# Patient Record
Sex: FEMALE | Race: WHITE | NOT HISPANIC OR LATINO | Employment: OTHER | URBAN - METROPOLITAN AREA
[De-identification: names, ages, dates, MRNs, and addresses within clinical notes are randomized per-mention and may not be internally consistent; named-entity substitution may affect disease eponyms.]

---

## 2019-08-23 ENCOUNTER — OFFICE VISIT - HEALTHEAST (OUTPATIENT)
Dept: FAMILY MEDICINE | Facility: CLINIC | Age: 44
End: 2019-08-23

## 2019-08-23 DIAGNOSIS — Z76.89 ENCOUNTER TO ESTABLISH CARE WITH NEW DOCTOR: ICD-10-CM

## 2019-08-23 DIAGNOSIS — M65.339 TRIGGER MIDDLE FINGER, UNSPECIFIED LATERALITY: ICD-10-CM

## 2019-08-23 DIAGNOSIS — E10.65 UNCONTROLLED TYPE 1 DIABETES MELLITUS WITH HYPERGLYCEMIA (H): ICD-10-CM

## 2019-08-23 RX ORDER — LISINOPRIL 20 MG/1
20 TABLET ORAL DAILY
Status: SHIPPED | COMMUNITY
Start: 2019-03-04 | End: 2022-09-30

## 2019-08-30 ENCOUNTER — RECORDS - HEALTHEAST (OUTPATIENT)
Dept: ADMINISTRATIVE | Facility: OTHER | Age: 44
End: 2019-08-30

## 2019-08-30 ENCOUNTER — COMMUNICATION - HEALTHEAST (OUTPATIENT)
Dept: FAMILY MEDICINE | Facility: CLINIC | Age: 44
End: 2019-08-30

## 2019-09-30 ENCOUNTER — COMMUNICATION - HEALTHEAST (OUTPATIENT)
Dept: FAMILY MEDICINE | Facility: CLINIC | Age: 44
End: 2019-09-30

## 2020-02-21 ENCOUNTER — COMMUNICATION - HEALTHEAST (OUTPATIENT)
Dept: FAMILY MEDICINE | Facility: CLINIC | Age: 45
End: 2020-02-21

## 2020-03-02 ENCOUNTER — COMMUNICATION - HEALTHEAST (OUTPATIENT)
Dept: FAMILY MEDICINE | Facility: CLINIC | Age: 45
End: 2020-03-02

## 2020-10-03 ENCOUNTER — COMMUNICATION - HEALTHEAST (OUTPATIENT)
Dept: SCHEDULING | Facility: CLINIC | Age: 45
End: 2020-10-03

## 2021-05-31 NOTE — PROGRESS NOTES
Assessment/Plan:    1. Encounter to establish care with new doctor  Patient establishing care at our clinic today.  All past medical history reviewed and updated in EMR.  Patient due for Td, administered today.  Patient due for Pap smear but declines today; per chart review Pap history 2018 normal, 2015 normal; patient reports no history of abnormal Pap smears.  - Td, Preservative Free (green label)    2. Trigger middle finger, unspecified laterality  Pt reporting bilateral trigger finger symptoms, hx of this and got cortisone injection 2 years ago that was helpful. Referral to orthopedics for management.  - Ambulatory referral to Orthopedics    3. Uncontrolled type 1 diabetes mellitus with hyperglycemia (H)  Patient with a history of type 1 diabetes.  Updated insulin regimen in chart.  Patient states she is due to see her endocrinologist next month and will have labs drawn at that time.  Most recent A1c per [facility] everywhere was 9.8% on 4/29/2019.  Per chart review patient also has kidney and eye damage from long-standing diabetes.  - insulin glargine (LANTUS; BASAGLAR) 100 unit/mL (3 mL) pen; Inject 20 Units under the skin at bedtime.  Dispense: 5 adj dose pen; Refill: 0      Follow up: when able for pap smear    Scarlett Kim MD  [facility]    Subjective:    Patient ID: Josefa Curiel is a 44 y.o. female is here today for establish care, trigger finger    Trigger finger, hx anxiety/depression  -hx trigger finger bilaterally: got cortisone injection approx 2 years bilaterally, bilateral middle fingers affected, starting to become more of an issue again, becoming bent and more painful  -mood has been doing well, declines therapy referral at this time, taking cymbalta, no side effects or concerns    Treatment team:  Endocrinologist Dr Domingo Costello, apt next month  Eye doctor a couple weeks ago; sees other eye doctor next month      Patient Active Problem List   Diagnosis     LORIN (acute kidney  injury) (H)     ETOH abuse     Cannabis abuse     Essential hypertension     Proteinuria     Intractable nausea and vomiting     Diabetes type 1, uncontrolled (H)     Chronic renal impairment, stage 3 (moderate) (H)     Upper GI bleeding     Proliferative diabetic retinopathy (H)     Nephrosis in diabetes mellitus (H)     Moderate episode of recurrent major depressive disorder (H)     Anxiety     Hyperlipidemia     Vitamin D deficiency     Trigger middle finger     Past Medical History:   Diagnosis Date     LORIN (acute kidney injury) (H)      Anxiety      Cannabis abuse      Depression      DKA (diabetic ketoacidoses) (H)      DKA (diabetic ketoacidoses) (H)      ETOH abuse      HLD (hyperlipidemia)      HTN (hypertension)      Lactic acidosis      Type 1 diabetes (H)      Past Surgical History:   Procedure Laterality Date     ANKLE FRACTURE SURGERY Left      APPENDECTOMY       Current Outpatient Medications on File Prior to Visit   Medication Sig Dispense Refill     atorvastatin (LIPITOR) 80 MG tablet Take 80 mg by mouth at bedtime.       DULoxetine (CYMBALTA) 30 MG capsule Take 60 mg by mouth daily.             insulin lispro (HUMALOG) 100 unit/mL injection Inject 1 Units under the skin 3 (three) times a day before meals. 1 unit for every 10 g CHO             lisinopril (PRINIVIL,ZESTRIL) 20 MG tablet Take 20 mg by mouth daily.       traZODone (DESYREL) 100 MG tablet Take 25-50 mg by mouth at bedtime as needed for sleep.       [DISCONTINUED] insulin glargine (LANTUS) 100 unit/mL injection Inject 17 Units under the skin every morning.       [DISCONTINUED] metoclopramide (REGLAN) 10 MG tablet Take 1 tablet (10 mg total) by mouth every 6 (six) hours. 30 tablet 0     [DISCONTINUED] omeprazole (PRILOSEC) 20 MG capsule Take 1 capsule (20 mg total) by mouth 2 (two) times a day before meals. 60 capsule 0     No current facility-administered medications on file prior to visit.      Allergies   Allergen Reactions     Nickel       Penicillins Rash     Social History     Socioeconomic History     Marital status: Single     Spouse name: Not on file     Number of children: Not on file     Years of education: Not on file     Highest education level: Not on file   Occupational History     Not on file   Social Needs     Financial resource strain: Not on file     Food insecurity:     Worry: Not on file     Inability: Not on file     Transportation needs:     Medical: Not on file     Non-medical: Not on file   Tobacco Use     Smoking status: Current Some Day Smoker     Smokeless tobacco: Never Used     Tobacco comment: occasional   Substance and Sexual Activity     Alcohol use: Yes     Frequency: 4 or more times a week     Drinks per session: 1 or 2     Binge frequency: Never     Drug use: Yes     Types: Marijuana     Comment: 4-5x/week     Sexual activity: Yes     Partners: Male     Birth control/protection: Condom   Lifestyle     Physical activity:     Days per week: Not on file     Minutes per session: Not on file     Stress: Not on file   Relationships     Social connections:     Talks on phone: Not on file     Gets together: Not on file     Attends Advent service: Not on file     Active member of club or organization: Not on file     Attends meetings of clubs or organizations: Not on file     Relationship status: Not on file     Intimate partner violence:     Fear of current or ex partner: Not on file     Emotionally abused: Not on file     Physically abused: Not on file     Forced sexual activity: Not on file   Other Topics Concern     Not on file   Social History Narrative     Not on file     Review of systems is as stated in HPI, and the remainder of the 10 system review is otherwise negative.    Objective:      /86 (Patient Site: Left Arm, Patient Position: Sitting, Cuff Size: Adult Regular)   Pulse 92   Wt 152 lb 8 oz (69.2 kg)   SpO2 99%   BMI 23.88 kg/m      General appearance: awake, NAD  HEENT: atraumatic,  normocephalic, no scleral icterus or injection, ears and nose grossly normal, moist mucous membranes  CV: RRR, no murmurs/rubs/gallops, normal S1 and S2  Lungs: CTAB, no wheezes or crackles, breathing comfortably on room air  Extremities: moving all extremities  Neuro: alert, oriented x3, CNs grossly intact, no focal deficits appreciated  Psych: normal mood/affect/behavior, answering questions appropriately, linear thought process

## 2021-06-01 ENCOUNTER — RECORDS - HEALTHEAST (OUTPATIENT)
Dept: ADMINISTRATIVE | Facility: CLINIC | Age: 46
End: 2021-06-01

## 2021-06-03 VITALS — WEIGHT: 152.5 LBS | BODY MASS INDEX: 23.88 KG/M2

## 2021-06-12 ENCOUNTER — HEALTH MAINTENANCE LETTER (OUTPATIENT)
Age: 46
End: 2021-06-12

## 2021-06-15 PROBLEM — N18.30 CHRONIC RENAL IMPAIRMENT, STAGE 3 (MODERATE) (H): Status: ACTIVE | Noted: 2017-09-13

## 2021-06-16 PROBLEM — E10.11 DIABETIC KETOACIDOSIS WITH COMA ASSOCIATED WITH TYPE 1 DIABETES MELLITUS (H): Status: ACTIVE | Noted: 2020-10-01

## 2021-06-16 PROBLEM — M65.339 TRIGGER MIDDLE FINGER: Status: ACTIVE | Noted: 2019-08-23

## 2021-06-16 PROBLEM — F33.1 MODERATE EPISODE OF RECURRENT MAJOR DEPRESSIVE DISORDER (H): Status: ACTIVE | Noted: 2017-09-27

## 2021-06-16 PROBLEM — E78.5 HYPERLIPIDEMIA: Status: ACTIVE | Noted: 2019-08-23

## 2021-06-16 PROBLEM — F41.9 ANXIETY: Status: ACTIVE | Noted: 2017-09-27

## 2021-06-16 PROBLEM — K92.2 UPPER GI BLEEDING: Status: ACTIVE | Noted: 2018-03-23

## 2021-06-19 NOTE — LETTER
Letter by Scarlett Kim MD at      Author: Scarlett Kim MD Service: -- Author Type: --    Filed:  Encounter Date: 8/30/2019 Status: (Other)         Josefa Curiel  [address]  [facility] [address]      August 30, 2019      Dear Josefa    In reviewing your records, we have determined a gap in your preventive services. Based on your age and health history, we recommend the follow service.       ? Physical with a Pap Smear        If you have had the service elsewhere, please contact us so we can update our records. Please let us know if you have transferred your care to another clinic.    Please call [phone] to schedule this appointment.    We believe that a strong preventive care program, including regular physicals and follow-up care is an important part of a healthy lifestyle and we are committed to helping you maintain your health.    Thank you for choosing us as your health care provider.    Sincerely,     [facility]

## 2021-06-19 NOTE — LETTER
Letter by Adalgisa Anand CMA at      Author: Adalgisa Anand CMA Service: -- Author Type: --    Filed:  Encounter Date: 9/30/2019 Status: Signed         Josefa Curiel  [address]  [facility] [address]      September 30, 2019      Dear Joesfa    In reviewing your records, we have determined a gap in your preventive services. Based on your age and health history, we recommend the follow service.     ? General Physical    ? Physical with a Pap Smear    ? Diabetic check- Not meeting goal please come to the clinic for Diabetes labs         If you have had the service elsewhere, please contact us so we can update our records. Please let us know if you have transferred your care to another clinic.    Please call [phone] to schedule this appointment.    We believe that a strong preventive care program, including regular physicals and follow-up care is an important part of a healthy lifestyle and we are committed to helping you maintain your health.    Thank you for choosing us as your health care provider.    Sincerely,     [facility]

## 2021-10-02 ENCOUNTER — HEALTH MAINTENANCE LETTER (OUTPATIENT)
Age: 46
End: 2021-10-02

## 2022-01-16 ENCOUNTER — HEALTH MAINTENANCE LETTER (OUTPATIENT)
Age: 47
End: 2022-01-16

## 2022-02-17 PROBLEM — E11.10 DKA (DIABETIC KETOACIDOSIS) (H): Status: RESOLVED | Noted: 2017-04-09 | Resolved: 2018-03-23

## 2022-07-03 ENCOUNTER — HEALTH MAINTENANCE LETTER (OUTPATIENT)
Age: 47
End: 2022-07-03

## 2022-08-28 ENCOUNTER — HEALTH MAINTENANCE LETTER (OUTPATIENT)
Age: 47
End: 2022-08-28

## 2022-09-28 LAB — GLUCOSE BLDC GLUCOMTR-MCNC: 86 MG/DL (ref 70–99)

## 2022-09-28 PROCEDURE — 99285 EMERGENCY DEPT VISIT HI MDM: CPT | Mod: 25

## 2022-09-28 RX ORDER — ONDANSETRON 4 MG/1
4 TABLET, ORALLY DISINTEGRATING ORAL ONCE
Status: COMPLETED | OUTPATIENT
Start: 2022-09-28 | End: 2022-09-29

## 2022-09-29 ENCOUNTER — HOSPITAL ENCOUNTER (INPATIENT)
Facility: HOSPITAL | Age: 47
LOS: 1 days | Discharge: HOME | DRG: 683 | End: 2022-09-30
Attending: EMERGENCY MEDICINE | Admitting: HOSPITALIST
Payer: COMMERCIAL

## 2022-09-29 DIAGNOSIS — F10.939 ALCOHOL WITHDRAWAL, WITH UNSPECIFIED COMPLICATION (H): ICD-10-CM

## 2022-09-29 DIAGNOSIS — N17.9 ACUTE KIDNEY INJURY (H): ICD-10-CM

## 2022-09-29 DIAGNOSIS — E10.65 UNCONTROLLED TYPE 1 DIABETES MELLITUS WITH HYPERGLYCEMIA (H): ICD-10-CM

## 2022-09-29 DIAGNOSIS — E16.2 HYPOGLYCEMIA: ICD-10-CM

## 2022-09-29 DIAGNOSIS — I16.0 HYPERTENSIVE URGENCY: ICD-10-CM

## 2022-09-29 DIAGNOSIS — R11.2 NAUSEA AND VOMITING, INTRACTABILITY OF VOMITING NOT SPECIFIED, UNSPECIFIED VOMITING TYPE: ICD-10-CM

## 2022-09-29 DIAGNOSIS — E86.0 DEHYDRATION: ICD-10-CM

## 2022-09-29 DIAGNOSIS — F10.10 ETOH ABUSE: ICD-10-CM

## 2022-09-29 DIAGNOSIS — R52 PAIN: Primary | ICD-10-CM

## 2022-09-29 PROBLEM — D72.829 LEUKOCYTOSIS: Status: ACTIVE | Noted: 2022-09-29

## 2022-09-29 PROBLEM — F10.20 ALCOHOL USE DISORDER, MODERATE, DEPENDENCE (H): Status: ACTIVE | Noted: 2022-09-29

## 2022-09-29 LAB
ALBUMIN UR-MCNC: 600 MG/DL
ANION GAP SERPL CALCULATED.3IONS-SCNC: 15 MMOL/L (ref 7–15)
APPEARANCE UR: ABNORMAL
BACTERIA #/AREA URNS HPF: ABNORMAL /HPF
BASE EXCESS BLDV CALC-SCNC: 3.6 MMOL/L
BASOPHILS # BLD AUTO: 0.1 10E3/UL (ref 0–0.2)
BASOPHILS NFR BLD AUTO: 0 %
BILIRUB UR QL STRIP: NEGATIVE
BUN SERPL-MCNC: 32.3 MG/DL (ref 6–20)
CALCIUM SERPL-MCNC: 9.4 MG/DL (ref 8.6–10)
CHLORIDE SERPL-SCNC: 96 MMOL/L (ref 98–107)
COLOR UR AUTO: ABNORMAL
CREAT SERPL-MCNC: 3.3 MG/DL (ref 0.51–0.95)
DEPRECATED HCO3 PLAS-SCNC: 31 MMOL/L (ref 22–29)
EOSINOPHIL # BLD AUTO: 0 10E3/UL (ref 0–0.7)
EOSINOPHIL NFR BLD AUTO: 0 %
ERYTHROCYTE [DISTWIDTH] IN BLOOD BY AUTOMATED COUNT: 13 % (ref 10–15)
ETHANOL SERPL-MCNC: <0.01 G/DL
GFR SERPL CREATININE-BSD FRML MDRD: 17 ML/MIN/1.73M2
GLUCOSE BLDC GLUCOMTR-MCNC: 102 MG/DL (ref 70–99)
GLUCOSE BLDC GLUCOMTR-MCNC: 110 MG/DL (ref 70–99)
GLUCOSE BLDC GLUCOMTR-MCNC: 115 MG/DL (ref 70–99)
GLUCOSE BLDC GLUCOMTR-MCNC: 117 MG/DL (ref 70–99)
GLUCOSE BLDC GLUCOMTR-MCNC: 131 MG/DL (ref 70–99)
GLUCOSE BLDC GLUCOMTR-MCNC: 148 MG/DL (ref 70–99)
GLUCOSE BLDC GLUCOMTR-MCNC: 160 MG/DL (ref 70–99)
GLUCOSE BLDC GLUCOMTR-MCNC: 166 MG/DL (ref 70–99)
GLUCOSE BLDC GLUCOMTR-MCNC: 220 MG/DL (ref 70–99)
GLUCOSE BLDC GLUCOMTR-MCNC: 31 MG/DL (ref 70–99)
GLUCOSE BLDC GLUCOMTR-MCNC: 45 MG/DL (ref 70–99)
GLUCOSE BLDC GLUCOMTR-MCNC: 47 MG/DL (ref 70–99)
GLUCOSE BLDC GLUCOMTR-MCNC: 70 MG/DL (ref 70–99)
GLUCOSE BLDC GLUCOMTR-MCNC: 77 MG/DL (ref 70–99)
GLUCOSE BLDC GLUCOMTR-MCNC: 91 MG/DL (ref 70–99)
GLUCOSE SERPL-MCNC: 57 MG/DL (ref 70–99)
GLUCOSE UR STRIP-MCNC: 150 MG/DL
HCO3 BLDV-SCNC: 26 MMOL/L (ref 24–30)
HCT VFR BLD AUTO: 37.6 % (ref 35–47)
HGB BLD-MCNC: 13.1 G/DL (ref 11.7–15.7)
HGB UR QL STRIP: ABNORMAL
IMM GRANULOCYTES # BLD: 0.1 10E3/UL
IMM GRANULOCYTES NFR BLD: 0 %
KETONES UR STRIP-MCNC: ABNORMAL MG/DL
LEUKOCYTE ESTERASE UR QL STRIP: NEGATIVE
LYMPHOCYTES # BLD AUTO: 1 10E3/UL (ref 0.8–5.3)
LYMPHOCYTES NFR BLD AUTO: 6 %
MAGNESIUM SERPL-MCNC: 2 MG/DL (ref 1.7–2.3)
MCH RBC QN AUTO: 29.8 PG (ref 26.5–33)
MCHC RBC AUTO-ENTMCNC: 34.8 G/DL (ref 31.5–36.5)
MCV RBC AUTO: 86 FL (ref 78–100)
MONOCYTES # BLD AUTO: 0.6 10E3/UL (ref 0–1.3)
MONOCYTES NFR BLD AUTO: 4 %
MUCOUS THREADS #/AREA URNS LPF: PRESENT /LPF
NEUTROPHILS # BLD AUTO: 14.4 10E3/UL (ref 1.6–8.3)
NEUTROPHILS NFR BLD AUTO: 90 %
NITRATE UR QL: NEGATIVE
NRBC # BLD AUTO: 0 10E3/UL
NRBC BLD AUTO-RTO: 0 /100
OXYHGB MFR BLDV: 45 % (ref 70–75)
PCO2 BLDV: 51 MM HG (ref 35–50)
PH BLDV: 7.37 [PH] (ref 7.35–7.45)
PH UR STRIP: 7 [PH] (ref 5–7)
PHOSPHATE SERPL-MCNC: 3.8 MG/DL (ref 2.5–4.5)
PLATELET # BLD AUTO: 426 10E3/UL (ref 150–450)
PO2 BLDV: 27 MM HG (ref 25–47)
POTASSIUM SERPL-SCNC: 3.1 MMOL/L (ref 3.4–5.3)
POTASSIUM SERPL-SCNC: 3.4 MMOL/L (ref 3.4–5.3)
POTASSIUM SERPL-SCNC: 3.6 MMOL/L (ref 3.4–5.3)
RBC # BLD AUTO: 4.4 10E6/UL (ref 3.8–5.2)
RBC URINE: 1 /HPF
SAO2 % BLDV: 45.5 % (ref 70–75)
SARS-COV-2 RNA RESP QL NAA+PROBE: NEGATIVE
SODIUM SERPL-SCNC: 142 MMOL/L (ref 136–145)
SP GR UR STRIP: 1.01 (ref 1–1.03)
SQUAMOUS EPITHELIAL: 6 /HPF
TROPONIN T SERPL HS-MCNC: 22 NG/L
TROPONIN T SERPL HS-MCNC: 28 NG/L
UROBILINOGEN UR STRIP-MCNC: <2 MG/DL
WBC # BLD AUTO: 15.9 10E3/UL (ref 4–11)
WBC URINE: 8 /HPF

## 2022-09-29 PROCEDURE — 250N000009 HC RX 250: Performed by: HOSPITALIST

## 2022-09-29 PROCEDURE — 250N000011 HC RX IP 250 OP 636: Performed by: EMERGENCY MEDICINE

## 2022-09-29 PROCEDURE — 250N000012 HC RX MED GY IP 250 OP 636 PS 637: Performed by: INTERNAL MEDICINE

## 2022-09-29 PROCEDURE — 250N000011 HC RX IP 250 OP 636: Performed by: STUDENT IN AN ORGANIZED HEALTH CARE EDUCATION/TRAINING PROGRAM

## 2022-09-29 PROCEDURE — 250N000011 HC RX IP 250 OP 636: Performed by: HOSPITALIST

## 2022-09-29 PROCEDURE — 99207 PR APP CREDIT; MD BILLING SHARED VISIT: CPT | Performed by: INTERNAL MEDICINE

## 2022-09-29 PROCEDURE — 96366 THER/PROPH/DIAG IV INF ADDON: CPT

## 2022-09-29 PROCEDURE — 84484 ASSAY OF TROPONIN QUANT: CPT | Performed by: INTERNAL MEDICINE

## 2022-09-29 PROCEDURE — 80048 BASIC METABOLIC PNL TOTAL CA: CPT | Performed by: EMERGENCY MEDICINE

## 2022-09-29 PROCEDURE — 250N000013 HC RX MED GY IP 250 OP 250 PS 637: Performed by: INTERNAL MEDICINE

## 2022-09-29 PROCEDURE — 258N000001 HC RX 258: Performed by: EMERGENCY MEDICINE

## 2022-09-29 PROCEDURE — 250N000013 HC RX MED GY IP 250 OP 250 PS 637: Performed by: HOSPITALIST

## 2022-09-29 PROCEDURE — 258N000001 HC RX 258

## 2022-09-29 PROCEDURE — 85025 COMPLETE CBC W/AUTO DIFF WBC: CPT | Performed by: EMERGENCY MEDICINE

## 2022-09-29 PROCEDURE — 250N000013 HC RX MED GY IP 250 OP 250 PS 637: Performed by: EMERGENCY MEDICINE

## 2022-09-29 PROCEDURE — 96372 THER/PROPH/DIAG INJ SC/IM: CPT | Mod: 59

## 2022-09-29 PROCEDURE — 36415 COLL VENOUS BLD VENIPUNCTURE: CPT | Performed by: STUDENT IN AN ORGANIZED HEALTH CARE EDUCATION/TRAINING PROGRAM

## 2022-09-29 PROCEDURE — 36415 COLL VENOUS BLD VENIPUNCTURE: CPT | Performed by: INTERNAL MEDICINE

## 2022-09-29 PROCEDURE — 36415 COLL VENOUS BLD VENIPUNCTURE: CPT | Performed by: EMERGENCY MEDICINE

## 2022-09-29 PROCEDURE — 93005 ELECTROCARDIOGRAM TRACING: CPT | Performed by: EMERGENCY MEDICINE

## 2022-09-29 PROCEDURE — 258N000003 HC RX IP 258 OP 636: Performed by: EMERGENCY MEDICINE

## 2022-09-29 PROCEDURE — 80048 BASIC METABOLIC PNL TOTAL CA: CPT | Performed by: STUDENT IN AN ORGANIZED HEALTH CARE EDUCATION/TRAINING PROGRAM

## 2022-09-29 PROCEDURE — 82805 BLOOD GASES W/O2 SATURATION: CPT | Performed by: EMERGENCY MEDICINE

## 2022-09-29 PROCEDURE — C9803 HOPD COVID-19 SPEC COLLECT: HCPCS

## 2022-09-29 PROCEDURE — 96375 TX/PRO/DX INJ NEW DRUG ADDON: CPT

## 2022-09-29 PROCEDURE — 96365 THER/PROPH/DIAG IV INF INIT: CPT

## 2022-09-29 PROCEDURE — 83735 ASSAY OF MAGNESIUM: CPT | Performed by: HOSPITALIST

## 2022-09-29 PROCEDURE — 81001 URINALYSIS AUTO W/SCOPE: CPT | Performed by: EMERGENCY MEDICINE

## 2022-09-29 PROCEDURE — 84484 ASSAY OF TROPONIN QUANT: CPT | Performed by: EMERGENCY MEDICINE

## 2022-09-29 PROCEDURE — 99223 1ST HOSP IP/OBS HIGH 75: CPT | Mod: AI | Performed by: HOSPITALIST

## 2022-09-29 PROCEDURE — 258N000003 HC RX IP 258 OP 636: Performed by: INTERNAL MEDICINE

## 2022-09-29 PROCEDURE — 84132 ASSAY OF SERUM POTASSIUM: CPT | Performed by: INTERNAL MEDICINE

## 2022-09-29 PROCEDURE — 96376 TX/PRO/DX INJ SAME DRUG ADON: CPT

## 2022-09-29 PROCEDURE — 258N000003 HC RX IP 258 OP 636: Performed by: STUDENT IN AN ORGANIZED HEALTH CARE EDUCATION/TRAINING PROGRAM

## 2022-09-29 PROCEDURE — 258N000003 HC RX IP 258 OP 636: Performed by: HOSPITALIST

## 2022-09-29 PROCEDURE — 84100 ASSAY OF PHOSPHORUS: CPT | Performed by: HOSPITALIST

## 2022-09-29 PROCEDURE — 96361 HYDRATE IV INFUSION ADD-ON: CPT

## 2022-09-29 PROCEDURE — 96367 TX/PROPH/DG ADDL SEQ IV INF: CPT

## 2022-09-29 PROCEDURE — 85025 COMPLETE CBC W/AUTO DIFF WBC: CPT | Performed by: STUDENT IN AN ORGANIZED HEALTH CARE EDUCATION/TRAINING PROGRAM

## 2022-09-29 PROCEDURE — 82077 ASSAY SPEC XCP UR&BREATH IA: CPT | Performed by: EMERGENCY MEDICINE

## 2022-09-29 PROCEDURE — U0005 INFEC AGEN DETEC AMPLI PROBE: HCPCS | Performed by: EMERGENCY MEDICINE

## 2022-09-29 PROCEDURE — HZ2ZZZZ DETOXIFICATION SERVICES FOR SUBSTANCE ABUSE TREATMENT: ICD-10-PCS | Performed by: HOSPITALIST

## 2022-09-29 PROCEDURE — 120N000001 HC R&B MED SURG/OB

## 2022-09-29 RX ORDER — AMOXICILLIN 250 MG
2 CAPSULE ORAL 2 TIMES DAILY PRN
Status: DISCONTINUED | OUTPATIENT
Start: 2022-09-29 | End: 2022-09-30 | Stop reason: HOSPADM

## 2022-09-29 RX ORDER — POTASSIUM CHLORIDE 1500 MG/1
20 TABLET, EXTENDED RELEASE ORAL ONCE
Status: COMPLETED | OUTPATIENT
Start: 2022-09-29 | End: 2022-09-29

## 2022-09-29 RX ORDER — NICOTINE POLACRILEX 4 MG
15-30 LOZENGE BUCCAL
Status: DISCONTINUED | OUTPATIENT
Start: 2022-09-29 | End: 2022-09-30 | Stop reason: HOSPADM

## 2022-09-29 RX ORDER — DULOXETIN HYDROCHLORIDE 60 MG/1
60 CAPSULE, DELAYED RELEASE ORAL EVERY EVENING
Status: DISCONTINUED | OUTPATIENT
Start: 2022-09-29 | End: 2022-09-30 | Stop reason: HOSPADM

## 2022-09-29 RX ORDER — DEXTROSE MONOHYDRATE 25 G/50ML
INJECTION, SOLUTION INTRAVENOUS
Status: COMPLETED
Start: 2022-09-29 | End: 2022-09-29

## 2022-09-29 RX ORDER — DIAZEPAM 10 MG/2ML
2.5 INJECTION, SOLUTION INTRAMUSCULAR; INTRAVENOUS ONCE
Status: COMPLETED | OUTPATIENT
Start: 2022-09-29 | End: 2022-09-29

## 2022-09-29 RX ORDER — FLUMAZENIL 0.1 MG/ML
0.2 INJECTION, SOLUTION INTRAVENOUS
Status: DISCONTINUED | OUTPATIENT
Start: 2022-09-29 | End: 2022-09-30 | Stop reason: HOSPADM

## 2022-09-29 RX ORDER — PROCHLORPERAZINE 25 MG
25 SUPPOSITORY, RECTAL RECTAL EVERY 12 HOURS PRN
Status: DISCONTINUED | OUTPATIENT
Start: 2022-09-29 | End: 2022-09-30 | Stop reason: HOSPADM

## 2022-09-29 RX ORDER — ACETAMINOPHEN 650 MG/1
650 SUPPOSITORY RECTAL EVERY 6 HOURS PRN
Status: DISCONTINUED | OUTPATIENT
Start: 2022-09-29 | End: 2022-09-30 | Stop reason: HOSPADM

## 2022-09-29 RX ORDER — HALOPERIDOL 5 MG/ML
2.5-5 INJECTION INTRAMUSCULAR EVERY 6 HOURS PRN
Status: DISCONTINUED | OUTPATIENT
Start: 2022-09-29 | End: 2022-09-30 | Stop reason: HOSPADM

## 2022-09-29 RX ORDER — FOLIC ACID 1 MG/1
1 TABLET ORAL DAILY
Status: DISCONTINUED | OUTPATIENT
Start: 2022-09-30 | End: 2022-09-30 | Stop reason: HOSPADM

## 2022-09-29 RX ORDER — LIDOCAINE 40 MG/G
CREAM TOPICAL
Status: DISCONTINUED | OUTPATIENT
Start: 2022-09-29 | End: 2022-09-30 | Stop reason: HOSPADM

## 2022-09-29 RX ORDER — ROSUVASTATIN CALCIUM 40 MG/1
40 TABLET, COATED ORAL DAILY
COMMUNITY
End: 2023-07-14

## 2022-09-29 RX ORDER — ONDANSETRON 4 MG/1
4 TABLET, ORALLY DISINTEGRATING ORAL ONCE
Status: COMPLETED | OUTPATIENT
Start: 2022-09-29 | End: 2022-09-29

## 2022-09-29 RX ORDER — ACETAMINOPHEN 325 MG/1
650 TABLET ORAL EVERY 6 HOURS PRN
Status: DISCONTINUED | OUTPATIENT
Start: 2022-09-29 | End: 2022-09-30 | Stop reason: HOSPADM

## 2022-09-29 RX ORDER — DIAZEPAM 10 MG/2ML
5-10 INJECTION, SOLUTION INTRAMUSCULAR; INTRAVENOUS EVERY 30 MIN PRN
Status: DISCONTINUED | OUTPATIENT
Start: 2022-09-29 | End: 2022-09-30 | Stop reason: HOSPADM

## 2022-09-29 RX ORDER — METOCLOPRAMIDE HYDROCHLORIDE 5 MG/ML
5 INJECTION INTRAMUSCULAR; INTRAVENOUS EVERY 6 HOURS PRN
Status: DISCONTINUED | OUTPATIENT
Start: 2022-09-29 | End: 2022-09-30 | Stop reason: HOSPADM

## 2022-09-29 RX ORDER — DIAZEPAM 5 MG
10 TABLET ORAL EVERY 30 MIN PRN
Status: DISCONTINUED | OUTPATIENT
Start: 2022-09-29 | End: 2022-09-30 | Stop reason: HOSPADM

## 2022-09-29 RX ORDER — AMOXICILLIN 250 MG
1 CAPSULE ORAL 2 TIMES DAILY PRN
Status: DISCONTINUED | OUTPATIENT
Start: 2022-09-29 | End: 2022-09-30 | Stop reason: HOSPADM

## 2022-09-29 RX ORDER — OLANZAPINE 5 MG/1
5-10 TABLET, ORALLY DISINTEGRATING ORAL EVERY 6 HOURS PRN
Status: DISCONTINUED | OUTPATIENT
Start: 2022-09-29 | End: 2022-09-30 | Stop reason: HOSPADM

## 2022-09-29 RX ORDER — DEXTROSE MONOHYDRATE 25 G/50ML
25-50 INJECTION, SOLUTION INTRAVENOUS
Status: DISCONTINUED | OUTPATIENT
Start: 2022-09-29 | End: 2022-09-30 | Stop reason: HOSPADM

## 2022-09-29 RX ORDER — SODIUM CHLORIDE 9 MG/ML
INJECTION, SOLUTION INTRAVENOUS CONTINUOUS
Status: DISCONTINUED | OUTPATIENT
Start: 2022-09-29 | End: 2022-09-29

## 2022-09-29 RX ORDER — PROCHLORPERAZINE MALEATE 10 MG
10 TABLET ORAL EVERY 6 HOURS PRN
Status: DISCONTINUED | OUTPATIENT
Start: 2022-09-29 | End: 2022-09-30 | Stop reason: HOSPADM

## 2022-09-29 RX ORDER — DIAZEPAM 10 MG/2ML
5 INJECTION, SOLUTION INTRAMUSCULAR; INTRAVENOUS ONCE
Status: COMPLETED | OUTPATIENT
Start: 2022-09-29 | End: 2022-09-29

## 2022-09-29 RX ORDER — CLONIDINE HYDROCHLORIDE 0.1 MG/1
0.1 TABLET ORAL EVERY 4 HOURS PRN
Status: DISCONTINUED | OUTPATIENT
Start: 2022-09-29 | End: 2022-09-30 | Stop reason: HOSPADM

## 2022-09-29 RX ORDER — CLONIDINE HYDROCHLORIDE 0.1 MG/1
0.1 TABLET ORAL 3 TIMES DAILY
Status: DISCONTINUED | OUTPATIENT
Start: 2022-09-29 | End: 2022-09-30

## 2022-09-29 RX ORDER — POTASSIUM CHLORIDE 1500 MG/1
40 TABLET, EXTENDED RELEASE ORAL ONCE
Status: COMPLETED | OUTPATIENT
Start: 2022-09-29 | End: 2022-09-29

## 2022-09-29 RX ORDER — HYDRALAZINE HYDROCHLORIDE 20 MG/ML
10 INJECTION INTRAMUSCULAR; INTRAVENOUS EVERY 6 HOURS PRN
Status: DISCONTINUED | OUTPATIENT
Start: 2022-09-29 | End: 2022-09-30 | Stop reason: HOSPADM

## 2022-09-29 RX ORDER — FAMOTIDINE 20 MG/1
20 TABLET, FILM COATED ORAL 2 TIMES DAILY
Status: DISCONTINUED | OUTPATIENT
Start: 2022-09-29 | End: 2022-09-30 | Stop reason: HOSPADM

## 2022-09-29 RX ORDER — ROSUVASTATIN CALCIUM 40 MG/1
40 TABLET, COATED ORAL DAILY
Status: DISCONTINUED | OUTPATIENT
Start: 2022-09-30 | End: 2022-09-30 | Stop reason: HOSPADM

## 2022-09-29 RX ORDER — TRAZODONE HYDROCHLORIDE 50 MG/1
50 TABLET, FILM COATED ORAL
Status: DISCONTINUED | OUTPATIENT
Start: 2022-09-29 | End: 2022-09-30 | Stop reason: HOSPADM

## 2022-09-29 RX ORDER — MULTIPLE VITAMINS W/ MINERALS TAB 9MG-400MCG
1 TAB ORAL DAILY
Status: DISCONTINUED | OUTPATIENT
Start: 2022-09-30 | End: 2022-09-30 | Stop reason: HOSPADM

## 2022-09-29 RX ADMIN — SODIUM CHLORIDE 1000 ML: 9 INJECTION, SOLUTION INTRAVENOUS at 01:15

## 2022-09-29 RX ADMIN — INSULIN GLARGINE 20 UNITS: 100 INJECTION, SOLUTION SUBCUTANEOUS at 23:38

## 2022-09-29 RX ADMIN — FAMOTIDINE 20 MG: 20 TABLET ORAL at 20:12

## 2022-09-29 RX ADMIN — DEXTROSE 15 G: 15 GEL ORAL at 10:46

## 2022-09-29 RX ADMIN — PROCHLORPERAZINE EDISYLATE 10 MG: 5 INJECTION INTRAMUSCULAR; INTRAVENOUS at 04:14

## 2022-09-29 RX ADMIN — CLONIDINE HYDROCHLORIDE 0.1 MG: 0.1 TABLET ORAL at 18:00

## 2022-09-29 RX ADMIN — ONDANSETRON 4 MG: 4 TABLET, ORALLY DISINTEGRATING ORAL at 03:00

## 2022-09-29 RX ADMIN — DEXTROSE AND SODIUM CHLORIDE: 5; 900 INJECTION, SOLUTION INTRAVENOUS at 18:18

## 2022-09-29 RX ADMIN — PROCHLORPERAZINE MALEATE 10 MG: 10 TABLET ORAL at 15:20

## 2022-09-29 RX ADMIN — DEXTROSE MONOHYDRATE 25 ML: 25 INJECTION, SOLUTION INTRAVENOUS at 15:31

## 2022-09-29 RX ADMIN — DEXTROSE MONOHYDRATE 50 ML: 25 INJECTION, SOLUTION INTRAVENOUS at 01:21

## 2022-09-29 RX ADMIN — POTASSIUM CHLORIDE 40 MEQ: 1500 TABLET, EXTENDED RELEASE ORAL at 20:12

## 2022-09-29 RX ADMIN — DEXTROSE MONOHYDRATE 50 ML: 25 INJECTION, SOLUTION INTRAVENOUS at 04:50

## 2022-09-29 RX ADMIN — SODIUM CHLORIDE: 9 INJECTION, SOLUTION INTRAVENOUS at 17:20

## 2022-09-29 RX ADMIN — DIAZEPAM 2.5 MG: 5 INJECTION, SOLUTION INTRAMUSCULAR; INTRAVENOUS at 04:13

## 2022-09-29 RX ADMIN — POTASSIUM CHLORIDE 20 MEQ: 1500 TABLET, EXTENDED RELEASE ORAL at 17:20

## 2022-09-29 RX ADMIN — DIAZEPAM 5 MG: 5 INJECTION, SOLUTION INTRAMUSCULAR; INTRAVENOUS at 03:01

## 2022-09-29 RX ADMIN — DIAZEPAM 10 MG: 5 TABLET ORAL at 17:32

## 2022-09-29 RX ADMIN — FAMOTIDINE 20 MG: 20 TABLET ORAL at 10:47

## 2022-09-29 RX ADMIN — POTASSIUM CHLORIDE 20 MEQ: 1500 TABLET, EXTENDED RELEASE ORAL at 23:38

## 2022-09-29 RX ADMIN — DULOXETINE HYDROCHLORIDE 60 MG: 60 CAPSULE, DELAYED RELEASE ORAL at 20:35

## 2022-09-29 RX ADMIN — ONDANSETRON 4 MG: 4 TABLET, ORALLY DISINTEGRATING ORAL at 00:08

## 2022-09-29 RX ADMIN — HYDRALAZINE HYDROCHLORIDE 10 MG: 20 INJECTION, SOLUTION INTRAMUSCULAR; INTRAVENOUS at 14:37

## 2022-09-29 RX ADMIN — SODIUM CHLORIDE 1000 ML: 9 INJECTION, SOLUTION INTRAVENOUS at 00:06

## 2022-09-29 RX ADMIN — FOLIC ACID: 5 INJECTION, SOLUTION INTRAMUSCULAR; INTRAVENOUS; SUBCUTANEOUS at 05:28

## 2022-09-29 ASSESSMENT — ACTIVITIES OF DAILY LIVING (ADL)
ADLS_ACUITY_SCORE: 37
ADLS_ACUITY_SCORE: 35
ADLS_ACUITY_SCORE: 37
ADLS_ACUITY_SCORE: 35
ADLS_ACUITY_SCORE: 37
ADLS_ACUITY_SCORE: 35
ADLS_ACUITY_SCORE: 37
ADLS_ACUITY_SCORE: 35
ADLS_ACUITY_SCORE: 37

## 2022-09-29 ASSESSMENT — ENCOUNTER SYMPTOMS
FEVER: 0
VOMITING: 1
DIARRHEA: 0
ABDOMINAL PAIN: 1

## 2022-09-29 NOTE — PHARMACY-ADMISSION MEDICATION HISTORY
Pharmacy Note - Admission Medication History    Pertinent Provider Information: Patient was in the process of transitioning from Lantus to Tresiba for long acting insulin. Currently uses Lantus 20 units subcutaneous at bedtime. Patient did report administering before presentation. She also has a Dexcom G6 CGM, she has been unable to charge her phone which has caused her to not be able to track her BG. She uses 1 unit of humalog for every 15 g CHO, due to low food intake she told me the last time she used any rapid-acting insulin was Monday. In the past she has been prescribed Clonidine and Amlodipine for BP control, crestor for HLD but hasn't been consistent taking them.      ______________________________________________________________________    Prior To Admission (PTA) med list completed and updated in EMR.       PTA Med List   Medication Sig Note Last Dose     DULoxetine (CYMBALTA) 60 MG capsule [DULOXETINE (CYMBALTA) 30 MG CAPSULE] Take 60 mg by mouth every evening.   Past Week at Unknown time     insulin glargine (LANTUS; BASAGLAR) 100 unit/mL (3 mL) pen [INSULIN GLARGINE (LANTUS; BASAGLAR) 100 UNIT/ML (3 ML) PEN] Inject 20 Units under the skin at bedtime.  9/28/2022 at PM     insulin lispro (HUMALOG) 100 unit/mL injection [INSULIN LISPRO (HUMALOG) 100 UNIT/ML INJECTION] Inject 1 Units under the skin 3 (three) times a day before meals. 1 unit for every 15 g CHO 9/29/2022: 1 unit for every 15 g CHO, patient reports low PO intake so hasn't been taking this consistently  Past Week at Unknown time     lisinopril (PRINIVIL,ZESTRIL) 20 MG tablet [LISINOPRIL (PRINIVIL,ZESTRIL) 20 MG TABLET] Take 20 mg by mouth daily.  More than a month at Unknown time     traZODone (DESYREL) 100 MG tablet Take 50 mg by mouth nightly as needed  Past Week at Unknown time       Information source(s): Patient and CareEverywhere/SureScripts  Method of interview communication: in-person    Summary of Changes to PTA Med List  New:  None  Discontinued: None  Changed: Cymbalta    Patient was asked about OTC/herbal products specifically.  PTA med list reflects this.    In the past week, patient estimated taking medication this percent of the time:  50-90% due to cost, running out and illness.    Allergies were reviewed, assessed, and updated with the patient.      Patient did not bring any medications to the hospital and can't retrieve from home. No multi-dose medications are available for use during hospital stay.     The information provided in this note is only as accurate as the sources available at the time of the update(s).    Thank you for the opportunity to participate in the care of this patient.    PATRICIA BARRERA [facility]  9/29/2022 8:37 AM

## 2022-09-29 NOTE — ED PROVIDER NOTES
EMERGENCY DEPARTMENT ENCOUNTER      NAME: Josefa Curiel  AGE: 47 year old female  YOB: 1975  MRN: [id number]  EVALUATION DATE & TIME: 2022 12:53 AM    PCP: Domingo Costello    ED PROVIDER: Courtney Drew M.D.      Chief Complaint   Patient presents with     Dehydration     Blood Sugar Problem         FINAL IMPRESSION:  1. Hypoglycemia    2. Dehydration    3. Acute kidney injury (H)    4. Alcohol withdrawal, with unspecified complication (H)        MEDICAL DECISION MAKIN:27 AM I met with the patient, obtained history, performed an initial exam, and discussed options and plan for diagnostics and treatment here in the ED.   1:55 AM I spoke with the hospitalist, Dr. Hein. We discussed the patient's case, and they agree to admit the patient.     Pertinent Labs & Imaging studies reviewed. (See chart for details)     Josefa Curiel is a 47 year old female who presents with hyperglycemia.  Vital signs show tachycardia and hypertension.  She uses alcohol regularly.  Denies use tonight.  Nausea and vomiting for 2 days.  No difficulty breathing.  Her abdominal exam does not show any focal tenderness.  She denies urinary symptoms for me.  She also denies respiratory symptoms.  Unclear the etiology of her nausea and vomiting but I will get labs, put her on a hypoglycemic protocol, give her IV fluids, antiemetics.    Despite these interventions, she did drop again to a glucose of 31.  She became diaphoretic, altered and fatigued.  Immediately an amp of D50 was administered with good response.  Additional labs show that she is severely dehydrated with an increase of her creatinine from 1.2-3.3.  She has had nausea and vomiting for several days.  I do not see an underlying cause of these symptoms aside from ongoing use of insulin while vomiting.  She does have a history of alcohol use.  Alcohol was undetectable today.  However, this could be related to alcohol withdrawal.  I will give her Ativan  for withdrawal symptoms, continue IV fluids and admit her to the hospital.    I discussed with Dr. Hein who accepted the patient for further cares.      30 minutes of critical care time     MEDICATIONS GIVEN IN THE EMERGENCY:  Medications   glucose gel 15-30 g ( Oral See Alternative 9/29/22 0450)     Or   dextrose 50 % injection 25-50 mL (50 mLs Intravenous Given 9/29/22 0450)     Or   glucagon injection 1 mg ( Subcutaneous See Alternative 9/29/22 0450)   hydrALAZINE (APRESOLINE) injection 10 mg (has no administration in time range)   lidocaine 1 % 0.1-1 mL (has no administration in time range)   lidocaine (LMX4) cream (has no administration in time range)   sodium chloride (PF) 0.9% PF flush 3 mL (3 mLs Intracatheter Given 9/29/22 0414)   sodium chloride (PF) 0.9% PF flush 3 mL (has no administration in time range)   acetaminophen (TYLENOL) tablet 650 mg (has no administration in time range)     Or   acetaminophen (TYLENOL) Suppository 650 mg (has no administration in time range)   senna-docusate (SENOKOT-S/PERICOLACE) 8.6-50 MG per tablet 1 tablet (has no administration in time range)     Or   senna-docusate (SENOKOT-S/PERICOLACE) 8.6-50 MG per tablet 2 tablet (has no administration in time range)   prochlorperazine (COMPAZINE) injection 10 mg (10 mg Intravenous Given 9/29/22 0414)     Or   prochlorperazine (COMPAZINE) tablet 10 mg ( Oral See Alternative 9/29/22 0414)     Or   prochlorperazine (COMPAZINE) suppository 25 mg ( Rectal See Alternative 9/29/22 0414)   famotidine (PEPCID) tablet 20 mg (has no administration in time range)   metoclopramide (REGLAN) injection 5 mg (has no administration in time range)   OLANZapine zydis (zyPREXA) ODT tab 5-10 mg (has no administration in time range)     Or   haloperidol lactate (HALDOL) injection 2.5-5 mg (has no administration in time range)   flumazenil (ROMAZICON) injection 0.2 mg (has no administration in time range)   melatonin tablet 5 mg (has no administration in  time range)   diazepam (VALIUM) tablet 10 mg (has no administration in time range)     Or   diazepam (VALIUM) injection 5-10 mg (has no administration in time range)   sodium chloride 0.9 % 1,000 mL with Infuvite Adult 10 mL, thiamine 100 mg, folic acid 1 mg infusion (has no administration in time range)   thiamine (B-1) tablet 100 mg (has no administration in time range)   folic acid (FOLVITE) tablet 1 mg (has no administration in time range)   multivitamin w/minerals (THERA-VIT-M) tablet 1 tablet (has no administration in time range)   sodium chloride 0.9% infusion (has no administration in time range)   ondansetron (ZOFRAN ODT) ODT tab 4 mg (4 mg Oral Given 9/29/22 0008)   0.9% sodium chloride BOLUS (0 mLs Intravenous Stopped 9/29/22 0059)   0.9% sodium chloride BOLUS (0 mLs Intravenous Stopped 9/29/22 0307)   diazepam (VALIUM) injection 5 mg (5 mg Intravenous Given 9/29/22 0301)   ondansetron (ZOFRAN ODT) ODT tab 4 mg (4 mg Oral Given 9/29/22 0300)   diazepam (VALIUM) injection 2.5 mg (2.5 mg Intravenous Given 9/29/22 0413)     =================================================================    HPI    Patient information was obtained from: Patient    Use of : Use of : N/A       Josefa Curiel is a 47 year old female with a history of insulin-dependent diabetes mellitus type I, HLD, HTN, and chronic renal impairment who presents with hypoglycemia.    The patient reports she has had 2 days of vomiting with mild abdominal pain. She has a history of diabetes mellitus type I and is insulin dependent. She has a continuous glucose monitor that connects to her phone, but her phone stopped charging recently and she lost track of her sugars. She has still been taking her Lantus (20 units), but stopped her Humalog as she has not been able to tolerate much PO intake. She reports feeling very dehydrated.    She has not had any recent sick contacts. She denies fever, diarrhea, and any other  complaints at this time.    REVIEW OF SYSTEMS   Review of Systems   Constitutional: Negative for fever.        + dehydration   Gastrointestinal: Positive for abdominal pain and vomiting. Negative for diarrhea.   All other systems reviewed and are negative.       PAST MEDICAL HISTORY:  Past Medical History:   Diagnosis Date     LORIN (acute kidney injury) (H)      Anxiety      Cannabis abuse      Depression      DKA (diabetic ketoacidoses)      DKA (diabetic ketoacidoses)      ETOH abuse      HLD (hyperlipidemia)      HTN (hypertension)      Lactic acidosis      Type 1 diabetes (H)        PAST SURGICAL HISTORY:  Past Surgical History:   Procedure Laterality Date     ANKLE FRACTURE SURGERY Left      APPENDECTOMY         CURRENT MEDICATIONS:      Current Facility-Administered Medications:      glucose gel 15-30 g, 15-30 g, Oral, Q15 Min PRN **OR** dextrose 50 % injection 25-50 mL, 25-50 mL, Intravenous, Q15 Min PRN, 50 mL at 09/29/22 0121 **OR** glucagon injection 1 mg, 1 mg, Subcutaneous, Q15 Min PRN, Courtney Drew MD    Current Outpatient Medications:      DULoxetine (CYMBALTA) 30 MG capsule, [DULOXETINE (CYMBALTA) 30 MG CAPSULE] Take 60 mg by mouth every evening. , Disp: , Rfl:      ibuprofen (ADVIL,MOTRIN) 200 MG tablet, [IBUPROFEN (ADVIL,MOTRIN) 200 MG TABLET] Take 200-400 mg by mouth every 6 (six) hours as needed for pain., Disp: , Rfl:      insulin glargine (LANTUS; BASAGLAR) 100 unit/mL (3 mL) pen, [INSULIN GLARGINE (LANTUS; BASAGLAR) 100 UNIT/ML (3 ML) PEN] Inject 20 Units under the skin at bedtime., Disp: 5 pen, Rfl: 0     insulin lispro (HUMALOG) 100 unit/mL injection, [INSULIN LISPRO (HUMALOG) 100 UNIT/ML INJECTION] Inject 1 Units under the skin 3 (three) times a day before meals. 1 unit for every 15 g CHO, Disp: , Rfl:      lisinopril (PRINIVIL,ZESTRIL) 20 MG tablet, [LISINOPRIL (PRINIVIL,ZESTRIL) 20 MG TABLET] Take 20 mg by mouth daily., Disp: , Rfl:      traZODone (DESYREL) 100 MG tablet, [TRAZODONE  "(DESYREL) 100 MG TABLET] Take 25-50 mg by mouth at bedtime as needed for sleep., Disp: , Rfl:     ALLERGIES:  Allergies   Allergen Reactions     Nickel Rash     Penicillins Rash       FAMILY HISTORY:  Family History   Problem Relation Age of Onset     Clotting Disorder Mother         \"thin blood\"     Arthritis Mother      Hyperlipidemia Mother      Skin Cancer Father         face/nose      Depression Father      Other - See Comments Sister         eye surgeries     No Known Problems Brother      Coronary Artery Disease Maternal Grandmother      Alcoholism Maternal Grandfather      Coronary Artery Disease Maternal Grandfather      No Known Problems Paternal Grandmother      No Known Problems Paternal Grandfather        SOCIAL HISTORY:   Social History     Tobacco Use     Smoking status: Current Some Day Smoker     Smokeless tobacco: Never Used     Tobacco comment: occasional   Substance Use Topics     Alcohol use: Yes     Alcohol/week: 35.0 standard drinks     Comment: Alcoholic Drinks/day: ~750 mL wine daily     Drug use: Yes     Types: Marijuana     Comment: Drug use: 4-5x/week        PHYSICAL EXAM:    Vitals: BP (!) 184/97   Pulse 108   Temp 97.8  F (36.6  C)   Resp 20   Ht 1.702 m (5' 7\")   Wt 59 kg (130 lb)   SpO2 98%   BMI 20.36 kg/m     General:. Alert and interactive, diaphoretic.  HENT: Oropharynx without erythema or exudates. MMM.  TMs clear bilaterally.  Eyes: Pupils mid-sized and equally reactive.   Neck: Full AROM.  No midline tenderness to palpation.  Cardiovascular: Tachycardic and regular rhythm. Peripheral pulses 2+ bilaterally.  Chest/Pulmonary: Normal work of breathing. Lung sounds clear and equal throughout, no wheezes or crackles. No chest wall tenderness or deformities.  Abdomen: Soft, nondistended. Nontender without guarding or rebound.  Back/Spine: No CVA or midline tenderness.  Extremities: Normal ROM of all major joints. No lower extremity edema.   Skin: Warm and dry. Normal skin " color.   Neuro: Speech clear. CNs grossly intact. Moves all extremities appropriately. Strength and sensation grossly intact to all extremities.   Psych: Normal affect/mood, cooperative, memory appropriate.     LAB:  All pertinent labs reviewed and interpreted.  Labs Ordered and Resulted from Time of ED Arrival to Time of ED Departure   BASIC METABOLIC PANEL - Abnormal       Result Value    Sodium 142      Potassium 3.6      Chloride 96 (*)     Carbon Dioxide (CO2) 31 (*)     Anion Gap 15      Urea Nitrogen 32.3 (*)     Creatinine 3.30 (*)     Calcium 9.4      Glucose 57 (*)     GFR Estimate 17 (*)    CBC WITH PLATELETS AND DIFFERENTIAL - Abnormal    WBC Count 15.9 (*)     RBC Count 4.40      Hemoglobin 13.1      Hematocrit 37.6      MCV 86      MCH 29.8      MCHC 34.8      RDW 13.0      Platelet Count 426      % Neutrophils 90      % Lymphocytes 6      % Monocytes 4      % Eosinophils 0      % Basophils 0      % Immature Granulocytes 0      NRBCs per 100 WBC 0      Absolute Neutrophils 14.4 (*)     Absolute Lymphocytes 1.0      Absolute Monocytes 0.6      Absolute Eosinophils 0.0      Absolute Basophils 0.1      Absolute Immature Granulocytes 0.1      Absolute NRBCs 0.0     BLOOD GAS VENOUS - Abnormal    pH Venous 7.37      pCO2 Venous 51 (*)     pO2 Venous 27      Bicarbonate Venous 26      Base Excess/Deficit (+/-) 3.6      Oxyhemoglobin Venous 45.0 (*)     O2 Sat, Venous 45.5 (*)    ROUTINE UA WITH MICROSCOPIC REFLEX TO CULTURE - Abnormal    Color Urine Light Yellow      Appearance Urine Turbid (*)     Glucose Urine 150  (*)     Bilirubin Urine Negative      Ketones Urine Trace (*)     Specific Gravity Urine 1.013      Blood Urine 0.03 mg/dL (*)     pH Urine 7.0      Protein Albumin Urine 600  (*)     Urobilinogen Urine <2.0      Nitrite Urine Negative      Leukocyte Esterase Urine Negative      Bacteria Urine Few (*)     Mucus Urine Present (*)     RBC Urine 1      WBC Urine 8 (*)     Squamous Epithelials Urine  6 (*)    GLUCOSE BY METER - Abnormal    GLUCOSE BY METER POCT 31 (*)    GLUCOSE BY METER - Abnormal    GLUCOSE BY METER POCT 220 (*)    ETHYL ALCOHOL LEVEL - Abnormal    Alcohol ethyl <0.01 (*)    GLUCOSE BY METER - Abnormal    GLUCOSE BY METER POCT 148 (*)    GLUCOSE BY METER - Abnormal    GLUCOSE BY METER POCT 117 (*)    GLUCOSE BY METER - Normal    GLUCOSE BY METER POCT 86     COVID-19 VIRUS (CORONAVIRUS) BY PCR - Normal    SARS CoV2 PCR Negative     GLUCOSE BY METER - Normal    GLUCOSE BY METER POCT 70     GLUCOSE MONITOR NURSING POCT   GLUCOSE MONITOR NURSING POCT   MAGNESIUM   PHOSPHORUS         EKG:   Sinus tachycardia with incomplete right bundle branch block  Ventricular rate 108  AL interval 124  QRS 94  Comparison: When compared to the prior ECG of 10-1-2020, T wave inversions present in anterior leads now were not present at that time.      PROCEDURES:     Critical Care     Performed by: Dr Courtney Drew  Authorized by: Dr Courtney Drew  Total critical care time: 30 minutes  Critical care was necessary to treat or prevent imminent or life-threatening deterioration of the following conditions: severe hypoglycemia, acute kidney injury, alcohol withdrawal  Critical care was time spent personally by me on the following activities: development of treatment plan with patient or surrogate, discussions with consultants, examination of patient, evaluation of patient's response to treatment, obtaining history from patient or surrogate, ordering and performing treatments and interventions, ordering and review of laboratory studies, ordering and review of radiographic studies, re-evaluation of patient's condition and monitoring for potential decompensation.  Critical care time was exclusive of separately billable procedures and treating other patients.      I, Berry Epperson, am serving as a scribe to document services personally performed by Dr. Courtney Drew  based on my observation and the provider's  statements to me. I, Courtney Drew MD attest that Berry Epperson is acting in a scribe capacity, has observed my performance of the services and has documented them in accordance with my direction.      Courtney Drew M.D.  Emergency Medicine  [facility] EMERGENCY DEPARTMENT  [address] [place] [id number]  [phone]  Dept: [phone]           Courtney Drew MD  09/29/22 0515

## 2022-09-29 NOTE — ED TRIAGE NOTES
Patient is a type 1 diabetic and has been throwing for 2 days. Sugars have been around 70s   She checks her numbers with her phone which has been dead since noon    Patient feels dehydrated and would like fluids.      Triage Assessment     Row Name 09/28/22 5041       Triage Assessment (Adult)    Airway WDL WDL

## 2022-09-29 NOTE — H&P
[facility]    History and Physical - Hospitalist Service       Date of Admission:  9/29/2022    Assessment & Plan      Josefa Curiel is a 47 year old female admitted on 9/29/2022. She came to the ED for evaluation of vomiting x2 days, feeling dehydrated and hypoglycemia    1.  Acute kidney injury on chronic kidney disease stage IIIb  Dehydration in the setting of decreased oral intake from GI symptoms  IV hydration  Avoid nephrotoxins including NSAIDs    2.  Type 1 diabetes mellitus with hypoglycemia  Blood glucose 70s, however her phone ran out of battery so she was unable to track blood glucose  Has been using Lantus but not Humalog  Will reduce basal insulin until diet is advanced and tolerated  Hypoglycemia likely in the setting of renal failure  Monitor blood glucose every 1 hour until stability  Treat per hypoglycemia protocol    3.  Nausea and vomiting  Likely acute gastritis  Abdomen is benign  Nausea control  Add famotidine    4.  Hypertensive urgency  IV hydralazine as needed    5.  Prolonged QTC  Avoid QT prolonging medications    6.  Alcohol use disorder  Patient reports drinking 3 times a week with last day of binge on 9/24/2022  Monitor with CIWA protocol  IV thiamine, multivitamin, folic acid until able to tolerate by mouth     Diet: Advance Diet as Tolerated: Clear Liquid Diet    DVT Prophylaxis: Ambulate every shift  [place] Catheter: Not present  Central Lines: None  Cardiac Monitoring: ACTIVE order. Indication: Tachyarrhythmias, acute (48 hours)  Code Status: Full Code      Clinically Significant Risk Factors Present on Admission                 # Hypertension: home medication list includes antihypertensive(s)          Disposition Plan      Expected Discharge Date: 10/01/2022                The patient's care was discussed with the Patient.    David Hein MD  Hospitalist Service  [facility]  Securely message with the Vocera Web Console  (learn more here)  Text page via [facility] Paging/Directory         ______________________________________________________________________    Chief Complaint   Vomiting, feeling dehydrated, hypoglycemic    History is obtained from the patient, electronic health record and emergency department physician    History of Present Illness   Josefa Curiel is a 47 year old female who came to the ED for evaluation of above chief complaint.  Past medical history of type 1 diabetes mellitus, retinopathy, hypertension, hyperlipidemia, depression, anxiety, alcohol abuse, cannabis use.  Patient reports last alcohol binge was on 9/24/2022, however states that she only drinks 3 times per week.  She smokes cigarettes occasionally and smokes marijuana as well.  Over the last 2 days prior to ED evaluation she had some nausea, vomiting, decreased oral intake.  She has used Lantus but no Humalog and her blood glucose had been in the 70s.  However, she keeps tracks of her blood glucose in her phone but it ran out of battery earlier in the day so she was unable to check her blood glucose.  Patient felt some shortness of breath but denies sore throat, cough, fevers, chest pain, palpitations, dysuria, hematuria, diarrhea, melena or hematochezia.  She complains of feeling cold and shaky.    Review of Systems    The 10 point Review of Systems is negative other than noted in the HPI or here.     Past Medical History    I have reviewed this patient's medical history and updated it with pertinent information if needed.   Past Medical History:   Diagnosis Date     LORIN (acute kidney injury) (H)      Anxiety      Cannabis abuse      Depression      DKA (diabetic ketoacidoses)      DKA (diabetic ketoacidoses)      ETOH abuse      HLD (hyperlipidemia)      HTN (hypertension)      Lactic acidosis      Type 1 diabetes (H)        Past Surgical History   I have reviewed this patient's surgical history and updated it with pertinent information if  "needed.  Past Surgical History:   Procedure Laterality Date     ANKLE FRACTURE SURGERY Left      APPENDECTOMY         Social History   I have reviewed this patient's social history and updated it with pertinent information if needed.  Social History     Tobacco Use     Smoking status: Current Some Day Smoker     Smokeless tobacco: Never Used     Tobacco comment: occasional   Substance Use Topics     Alcohol use: Yes     Alcohol/week: 35.0 standard drinks     Comment: Alcoholic Drinks/day: ~750 mL wine daily     Drug use: Yes     Types: Marijuana     Comment: Drug use: 4-5x/week       Family History   I have reviewed this patient's family history and updated it with pertinent information if needed.  Family History   Problem Relation Age of Onset     Clotting Disorder Mother         \"thin blood\"     Arthritis Mother      Hyperlipidemia Mother      Skin Cancer Father         face/nose      Depression Father      Other - See Comments Sister         eye surgeries     No Known Problems Brother      Coronary Artery Disease Maternal Grandmother      Alcoholism Maternal Grandfather      Coronary Artery Disease Maternal Grandfather      No Known Problems Paternal Grandmother      No Known Problems Paternal Grandfather        Prior to Admission Medications   Prior to Admission Medications   Prescriptions Last Dose Informant Patient Reported? Taking?   DULoxetine (CYMBALTA) 30 MG capsule   Yes No   Sig: [DULOXETINE (CYMBALTA) 30 MG CAPSULE] Take 60 mg by mouth every evening.    ibuprofen (ADVIL,MOTRIN) 200 MG tablet   Yes No   Sig: [IBUPROFEN (ADVIL,MOTRIN) 200 MG TABLET] Take 200-400 mg by mouth every 6 (six) hours as needed for pain.   insulin glargine (LANTUS; BASAGLAR) 100 unit/mL (3 mL) pen   No No   Sig: [INSULIN GLARGINE (LANTUS; BASAGLAR) 100 UNIT/ML (3 ML) PEN] Inject 20 Units under the skin at bedtime.   insulin lispro (HUMALOG) 100 unit/mL injection   Yes No   Sig: [INSULIN LISPRO (HUMALOG) 100 UNIT/ML INJECTION] " Inject 1 Units under the skin 3 (three) times a day before meals. 1 unit for every 15 g CHO   lisinopril (PRINIVIL,ZESTRIL) 20 MG tablet   Yes No   Sig: [LISINOPRIL (PRINIVIL,ZESTRIL) 20 MG TABLET] Take 20 mg by mouth daily.   traZODone (DESYREL) 100 MG tablet   Yes No   Sig: [TRAZODONE (DESYREL) 100 MG TABLET] Take 25-50 mg by mouth at bedtime as needed for sleep.      Facility-Administered Medications: None     Allergies   Allergies   Allergen Reactions     Nickel Rash     Penicillins Rash       Physical Exam   Vital Signs: Temp: 97.8  F (36.6  C)   BP: (!) 187/87 Pulse: 113   Resp: 22 SpO2: 93 % O2 Device: None (Room air)    Weight: 130 lbs 0 oz    Constitutional: awake and alert  Respiratory: no increased work of breathing, good air exchange and clear to auscultation  Cardiovascular: tachycardic with regular rhythm, normal S1 and S2 and murmurs include systolic murmur III/VI located at right upper sternal border without radiation  GI: normal bowel sounds, soft and non-tender  Skin: no bruising or bleeding and no redness, warmth, or swelling  Musculoskeletal: no lower extremity pitting edema present  there is no redness, warmth, or swelling of the joints  Neurologic: Mental Status Exam:  Level of Alertness:   awake  Orientation:   person, place, time  Motor Exam:  moves all extremities well and symmetrically  Neuropsychiatric: General: normal, calm and normal eye contact    Data   Data reviewed today: I reviewed all medications, new labs and imaging results over the last 24 hours. I personally reviewed the EKG tracing showing Sinus tachycardia at 180 bpm, nonspecific ST waves.    Recent Labs   Lab 09/29/22  0259 09/29/22  0233 09/29/22  0128 09/29/22  0118 09/29/22  0002   WBC  --   --   --   --  15.9*   HGB  --   --   --   --  13.1   MCV  --   --   --   --  86   PLT  --   --   --   --  426   NA  --   --   --   --  142   POTASSIUM  --   --   --   --  3.6   CHLORIDE  --   --   --   --  96*   CO2  --   --   --   --   31*   BUN  --   --   --   --  32.3*   CR  --   --   --   --  3.30*   ANIONGAP  --   --   --   --  15   ZACH  --   --   --   --  9.4   * 148* 220*   < > 57*    < > = values in this interval not displayed.     No results found for this or any previous visit (from the past 24 hour(s)).

## 2022-09-29 NOTE — PROGRESS NOTES
[facility]    Hospitalist Progress Note    Assessment & Plan   47 year old female admitted on 9/29/2022. She came to the ED for evaluation of vomiting x2 days, feeling dehydrated and hypoglycemia     1.  Acute kidney injury on chronic kidney disease stage IIIb, baseline creatinine 1.7 to 2.2.   Dehydration in the setting of decreased oral intake from GI symptoms  IV hydration     2.  Type 1 diabetes mellitus with hypoglycemia  Blood glucose 70s, however her phone ran out of battery so she was unable to track blood glucose  Has been using Lantus but not Humalog  Will reduce basal insulin until diet is advanced and tolerated  Hypoglycemia likely in the setting of renal failure  -- will start Lantus 20 units daily, and given IV D5NS to avoid hypoglycemia     3.  Nausea and vomiting  Likely acute gastritis  Abdomen is benign  Nausea control  Add famotidine     4.  Hypertensive urgency  IV hydralazine as needed     5.  Prolonged QTC  Avoid QT prolonging medications     6.  Alcohol use disorder  Patient reports drinking 3 times a week with last day of binge on 9/24/2022  Monitor with CIWA protocol  IV thiamine, multivitamin, folic acid until able to tolerate by mouth    7.  Elevated trop, ?demand ischemia   -- serial trops    Impression:   Principal Problem:    LORIN (acute kidney injury) (H)  Active Problems:    Essential hypertension    Nausea with vomiting    DM type 1, Hgb A1C 10.0 in 2020    Chronic renal impairment, stage 3 (moderate) (H)    Alcohol use disorder, moderate, dependence (H)    Hypertensive urgency    Leukocytosis      Plan:  Advance diet as tolerated, check potassium, replace as needed.     DVT Prophylaxis: Pneumatic Compression Devices  Code Status: Full Code    Disposition: Expected discharge in 1-2 days    Luke Waterman MD  Pager [phone]  Cell Phone [phone]  Text Page (7am to 6pm)  (35 min total)    Interval History   Nausea improving, wants regular diet.      Physical Exam   Temp: 97.8  F (36.6  C)   BP: (!) 179/90 Pulse: 117   Resp: 16 SpO2: 98 % O2 Device: None (Room air)    Vitals:    09/28/22 2249   Weight: 59 kg (130 lb)     Vital Signs with Ranges  Temp:  [97.8  F (36.6  C)] 97.8  F (36.6  C)  Pulse:  [108-120] 117  Resp:  [16-22] 16  BP: (128-212)/() 179/90  MAP:  [108 mmHg] 108 mmHg  SpO2:  [93 %-100 %] 98 %  I/O last 3 completed shifts:  In: 2000 [IV Piggyback:2000]  Out: -     # Pain Assessment:  Current Pain Score 9/28/2022   Patient currently in pain? no   Josefa s pain level was assessed and she currently denies pain.        Constitutional: Awake, alert, cooperative, no apparent distress  Respiratory: Clear to auscultation bilaterally, no crackles or wheezing  Cardiovascular: Regular rate and rhythm, normal S1 and S2, and no murmur noted  GI: Normal bowel sounds, soft, non-distended, non-tender  Extrem: No calf tenderness, no ankle edema  Neuro: Ox3, no focal motor or sensory deficits    Medications     sodium chloride         famotidine  20 mg Oral BID     [START ON 9/30/2022] folic acid  1 mg Oral Daily     insulin aspart  1-10 Units Subcutaneous TID AC     insulin aspart  1-7 Units Subcutaneous At Bedtime     [START ON 9/30/2022] multivitamin w/minerals  1 tablet Oral Daily     sodium chloride (PF)  3 mL Intracatheter Q8H     [START ON 9/30/2022] thiamine  100 mg Oral Daily       Data   Recent Labs   Lab 09/29/22  1407 09/29/22  1108 09/29/22  1042 09/29/22  0118 09/29/22  0002   WBC  --   --   --   --  15.9*   HGB  --   --   --   --  13.1   MCV  --   --   --   --  86   PLT  --   --   --   --  426   NA  --   --   --   --  142   POTASSIUM  --   --   --   --  3.6   CHLORIDE  --   --   --   --  96*   CO2  --   --   --   --  31*   BUN  --   --   --   --  32.3*   CR  --   --   --   --  3.30*   ANIONGAP  --   --   --   --  15   ZACH  --   --   --   --  9.4   * 110* 45*   < > 57*    < > = values in this interval not displayed.       Imaging:   No  results found for this or any previous visit (from the past 24 hour(s)).

## 2022-09-29 NOTE — PROVIDER NOTIFICATION
" Patient heart rate consistently in 120s. /97 despite. PRN hydralazine being given.     She is more emotional and reports that she feels \"off\" denies any chest pain.     CIWA score of 14 and gave her 10mg diazepam.     Afebrile. States she feels cold and shaky.     Updated Dr. Aguero, new orders received. See MAR.       "

## 2022-09-30 VITALS
RESPIRATION RATE: 18 BRPM | TEMPERATURE: 98.1 F | DIASTOLIC BLOOD PRESSURE: 99 MMHG | BODY MASS INDEX: 20.4 KG/M2 | WEIGHT: 130 LBS | HEART RATE: 84 BPM | SYSTOLIC BLOOD PRESSURE: 182 MMHG | OXYGEN SATURATION: 100 % | HEIGHT: 67 IN

## 2022-09-30 LAB
ANION GAP SERPL CALCULATED.3IONS-SCNC: 9 MMOL/L (ref 7–15)
ATRIAL RATE - MUSE: 108 BPM
BUN SERPL-MCNC: 19.9 MG/DL (ref 6–20)
CALCIUM SERPL-MCNC: 7.7 MG/DL (ref 8.6–10)
CHLORIDE SERPL-SCNC: 108 MMOL/L (ref 98–107)
CREAT SERPL-MCNC: 2.9 MG/DL (ref 0.51–0.95)
DEPRECATED HCO3 PLAS-SCNC: 24 MMOL/L (ref 22–29)
DIASTOLIC BLOOD PRESSURE - MUSE: NORMAL MMHG
ERYTHROCYTE [DISTWIDTH] IN BLOOD BY AUTOMATED COUNT: 13.2 % (ref 10–15)
GFR SERPL CREATININE-BSD FRML MDRD: 20 ML/MIN/1.73M2
GLUCOSE BLDC GLUCOMTR-MCNC: 130 MG/DL (ref 70–99)
GLUCOSE BLDC GLUCOMTR-MCNC: 168 MG/DL (ref 70–99)
GLUCOSE BLDC GLUCOMTR-MCNC: 175 MG/DL (ref 70–99)
GLUCOSE BLDC GLUCOMTR-MCNC: 27 MG/DL (ref 70–99)
GLUCOSE BLDC GLUCOMTR-MCNC: 34 MG/DL (ref 70–99)
GLUCOSE BLDC GLUCOMTR-MCNC: 88 MG/DL (ref 70–99)
GLUCOSE SERPL-MCNC: 116 MG/DL (ref 70–99)
HCT VFR BLD AUTO: 30.6 % (ref 35–47)
HGB BLD-MCNC: 10.1 G/DL (ref 11.7–15.7)
INTERPRETATION ECG - MUSE: NORMAL
MAGNESIUM SERPL-MCNC: 1.6 MG/DL (ref 1.7–2.3)
MCH RBC QN AUTO: 29.6 PG (ref 26.5–33)
MCHC RBC AUTO-ENTMCNC: 33 G/DL (ref 31.5–36.5)
MCV RBC AUTO: 90 FL (ref 78–100)
P AXIS - MUSE: 78 DEGREES
PLATELET # BLD AUTO: 279 10E3/UL (ref 150–450)
POTASSIUM SERPL-SCNC: 3.6 MMOL/L (ref 3.4–5.3)
PR INTERVAL - MUSE: 124 MS
QRS DURATION - MUSE: 94 MS
QT - MUSE: 394 MS
QTC - MUSE: 527 MS
R AXIS - MUSE: 88 DEGREES
RBC # BLD AUTO: 3.41 10E6/UL (ref 3.8–5.2)
SODIUM SERPL-SCNC: 141 MMOL/L (ref 136–145)
SYSTOLIC BLOOD PRESSURE - MUSE: NORMAL MMHG
T AXIS - MUSE: 68 DEGREES
TROPONIN T SERPL HS-MCNC: 20 NG/L
VENTRICULAR RATE- MUSE: 108 BPM
WBC # BLD AUTO: 8.6 10E3/UL (ref 4–11)

## 2022-09-30 PROCEDURE — 96366 THER/PROPH/DIAG IV INF ADDON: CPT

## 2022-09-30 PROCEDURE — 85027 COMPLETE CBC AUTOMATED: CPT | Performed by: INTERNAL MEDICINE

## 2022-09-30 PROCEDURE — 258N000001 HC RX 258: Performed by: EMERGENCY MEDICINE

## 2022-09-30 PROCEDURE — 83735 ASSAY OF MAGNESIUM: CPT | Performed by: INTERNAL MEDICINE

## 2022-09-30 PROCEDURE — 250N000013 HC RX MED GY IP 250 OP 250 PS 637: Performed by: HOSPITALIST

## 2022-09-30 PROCEDURE — 99239 HOSP IP/OBS DSCHRG MGMT >30: CPT | Performed by: INTERNAL MEDICINE

## 2022-09-30 PROCEDURE — 96376 TX/PRO/DX INJ SAME DRUG ADON: CPT

## 2022-09-30 PROCEDURE — 84484 ASSAY OF TROPONIN QUANT: CPT | Performed by: INTERNAL MEDICINE

## 2022-09-30 PROCEDURE — 80048 BASIC METABOLIC PNL TOTAL CA: CPT | Performed by: INTERNAL MEDICINE

## 2022-09-30 PROCEDURE — 250N000013 HC RX MED GY IP 250 OP 250 PS 637: Performed by: INTERNAL MEDICINE

## 2022-09-30 PROCEDURE — 36415 COLL VENOUS BLD VENIPUNCTURE: CPT | Performed by: INTERNAL MEDICINE

## 2022-09-30 RX ORDER — CLONIDINE HYDROCHLORIDE 0.1 MG/1
0.1 TABLET ORAL ONCE
Status: COMPLETED | OUTPATIENT
Start: 2022-09-30 | End: 2022-09-30

## 2022-09-30 RX ORDER — ACETAMINOPHEN 325 MG/1
650 TABLET ORAL EVERY 4 HOURS PRN
Refills: 0
Start: 2022-09-30 | End: 2022-10-19

## 2022-09-30 RX ORDER — POTASSIUM CHLORIDE 750 MG/1
10 TABLET, EXTENDED RELEASE ORAL ONCE
Status: COMPLETED | OUTPATIENT
Start: 2022-09-30 | End: 2022-09-30

## 2022-09-30 RX ORDER — CLONIDINE HYDROCHLORIDE 0.1 MG/1
0.2 TABLET ORAL 2 TIMES DAILY
Status: DISCONTINUED | OUTPATIENT
Start: 2022-09-30 | End: 2022-09-30 | Stop reason: HOSPADM

## 2022-09-30 RX ORDER — FAMOTIDINE 20 MG/1
20 TABLET, FILM COATED ORAL 2 TIMES DAILY
Qty: 60 TABLET | Refills: 1 | Status: ON HOLD | OUTPATIENT
Start: 2022-09-30 | End: 2022-10-23

## 2022-09-30 RX ORDER — MULTIPLE VITAMINS W/ MINERALS TAB 9MG-400MCG
1 TAB ORAL DAILY
Qty: 30 TABLET | Refills: 1 | Status: SHIPPED | OUTPATIENT
Start: 2022-09-30 | End: 2022-10-19

## 2022-09-30 RX ORDER — INSULIN LISPRO 100 [IU]/ML
INJECTION, SOLUTION INTRAVENOUS; SUBCUTANEOUS
Qty: 15 ML | Refills: 0 | Status: ON HOLD
Start: 2022-09-30 | End: 2022-12-14

## 2022-09-30 RX ORDER — CLONIDINE HYDROCHLORIDE 0.1 MG/1
0.2 TABLET ORAL 2 TIMES DAILY
Qty: 120 TABLET | Refills: 1 | Status: SHIPPED | OUTPATIENT
Start: 2022-09-30 | End: 2022-11-17

## 2022-09-30 RX ADMIN — CLONIDINE HYDROCHLORIDE 0.1 MG: 0.1 TABLET ORAL at 09:07

## 2022-09-30 RX ADMIN — FAMOTIDINE 20 MG: 20 TABLET ORAL at 09:08

## 2022-09-30 RX ADMIN — CLONIDINE HYDROCHLORIDE 0.1 MG: 0.1 TABLET ORAL at 10:32

## 2022-09-30 RX ADMIN — DEXTROSE MONOHYDRATE 25 ML: 25 INJECTION, SOLUTION INTRAVENOUS at 02:14

## 2022-09-30 RX ADMIN — ROSUVASTATIN CALCIUM 40 MG: 40 TABLET, COATED ORAL at 09:14

## 2022-09-30 RX ADMIN — Medication 1 TABLET: at 09:07

## 2022-09-30 RX ADMIN — POTASSIUM CHLORIDE 10 MEQ: 750 TABLET, EXTENDED RELEASE ORAL at 09:07

## 2022-09-30 RX ADMIN — FOLIC ACID 1 MG: 1 TABLET ORAL at 09:08

## 2022-09-30 RX ADMIN — THIAMINE HCL TAB 100 MG 100 MG: 100 TAB at 09:07

## 2022-09-30 ASSESSMENT — ACTIVITIES OF DAILY LIVING (ADL)
ADLS_ACUITY_SCORE: 37

## 2022-09-30 NOTE — UTILIZATION REVIEW
Admission Status; Secondary Review Determination       As part of the [facility] Utilization review plan, a self-audit is done on Medicare inpatient admission with less than 2 midnights stay. The 2014 IPPS Final Rule allows outpatient billing in the event that a hospital determines that an inpatient admission was not medically necessary under utilization review process.          (x) Outpatient status would be Appropriate- Short Stay- Post discharge review.     RATIONALE FOR DETERMINATION   47 yr old female presented with vomiting.  Type I DM with hypoglycemia and LORIN on CKD3.  IVF given and discharged after one night.      This account and medical record number for a Medicare beneficiary was an inpatient short stay (<2 midnights) and didn't meet medical necessity for inpatient admission. We recommend billing outpatient services based on the severity of illness, intensity of service provided and the inpatient length of stay.  Please contact me within one week of receiving this letter only if you disagree with this determination. If you concur, no further action is needed.          The information on this document is developed by the utilization review team in order for the business office to ensure compliance.  This only denotes the appropriateness of proper admission status and does not reflect the quality of care rendered.         The definitions of Inpatient Status and Observation Status used in making the determination above are those provided in the CMS Coverage Manual, Chapter 1 and Chapter 6, section 70.4.      Sincerely,     Tasia Sparks MD  Utilization  Management  [facility].

## 2022-09-30 NOTE — DISCHARGE SUMMARY
[facility]    Discharge Summary  Hospitalist    Date of Admission:  9/29/2022  Date of Discharge:  9/30/2022  9:15 PM  Discharging Provider: Luke Waterman MD, MD    Discharge Diagnoses   Principal Problem:    LORIN (acute kidney injury)     Nausea with vomiting    Probable Alcoholic Gastritis     Elevated trop consistent with demand ischemia  Active Problems:    Essential hypertension    Nausea with vomiting    DM type 1, Hgb A1C 10.0 in 2020    Chronic renal impairment, stage 3 (moderate) (H)    Alcohol use disorder, moderate, dependence (H)    Hypertensive urgency    Leukocytosis -- stress related     Hypomagnesemia     History of Present Illness   47 year old female with  type 1 diabetes mellitus since age 10, retinopathy, hypertension, hyperlipidemia, depression, anxiety, alcohol abuse, cannabis use, who reports last alcohol binge was on 9/24/2022.  She smokes cigarettes occasionally and smokes marijuana as well.  Over the last 2 days prior to ED evaluation she had some nausea, vomiting, decreased oral intake.  She has used Lantus but no Humalog and her blood glucose had been in the 70s.  However, she keeps tracks of her blood glucose in her phone but it ran out of battery earlier in the day so she was unable to check her blood glucose.  Patient felt some shortness of breath but no chest pain.      In ER, , /114, Creat 3.30, potassium 3.1, Trop 28 (normal < 14), WBC 15.9, Hgb 13.1, UA with 8 WBC's and no dysuria.      Hospital Course   Observed on cardiac telemetry, serial trops improved -- all consistent with demand ischemia.  Treated with aggressive IV hydration along with restarting insulin -- and after 24 hours creatinine improved to 2.90 and WBC normal at 8.6.  No infection identified. Magnesium low and replaced.  Lisinopril held to avoid worsening LORIN, and was started on Clonidine 0.2 mg bid for blood pressure control, and helped with alcohol withdrawal symptoms.      Discussed importance of avoiding alcohol given her underlying diabetes, and suspect it will help with her glucose control to avoid alcohol as her current Hgb A1C was 10.0. She will follow-up in 5 days and check BMP, and possible resume Lisinopril then if creatinine continues to improve.       Luke Waterman MD  Pager: [phone]  Cell Phone:  [phone]       Significant Results and Procedures   As above    Pending Results   These results will be followed up by Dr. Waterman  Unresulted Labs Ordered in the Past 30 Days of this Admission     No orders found for last 31 day(s).          Code Status   Full Code       Primary Care Physician   Domingo Costello    Physical Exam   Temp: 98.1  F (36.7  C) Temp src: Oral BP: (P) 124/81 Pulse: 84   Resp: (P) 19 SpO2: 100 % O2 Device: None (Room air)    Vitals:    09/28/22 2249   Weight: 59 kg (130 lb)     Vital Signs with Ranges  Temp:  [98.1  F (36.7  C)] 98.1  F (36.7  C)  Pulse:  [] 84  Resp:  [15-43] (P) 19  BP: (118-216)/() (P) 124/81  SpO2:  [94 %-100 %] 100 %  No intake/output data recorded.    Exam on discharge:   Lungs clear  CV with reg S1S2    Discharge Disposition   Discharged to home  Condition at discharge: Fair    Consultations This Hospital Stay   CARE MANAGEMENT / SOCIAL WORK IP CONSULT    Time Spent on this Encounter   I spent a total of 35 minutes discharging this patient.     Discharge Orders      Reason for your hospital stay    Nausea and vomiting, stomach irritation probably 2nd to alcohol.     Follow-up and recommended labs and tests     Follow up with primary care provider, Domingo Costello, in 5 days and check BMP and BP then, and if creatinine back to baseline may consider restarting Lisinopril.     Activity    Your activity upon discharge: activity as tolerated     Discharge Instructions    Call Dr. Waterman if any medical questions at Cell Phone [phone].     Diet    Follow this diet upon discharge: Orders Placed This Encounter        Diabetic diet, avoid all alcohol for now.     Discharge Medications   Current Discharge Medication List      START taking these medications    Details   acetaminophen (TYLENOL) 325 MG tablet Take 2 tablets (650 mg) by mouth every 4 hours as needed for mild pain or other (and adjunct with moderate or severe pain or per patient request)  Refills: 0    Associated Diagnoses: Pain      cloNIDine (CATAPRES) 0.1 MG tablet Take 2 tablets (0.2 mg) by mouth 2 times daily  Qty: 120 tablet, Refills: 1    Associated Diagnoses: Hypertensive urgency      famotidine (PEPCID) 20 MG tablet Take 1 tablet (20 mg) by mouth 2 times daily For 1 week then 1 twice a day as needed for heartburn  Qty: 60 tablet, Refills: 1    Associated Diagnoses: Nausea and vomiting, intractability of vomiting not specified, unspecified vomiting type      insulin lispro (HUMALOG KWIKPEN) 100 UNIT/ML (1 unit dial) KWIKPEN 1 unit per 15 gm Carbs, hold if glucose < 80  Qty: 15 mL, Refills: 0    Associated Diagnoses: Uncontrolled type 1 diabetes mellitus with hyperglycemia (H)      multivitamin w/minerals (THERA-VIT-M) tablet Take 1 tablet by mouth daily  Qty: 30 tablet, Refills: 1    Associated Diagnoses: ETOH abuse         CONTINUE these medications which have CHANGED    Details   insulin glargine (LANTUS PEN) 100 UNIT/ML pen Inject 17 Units Subcutaneous At Bedtime  Refills: 0    Comments: If Lantus is not covered by insurance, may substitute Basaglar or Semglee or other insulin glargine product per insurance preference at same dose and frequency.    Associated Diagnoses: Uncontrolled type 1 diabetes mellitus with hyperglycemia (H)         CONTINUE these medications which have NOT CHANGED    Details   DULoxetine (CYMBALTA) 60 MG capsule [DULOXETINE (CYMBALTA) 30 MG CAPSULE] Take 60 mg by mouth every evening.       traZODone (DESYREL) 100 MG tablet Take 50 mg by mouth nightly as needed      rosuvastatin (CRESTOR) 40 MG tablet Take 40 mg by mouth daily          STOP taking these medications       insulin lispro (HUMALOG) 100 unit/mL injection Comments:   Reason for Stopping:         lisinopril (PRINIVIL,ZESTRIL) 20 MG tablet Comments:   Reason for Stopping:             Allergies   Allergies   Allergen Reactions     Nickel Rash     Penicillins Rash     Data   Most Recent 3 CBC's:Recent Labs   Lab Test 09/30/22  0408 09/29/22  0002 10/03/20  0750   WBC 8.6 15.9* 7.5   HGB 10.1* 13.1 11.6*   MCV 90 86 89    426 271      Most Recent 3 BMP's:  Recent Labs   Lab Test 09/30/22  1211 09/30/22  0800 09/30/22  0737 09/30/22  0408 09/29/22  2258 09/29/22  2150 09/29/22  1526 09/29/22  1429 09/29/22  0118 09/29/22  0002 10/03/20  0831 10/03/20  0750   NA  --   --   --  141  --   --   --   --   --  142  --  134*   POTASSIUM  --   --   --  3.6  --  3.4  --  3.1*  --  3.6   < > 3.8   CHLORIDE  --   --   --  108*  --   --   --   --   --  96*  --  101   CO2  --   --   --  24  --   --   --   --   --  31*  --  26   BUN  --   --   --  19.9  --   --   --   --   --  32.3*  --  12   CR  --   --   --  2.90*  --   --   --   --   --  3.30*  --  1.72*   ANIONGAP  --   --   --  9  --   --   --   --   --  15  --  7   ZAHC  --   --   --  7.7*  --   --   --   --   --  9.4  --  8.4*  8.4*   * 88 34* 116*   < >  --    < >  --    < > 57*   < > 138*    < > = values in this interval not displayed.     Most Recent 2 LFT's:  Recent Labs   Lab Test 10/02/20  0321 10/01/20  1903   AST 15 20   ALT <9 14   ALKPHOS 62 97   BILITOTAL 0.2 0.4     Most Recent INR's and Anticoagulation Dosing History:  Anticoagulation Dose History     Recent Dosing and Labs Latest Ref Rng & Units 3/23/2018    INR 0.90 - 1.10 0.87(L)        Most Recent 3 Troponin's:No lab results found.  Most Recent Cholesterol Panel:  Recent Labs   Lab Test 04/10/17  0507   CHOL 282*   *   HDL 85   TRIG 134     Most Recent 6 Bacteria Isolates From Any Culture (See EPIC Reports for Culture Details):No lab results found.  Most  Recent TSH, T4 and A1c Labs:  Recent Labs   Lab Test 10/02/20  0321   A1C 10.0*

## 2022-09-30 NOTE — SIGNIFICANT EVENT
"Around 0230, pt awoke after \"dreaming the blood sugar was low\". Pt was also extremely diaphoretic. Checked BG, result was 27. Rechecked with the same result. Pt was alert and able to take PO fluids. X2 orange juice given. PRN D50 25mL given. Rechecked BG after 15 minutes, per protocol, increased to 175.   Notified house resident, Dr. Mendez of critical lab and interventions. Ordered to recheck BG in an additional hour. 0330 BG was 130. Pt asymptomatic at that time.   "

## 2022-09-30 NOTE — PLAN OF CARE
Problem: Plan of Care - These are the overarching goals to be used throughout the patient stay.    Goal: Optimal Comfort and Wellbeing  Outcome: Ongoing, Progressing   Goal Outcome Evaluation:      Please see significant event note following pts hypoglycemic episode overnight.   Pt had no further symptoms of hypoglycemia after intervention.    Denied pain.   CIWA scores 0 overnight.   VSS.   SR on tele.

## 2022-09-30 NOTE — SIGNIFICANT EVENT
"Dr Aguero updated on low BG this am of 34.  Patient was given orange juice X3 and rechecked.  Follow up BG 88.  Patient called for assistance stating that she felt like she was \"having a low BS.\"  Patient was A&OX4.  Patient ordered breakfast.   "

## 2022-10-19 ENCOUNTER — APPOINTMENT (OUTPATIENT)
Dept: MRI IMAGING | Facility: HOSPITAL | Age: 47
DRG: 871 | End: 2022-10-19
Attending: FAMILY MEDICINE
Payer: COMMERCIAL

## 2022-10-19 ENCOUNTER — HOSPITAL ENCOUNTER (INPATIENT)
Facility: HOSPITAL | Age: 47
LOS: 5 days | Discharge: HOME | DRG: 871 | End: 2022-10-24
Attending: FAMILY MEDICINE | Admitting: INTERNAL MEDICINE
Payer: COMMERCIAL

## 2022-10-19 DIAGNOSIS — A41.9 SEPSIS WITH ENCEPHALOPATHY WITHOUT SEPTIC SHOCK, DUE TO UNSPECIFIED ORGANISM (H): ICD-10-CM

## 2022-10-19 DIAGNOSIS — R78.81 GRAM-POSITIVE BACTEREMIA: ICD-10-CM

## 2022-10-19 DIAGNOSIS — G93.41 SEPSIS WITH ENCEPHALOPATHY WITHOUT SEPTIC SHOCK, DUE TO UNSPECIFIED ORGANISM (H): ICD-10-CM

## 2022-10-19 DIAGNOSIS — E10.69 TYPE 1 DIABETES MELLITUS WITH OTHER SPECIFIED COMPLICATION (H): ICD-10-CM

## 2022-10-19 DIAGNOSIS — I10 ESSENTIAL HYPERTENSION: ICD-10-CM

## 2022-10-19 DIAGNOSIS — E10.65 UNCONTROLLED TYPE 1 DIABETES MELLITUS WITH HYPERGLYCEMIA (H): ICD-10-CM

## 2022-10-19 DIAGNOSIS — N18.30 CHRONIC RENAL IMPAIRMENT, STAGE 3 (MODERATE), UNSPECIFIED WHETHER STAGE 3A OR 3B CKD (H): ICD-10-CM

## 2022-10-19 DIAGNOSIS — K21.9 GASTROESOPHAGEAL REFLUX DISEASE WITHOUT ESOPHAGITIS: Primary | ICD-10-CM

## 2022-10-19 DIAGNOSIS — L03.115 CELLULITIS OF RIGHT FOOT: ICD-10-CM

## 2022-10-19 DIAGNOSIS — L97.511 ULCER OF RIGHT FOOT, LIMITED TO BREAKDOWN OF SKIN (H): ICD-10-CM

## 2022-10-19 DIAGNOSIS — R65.20 SEPSIS WITH ENCEPHALOPATHY WITHOUT SEPTIC SHOCK, DUE TO UNSPECIFIED ORGANISM (H): ICD-10-CM

## 2022-10-19 DIAGNOSIS — N17.9 AKI (ACUTE KIDNEY INJURY) (H): ICD-10-CM

## 2022-10-19 LAB
ALBUMIN SERPL BCG-MCNC: 3.4 G/DL (ref 3.5–5.2)
ALBUMIN UR-MCNC: 300 MG/DL
ALP SERPL-CCNC: 116 U/L (ref 35–104)
ALT SERPL W P-5'-P-CCNC: 21 U/L (ref 10–35)
AMPHETAMINES UR QL SCN: ABNORMAL
ANION GAP SERPL CALCULATED.3IONS-SCNC: 17 MMOL/L (ref 7–15)
APPEARANCE UR: CLEAR
AST SERPL W P-5'-P-CCNC: 45 U/L (ref 10–35)
BACTERIA #/AREA URNS HPF: ABNORMAL /HPF
BARBITURATES UR QL SCN: ABNORMAL
BASOPHILS # BLD AUTO: 0 10E3/UL (ref 0–0.2)
BASOPHILS NFR BLD AUTO: 0 %
BENZODIAZ UR QL SCN: ABNORMAL
BILIRUB DIRECT SERPL-MCNC: <0.2 MG/DL (ref 0–0.3)
BILIRUB SERPL-MCNC: 0.5 MG/DL
BILIRUB UR QL STRIP: NEGATIVE
BUN SERPL-MCNC: 32.9 MG/DL (ref 6–20)
BZE UR QL SCN: ABNORMAL
CALCIUM SERPL-MCNC: 9 MG/DL (ref 8.6–10)
CANNABINOIDS UR QL SCN: ABNORMAL
CHLORIDE SERPL-SCNC: 85 MMOL/L (ref 98–107)
COLOR UR AUTO: ABNORMAL
CREAT SERPL-MCNC: 3.35 MG/DL (ref 0.51–0.95)
DEPRECATED HCO3 PLAS-SCNC: 26 MMOL/L (ref 22–29)
EOSINOPHIL # BLD AUTO: 0 10E3/UL (ref 0–0.7)
EOSINOPHIL NFR BLD AUTO: 0 %
ERYTHROCYTE [DISTWIDTH] IN BLOOD BY AUTOMATED COUNT: 12.4 % (ref 10–15)
ETHANOL SERPL-MCNC: <0.01 G/DL
FLUAV RNA SPEC QL NAA+PROBE: NEGATIVE
FLUBV RNA RESP QL NAA+PROBE: NEGATIVE
GFR SERPL CREATININE-BSD FRML MDRD: 17 ML/MIN/1.73M2
GLUCOSE BLDC GLUCOMTR-MCNC: 101 MG/DL (ref 70–99)
GLUCOSE BLDC GLUCOMTR-MCNC: 173 MG/DL (ref 70–99)
GLUCOSE BLDC GLUCOMTR-MCNC: 181 MG/DL (ref 70–99)
GLUCOSE BLDC GLUCOMTR-MCNC: 184 MG/DL (ref 70–99)
GLUCOSE BLDC GLUCOMTR-MCNC: 197 MG/DL (ref 70–99)
GLUCOSE BLDC GLUCOMTR-MCNC: 37 MG/DL (ref 70–99)
GLUCOSE BLDC GLUCOMTR-MCNC: 47 MG/DL (ref 70–99)
GLUCOSE SERPL-MCNC: 151 MG/DL (ref 70–99)
GLUCOSE UR STRIP-MCNC: 150 MG/DL
HBA1C MFR BLD: 8.8 %
HCT VFR BLD AUTO: 31.5 % (ref 35–47)
HGB BLD-MCNC: 10.8 G/DL (ref 11.7–15.7)
HGB UR QL STRIP: ABNORMAL
IMM GRANULOCYTES # BLD: 0 10E3/UL
IMM GRANULOCYTES NFR BLD: 0 %
KETONES UR STRIP-MCNC: ABNORMAL MG/DL
LACTATE SERPL-SCNC: 0.6 MMOL/L (ref 0.7–2)
LACTATE SERPL-SCNC: 1.3 MMOL/L (ref 0.7–2)
LACTATE SERPL-SCNC: 3.9 MMOL/L (ref 0.7–2)
LEUKOCYTE ESTERASE UR QL STRIP: NEGATIVE
LIPASE SERPL-CCNC: 24 U/L (ref 13–60)
LYMPHOCYTES # BLD AUTO: 0.2 10E3/UL (ref 0.8–5.3)
LYMPHOCYTES NFR BLD AUTO: 3 %
MAGNESIUM SERPL-MCNC: 1.5 MG/DL (ref 1.7–2.3)
MCH RBC QN AUTO: 29.3 PG (ref 26.5–33)
MCHC RBC AUTO-ENTMCNC: 34.3 G/DL (ref 31.5–36.5)
MCV RBC AUTO: 85 FL (ref 78–100)
MONOCYTES # BLD AUTO: 0.1 10E3/UL (ref 0–1.3)
MONOCYTES NFR BLD AUTO: 1 %
NEUTROPHILS # BLD AUTO: 7.6 10E3/UL (ref 1.6–8.3)
NEUTROPHILS NFR BLD AUTO: 96 %
NITRATE UR QL: NEGATIVE
NRBC # BLD AUTO: 0 10E3/UL
NRBC BLD AUTO-RTO: 0 /100
OPIATES UR QL SCN: ABNORMAL
PCP QUAL URINE (ROCHE): ABNORMAL
PH UR STRIP: 7.5 [PH] (ref 5–7)
PLATELET # BLD AUTO: 260 10E3/UL (ref 150–450)
POTASSIUM SERPL-SCNC: 3.6 MMOL/L (ref 3.4–5.3)
PROCALCITONIN SERPL IA-MCNC: 3.34 NG/ML
PROT SERPL-MCNC: 6.7 G/DL (ref 6.4–8.3)
RBC # BLD AUTO: 3.69 10E6/UL (ref 3.8–5.2)
RBC URINE: 1 /HPF
RSV RNA SPEC NAA+PROBE: NEGATIVE
SARS-COV-2 RNA RESP QL NAA+PROBE: NEGATIVE
SARS-COV-2 RNA RESP QL NAA+PROBE: NEGATIVE
SODIUM SERPL-SCNC: 128 MMOL/L (ref 136–145)
SP GR UR STRIP: 1.01 (ref 1–1.03)
SQUAMOUS EPITHELIAL: 3 /HPF
UROBILINOGEN UR STRIP-MCNC: <2 MG/DL
WBC # BLD AUTO: 7.9 10E3/UL (ref 4–11)
WBC URINE: 2 /HPF

## 2022-10-19 PROCEDURE — 258N000001 HC RX 258: Performed by: INTERNAL MEDICINE

## 2022-10-19 PROCEDURE — 80053 COMPREHEN METABOLIC PANEL: CPT | Performed by: FAMILY MEDICINE

## 2022-10-19 PROCEDURE — 80307 DRUG TEST PRSMV CHEM ANLYZR: CPT | Performed by: INTERNAL MEDICINE

## 2022-10-19 PROCEDURE — 87637 SARSCOV2&INF A&B&RSV AMP PRB: CPT | Performed by: STUDENT IN AN ORGANIZED HEALTH CARE EDUCATION/TRAINING PROGRAM

## 2022-10-19 PROCEDURE — 85025 COMPLETE CBC W/AUTO DIFF WBC: CPT | Performed by: FAMILY MEDICINE

## 2022-10-19 PROCEDURE — 96365 THER/PROPH/DIAG IV INF INIT: CPT

## 2022-10-19 PROCEDURE — 250N000011 HC RX IP 250 OP 636: Performed by: FAMILY MEDICINE

## 2022-10-19 PROCEDURE — 99222 1ST HOSP IP/OBS MODERATE 55: CPT | Performed by: INTERNAL MEDICINE

## 2022-10-19 PROCEDURE — 250N000013 HC RX MED GY IP 250 OP 250 PS 637: Performed by: FAMILY MEDICINE

## 2022-10-19 PROCEDURE — 36415 COLL VENOUS BLD VENIPUNCTURE: CPT | Performed by: INTERNAL MEDICINE

## 2022-10-19 PROCEDURE — 96367 TX/PROPH/DG ADDL SEQ IV INF: CPT

## 2022-10-19 PROCEDURE — 83605 ASSAY OF LACTIC ACID: CPT | Performed by: FAMILY MEDICINE

## 2022-10-19 PROCEDURE — 96368 THER/DIAG CONCURRENT INF: CPT

## 2022-10-19 PROCEDURE — 87149 DNA/RNA DIRECT PROBE: CPT | Performed by: FAMILY MEDICINE

## 2022-10-19 PROCEDURE — 96361 HYDRATE IV INFUSION ADD-ON: CPT

## 2022-10-19 PROCEDURE — U0003 INFECTIOUS AGENT DETECTION BY NUCLEIC ACID (DNA OR RNA); SEVERE ACUTE RESPIRATORY SYNDROME CORONAVIRUS 2 (SARS-COV-2) (CORONAVIRUS DISEASE [COVID-19]), AMPLIFIED PROBE TECHNIQUE, MAKING USE OF HIGH THROUGHPUT TECHNOLOGIES AS DESCRIBED BY CMS-2020-01-R: HCPCS | Performed by: FAMILY MEDICINE

## 2022-10-19 PROCEDURE — 36415 COLL VENOUS BLD VENIPUNCTURE: CPT | Performed by: FAMILY MEDICINE

## 2022-10-19 PROCEDURE — 258N000003 HC RX IP 258 OP 636: Performed by: FAMILY MEDICINE

## 2022-10-19 PROCEDURE — 83735 ASSAY OF MAGNESIUM: CPT | Performed by: FAMILY MEDICINE

## 2022-10-19 PROCEDURE — 87077 CULTURE AEROBIC IDENTIFY: CPT | Performed by: FAMILY MEDICINE

## 2022-10-19 PROCEDURE — 84145 PROCALCITONIN (PCT): CPT | Performed by: FAMILY MEDICINE

## 2022-10-19 PROCEDURE — 81001 URINALYSIS AUTO W/SCOPE: CPT | Performed by: FAMILY MEDICINE

## 2022-10-19 PROCEDURE — 96366 THER/PROPH/DIAG IV INF ADDON: CPT

## 2022-10-19 PROCEDURE — 96372 THER/PROPH/DIAG INJ SC/IM: CPT | Mod: 59

## 2022-10-19 PROCEDURE — 99223 1ST HOSP IP/OBS HIGH 75: CPT | Performed by: INTERNAL MEDICINE

## 2022-10-19 PROCEDURE — 82248 BILIRUBIN DIRECT: CPT | Performed by: FAMILY MEDICINE

## 2022-10-19 PROCEDURE — 250N000013 HC RX MED GY IP 250 OP 250 PS 637: Performed by: INTERNAL MEDICINE

## 2022-10-19 PROCEDURE — 83605 ASSAY OF LACTIC ACID: CPT | Performed by: INTERNAL MEDICINE

## 2022-10-19 PROCEDURE — 73718 MRI LOWER EXTREMITY W/O DYE: CPT | Mod: RT

## 2022-10-19 PROCEDURE — 83690 ASSAY OF LIPASE: CPT | Performed by: FAMILY MEDICINE

## 2022-10-19 PROCEDURE — 120N000001 HC R&B MED SURG/OB

## 2022-10-19 PROCEDURE — C9803 HOPD COVID-19 SPEC COLLECT: HCPCS

## 2022-10-19 PROCEDURE — 83036 HEMOGLOBIN GLYCOSYLATED A1C: CPT | Performed by: INTERNAL MEDICINE

## 2022-10-19 PROCEDURE — 250N000011 HC RX IP 250 OP 636: Performed by: INTERNAL MEDICINE

## 2022-10-19 PROCEDURE — 99222 1ST HOSP IP/OBS MODERATE 55: CPT | Performed by: PODIATRIST

## 2022-10-19 PROCEDURE — 99285 EMERGENCY DEPT VISIT HI MDM: CPT | Mod: CS,25

## 2022-10-19 PROCEDURE — 82077 ASSAY SPEC XCP UR&BREATH IA: CPT | Performed by: FAMILY MEDICINE

## 2022-10-19 PROCEDURE — 93005 ELECTROCARDIOGRAM TRACING: CPT | Performed by: STUDENT IN AN ORGANIZED HEALTH CARE EDUCATION/TRAINING PROGRAM

## 2022-10-19 RX ORDER — MEROPENEM 500 MG/1
500 INJECTION, POWDER, FOR SOLUTION INTRAVENOUS EVERY 12 HOURS
Status: DISCONTINUED | OUTPATIENT
Start: 2022-10-19 | End: 2022-10-20

## 2022-10-19 RX ORDER — FAMOTIDINE 20 MG/1
20 TABLET, FILM COATED ORAL 2 TIMES DAILY
Status: DISCONTINUED | OUTPATIENT
Start: 2022-10-19 | End: 2022-10-19

## 2022-10-19 RX ORDER — MAGNESIUM SULFATE HEPTAHYDRATE 40 MG/ML
2 INJECTION, SOLUTION INTRAVENOUS ONCE
Status: COMPLETED | OUTPATIENT
Start: 2022-10-19 | End: 2022-10-19

## 2022-10-19 RX ORDER — PIPERACILLIN SODIUM, TAZOBACTAM SODIUM 3; .375 G/15ML; G/15ML
3.38 INJECTION, POWDER, LYOPHILIZED, FOR SOLUTION INTRAVENOUS EVERY 12 HOURS
Status: DISCONTINUED | OUTPATIENT
Start: 2022-10-19 | End: 2022-10-19

## 2022-10-19 RX ORDER — PIPERACILLIN SODIUM, TAZOBACTAM SODIUM 3; .375 G/15ML; G/15ML
3.38 INJECTION, POWDER, LYOPHILIZED, FOR SOLUTION INTRAVENOUS EVERY 8 HOURS
Status: DISCONTINUED | OUTPATIENT
Start: 2022-10-19 | End: 2022-10-19 | Stop reason: DRUGHIGH

## 2022-10-19 RX ORDER — SODIUM CHLORIDE 9 MG/ML
150 INJECTION, SOLUTION INTRAVENOUS CONTINUOUS
Status: DISCONTINUED | OUTPATIENT
Start: 2022-10-19 | End: 2022-10-20

## 2022-10-19 RX ORDER — ROSUVASTATIN CALCIUM 40 MG/1
40 TABLET, COATED ORAL DAILY
Status: DISCONTINUED | OUTPATIENT
Start: 2022-10-19 | End: 2022-10-19

## 2022-10-19 RX ORDER — CLONIDINE HYDROCHLORIDE 0.1 MG/1
0.2 TABLET ORAL 2 TIMES DAILY
Status: DISCONTINUED | OUTPATIENT
Start: 2022-10-19 | End: 2022-10-24 | Stop reason: HOSPADM

## 2022-10-19 RX ORDER — PROCHLORPERAZINE 25 MG
25 SUPPOSITORY, RECTAL RECTAL EVERY 12 HOURS PRN
Status: DISCONTINUED | OUTPATIENT
Start: 2022-10-19 | End: 2022-10-24 | Stop reason: HOSPADM

## 2022-10-19 RX ORDER — ACETAMINOPHEN 325 MG/1
975 TABLET ORAL EVERY 4 HOURS PRN
Status: DISCONTINUED | OUTPATIENT
Start: 2022-10-19 | End: 2022-10-19 | Stop reason: DRUGHIGH

## 2022-10-19 RX ORDER — ROSUVASTATIN CALCIUM 10 MG/1
10 TABLET, COATED ORAL DAILY
Status: DISCONTINUED | OUTPATIENT
Start: 2022-10-19 | End: 2022-10-24 | Stop reason: HOSPADM

## 2022-10-19 RX ORDER — DEXTROSE MONOHYDRATE 25 G/50ML
25-50 INJECTION, SOLUTION INTRAVENOUS
Status: DISCONTINUED | OUTPATIENT
Start: 2022-10-19 | End: 2022-10-24 | Stop reason: HOSPADM

## 2022-10-19 RX ORDER — LIDOCAINE 40 MG/G
CREAM TOPICAL
Status: DISCONTINUED | OUTPATIENT
Start: 2022-10-19 | End: 2022-10-24 | Stop reason: HOSPADM

## 2022-10-19 RX ORDER — CLINDAMYCIN PHOSPHATE 900 MG/50ML
900 INJECTION, SOLUTION INTRAVENOUS ONCE
Status: COMPLETED | OUTPATIENT
Start: 2022-10-19 | End: 2022-10-19

## 2022-10-19 RX ORDER — FAMOTIDINE 20 MG/1
20 TABLET, FILM COATED ORAL
Status: DISCONTINUED | OUTPATIENT
Start: 2022-10-19 | End: 2022-10-24 | Stop reason: HOSPADM

## 2022-10-19 RX ORDER — NICOTINE POLACRILEX 4 MG
15-30 LOZENGE BUCCAL
Status: DISCONTINUED | OUTPATIENT
Start: 2022-10-19 | End: 2022-10-24 | Stop reason: HOSPADM

## 2022-10-19 RX ORDER — CEFTRIAXONE 1 G/1
1 INJECTION, POWDER, FOR SOLUTION INTRAMUSCULAR; INTRAVENOUS ONCE
Status: COMPLETED | OUTPATIENT
Start: 2022-10-19 | End: 2022-10-19

## 2022-10-19 RX ORDER — PROCHLORPERAZINE MALEATE 10 MG
10 TABLET ORAL EVERY 6 HOURS PRN
Status: DISCONTINUED | OUTPATIENT
Start: 2022-10-19 | End: 2022-10-24 | Stop reason: HOSPADM

## 2022-10-19 RX ORDER — ACETAMINOPHEN 325 MG/1
650 TABLET ORAL ONCE
Status: COMPLETED | OUTPATIENT
Start: 2022-10-19 | End: 2022-10-19

## 2022-10-19 RX ORDER — DULOXETIN HYDROCHLORIDE 60 MG/1
60 CAPSULE, DELAYED RELEASE ORAL EVERY EVENING
Status: DISCONTINUED | OUTPATIENT
Start: 2022-10-19 | End: 2022-10-24 | Stop reason: HOSPADM

## 2022-10-19 RX ORDER — PIPERACILLIN SODIUM, TAZOBACTAM SODIUM 3; .375 G/15ML; G/15ML
3.38 INJECTION, POWDER, LYOPHILIZED, FOR SOLUTION INTRAVENOUS ONCE
Status: DISCONTINUED | OUTPATIENT
Start: 2022-10-19 | End: 2022-10-19

## 2022-10-19 RX ORDER — MEROPENEM 1 G/1
1 INJECTION, POWDER, FOR SOLUTION INTRAVENOUS EVERY 8 HOURS
Status: DISCONTINUED | OUTPATIENT
Start: 2022-10-19 | End: 2022-10-19

## 2022-10-19 RX ORDER — ACETAMINOPHEN 325 MG/1
975 TABLET ORAL EVERY 6 HOURS PRN
Status: DISCONTINUED | OUTPATIENT
Start: 2022-10-19 | End: 2022-10-24 | Stop reason: HOSPADM

## 2022-10-19 RX ADMIN — PROCHLORPERAZINE EDISYLATE 10 MG: 5 INJECTION INTRAMUSCULAR; INTRAVENOUS at 18:12

## 2022-10-19 RX ADMIN — CLONIDINE HYDROCHLORIDE 0.2 MG: 0.1 TABLET ORAL at 20:19

## 2022-10-19 RX ADMIN — ACETAMINOPHEN 650 MG: 325 TABLET, FILM COATED ORAL at 09:25

## 2022-10-19 RX ADMIN — DEXTROSE 15 G: 15 GEL ORAL at 21:16

## 2022-10-19 RX ADMIN — DULOXETINE HYDROCHLORIDE 60 MG: 60 CAPSULE, DELAYED RELEASE ORAL at 20:44

## 2022-10-19 RX ADMIN — CEFTRIAXONE SODIUM 1 G: 1 INJECTION, POWDER, FOR SOLUTION INTRAMUSCULAR; INTRAVENOUS at 10:17

## 2022-10-19 RX ADMIN — VANCOMYCIN HYDROCHLORIDE 1500 MG: 5 INJECTION, POWDER, LYOPHILIZED, FOR SOLUTION INTRAVENOUS at 12:55

## 2022-10-19 RX ADMIN — SODIUM CHLORIDE 1770 ML: 9 INJECTION, SOLUTION INTRAVENOUS at 09:44

## 2022-10-19 RX ADMIN — CLINDAMYCIN IN 5 PERCENT DEXTROSE 900 MG: 18 INJECTION, SOLUTION INTRAVENOUS at 09:46

## 2022-10-19 RX ADMIN — SODIUM CHLORIDE 150 ML/HR: 9 INJECTION, SOLUTION INTRAVENOUS at 23:03

## 2022-10-19 RX ADMIN — FAMOTIDINE 20 MG: 20 TABLET ORAL at 20:20

## 2022-10-19 RX ADMIN — ROSUVASTATIN CALCIUM 10 MG: 10 TABLET, FILM COATED ORAL at 16:55

## 2022-10-19 RX ADMIN — DEXTROSE MONOHYDRATE 50 ML: 25 INJECTION, SOLUTION INTRAVENOUS at 21:47

## 2022-10-19 RX ADMIN — MAGNESIUM SULFATE HEPTAHYDRATE 2 G: 40 INJECTION, SOLUTION INTRAVENOUS at 15:48

## 2022-10-19 RX ADMIN — MEROPENEM 500 MG: 500 INJECTION, POWDER, FOR SOLUTION INTRAVENOUS at 15:49

## 2022-10-19 ASSESSMENT — ENCOUNTER SYMPTOMS
NAUSEA: 1
COUGH: 0
VOMITING: 1
SHORTNESS OF BREATH: 0
ABDOMINAL PAIN: 1
DIARRHEA: 1
RHINORRHEA: 0

## 2022-10-19 ASSESSMENT — ACTIVITIES OF DAILY LIVING (ADL)
ADLS_ACUITY_SCORE: 35
ADLS_ACUITY_SCORE: 37
ADLS_ACUITY_SCORE: 35
ADLS_ACUITY_SCORE: 37
ADLS_ACUITY_SCORE: 35
ADLS_ACUITY_SCORE: 37
ADLS_ACUITY_SCORE: 37
ADLS_ACUITY_SCORE: 35

## 2022-10-19 NOTE — CONSULTS
Consultation - Infectious Disease  Josefa Curiel,  1975, MRN [id number]    Admitting Dx: Chronic renal impairment, stage 3 (moderate), unspecified whether stage 3a or 3b CKD (H) [N18.30]    PCP: Domingo Costello, [phone]   Code status:  Full Code       Extended Emergency Contact Information  Primary Emergency Contact: Wilbert Peña   [facility]  Mobile Phone: [phone]  Relation: Significant other       ASSESSMENT   1. Bullous cellulitis of right foot. Most non-purulent cellulitis is due to beta-hemolytic strep. Presenting with nausea, vomiting, noted to have temp to 103, elevated lactate. MRI not showing bone or joint infection. Low suspicion for necrotizing fasciitis clinically on exam.   2. DM type 1  3. CKD  4. Penicillin allergy as a child. Tolerated a dose of ceftriaxone here.   5. Recently quit alcohol use.     Principal Problem:    Chronic renal impairment, stage 3 (moderate), unspecified whether stage 3a or 3b CKD (H)  Active Problems:    Cellulitis of right foot    Ulcer of right foot, limited to breakdown of skin (H)    Type 1 diabetes mellitus with other specified complication (H)    Sepsis with encephalopathy without septic shock, due to unspecified organism (H)       PLAN   Can continue broad spectrum antibiotics pending blood cultures  Podiatry to see regarding potential debridement of ulceration. I do not see urgency for debridement based on exam.   Will follow.     Silvestre Valdez MD  [facility]  [phone] clinic  [employer] paging  ______________________________________________________________________        Reason For Consult: Cellulitis     HPI    We have been requested by Dr. Cisneros to evaluate Josefa Curiel who is a 47 year old year old female for the above. She has a PMH of hypertension, DM-1, CKD3b, anxiety/depression, prolong QTc, ETOH abuse, THC use who presented to ED this morning for evaluation of nausea and vomiting.     Recalls she  "started vomiting on 10/17. Also with abdominal pain that she attributes to retching. Had an episode of diarrhea this morning. Upon arrival to ED was noted be in sepsis with temp of 103F, HR in 150s, elevated lactic acid and procalcitonin. She was also noted to have 2 cm circular wound over the plantar aspect of right 1st MTP joint with surrounding cellulitis. Patient doesn't remember exactly how that happened, but may have injured it. She states she has been dealing with a lot of anxiety/depression lately, and her residence is \"[facility] Depression contributed to her delaying coming in. Has diabetic neuropathy in feet.     Feels better now after having had IV fluids. Pain at foot is mild.    Penicillin caused rash when she was 10 years old.          Medical History  Past Medical History:   Diagnosis Date     LORIN (acute kidney injury) (H)      Anxiety      Cannabis abuse      Depression      Diabetes Type 1, uncontrolled 1985    Onset age 10     DKA (diabetic ketoacidoses)      ETOH abuse      HLD (hyperlipidemia)      HTN (hypertension)      Lactic acidosis     Surgical History  She  has a past surgical history that includes appendectomy and Ankle Fracture Surgery (Left).   Social History  Reviewed, and she  reports that she has been smoking. She has never used smokeless tobacco. She reports current alcohol use of about 35.0 standard drinks per week. She reports current drug use. Drug: Marijuana.   Allergies  Allergies   Allergen Reactions     Colestipol GI Disturbance     Nickel Rash     Penicillins Rash    Family History  family history includes Alcoholism in her maternal grandfather; Arthritis in her mother; Clotting Disorder in her mother; Coronary Artery Disease in her maternal grandfather and maternal grandmother; Depression in her father; Hyperlipidemia in her mother; No Known Problems in her brother, paternal grandfather, and paternal grandmother; Other - See Comments in her sister; Skin Cancer in her father.   "    Psychosocial Needs  Social History     Social History Narrative     Not on file     Additional psychosocial needs reviewed per nursing assessment.       Animal exposure: cat  Others ill: no  Lives with boyfriend  Quit alcohol 2 weeks ago. Smokes THC, trying to quit that too.     Immunization History   Administered Date(s) Administered     DT (PEDS <7y) 04/08/1994     FLU 6-35 months 10/08/2008, 10/28/2009     Flu, Unspecified 10/30/2014     HepB-Adult 10/30/2014, 11/28/2014, 05/08/2015     Influenza (IIV3) PF 10/16/1992, 10/07/1993, 01/10/2012     Influenza Vaccine IM > 6 months Valent IIV4 (Alfuria,Fluzone) 09/11/2013, 10/30/2014, 11/16/2015, 04/12/2017, 03/24/2018, 10/02/2020     Pneumo Conj 13-V (2010&after) 09/12/2008     Pneumococcal 23 valent 09/12/2008     TD (ADULT, 7+) 08/23/2019     Tdap (Adacel,Boostrix) 09/12/2008        Prior to Admission Medications   (Not in a hospital admission)         Anti-infectives: meropenem 10/19-  Vancomycin 10/19 x1  clindamycin 10/19 x1  Ceftriaxone 10/19 x1  Cultures: blood pending 10/19  Imaging: reviewed images          Review of Systems:  All other systems negative in detail except what is noted above. Physical Exam:  Temp:  [103  F (39.4  C)] 103  F (39.4  C)  Pulse:  [] 97  Resp:  [16-28] 16  BP: (118-187)/() 123/69  SpO2:  [10 %-100 %] 97 %    GENERAL:  In no acute distress, is alert and oriented to person, place, time.  EYES: No conjunctival injection, pupils equally reactive to light, extra-ocular movement intact  HEAD, EARS, NOSE, MOUTH, AND THROAT: Nontraumatic, mouth without oral ulcers.   RESPIRATORY: Clear breath sounds to auscultation bilaterally.   CARDIOVASCULAR: Regular rate and rhythm, normal S1 and S2, no murmurs, rubs, or gallops.  ABDOMEN: Soft, nontender, no masses, no organomegaly.  Normal bowel sounds.  MUSCULOSKELETAL: No synovitis.  LYMPHATIC: No inguinal lymphadenopathy.  SKIN/HAIR/NAILS: see ER photo -- large blister/bulla at ball  of R great toe area, superficial skin has sloughed off, there is drying of underlying tissue with discoloration. Surrounding erythema, mildly tender. Not tender proximally in foot or shin. Ulceration extends to webspace between digits one and two.   NEUROLOGIC: decreased sensation to light touch in feet  Good DP and PT pulse R foot       Pertinent Labs         Recent Labs   Lab 10/19/22  0923   *   CO2 26   BUN 32.9*       Lab Results   Component Value Date    ALT 21 10/19/2022    AST 45 (H) 10/19/2022    ALKPHOS 116 (H) 10/19/2022            Pertinent Radiology  Radiology Results: Reviewed  MR Foot Right w/o Contrast    Result Date: 10/19/2022  EXAM: MR FOOT RIGHT W/O CONTRAST LOCATION: [facility] DATE/TIME: 10/19/2022 12:03 PM INDICATION: Sepsis, wound, ?osteo. COMPARISON: None. TECHNIQUE: Unenhanced. FINDINGS: JOINTS AND BONES: -No evidence for fracture or marrow signal abnormality. No evidence for osteomyelitis. No significant effusion to suggest a septic arthropathy. Hammertoe deformities. TENDONS: -The flexor and extensor tendons are negative for tendinopathy, tendosynovitis, or tearing. The hallucis tendons are negative. LIGAMENTS: -The ligament of Lisfranc is intact. The collateral ligaments at the MTP joints are all intact. MUSCLES AND SOFT TISSUES: -There is edema or cellulitis along the dorsal aspect of the foot. There are some bullous/blistering lesions involving the 3rd and 5th toes which are minimal to direct visual inspection. Fatty infiltration and atrophy of the plantar and interosseous musculature.     IMPRESSION: 1.  No evidence for fracture. 2.  No evidence for osteomyelitis, septic arthropathy, or abscess. 3.  Bullous/blistering lesions involving the 3rd and 5th toes. These would be amenable to direct visual inspection. 4.  Edema or cellulitis along the dorsal aspect of the foot extending into the toes. 5.  Fatty infiltration or atrophy of the plantar and  interosseous musculature. 6.  Hammertoe deformities.

## 2022-10-19 NOTE — ED NOTES
Agree with the triage note    She has a wound on the bottom of her right foot. She thinks has been there for about three days. There is redness that has spread to the top of the foot. Due to her diabetes she did not notice and it is clear these is an infection on the foot. She states she has been depressed and not paying as much attention to her wellbeing and foot care as she would normally. She also states her last alcohol drink was two weeks ago. She has been trying to quit drinking. She smoke mariajuana last three days ago. This she is not trying to quit. She feels she still needs it for her abdominal pain. She is very anxious and fidgety in the bed. She has difficulty laying still. She has vomited for me twice. She has a glucose monitor on her right lateral upper leg. She states it was placed there yesterday. She is pleasant and cooperative.

## 2022-10-19 NOTE — PHARMACY-VANCOMYCIN DOSING SERVICE
Pharmacy Vancomycin Initial Note  Date of Service 2022  Patient's  1975  47 year old, female    Indication: Sepsis and Skin and Soft Tissue Infection    Current estimated CrCl = Estimated Creatinine Clearance: 22.8 mL/min (A) (based on SCr of 2.9 mg/dL (H)).    Creatinine for last 3 days  No results found for requested labs within last 72 hours.    Recent Vancomycin Level(s) for last 3 days  No results found for requested labs within last 72 hours.      Vancomycin IV Administrations (past 72 hours)      No vancomycin orders with administrations in past 72 hours.                Nephrotoxins and other renal medications (From now, onward)    Start     Dose/Rate Route Frequency Ordered Stop    10/19/22 0930  vancomycin (VANCOCIN) 1,500 mg in sodium chloride 0.9 % 250 mL intermittent infusion         1,500 mg  over 90 Minutes Intravenous ONCE 10/19/22 0906            Contrast Orders - past 72 hours (72h ago, onward)    None              Plan:  1. Start vancomycin  1500 mg IV once (25.4 mg/kg)   2. Please consult again if vancomycin to be continued on the unit.     Yvette Domingo, [facility]

## 2022-10-19 NOTE — PHARMACY-VANCOMYCIN DOSING SERVICE
Pharmacy Vancomycin Initial Note  Date of Service 2022  Patient's  1975  47 year old, female    Indication: Skin and Soft Tissue Infection    Current estimated CrCl = Estimated Creatinine Clearance: 19.8 mL/min (A) (based on SCr of 3.35 mg/dL (H)).    Creatinine for last 3 days  10/19/2022:  9:23 AM Creatinine 3.35 mg/dL    Recent Vancomycin Level(s) for last 3 days  No results found for requested labs within last 72 hours.      Vancomycin IV Administrations (past 72 hours)                   vancomycin (VANCOCIN) 1,500 mg in sodium chloride 0.9 % 250 mL intermittent infusion (mg) 1,500 mg New Bag 10/19/22 1255                Nephrotoxins and other renal medications (From now, onward)    Start     Dose/Rate Route Frequency Ordered Stop    10/20/22 1300  vancomycin (VANCOCIN) 500 mg in sodium chloride 0.9 % 100 mL intermittent infusion         500 mg  over 1 Hours Intravenous EVERY 24 HOURS 10/19/22 1518            Contrast Orders - past 72 hours (72h ago, onward)    None          [employer] Prediction of Planned Initial Vancomycin Regimen  Loading dose: N/A  Regimen: 500 mg IV every 24 hours.  Start time: 16:16 on 10/19/2022  Exposure target: AUC24 (range)400-600 mg/L.hr   AUC24,ss: 468 mg/L.hr  Probability of AUC24 > 400: 68 %  Ctrough,ss: 16.4 mg/L  Probability of Ctrough,ss > 20: 30 %  Probability of nephrotoxicity (Lodise JAY ): 12 %          Plan:  1. Start vancomycin  500 mg IV q24h.   2. Vancomycin monitoring method: AUC  3. Vancomycin therapeutic monitoring goal: 400-600 mg*h/L  4. Pharmacy will check vancomycin levels as appropriate in 1-3 Days.    5. Serum creatinine levels will be ordered daily for the first week of therapy and at least twice weekly for subsequent weeks.      JESSE MOORE RPH

## 2022-10-19 NOTE — ED PROVIDER NOTES
EMERGENCY DEPARTMENT ENCOUNTER      NAME: Josefa Curiel  AGE: 47 year old female  YOB: 1975  MRN: [id number]  EVALUATION DATE & TIME: No admission date for patient encounter.    PCP: Domingo Costello    ED PROVIDER: Pradeep Sandhu M.D.    Chief Complaint   Patient presents with     Nausea, Vomiting, & Diarrhea     FINAL IMPRESSION:  1. Ulcer of right foot, limited to breakdown of skin (H)    2. Cellulitis of right foot    3. Sepsis with encephalopathy without septic shock, due to unspecified organism (H)    4. Type 1 diabetes mellitus with other specified complication (H)    5. Chronic renal impairment, stage 3 (moderate), unspecified whether stage 3a or 3b CKD (H)      ED COURSE & MEDICAL DECISION MAKING:    Pertinent Labs & Imaging studies personally reviewed and interpreted by me. (See chart for details)    8:47 AM Patient seen and examined, prior records reviewed.  Differential diagnosis includes but not limited to gastritis, pancreatitis, hepatitis, duodenitis, bowel obstruction, urinary tract infection, colitis, myocardial infarction, increased intracranial pressure, electrolyte disturbance, DKA, alcohol intoxication, alcohol withdrawal, medication reaction.  Patient with type 1 diabetes presents with vomiting for the last couple of days, found to be febrile and tachycardic on initial arrival.  She has a wound on her right foot with deeper tracking purulent discharge between the first and second toes, redness of the foot with tracking erythema to the ankle.  This is most likely the source today, she does meet sepsis criteria and concern for osteomyelitis as well.  Labs, MRI of the right foot are ordered.  Fluid bolus, Tylenol, and antibiotic coverage with Rocephin, vancomycin and clindamycin per protocol for infected diabetic foot ulcer.  Plan for admission.  Discussed transfer with the patient and she is agreeable to this as well.  10:12 AM No beds available in [facility] or within  the [facility].  Patient will be admitted to [facility].  Lactate and procalcitonin are elevated consistent with severe bacterial infection and sepsis.  10:33 AM Spoke with Dr. Cisneros, hospitalist, who accepts patient for admission.  Requests blood alcohol and urine drug screen which are ordered.  12:18 PM Alcohol screen is negative.  MRI of the foot does not demonstrate any deep space infection.  Heart rate improved, blood pressure remained stable.  Repeat lactate is pending.  2:48 PM Repeat lactate 0.6    At the conclusion of the encounter I discussed the results of all of the tests and the disposition. The questions were answered. The patient or family acknowledged understanding and was agreeable with the care plan.     Medical Decision Making    Supplemental history from: N/A    External Record(s) Reviewed: Inpatient Record and Outpatient Record    Differential Diagnosis: See MDM charting for differential considered.     I performed an independent interpretation of the: EKG: See my documentation.    Discussed with radiology regarding test interpretation: MRI Results    Discussion of management with another provider: Hospitalist    The following testing was considered but ultimately not selected: CT Imaging     I considered prescription management with: Antibiotic and Symptomatic Management    The patient's care impacted: Diabetes and Mental Health    Consideration of Admission/Observation: Patient admitted    Care significantly affected by Social Determinants of Health including: Alcohol Abuse and/or Recreational Drug Use    PROCEDURES:   Procedures     Critical Care     Performed by: Dr Pradeep Sandhu  Total critical care time: 220 minutes-prolonged critical care time due to critical bed shortage regionally  Critical care was necessary to treat or prevent imminent or life-threatening deterioration of the following conditions: Sepsis, multiple antibiotics  Critical care was time spent personally  by me on the following activities: development of treatment plan with patient or surrogate, discussions with consultants, examination of patient, evaluation of patient's response to treatment, obtaining history from patient or surrogate, ordering and performing treatments and interventions, ordering and review of laboratory studies, ordering and review of radiographic studies, re-evaluation of patient's condition and monitoring for potential decompensation.  Critical care time was exclusive of separately billable procedures and treating other patients.      MEDICATIONS GIVEN IN THE EMERGENCY:  Medications   0.9% sodium chloride BOLUS ( Intravenous Restarted 10/19/22 1247)     Followed by   sodium chloride 0.9% infusion (has no administration in time range)   vancomycin (VANCOCIN) 1,500 mg in sodium chloride 0.9 % 250 mL intermittent infusion (1,500 mg Intravenous New Bag 10/19/22 1255)   magnesium sulfate 2 g in water intermittent infusion (has no administration in time range)   lidocaine 1 % 0.1-1 mL (has no administration in time range)   lidocaine (LMX4) cream (has no administration in time range)   sodium chloride (PF) 0.9% PF flush 3 mL (has no administration in time range)   sodium chloride (PF) 0.9% PF flush 3 mL (has no administration in time range)   melatonin tablet 1 mg (has no administration in time range)   glucose gel 15-30 g (has no administration in time range)     Or   dextrose 50 % injection 25-50 mL (has no administration in time range)     Or   glucagon injection 1 mg (has no administration in time range)   prochlorperazine (COMPAZINE) injection 10 mg (has no administration in time range)     Or   prochlorperazine (COMPAZINE) tablet 10 mg (has no administration in time range)     Or   prochlorperazine (COMPAZINE) suppository 25 mg (has no administration in time range)   insulin aspart (NovoLOG) injection (RAPID ACTING) (has no administration in time range)   insulin aspart (NovoLOG) injection  (RAPID ACTING) (has no administration in time range)   cloNIDine (CATAPRES) tablet 0.2 mg (has no administration in time range)   DULoxetine (CYMBALTA) DR capsule 60 mg (has no administration in time range)   famotidine (PEPCID) tablet 20 mg (has no administration in time range)   insulin glargine (LANTUS PEN) injection 20 Units (has no administration in time range)   rosuvastatin (CRESTOR) tablet 40 mg (has no administration in time range)   traZODone (DESYREL) half-tab 25 mg (has no administration in time range)   insulin aspart (NovoLOG) injection (RAPID ACTING) (has no administration in time range)   insulin aspart (NovoLOG) injection (RAPID ACTING) (has no administration in time range)   insulin aspart (NovoLOG) injection (RAPID ACTING) (has no administration in time range)   acetaminophen (TYLENOL) tablet 975 mg (has no administration in time range)   piperacillin-tazobactam (ZOSYN) 3.375 g vial to attach to  mL bag (has no administration in time range)     Followed by   piperacillin-tazobactam (ZOSYN) 3.375 g vial to attach to  mL bag (has no administration in time range)   cefTRIAXone (ROCEPHIN) 1 g vial to attach to  mL bag for ADULTS or NS 50 mL bag for PEDS (0 g Intravenous Stopped 10/19/22 1046)   clindamycin (CLEOCIN) infusion 900 mg (0 mg Intravenous Stopped 10/19/22 1016)   acetaminophen (TYLENOL) tablet 650 mg (650 mg Oral Given 10/19/22 0925)       NEW PRESCRIPTIONS STARTED AT TODAY'S ER VISIT  New Prescriptions    No medications on file     =================================================================    HPI    Patient information was obtained from: patient    Josefa Curiel is a 47 year old female with a pertinent history of anxiety, alcohol abuse, upper GI bleed, type I DM, DKA, HLD, HTN, who presents to this ED by walk in for evaluation of vomiting.  Patient says she started vomiting 2 days ago.  Some diarrhea today.  Has not noted any runny nose, cough, chest pain,  shortness of breath, urinary symptoms.  She does note some mild diffuse abdominal pain.  She is an insulin-dependent diabetic, did not take her insulin today.  History of alcohol use, says she quit drinking 2 weeks ago.  Also notes a sore on her foot.    REVIEW OF SYSTEMS  Review of Systems   HENT: Negative for rhinorrhea.    Respiratory: Negative for cough and shortness of breath.    Cardiovascular: Negative for chest pain.   Gastrointestinal: Positive for abdominal pain (diffuse), diarrhea, nausea and vomiting.   Genitourinary: Negative.    All other systems reviewed and are negative.     All other systems reviewed and negative    PAST MEDICAL HISTORY:  Past Medical History:   Diagnosis Date     LORIN (acute kidney injury) (H)      Anxiety      Cannabis abuse      Depression      Diabetes Type 1, uncontrolled 1985    Onset age 10     DKA (diabetic ketoacidoses)      ETOH abuse      HLD (hyperlipidemia)      HTN (hypertension)      Lactic acidosis        PAST SURGICAL HISTORY:  Past Surgical History:   Procedure Laterality Date     ANKLE FRACTURE SURGERY Left      APPENDECTOMY         CURRENT MEDICATIONS:    Current Facility-Administered Medications   Medication     acetaminophen (TYLENOL) tablet 975 mg     cloNIDine (CATAPRES) tablet 0.2 mg     glucose gel 15-30 g    Or     dextrose 50 % injection 25-50 mL    Or     glucagon injection 1 mg     DULoxetine (CYMBALTA) DR capsule 60 mg     famotidine (PEPCID) tablet 20 mg     insulin aspart (NovoLOG) injection (RAPID ACTING)     insulin aspart (NovoLOG) injection (RAPID ACTING)     [START ON 10/20/2022] insulin aspart (NovoLOG) injection (RAPID ACTING)     insulin aspart (NovoLOG) injection (RAPID ACTING)     insulin aspart (NovoLOG) injection (RAPID ACTING)     [START ON 10/20/2022] insulin glargine (LANTUS PEN) injection 20 Units     lidocaine (LMX4) cream     lidocaine 1 % 0.1-1 mL     magnesium sulfate 2 g in water intermittent infusion     melatonin tablet 1 mg      "piperacillin-tazobactam (ZOSYN) 3.375 g vial to attach to  mL bag    Followed by     piperacillin-tazobactam (ZOSYN) 3.375 g vial to attach to  mL bag     prochlorperazine (COMPAZINE) injection 10 mg    Or     prochlorperazine (COMPAZINE) tablet 10 mg    Or     prochlorperazine (COMPAZINE) suppository 25 mg     rosuvastatin (CRESTOR) tablet 40 mg     sodium chloride (PF) 0.9% PF flush 3 mL     sodium chloride (PF) 0.9% PF flush 3 mL     sodium chloride 0.9% infusion     traZODone (DESYREL) half-tab 25 mg     vancomycin (VANCOCIN) 1,500 mg in sodium chloride 0.9 % 250 mL intermittent infusion     Current Outpatient Medications   Medication     cloNIDine (CATAPRES) 0.1 MG tablet     DULoxetine (CYMBALTA) 60 MG capsule     famotidine (PEPCID) 20 MG tablet     insulin glargine (LANTUS PEN) 100 UNIT/ML pen     insulin lispro (HUMALOG KWIKPEN) 100 UNIT/ML (1 unit dial) KWIKPEN     rosuvastatin (CRESTOR) 40 MG tablet     traZODone (DESYREL) 100 MG tablet       ALLERGIES:  Allergies   Allergen Reactions     Colestipol GI Disturbance     Nickel Rash     Penicillins Rash       FAMILY HISTORY:  Family History   Problem Relation Age of Onset     Clotting Disorder Mother         \"thin blood\"     Arthritis Mother      Hyperlipidemia Mother      Skin Cancer Father         face/nose      Depression Father      Other - See Comments Sister         eye surgeries     No Known Problems Brother      Coronary Artery Disease Maternal Grandmother      Alcoholism Maternal Grandfather      Coronary Artery Disease Maternal Grandfather      No Known Problems Paternal Grandmother      No Known Problems Paternal Grandfather        SOCIAL HISTORY:   Social History     Socioeconomic History     Marital status: Single   Tobacco Use     Smoking status: Some Days     Smokeless tobacco: Never     Tobacco comments:     occasional   Substance and Sexual Activity     Alcohol use: Yes     Alcohol/week: 35.0 standard drinks     Comment: " Alcoholic Drinks/day: ~750 mL wine daily     Drug use: Yes     Types: Marijuana     Comment: Drug use: 4-5x/week     Sexual activity: Yes     Partners: Male     Birth control/protection: Condom       VITALS:  /69   Pulse 97   Temp (!) 103  F (39.4  C) (Oral)   Resp 16   Wt 59 kg (130 lb)   LMP 10/05/2022   SpO2 97%   BMI 20.36 kg/m      PHYSICAL EXAM:  Physical Exam  Vitals and nursing note reviewed.   Constitutional:       Appearance: Normal appearance. She is ill-appearing.   HENT:      Head: Normocephalic and atraumatic.      Right Ear: External ear normal.      Left Ear: External ear normal.      Nose: Nose normal.      Mouth/Throat:      Mouth: Mucous membranes are moist.   Eyes:      Extraocular Movements: Extraocular movements intact.      Conjunctiva/sclera: Conjunctivae normal.      Pupils: Pupils are equal, round, and reactive to light.   Cardiovascular:      Rate and Rhythm: Regular rhythm. Tachycardia present.   Pulmonary:      Effort: Pulmonary effort is normal.      Breath sounds: Normal breath sounds. No wheezing or rales.   Abdominal:      General: Abdomen is flat. There is no distension.      Palpations: Abdomen is soft.      Tenderness: There is no abdominal tenderness. There is no guarding.   Musculoskeletal:         General: Normal range of motion.      Cervical back: Normal range of motion and neck supple.      Right lower leg: No edema.      Left lower leg: No edema.   Lymphadenopathy:      Cervical: No cervical adenopathy.   Skin:     General: Skin is warm and dry.      Comments: 2 cm ulcer on the plantar surface overlying the head of the first metatarsal with surrounding erythema on the forefoot and midfoot, linear tracking just proximal to the ankle.  Some purulent discharge and deeper appearing infection between the first and second toes.   Neurological:      General: No focal deficit present.      Mental Status: She is alert and oriented to person, place, and time. Mental  status is at baseline.      Comments: No gross focal neurologic deficits   Psychiatric:         Mood and Affect: Mood normal.         Behavior: Behavior normal.         Thought Content: Thought content normal.                      LAB:  All pertinent labs reviewed and interpreted.  Results for orders placed or performed during the hospital encounter of 10/19/22   MR Foot Right w/o Contrast    Impression    IMPRESSION:  1.  No evidence for fracture.  2.  No evidence for osteomyelitis, septic arthropathy, or abscess.  3.  Bullous/blistering lesions involving the 3rd and 5th toes. These would be amenable to direct visual inspection.  4.  Edema or cellulitis along the dorsal aspect of the foot extending into the toes.  5.  Fatty infiltration or atrophy of the plantar and interosseous musculature.  6.  Hammertoe deformities.     Symptomatic; Unknown Influenza A/B & SARS-CoV2 (COVID-19) Virus PCR Multiplex Nasopharyngeal    Specimen: Nasopharyngeal; Swab   Result Value Ref Range    Influenza A PCR Negative Negative    Influenza B PCR Negative Negative    RSV PCR Negative Negative    SARS CoV2 PCR Negative Negative   Glucose by meter   Result Value Ref Range    GLUCOSE BY METER POCT 181 (H) 70 - 99 mg/dL   Basic metabolic panel   Result Value Ref Range    Sodium 128 (L) 136 - 145 mmol/L    Potassium 3.6 3.4 - 5.3 mmol/L    Chloride 85 (L) 98 - 107 mmol/L    Carbon Dioxide (CO2) 26 22 - 29 mmol/L    Anion Gap 17 (H) 7 - 15 mmol/L    Urea Nitrogen 32.9 (H) 6.0 - 20.0 mg/dL    Creatinine 3.35 (H) 0.51 - 0.95 mg/dL    Calcium 9.0 8.6 - 10.0 mg/dL    Glucose 151 (H) 70 - 99 mg/dL    GFR Estimate 17 (L) >60 mL/min/1.73m2   Lactic acid whole blood   Result Value Ref Range    Lactic Acid 3.9 (H) 0.7 - 2.0 mmol/L   Result Value Ref Range    Magnesium 1.5 (L) 1.7 - 2.3 mg/dL   Result Value Ref Range    Lipase 24 13 - 60 U/L   Hepatic function panel   Result Value Ref Range    Protein Total 6.7 6.4 - 8.3 g/dL    Albumin 3.4 (L) 3.5 -  5.2 g/dL    Bilirubin Total 0.5 <=1.2 mg/dL    Alkaline Phosphatase 116 (H) 35 - 104 U/L    AST 45 (H) 10 - 35 U/L    ALT 21 10 - 35 U/L    Bilirubin Direct <0.20 0.00 - 0.30 mg/dL   Result Value Ref Range    Procalcitonin 3.34 (H) <0.05 ng/mL   Asymptomatic COVID-19 Virus (Coronavirus) by PCR Nasopharyngeal    Specimen: Nasopharyngeal; Swab   Result Value Ref Range    SARS CoV2 PCR Negative Negative   CBC with platelets and differential   Result Value Ref Range    WBC Count 7.9 4.0 - 11.0 10e3/uL    RBC Count 3.69 (L) 3.80 - 5.20 10e6/uL    Hemoglobin 10.8 (L) 11.7 - 15.7 g/dL    Hematocrit 31.5 (L) 35.0 - 47.0 %    MCV 85 78 - 100 fL    MCH 29.3 26.5 - 33.0 pg    MCHC 34.3 31.5 - 36.5 g/dL    RDW 12.4 10.0 - 15.0 %    Platelet Count 260 150 - 450 10e3/uL    % Neutrophils 96 %    % Lymphocytes 3 %    % Monocytes 1 %    % Eosinophils 0 %    % Basophils 0 %    % Immature Granulocytes 0 %    NRBCs per 100 WBC 0 <1 /100    Absolute Neutrophils 7.6 1.6 - 8.3 10e3/uL    Absolute Lymphocytes 0.2 (L) 0.8 - 5.3 10e3/uL    Absolute Monocytes 0.1 0.0 - 1.3 10e3/uL    Absolute Eosinophils 0.0 0.0 - 0.7 10e3/uL    Absolute Basophils 0.0 0.0 - 0.2 10e3/uL    Absolute Immature Granulocytes 0.0 <=0.4 10e3/uL    Absolute NRBCs 0.0 10e3/uL   Ethyl Alcohol Level   Result Value Ref Range    Alcohol ethyl <0.01 (H) <=0.00 g/dL   Lactic acid whole blood   Result Value Ref Range    Lactic Acid 0.6 (L) 0.7 - 2.0 mmol/L       RADIOLOGY:  Reviewed all pertinent imaging. Please see official radiology report.  MR Foot Right w/o Contrast   Final Result   IMPRESSION:   1.  No evidence for fracture.   2.  No evidence for osteomyelitis, septic arthropathy, or abscess.   3.  Bullous/blistering lesions involving the 3rd and 5th toes. These would be amenable to direct visual inspection.   4.  Edema or cellulitis along the dorsal aspect of the foot extending into the toes.   5.  Fatty infiltration or atrophy of the plantar and interosseous  musculature.   6.  Hammertoe deformities.           EKG:    Performed at: 8:51 AM  Impression: Sinus tachycardia, right axis deviation  Rate: 138  Rhythm: Sinus  Axis: Right  DC Interval: 130  QRS Interval: 90  QTc Interval: 439  ST Changes: No acute ischemic change  Comparison: September 2022, incomplete right bundle branch block previously seen is absent today    I have independently reviewed and interpreted the EKG(s) documented above.    [facility] Documentation:     The patient has signs of Severe Sepsis as evidenced by:  1. 2 SIRS criteria, AND  2. Suspected infection, AND   3. Organ dysfunction: Lactic Acidosis with value >2.0    Time severe sepsis diagnosis confirmed: 8:47 AM 10/19/22 as this was the time when patient febrile, tachycardic, with source of infection    3 Hour Severe Sepsis Bundle Completion:    1. Initial Lactic Acid Result:   Recent Labs   Lab Test 10/19/22  1304 10/19/22  0923 10/02/20  0321   LACT 0.6* 3.9* 0.8     2. Blood Cultures before Antibiotics: Yes  3. Broad Spectrum Antibiotics Administered:  yes  Anti-infectives (From admission through now)    Start     Dose/Rate Route Frequency Ordered Stop    10/19/22 1100  vancomycin (VANCOCIN) 1,500 mg in sodium chloride 0.9 % 250 mL intermittent infusion         1,500 mg  over 90 Minutes Intravenous ONCE 10/19/22 1038      10/19/22 0930  cefTRIAXone (ROCEPHIN) 1 g vial to attach to  mL bag for ADULTS or NS 50 mL bag for PEDS         1 g  over 15-30 Minutes Intravenous ONCE 10/19/22 0857 10/19/22 1046    10/19/22 0900  clindamycin (CLEOCIN) infusion 900 mg         900 mg  100 mL/hr over 30 Minutes Intravenous ONCE 10/19/22 0857 10/19/22 1016      4. Is initial hypotension present?   No (IV fluid bolus NOT required). IV Fluid volume administered: 1800mL                Severe Sepsis reassessment:  1. Repeat Lactic Acid Level within 6 hours of time zero: 0.9  2. MAP>65 after initial IVF bolus, will continue to monitor fluid  status and vital signs    I attest to having performed a repeat sepsis exam and assessment of perfusion at 11:40 AM and the results demonstrate no change.      I, Ilir Springer, am serving as a scribe to document services personally performed by Dr. Sandhu based on my observation and the provider's statements to me. I, Pradeep Sandhu MD attest that Ilir Springer is acting in a scribe capacity, has observed my performance of the services and has documented them in accordance with my direction.    Pradeep Sandhu M.D.  Emergency Medicine  [facility] EMERGENCY DEPARTMENT  [address] [place] [id number]  [phone]  Dept: [phone]     Pradeep Sandhu MD  10/19/22 4588

## 2022-10-19 NOTE — PHARMACY-ADMISSION MEDICATION HISTORY
Pharmacy Note - Admission Medication History    Pertinent Provider Information: Updated Humalog directions per most recent Endocrinology visit 10/11/22.      ______________________________________________________________________    Prior To Admission (PTA) med list completed and updated in EMR.       PTA Med List   Medication Sig Note Last Dose     cloNIDine (CATAPRES) 0.1 MG tablet Take 2 tablets (0.2 mg) by mouth 2 times daily  10/18/2022 at AM     DULoxetine (CYMBALTA) 60 MG capsule [DULOXETINE (CYMBALTA) 30 MG CAPSULE] Take 60 mg by mouth every evening.   10/17/2022 at PM     famotidine (PEPCID) 20 MG tablet Take 1 tablet (20 mg) by mouth 2 times daily For 1 week then 1 twice a day as needed for heartburn (Patient taking differently: Take 20 mg by mouth 2 times daily as needed (heartburn) For 1 week then 1 twice a day as needed for heartburn)  Past Month at PRN     insulin glargine (LANTUS PEN) 100 UNIT/ML pen Inject 17 Units Subcutaneous At Bedtime (Patient taking differently: Inject 20 Units Subcutaneous every morning) 10/19/2022: Using up supply of Lantus then switching to Tresiba 35 units QAM 10/18/2022 at AM     insulin lispro (HUMALOG KWIKPEN) 100 UNIT/ML (1 unit dial) KWIKPEN 1 unit per 15 gm Carbs, hold if glucose < 80 (Patient taking differently: Inject 1 unit for every 6g carbs for breakfast, 1 unit for every 4g carbs for lunch, & 1 unit for every 3g carbs for dinner plus 1 unit for every 70 blood sugar above 130. Max 50 units per day)  10/18/2022 at AM     rosuvastatin (CRESTOR) 40 MG tablet Take 40 mg by mouth daily 10/19/2022: Reports she is not great at remembering to take this medication 10/14/2022     traZODone (DESYREL) 100 MG tablet Take 25 mg by mouth nightly as needed for sleep  Past Month at PRN       Information source(s): Patient, Clinic records and CareEverywhere/SureScripts  Method of interview communication: in-person with surgical mask and oscareld    Summary of Changes to PTA Med  List  New: none  Discontinued: multivitamin, Tylenol  Changed: Lantus 17 units HS to 20 units AM    Patient was asked about OTC/herbal products specifically.  PTA med list reflects this.    Allergies were reviewed, assessed, and updated with the patient.      Patient does not use any multi-dose medications prior to admission.    The information provided in this note is only as accurate as the sources available at the time of the update(s).    Thank you for the opportunity to participate in the care of this patient.    Yvette Domingo [facility]  10/19/2022 1:35 PM

## 2022-10-19 NOTE — H&P
ADMISSION HISTORY & PHYSICAL    CODE STATUS:  Domingo Macedo, [phone]    Patient YOB: 1975  Admit date: 10/19/2022  Date of Service: 10/19/2022    ASSESSMENT AND PLAN:  47 year old female with PMH of hypertension, DM-1, CKD3b, anxiety/depression, prolong QTc, ETOH abuse, THC use who presented to our ED for evaluation of nausea and vomiting.    Right foot cellulitis with sepsis:  -- Patient has a circular ulcer over the right 1st MTP joint in the plantar surface with surrounding erythema over the skin dorsally spreading towards the ankle  -- Elevated procal and lactic acid noted  -- Received IV rocephin/clinida and vanc in ED. Will continue vanc/zosyn. Blood cultures obtained  -- MRI right foot to rule out osteomyelitis  -- Consult podiatry. Consult ID    Lactic acidosis  -- Received 30ml/kg IVF per sepsis protocol in ED  -- Cont with NS @ 150ml/hr  -- Check lactic acid in 4 hours    LORIN on CKD-3b  -- Baseline creatinine appears to be around 1.5. Presented with creatinine of 3.3  -- IVF as above    Nausea/vomiting  -- Due to above  -- Symptomatic treatment    DM-1  -- Resume home insulin regimen  -- Diabetic diet    Hypertension  -- Cont with home  meds    Prolong QTc  --Replace maynesium    H/o ETOH abuse  -- Reports no alcohol use for 2 wks  -- Watch for withdrawal    H/O THC abuse  -- UDS    Anxiety/depression  -- Cont home meds    Disposition:  -Anticipated Length of Stay in midnights and medical necessity (including a midnight in the Emergency Department after triage if applicable): >2      CHIEF COMPLAINT:  Nausea and vomiting    HISTORY OF PRESENTING ILLNESS:  47 year old female with PMH of hypertension, DM-1, CKD3b, anxiety/depression, prolong QTc, ETOH abuse, THC use who presented to our ED for evaluation of nausea and vomiting.    Patient reports 2 days of nausea and vomiting. Reports she has been vomiting about 3-4 times a day. Couldn't keep even liquids down. Denies any abdominal  "pain per se. States she had an episode of diarrhea this morning. Patient upon arrival to ED was noted be in sepsis with temp of 103F, HR in 150s, elevated lactic acid and procalcitonin. She was also noted to have 2 cm circular wound over the plantar aspect of right 1st MTP joint with surrounding cellulitis. Patient doesn't remember exactly how that happened. She states she has been dealing with a lot of anxiety/depression lately, and [place] \"[place]\". She reports she might have bumped her foot on something on the floor few days ago.     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)     Moderate episode of recurrent major depressive disorder (H)     Anxiety     Hyperlipidemia     Vitamin D deficiency     Trigger middle finger     Diabetic ketoacidosis with coma associated with type 1 diabetes mellitus (H)     Alcohol use disorder, moderate, dependence (H)     Hypertensive urgency     Leukocytosis     Cellulitis of right foot     Ulcer of right foot, limited to breakdown of skin (H)     Type 1 diabetes mellitus with other specified complication (H)     Sepsis with encephalopathy without septic shock, due to unspecified organism (H)     Chronic renal impairment, stage 3 (moderate), unspecified whether stage 3a or 3b CKD (H)       Past Medical History:   Diagnosis Date     LORIN (acute kidney injury) (H)      Anxiety      Cannabis abuse      Depression      Diabetes Type 1, uncontrolled 1985    Onset age 10     DKA (diabetic ketoacidoses)      ETOH abuse      HLD (hyperlipidemia)      HTN (hypertension)      Lactic acidosis         Past Surgical History:   Procedure Laterality Date     " ANKLE FRACTURE SURGERY Left      APPENDECTOMY            ALLERGIES:  Allergies   Allergen Reactions     Nickel Rash     Penicillins Rash       MEDICATIONS:  Reviewed.  Current Facility-Administered Medications   Medication     sodium chloride 0.9% infusion     vancomycin (VANCOCIN) 1,500 mg in sodium chloride 0.9 % 250 mL intermittent infusion     Current Outpatient Medications   Medication     acetaminophen (TYLENOL) 325 MG tablet     cloNIDine (CATAPRES) 0.1 MG tablet     DULoxetine (CYMBALTA) 60 MG capsule     famotidine (PEPCID) 20 MG tablet     insulin glargine (LANTUS PEN) 100 UNIT/ML pen     insulin lispro (HUMALOG KWIKPEN) 100 UNIT/ML (1 unit dial) KWIKPEN     multivitamin w/minerals (THERA-VIT-M) tablet     rosuvastatin (CRESTOR) 40 MG tablet     traZODone (DESYREL) 100 MG tablet       Current Facility-Administered Medications:      [COMPLETED] 0.9% sodium chloride BOLUS, 30 mL/kg, Intravenous, Once, Last Rate: 1,770 mL/hr at 10/19/22 0944, 1,770 mL at 10/19/22 0944 **FOLLOWED BY** sodium chloride 0.9% infusion, 150 mL/hr, Intravenous, Continuous, Pradeep Sandhu MD     vancomycin (VANCOCIN) 1,500 mg in sodium chloride 0.9 % 250 mL intermittent infusion, 1,500 mg, Intravenous, Once, Pradeep Sandhu MD    Current Outpatient Medications:      acetaminophen (TYLENOL) 325 MG tablet, Take 2 tablets (650 mg) by mouth every 4 hours as needed for mild pain or other (and adjunct with moderate or severe pain or per patient request), Disp: , Rfl: 0     cloNIDine (CATAPRES) 0.1 MG tablet, Take 2 tablets (0.2 mg) by mouth 2 times daily, Disp: 120 tablet, Rfl: 1     DULoxetine (CYMBALTA) 60 MG capsule, [DULOXETINE (CYMBALTA) 30 MG CAPSULE] Take 60 mg by mouth every evening. , Disp: , Rfl:      famotidine (PEPCID) 20 MG tablet, Take 1 tablet (20 mg) by mouth 2 times daily For 1 week then 1 twice a day as needed for heartburn, Disp: 60 tablet, Rfl: 1     insulin glargine (LANTUS PEN) 100 UNIT/ML pen, Inject 17  "Units Subcutaneous At Bedtime, Disp: , Rfl: 0     insulin lispro (HUMALOG KWIKPEN) 100 UNIT/ML (1 unit dial) KWIKPEN, 1 unit per 15 gm Carbs, hold if glucose < 80, Disp: 15 mL, Rfl: 0     multivitamin w/minerals (THERA-VIT-M) tablet, Take 1 tablet by mouth daily, Disp: 30 tablet, Rfl: 1     rosuvastatin (CRESTOR) 40 MG tablet, Take 40 mg by mouth daily, Disp: , Rfl:      traZODone (DESYREL) 100 MG tablet, Take 50 mg by mouth nightly as needed, Disp: , Rfl:    Scheduled Meds:    vancomycin  1,500 mg Intravenous Once     Continuous Infusions:    sodium chloride       PRN Meds:.    SOCIAL HISTORY:  Social History     Socioeconomic History     Marital status: Single     Spouse name: Not on file     Number of children: Not on file     Years of education: Not on file     Highest education level: Not on file   Occupational History     Not on file   Tobacco Use     Smoking status: Some Days     Smokeless tobacco: Never     Tobacco comments:     occasional   Substance and Sexual Activity     Alcohol use: Yes     Alcohol/week: 35.0 standard drinks     Comment: Alcoholic Drinks/day: ~750 mL wine daily     Drug use: Yes     Types: Marijuana     Comment: Drug use: 4-5x/week     Sexual activity: Yes     Partners: Male     Birth control/protection: Condom   Other Topics Concern     Not on file   Social History Narrative     Not on file     Social Determinants of Health     Financial Resource Strain: Not on file   Food Insecurity: Not on file   Transportation Needs: Not on file   Physical Activity: Not on file   Stress: Not on file   Social Connections: Not on file   Intimate Partner Violence: Not on file   Housing Stability: Not on file       FAMILY HISTORY:  Family History   Problem Relation Age of Onset     Clotting Disorder Mother         \"thin blood\"     Arthritis Mother      Hyperlipidemia Mother      Skin Cancer Father         face/nose      Depression Father      Other - See Comments Sister         eye surgeries     No " Known Problems Brother      Coronary Artery Disease Maternal Grandmother      Alcoholism Maternal Grandfather      Coronary Artery Disease Maternal Grandfather      No Known Problems Paternal Grandmother      No Known Problems Paternal Grandfather         ROS:  12 point review of systems reviewed and is negative except for what has already been mentioned in HPI.       PHYSICAL EXAM:  GENRL:  Not in acute distress   BP (!) 169/91   Pulse 120   Temp (!) 103  F (39.4  C) (Oral)   Resp 28   Wt 59 kg (130 lb)   LMP 10/05/2022   SpO2 95%   BMI 20.36 kg/m    No intake/output data recorded.  I/O this shift:  In: -   Out: 125 [Emesis/NG output:125]  HEENT: NC/AT      Eyes-  Pupil round and reactive to light bilaterally       Neck- supple, no JVP elevation,       Sclera- anicteric      Oropharynx- moist and pink  CHEST: Clear to auscultation bilateral anteriorly, no ronchi or wheezing  HEART: S1S2 normal, regular.   ABDMN: Soft. Non-tender, non-distended.  No guarding or rigidity. Bowel sounds- active  EXTRM: A circular about 2cm ulcer over the right 1st MTP joint in the plantar surface with surrounding erythema over the skin dorsally spreading towards the ankle  NEURO: Alert and awake. Cranial nerves II-XII grossly intact. No focal neurological deficit.      DIAGNOSTIC DATA:    Recent Labs   Lab 10/19/22  0923   WBC 7.9   HGB 10.8*   HCT 31.5*          Recent Labs   Lab 10/19/22  0923   *   CO2 26   BUN 32.9*       No results for input(s): INR in the last 168 hours.    Recent Labs   Lab 10/19/22  0923   *   CO2 26   BUN 32.9*   ALBUMIN 3.4*   ALKPHOS 116*   ALT 21   AST 45*         No results found.    Patient's new lab studies reviewed personally.  Patient's new radiology reports reviewed personally.  I personally viewed and personally interpreted patient's EKG: sinus tachy    Note created using dragon voice recognition software.  Errors in spelling or words which seems out of context are  unintentional.  Sounds alike errors may have escaped editing.     10/19/2022  Reji Cisneros MD  HOSPITALIST, [facility]  PAGER NO. [phone]

## 2022-10-19 NOTE — ED TRIAGE NOTES
Patient presents with vomiting and diarrhea that has occurred over the past two days. She is a type 1 diabetic and states that her blood glucose were in the 70-90.

## 2022-10-19 NOTE — CONSULTS
Consultation - Foot and Ankle/Podiatry  Josefa Curiel,  1975, MRN [id number]    Admitting Dx: Chronic renal impairment, stage 3 (moderate), unspecified whether stage 3a or 3b CKD (H) [N18.30]    PCP: Domingo Costello, [phone]   Code status:  Full Code       Extended Emergency Contact Information  Primary Emergency Contact: Wilbert Peña   [facility]  Mobile Phone: [phone]  Relation: Significant other       ASSESSMENT   Diabetic Foot ulceration bilateral  Principal Problem:    Chronic renal impairment, stage 3 (moderate), unspecified whether stage 3a or 3b CKD (H)  Active Problems:    Cellulitis of right foot    Ulcer of right foot, limited to breakdown of skin (H)    Type 1 diabetes mellitus with other specified complication (H)    Sepsis with encephalopathy without septic shock, due to unspecified organism (H)       PLAN   -Ulceration with non-viable tissue plantar medial right foot including stable eschar, no increased depth. Diffuse erythema along the right foot ulceration extending into the dorsal right midfoot. Ulceration along distal medial 3rd digit left foot, no increased depth or erythema at this location. WBC 7.9.     MRI right foot: 1.  No evidence for fracture.  2.  No evidence for osteomyelitis, septic arthropathy, or abscess.  3.  Bullous/blistering lesions involving the 3rd and 5th toes. These would be amenable to direct visual inspection.  4.  Edema or cellulitis along the dorsal aspect of the foot extending into the toes.  5.  Fatty infiltration or atrophy of the plantar and interosseous musculature.  6.  Hammertoe deformities.    -I reviewed the above with the patient today and do not recommend surgical debridement at this time. Although we discussed the benefits of sharp debridement, this can be done as an outpatient.     -Recommend [facility] nurse to manage bilateral foot wounds while in-house.    -Recommend non-weight bearing right foot with knee walker.     -Medical management per  hospitalist. Antibiotics per ID. I would like to see her for follow-up in 7-10 days at the [facility].     Thank you for the consultation. I will sign off at this time. Please contact me with questions.     David Patel DPM  [facility] Podiatry/Foot & Ankle Surgery  On-Call: [phone]  ______________________________________________________________________        Reason For Consult: Chronic renal impairment, stage 3 (moderate), unspecified whether stage 3a or 3b CKD (H)  Cellulitis right foot  Diabetic foot ulceration bilateral feet       HPI    I have been requested by Dr. Cisneros to evaluate Josefa Curiel who is a 47 year old year old female for the above. The patient was admitted to [facility] with a right foot infection. The patient states he only recently noticed the sore but is not a good historian due to recent worsening of her depression. PMH significant for DM1. She lives with her boyfriend. Right foot MRI completed today.          Medical History  Past Medical History:   Diagnosis Date     LORIN (acute kidney injury) (H)      Anxiety      Cannabis abuse      Depression      Diabetes Type 1, uncontrolled 1985    Onset age 10     DKA (diabetic ketoacidoses)      ETOH abuse      HLD (hyperlipidemia)      HTN (hypertension)      Lactic acidosis      Surgical History  She  has a past surgical history that includes appendectomy and Ankle Fracture Surgery (Left).   Social History  Reviewed, and she  reports that she has been smoking. She has never used smokeless tobacco. She reports current alcohol use of about 35.0 standard drinks per week. She reports current drug use. Drug: Marijuana.   Allergies  Allergies   Allergen Reactions     Colestipol GI Disturbance     Nickel Rash     Penicillins Rash    Family History  family history includes Alcoholism in her maternal grandfather; Arthritis in her mother; Clotting Disorder in her mother; Coronary Artery Disease in her maternal  grandfather and maternal grandmother; Depression in her father; Hyperlipidemia in her mother; No Known Problems in her brother, paternal grandfather, and paternal grandmother; Other - See Comments in her sister; Skin Cancer in her father.  family history not pertinent to presenting problem.    Psychosocial Needs  Social History     Social History Narrative     Not on file     Additional psychosocial needs reviewed per nursing assessment.       Prior to Admission Medications   (Not in a hospital admission)         Imaging: reviewed images          Review of Systems:  All other systems negative in detail except what is noted in HPI.  Physical Exam:  Temp:  [97.7  F (36.5  C)-103  F (39.4  C)] 97.7  F (36.5  C)  Pulse:  [] 96  Resp:  [16-28] 18  BP: (118-187)/() 145/70  SpO2:  [10 %-100 %] 100 %    General appearance: Patient is alert and fully cooperative with history & exam.  No sign of distress is noted during the visit.  Psychiatric: Affect is pleasant & appropriate.  Patient appears motivated to improve health.  Respiratory: Breathing is regular & unlabored while sitting.  HEENT: Hearing is intact to spoken word.  Speech is clear.  No gross evidence of visual impairment that would impact ambulation.    Vascular: Dorsalis pedis and posterior tibial pulses are palpable bilateral. Diffuse edema right foot and ankle.   Dermatologic: Ulceration with non-viable tissue plantar medial right foot including stable eschar, no increased depth. Diffuse erythema along the right foot ulceration extending into the dorsal right midfoot. Ulceration along distal medial 3rd digit left foot, no increased depth or erythema at this location.   Neurologic: Diminished to light touch bilateral.   Musculoskeletal: Contracted digits bilateral.      BP (!) 145/70 (BP Location: Left arm)   Pulse 96   Temp 97.7  F (36.5  C) (Oral)   Resp 18   Wt 59 kg (130 lb)   LMP 10/05/2022   SpO2 100%   BMI 20.36 kg/m      BMI= Body mass  index is 20.36 kg/m .    Pertinent Labs    [unfilled]       Recent Labs   Lab 10/19/22  0923   *   CO2 26   BUN 32.9*       Lab Results   Component Value Date    ALT 21 10/19/2022    AST 45 (H) 10/19/2022    ALKPHOS 116 (H) 10/19/2022        No results found for: CRP   [unfilled]     Pertinent Radiology  Radiology Results: Reviewed  MR Foot Right w/o Contrast    Result Date: 10/19/2022  EXAM: MR FOOT RIGHT W/O CONTRAST LOCATION: [facility] DATE/TIME: 10/19/2022 12:03 PM INDICATION: Sepsis, wound, ?osteo. COMPARISON: None. TECHNIQUE: Unenhanced. FINDINGS: JOINTS AND BONES: -No evidence for fracture or marrow signal abnormality. No evidence for osteomyelitis. No significant effusion to suggest a septic arthropathy. Hammertoe deformities. TENDONS: -The flexor and extensor tendons are negative for tendinopathy, tendosynovitis, or tearing. The hallucis tendons are negative. LIGAMENTS: -The ligament of Lisfranc is intact. The collateral ligaments at the MTP joints are all intact. MUSCLES AND SOFT TISSUES: -There is edema or cellulitis along the dorsal aspect of the foot. There are some bullous/blistering lesions involving the 3rd and 5th toes which are minimal to direct visual inspection. Fatty infiltration and atrophy of the plantar and interosseous musculature.     IMPRESSION: 1.  No evidence for fracture. 2.  No evidence for osteomyelitis, septic arthropathy, or abscess. 3.  Bullous/blistering lesions involving the 3rd and 5th toes. These would be amenable to direct visual inspection. 4.  Edema or cellulitis along the dorsal aspect of the foot extending into the toes. 5.  Fatty infiltration or atrophy of the plantar and interosseous musculature. 6.  Hammertoe deformities.

## 2022-10-20 ENCOUNTER — APPOINTMENT (OUTPATIENT)
Dept: PHYSICAL THERAPY | Facility: HOSPITAL | Age: 47
DRG: 871 | End: 2022-10-20
Attending: INTERNAL MEDICINE
Payer: COMMERCIAL

## 2022-10-20 LAB
ALBUMIN SERPL BCG-MCNC: 2.9 G/DL (ref 3.5–5.2)
ALP SERPL-CCNC: 98 U/L (ref 35–104)
ALT SERPL W P-5'-P-CCNC: 21 U/L (ref 10–35)
ANION GAP SERPL CALCULATED.3IONS-SCNC: 11 MMOL/L (ref 7–15)
AST SERPL W P-5'-P-CCNC: 35 U/L (ref 10–35)
BILIRUB SERPL-MCNC: 0.2 MG/DL
BUN SERPL-MCNC: 27 MG/DL (ref 6–20)
CALCIUM SERPL-MCNC: 8 MG/DL (ref 8.6–10)
CHLORIDE SERPL-SCNC: 97 MMOL/L (ref 98–107)
CREAT SERPL-MCNC: 2.67 MG/DL (ref 0.51–0.95)
DEPRECATED HCO3 PLAS-SCNC: 25 MMOL/L (ref 22–29)
ENTEROCOCCUS FAECALIS: NOT DETECTED
ENTEROCOCCUS FAECIUM: NOT DETECTED
ERYTHROCYTE [DISTWIDTH] IN BLOOD BY AUTOMATED COUNT: 12.7 % (ref 10–15)
GFR SERPL CREATININE-BSD FRML MDRD: 22 ML/MIN/1.73M2
GLUCOSE BLDC GLUCOMTR-MCNC: 116 MG/DL (ref 70–99)
GLUCOSE BLDC GLUCOMTR-MCNC: 123 MG/DL (ref 70–99)
GLUCOSE BLDC GLUCOMTR-MCNC: 124 MG/DL (ref 70–99)
GLUCOSE BLDC GLUCOMTR-MCNC: 148 MG/DL (ref 70–99)
GLUCOSE BLDC GLUCOMTR-MCNC: 152 MG/DL (ref 70–99)
GLUCOSE BLDC GLUCOMTR-MCNC: 211 MG/DL (ref 70–99)
GLUCOSE BLDC GLUCOMTR-MCNC: 31 MG/DL (ref 70–99)
GLUCOSE BLDC GLUCOMTR-MCNC: 33 MG/DL (ref 70–99)
GLUCOSE BLDC GLUCOMTR-MCNC: 34 MG/DL (ref 70–99)
GLUCOSE BLDC GLUCOMTR-MCNC: 38 MG/DL (ref 70–99)
GLUCOSE BLDC GLUCOMTR-MCNC: 45 MG/DL (ref 70–99)
GLUCOSE SERPL-MCNC: 160 MG/DL (ref 70–99)
HCT VFR BLD AUTO: 28.8 % (ref 35–47)
HGB BLD-MCNC: 9.7 G/DL (ref 11.7–15.7)
LISTERIA SPECIES (DETECTED/NOT DETECTED): NOT DETECTED
MCH RBC QN AUTO: 29.7 PG (ref 26.5–33)
MCHC RBC AUTO-ENTMCNC: 33.7 G/DL (ref 31.5–36.5)
MCV RBC AUTO: 88 FL (ref 78–100)
PLATELET # BLD AUTO: 244 10E3/UL (ref 150–450)
POTASSIUM SERPL-SCNC: 3.4 MMOL/L (ref 3.4–5.3)
PROT SERPL-MCNC: 5.8 G/DL (ref 6.4–8.3)
RBC # BLD AUTO: 3.27 10E6/UL (ref 3.8–5.2)
SODIUM SERPL-SCNC: 133 MMOL/L (ref 136–145)
STAPHYLOCOCCUS AUREUS: DETECTED
STAPHYLOCOCCUS EPIDERMIDIS: NOT DETECTED
STAPHYLOCOCCUS LUGDUNENSIS: NOT DETECTED
STREPTOCOCCUS AGALACTIAE: NOT DETECTED
STREPTOCOCCUS ANGINOSUS GROUP: NOT DETECTED
STREPTOCOCCUS PNEUMONIAE: NOT DETECTED
STREPTOCOCCUS PYOGENES: NOT DETECTED
STREPTOCOCCUS SPECIES: NOT DETECTED
WBC # BLD AUTO: 8.2 10E3/UL (ref 4–11)

## 2022-10-20 PROCEDURE — 250N000011 HC RX IP 250 OP 636: Performed by: INTERNAL MEDICINE

## 2022-10-20 PROCEDURE — 85027 COMPLETE CBC AUTOMATED: CPT | Performed by: INTERNAL MEDICINE

## 2022-10-20 PROCEDURE — 258N000003 HC RX IP 258 OP 636: Performed by: STUDENT IN AN ORGANIZED HEALTH CARE EDUCATION/TRAINING PROGRAM

## 2022-10-20 PROCEDURE — 250N000013 HC RX MED GY IP 250 OP 250 PS 637: Performed by: INTERNAL MEDICINE

## 2022-10-20 PROCEDURE — 97161 PT EVAL LOW COMPLEX 20 MIN: CPT | Mod: GP

## 2022-10-20 PROCEDURE — 250N000012 HC RX MED GY IP 250 OP 636 PS 637: Performed by: INTERNAL MEDICINE

## 2022-10-20 PROCEDURE — 80053 COMPREHEN METABOLIC PANEL: CPT | Performed by: INTERNAL MEDICINE

## 2022-10-20 PROCEDURE — 36415 COLL VENOUS BLD VENIPUNCTURE: CPT | Performed by: INTERNAL MEDICINE

## 2022-10-20 PROCEDURE — G0463 HOSPITAL OUTPT CLINIC VISIT: HCPCS | Mod: 25

## 2022-10-20 PROCEDURE — 120N000001 HC R&B MED SURG/OB

## 2022-10-20 PROCEDURE — 97116 GAIT TRAINING THERAPY: CPT | Mod: GP

## 2022-10-20 PROCEDURE — 99233 SBSQ HOSP IP/OBS HIGH 50: CPT | Performed by: INTERNAL MEDICINE

## 2022-10-20 PROCEDURE — 96366 THER/PROPH/DIAG IV INF ADDON: CPT

## 2022-10-20 PROCEDURE — 87040 BLOOD CULTURE FOR BACTERIA: CPT | Performed by: FAMILY MEDICINE

## 2022-10-20 PROCEDURE — 258N000003 HC RX IP 258 OP 636: Performed by: FAMILY MEDICINE

## 2022-10-20 PROCEDURE — 258N000001 HC RX 258: Performed by: INTERNAL MEDICINE

## 2022-10-20 PROCEDURE — 97602 WOUND(S) CARE NON-SELECTIVE: CPT

## 2022-10-20 RX ORDER — DEXTROSE MONOHYDRATE 50 MG/ML
INJECTION, SOLUTION INTRAVENOUS CONTINUOUS
Status: ACTIVE | OUTPATIENT
Start: 2022-10-20 | End: 2022-10-21

## 2022-10-20 RX ORDER — CEFAZOLIN SODIUM 1 G/3ML
1 INJECTION, POWDER, FOR SOLUTION INTRAMUSCULAR; INTRAVENOUS EVERY 12 HOURS
Status: DISCONTINUED | OUTPATIENT
Start: 2022-10-20 | End: 2022-10-24 | Stop reason: HOSPADM

## 2022-10-20 RX ADMIN — CEFAZOLIN 1 G: 1 INJECTION, POWDER, FOR SOLUTION INTRAMUSCULAR; INTRAVENOUS at 23:57

## 2022-10-20 RX ADMIN — MEROPENEM 500 MG: 500 INJECTION, POWDER, FOR SOLUTION INTRAVENOUS at 01:43

## 2022-10-20 RX ADMIN — SODIUM CHLORIDE 150 ML/HR: 9 INJECTION, SOLUTION INTRAVENOUS at 05:43

## 2022-10-20 RX ADMIN — INSULIN ASPART 6 UNITS: 100 INJECTION, SOLUTION INTRAVENOUS; SUBCUTANEOUS at 08:45

## 2022-10-20 RX ADMIN — DEXTROSE MONOHYDRATE 25 ML: 25 INJECTION, SOLUTION INTRAVENOUS at 11:54

## 2022-10-20 RX ADMIN — INSULIN GLARGINE 20 UNITS: 100 INJECTION, SOLUTION SUBCUTANEOUS at 09:50

## 2022-10-20 RX ADMIN — CLONIDINE HYDROCHLORIDE 0.2 MG: 0.1 TABLET ORAL at 21:20

## 2022-10-20 RX ADMIN — DEXTROSE MONOHYDRATE 25 ML: 25 INJECTION, SOLUTION INTRAVENOUS at 21:18

## 2022-10-20 RX ADMIN — DULOXETINE HYDROCHLORIDE 60 MG: 60 CAPSULE, DELAYED RELEASE ORAL at 21:21

## 2022-10-20 RX ADMIN — CLONIDINE HYDROCHLORIDE 0.2 MG: 0.1 TABLET ORAL at 08:37

## 2022-10-20 RX ADMIN — DEXTROSE MONOHYDRATE: 50 INJECTION, SOLUTION INTRAVENOUS at 22:02

## 2022-10-20 RX ADMIN — ROSUVASTATIN CALCIUM 10 MG: 10 TABLET, FILM COATED ORAL at 08:37

## 2022-10-20 RX ADMIN — CEFAZOLIN 1 G: 1 INJECTION, POWDER, FOR SOLUTION INTRAMUSCULAR; INTRAVENOUS at 12:31

## 2022-10-20 ASSESSMENT — ACTIVITIES OF DAILY LIVING (ADL)
ADLS_ACUITY_SCORE: 37
DRESSING/BATHING_DIFFICULTY: NO
CONCENTRATING,_REMEMBERING_OR_MAKING_DECISIONS_DIFFICULTY: NO
DOING_ERRANDS_INDEPENDENTLY_DIFFICULTY: NO
WEAR_GLASSES_OR_BLIND: NO
DIFFICULTY_EATING/SWALLOWING: NO
WALKING_OR_CLIMBING_STAIRS_DIFFICULTY: NO
ADLS_ACUITY_SCORE: 37
ADLS_ACUITY_SCORE: 19
ADLS_ACUITY_SCORE: 37
CHANGE_IN_FUNCTIONAL_STATUS_SINCE_ONSET_OF_CURRENT_ILLNESS/INJURY: NO
TOILETING_ISSUES: NO
ADLS_ACUITY_SCORE: 37
FALL_HISTORY_WITHIN_LAST_SIX_MONTHS: NO
ADLS_ACUITY_SCORE: 37
ADLS_ACUITY_SCORE: 19
ADLS_ACUITY_SCORE: 37
ADLS_ACUITY_SCORE: 19
DEPENDENT_IADLS:: INDEPENDENT

## 2022-10-20 NOTE — CONSULTS
"[facility]  WO Nurse Inpatient Assessment     Consulted for:  Right foot podiatry already saw and recommended [facility] manage    Patient History (according to provider note(s):      47 year old female with PMH of hypertension, DM-1, CKD3b, anxiety/depression, prolong QTc, ETOH abuse, THC use who presented to our ED for evaluation of nausea and vomiting.     Patient reports 2 days of nausea and vomiting. Reports she has been vomiting about 3-4 times a day. Couldn't keep even liquids down. Denies any abdominal pain per se. States she had an episode of diarrhea this morning. Patient upon arrival to ED was noted be in sepsis with temp of 103F, HR in 150s, elevated lactic acid and procalcitonin. She was also noted to have 2 cm circular wound over the plantar aspect of right 1st MTP joint with surrounding cellulitis. Patient doesn't remember exactly how that happened. She states she has been dealing with a lot of anxiety/depression lately, and her residence is \"[facility]". She reports she might have bumped her foot on something on the floor few days ago.  MRI shows no evidence of osteomyelitis.    Areas Assessed:      Areas visualized during today's visit: Focused: and Lower extremities     Wound Location:  Right medial plantar foot           Date of last photo 10/19 from ED &10/20 from WOC  Wound History: Patient unsure how long this has been going on   Wound Base: 2/95/5% granulation tissue and eschar, yellow slough     Palpation of the wound bed: normal      Drainage: moderate     Description of drainage: serosanguinous     Measurements (length x width x depth, in cm)   Right plantar foot and between first and second toe 6  x 5.5  x  utd cm  Left 2nd toe - 2 x 1.5 cm dried drainage and resurfacing - no drainage  Periwound skin: edematous and red out to 5cm at 3 oclock      Color: red      Temperature: hot  Odor: none  Pain: absent, none    Treatment Plan:     Right plantar foot and left 2nd toe " wound(s): Daily    Cleanse with vashe #[id number] moistened gauze and pat dry   Apply silvercell to wound bed only  Apply 3x3 foam cut in half to medial sides of first toe and 2nd toe wounds  Apply a 4x4 mepilex over the rest of the wound bed   Apply sween cream to left second toe    Non-weight bearing on right foot  Follow up with Podiatry      Orders: Written    RECOMMEND PRIMARY TEAM ORDER: Vascular consult  Education provided: plan of care and Off-loading pressure  Discussed plan of care with: Patient and Nurse  WO nurse follow-up plan: weekly  Notify WOC if wound(s) deteriorate.  Nursing to notify the Provider(s) and re-consult the WO Nurse if new skin concern.    DATA:     Current support surface: Standard  Atmos Air mattress  Containment of urine/stool: Continent of bladder and Continent of bowel  BMI: Body mass index is 20.36 kg/m .   Active diet order: Orders Placed This Encounter      Combination Diet Regular Diet Adult; Moderate Consistent Carb (60 g CHO per Meal) Diet     Output: I/O last 3 completed shifts:  In: 84733 [P.O.:540; I.V.:76492]  Out: 125 [Emesis/NG output:125]     Labs: Recent Labs   Lab 10/19/22  0923   ALBUMIN 3.4*   HGB 10.8*   WBC 7.9   A1C 8.8*     Pressure injury risk assessment:   Sensory Perception: 3-->slightly limited  Moisture: 4-->rarely moist  Activity: 3-->walks occasionally  Mobility: 3-->slightly limited  Nutrition: 2-->probably inadequate  Friction and Shear: 3-->no apparent problem  Corey Score: 18    Kaykay Sandhu RN BS CWOCN

## 2022-10-20 NOTE — PROGRESS NOTES
INFECTIOUS DISEASE FOLLOW UP NOTE      ASSESSMENT:  1. Bullous cellulitis of right foot with staph aureus bacteremia.   Presenting with nausea, vomiting, noted to have temp to 103, elevated lactate. MRI not showing bone or joint infection. Low suspicion for necrotizing fasciitis clinically on exam. With lesion on left toe as well, question whether these skin areas could be embolic now that she is found to have bacteremia.   2. DM type 1  3. CKD. Creatinine better today.  4. Penicillin allergy as a child. Tolerated a dose of ceftriaxone here. Recalls she has tolerated amoxicillin since. Discussed use of cefazolin, should be safe.   5. Recently quit alcohol use.        PLAN:  Surveillance blood cultures  Cefazolin, renally adjusted -- I have ordered for CrCl of 24, not sure if she is at steady state, may need increased dose if Cr improving, appreciate pharmacy assistance.   Discussed MARAH as preferred to TTE. She agrees. Will order for tomorrow.   Expect course of IV antibiotics after discharge in the range of 4-6 weeks.     Silvestre Valdez MD  [facility]  [phone] clinic  [facility] p[facility]    ______________________________________________________________________    SUBJECTIVE / INTERVAL HISTORY: feels better. Temps ok. Without much foot pain. Nausea improved. Reviewed results. Notes that she developed illness on 10/17, although she has been depressed so it is difficult to know how she was doing prior to that.     She has known murmur.     Recalls penicillin rash as a child, recalls that she tolerated amoxicillin after that. Tolerating antibiotics here so far.    ROS: All other systems negative except as listed above.        OBJECTIVE:  /80 (BP Location: Left arm, Patient Position: Supine, Cuff Size: Adult Regular)   Pulse 74   Temp 97.8  F (36.6  C) (Oral)   Resp 16   Wt 59 kg (130 lb)   LMP 10/05/2022   SpO2 98%   BMI 20.36 kg/m         Vital Signs  Temp: 97.8  F (36.6   C)  Temp src: Oral  Resp: 16  Pulse: 74  Pulse Rate Source: Monitor  BP: 138/80  BP Location: Right arm    Temp (24hrs), Av.8  F (36.6  C), Min:97.5  F (36.4  C), Max:98.1  F (36.7  C)      GEN: No acute distress.    RESPIRATORY:  Normal breathing pattern. Clear to auscultation  CARDIOVASCULAR:  Regular rate and rhythm. Normal S1 and S2. +murmur.  ABDOMEN:  Soft, normal bowel sounds, non-tender, no masses.  EXTREMITIES: See photos from wound care note. Not much tenderness at foot.   SKIN/HAIR/NAILS: also lesion 3rd toe L foot. No other rashes  IV: peripheral        Antibiotics:  meropenem 10/19-  Vancomycin 10/19 x1  clindamycin 10/19 x1  Ceftriaxone 10/19 x1    Pertinent labs:    Recent Labs   Lab 10/20/22  0714 10/19/22  0923   WBC 8.2 7.9   HGB 9.7* 10.8*   HCT 28.8* 31.5*    260        Recent Labs   Lab 10/20/22  0714 10/19/22  0923   * 128*   CO2 25 26   BUN 27.0* 32.9*      No results found for: CRP      Lab Results   Component Value Date    ALT 21 10/20/2022    AST 35 10/20/2022    ALKPHOS 98 10/20/2022         MICROBIOLOGY DATA:  blood 10/19 MSSA    RADIOLOGY:  MR Foot Right w/o Contrast    Result Date: 10/19/2022  EXAM: MR FOOT RIGHT W/O CONTRAST LOCATION: [facility] DATE/TIME: 10/19/2022 12:03 PM INDICATION: Sepsis, wound, ?osteo. COMPARISON: None. TECHNIQUE: Unenhanced. FINDINGS: JOINTS AND BONES: -No evidence for fracture or marrow signal abnormality. No evidence for osteomyelitis. No significant effusion to suggest a septic arthropathy. Hammertoe deformities. TENDONS: -The flexor and extensor tendons are negative for tendinopathy, tendosynovitis, or tearing. The hallucis tendons are negative. LIGAMENTS: -The ligament of Lisfranc is intact. The collateral ligaments at the MTP joints are all intact. MUSCLES AND SOFT TISSUES: -There is edema or cellulitis along the dorsal aspect of the foot. There are some bullous/blistering lesions involving the 3rd and 5th toes  which are minimal to direct visual inspection. Fatty infiltration and atrophy of the plantar and interosseous musculature.     IMPRESSION: 1.  No evidence for fracture. 2.  No evidence for osteomyelitis, septic arthropathy, or abscess. 3.  Bullous/blistering lesions involving the 3rd and 5th toes. These would be amenable to direct visual inspection. 4.  Edema or cellulitis along the dorsal aspect of the foot extending into the toes. 5.  Fatty infiltration or atrophy of the plantar and interosseous musculature. 6.  Hammertoe deformities.

## 2022-10-20 NOTE — PLAN OF CARE
Goal Outcome Evaluation:      Plan of Care Reviewed With: patient    Overall Patient Progress: improvingOverall Patient Progress: improving    Outcome Evaluation: vitals stable, denies pain

## 2022-10-20 NOTE — PROGRESS NOTES
10/20/22 1600   Appointment Info   Signing Clinician's Name / Credentials (PT) Ena Meraz, PT, DPT   Rehab Comments (PT) NWB RLE   Living Environment   People in Home significant other   Current Living Arrangements house   Home Accessibility stairs to enter home   Number of Stairs, Main Entrance 3   Stair Railings, Main Entrance none   Transportation Anticipated family or friend will provide   Living Environment Comments Pt lives in a house with her boyfriend. 3 [address]OTE[address]. All needs met on one level.   Self-Care   Usual Activity Tolerance good   Current Activity Tolerance moderate   Equipment Currently Used at Home none   Fall history within last six months no   Activity/Exercise/Self-Care Comment Pt is independent at baseline.   General Information   Onset of Illness/Injury or Date of Surgery 10/19/22   Referring Physician Reji Cisneros MBBS   Patient/Family Therapy Goals Statement (PT) Return home   Pertinent History of Current Problem (include personal factors and/or comorbidities that impact the POC) NWB RLE   Weight-Bearing Status - LLE full weight-bearing   Weight-Bearing Status - RLE (S)  nonweight-bearing   Posture    Posture Forward head position   Range of Motion (ROM)   Range of Motion ROM is WNL   Strength (Manual Muscle Testing)   Strength (Manual Muscle Testing) strength is WNL   Bed Mobility   Bed Mobility supine-sit   Supine-Sit Bond (Bed Mobility) independent   Transfers   Transfers sit-stand transfer   Maintains Weight-bearing Status (Transfers) verbal cues to maintain;nonverbal cues (demo/gesture) to maintain   Impairments Contributing to Impaired Transfers impaired balance   Sit-Stand Transfer   Sit-Stand Bond (Transfers) contact guard;verbal cues   Assistive Device (Sit-Stand Transfers) crutches, axillary   Gait/Stairs (Locomotion)   Bond Level (Gait) contact guard;verbal cues   Assistive Device (Gait) crutches, axillary   Distance in Feet (Required for LE Total  Joints) 10'   Distance in Feet (Gait) 100'   Pattern (Gait) swing-through   Deviations/Abnormal Patterns (Gait) gait speed decreased   Maintains Weight-bearing Status (Gait) verbal cues to maintain;nonverbal cues (demo/gesture) to maintain   Balance   Balance other (describe)   Balance Comments Mild to moderate unsteadiness with single leg balance on LLE.   Clinical Impression   Criteria for Skilled Therapeutic Intervention Yes, treatment indicated   PT Diagnosis (PT) Impaired functional mobility   Influenced by the following impairments NWB RLE, impaired balance, decreased strength   Functional limitations due to impairments Transfers, gait, stairs   Clinical Presentation (PT Evaluation Complexity) Stable/Uncomplicated   Clinical Presentation Rationale Pt presents as medically diagnosed.   Clinical Decision Making (Complexity) low complexity   Planned Therapy Interventions (PT) balance training;gait training;patient/family education;stair training;transfer training;home program guidelines   Anticipated Equipment Needs at Discharge (PT) crutches, axillary   Risk & Benefits of therapy have been explained evaluation/treatment results reviewed;care plan/treatment goals reviewed;patient   PT Total Evaluation Time   PT Eval, Low Complexity Minutes (26281) 10   Physical Therapy Goals   PT Frequency Daily   PT Predicted Duration/Target Date for Goal Attainment 10/27/22   PT Goals Transfers;Gait;Stairs   PT: Transfers Supervision/stand-by assist;Sit to/from stand;Assistive device;Within precautions   PT: Gait Supervision/stand-by assist;Assistive device;Within precautions;100 feet   PT: Stairs Supervision/stand-by assist;Assistive device;Within precautions;3 stairs   Interventions   Interventions Quick Adds Gait Training   Gait Training   Gait Training Minutes (26906) 10   Symptoms Noted During/After Treatment (Gait Training) fatigue   Treatment Detail/Skilled Intervention Sit<>stand from EOB with CGA and crutches. Pt able  to maintain NWB RLE with verbal cues. Pt ambulated 100' in hallway with CGA and crutches. Verbal cues for shorter hops/steps to prevent LOB backward. Increase in unsteadiness with increase in gait distance, likely due to fatigue, but no LOB.   [place] Level (Gait Training) contact guard   Physical Assistance Level (Gait Training) verbal cues;1 person assist   Weight Bearing (Gait Training) nonweight-bearing  (NWB RLE)   Assistive Device (Gait Training) axillary crutches   Pattern Analysis (Gait Training) swing-through gait   Impairments (Gait Analysis/Training) balance impaired;strength decreased   PT Discharge Planning   PT Plan Transfers, gait (crutches vs. knee scooter - Need to bring), stairs   PT Discharge Recommendation (DC Rec) (S)  home with assist   PT Rationale for DC Rec Pt able to maintain NWB RLE for transfers/ambulation with crutches, CGA. Pending ability to do stairs, anticipate pt will be able to discharge home with assist from her boyfriend.   PT Brief overview of current status CGA for transfers/ambulation with crutches. NWB RLE.   Total Session Time   Timed Code Treatment Minutes 10   Total Session Time (sum of timed and untimed services) 20

## 2022-10-20 NOTE — PROGRESS NOTES
Daily Progress Note        CODE STATUS:  Full Code    10/20/22  Assessment/Plan:  47 year old female with PMH of hypertension, DM-1, CKD3b, anxiety/depression, prolong QTc, ETOH abuse, THC use who presented to our ED for evaluation of nausea and vomiting.     Right foot cellulitis with sepsis:  -- Patient has a circular ulcer over the right 1st MTP joint in the plantar surface with surrounding erythema over the skin dorsally spreading towards the ankle  -- Elevated procal and lactic acid noted  -- Received IV rocephin/clinida and vanc in ED. Cont Vanc/cefepime. Blood cultures- 2/2 bottles positive for GPC in clusters. MARAH ordered per ID  -- MRI right foot- no osteomyelitis or abscess  -- Appreciate podiatry and ID consult. WOC consulted for wound care     Lactic acidosis  -- Received 30ml/kg IVF per sepsis protocol in ED  -- Resolved with IVF     LORIN on CKD-3b  -- Baseline creatinine appears to be around 1.5. Presented with creatinine of 3.3. Improving  -- Avoid nephrotoxic meds     Nausea/vomiting  -- Due to above  -- Symptomatic treatment     DM-1  -- Resumed home insulin regimen  -- Diabetic diet   -- Patient was hypoglycemic last night needing D50. Patient reports she doesn't take her novolog as reported in the admission med-rec. She takes novolog with I:C ratio of 1:15. Changed.     Hypertension  -- Cont with home  meds     Prolong QTc  --Replaced magnesium     H/o ETOH abuse  -- Reports no alcohol use for 2 wks  -- Watch for withdrawal     H/O THC abuse  -- UDS positive for the same     Anxiety/depression  -- Cont home meds      Disposition; TBD  Barrier to discharge; IV antibiotics, renal failure.      LOS: 1 day     Subjective:  Interval History: Patient seen and examined. Notes, labs, imaging reports personally reviewed. Patient reports feeling a lot better today. No fever overnight.     Review of Systems:   As mentioned in subjective.    Patient Active Problem List   Diagnosis     LORIN (acute kidney injury)  (H)     ETOH abuse     Cannabis abuse     Essential hypertension     Proteinuria     Nausea with vomiting     DM type 1, Hgb A1C 10.0 in 2020     Chronic renal impairment, stage 3 (moderate) (H)     Upper GI bleeding     Proliferative diabetic retinopathy (H)     Nephrosis in diabetes mellitus (H)     Moderate episode of recurrent major depressive disorder (H)     Anxiety     Hyperlipidemia     Vitamin D deficiency     Trigger middle finger     Diabetic ketoacidosis with coma associated with type 1 diabetes mellitus (H)     Alcohol use disorder, moderate, dependence (H)     Hypertensive urgency     Leukocytosis     Cellulitis of right foot     Ulcer of right foot, limited to breakdown of skin (H)     Type 1 diabetes mellitus with other specified complication (H)     Sepsis with encephalopathy without septic shock, due to unspecified organism (H)     Chronic renal impairment, stage 3 (moderate), unspecified whether stage 3a or 3b CKD (H)       Scheduled Meds:    cloNIDine  0.2 mg Oral BID     DULoxetine  60 mg Oral QPM     famotidine  20 mg Oral Q48H     insulin aspart  1-7 Units Subcutaneous TID AC     insulin aspart  1-5 Units Subcutaneous At Bedtime     insulin aspart   Subcutaneous Daily with breakfast     insulin aspart   Subcutaneous Daily with lunch     insulin aspart   Subcutaneous Daily with supper     insulin glargine  20 Units Subcutaneous QAM     meropenem  500 mg Intravenous Q12H     rosuvastatin  10 mg Oral Daily     sodium chloride (PF)  3 mL Intracatheter Q8H     vancomycin  500 mg Intravenous Q24H     Continuous Infusions:    sodium chloride 150 mL/hr (10/20/22 0543)     PRN Meds:.acetaminophen, glucose **OR** dextrose **OR** glucagon, lidocaine 4%, lidocaine (buffered or not buffered), melatonin, prochlorperazine **OR** prochlorperazine **OR** prochlorperazine, sodium chloride (PF), traZODone    Objective:  Vital signs in last 24 hours:  Temp:  [97.5  F (36.4  C)-98.1  F (36.7  C)] 97.7  F (36.5   C)  Pulse:  [] 78  Resp:  [14-38] 18  BP: (118-187)/() 138/80  SpO2:  [90 %-100 %] 96 %        Intake/Output Summary (Last 24 hours) at 10/20/2022 0929  Last data filed at 10/20/2022 0700  Gross per 24 hour   Intake 88097 ml   Output 800 ml   Net 34201 ml       Physical Exam:    General: Not in obvious distress.  HEENT: NC, AT   Chest: Clear to auscultation bilaterally  Heart: S1S2 normal, regular. Murmur +  Abdomen: Soft. NT, ND. Bowel sounds- active.  Extremities: A circular about 2cm ulcer over the right 1st MTP joint in the plantar surface with surrounding erythema over the skin dorsally spreading towards the ankle.  Neuro: Alert and awake, grossly non-focal      Lab Results:(I have personally reviewed the results)    Recent Results (from the past 24 hour(s))   Blood Culture Arm, Left    Collection Time: 10/19/22  9:40 AM    Specimen: Arm, Left; Blood   Result Value Ref Range    Culture Positive on the 1st day of incubation (A)     Culture Gram positive cocci in clusters (AA)    Lactic acid whole blood    Collection Time: 10/19/22  1:04 PM   Result Value Ref Range    Lactic Acid 0.6 (L) 0.7 - 2.0 mmol/L   Glucose by meter    Collection Time: 10/19/22  4:41 PM   Result Value Ref Range    GLUCOSE BY METER POCT 101 (H) 70 - 99 mg/dL   Lactic Acid STAT    Collection Time: 10/19/22  8:04 PM   Result Value Ref Range    Lactic Acid 1.3 0.7 - 2.0 mmol/L   UA with Microscopic reflex to Culture    Collection Time: 10/19/22  8:42 PM    Specimen: Urine, Midstream   Result Value Ref Range    Color Urine Light Yellow Colorless, Straw, Light Yellow, Yellow    Appearance Urine Clear Clear    Glucose Urine 150 (A) Negative mg/dL    Bilirubin Urine Negative Negative    Ketones Urine Trace (A) Negative mg/dL    Specific Gravity Urine 1.014 1.001 - 1.030    Blood Urine 0.03 mg/dL (A) Negative    pH Urine 7.5 (H) 5.0 - 7.0    Protein Albumin Urine 300 (A) Negative mg/dL    Urobilinogen Urine <2.0 <2.0 mg/dL    Nitrite  Urine Negative Negative    Leukocyte Esterase Urine Negative Negative    Bacteria Urine Few (A) None Seen /HPF    RBC Urine 1 <=2 /HPF    WBC Urine 2 <=5 /HPF    Squamous Epithelials Urine 3 (H) <=1 /HPF   Drug abuse screen 77 urine (FL, RH, SH)    Collection Time: 10/19/22  8:43 PM   Result Value Ref Range    Amphetamines Urine Screen Negative Screen Negative    Barbituates Urine Screen Negative Screen Negative    Benzodiazepine Urine Screen Negative Screen Negative    Cannabinoids Urine Screen Positive (A) Screen Negative    Opiates Urine Screen Negative Screen Negative    PCP Urine Screen Negative Screen Negative    Cocaine Urine Screen Negative Screen Negative   Glucose by meter    Collection Time: 10/19/22  9:09 PM   Result Value Ref Range    GLUCOSE BY METER POCT 37 (LL) 70 - 99 mg/dL   Glucose by meter    Collection Time: 10/19/22  9:38 PM   Result Value Ref Range    GLUCOSE BY METER POCT 47 (LL) 70 - 99 mg/dL   Glucose by meter    Collection Time: 10/19/22 10:03 PM   Result Value Ref Range    GLUCOSE BY METER POCT 173 (H) 70 - 99 mg/dL   Glucose by meter    Collection Time: 10/19/22 10:34 PM   Result Value Ref Range    GLUCOSE BY METER POCT 184 (H) 70 - 99 mg/dL   Glucose by meter    Collection Time: 10/19/22 11:12 PM   Result Value Ref Range    GLUCOSE BY METER POCT 197 (H) 70 - 99 mg/dL   Glucose by meter    Collection Time: 10/20/22  2:01 AM   Result Value Ref Range    GLUCOSE BY METER POCT 211 (H) 70 - 99 mg/dL   Comprehensive metabolic panel    Collection Time: 10/20/22  7:14 AM   Result Value Ref Range    Sodium 133 (L) 136 - 145 mmol/L    Potassium 3.4 3.4 - 5.3 mmol/L    Chloride 97 (L) 98 - 107 mmol/L    Carbon Dioxide (CO2) 25 22 - 29 mmol/L    Anion Gap 11 7 - 15 mmol/L    Urea Nitrogen 27.0 (H) 6.0 - 20.0 mg/dL    Creatinine 2.67 (H) 0.51 - 0.95 mg/dL    Calcium 8.0 (L) 8.6 - 10.0 mg/dL    Glucose 160 (H) 70 - 99 mg/dL    Alkaline Phosphatase 98 35 - 104 U/L    AST 35 10 - 35 U/L    ALT 21 10 -  35 U/L    Protein Total 5.8 (L) 6.4 - 8.3 g/dL    Albumin 2.9 (L) 3.5 - 5.2 g/dL    Bilirubin Total 0.2 <=1.2 mg/dL    GFR Estimate 22 (L) >60 mL/min/1.73m2   CBC with platelets    Collection Time: 10/20/22  7:14 AM   Result Value Ref Range    WBC Count 8.2 4.0 - 11.0 10e3/uL    RBC Count 3.27 (L) 3.80 - 5.20 10e6/uL    Hemoglobin 9.7 (L) 11.7 - 15.7 g/dL    Hematocrit 28.8 (L) 35.0 - 47.0 %    MCV 88 78 - 100 fL    MCH 29.7 26.5 - 33.0 pg    MCHC 33.7 31.5 - 36.5 g/dL    RDW 12.7 10.0 - 15.0 %    Platelet Count 244 150 - 450 10e3/uL   Glucose by meter    Collection Time: 10/20/22  7:20 AM   Result Value Ref Range    GLUCOSE BY METER POCT 148 (H) 70 - 99 mg/dL       All laboratory and imaging data in the past 24 hours reviewed  Serum Glucose range:   Recent Labs   Lab 10/20/22  0720 10/20/22  0714 10/20/22  0201 10/19/22  2312   * 160* 211* 197*     ABG: No lab results found in last 7 days.  CBC:   Recent Labs   Lab 10/20/22  0714 10/19/22  0923   WBC 8.2 7.9   HGB 9.7* 10.8*   HCT 28.8* 31.5*   MCV 88 85    260   NEUTROPHIL  --  96   LYMPH  --  3   MONOCYTE  --  1   EOSINOPHIL  --  0     Chemistry:   Recent Labs   Lab 10/20/22  0714 10/19/22  0923   * 128*   POTASSIUM 3.4 3.6   CHLORIDE 97* 85*   CO2 25 26   BUN 27.0* 32.9*   CR 2.67* 3.35*   GFRESTIMATED 22* 17*   ZACH 8.0* 9.0   MAG  --  1.5*   PROTTOTAL 5.8* 6.7   ALBUMIN 2.9* 3.4*   AST 35 45*   ALT 21 21   ALKPHOS 98 116*   BILITOTAL 0.2 0.5     Coags:  No results for input(s): INR, PROTIME, PTT in the last 168 hours.    Invalid input(s): APTT  Cardiac Markers:  No results for input(s): CKTOTAL, TROPONINI in the last 168 hours.       MR Foot Right w/o Contrast    Result Date: 10/19/2022  EXAM: MR FOOT RIGHT W/O CONTRAST LOCATION: [facility] DATE/TIME: 10/19/2022 12:03 PM INDICATION: Sepsis, wound, ?osteo. COMPARISON: None. TECHNIQUE: Unenhanced. FINDINGS: JOINTS AND BONES: -No evidence for fracture or marrow signal  abnormality. No evidence for osteomyelitis. No significant effusion to suggest a septic arthropathy. Hammertoe deformities. TENDONS: -The flexor and extensor tendons are negative for tendinopathy, tendosynovitis, or tearing. The hallucis tendons are negative. LIGAMENTS: -The ligament of Lisfranc is intact. The collateral ligaments at the MTP joints are all intact. MUSCLES AND SOFT TISSUES: -There is edema or cellulitis along the dorsal aspect of the foot. There are some bullous/blistering lesions involving the 3rd and 5th toes which are minimal to direct visual inspection. Fatty infiltration and atrophy of the plantar and interosseous musculature.     IMPRESSION: 1.  No evidence for fracture. 2.  No evidence for osteomyelitis, septic arthropathy, or abscess. 3.  Bullous/blistering lesions involving the 3rd and 5th toes. These would be amenable to direct visual inspection. 4.  Edema or cellulitis along the dorsal aspect of the foot extending into the toes. 5.  Fatty infiltration or atrophy of the plantar and interosseous musculature. 6.  Hammertoe deformities.       Latest radiology report personally reviewed.    Note created using dragon voice recognition software so sounds alike errors may have escaped editing.      10/20/2022   Reji Cisneros MD  Hospitalist, [facility]  Pager: [phone]

## 2022-10-20 NOTE — DISCHARGE INSTRUCTIONS
Right plantar foot and left 2nd toe wound(s): Daily    Cleanse with vashe #[id number] moistened gauze and pat dry   Apply silvercell to wound bed only  Apply 3x3 foam cut in half to medial sides of first toe and 2nd toe wounds  Apply a 4x4 mepilex over the rest of the wound bed   Apply sween cream to left second toe    Non-weight bearing on right foot  Follow up with Podiatry  [phone] [facility] 7-10 days (call to make appointment)

## 2022-10-20 NOTE — PLAN OF CARE
Problem: Nausea and Vomiting  Goal: Nausea and Vomiting Relief  Outcome: Progressing     Problem: Pain Chronic (Persistent) (Comorbidity Management)  Goal: Acceptable Pain Control and Functional Ability  Outcome: Progressing     Problem: Sepsis/Septic Shock  Goal: Absence of Infection Signs and Symptoms  Outcome: Progressing   Goal Outcome Evaluation:  VSS on RA. Up stand by assist. Heat wrap on for abdominal pain- she had used it earlier and was helpful. Offered prn tylenol but declined. Denies foot pain. Continues with iv antibiotics and iv fluids. NSR on tele monitor.

## 2022-10-20 NOTE — PROVIDER NOTIFICATION
Notified Dr. Del Real of +ve blood cultures x2, gram +ve cocci in clusters per ID lab.  Per provider, pt is on meropenem and vancomycin, she will order repeat blood cultures.

## 2022-10-20 NOTE — SIGNIFICANT EVENT
Significant Event Note    Time of event: 5:21 AM October 20, 2022    Description of event:  Patient with positive blood culture from 10/19/2022, gram-positive cocci in clusters in 2/2 bottles.  Patient already on vancomycin and meropenem    Plan:  Repeat blood cultures  No changes in antibiotics at this time, appropriately covered.    Discussed with: bedside nurse    Darcie Del Real DO

## 2022-10-20 NOTE — PROGRESS NOTES
Pts BG is now at 197. Pt denying pain.     /68   Pulse 87   Temp 98  F (36.7  C) (Oral)   Resp 14   Wt 59 kg (130 lb)   LMP 10/05/2022   SpO2 99%   BMI 20.36 kg/m

## 2022-10-20 NOTE — PLAN OF CARE
Problem: Plan of Care - These are the overarching goals to be used throughout the patient stay.    Goal: Optimal Comfort and Wellbeing  Outcome: Progressing     Patient up from ED. Patient blood glucose low in ED, upon arrival to unit blood glucose 45, D50 given sugar up to 123. Patient ordered lunch reports decreased appetite related to increased depression recently. Obtained psych consult, patient denies thoughts of harm.     Patient non-weight bearing on right lower extremity, PT consulted. Patient on IV antibiotics. VSS.

## 2022-10-21 ENCOUNTER — APPOINTMENT (OUTPATIENT)
Dept: CARDIOLOGY | Facility: HOSPITAL | Age: 47
DRG: 871 | End: 2022-10-21
Attending: INTERNAL MEDICINE
Payer: COMMERCIAL

## 2022-10-21 LAB
CREAT SERPL-MCNC: 2.46 MG/DL (ref 0.51–0.95)
GFR SERPL CREATININE-BSD FRML MDRD: 24 ML/MIN/1.73M2
GLUCOSE BLDC GLUCOMTR-MCNC: 130 MG/DL (ref 70–99)
GLUCOSE BLDC GLUCOMTR-MCNC: 144 MG/DL (ref 70–99)
GLUCOSE BLDC GLUCOMTR-MCNC: 158 MG/DL (ref 70–99)
GLUCOSE BLDC GLUCOMTR-MCNC: 59 MG/DL (ref 70–99)
GLUCOSE BLDC GLUCOMTR-MCNC: 60 MG/DL (ref 70–99)
GLUCOSE BLDC GLUCOMTR-MCNC: 71 MG/DL (ref 70–99)
GLUCOSE BLDC GLUCOMTR-MCNC: 74 MG/DL (ref 70–99)
GLUCOSE BLDC GLUCOMTR-MCNC: 85 MG/DL (ref 70–99)
HOLD SPECIMEN: NORMAL
HOLD SPECIMEN: NORMAL
LVEF ECHO: NORMAL

## 2022-10-21 PROCEDURE — 99232 SBSQ HOSP IP/OBS MODERATE 35: CPT | Performed by: INTERNAL MEDICINE

## 2022-10-21 PROCEDURE — 93312 ECHO TRANSESOPHAGEAL: CPT | Mod: 26 | Performed by: INTERNAL MEDICINE

## 2022-10-21 PROCEDURE — 99152 MOD SED SAME PHYS/QHP 5/>YRS: CPT | Performed by: INTERNAL MEDICINE

## 2022-10-21 PROCEDURE — 250N000011 HC RX IP 250 OP 636: Performed by: INTERNAL MEDICINE

## 2022-10-21 PROCEDURE — 250N000009 HC RX 250: Performed by: INTERNAL MEDICINE

## 2022-10-21 PROCEDURE — 87040 BLOOD CULTURE FOR BACTERIA: CPT | Performed by: INTERNAL MEDICINE

## 2022-10-21 PROCEDURE — 93325 DOPPLER ECHO COLOR FLOW MAPG: CPT | Mod: 26 | Performed by: INTERNAL MEDICINE

## 2022-10-21 PROCEDURE — 82565 ASSAY OF CREATININE: CPT | Performed by: INTERNAL MEDICINE

## 2022-10-21 PROCEDURE — 99233 SBSQ HOSP IP/OBS HIGH 50: CPT | Performed by: INTERNAL MEDICINE

## 2022-10-21 PROCEDURE — 93325 DOPPLER ECHO COLOR FLOW MAPG: CPT

## 2022-10-21 PROCEDURE — 36415 COLL VENOUS BLD VENIPUNCTURE: CPT | Performed by: INTERNAL MEDICINE

## 2022-10-21 PROCEDURE — 258N000003 HC RX IP 258 OP 636: Performed by: INTERNAL MEDICINE

## 2022-10-21 PROCEDURE — 120N000001 HC R&B MED SURG/OB

## 2022-10-21 PROCEDURE — 250N000013 HC RX MED GY IP 250 OP 250 PS 637: Performed by: INTERNAL MEDICINE

## 2022-10-21 PROCEDURE — 250N000012 HC RX MED GY IP 250 OP 636 PS 637: Performed by: INTERNAL MEDICINE

## 2022-10-21 PROCEDURE — 93320 DOPPLER ECHO COMPLETE: CPT | Mod: 26 | Performed by: INTERNAL MEDICINE

## 2022-10-21 RX ORDER — LIDOCAINE HYDROCHLORIDE 20 MG/ML
SOLUTION OROPHARYNGEAL
Status: COMPLETED | OUTPATIENT
Start: 2022-10-21 | End: 2022-10-21

## 2022-10-21 RX ORDER — FENTANYL CITRATE 50 UG/ML
INJECTION, SOLUTION INTRAMUSCULAR; INTRAVENOUS
Status: COMPLETED | OUTPATIENT
Start: 2022-10-21 | End: 2022-10-21

## 2022-10-21 RX ORDER — SODIUM CHLORIDE 9 MG/ML
INJECTION, SOLUTION INTRAVENOUS CONTINUOUS PRN
Status: COMPLETED | OUTPATIENT
Start: 2022-10-21 | End: 2022-10-21

## 2022-10-21 RX ADMIN — ROSUVASTATIN CALCIUM 10 MG: 10 TABLET, FILM COATED ORAL at 08:14

## 2022-10-21 RX ADMIN — SODIUM CHLORIDE 30 ML/HR: 9 INJECTION, SOLUTION INTRAVENOUS at 09:52

## 2022-10-21 RX ADMIN — CEFAZOLIN 1 G: 1 INJECTION, POWDER, FOR SOLUTION INTRAMUSCULAR; INTRAVENOUS at 12:02

## 2022-10-21 RX ADMIN — FAMOTIDINE 20 MG: 20 TABLET ORAL at 19:52

## 2022-10-21 RX ADMIN — INSULIN ASPART 2 UNITS: 100 INJECTION, SOLUTION INTRAVENOUS; SUBCUTANEOUS at 14:37

## 2022-10-21 RX ADMIN — FENTANYL CITRATE 50 MCG: 50 INJECTION, SOLUTION INTRAMUSCULAR; INTRAVENOUS at 10:25

## 2022-10-21 RX ADMIN — BENZOCAINE 4 SPRAY: 220 SPRAY, METERED PERIODONTAL at 10:25

## 2022-10-21 RX ADMIN — CLONIDINE HYDROCHLORIDE 0.2 MG: 0.1 TABLET ORAL at 08:14

## 2022-10-21 RX ADMIN — DULOXETINE HYDROCHLORIDE 60 MG: 60 CAPSULE, DELAYED RELEASE ORAL at 19:52

## 2022-10-21 RX ADMIN — MIDAZOLAM 2 MG: 1 INJECTION INTRAMUSCULAR; INTRAVENOUS at 10:27

## 2022-10-21 RX ADMIN — LIDOCAINE HYDROCHLORIDE 15 ML: 20 SOLUTION ORAL; TOPICAL at 10:24

## 2022-10-21 RX ADMIN — CLONIDINE HYDROCHLORIDE 0.2 MG: 0.1 TABLET ORAL at 19:52

## 2022-10-21 ASSESSMENT — ACTIVITIES OF DAILY LIVING (ADL)
ADLS_ACUITY_SCORE: 19
ADLS_ACUITY_SCORE: 19
ADLS_ACUITY_SCORE: 20
ADLS_ACUITY_SCORE: 19
ADLS_ACUITY_SCORE: 20

## 2022-10-21 NOTE — PLAN OF CARE
Patient up to bathroom with assist, ordered dinner and ate well.Covered blood sugar and carb count, per order see previous note on Hypoglycemic episode. Patient denied pain or nausea. Vital signs otherwise baseline. Reported and continue to monitor  Problem: Plan of Care - These are the overarching goals to be used throughout the patient stay.    Goal: Absence of Hospital-Acquired Illness or Injury  Intervention: Identify and Manage Fall Risk  Recent Flowsheet Documentation  Taken 10/20/2022 1615 by Louise Roland, RN  Safety Promotion/Fall Prevention:   activity supervised   fall prevention program maintained   nonskid shoes/slippers when out of bed     Problem: Plan of Care - These are the overarching goals to be used throughout the patient stay.    Goal: Optimal Comfort and Wellbeing  Outcome: Progressing     Problem: Diabetes Comorbidity  Goal: Blood Glucose Level Within Targeted Range  Outcome: Progressing     Problem: Pain Chronic (Persistent) (Comorbidity Management)  Goal: Acceptable Pain Control and Functional Ability  Intervention: Manage Persistent Pain  Recent Flowsheet Documentation  Taken 10/20/2022 1615 by Louise Roland, RN  Medication Review/Management: medications reviewed     Problem: Sepsis/Septic Shock  Goal: Absence of Infection Signs and Symptoms  Outcome: Progressing     Problem: Sepsis/Septic Shock  Goal: Optimal Nutrition Intake  Outcome: Progressing   Goal Outcome Evaluation:

## 2022-10-21 NOTE — PLAN OF CARE
Problem: Plan of Care - These are the overarching goals to be used throughout the patient stay.    Goal: Plan of Care Review  Description: The Plan of Care Review/Shift note should be completed every shift.  The Outcome Evaluation is a brief statement about your assessment that the patient is improving, declining, or no change.  This information will be displayed automatically on your shift note.  Outcome: Adequate for Care Transition  Flowsheets (Taken 10/21/2022 1301)  Plan of Care Reviewed With: patient     Problem: Nausea and Vomiting  Goal: Nausea and Vomiting Relief  Outcome: Adequate for Care Transition   Goal Outcome Evaluation:-none noted - medicate as per mar  Problem: Diabetes Comorbidity  Goal: Blood Glucose Level Within Targeted Range  Outcome: Adequate for Care Transition-monitoring before meals  Problem: Pain Chronic (Persistent) (Comorbidity Management)  Goal: Acceptable Pain Control and Functional Ability  Outcome: Adequate for Care Transition  Intervention: Manage Persistent Pain-understands pain scale (0-10) and medicate as per mar  Recent Flowsheet Documentation  Taken 10/21/2022 5979 by Adelina Lora RN  Medication Review/Management: medications reviewed            Plan of Care Reviewed With: patient

## 2022-10-21 NOTE — PROGRESS NOTES
INFECTIOUS DISEASE FOLLOW UP NOTE      ASSESSMENT:  1. Bullous cellulitis of right foot with MSSA bacteremia.   Presenting with nausea, vomiting, noted to have temp to 103, elevated lactate. MRI not showing bone or joint infection. Low suspicion for necrotizing fasciitis clinically on exam. With lesion on left toe as well, question whether these skin areas could be embolic now that she is found to have bacteremia, however MARAH not showing endocarditis. Improved.    2. DM type 1  3. CKD. Creatinine better today.  4. Penicillin allergy as a child. Tolerated a dose of ceftriaxone here. Recalls she has tolerated amoxicillin since. Discussed use of cefazolin, should be safe.   5. Recently quit alcohol use.        PLAN:  Surveillance blood cultures  Cefazolin, renally adjusted -- I have ordered for CrCl of 27, not sure if she is at steady state, may need increased dose if Cr improving, appreciate pharmacy assistance.   Expect course of IV antibiotics after discharge in the range of 4 weeks from 10/19.   PICC once blood cultures are negative 48-72 hours.   Assess for outpatient IV antibiotic.    Silvestre Valdez MD  [facility]  [phone] clinic  [facility] paging    ______________________________________________________________________    SUBJECTIVE / INTERVAL HISTORY: back from [facility]. Doing well. Feels better. Denies pain. Tolerating cefazolin so far. Discussed results and plan.     She has known murmur.     Recalls penicillin rash as a child, recalls that she tolerated amoxicillin after that. Tolerating antibiotics here so far.    ROS: All other systems negative except as listed above.        OBJECTIVE:  /67 (BP Location: Left arm)   Pulse 66   Temp 98  F (36.7  C) (Oral)   Resp 18   Wt 59 kg (130 lb)   LMP 10/05/2022   SpO2 96%   BMI 20.36 kg/m             GEN: No acute distress.    RESPIRATORY:  Normal breathing pattern.   CARDIOVASCULAR:  Regular rate and rhythm. Normal S1 and S2.  +murmur.  ABDOMEN:  Soft, normal bowel sounds, non-tender, no masses.  EXTREMITIES: See photos from wound care note. Not much tenderness at foot. Erythema and swelling of right foot.   SKIN/HAIR/NAILS: also lesion 3rd toe L foot. No other rashes  IV: peripheral        Antibiotics:  Cefazolin 10/20-      meropenem 10/19-20  Vancomycin 10/19 x1  clindamycin 10/19 x1  Ceftriaxone 10/19 x1    Pertinent labs:    Recent Labs   Lab 10/20/22  0714 10/19/22  0923   WBC 8.2 7.9   HGB 9.7* 10.8*   HCT 28.8* 31.5*    260        Recent Labs   Lab 10/20/22  0714 10/19/22  0923   * 128*   CO2 25 26   BUN 27.0* 32.9*      No results found for: CRP      Lab Results   Component Value Date    ALT 21 10/20/2022    AST 35 10/20/2022    ALKPHOS 98 10/20/2022         MICROBIOLOGY DATA:  blood 10/19 MSSA  10/20 negative to date  10/21 pending      RADIOLOGY:  MR Foot Right w/o Contrast    Result Date: 10/19/2022  EXAM: MR FOOT RIGHT W/O CONTRAST LOCATION: [facility] DATE/TIME: 10/19/2022 12:03 PM INDICATION: Sepsis, wound, ?osteo. COMPARISON: None. TECHNIQUE: Unenhanced. FINDINGS: JOINTS AND BONES: -No evidence for fracture or marrow signal abnormality. No evidence for osteomyelitis. No significant effusion to suggest a septic arthropathy. Hammertoe deformities. TENDONS: -The flexor and extensor tendons are negative for tendinopathy, tendosynovitis, or tearing. The hallucis tendons are negative. LIGAMENTS: -The ligament of Lisfranc is intact. The collateral ligaments at the MTP joints are all intact. MUSCLES AND SOFT TISSUES: -There is edema or cellulitis along the dorsal aspect of the foot. There are some bullous/blistering lesions involving the 3rd and 5th toes which are minimal to direct visual inspection. Fatty infiltration and atrophy of the plantar and interosseous musculature.     IMPRESSION: 1.  No evidence for fracture. 2.  No evidence for osteomyelitis, septic arthropathy, or abscess. 3.   Bullous/blistering lesions involving the 3rd and 5th toes. These would be amenable to direct visual inspection. 4.  Edema or cellulitis along the dorsal aspect of the foot extending into the toes. 5.  Fatty infiltration or atrophy of the plantar and interosseous musculature. 6.  Hammertoe deformities.     Echocardiogram MAR[facility]    Result Date: 10/21/2022  [id number]^[email or web address]^JOSHUA^[place] [address]  Name: DONITA FREITAS MRN: [id number] : 1975 Study Date: 10/21/2022 09:54 AM Age: 47 yrs Gender: Female Patient Location: [facility] Reason For Study: Murmur Ordering Physician: JOSHUA POOLE Performed By: MAHESH/ROSIE  BSA: 1.7 m2 Height: 67 in Weight: 130 lb HR: 73 ______________________________________________________________________________ Procedure Complete [facility] Adult. Good quality two-dimensional was performed and interpreted. Good quality color and spectral Doppler were performed and interpreted. ______________________________________________________________________________ Interpretation Summary  Left ventricular size, wall motion and function are normal. The ejection fraction is 60-65%. Normal right ventricle size and systolic function. No vegetations present. No hemodynamically significant valvular abnormalities on 2D or color flow imaging. ______________________________________________________________________________ MAR[facility] I determined this patient to be an appropriate candidate for the planned sedation and procedure and have reassessed the patient immediately prior to sedation and procedure. Total sedation time: 18 mins minutes of continuous bedside 1:1 monitoring. Versed (2mg) was given intravenously. Fentanyl (50mcg) was given intravenously. 4 sprays benzocaine 15 mL viscous lidocaine. Consent to the procedure was obtained prior to sedation. There were no complications associated with this procedure. The Transducer was inserted without  difficulty . The procedure was performed in the Echo Lab.  Left Ventricle Left ventricular size, wall motion and function are normal. The ejection fraction is 60-65%.  Right Ventricle Normal right ventricle size and systolic function.  Atria Normal left atrial size. Right atrial size is normal. There is no color Doppler evidence of an atrial shunt. No thrombus is detected in the left atrial appendage.  Mitral Valve Mitral valve leaflets appear normal. There is no evidence of mitral stenosis or clinically significant mitral regurgitation.  Tricuspid Valve The tricuspid valve is normal in structure and function. This degree of valvular regurgitation is within normal limits. There is no tricuspid stenosis.  Aortic Valve The aortic valve is trileaflet. Lambl's excescences noted. No aortic regurgitation is present. No aortic stenosis is present.  Pulmonic Valve The pulmonic valve is not well seen, but is grossly normal. There is trace pulmonic valvular regurgitation.  Vessels The aortic root is normal size. Normal size ascending aorta. The inferior vena cava is normal.  Pericardial/Pleural There is no pericardial effusion. ______________________________________________________________________________ Report approved by: Eli Nicole 10/21/2022 11:07 AM  ______________________________________________________________________________

## 2022-10-21 NOTE — PROGRESS NOTES
Nurse contacted Dr. Cisneros regarding patients blood sugar of 130 and lantus insulin.  Scheduled 20units of lantus - per DrGaby Order will only give 10u of lantus

## 2022-10-21 NOTE — PROGRESS NOTES
Patient blood sugar is 60- orange juice and crackers given and recheck done in 15minutes sugar then 85

## 2022-10-21 NOTE — PROGRESS NOTES
"Transesophageal Echocardiogram:  Patient tolerated procedure well. VSS. See MAR for sedation dosages. NPO until 1140 and no hot foods/liquids til 1640. Gave report to Montse to update. Premilary findings from Dr. Pickens \"looked good\",  however final results pending cardiology interpretation.    Shelley Matute RN        "

## 2022-10-21 NOTE — PLAN OF CARE
Problem: Plan of Care - These are the overarching goals to be used throughout the patient stay.    Goal: Plan of Care Review  Description: The Plan of Care Review/Shift note should be completed every shift.  The Outcome Evaluation is a brief statement about your assessment that the patient is improving, declining, or no change.  This information will be displayed automatically on your shift note.  Outcome: Progressing     Problem: Diabetes Comorbidity  Goal: Blood Glucose Level Within Targeted Range  Outcome: Progressing   Goal Outcome Evaluation:       Pt a&o x4. Reminded pt on NPO started midnight for echocardiogram MARAH. Denies any pain or discomfort. Slept between cares. VSS WNL. IV Cefazolin given.

## 2022-10-21 NOTE — PROGRESS NOTES
Daily Progress Note        CODE STATUS:  Full Code    10/20/22  Assessment/Plan:  47 year old female with PMH of hypertension, DM-1, CKD3b, anxiety/depression, prolong QTc, ETOH abuse, THC use who presented to our ED for evaluation of nausea and vomiting.     Right foot cellulitis with sepsis:  -- Patient has a circular ulcer over the right 1st MTP joint in the plantar surface with surrounding erythema over the skin dorsally spreading towards the ankle  -- Elevated procal and lactic acid noted  -- Received IV rocephin/clinida and vanc in ED. Started empirically on Vanc/cefepime on admission. Blood cultures- 2/2 bottles positive for GPC in clusters. ID consult appreciated. Antibiotics swithced to IV cefazolin. MARAH ordered per ID  -- MRI right foot- no osteomyelitis or abscess  -- Appreciate podiatry and ID consult. WOC consulted for wound care     Lactic acidosis  -- Received 30ml/kg IVF per sepsis protocol in ED  -- Resolved with IVF     LORIN on CKD-3b  -- Baseline creatinine appears to be around 1.5. Presented with creatinine of 3.3. Improving. Creatinine 2.46 today  -- Avoid nephrotoxic meds     Nausea/vomiting  -- Due to above  -- Symptomatic treatment     DM-1  -- Resumed home insulin regimen  -- Diabetic diet   -- Patient was hypoglycemic episodes needing D50. Patient reports she doesn't take her novolog as reported in the admission med-rec. She takes novolog with I:C ratio of 1:15. Changed.  -- NPO for MARAH today. Will only give 10 units of lantus this morning (instead of 20 units)     Hypertension  -- Cont with home  meds     Prolong QTc  --Replaced magnesium     H/o ETOH abuse  -- Reports no alcohol use for 2 wks  -- Watch for withdrawal     H/O THC abuse  -- UDS positive for the same     Anxiety/depression  -- Cont home meds      Disposition; TBD  Barrier to discharge; IV antibiotics, renal failure.     Subjective:  Interval History: No acute issues overnight. Doing well.     Review of Systems:   As mentioned  in subjective.    Patient Active Problem List   Diagnosis     LORIN (acute kidney injury) (H)     ETOH abuse     Cannabis abuse     Essential hypertension     Proteinuria     Nausea with vomiting     DM type 1, Hgb A1C 10.0 in 2020     Chronic renal impairment, stage 3 (moderate) (H)     Upper GI bleeding     Proliferative diabetic retinopathy (H)     Nephrosis in diabetes mellitus (H)     Moderate episode of recurrent major depressive disorder (H)     Anxiety     Hyperlipidemia     Vitamin D deficiency     Trigger middle finger     Diabetic ketoacidosis with coma associated with type 1 diabetes mellitus (H)     Alcohol use disorder, moderate, dependence (H)     Hypertensive urgency     Leukocytosis     Cellulitis of right foot     Ulcer of right foot, limited to breakdown of skin (H)     Type 1 diabetes mellitus with other specified complication (H)     Sepsis with encephalopathy without septic shock, due to unspecified organism (H)     Chronic renal impairment, stage 3 (moderate), unspecified whether stage 3a or 3b CKD (H)       Scheduled Meds:    ceFAZolin  1 g Intravenous Q12H     cloNIDine  0.2 mg Oral BID     DULoxetine  60 mg Oral QPM     famotidine  20 mg Oral Q48H     influenza quadrivalent (PF) vacc  0.5 mL Intramuscular Prior to discharge     insulin aspart  1-7 Units Subcutaneous TID AC     insulin aspart  1-5 Units Subcutaneous At Bedtime     insulin aspart   Subcutaneous Daily with breakfast     insulin aspart   Subcutaneous Daily with lunch     insulin aspart   Subcutaneous Daily with supper     [Held by provider] insulin glargine  20 Units Subcutaneous QAM     rosuvastatin  10 mg Oral Daily     sodium chloride (PF)  3 mL Intracatheter Q8H     Continuous Infusions:    PRN Meds:.acetaminophen, glucose **OR** dextrose **OR** glucagon, lidocaine 4%, lidocaine (buffered or not buffered), melatonin, prochlorperazine **OR** prochlorperazine **OR** prochlorperazine, sodium chloride (PF),  traZODone    Objective:  Vital signs in last 24 hours:  Temp:  [97.4  F (36.3  C)-98.4  F (36.9  C)] 98.4  F (36.9  C)  Pulse:  [] 110  Resp:  [16-18] 18  BP: (135-167)/(69-91) 167/89  SpO2:  [95 %-100 %] 95 %        Intake/Output Summary (Last 24 hours) at 10/20/2022 0929  Last data filed at 10/20/2022 0700  Gross per 24 hour   Intake 80475 ml   Output 800 ml   Net 64633 ml       Physical Exam:    General: Not in obvious distress.  HEENT: NC, AT   Chest: Clear to auscultation bilaterally  Heart: S1S2 normal, regular. Murmur +  Abdomen: Soft. NT, ND. Bowel sounds- active.  Extremities: A circular about 2cm ulcer over the right 1st MTP joint in the plantar surface with surrounding erythema over the skin dorsally spreading towards the ankle.  Neuro: Alert and awake, grossly non-focal      Lab Results:(I have personally reviewed the results)    Recent Results (from the past 24 hour(s))   Glucose by meter    Collection Time: 10/20/22 11:08 AM   Result Value Ref Range    GLUCOSE BY METER POCT 33 (LL) 70 - 99 mg/dL   Glucose by meter    Collection Time: 10/20/22 11:35 AM   Result Value Ref Range    GLUCOSE BY METER POCT 38 (LL) 70 - 99 mg/dL   Glucose by meter    Collection Time: 10/20/22 11:49 AM   Result Value Ref Range    GLUCOSE BY METER POCT 45 (LL) 70 - 99 mg/dL   Glucose by meter    Collection Time: 10/20/22 12:30 PM   Result Value Ref Range    GLUCOSE BY METER POCT 123 (H) 70 - 99 mg/dL   Glucose by meter    Collection Time: 10/20/22  5:08 PM   Result Value Ref Range    GLUCOSE BY METER POCT 152 (H) 70 - 99 mg/dL   Glucose by meter    Collection Time: 10/20/22  9:03 PM   Result Value Ref Range    GLUCOSE BY METER POCT 31 (LL) 70 - 99 mg/dL   Glucose by meter    Collection Time: 10/20/22  9:14 PM   Result Value Ref Range    GLUCOSE BY METER POCT 34 (LL) 70 - 99 mg/dL   Glucose by meter    Collection Time: 10/20/22  9:29 PM   Result Value Ref Range    GLUCOSE BY METER POCT 116 (H) 70 - 99 mg/dL   Glucose by  meter    Collection Time: 10/20/22 11:05 PM   Result Value Ref Range    GLUCOSE BY METER POCT 124 (H) 70 - 99 mg/dL   Glucose by meter    Collection Time: 10/21/22  2:09 AM   Result Value Ref Range    GLUCOSE BY METER POCT 144 (H) 70 - 99 mg/dL   Creatinine    Collection Time: 10/21/22  5:46 AM   Result Value Ref Range    Creatinine 2.46 (H) 0.51 - 0.95 mg/dL    GFR Estimate 24 (L) >60 mL/min/1.73m2   Extra Red Top Tube    Collection Time: 10/21/22  5:46 AM   Result Value Ref Range    Hold Specimen JIC    Glucose by meter    Collection Time: 10/21/22  7:54 AM   Result Value Ref Range    GLUCOSE BY METER POCT 130 (H) 70 - 99 mg/dL       All laboratory and imaging data in the past 24 hours reviewed  Serum Glucose range:   Recent Labs   Lab 10/21/22  0754 10/21/22  0209 10/20/22  2305 10/20/22  2129   * 144* 124* 116*     ABG: No lab results found in last 7 days.  CBC:   Recent Labs   Lab 10/20/22  0714 10/19/22  0923   WBC 8.2 7.9   HGB 9.7* 10.8*   HCT 28.8* 31.5*   MCV 88 85    260   NEUTROPHIL  --  96   LYMPH  --  3   MONOCYTE  --  1   EOSINOPHIL  --  0     Chemistry:   Recent Labs   Lab 10/21/22  0546 10/20/22  0714 10/19/22  0923   NA  --  133* 128*   POTASSIUM  --  3.4 3.6   CHLORIDE  --  97* 85*   CO2  --  25 26   BUN  --  27.0* 32.9*   CR 2.46* 2.67* 3.35*   GFRESTIMATED 24* 22* 17*   ZACH  --  8.0* 9.0   MAG  --   --  1.5*   PROTTOTAL  --  5.8* 6.7   ALBUMIN  --  2.9* 3.4*   AST  --  35 45*   ALT  --  21 21   ALKPHOS  --  98 116*   BILITOTAL  --  0.2 0.5     Coags:  No results for input(s): INR, PROTIME, PTT in the last 168 hours.    Invalid input(s): APTT  Cardiac Markers:  No results for input(s): CKTOTAL, TROPONINI in the last 168 hours.       MR Foot Right w/o Contrast    Result Date: 10/19/2022  EXAM: MR FOOT RIGHT W/O CONTRAST LOCATION: [facility] DATE/TIME: 10/19/2022 12:03 PM INDICATION: Sepsis, wound, ?osteo. COMPARISON: None. TECHNIQUE: Unenhanced. FINDINGS:  JOINTS AND BONES: -No evidence for fracture or marrow signal abnormality. No evidence for osteomyelitis. No significant effusion to suggest a septic arthropathy. Hammertoe deformities. TENDONS: -The flexor and extensor tendons are negative for tendinopathy, tendosynovitis, or tearing. The hallucis tendons are negative. LIGAMENTS: -The ligament of Lisfranc is intact. The collateral ligaments at the MTP joints are all intact. MUSCLES AND SOFT TISSUES: -There is edema or cellulitis along the dorsal aspect of the foot. There are some bullous/blistering lesions involving the 3rd and 5th toes which are minimal to direct visual inspection. Fatty infiltration and atrophy of the plantar and interosseous musculature.     IMPRESSION: 1.  No evidence for fracture. 2.  No evidence for osteomyelitis, septic arthropathy, or abscess. 3.  Bullous/blistering lesions involving the 3rd and 5th toes. These would be amenable to direct visual inspection. 4.  Edema or cellulitis along the dorsal aspect of the foot extending into the toes. 5.  Fatty infiltration or atrophy of the plantar and interosseous musculature. 6.  Hammertoe deformities.       Latest radiology report personally reviewed.    Note created using dragon voice recognition software so sounds alike errors may have escaped editing.      10/21/2022   Reji Cisneros MD  Hospitalist, [facility]  Pager: [phone]

## 2022-10-21 NOTE — PROVIDER NOTIFICATION
At around 2100 patient requested blood glucose check as she felt hypoglycemic. Blood sugar below 31. Protocol followed Pt. Declined Glucose gel and apple juice, requesting orange juice instead. Drank four and BG still below 34. D50 given iv and recheck 116. Patient alert and responsive throughout and very upset/agitated. Pt. Inconsolable at first. Responded more calmly thereafter and settled into sleep. Will do spot check blood sugar at 2300. Call placed to House physician and updated. Pt. Is NPO for procedure in A.M.. D% IVM ordered and running. Patient is calm and baseline at time of this entry.

## 2022-10-21 NOTE — PRE-PROCEDURE
GENERAL PRE-PROCEDURE:   Date/Time:  10/21/2022 10:23 AM    Written consent obtained?: Yes    Risks and benefits: Risks, benefits and alternatives were discussed    Consent given by:  Patient  Patient states understanding of procedure being performed: Yes    Patient's understanding of procedure matches consent: Yes    Procedure consent matches procedure scheduled: Yes    Expected level of sedation:  Moderate  Appropriately NPO:  Yes  ASA Class:  3  Mallampati  :  Grade 3- soft palate visible, posterior pharyngeal wall not visible  Lungs:  Lungs clear with good breath sounds bilaterally  Heart:  Normal heart sounds and rate  History & Physical reviewed:  History and physical reviewed and no updates needed  Statement of review:  I have reviewed the lab findings, diagnostic data, medications, and the plan for sedation

## 2022-10-21 NOTE — CONSULTS
Care Management Initial Consult    General Information  Assessment completed with: Josefa Bolanos  Type of CM/SW Visit: Initial Assessment    Primary Care Provider verified and updated as needed: Yes   Readmission within the last 30 days: current reason for admission unrelated to previous admission   Return Category: New Diagnosis  Reason for Consult: discharge planning  Advance Care Planning: Advance Care Planning Reviewed: other (see comments) (pt has no HCD and does not want info)          Communication Assessment  Patient's communication style: spoken language (English or Bilingual)    Hearing Difficulty or Deaf: no   Wear Glasses or Blind: no    Cognitive  Cognitive/Neuro/Behavioral: WDL                      Living Environment:   People in home: significant other  Wilbert  Current living Arrangements: house      Able to return to prior arrangements: yes       Family/Social Support:  Care provided by: self  Provides care for: no one  Marital Status: Lives with Significant Other  Significant Other, Parent(s), Sibling(s)       Wilbert  Description of Support System: Supportive, Involved    Support Assessment: Adequate family and caregiver support, Adequate social supports    Current Resources:   Patient receiving home care services: No     Community Resources: None  Equipment currently used at home: glucometer, other (see comments) (CGM)  Supplies currently used at home: None    Employment/Financial:  Employment Status: employed full-time     Employment/ Comments:  (works from home for [employer] company)  Financial Concerns:             Lifestyle & Psychosocial Needs:  Social Determinants of Health     Tobacco Use: High Risk     Smoking Tobacco Use: Some Days     Smokeless Tobacco Use: Never     Passive Exposure: Not on file   Alcohol Use: Not on file   Financial Resource Strain: Not on file   Food Insecurity: Not on file   Transportation Needs: Not on file   Physical Activity: Not on file   Stress: Not on  "file   Social Connections: Not on file   Intimate Partner Violence: Not on file   Depression: Not on file   Housing Stability: Not on file       Functional Status:  Prior to admission patient needed assistance:   Dependent ADLs:: Independent  Dependent IADLs:: Independent  Assesssment of Functional Status: At functional baseline    Mental Health Status:  Mental Health Status: Current Concern  Mental Health Management: Medication    Chemical Dependency Status:  Chemical Dependency Status: Current Concern  Chemical Dependency Management: Other (see comment) (pt interested in CD resources for outpt tx)          Values/Beliefs:  Spiritual, Cultural Beliefs, Jew Practices, Values that affect care:                 Additional Information:    SW met with pt to complete assessment, address consult, and discuss discharge planning.  Pt and significant other Wilbert of eighteen years live together with their cat in the lower level of a home owned by another couple.  Pt is independent at baseline and drives.  Pt works full time from home.  Pt reported long-standing depression.  She takes Cymbalta for her symptoms, although she stated to SW that she does not feel the medication is helping as much anymore.  Pt reported that she has been struggling to find a new therapist since her previous one retired two years ago and then Covid hit.  Pt stated that she \"gets by mentally.\"  Pt reported anxiety and body dysmorphic concerns as well.  Pt spoke about trying to motivate and organize herself to follow through with making appointments and taking steps to utilize other community resources and services.  Pt reported additional struggles with tasks such as cleaning and putting her clothes away.  Pt reported completing a Dialectical Behavioral Therapy (DBT) program fifteen years ago that she found helpful.  Discussed pt getting back into a DBT program.  Discussed referral to [facility] for an Adult Mental Health Case " "Manager - pt is interested and is okay with SW sending referral.   Pt reported that she stopped drinking alcohol two weeks ago.  She stated that the first week after stopping she felt weird and craved sugar a lot such as juice.  She has also been a lot sleepier.  She reported that she has been working on identifying her cravings.  Pt stated that she drinks wine about four times per week from a 750 ml bottle.  She estimated that she drinks a total of three bottles within a one week period.  Pt stated that she sometimes drinks vodka and orange juice mixed drinks.  She usually drinks alone.  She has thought about and looked into AA and getting a sponsor.  She stated that she thinks a sponsor would be \"too indulgent\" and when SW asked her to elaborate on this she stated that she thinks she does not need a sponsor.  Pt shared that she just bought a book for herself by Kehinde Humphrey regarding how to quit drinking.  She reported that her boyfriend drinks occasionally.  Discussed CD resources, and pt is interested in looking into potential outpatient CD treatment options that could work with her job schedule.  SW to follow up with pt with these resources.  Pt seems to want to do what she feels is the right thing to do and to please others.  Pt appears to become overwhelmed with anxiety and self-doubt to the point that she self-sabotages any potential progress.  Pt does report support from her boyfriend, as well as her parents and siblings.  Boyfriend to transport at discharge.      BHAVANI Fong, PIERRESW 10/21/22 5:13 PM            "

## 2022-10-22 ENCOUNTER — APPOINTMENT (OUTPATIENT)
Dept: PHYSICAL THERAPY | Facility: HOSPITAL | Age: 47
DRG: 871 | End: 2022-10-22
Payer: COMMERCIAL

## 2022-10-22 LAB
BACTERIA BLD CULT: ABNORMAL
CREAT SERPL-MCNC: 2.45 MG/DL (ref 0.51–0.95)
CRP SERPL-MCNC: 45.4 MG/L
GFR SERPL CREATININE-BSD FRML MDRD: 24 ML/MIN/1.73M2
GLUCOSE BLDC GLUCOMTR-MCNC: 119 MG/DL (ref 70–99)
GLUCOSE BLDC GLUCOMTR-MCNC: 121 MG/DL (ref 70–99)
GLUCOSE BLDC GLUCOMTR-MCNC: 121 MG/DL (ref 70–99)
GLUCOSE BLDC GLUCOMTR-MCNC: 137 MG/DL (ref 70–99)
GLUCOSE BLDC GLUCOMTR-MCNC: 167 MG/DL (ref 70–99)
GLUCOSE BLDC GLUCOMTR-MCNC: 41 MG/DL (ref 70–99)
HOLD SPECIMEN: NORMAL

## 2022-10-22 PROCEDURE — 250N000013 HC RX MED GY IP 250 OP 250 PS 637: Performed by: INTERNAL MEDICINE

## 2022-10-22 PROCEDURE — 250N000011 HC RX IP 250 OP 636: Performed by: INTERNAL MEDICINE

## 2022-10-22 PROCEDURE — 36415 COLL VENOUS BLD VENIPUNCTURE: CPT | Performed by: INTERNAL MEDICINE

## 2022-10-22 PROCEDURE — 120N000001 HC R&B MED SURG/OB

## 2022-10-22 PROCEDURE — 86140 C-REACTIVE PROTEIN: CPT | Performed by: INTERNAL MEDICINE

## 2022-10-22 PROCEDURE — 250N000012 HC RX MED GY IP 250 OP 636 PS 637: Performed by: INTERNAL MEDICINE

## 2022-10-22 PROCEDURE — 82565 ASSAY OF CREATININE: CPT | Performed by: INTERNAL MEDICINE

## 2022-10-22 PROCEDURE — 99232 SBSQ HOSP IP/OBS MODERATE 35: CPT | Performed by: INTERNAL MEDICINE

## 2022-10-22 PROCEDURE — 97530 THERAPEUTIC ACTIVITIES: CPT | Mod: GP

## 2022-10-22 PROCEDURE — 97116 GAIT TRAINING THERAPY: CPT | Mod: GP

## 2022-10-22 PROCEDURE — 87040 BLOOD CULTURE FOR BACTERIA: CPT | Performed by: INTERNAL MEDICINE

## 2022-10-22 RX ORDER — AMLODIPINE BESYLATE 2.5 MG/1
2.5 TABLET ORAL AT BEDTIME
Status: DISCONTINUED | OUTPATIENT
Start: 2022-10-22 | End: 2022-10-24 | Stop reason: HOSPADM

## 2022-10-22 RX ORDER — HYDRALAZINE HYDROCHLORIDE 20 MG/ML
10 INJECTION INTRAMUSCULAR; INTRAVENOUS EVERY 6 HOURS PRN
Status: DISCONTINUED | OUTPATIENT
Start: 2022-10-22 | End: 2022-10-24 | Stop reason: HOSPADM

## 2022-10-22 RX ADMIN — CLONIDINE HYDROCHLORIDE 0.2 MG: 0.1 TABLET ORAL at 20:17

## 2022-10-22 RX ADMIN — CEFAZOLIN 1 G: 1 INJECTION, POWDER, FOR SOLUTION INTRAMUSCULAR; INTRAVENOUS at 00:18

## 2022-10-22 RX ADMIN — ROSUVASTATIN CALCIUM 10 MG: 10 TABLET, FILM COATED ORAL at 08:21

## 2022-10-22 RX ADMIN — CLONIDINE HYDROCHLORIDE 0.2 MG: 0.1 TABLET ORAL at 08:21

## 2022-10-22 RX ADMIN — HYDRALAZINE HYDROCHLORIDE 10 MG: 20 INJECTION, SOLUTION INTRAMUSCULAR; INTRAVENOUS at 16:07

## 2022-10-22 RX ADMIN — INSULIN ASPART 3 UNITS: 100 INJECTION, SOLUTION INTRAVENOUS; SUBCUTANEOUS at 13:45

## 2022-10-22 RX ADMIN — DULOXETINE HYDROCHLORIDE 60 MG: 60 CAPSULE, DELAYED RELEASE ORAL at 20:17

## 2022-10-22 RX ADMIN — CEFAZOLIN 1 G: 1 INJECTION, POWDER, FOR SOLUTION INTRAMUSCULAR; INTRAVENOUS at 12:22

## 2022-10-22 RX ADMIN — INSULIN ASPART 3 UNITS: 100 INJECTION, SOLUTION INTRAVENOUS; SUBCUTANEOUS at 09:10

## 2022-10-22 RX ADMIN — AMLODIPINE BESYLATE 2.5 MG: 2.5 TABLET ORAL at 18:13

## 2022-10-22 ASSESSMENT — ACTIVITIES OF DAILY LIVING (ADL)
ADLS_ACUITY_SCORE: 20

## 2022-10-22 NOTE — PROGRESS NOTES
"Care Management Follow Up    Length of Stay (days): 3    Expected Discharge Date: 10/23/2022     Concerns to be Addressed: discharge  planning     Patient plan of care discussed at interdisciplinary rounds: Yes    Anticipated Discharge Disposition: Home     Anticipated Discharge Services: Chemical Dependency Resources, Mental Health Resources, needs long term IV Abx  Anticipated Discharge DME: None    Patient/family educated on Medicare website which has current facility and service quality ratings: yes  Education Provided on the Discharge Plan:    Patient/Family in Agreement with the Plan: yes    Referrals Placed by CM/SW:  (adult mental health case management; outpatient CD tx resources), and [facility]  Private pay costs discussed: Not applicable    Additional Information:  Chart reviewed.    Per ID note 10/21:  \"Surveillance blood cultures  Cefazolin, renally adjusted -- I have ordered for CrCl of 27, not sure if she is at steady state, may need increased dose if Cr improving, appreciate pharmacy assistance.   Expect course of IV antibiotics after discharge in the range of 4 weeks from 10/19.   PICC once blood cultures are negative 48-72 hours.   Assess for outpatient IV antibiotic.\"    RNCM sent a referral to [facility] (pending).    12:15pm- [facility] can accept the patient with 100% coverage, patient has met deductbile for the year.     CM to notify them closer to discharge date.     Per therapy patient can return home with assist, patient will need RN for IV abx.    CM will continue to follow plan of care, review recommendations, and assist with any discharge needs anticipated.         Carol Whitney RN        "

## 2022-10-22 NOTE — PROGRESS NOTES
Bandage changed on right foot - as per orders cleansed with vashe cleanser/silvercell applied to area and covered with mepilex- patient tolerated well

## 2022-10-22 NOTE — PROGRESS NOTES
INFECTIOUS DISEASE FOLLOW UP NOTE      ASSESSMENT:  1. Bullous cellulitis of right foot with MSSA bacteremia.   Presenting with nausea, vomiting, noted to have temp to 103, elevated lactate. MRI not showing bone or joint infection. Low suspicion for necrotizing fasciitis clinically on exam. With lesion on left toe as well, question whether these skin areas could be embolic now that she is found to have bacteremia, however MARAH not showing endocarditis. Improved.    2. DM type 1  3. CKD. Creatinine better today.  4. Penicillin allergy as a child. Tolerated a dose of ceftriaxone here. Recalls she has tolerated amoxicillin since. Discussed use of cefazolin, should be safe.   5. Recently quit alcohol use.        PLAN:  Surveillance blood cultures--none + since oct 19  Cefazolin, renally adjusted -- I have ordered for CrCl of 27, not sure if she is at steady state, may need increased dose if Cr improving, appreciate pharmacy assistance.   Expect course of IV antibiotics after discharge in the range of 4 weeks from 10/19.   PICC once blood cultures are negative 48-72 hours.  Probably best to do tomorrow if we can, ow first thing Monday AM    Assess for outpatient IV antibiotic.    DEYSI Cosme MD  [facility]  [phone] clinic  [employer] paging    ______________________________________________________________________    SUBJECTIVE / INTERVAL HISTORY:nice lady.  Doing good.  Talked a bit about picc and thrice daily dosing plan for ancef etc    She has known murmur.     Recalls penicillin rash as a child, recalls that she tolerated amoxicillin after that. Tolerating antibiotics here so far.    ROS: All other systems negative except as listed above.        OBJECTIVE:  BP (!) 168/84 (BP Location: Right arm)   Pulse 77   Temp 98  F (36.7  C) (Oral)   Resp 18   Wt 59 kg (130 lb)   LMP 10/05/2022   SpO2 98%   BMI 20.36 kg/m             GEN: No acute distress.    RESPIRATORY:  Normal breathing pattern.    CARDIOVASCULAR:  Regular rate and rhythm. Normal S1 and S2. +murmur.  ABDOMEN:  Soft, normal bowel sounds, non-tender, no masses.  EXTREMITIES: See photos from wound care note. Not much tenderness at foot. Erythema and swelling of right foot.   SKIN/HAIR/NAILS: also lesion 3rd toe L foot. No other rashes  IV: peripheral        Antibiotics:  Cefazolin 10/20-      meropenem 10/19-20  Vancomycin 10/19 x1  clindamycin 10/19 x1  Ceftriaxone 10/19 x1    Pertinent labs:    Recent Labs   Lab 10/20/22  0714 10/19/22  0923   WBC 8.2 7.9   HGB 9.7* 10.8*   HCT 28.8* 31.5*    260        Recent Labs   Lab 10/20/22  0714 10/19/22  0923   * 128*   CO2 25 26   BUN 27.0* 32.9*        Lab Results   Component Value Date    CRP 45.40 (H) 10/22/2022         Lab Results   Component Value Date    ALT 21 10/20/2022    AST 35 10/20/2022    ALKPHOS 98 10/20/2022         MICROBIOLOGY DATA:  blood 10/19 MSSA  10/20 negative to date  10/21 pending      RADIOLOGY:  MR Foot Right w/o Contrast    Result Date: 10/19/2022  EXAM: MR FOOT RIGHT W/O CONTRAST LOCATION: [facility] DATE/TIME: 10/19/2022 12:03 PM INDICATION: Sepsis, wound, ?osteo. COMPARISON: None. TECHNIQUE: Unenhanced. FINDINGS: JOINTS AND BONES: -No evidence for fracture or marrow signal abnormality. No evidence for osteomyelitis. No significant effusion to suggest a septic arthropathy. Hammertoe deformities. TENDONS: -The flexor and extensor tendons are negative for tendinopathy, tendosynovitis, or tearing. The hallucis tendons are negative. LIGAMENTS: -The ligament of Lisfranc is intact. The collateral ligaments at the MTP joints are all intact. MUSCLES AND SOFT TISSUES: -There is edema or cellulitis along the dorsal aspect of the foot. There are some bullous/blistering lesions involving the 3rd and 5th toes which are minimal to direct visual inspection. Fatty infiltration and atrophy of the plantar and interosseous musculature.     IMPRESSION:  1.  No evidence for fracture. 2.  No evidence for osteomyelitis, septic arthropathy, or abscess. 3.  Bullous/blistering lesions involving the 3rd and 5th toes. These would be amenable to direct visual inspection. 4.  Edema or cellulitis along the dorsal aspect of the foot extending into the toes. 5.  Fatty infiltration or atrophy of the plantar and interosseous musculature. 6.  Hammertoe deformities.     Echocardiogram MARAH    Result Date: 10/21/2022  [id number] [facility] [id number]^[email or web address]^JOSHUA^[place] [address]  Name: DONITA FREITAS MRN: [id number] : 1975 Study Date: 10/21/2022 09:54 AM Age: 47 yrs Gender: Female Patient Location: [facility] Reason For Study: Murmur Ordering Physician: JOSHUA POOLE Performed By: CM/ROSIE  BSA: 1.7 m2 Height: 67 in Weight: 130 lb HR: 73 ______________________________________________________________________________ Procedure Complete MAR[facility] Adult. Good quality two-dimensional was performed and interpreted. Good quality color and spectral Doppler were performed and interpreted. ______________________________________________________________________________ Interpretation Summary  Left ventricular size, wall motion and function are normal. The ejection fraction is 60-65%. Normal right ventricle size and systolic function. No vegetations present. No hemodynamically significant valvular abnormalities on 2D or color flow imaging. ______________________________________________________________________________ MARAH I determined this patient to be an appropriate candidate for the planned sedation and procedure and have reassessed the patient immediately prior to sedation and procedure. Total sedation time: 18 mins minutes of continuous bedside 1:1 monitoring. Versed (2mg) was given intravenously. Fentanyl (50mcg) was given intravenously. 4 sprays benzocaine 15 mL viscous lidocaine. Consent to the procedure was obtained prior to sedation.  There were no complications associated with this procedure. The Transducer was inserted without difficulty . The procedure was performed in the Echo Lab.  Left Ventricle Left ventricular size, wall motion and function are normal. The ejection fraction is 60-65%.  Right Ventricle Normal right ventricle size and systolic function.  Atria Normal left atrial size. Right atrial size is normal. There is no color Doppler evidence of an atrial shunt. No thrombus is detected in the left atrial appendage.  Mitral Valve Mitral valve leaflets appear normal. There is no evidence of mitral stenosis or clinically significant mitral regurgitation.  Tricuspid Valve The tricuspid valve is normal in structure and function. This degree of valvular regurgitation is within normal limits. There is no tricuspid stenosis.  Aortic Valve The aortic valve is trileaflet. Lambl's excescences noted. No aortic regurgitation is present. No aortic stenosis is present.  Pulmonic Valve The pulmonic valve is not well seen, but is grossly normal. There is trace pulmonic valvular regurgitation.  Vessels The aortic root is normal size. Normal size ascending aorta. The inferior vena cava is normal.  Pericardial/Pleural There is no pericardial effusion. ______________________________________________________________________________ Report approved by: Eli Nicole 10/21/2022 11:07 AM  ______________________________________________________________________________

## 2022-10-22 NOTE — PROGRESS NOTES
Daily Progress Note        CODE STATUS:  Full Code    10/20/22  Assessment/Plan:  47 year old female with PMH of hypertension, DM-1, CKD3b, anxiety/depression, prolong QTc, ETOH abuse, THC use who presented to our ED for evaluation of nausea and vomiting.     Right foot cellulitis with sepsis:  MSSA bacteremia  -- Patient has a circular ulcer over the right 1st MTP joint in the plantar surface with surrounding erythema over the skin dorsally spreading towards the ankle  -- Elevated procal and lactic acid noted  -- Received IV rocephin/clinida and vanc in ED. Started empirically on Vanc/cefepime on admission. Blood cultures- 2/2 bottles positive for MSSA. ID consult appreciated. Antibiotics swithced to IV cefazolin. MARAH negative for vegetations. ID recommends 4 wks of IV antibiotics. Will place a piccline, likely tomorrow or on Monday, then home with home infusion.   -- MRI right foot- no osteomyelitis or abscess  -- Appreciate podiatry and ID consult. WOC consulted for wound care     Lactic acidosis  -- Received 30ml/kg IVF per sepsis protocol in ED  -- Resolved with IVF     LORIN on CKD-3b  -- Baseline creatinine appears to be around 1.5. Presented with creatinine of 3.3. Improving. Creatinine 2.45 today. This could corrie her new baseline  -- Avoid nephrotoxic meds     Nausea/vomiting  -- Due to above  -- Symptomatic treatment     DM-1  -- Diabetic diet   -- Patient having recurrent hypoglycemic episodes. Patient reports she doesn't take her novolog as reported in the admission med-rec. She takes novolog with I:C ratio of 1:15. Changed.  -- Decreased lantus to 5 units AM. Titrate the dose as needed     Hypertension  -- Cont with home  meds     Prolong QTc  --Replaced magnesium     H/o ETOH abuse  -- Reports no alcohol use for 2 wks  -- Watch for withdrawal     H/O THC abuse  -- UDS positive for the same     Anxiety/depression  -- Cont home meds      Disposition; TBD  Barrier to discharge; IV antibiotics, renal failure.      Subjective:  Interval History: Patient seen and examined this morning. Reports doing well except for some sorethroat, and some pain below the left scapular ankle. Pain is sharp and worse with deep inspiration suggestive of pleurisy.     Review of Systems:   As mentioned in subjective.    Patient Active Problem List   Diagnosis     LORIN (acute kidney injury) (H)     ETOH abuse     Cannabis abuse     Essential hypertension     Proteinuria     Nausea with vomiting     DM type 1, Hgb A1C 10.0 in 2020     Chronic renal impairment, stage 3 (moderate) (H)     Upper GI bleeding     Proliferative diabetic retinopathy (H)     Nephrosis in diabetes mellitus (H)     Moderate episode of recurrent major depressive disorder (H)     Anxiety     Hyperlipidemia     Vitamin D deficiency     Trigger middle finger     Diabetic ketoacidosis with coma associated with type 1 diabetes mellitus (H)     Alcohol use disorder, moderate, dependence (H)     Hypertensive urgency     Leukocytosis     Cellulitis of right foot     Ulcer of right foot, limited to breakdown of skin (H)     Type 1 diabetes mellitus with other specified complication (H)     Sepsis with encephalopathy without septic shock, due to unspecified organism (H)     Chronic renal impairment, stage 3 (moderate), unspecified whether stage 3a or 3b CKD (H)       Scheduled Meds:    ceFAZolin  1 g Intravenous Q12H     cloNIDine  0.2 mg Oral BID     DULoxetine  60 mg Oral QPM     famotidine  20 mg Oral Q48H     influenza quadrivalent (PF) vacc  0.5 mL Intramuscular Prior to discharge     insulin aspart  1-7 Units Subcutaneous TID AC     insulin aspart  1-5 Units Subcutaneous At Bedtime     insulin aspart   Subcutaneous Daily with breakfast     insulin aspart   Subcutaneous Daily with lunch     insulin aspart   Subcutaneous Daily with supper     insulin glargine  5 Units Subcutaneous QAM AC     rosuvastatin  10 mg Oral Daily     sodium chloride (PF)  3 mL Intracatheter Q8H      Continuous Infusions:    PRN Meds:.acetaminophen, glucose **OR** dextrose **OR** glucagon, lidocaine 4%, lidocaine (buffered or not buffered), melatonin, prochlorperazine **OR** prochlorperazine **OR** prochlorperazine, sodium chloride (PF), traZODone    Objective:  Vital signs in last 24 hours:  Temp:  [98  F (36.7  C)-98.2  F (36.8  C)] 98  F (36.7  C)  Pulse:  [75-78] 77  Resp:  [16-20] 18  BP: (125-168)/(77-84) 168/84  SpO2:  [98 %-100 %] 98 %        Intake/Output Summary (Last 24 hours) at 10/20/2022 0929  Last data filed at 10/20/2022 0700  Gross per 24 hour   Intake 54165 ml   Output 800 ml   Net 99006 ml       Physical Exam:    General: Not in obvious distress.  HEENT: NC, AT   Chest: Clear to auscultation bilaterally  Heart: S1S2 normal, regular. Murmur +  Abdomen: Soft. NT, ND. Bowel sounds- active.  Extremities: A circular about 2cm ulcer over the right 1st MTP joint in the plantar surface with surrounding erythema over the skin dorsally spreading towards the ankle. Swelling and redness much better today.   Neuro: Alert and awake, grossly non-focal      Lab Results:(I have personally reviewed the results)    Recent Results (from the past 24 hour(s))   Glucose by meter    Collection Time: 10/21/22  4:53 PM   Result Value Ref Range    GLUCOSE BY METER POCT 158 (H) 70 - 99 mg/dL   Glucose by meter    Collection Time: 10/21/22  7:31 PM   Result Value Ref Range    GLUCOSE BY METER POCT 71 70 - 99 mg/dL   Glucose by meter    Collection Time: 10/21/22 10:12 PM   Result Value Ref Range    GLUCOSE BY METER POCT 59 (L) 70 - 99 mg/dL   Glucose by meter    Collection Time: 10/21/22 10:45 PM   Result Value Ref Range    GLUCOSE BY METER POCT 74 70 - 99 mg/dL   Glucose by meter    Collection Time: 10/22/22  2:47 AM   Result Value Ref Range    GLUCOSE BY METER POCT 41 (LL) 70 - 99 mg/dL   Glucose by meter    Collection Time: 10/22/22  5:06 AM   Result Value Ref Range    GLUCOSE BY METER POCT 137 (H) 70 - 99 mg/dL    Extra Purple Top Tube    Collection Time: 10/22/22  5:10 AM   Result Value Ref Range    Hold Specimen JIC    Creatinine    Collection Time: 10/22/22  5:16 AM   Result Value Ref Range    Creatinine 2.45 (H) 0.51 - 0.95 mg/dL    GFR Estimate 24 (L) >60 mL/min/1.73m2   CRP inflammation    Collection Time: 10/22/22  5:16 AM   Result Value Ref Range    CRP Inflammation 45.40 (H) <5.00 mg/L   Glucose by meter    Collection Time: 10/22/22  7:34 AM   Result Value Ref Range    GLUCOSE BY METER POCT 119 (H) 70 - 99 mg/dL   Glucose by meter    Collection Time: 10/22/22 11:32 AM   Result Value Ref Range    GLUCOSE BY METER POCT 121 (H) 70 - 99 mg/dL       All laboratory and imaging data in the past 24 hours reviewed  Serum Glucose range:   Recent Labs   Lab 10/22/22  1132 10/22/22  0734 10/22/22  0506 10/22/22  0247   * 119* 137* 41*     ABG: No lab results found in last 7 days.  CBC:   Recent Labs   Lab 10/20/22  0714 10/19/22  0923   WBC 8.2 7.9   HGB 9.7* 10.8*   HCT 28.8* 31.5*   MCV 88 85    260   NEUTROPHIL  --  96   LYMPH  --  3   MONOCYTE  --  1   EOSINOPHIL  --  0     Chemistry:   Recent Labs   Lab 10/22/22  0516 10/21/22  0546 10/20/22  0714 10/19/22  0923   NA  --   --  133* 128*   POTASSIUM  --   --  3.4 3.6   CHLORIDE  --   --  97* 85*   CO2  --   --  25 26   BUN  --   --  27.0* 32.9*   CR 2.45* 2.46* 2.67* 3.35*   GFRESTIMATED 24* 24* 22* 17*   ZACH  --   --  8.0* 9.0   MAG  --   --   --  1.5*   PROTTOTAL  --   --  5.8* 6.7   ALBUMIN  --   --  2.9* 3.4*   AST  --   --  35 45*   ALT  --   --  21 21   ALKPHOS  --   --  98 116*   BILITOTAL  --   --  0.2 0.5     Coags:  No results for input(s): INR, PROTIME, PTT in the last 168 hours.    Invalid input(s): APTT  Cardiac Markers:  No results for input(s): CKTOTAL, TROPONINI in the last 168 hours.       MR Foot Right w/o Contrast    Result Date: 10/19/2022  EXAM: MR FOOT RIGHT W/O CONTRAST LOCATION: [facility] DATE/TIME: 10/19/2022  12:03 PM INDICATION: Sepsis, wound, ?osteo. COMPARISON: None. TECHNIQUE: Unenhanced. FINDINGS: JOINTS AND BONES: -No evidence for fracture or marrow signal abnormality. No evidence for osteomyelitis. No significant effusion to suggest a septic arthropathy. Hammertoe deformities. TENDONS: -The flexor and extensor tendons are negative for tendinopathy, tendosynovitis, or tearing. The hallucis tendons are negative. LIGAMENTS: -The ligament of Lisfranc is intact. The collateral ligaments at the MTP joints are all intact. MUSCLES AND SOFT TISSUES: -There is edema or cellulitis along the dorsal aspect of the foot. There are some bullous/blistering lesions involving the 3rd and 5th toes which are minimal to direct visual inspection. Fatty infiltration and atrophy of the plantar and interosseous musculature.     IMPRESSION: 1.  No evidence for fracture. 2.  No evidence for osteomyelitis, septic arthropathy, or abscess. 3.  Bullous/blistering lesions involving the 3rd and 5th toes. These would be amenable to direct visual inspection. 4.  Edema or cellulitis along the dorsal aspect of the foot extending into the toes. 5.  Fatty infiltration or atrophy of the plantar and interosseous musculature. 6.  Hammertoe deformities.       Latest radiology report personally reviewed.    Note created using dragon voice recognition software so sounds alike errors may have escaped editing.      10/22/2022   Reji Cisneros MD  Hospitalist, [facility]  Pager: [phone]

## 2022-10-22 NOTE — PLAN OF CARE
"  Problem: Plan of Care - These are the overarching goals to be used throughout the patient stay.    Goal: Patient-Specific Goal (Individualized)  Description: You can add care plan individualizations to a care plan. Examples of Individualization might be:  \"Parent requests to be called daily at 9am for status\", \"I have a hard time hearing out of my right ear\", or \"Do not touch me to wake me up as it startles me\".  Outcome: Adequate for Care Transition     Problem: Plan of Care - These are the overarching goals to be used throughout the patient stay.    Goal: Absence of Hospital-Acquired Illness or Injury  Intervention: Prevent Skin Injury  Recent Flowsheet Documentation  Taken 10/22/2022 0830 by Adelina Lora, RN  Body Position: position changed independently   Goal Outcome Evaluation:wound care as ordered  Problem: Pain Chronic (Persistent) (Comorbidity Management)  Goal: Acceptable Pain Control and Functional Ability  Intervention: Manage Persistent Pain-understands pain scale 90-10) and medicate as per mar  Problem: Sepsis/Septic Shock  Goal: Blood Glucose Level Within Targeted Range  Outcome: Progressing-monitoring blood sugars as per orders/encourage to eat meals     Recent Flowsheet Documentation  Taken 10/22/2022 0830 by Adelina Lora, RN  Medication Review/Management: medications reviewed         Plan of Care Reviewed With: patient                 "

## 2022-10-22 NOTE — PLAN OF CARE
"  Problem: Plan of Care - These are the overarching goals to be used throughout the patient stay.    Goal: Patient-Specific Goal (Individualized)  Description: You can add care plan individualizations to a care plan. Examples of Individualization might be:  \"Parent requests to be called daily at 9am for status\", \"I have a hard time hearing out of my right ear\", or \"Do not touch me to wake me up as it startles me\".  Outcome: Progressing  Goal: Absence of Hospital-Acquired Illness or Injury  Outcome: Progressing  Intervention: Identify and Manage Fall Risk  Recent Flowsheet Documentation  Taken 10/22/2022 0200 by Lynn Oconnell, RN  Safety Promotion/Fall Prevention:   activity supervised   nonskid shoes/slippers when out of bed   safety round/check completed  Intervention: Prevent Skin Injury  Recent Flowsheet Documentation  Taken 10/22/2022 0200 by Lynn Oconnell, RN  Body Position: position changed independently  Goal: Optimal Comfort and Wellbeing  Outcome: Progressing  Goal: Readiness for Transition of Care  Outcome: Progressing     Problem: Sepsis/Septic Shock  Goal: Blood Glucose Level Within Targeted Range  Outcome: Progressing  Goal: Absence of Infection Signs and Symptoms  Outcome: Progressing  Intervention: Promote Recovery  Recent Flowsheet Documentation  Taken 10/22/2022 0200 by Lynn Oconnell, RN  Activity Management: activity adjusted per tolerance  Goal: Optimal Nutrition Intake  Outcome: Progressing     Problem: Hyperglycemia  Goal: Blood Glucose Level Within Targeted Range  Outcome: Progressing   Goal Outcome Evaluation:  Pt has been sleeping well throughout the shift, awake at 0245 to have blood sugar checked, it was 41mg/dl. Orange juice and january crackers given. At recheck blood sugar was 137. Pt denied pain. All due cares done and explained to the patient, will cont to monitor and treat as needed.                      "

## 2022-10-22 NOTE — PLAN OF CARE
Problem: Sepsis/Septic Shock  Goal: Blood Glucose Level Within Targeted Range  Outcome: Not Progressing     Problem: Sepsis/Septic Shock  Goal: Optimal Nutrition Intake  Outcome: Not Progressing     Goal Outcome Evaluation:       Pt presented to the hospital on 10/19 with right foot cellulitis and sepsis. She has an ulcer over the first MTP of the right foot. Dressing is clean, dry and intact. WOC consulting. Receiving IV abx in the form of Ancef. Blood cultures positive. She will require prolonged course of IV abx. PICC line will be placed prior to discharge but after consecutive negative blood cultures. NWB to the RLE. Requires assist of 1 with a walker for ambulation. Pt denies pain. A&O x4. Presented to hospital with LORIN. Creatinine remains elevated today at 2.4. Echo and TTE were performed today and unremarkable. She is tolerating a regular diet. Appetite is poor. Blood sugar was 158 before meal and 59 at hs. She has been having frequent episodes of hypoglycemia. Today adjustments had been made in her scheduled Lantus and carb coverage. MD notified. No new orders at this time. Snack provided. Continent of bowel and bladder. VS stable.

## 2022-10-23 LAB
CREAT SERPL-MCNC: 2.47 MG/DL (ref 0.51–0.95)
GFR SERPL CREATININE-BSD FRML MDRD: 24 ML/MIN/1.73M2
GLUCOSE BLDC GLUCOMTR-MCNC: 136 MG/DL (ref 70–99)
GLUCOSE BLDC GLUCOMTR-MCNC: 147 MG/DL (ref 70–99)
GLUCOSE BLDC GLUCOMTR-MCNC: 264 MG/DL (ref 70–99)
GLUCOSE BLDC GLUCOMTR-MCNC: 455 MG/DL (ref 70–99)
GLUCOSE BLDC GLUCOMTR-MCNC: 458 MG/DL (ref 70–99)
GLUCOSE BLDC GLUCOMTR-MCNC: 466 MG/DL (ref 70–99)

## 2022-10-23 PROCEDURE — 99232 SBSQ HOSP IP/OBS MODERATE 35: CPT | Performed by: INTERNAL MEDICINE

## 2022-10-23 PROCEDURE — 272N000450 HC KIT 4FR POWER PICC SINGLE LUMEN

## 2022-10-23 PROCEDURE — 36415 COLL VENOUS BLD VENIPUNCTURE: CPT | Performed by: INTERNAL MEDICINE

## 2022-10-23 PROCEDURE — 250N000011 HC RX IP 250 OP 636: Performed by: INTERNAL MEDICINE

## 2022-10-23 PROCEDURE — 36569 INSJ PICC 5 YR+ W/O IMAGING: CPT

## 2022-10-23 PROCEDURE — 250N000013 HC RX MED GY IP 250 OP 250 PS 637: Performed by: INTERNAL MEDICINE

## 2022-10-23 PROCEDURE — 87040 BLOOD CULTURE FOR BACTERIA: CPT | Performed by: INTERNAL MEDICINE

## 2022-10-23 PROCEDURE — G0008 ADMIN INFLUENZA VIRUS VAC: HCPCS | Performed by: INTERNAL MEDICINE

## 2022-10-23 PROCEDURE — 250N000009 HC RX 250: Performed by: INTERNAL MEDICINE

## 2022-10-23 PROCEDURE — 120N000001 HC R&B MED SURG/OB

## 2022-10-23 PROCEDURE — 82565 ASSAY OF CREATININE: CPT | Performed by: INTERNAL MEDICINE

## 2022-10-23 PROCEDURE — 90686 IIV4 VACC NO PRSV 0.5 ML IM: CPT | Performed by: INTERNAL MEDICINE

## 2022-10-23 RX ORDER — LIDOCAINE 40 MG/G
CREAM TOPICAL
Status: DISCONTINUED | OUTPATIENT
Start: 2022-10-23 | End: 2022-10-23 | Stop reason: CLARIF

## 2022-10-23 RX ORDER — AMLODIPINE BESYLATE 2.5 MG/1
2.5 TABLET ORAL AT BEDTIME
Qty: 30 TABLET | Refills: 0 | Status: ON HOLD | OUTPATIENT
Start: 2022-10-23 | End: 2022-11-28

## 2022-10-23 RX ORDER — CEFAZOLIN SODIUM 1 G/3ML
1 INJECTION, POWDER, FOR SOLUTION INTRAMUSCULAR; INTRAVENOUS EVERY 12 HOURS
Start: 2022-10-23 | End: 2022-11-17

## 2022-10-23 RX ORDER — FAMOTIDINE 20 MG/1
20 TABLET, FILM COATED ORAL 2 TIMES DAILY PRN
Start: 2022-10-23 | End: 2022-11-17

## 2022-10-23 RX ADMIN — DULOXETINE HYDROCHLORIDE 60 MG: 60 CAPSULE, DELAYED RELEASE ORAL at 20:14

## 2022-10-23 RX ADMIN — FAMOTIDINE 20 MG: 20 TABLET ORAL at 20:14

## 2022-10-23 RX ADMIN — AMLODIPINE BESYLATE 2.5 MG: 2.5 TABLET ORAL at 20:14

## 2022-10-23 RX ADMIN — INFLUENZA A VIRUS A/VICTORIA/2570/2019 IVR-215 (H1N1) ANTIGEN (FORMALDEHYDE INACTIVATED), INFLUENZA A VIRUS A/DARWIN/9/2021 SAN-010 (H3N2) ANTIGEN (FORMALDEHYDE INACTIVATED), INFLUENZA B VIRUS B/PHUKET/3073/2013 ANTIGEN (FORMALDEHYDE INACTIVATED), AND INFLUENZA B VIRUS B/MICHIGAN/01/2021 ANTIGEN (FORMALDEHYDE INACTIVATED) 0.5 ML: 15; 15; 15; 15 INJECTION, SUSPENSION INTRAMUSCULAR at 11:42

## 2022-10-23 RX ADMIN — CEFAZOLIN 1 G: 1 INJECTION, POWDER, FOR SOLUTION INTRAMUSCULAR; INTRAVENOUS at 00:27

## 2022-10-23 RX ADMIN — CLONIDINE HYDROCHLORIDE 0.2 MG: 0.1 TABLET ORAL at 20:14

## 2022-10-23 RX ADMIN — ROSUVASTATIN CALCIUM 10 MG: 10 TABLET, FILM COATED ORAL at 08:23

## 2022-10-23 RX ADMIN — HYDRALAZINE HYDROCHLORIDE 10 MG: 20 INJECTION, SOLUTION INTRAMUSCULAR; INTRAVENOUS at 08:23

## 2022-10-23 RX ADMIN — INSULIN ASPART 2 UNITS: 100 INJECTION, SOLUTION INTRAVENOUS; SUBCUTANEOUS at 09:02

## 2022-10-23 RX ADMIN — CEFAZOLIN 1 G: 1 INJECTION, POWDER, FOR SOLUTION INTRAMUSCULAR; INTRAVENOUS at 11:44

## 2022-10-23 RX ADMIN — LIDOCAINE HYDROCHLORIDE 2 ML: 10 INJECTION, SOLUTION EPIDURAL; INFILTRATION; INTRACAUDAL; PERINEURAL at 10:40

## 2022-10-23 RX ADMIN — CLONIDINE HYDROCHLORIDE 0.2 MG: 0.1 TABLET ORAL at 08:23

## 2022-10-23 ASSESSMENT — ACTIVITIES OF DAILY LIVING (ADL)
ADLS_ACUITY_SCORE: 20

## 2022-10-23 NOTE — PLAN OF CARE
Problem: Hypertension Acute  Goal: Blood Pressure Within Desired Range  Outcome: Not Progressing     Problem: Hyperglycemia  Goal: Blood Glucose Level Within Targeted Range  10/22/2022 2225 by Sammi Dennison, RN  Outcome: Progressing  10/22/2022 2224 by Sammi Dennison RN  Outcome: Progressing     Goal Outcome Evaluation:       Pt presented to hospital on 10/19 with RLE cellulitis. MRI negative for osteomyelitis. Blood cultures were positive. ID consulting. Receiving IV abx in the form of Ancef. Pt will need a PICC line placed at time of discharge for long-term course of IV abx. Repeat blood cultures x2 are showing no growth. Dressing in place to ulcer on plantar aspect of right foot. Denies pain. NWB to RLE. Ambulates with SBA using a walker. Receiving PT services. Pt is A&O x4. Creatinine remains elevated at 2.4. She is tolerating a diabetic diet. Appetite is fair. Blood sugar was 121 before dinner and 167 at hs. She has had episodes of hypoglycemia the last couple of days with adjustments made in insulin regimen. She is currently receiving s/s, carb coverage and scheduled Lantus insulin. BP has been elevated this shift, up to 194/104. MD updated. Received prn IV Hydralazine at 1605 and daily Amlodipine was initiated, which were effective.

## 2022-10-23 NOTE — PLAN OF CARE
Problem: Plan of Care - These are the overarching goals to be used throughout the patient stay.    Goal: Absence of Hospital-Acquired Illness or Injury  Outcome: Progressing  Intervention: Identify and Manage Fall Risk  Recent Flowsheet Documentation  Taken 10/23/2022 0300 by Lynn Oconnell RN  Safety Promotion/Fall Prevention:   activity supervised   nonskid shoes/slippers when out of bed   safety round/check completed  Intervention: Prevent Skin Injury  Recent Flowsheet Documentation  Taken 10/23/2022 0300 by Lynn Oconnell RN  Body Position:   position changed independently   turned   right  Goal: Optimal Comfort and Wellbeing  Outcome: Progressing  Goal: Readiness for Transition of Care  Outcome: Progressing     Problem: Sepsis/Septic Shock  Goal: Blood Glucose Level Within Targeted Range  Outcome: Progressing  Goal: Absence of Infection Signs and Symptoms  Outcome: Progressing  Intervention: Promote Recovery  Recent Flowsheet Documentation  Taken 10/23/2022 0300 by Lynn Oconnell RN  Activity Management: activity adjusted per tolerance  Goal: Optimal Nutrition Intake  Outcome: Progressing     Problem: Hyperglycemia  Goal: Blood Glucose Level Within Targeted Range  Outcome: Progressing     Problem: Hypertension Acute  Goal: Blood Pressure Within Desired Range  Outcome: Progressing  Intervention: Normalize Blood Pressure  Recent Flowsheet Documentation  Taken 10/23/2022 0300 by Lynn Oconnell RN  Medication Review/Management: medications reviewed   Goal Outcome Evaluation:  Pt has been sleeping well throughout the night, awake only with cares. Pt denied pain and is receiving iv axb. Pt is due to have picc placed after 3rd negative blood culture.

## 2022-10-23 NOTE — PLAN OF CARE
Problem: Plan of Care - These are the overarching goals to be used throughout the patient stay.    Goal: Absence of Hospital-Acquired Illness or Injury  Outcome: Progressing  Intervention: Identify and Manage Fall Risk  Recent Flowsheet Documentation  Taken 10/23/2022 0820 by Adelina Lora RN  Safety Promotion/Fall Prevention: activity supervised  Intervention: Prevent Skin Injury  Recent Flowsheet Documentation  Taken 10/23/2022 0820 by Adelina Lora RN  Body Position: position changed independently     Problem: Pain Chronic (Persistent) (Comorbidity Management)  Goal: Acceptable Pain Control and Functional Ability  Intervention: Manage Persistent Pain  Recent Flowsheet Documentation  Taken 10/23/2022 0820 by Adelina Lora RN  Medication Review/Management: medications reviewed   Goal Outcome Evaluation:understands pain scale (0-10) and medicate as per mar  Problem: Sepsis/Septic Shock  Goal: Blood Glucose Level Within Targeted Range  Outcome: Progressing-monitoring before meals  Problem: Hypertension Acute  Goal: Blood Pressure Within Desired Range  Outcome: Progressing  Intervention: Normalize Blood Pressure-monitoring every shift and medicate as per mar  Recent Flowsheet Documentation  Taken 10/23/2022 0820 by Adelina Lora RN  Medication Review/Management: medications reviewed

## 2022-10-23 NOTE — PROGRESS NOTES
INFECTIOUS DISEASE FOLLOW UP NOTE      ASSESSMENT:  1. Bullous cellulitis of right foot with MSSA bacteremia.   Presenting with nausea, vomiting, noted to have temp to 103, elevated lactate. MRI not showing bone or joint infection. Low suspicion for necrotizing fasciitis clinically on exam. With lesion on left toe as well, question whether these skin areas could be embolic now that she is found to have bacteremia, however MARAH not showing endocarditis. Improved.    2. DM type 1  3. CKD. Creatinine better today.  4. Penicillin allergy as a child. Tolerated a dose of ceftriaxone here. Recalls she has tolerated amoxicillin since. Discussed use of cefazolin, should be safe.   5. Recently quit alcohol use.        PLAN:  Surveillance blood cultures--none + since oct 19  Cefazolin, Expect course of IV antibiotics after discharge in the range of 4 weeks from 10/19.   PICC in  See Discharge orders    DEYSI Cosme MD  [facility]  [phone] clinic  Am[employer] paging    ______________________________________________________________________    SUBJECTIVE / INTERVAL HISTORY:picc in.  May go home later.  Tearful now as talking to care manager and desperately wanting to get out of here.       Recalls penicillin rash as a child, recalls that she tolerated amoxicillin after that. Tolerating antibiotics here so far.    ROS: All other systems negative except as listed above.        OBJECTIVE:  BP (!) 187/87 (BP Location: Left arm)   Pulse 82   Temp 98  F (36.7  C) (Oral)   Resp 18   Wt 59 kg (130 lb)   LMP 10/05/2022   SpO2 99%   BMI 20.36 kg/m             GEN: No acute distress.    RESPIRATORY:  Normal breathing pattern.   CARDIOVASCULAR:  Regular rate and rhythm. Normal S1 and S2. +murmur.  ABDOMEN:  Soft, normal bowel sounds, non-tender, no masses.  EXTREMITIES: See photos from wound care note. Not much tenderness at foot. Erythema and swelling of right foot.   SKIN/HAIR/NAILS: also lesion 3rd toe L foot.  No other rashes  IV: peripheral        Antibiotics:  Cefazolin 10/20-      meropenem 10/19-20  Vancomycin 10/19 x1  clindamycin 10/19 x1  Ceftriaxone 10/19 x1    Pertinent labs:    Recent Labs   Lab 10/20/22  0714 10/19/22  0923   WBC 8.2 7.9   HGB 9.7* 10.8*   HCT 28.8* 31.5*    260        Recent Labs   Lab 10/20/22  0714 10/19/22  0923   * 128*   CO2 25 26   BUN 27.0* 32.9*        Lab Results   Component Value Date    CRP 45.40 (H) 10/22/2022         Lab Results   Component Value Date    ALT 21 10/20/2022    AST 35 10/20/2022    ALKPHOS 98 10/20/2022         MICROBIOLOGY DATA:  blood 10/19 MSSA  10/20 negative to date  10/21 pending      RADIOLOGY:  MR Foot Right w/o Contrast    Result Date: 10/19/2022  EXAM: MR FOOT RIGHT W/O CONTRAST LOCATION: [facility] DATE/TIME: 10/19/2022 12:03 PM INDICATION: Sepsis, wound, ?osteo. COMPARISON: None. TECHNIQUE: Unenhanced. FINDINGS: JOINTS AND BONES: -No evidence for fracture or marrow signal abnormality. No evidence for osteomyelitis. No significant effusion to suggest a septic arthropathy. Hammertoe deformities. TENDONS: -The flexor and extensor tendons are negative for tendinopathy, tendosynovitis, or tearing. The hallucis tendons are negative. LIGAMENTS: -The ligament of Lisfranc is intact. The collateral ligaments at the MTP joints are all intact. MUSCLES AND SOFT TISSUES: -There is edema or cellulitis along the dorsal aspect of the foot. There are some bullous/blistering lesions involving the 3rd and 5th toes which are minimal to direct visual inspection. Fatty infiltration and atrophy of the plantar and interosseous musculature.     IMPRESSION: 1.  No evidence for fracture. 2.  No evidence for osteomyelitis, septic arthropathy, or abscess. 3.  Bullous/blistering lesions involving the 3rd and 5th toes. These would be amenable to direct visual inspection. 4.  Edema or cellulitis along the dorsal aspect of the foot extending into the  toes. 5.  Fatty infiltration or atrophy of the plantar and interosseous musculature. 6.  Hammertoe deformities.     Echocardiogram MARAH    Result Date: 10/21/2022  [id number] [facility] [id number]^ZULEMA^JOSHUA^[place] [address]  Name: DONITA FREITAS MRN: [id number] : 1975 Study Date: 10/21/2022 09:54 AM Age: 47 yrs Gender: Female Patient Location: [facility] Reason For Study: Murmur Ordering Physician: JOSHUA POOLE Performed By: MAHESH/ROSIE  BSA: 1.7 m2 Height: 67 in Weight: 130 lb HR: 73 ______________________________________________________________________________ Procedure Complete MARAH Adult. Good quality two-dimensional was performed and interpreted. Good quality color and spectral Doppler were performed and interpreted. ______________________________________________________________________________ Interpretation Summary  Left ventricular size, wall motion and function are normal. The ejection fraction is 60-65%. Normal right ventricle size and systolic function. No vegetations present. No hemodynamically significant valvular abnormalities on 2D or color flow imaging. ______________________________________________________________________________ MARAH I determined this patient to be an appropriate candidate for the planned sedation and procedure and have reassessed the patient immediately prior to sedation and procedure. Total sedation time: 18 mins minutes of continuous bedside 1:1 monitoring. Versed (2mg) was given intravenously. Fentanyl (50mcg) was given intravenously. 4 sprays benzocaine 15 mL viscous lidocaine. Consent to the procedure was obtained prior to sedation. There were no complications associated with this procedure. The Transducer was inserted without difficulty . The procedure was performed in the Echo Lab.  Left Ventricle Left ventricular size, wall motion and function are normal. The ejection fraction is 60-65%.  Right Ventricle Normal right  ventricle size and systolic function.  Atria Normal left atrial size. Right atrial size is normal. There is no color Doppler evidence of an atrial shunt. No thrombus is detected in the left atrial appendage.  Mitral Valve Mitral valve leaflets appear normal. There is no evidence of mitral stenosis or clinically significant mitral regurgitation.  Tricuspid Valve The tricuspid valve is normal in structure and function. This degree of valvular regurgitation is within normal limits. There is no tricuspid stenosis.  Aortic Valve The aortic valve is trileaflet. Lambl's excescences noted. No aortic regurgitation is present. No aortic stenosis is present.  Pulmonic Valve The pulmonic valve is not well seen, but is grossly normal. There is trace pulmonic valvular regurgitation.  Vessels The aortic root is normal size. Normal size ascending aorta. The inferior vena cava is normal.  Pericardial/Pleural There is no pericardial effusion. ______________________________________________________________________________ Report approved by: Eli Nicole 10/21/2022 11:07 AM  ______________________________________________________________________________

## 2022-10-23 NOTE — PROGRESS NOTES
Patients blood sugar at 1130 is 455-notified Dr. Cisneros - gave 7 units per sliding scale  and will give per carb count when she eats.  Dr. Cisnreos ordered another 5units of lantus to give to patient now.  Patient upset wants to really go home.  States not her fault that sugars are high and that she is eating more than she usually eats at home.  Notified Dr. Cisneros she wants to go   in room talking to patient~~notified patient that she can't discharge today because they are still working on who will be doing the infusion, so more likely Monday.   1308 patient notified nurse that she is not going to eat her lunch so wont be giving carb count correction-- recheck blood sugar is 466 at 1310-notified Dr. Cisneros that sugar went up since last notification and patient is not going to eat lunch - he says to recheck in an hour  1410 recheck blood sugar 458- Dr. Cisneros notified-he is going to order 8 units of novolog to be given.   1415 nurse gave 8 units novolog and to do normal dinner check

## 2022-10-23 NOTE — PROGRESS NOTES
Daily Progress Note        CODE STATUS:  Full Code    10/20/22  Assessment/Plan:  47 year old female with PMH of hypertension, DM-1, CKD3b, anxiety/depression, prolong QTc, ETOH abuse, THC use who presented to our ED for evaluation of nausea and vomiting.     Right foot cellulitis with sepsis:  MSSA bacteremia  -- Patient has a circular ulcer over the right 1st MTP joint in the plantar surface with surrounding erythema over the skin dorsally spreading towards the ankle  -- Elevated procal and lactic acid noted  -- Received IV rocephin/clinida and vanc in ED. Started empirically on Vanc/cefepime on admission. Blood cultures- 2/2 bottles positive for MSSA. ID consult appreciated. Antibiotics swithced to IV cefazolin. MARAH negative for vegetations. ID recommends 4 wks of IV antibiotics. Will place a piccline today. Ordered. Home today if home infusion can be set up. Discussed with   -- MRI right foot- no osteomyelitis or abscess  -- Appreciate podiatry and ID consult. WOC consulted for wound care     Lactic acidosis  -- Received 30ml/kg IVF per sepsis protocol in ED  -- Resolved with IVF     LORIN on CKD-3b  -- Baseline creatinine appears to be around 1.5. Presented with creatinine of 3.3. Improving. Creatinine 2.45 today. This could corrie her new baseline  -- Avoid nephrotoxic meds     Nausea/vomiting  -- Due to above  -- Symptomatic treatment     DM-1  -- Diabetic diet   -- Patient having recurrent hypoglycemic episodes. Patient reports she doesn't take her novolog as reported in the admission med-rec. She takes novolog with I:C ratio of 1:15. Changed.  -- Decreased lantus to 5 units AM. Titrate the dose as needed     Addendum: 12 noon. Paged with BS of 455 (which was unusual as she was having hypoglycemic episodes before). It appears she was not given the ordered lantus yesterday. Got 5 units this morning. Added 5 more units of lantus once. Will give 10 units of lantus from tomorrow. Cont the sliding scale  and I;C ratio.    Hypertension  -- Cont with home  meds     Prolong QTc  --Replaced magnesium     H/o ETOH abuse  -- Reports no alcohol use for 2 wks  -- Watch for withdrawal     H/O THC abuse  -- UDS positive for the same     Anxiety/depression  -- Cont home meds      Disposition; TBD  Barrier to discharge; IV antibiotics, renal failure.     Subjective:  Interval History: Doing well. No new issues overnight. Feels ready to go home.     Review of Systems:   As mentioned in subjective.    Patient Active Problem List   Diagnosis     LORIN (acute kidney injury) (H)     ETOH abuse     Cannabis abuse     Essential hypertension     Proteinuria     Nausea with vomiting     DM type 1, Hgb A1C 10.0 in 2020     Chronic renal impairment, stage 3 (moderate) (H)     Upper GI bleeding     Proliferative diabetic retinopathy (H)     Nephrosis in diabetes mellitus (H)     Moderate episode of recurrent major depressive disorder (H)     Anxiety     Hyperlipidemia     Vitamin D deficiency     Trigger middle finger     Diabetic ketoacidosis with coma associated with type 1 diabetes mellitus (H)     Alcohol use disorder, moderate, dependence (H)     Hypertensive urgency     Leukocytosis     Cellulitis of right foot     Ulcer of right foot, limited to breakdown of skin (H)     Type 1 diabetes mellitus with other specified complication (H)     Sepsis with encephalopathy without septic shock, due to unspecified organism (H)     Chronic renal impairment, stage 3 (moderate), unspecified whether stage 3a or 3b CKD (H)       Scheduled Meds:    amLODIPine  2.5 mg Oral At Bedtime     ceFAZolin  1 g Intravenous Q12H     cloNIDine  0.2 mg Oral BID     DULoxetine  60 mg Oral QPM     famotidine  20 mg Oral Q48H     influenza quadrivalent (PF) vacc  0.5 mL Intramuscular Prior to discharge     insulin aspart  1-7 Units Subcutaneous TID AC     insulin aspart  1-5 Units Subcutaneous At Bedtime     insulin aspart   Subcutaneous Daily with breakfast      insulin aspart   Subcutaneous Daily with lunch     insulin aspart   Subcutaneous Daily with supper     insulin glargine  5 Units Subcutaneous QAM AC     rosuvastatin  10 mg Oral Daily     sodium chloride (PF)  3 mL Intracatheter Q8H     Continuous Infusions:    PRN Meds:.acetaminophen, glucose **OR** dextrose **OR** glucagon, hydrALAZINE, lidocaine 4%, lidocaine 4%, lidocaine (buffered or not buffered), lidocaine (buffered or not buffered), melatonin, prochlorperazine **OR** prochlorperazine **OR** prochlorperazine, sodium chloride (PF), sodium chloride (PF), traZODone    Objective:  Vital signs in last 24 hours:  Temp:  [98  F (36.7  C)-98.8  F (37.1  C)] 98.2  F (36.8  C)  Pulse:  [77-87] 87  Resp:  [16-20] 18  BP: (142-194)/() 188/103  SpO2:  [97 %-98 %] 98 %        Intake/Output Summary (Last 24 hours) at 10/20/2022 0929  Last data filed at 10/20/2022 0700  Gross per 24 hour   Intake 19288 ml   Output 800 ml   Net 56507 ml       Physical Exam:    General: Not in obvious distress.  HEENT: NC, AT   Chest: Clear to auscultation bilaterally  Heart: S1S2 normal, regular. Murmur +  Abdomen: Soft. NT, ND. Bowel sounds- active.  Extremities: A circular about 2cm ulcer over the right 1st MTP joint in the plantar surface with surrounding erythema over the skin dorsally spreading towards the ankle. Swelling and redness much better today.   Neuro: Alert and awake, grossly non-focal      Lab Results:(I have personally reviewed the results)    Recent Results (from the past 24 hour(s))   Glucose by meter    Collection Time: 10/22/22 11:32 AM   Result Value Ref Range    GLUCOSE BY METER POCT 121 (H) 70 - 99 mg/dL   Glucose by meter    Collection Time: 10/22/22  5:02 PM   Result Value Ref Range    GLUCOSE BY METER POCT 121 (H) 70 - 99 mg/dL   Glucose by meter    Collection Time: 10/22/22  9:28 PM   Result Value Ref Range    GLUCOSE BY METER POCT 167 (H) 70 - 99 mg/dL   Creatinine    Collection Time: 10/23/22  5:19 AM    Result Value Ref Range    Creatinine 2.47 (H) 0.51 - 0.95 mg/dL    GFR Estimate 24 (L) >60 mL/min/1.73m2   Glucose by meter    Collection Time: 10/23/22  8:18 AM   Result Value Ref Range    GLUCOSE BY METER POCT 264 (H) 70 - 99 mg/dL       All laboratory and imaging data in the past 24 hours reviewed  Serum Glucose range:   Recent Labs   Lab 10/23/22  0818 10/22/22  2128 10/22/22  1702 10/22/22  1132   * 167* 121* 121*     ABG: No lab results found in last 7 days.  CBC:   Recent Labs   Lab 10/20/22  0714 10/19/22  0923   WBC 8.2 7.9   HGB 9.7* 10.8*   HCT 28.8* 31.5*   MCV 88 85    260   NEUTROPHIL  --  96   LYMPH  --  3   MONOCYTE  --  1   EOSINOPHIL  --  0     Chemistry:   Recent Labs   Lab 10/23/22  0519 10/22/22  0516 10/21/22  0546 10/20/22  0714 10/19/22  0923   NA  --   --   --  133* 128*   POTASSIUM  --   --   --  3.4 3.6   CHLORIDE  --   --   --  97* 85*   CO2  --   --   --  25 26   BUN  --   --   --  27.0* 32.9*   CR 2.47* 2.45* 2.46* 2.67* 3.35*   GFRESTIMATED 24* 24* 24* 22* 17*   ZACH  --   --   --  8.0* 9.0   MAG  --   --   --   --  1.5*   PROTTOTAL  --   --   --  5.8* 6.7   ALBUMIN  --   --   --  2.9* 3.4*   AST  --   --   --  35 45*   ALT  --   --   --  21 21   ALKPHOS  --   --   --  98 116*   BILITOTAL  --   --   --  0.2 0.5     Coags:  No results for input(s): INR, PROTIME, PTT in the last 168 hours.    Invalid input(s): APTT  Cardiac Markers:  No results for input(s): CKTOTAL, TROPONINI in the last 168 hours.       MR Foot Right w/o Contrast    Result Date: 10/19/2022  EXAM: MR FOOT RIGHT W/O CONTRAST LOCATION: [facility] DATE/TIME: 10/19/2022 12:03 PM INDICATION: Sepsis, wound, ?osteo. COMPARISON: None. TECHNIQUE: Unenhanced. FINDINGS: JOINTS AND BONES: -No evidence for fracture or marrow signal abnormality. No evidence for osteomyelitis. No significant effusion to suggest a septic arthropathy. Hammertoe deformities. TENDONS: -The flexor and extensor  tendons are negative for tendinopathy, tendosynovitis, or tearing. The hallucis tendons are negative. LIGAMENTS: -The ligament of Lisfranc is intact. The collateral ligaments at the MTP joints are all intact. MUSCLES AND SOFT TISSUES: -There is edema or cellulitis along the dorsal aspect of the foot. There are some bullous/blistering lesions involving the 3rd and 5th toes which are minimal to direct visual inspection. Fatty infiltration and atrophy of the plantar and interosseous musculature.     IMPRESSION: 1.  No evidence for fracture. 2.  No evidence for osteomyelitis, septic arthropathy, or abscess. 3.  Bullous/blistering lesions involving the 3rd and 5th toes. These would be amenable to direct visual inspection. 4.  Edema or cellulitis along the dorsal aspect of the foot extending into the toes. 5.  Fatty infiltration or atrophy of the plantar and interosseous musculature. 6.  Hammertoe deformities.       Latest radiology report personally reviewed.    Note created using dragon voice recognition software so sounds alike errors may have escaped editing.      10/23/2022   Reji Cisneros MD  Hospitalist, [facility]  Pager: [phone]

## 2022-10-23 NOTE — PROGRESS NOTES
Care Management Follow Up    Length of Stay (days): 4    Expected Discharge Date: 10/24/2022     Concerns to be Addressed: discharge planning     Patient plan of care discussed at interdisciplinary rounds: Yes    Anticipated Discharge Disposition: Home     Anticipated Discharge Services: Chemical Dependency Resources, Mental Health Resources  Anticipated Discharge DME: None    Patient/family educated on Medicare website which has current facility and service quality ratings: yes  Education Provided on the Discharge Plan:    Patient/Family in Agreement with the Plan: yes    Referrals Placed by CM/SW:  (adult mental health case management; outpatient CD tx resources), [facility]  Private pay costs discussed: Not applicable    Additional Information:  Chart reviewed.  10:55 am- [facility] can go to t  [facility] accepted the patient with 100% coverage.      RNCM called [facility] this am to see if they would be able to do IV abx teach today. Awaiting call back.    he house to teach tonight, and they would need orders in by 2pm to compound the ABX, if patient is medically ready to discharge today.     RNCM updated the patient, and [facility].    12:05 pm- [facility] talked to pharmacist through [facility], the pharmacist wants to have a team meeting tomorrow morning (10/24) discussing if it would be safe for the patient to have IV abx treatment at home. Due to patient's history of alcohol abuse/ and THC found in the blood. They are concerned the patient would not be compliment/saftey. They will meet tomorrow and contact back in the morning.     Patient informed, writer told other CM to tell the patient that she is still under review at this time.     [facility] also wants to monitor patient's blood sugar today as well.    RNCM to follow up with [facility] on Monday morning (10/23).    CM will continue to follow plan of care, review recommendations, and assist with any discharge needs anticipated.       Carol Whitney RN

## 2022-10-23 NOTE — PROCEDURES
"PICC Line Insertion Procedure Note  Pt. Name: Josefa Curiel  MRN:        [id number]  Procedure: Insertion of a  single Lumen  single fr  Bard SOLO (valved) Power PICC, Lot number [id number]    Indications: long term antibiotics    Contraindications : none    Procedure Details   Patient identified with 2 identifiers and \"Time Out\" conducted.  .     Central line insertion bundle followed: hand hygeine performed prior to procedure, site cleansed with cholraprep, hat, mask, sterile gloves,sterile gown worn, patient draped with maximum barrier head to toe drape, sterile field maintained.    The vein was assessed and found to be compressible and of adequate size. 2 ml 1% Lidocaine administered sq to the insertion site. A 4 Fr PICC was inserted into the brachial vein of the right arm with ultrasound guidance. 1 attempt(s) required to access vein.   Catheter threaded without difficulty. Good blood return noted.    Modified Seldinger Technique used for insertion.    The 8 sharps that are included in the PICC insertion kit were accounted for and disposed of in the sharps container prior to breakdown of the sterile field.    Catheter secured with Statlock, biopatch and Tegaderm dressing applied.    Findings:  Total catheter length  36 cm, with 0 cm exposed. Mid upper arm circumference is 25.5 cm. Catheter was flushed with 30 cc NS. Patient  tolerated procedure well.    Tip placement verified by 3cg technology . Tip placement in the SVC.    CLABSI prevention brochure left at bedside.    Patient's primary RN notified PICC is ready for use.    Comments:            Bindu Pope RN [facility]      "

## 2022-10-24 ENCOUNTER — APPOINTMENT (OUTPATIENT)
Dept: PHYSICAL THERAPY | Facility: HOSPITAL | Age: 47
DRG: 871 | End: 2022-10-24
Payer: COMMERCIAL

## 2022-10-24 VITALS
RESPIRATION RATE: 18 BRPM | HEART RATE: 81 BPM | OXYGEN SATURATION: 100 % | TEMPERATURE: 98.1 F | DIASTOLIC BLOOD PRESSURE: 86 MMHG | SYSTOLIC BLOOD PRESSURE: 168 MMHG | BODY MASS INDEX: 20.36 KG/M2 | WEIGHT: 130 LBS

## 2022-10-24 LAB
CREAT SERPL-MCNC: 2.65 MG/DL (ref 0.51–0.95)
GFR SERPL CREATININE-BSD FRML MDRD: 22 ML/MIN/1.73M2
GLUCOSE BLDC GLUCOMTR-MCNC: 296 MG/DL (ref 70–99)
GLUCOSE BLDC GLUCOMTR-MCNC: 305 MG/DL (ref 70–99)

## 2022-10-24 PROCEDURE — 99239 HOSP IP/OBS DSCHRG MGMT >30: CPT | Performed by: INTERNAL MEDICINE

## 2022-10-24 PROCEDURE — 250N000011 HC RX IP 250 OP 636: Performed by: INTERNAL MEDICINE

## 2022-10-24 PROCEDURE — 97116 GAIT TRAINING THERAPY: CPT | Mod: GP

## 2022-10-24 PROCEDURE — 250N000013 HC RX MED GY IP 250 OP 250 PS 637: Performed by: INTERNAL MEDICINE

## 2022-10-24 PROCEDURE — 82565 ASSAY OF CREATININE: CPT | Performed by: INTERNAL MEDICINE

## 2022-10-24 PROCEDURE — 97530 THERAPEUTIC ACTIVITIES: CPT | Mod: GP

## 2022-10-24 RX ADMIN — CEFAZOLIN 1 G: 1 INJECTION, POWDER, FOR SOLUTION INTRAMUSCULAR; INTRAVENOUS at 00:07

## 2022-10-24 RX ADMIN — ROSUVASTATIN CALCIUM 10 MG: 10 TABLET, FILM COATED ORAL at 07:54

## 2022-10-24 RX ADMIN — HYDRALAZINE HYDROCHLORIDE 10 MG: 20 INJECTION, SOLUTION INTRAMUSCULAR; INTRAVENOUS at 07:54

## 2022-10-24 RX ADMIN — CLONIDINE HYDROCHLORIDE 0.2 MG: 0.1 TABLET ORAL at 07:54

## 2022-10-24 RX ADMIN — CEFAZOLIN 1 G: 1 INJECTION, POWDER, FOR SOLUTION INTRAMUSCULAR; INTRAVENOUS at 11:24

## 2022-10-24 RX ADMIN — INSULIN ASPART 3 UNITS: 100 INJECTION, SOLUTION INTRAVENOUS; SUBCUTANEOUS at 08:11

## 2022-10-24 ASSESSMENT — ACTIVITIES OF DAILY LIVING (ADL)
ADLS_ACUITY_SCORE: 20

## 2022-10-24 NOTE — PROGRESS NOTES
Discharge paperwork gone over with patient - questions answered.  Wound care gone over with patient - knows to follow up with podietry in 7-10 days.  Instructions give to patient to have labs done every week and to fax results to ID doctors.  Patient understands non weight bear on right foot.

## 2022-10-24 NOTE — PLAN OF CARE
Problem: Plan of Care - These are the overarching goals to be used throughout the patient stay.    Goal: Absence of Hospital-Acquired Illness or Injury  Outcome: Progressing  Intervention: Identify and Manage Fall Risk  Recent Flowsheet Documentation  Taken 10/24/2022 0300 by Lynn Oconnell RN  Safety Promotion/Fall Prevention:   nonskid shoes/slippers when out of bed   safety round/check completed  Intervention: Prevent Skin Injury  Recent Flowsheet Documentation  Taken 10/24/2022 0300 by Lynn Oconnell RN  Body Position: position changed independently  Goal: Optimal Comfort and Wellbeing  Outcome: Progressing  Goal: Readiness for Transition of Care  Outcome: Progressing     Problem: Sepsis/Septic Shock  Goal: Blood Glucose Level Within Targeted Range  Outcome: Progressing  Goal: Absence of Infection Signs and Symptoms  Outcome: Progressing  Intervention: Promote Recovery  Recent Flowsheet Documentation  Taken 10/24/2022 0300 by Lynn Oconnell RN  Activity Management: up ad patricia  Goal: Optimal Nutrition Intake  Outcome: Progressing     Problem: Hyperglycemia  Goal: Blood Glucose Level Within Targeted Range  Outcome: Progressing     Problem: Hypertension Acute  Goal: Blood Pressure Within Desired Range  Outcome: Progressing  Intervention: Normalize Blood Pressure  Recent Flowsheet Documentation  Taken 10/24/2022 0300 by Lynn Oconnell RN  Medication Review/Management: medications reviewed   Goal Outcome Evaluation:  Pt has been sleeping well overnight, awake only with cares and to use bathroom. Pt has denied pain overnight.

## 2022-10-24 NOTE — PLAN OF CARE
Physical Therapy Discharge Summary    Reason for therapy discharge:    Discharged to home.    Progress towards therapy goal(s). See goals on Care Plan in [facility] electronic health record for goal details.  Goals partially met.  Barriers to achieving goals:   discharge from facility.    Therapy recommendation(s):    No further therapy is recommended.      Cee Blanton, PT  10/24/2022

## 2022-10-24 NOTE — PROGRESS NOTES
[facility]    Referral received  from Carol Whitney CM, for IV antibiotics.    Benefits verified.  Pt has coverage for IV antibiotics under her [employer] plan. She has met her deductible ($2000) in full and she has met her out of pocket ($6000) in full.  She should be covered at 100%.    Writer has evaluated pt for appropriateness for home infusion services and management has approved servicing this patient.  Writer has spoken with pt to review benefits, home infusion and to offer choice of agency/home infusion provider.     will be seeing pt around 1130-12 noon for home infusion education.  [facility] will be providing pt's home infusion nursing visits.  Update provided to Surekha Kowalski CM.    Thank you for the referral.    Corine Jacome RN, BSN  [facility] Liaison  [phone] (Mon through Fri, 8:00 am-5:00 pm)  [phone] (Office)

## 2022-10-24 NOTE — PROGRESS NOTES
Wound care done with right foot as per wound nurse notes - vashe solution/mepilex cut in two on put on two toes and large mepilex over silvercele

## 2022-10-24 NOTE — PROGRESS NOTES
Care Management Discharge Note    Discharge Date: 10/24/2022       Discharge Disposition: Home, Home Infusion    Discharge Services: Mental Health Resources    Discharge DME: None    Discharge Transportation: family or friend will provide    Private pay costs discussed: Not applicable    PAS Confirmation Code:  (N/A)  Patient/family educated on Medicare website which has current facility and service quality ratings: yes    Education Provided on the Discharge Plan:    Persons Notified of Discharge Plans: Patient  Patient/Family in Agreement with the Plan: yes    Handoff Referral Completed: Yes    Additional Information:  Patient to discharge home with home infusion services, received CD resources. Family/ friend to transport.     Ana Kowalski RN

## 2022-10-24 NOTE — PLAN OF CARE
Problem: Plan of Care - These are the overarching goals to be used throughout the patient stay.    Goal: Absence of Hospital-Acquired Illness or Injury  Outcome: Progressing  Intervention: Identify and Manage Fall Risk  Recent Flowsheet Documentation  Taken 10/24/2022 0755 by Adelina Lora RN  Safety Promotion/Fall Prevention:   assistive device/personal items within reach   nonskid shoes/slippers when out of bed  Intervention: Prevent Skin Injury  Recent Flowsheet Documentation  Taken 10/24/2022 0755 by Adelina Lora RN  Body Position: position changed independently     Problem: Pain Chronic (Persistent) (Comorbidity Management)  Goal: Acceptable Pain Control and Functional Ability  Intervention: Manage Persistent Pain  Recent Flowsheet Documentation  Taken 10/24/2022 0755 by Adelina Lora RN  Medication Review/Management: medications reviewed   Goal Outcome Evaluation:-patient understands pain scale (0-10) and medicate as per mar  Problem: Hyperglycemia  Goal: Blood Glucose Level Within Targeted Range  Outcome: Progressing-monintroing before meals and insulin as per orders  Problem: Hypertension Acute  Goal: Blood Pressure Within Desired Range  Outcome: Progressing  Intervention: Normalize Blood Pressure-monitoring every shift and medicate as per mar   Recent Flowsheet Documentation  Taken 10/24/2022 0755 by Adelina Lora, RN  Medication Review/Management: medications reviewed

## 2022-10-24 NOTE — PROGRESS NOTES
"Care Management Follow Up    Length of Stay (days): 4    Expected Discharge Date: 10/24/2022     Concerns to be Addressed: discharge planning       Patient plan of care discussed at interdisciplinary rounds: Yes    Anticipated Discharge Disposition: Home, Home Infusion     Anticipated Discharge Services: Mental Health Resources, Chemical Dependency Resources, Other (see comment) (crisis stabilization referral sent for mental health; information on outpt CD programs through [email or web address] and [facility] provided)    Anticipated Discharge DME: None    Patient/family educated on Medicare website which has current facility and service quality ratings: yes    Education Provided on the Discharge Plan:  Yes    Patient/Family in Agreement with the Plan: yes    Referrals Placed by CM/SW: Home Infusion (referral for crisis stabilization services for mental health sent; referral info for outpt CD programs also provided to pt)    Private pay costs discussed: Not applicable    Additional Information: Per Dr Cisneros, PICC line to be placed today for pt's four weeks of IV antibiotics.  RN MAHESH MANZANO has been working on arranging home infusion for pt through [facility].  SW notified that pt has PICC line and per home infusion and team pt can discharge home today.    SW went to pt's room to follow up on providing mental health and CD resources to pt.  When SW got to pt's room, pt was crying.  Pt stated she was just told that she cannot discharge now because her blood sugar is too high and they need to monitor her until tomorrow.  Pt very upset and crying sometimes uncontrollably.  Pt shared many feelings about the situation with SW.  Pt appeared to be taking on much of the blame for this, although it was not something in her control and she also stated that she was not sure \"what she did wrong.\"  Pt also stated at one point that we were probably going to \"throw her in the psych messer.\"  SW explored with pt her feelings " and thoughts regarding this statement.  SW discussed crisis stabilization services through [facility] that provide mental health support and services and would come to pt's home.  Pt agreed for SW to send referral for these services and supports.  Pt signed referral form and release of information.  SW sent referral.  SW also provided pt with outpatient CD program information for [facility] and [facility] programs.  SW and pt continued discussion about discharge barriers.  After speaking with pt's nurse Adelina, Dr Cisneros, RN MAHESH Medina, and Dr Cosme from ID, it was determined that pt could also not discharge today due to [facility] still evaluating whether or not they were going to accept her for services due to concerns about pt's ability to follow through with administering the medication and her compliance level (see RN MAHESH MANZANO's note for more information).  Pt quite unhappy and continued to cry and express concerns about not being able to leave today (primarily financial but also work concerns).      TIA updated charge nurse and pt's nurse that pt not discharging today.  Orders need to be taken out.        BHAVANI Fong, ISRAEL 10/23/22 7:30 PM

## 2022-10-24 NOTE — DISCHARGE SUMMARY
[facility]  Hospitalist Discharge Summary      Date of Admission:  10/19/2022  Date of Discharge:  10/24/2022 12:43 PM  Discharging Provider: Elsa Cruz DO  Discharge Service: Hospitalist Service    Discharge Diagnoses   Right foot cellulitis  Sepsis  MSSA bacteremia  Lactic acidosis  LORIN  Hyperglycemia diabetes    Follow-ups Needed After Discharge   Follow-up Appointments     Follow-up and recommended labs and tests       Follow up with primary care provider, Domingo Costello, within 7 days for   hospital follow- up.    Follow up with ID specialist in 2-3 wks  Follow up with podiatry clinic in 1-2 wks.             Unresulted Labs Ordered in the Past 30 Days of this Admission     Date and Time Order Name Status Description    10/23/2022 12:02 AM Blood Culture Peripheral Blood Preliminary     10/22/2022 12:01 AM Blood Culture Peripheral Blood Preliminary     10/21/2022 12:02 AM Blood Culture Peripheral Blood Preliminary     10/20/2022  6:51 AM Blood Culture Arm, Left Preliminary       These results will be followed up by [facility]    Discharge Disposition   Discharged to home  Condition at discharge: Stable      Hospital Course   47 year old female with PMH of hypertension, DM-1, CKD3b, anxiety/depression, prolong QTc, ETOH abuse, THC use who presented to our ED for evaluation of nausea and vomiting.     Right foot cellulitis with sepsis:  MSSA bacteremia  -- Patient has a circular ulcer over the right 1st MTP joint in the plantar surface with surrounding erythema over the skin dorsally spreading towards the ankle  -- Received IV rocephin/clinida and vanc in ED. Started empirically on Vanc/cefepime on admission. Blood cultures- 2/2 bottles positive for MSSA. ID consult appreciated. Antibiotics swithced to IV cefazolin. MARAH negative for vegetations.    ID recommends 4 wks of IV antibiotics. PICC line placed IV home infusion set up   -- MRI right foot- no osteomyelitis or abscess  -- Appreciate  podiatry and ID consult. WOC consulted for wound care     Lactic acidosis     LORIN on CKD-3b  -- Baseline creatinine appears to be around 1.5. Presented with creatinine of 3.3. Improving. Creatinine 2.45 today. This could be her new baseline   Follow-up with nephrology ordered at discharge  -- Avoid nephrotoxic meds     Nausea/vomiting  -- Due to above  -- Symptomatic treatment     DM-1  -- Diabetic diet   -- Hypoglycemic and hyperglycemic episodes here.  Patient notes that her blood sugars run well controlled outpatient.  Resume home regimen at discharge.      Hypertension  -- Cont with home  meds     Prolong QTc  --Replaced magnesium     H/o ETOH abuse  -- Reports no alcohol use for 2 wks     H/O THC abuse  -- UDS positive for the same     Anxiety/depression  -- Cont home meds       Consultations This Hospital Stay   PHARMACY TO DOSE VANCO  PHARMACY TO DOSE VANCO  INFECTIOUS DISEASES IP CONSULT  PODIATRY IP CONSULT  WOUND OSTOMY CONTINENCE NURSE  IP CONSULT  PSYCHIATRY IP CONSULT  PHYSICAL THERAPY ADULT IP CONSULT  CARE MANAGEMENT / SOCIAL WORK IP CONSULT  VASCULAR ACCESS ADULT IP CONSULT    Code Status   Prior    Time Spent on this Encounter   I, Elsa Cruz DO, personally saw the patient today and spent greater than 30 minutes discharging this patient.       Elsa Cruz DO  [place] [id number]  Phone: [phone]  Fax: [phone]  ______________________________________________________________________    Physical Exam   Vital Signs: Temp: 98.1  F (36.7  C) Temp src: Oral BP: (!) 168/86 Pulse: 81   Resp: 18 SpO2: 100 % O2 Device: None (Room air)    Weight: 130 lbs 0 oz  GENRL: Alert and answering questions appropriately. Not in acute distress. Sitting in bed   HEENT: no lymphadenopathy or thyromegaly  CHEST: Clear to auscultation bilaterally. No wheezes, rhonchi or crackles. Breathing easily   HEART: Regular rate and rhythm,  S1S2 auscultated. No murmurs, rubs or gallops.   ABDMN: Soft. Non-tender, non-distended. No organomegaly. No guarding or rigidity. Bowel sounds present   EXTRM: No pedal edema, DP pulses 2+.   NEURO: Cranial nerves II-XII grossly intact. No focal neurological deficit. No involuntary movements. Normal mentation  PSYCH: Normal affect and mood.   INTGM: No skin rash, no cyanosis or clubbing        Primary Care Physician   Domingo Costello    Discharge Orders      Home Infusion Referral      Home Care Referral      Med Therapy Management Referral      Adult Nephrology  Referral      Reason for your hospital stay    Foot cellulitis, sepsis     Follow-up and recommended labs and tests     Follow up with primary care provider, Domingo Costello, within 7 days for hospital follow- up.    Follow up with ID specialist in 2-3 wks  Follow up with podiatry clinic in 1-2 wks.     Activity    Your activity upon discharge: activity as tolerated. NWB on the right foot.     Discharge Instructions    Weekly BMP, CBC, CRP while on antibiotics. Fax the result to ID specialist, Dr Valdez.     Patient care order    ID discharge orders:    Draw weekly:  CBC with differential  BMP  ESR  CRP inflammation (NOT the hi sens version)    Fax above weekly to [facility] [phone]     Diet    Follow this diet upon discharge: Orders Placed This Encounter      Consistent Carbohydrate Diet Moderate Consistent Carb (60 g CHO per Meal) Diet       Significant Results and Procedures   Most Recent 3 CBC's:Recent Labs   Lab Test 10/20/22  0714 10/19/22  0923 09/30/22  0408   WBC 8.2 7.9 8.6   HGB 9.7* 10.8* 10.1*   MCV 88 85 90    260 279     Most Recent 3 BMP's:Recent Labs   Lab Test 10/24/22  1128 10/24/22  0803 10/24/22  0619 10/23/22  2111 10/23/22  0818 10/23/22  0519 10/22/22  0734 10/22/22  0516 10/20/22  0720 10/20/22  0714 10/19/22  1641 10/19/22  0923 09/30/22  0737 09/30/22  0408   NA  --   --   --   --   --   --   --   --   --  133*  --  128*  --   141   POTASSIUM  --   --   --   --   --   --   --   --   --  3.4  --  3.6  --  3.6   CHLORIDE  --   --   --   --   --   --   --   --   --  97*  --  85*  --  108*   CO2  --   --   --   --   --   --   --   --   --  25  --  26  --  24   BUN  --   --   --   --   --   --   --   --   --  27.0*  --  32.9*  --  19.9   CR  --   --  2.65*  --   --  2.47*  --  2.45*   < > 2.67*  --  3.35*  --  2.90*   ANIONGAP  --   --   --   --   --   --   --   --   --  11  --  17*  --  9   ZACH  --   --   --   --   --   --   --   --   --  8.0*  --  9.0  --  7.7*   * 296*  --  136*   < >  --    < >  --    < > 160*   < > 151*   < > 116*    < > = values in this interval not displayed.     Most Recent 2 LFT's:Recent Labs   Lab Test 10/20/22  0714 10/19/22  0923   AST 35 45*   ALT 21 21   ALKPHOS 98 116*   BILITOTAL 0.2 0.5   ,   Results for orders placed or performed during the hospital encounter of 10/19/22   MR Foot Right w/o Contrast    Narrative    EXAM: MR FOOT RIGHT W/O CONTRAST  LOCATION: [facility]  DATE/TIME: 10/19/2022 12:03 PM    INDICATION: Sepsis, wound, ?osteo.  COMPARISON: None.  TECHNIQUE: Unenhanced.    FINDINGS:     JOINTS AND BONES:   -No evidence for fracture or marrow signal abnormality. No evidence for osteomyelitis. No significant effusion to suggest a septic arthropathy. Hammertoe deformities.    TENDONS:   -The flexor and extensor tendons are negative for tendinopathy, tendosynovitis, or tearing. The hallucis tendons are negative.    LIGAMENTS:   -The ligament of Lisfranc is intact. The collateral ligaments at the MTP joints are all intact.    MUSCLES AND SOFT TISSUES:   -There is edema or cellulitis along the dorsal aspect of the foot. There are some bullous/blistering lesions involving the 3rd and 5th toes which are minimal to direct visual inspection. Fatty infiltration and atrophy of the plantar and interosseous   musculature.      Impression    IMPRESSION:  1.  No evidence for  fracture.  2.  No evidence for osteomyelitis, septic arthropathy, or abscess.  3.  Bullous/blistering lesions involving the 3rd and 5th toes. These would be amenable to direct visual inspection.  4.  Edema or cellulitis along the dorsal aspect of the foot extending into the toes.  5.  Fatty infiltration or atrophy of the plantar and interosseous musculature.  6.  Hammertoe deformities.     Echocardiogram MARAH     Value    LVEF  60-65%    [facility]    [id number]  [id number]  [id number]^ZULEMA^JOSHUA^[place] [address]     Name: DONITA FREITAS  MRN: [id number]  : 1975  Study Date: 10/21/2022 09:54 AM  Age: 47 yrs  Gender: Female  Patient Location: [facility]  Reason For Study: Murmur  Ordering Physician: JOSHUA POOLE  Performed By: MAHESH/ROSIE     BSA: 1.7 m2  Height: 67 in  Weight: 130 lb  HR: 73  ______________________________________________________________________________  Procedure  Complete MARAH Adult. Good quality two-dimensional was performed and  interpreted. Good quality color and spectral Doppler were performed and  interpreted.  ______________________________________________________________________________  Interpretation Summary     Left ventricular size, wall motion and function are normal. The ejection  fraction is 60-65%.  Normal right ventricle size and systolic function.  No vegetations present. No hemodynamically significant valvular abnormalities  on 2D or color flow imaging.  ______________________________________________________________________________  MARAH  I determined this patient to be an appropriate candidate for the planned  sedation and procedure and have reassessed the patient immediately prior to  sedation and procedure. Total sedation time: 18 mins minutes of continuous  bedside 1:1 monitoring. Versed (2mg) was given intravenously. Fentanyl (50mcg)  was given intravenously. 4 sprays benzocaine  15 mL viscous lidocaine. Consent to  the procedure was obtained prior to  sedation. There were no complications associated with this procedure. The  Transducer was inserted without difficulty . The procedure was performed in  the Echo Lab.     Left Ventricle  Left ventricular size, wall motion and function are normal. The ejection  fraction is 60-65%.     Right Ventricle  Normal right ventricle size and systolic function.     Atria  Normal left atrial size. Right atrial size is normal. There is no color  Doppler evidence of an atrial shunt. No thrombus is detected in the left  atrial appendage.     Mitral Valve  Mitral valve leaflets appear normal. There is no evidence of mitral stenosis  or clinically significant mitral regurgitation.     Tricuspid Valve  The tricuspid valve is normal in structure and function. This degree of  valvular regurgitation is within normal limits. There is no tricuspid  stenosis.     Aortic Valve  The aortic valve is trileaflet. Lambl's excescences noted. No aortic  regurgitation is present. No aortic stenosis is present.     Pulmonic Valve  The pulmonic valve is not well seen, but is grossly normal. There is trace  pulmonic valvular regurgitation.     Vessels  The aortic root is normal size. Normal size ascending aorta. The inferior vena  cava is normal.     Pericardial/Pleural  There is no pericardial effusion.  ______________________________________________________________________________  Report approved by: Eli Nicole 10/21/2022 11:07 AM     ______________________________________________________________________________            Discharge Medications   Discharge Medication List as of 10/24/2022 12:32 PM      START taking these medications    Details   amLODIPine (NORVASC) 2.5 MG tablet Take 1 tablet (2.5 mg) by mouth At Bedtime, Disp-30 tablet, R-0, E-Prescribe      ceFAZolin (ANCEF) 1 GM vial Inject 1 g into the vein every 12 hours for 24 days, No Print Out         CONTINUE these medications which have  CHANGED    Details   famotidine (PEPCID) 20 MG tablet Take 1 tablet (20 mg) by mouth 2 times daily as needed (heartburn) For 1 week then 1 twice a day as needed for heartburn, No Print Out      insulin glargine (LANTUS PEN) 100 UNIT/ML pen Inject 10 Units Subcutaneous every morning, Disp-15 mL, R-0, No Print OutIf Lantus is not covered by insurance, may substitute Basaglar or Semglee or other insulin glargine product per insurance preference at same dose and frequency.           CONTINUE these medications which have NOT CHANGED    Details   cloNIDine (CATAPRES) 0.1 MG tablet Take 2 tablets (0.2 mg) by mouth 2 times daily, Disp-120 tablet, R-1, E-Prescribe      DULoxetine (CYMBALTA) 60 MG capsule [DULOXETINE (CYMBALTA) 30 MG CAPSULE] Take 60 mg by mouth every evening. , Historical      insulin lispro (HUMALOG KWIKPEN) 100 UNIT/ML (1 unit dial) KWIKPEN 1 unit per 15 gm Carbs, hold if glucose < 80, Disp-15 mL, R-0, No Print Out      rosuvastatin (CRESTOR) 40 MG tablet Take 40 mg by mouth daily, Historical      traZODone (DESYREL) 100 MG tablet Take 25 mg by mouth nightly as needed for sleep, Historical           Allergies   Allergies   Allergen Reactions     Colestipol GI Disturbance     Nickel Rash     Penicillins Rash

## 2022-10-24 NOTE — PLAN OF CARE
Problem: Hypertension Acute  Goal: Blood Pressure Within Desired Range  Outcome: Progressing     Problem: Hyperglycemia  Goal: Blood Glucose Level Within Targeted Range  Outcome: Progressing     Problem: Plan of Care - These are the overarching goals to be used throughout the patient stay.    Goal: Readiness for Transition of Care  Outcome: Progressing     Goal Outcome Evaluation:       Pt presented to the hospital on 10/19 with RLE cellulitis. MRI shows no osteomyelitis. Received PICC line to right upper arm today for long-term course of IV abx at home. She is receiving abx in the form of IV Ancef. Has had consecutive blood cultures with no growth. Discharge to home was delayed  today 2/2 episodes of hyperglycemia and hypertension as well as inability to arrange home infusion therapy. She is SBA with transfers. Nonweightbearing to the RLE. Denies pain. She is tolerating a diabetic diet. Pt received additional doses of insulin today for hyperglycemia >400. Blood sugar was 147 at dinner and 136 at hs. Remains on s/s, carb coverage and scheduled Lantus insulin. Creatinine has stabilized at 2.4. VS stable.

## 2022-10-25 LAB — BACTERIA BLD CULT: NO GROWTH

## 2022-10-26 ENCOUNTER — PATIENT OUTREACH (OUTPATIENT)
Dept: CARE COORDINATION | Facility: CLINIC | Age: 47
End: 2022-10-26

## 2022-10-26 LAB — BACTERIA BLD CULT: NO GROWTH

## 2022-10-26 NOTE — PROGRESS NOTES
[facility] Resource Center Contact  [facility]/Voicemail     Clinical Data: Transitional Care Management Outreach     Outreach attempted x 2.  Left message on patient's voicemail, providing [facility]'s 24/7 scheduling and nurse triage phone number [phone] ([phone]) for questions/concerns and/or to schedule an appt with an [facility] provider, if they do not have a PCP.      Plan:  [facility] will do no further outreaches at this time.       Raysa Potter  Community Health Worker  [facility], [facility]  [phone]      *Connected Care Resource Team does NOT follow patient ongoing. Referrals are identified based on internal discharge reports and the outreach is to ensure patient has an understanding of their discharge instructions.

## 2022-10-26 NOTE — PROGRESS NOTES
Medication Therapy Management (MTM) Encounter    ASSESSMENT:                            Right foot cellulitis with sepsis: Patient to closely follow with ID and vascular.  Appears to be doing well since home.    Hypertension: Last blood pressure not at goal <130/80 mmhg, not been checked since she started amlodipine.  She has upcoming appointments where her BP will be checked.  Per chart review, patient was on lisinopril in the past but it had been held due to worsening LORIN therefore clonidine was started for blood pressure control and also to help with alcohol withdrawal symptoms.    Type 1 Diabetes: Patient is closely following with endocrinology.  Per her report, sounds like her blood sugars are well controlled.  However, we reviewed her last A1c which was elevated.  She is due for a microalbuminuria check.  We did discuss getting a primary care physician, since she mainly follows with endocrinology.    Mood/Insomnia: Stable at this time.    GERD: Okay to continue to use famotidine as needed.    PLAN:                            1.  Future BP check at upcoming appointments  2.  Due for microalbuminuria check    Follow-up: As needed with MTM     SUBJECTIVE/OBJECTIVE:                          Josefa Curiel is a 47 year old female called for a transitions of care visit. She was discharged from [facility] on 10/24/22 for cellulitis.      Reason for visit: Transitions of care.  Medication Adherence/Access: no issues reported.  Patient was familiar with her medications.  She follows with endocrinology, does not have a primary physician.    Right foot cellulitis with sepsis: Patient has a circular ulcer over the right 1st MTP joint in the plantar surface with surrounding erythema over the skin dorsally spreading towards the ankle. Blood cultures- 2/2 bottles positive for MSSA. ID and podiatry consult. Antibiotics switched to IV cefazolin. MARAH negative for vegetations.  ID recommended 4 wks of IV antibiotics. PICC line  placed, IV home infusion set up. MRI right foot- no osteomyelitis or abscess. Has follow up with vascular and ID. Doing well since hospital. Foot is getting better she thinks. Took photos to see the changes.  Tolerating antibiotic fine.    Hypertension: Currently taking amlodipine 2.5 mg daily and clonidine 0.1 mg x2 (0.2 mg) twice daily. Amlodipine started at discharge. Patient does not monitor BP at home.  Denies edema or lightheadedness/dizziness.   BP Readings from Last 3 Encounters:   10/24/22 (!) 168/86   09/30/22 (P) 124/81       Type 1 Diabetes: Following with endocrinology, Dr. Costello.   Currently taking Lantus 20 units daily and Humalog 1 unit for every 15 g CHO.  She is planning on finishing up the Lantus that she has been switching to Tresiba.  Using a DexCom G6.  Reports blood sugars have been running in the 120s she has had a few lows in the 80s, but her Dexcom warns her early.  Last A1c checked 10/19/22 = 8.8%.   Microalbumin checked 7/21/20   Is taking rosuvastatin 40 mg daily. Last lipids checked 5/18/22  Is not taking aspirin 81 mg    Mood/Insomnia: Continues duloxetine 60 mg daily and trazodone 100 mg -cutting into pieces.  Reports that her mood is well on the Cymbalta, she feels it is working.  She only uses the trazodone as needed and cuts it into small pieces.    GERD: Patient was discharged with famotidine as needed.  She reports that she occasionally gets some heartburn and acidic feeling.      Today's Vitals: LMP 10/05/2022      Allergies/ADRs: Reviewed in chart  Past Medical History: Reviewed in chart  Tobacco: She reports that she has been smoking. She has never used smokeless tobacco.Nicotine/Tobacco Cessation Plan:   Information offered: Patient not interested at this time  Alcohol: Reviewed in chart    ----------------  Post Discharge Medication Reconciliation Status: discharge medications reconciled and changed, per note/orders.    I spent 9 minutes with this patient today. I offer these  suggestions with the understanding that I don't fully understand Josefa's past medical history and the complexity of her health conditions. Josefa should make no changes without the approval of her physician. A copy of the visit note was provided to the patient's provider(s).    The patient declined a summary of these recommendations.     Frieda Perez, Pharm.D., [facility]   Medication Therapy Management Pharmacist   [facility]     Telemedicine Visit Details  Type of service:  Telephone visit  Start Time: 8:34 AM  End Time: 8:43 AM  Originating Location (pt. Location): Home  Distant Location (provider location):  On-site  Provider has received verbal consent for a visit from the patient? Yes     Medication Therapy Recommendations  Diabetes type 1, uncontrolled    Current Medication: amLODIPine (NORVASC) 2.5 MG tablet   Rationale: Medication requires monitoring - Needs additional monitoring   Recommendation: Order Lab   Status: Contact Provider - Awaiting Response   Note: microalbuminuria

## 2022-10-27 ENCOUNTER — LAB REQUISITION (OUTPATIENT)
Dept: LAB | Facility: HOSPITAL | Age: 47
End: 2022-10-27
Payer: COMMERCIAL

## 2022-10-27 ENCOUNTER — VIRTUAL VISIT (OUTPATIENT)
Dept: PHARMACY | Facility: CLINIC | Age: 47
End: 2022-10-27
Payer: COMMERCIAL

## 2022-10-27 DIAGNOSIS — E10.65 UNCONTROLLED TYPE 1 DIABETES MELLITUS WITH HYPERGLYCEMIA (H): ICD-10-CM

## 2022-10-27 DIAGNOSIS — R78.81 BACTEREMIA: ICD-10-CM

## 2022-10-27 DIAGNOSIS — K92.2 UPPER GI BLEEDING: ICD-10-CM

## 2022-10-27 DIAGNOSIS — I16.0 HYPERTENSIVE URGENCY: ICD-10-CM

## 2022-10-27 DIAGNOSIS — L03.115 CELLULITIS OF RIGHT FOOT: Primary | ICD-10-CM

## 2022-10-27 DIAGNOSIS — F41.9 ANXIETY: ICD-10-CM

## 2022-10-27 LAB
ANION GAP SERPL CALCULATED.3IONS-SCNC: 13 MMOL/L (ref 7–15)
BACTERIA BLD CULT: NO GROWTH
BASOPHILS # BLD AUTO: 0.1 10E3/UL (ref 0–0.2)
BASOPHILS NFR BLD AUTO: 1 %
BUN SERPL-MCNC: 22.2 MG/DL (ref 6–20)
CALCIUM SERPL-MCNC: 8.6 MG/DL (ref 8.6–10)
CHLORIDE SERPL-SCNC: 98 MMOL/L (ref 98–107)
CREAT SERPL-MCNC: 2.84 MG/DL (ref 0.51–0.95)
CRP SERPL-MCNC: 12.8 MG/L
DEPRECATED HCO3 PLAS-SCNC: 25 MMOL/L (ref 22–29)
EOSINOPHIL # BLD AUTO: 0.3 10E3/UL (ref 0–0.7)
EOSINOPHIL NFR BLD AUTO: 3 %
ERYTHROCYTE [DISTWIDTH] IN BLOOD BY AUTOMATED COUNT: 13.2 % (ref 10–15)
ERYTHROCYTE [SEDIMENTATION RATE] IN BLOOD BY WESTERGREN METHOD: 71 MM/HR (ref 0–20)
GFR SERPL CREATININE-BSD FRML MDRD: 20 ML/MIN/1.73M2
GLUCOSE SERPL-MCNC: 74 MG/DL (ref 70–99)
HCT VFR BLD AUTO: 28.5 % (ref 35–47)
HGB BLD-MCNC: 9.6 G/DL (ref 11.7–15.7)
IMM GRANULOCYTES # BLD: 0.2 10E3/UL
IMM GRANULOCYTES NFR BLD: 2 %
LYMPHOCYTES # BLD AUTO: 1.3 10E3/UL (ref 0.8–5.3)
LYMPHOCYTES NFR BLD AUTO: 12 %
MCH RBC QN AUTO: 29.4 PG (ref 26.5–33)
MCHC RBC AUTO-ENTMCNC: 33.7 G/DL (ref 31.5–36.5)
MCV RBC AUTO: 87 FL (ref 78–100)
MONOCYTES # BLD AUTO: 0.6 10E3/UL (ref 0–1.3)
MONOCYTES NFR BLD AUTO: 5 %
NEUTROPHILS # BLD AUTO: 9.3 10E3/UL (ref 1.6–8.3)
NEUTROPHILS NFR BLD AUTO: 77 %
NRBC # BLD AUTO: 0 10E3/UL
NRBC BLD AUTO-RTO: 0 /100
PLATELET # BLD AUTO: 462 10E3/UL (ref 150–450)
POTASSIUM SERPL-SCNC: 4.4 MMOL/L (ref 3.4–5.3)
RBC # BLD AUTO: 3.27 10E6/UL (ref 3.8–5.2)
SODIUM SERPL-SCNC: 136 MMOL/L (ref 136–145)
WBC # BLD AUTO: 11.6 10E3/UL (ref 4–11)

## 2022-10-27 PROCEDURE — 85049 AUTOMATED PLATELET COUNT: CPT | Performed by: INTERNAL MEDICINE

## 2022-10-27 PROCEDURE — 99605 MTMS BY PHARM NP 15 MIN: CPT | Performed by: PHARMACIST

## 2022-10-27 PROCEDURE — 80048 BASIC METABOLIC PNL TOTAL CA: CPT | Performed by: INTERNAL MEDICINE

## 2022-10-27 PROCEDURE — 85014 HEMATOCRIT: CPT | Performed by: INTERNAL MEDICINE

## 2022-10-27 PROCEDURE — 86140 C-REACTIVE PROTEIN: CPT | Performed by: INTERNAL MEDICINE

## 2022-10-27 PROCEDURE — 85652 RBC SED RATE AUTOMATED: CPT | Performed by: INTERNAL MEDICINE

## 2022-10-28 ENCOUNTER — TELEPHONE (OUTPATIENT)
Dept: NEPHROLOGY | Facility: CLINIC | Age: 47
End: 2022-10-28

## 2022-10-28 DIAGNOSIS — N18.30 CHRONIC RENAL IMPAIRMENT, STAGE 3 (MODERATE), UNSPECIFIED WHETHER STAGE 3A OR 3B CKD (H): Primary | ICD-10-CM

## 2022-10-28 LAB — BACTERIA BLD CULT: NO GROWTH

## 2022-10-28 NOTE — TELEPHONE ENCOUNTER
[facility] Call Center    Phone Message    May a detailed message be left on voicemail: yes     Reason for Call: orders  Date needed: before 11/16/22  Provider: rachel    Sending encounter per guidelines for orders    Action Taken: Message routed to:  [facility] Nephrology    Travel Screening: Not Applicable

## 2022-10-31 NOTE — TELEPHONE ENCOUNTER
DIAGNOSIS:  Chronic renal impairment, stage 3 (moderate), unspecified whether stage 3a or 3b   DATE RECEIVED: 11.16.2022   NOTES STATUS DETAILS   OFFICE NOTE from referring provider Internal 10.19.2022 Elsa Cruz DO   OFFICE NOTE from other specialist      *Only VASCULITIS or LUPUS gather office notes for the following     *PULMONARY       *ENT     *DERMATOLOGY     *RHEUMATOLOGY     DISCHARGE SUMMARY from hospital Internal 10.19.2022 Elsa Cruz DO   DISCHARGE REPORT from the ER     MEDICATION LIST Internal / CE    IMAGING  (NEED IMAGES AND REPORTS)     KIDNEY CT SCAN     KIDNEY ULTRASOUND     MR ABDOMEN     NUCLEAR MEDICINE RENAL     LABS     CBC Internal 10.27.2022   CMP Internal 10.27.2022   BMP Internal 10.27.2022   UA Internal 10.19.2022   URINE PROTEIN Internal 10.19.2022   RENAL PANEL w    BIOPSY     KIDNEY BIOPSY

## 2022-11-01 ENCOUNTER — OFFICE VISIT (OUTPATIENT)
Dept: VASCULAR SURGERY | Facility: CLINIC | Age: 47
End: 2022-11-01
Attending: PODIATRIST
Payer: COMMERCIAL

## 2022-11-01 VITALS
RESPIRATION RATE: 12 BRPM | SYSTOLIC BLOOD PRESSURE: 178 MMHG | HEART RATE: 88 BPM | OXYGEN SATURATION: 99 % | DIASTOLIC BLOOD PRESSURE: 96 MMHG | WEIGHT: 130 LBS | BODY MASS INDEX: 20.36 KG/M2

## 2022-11-01 DIAGNOSIS — L97.511 ULCER OF RIGHT FOOT, LIMITED TO BREAKDOWN OF SKIN (H): Primary | ICD-10-CM

## 2022-11-01 DIAGNOSIS — L97.521 ULCER OF LEFT FOOT, LIMITED TO BREAKDOWN OF SKIN (H): ICD-10-CM

## 2022-11-01 PROCEDURE — G0463 HOSPITAL OUTPT CLINIC VISIT: HCPCS

## 2022-11-01 PROCEDURE — 11045 DBRDMT SUBQ TISS EACH ADDL: CPT | Performed by: PODIATRIST

## 2022-11-01 PROCEDURE — 97597 DBRDMT OPN WND 1ST 20 CM/<: CPT | Performed by: PODIATRIST

## 2022-11-01 PROCEDURE — 11042 DBRDMT SUBQ TIS 1ST 20SQCM/<: CPT | Performed by: PODIATRIST

## 2022-11-01 RX ORDER — PROCHLORPERAZINE 25 MG/1
SUPPOSITORY RECTAL
COMMUNITY
Start: 2022-10-31

## 2022-11-01 ASSESSMENT — PAIN SCALES - GENERAL: PAINLEVEL: NO PAIN (0)

## 2022-11-01 NOTE — PROGRESS NOTES
FOOT AND ANKLE SURGERY/PODIATRY Progress Note      ASSESSMENT:   Diabetic Ulceration right foot  Diabetic Ulceration 3rd digit left foot       TREATMENT:  -I discussed with the patient that there is considerable fibrotic tissue on both feet with slough. I was able to remove non-viable tissue in clinic today, but discussed that surgical debridement with possible use of wound vac may be necessary.     -We will begin use of santyl at this time. Continue non-weight bearing right foot with crutches.     -Recommend better blood sugar control.     -After discussion of risk factors and consent obtained 2% Lidocaine HCL jelly was applied, under clean conditions, the right and foot ulceration(s) were debrided using #15 blade scalpel.  Devitalized and nonviable tissue, along with any fibrin and slough, was removed to improve granulation tissue formation, stimulate wound healing, decrease overall bacteria load, disrupt biofilm formation and decrease edge senescence. Wound drainage was scant Serosanguinous. Total excisional debridement was 20.4 sq cm into the subcutaneous tissue with a depth of 0.3 cm.   Ulcers were improved afterwards and .  Measures were as noted on the flow sheet. Santyl to be applied daily with a gauze dressing.     -After discussion of risk factors and consent obtained 2% Lidocaine HCL jelly was applied, under clean conditions, the left and foot ulceration(s) were debrided using #15 blade scalpel.  Devitalized and nonviable tissue, along with any fibrin and slough, was removed to improve granulation tissue formation, stimulate wound healing, decrease overall bacteria load, disrupt biofilm formation and decrease edge senescence. Wound drainage was scant No. Total excisional debridement was 1.04 sq cm from the epidermis/dermis area with a depth of 0.1 cm.   Ulcers were improved afterwards and .  Measures were as noted on the flow sheet. A gauze dressing was applied. Santyl to be applied daily  with a gauze dressing.    -She will follow-up in 3 weeks.    ADILENE Alexander [facility] [place]      HPI: Josefa Curiel was seen again today for bilateral foot ulcers. Recent MRI obtained during her hospitalization was negative for osteomyelitis on the right foot.       Past Medical History:   Diagnosis Date     LORIN (acute kidney injury) (H)      Anxiety      Cannabis abuse      Depression      Diabetes Type 1, uncontrolled 1985    Onset age 10     DKA (diabetic ketoacidoses)      ETOH abuse      HLD (hyperlipidemia)      HTN (hypertension)      Lactic acidosis        Past Surgical History:   Procedure Laterality Date     ANKLE FRACTURE SURGERY Left      APPENDECTOMY       PICC SINGLE LUMEN PLACEMENT  10/23/2022            Allergies   Allergen Reactions     Colestipol GI Disturbance     Nickel Rash     Penicillins Rash         Current Outpatient Medications:      amLODIPine (NORVASC) 2.5 MG tablet, Take 1 tablet (2.5 mg) by mouth At Bedtime, Disp: 30 tablet, Rfl: 0     ceFAZolin (ANCEF) 1 GM vial, Inject 1 g into the vein every 12 hours for 24 days, Disp: , Rfl:      cloNIDine (CATAPRES) 0.1 MG tablet, Take 2 tablets (0.2 mg) by mouth 2 times daily, Disp: 120 tablet, Rfl: 1     collagenase (SANTYL) 250 UNIT/GM external ointment, Apply topically daily Total square centimeters of wound(s) being treated is         30 day supply, Disp: 30 g, Rfl: 3     Continuous Blood Gluc Sensor (DEXCOM G6 SENSOR) MISC, , Disp: , Rfl:      DULoxetine (CYMBALTA) 60 MG capsule, [DULOXETINE (CYMBALTA) 30 MG CAPSULE] Take 60 mg by mouth every evening. , Disp: , Rfl:      famotidine (PEPCID) 20 MG tablet, Take 1 tablet (20 mg) by mouth 2 times daily as needed (heartburn) For 1 week then 1 twice a day as needed for heartburn, Disp: , Rfl:      insulin degludec (TRESIBA) 100 UNIT/ML pen, Inject 35 units under the skin in the morning., Disp: , Rfl:      insulin glargine (LANTUS PEN) 100 UNIT/ML pen, Inject 20 Units  Subcutaneous At Bedtime, Disp: , Rfl:      insulin lispro (HUMALOG KWIKPEN) 100 UNIT/ML (1 unit dial) KWIKPEN, 1 unit per 15 gm Carbs, hold if glucose < 80 (Patient taking differently: Inject 1 unit for every 6g carbs for breakfast, 1 unit for every 4g carbs for lunch, & 1 unit for every 3g carbs for dinner plus 1 unit for every 70 blood sugar above 130. Max 50 units per day), Disp: 15 mL, Rfl: 0     rosuvastatin (CRESTOR) 40 MG tablet, Take 40 mg by mouth daily, Disp: , Rfl:      traZODone (DESYREL) 100 MG tablet, Take 25 mg by mouth nightly as needed for sleep, Disp: , Rfl:     Review of Systems - 10 point Review of Systems is negative except for foot ulcers which is noted in HPI.      OBJECTIVE:  BP (!) 178/96   Pulse 88   Resp 12   Wt 130 lb (59 kg)   LMP 10/05/2022   SpO2 99%   BMI 20.36 kg/m    General appearance: Patient is alert and fully cooperative with history & exam.  No sign of distress is noted during the visit.    Vascular: Dorsalis pedis palpableBilateral.  Dermatologic:    Wound Foot Other (comment) NA (Active)   Wound Bed Edema;Erythema, non-blanchable, skin intact 10/22/22 1415   Celina-wound Assessment Other (Comment) 10/24/22 0300   Estimated Circumference ( if not measured golf sized 10/19/22 1735   Drainage Amount UTV 10/21/22 1742   Drainage Color/Characteristics UTV 10/21/22 1742   Wound Care/Cleansing Wound cleanser 10/24/22 1100   Dressing Foam 10/24/22 1100   Dressing Status Clean, dry, intact;New dressing;Changed 10/24/22 1100   Dressing Change Due 10/23/22 10/22/22 1415       Wound Toe (Comment  which one) Ulceration (Active)   Wound Bed Other (Comment) 10/23/22 0300   Celina-wound Assessment Other (Comment) 10/23/22 0300   Estimated Circumference ( if not measured quarter sized 10/19/22 1737   Drainage Amount UTV 10/23/22 0300   Drainage Color/Characteristics UTV 10/23/22 0300   Dressing Open to air 10/21/22 0815   Dressing Status Clean, dry, intact 10/23/22 0820       VASC Wound  Right plantar foot (Active)   Pre Size Length 6.8 11/01/22 1400   Pre Size Width 3 11/01/22 1400   Pre Size Depth 0.3 11/01/22 1400   Pre Total Sq cm 20.4 11/01/22 1400       VASC Wound left 3rd toe (Active)       VASC Wound left 3rd toe (Active)   Post Size Length 1.3 11/01/22 1400   Post Size Width 0.8 11/01/22 1400   Post Size Depth 0.1 11/01/22 1400   Fibrotic tissue with slough plantar right foot ulcer and distal ulcer 3rd digit left foot, no erythema bilateral. Stable eschar distal left hallux and 5th digit right foot.   Neurologic: Diminished to light touch Bilateral.  Musculoskeletal: Contracted digits noted Bilateral.    Imaging:     MR Foot Right w/o Contrast    Result Date: 10/19/2022  EXAM: MR FOOT RIGHT W/O CONTRAST LOCATION: [facility] DATE/TIME: 10/19/2022 12:03 PM INDICATION: Sepsis, wound, ?osteo. COMPARISON: None. TECHNIQUE: Unenhanced. FINDINGS: JOINTS AND BONES: -No evidence for fracture or marrow signal abnormality. No evidence for osteomyelitis. No significant effusion to suggest a septic arthropathy. Hammertoe deformities. TENDONS: -The flexor and extensor tendons are negative for tendinopathy, tendosynovitis, or tearing. The hallucis tendons are negative. LIGAMENTS: -The ligament of Lisfranc is intact. The collateral ligaments at the MTP joints are all intact. MUSCLES AND SOFT TISSUES: -There is edema or cellulitis along the dorsal aspect of the foot. There are some bullous/blistering lesions involving the 3rd and 5th toes which are minimal to direct visual inspection. Fatty infiltration and atrophy of the plantar and interosseous musculature.     IMPRESSION: 1.  No evidence for fracture. 2.  No evidence for osteomyelitis, septic arthropathy, or abscess. 3.  Bullous/blistering lesions involving the 3rd and 5th toes. These would be amenable to direct visual inspection. 4.  Edema or cellulitis along the dorsal aspect of the foot extending into the toes. 5.  Fatty  infiltration or atrophy of the plantar and interosseous musculature. 6.  Hammertoe deformities.     Echocardiogram MARAH    Result Date: 10/21/2022  [id number] [facility] [id number]^ZULEMA^JOSHUA^[place] [address]  Name: DONITA FREITAS MRN: [id number] : 1975 Study Date: 10/21/2022 09:54 AM Age: 47 yrs Gender: Female Patient Location: [facility] [address] Reason For Study: Murmur Ordering Physician: JOSHUA POOLE Performed By: MAHESH/ROSIE  BSA: 1.7 m2 Height: 67 in Weight: 130 lb HR: 73 ______________________________________________________________________________ Procedure Complete MARAH Adult. Good quality two-dimensional was performed and interpreted. Good quality color and spectral Doppler were performed and interpreted. ______________________________________________________________________________ Interpretation Summary  Left ventricular size, wall motion and function are normal. The ejection fraction is 60-65%. Normal right ventricle size and systolic function. No vegetations present. No hemodynamically significant valvular abnormalities on 2D or color flow imaging. ______________________________________________________________________________ MARAH I determined this patient to be an appropriate candidate for the planned sedation and procedure and have reassessed the patient immediately prior to sedation and procedure. Total sedation time: 18 mins minutes of continuous bedside 1:1 monitoring. Versed (2mg) was given intravenously. Fentanyl (50mcg) was given intravenously. 4 sprays benzocaine 15 mL viscous lidocaine. Consent to the procedure was obtained prior to sedation. There were no complications associated with this procedure. The Transducer was inserted without difficulty . The procedure was performed in the Echo Lab.  Left Ventricle Left ventricular size, wall motion and function are normal. The ejection fraction is 60-65%.  Right Ventricle Normal right ventricle size and  systolic function.  Atria Normal left atrial size. Right atrial size is normal. There is no color Doppler evidence of an atrial shunt. No thrombus is detected in the left atrial appendage.  Mitral Valve Mitral valve leaflets appear normal. There is no evidence of mitral stenosis or clinically significant mitral regurgitation.  Tricuspid Valve The tricuspid valve is normal in structure and function. This degree of valvular regurgitation is within normal limits. There is no tricuspid stenosis.  Aortic Valve The aortic valve is trileaflet. Lambl's excescences noted. No aortic regurgitation is present. No aortic stenosis is present.  Pulmonic Valve The pulmonic valve is not well seen, but is grossly normal. There is trace pulmonic valvular regurgitation.  Vessels The aortic root is normal size. Normal size ascending aorta. The inferior vena cava is normal.  Pericardial/Pleural There is no pericardial effusion. ______________________________________________________________________________ Report approved by: lEi Nicole 10/21/2022 11:07 AM  ______________________________________________________________________________           Picture:

## 2022-11-01 NOTE — PATIENT INSTRUCTIONS
Important lnstructions      WEIGHT BEARING STATUS: You are to remain NON WEIGHT BEARING on your right foot. NON WEIGHT BEARING MEANS NO PRESSURE ON YOUR FOOT OR HEEL AT ANY TIME FOR ANY REASON!    2. OFFLOADING DEVICE: Must use a CRUTCHES at all times! (do not use affected foot to push wheelchair)    3. STABILIZATION DEVICE: Use a CAM BOOT . You will need to WEAR THIS ANYTIME YOU ARE UP AND OUT OF BED, IT IS OKAY TO REMOVE WHEN YOU ARE SLEEPING..       4. ELEVATE: Elevating your leg means laying with your head on a pillow and your foot ABOVE YOUR WAIST.     5. DO NOT MOVE YOUR FOOT.  There is a risk of worsening the wound or incision. To give yourself a higher chance of healing, please DO NOT swing foot back and forth and wiggle foot/toes especially when inside a stabilization device.        Dressing Change lnstructions        Dressing change daily with Santyl    How to Use Collagenase  SANTYL  Ointment    DAILY CLEANSE  Gently cleanse the wound with sterile saline    APPLY  Apply SANTYL  Ointment at the thickness of a nickel or the thickness of the cream inside an [employer]    COVER  Cover the wound with a clean moistened gauze followed by dry gauze if wound bed is dry. Wounds with sufficient exudate will naturally activate the collagenase enzyme and do not need a moistened gauze applied. Moisture helps release the active ingredient in SANTYL  to make the ointment most effective. Without the right amount of moisture, SANTYL  Ointment may not work properly. May apply adaptic touch over Santyl ointment to hold into place. Secure with roll gauze. Do not use dressings that contain silver or iodine with SANTYL  Ointment, as they may stop SANTYL  Ointment       It IS NOT ok to get your wound wet in the bath or shower    SEEK MEDICAL CARE IF:  You have an increase in swelling, pain, or redness around the wound.  You have an increase in the amount of pus coming from the wound.  There is a bad smell coming from the  wound.  The wound appears to be worsening/enlarging  You have a fever greater than 101.5 F      It is ok to continue current wound care treatment/products for the next 2-3 days until new wound care supplies are ordered and arrive. If longer than this please contact our office at [phone].        We want to hear from you!   In the next few weeks, you should receive a call or email to complete a survey about your visit at [facility]. Please help us improve your appointment experience by letting us know how we did today. We strive to make your experience good and value any ways in which we could do better.      We value your input and suggestions.    Thank you for choosing the [facility]!

## 2022-11-02 ENCOUNTER — TELEPHONE (OUTPATIENT)
Dept: VASCULAR SURGERY | Facility: CLINIC | Age: 47
End: 2022-11-02

## 2022-11-02 NOTE — TELEPHONE ENCOUNTER
Spoke with [facility].  Verified wound measurements and gave verbal ok for amount to be dispensed.

## 2022-11-02 NOTE — TELEPHONE ENCOUNTER
Rita calling from [facility] pharmacy asking for a call back with the wound measurments for this patient.      She states you can speak to whoever answers the phone. Of note,  [facility] has this patient's last name spelt with 2 m's (Angel).     [phone]

## 2022-11-03 ENCOUNTER — MEDICAL CORRESPONDENCE (OUTPATIENT)
Dept: HEALTH INFORMATION MANAGEMENT | Facility: CLINIC | Age: 47
End: 2022-11-03

## 2022-11-03 ENCOUNTER — LAB REQUISITION (OUTPATIENT)
Dept: LAB | Facility: CLINIC | Age: 47
End: 2022-11-03
Payer: COMMERCIAL

## 2022-11-03 DIAGNOSIS — R78.81 BACTEREMIA: ICD-10-CM

## 2022-11-03 LAB
ANION GAP SERPL CALCULATED.3IONS-SCNC: 9 MMOL/L (ref 3–14)
BASOPHILS # BLD AUTO: 0.1 10E3/UL (ref 0–0.2)
BASOPHILS NFR BLD AUTO: 1 %
BUN SERPL-MCNC: 37 MG/DL (ref 7–30)
CALCIUM SERPL-MCNC: 8.5 MG/DL (ref 8.5–10.1)
CHLORIDE BLD-SCNC: 103 MMOL/L (ref 94–109)
CO2 SERPL-SCNC: 25 MMOL/L (ref 20–32)
CREAT SERPL-MCNC: 3.25 MG/DL (ref 0.52–1.04)
CRP SERPL-MCNC: 3.9 MG/L (ref 0–8)
EOSINOPHIL # BLD AUTO: 0.2 10E3/UL (ref 0–0.7)
EOSINOPHIL NFR BLD AUTO: 2 %
ERYTHROCYTE [DISTWIDTH] IN BLOOD BY AUTOMATED COUNT: 13.3 % (ref 10–15)
ERYTHROCYTE [SEDIMENTATION RATE] IN BLOOD BY WESTERGREN METHOD: 88 MM/HR (ref 0–20)
GFR SERPL CREATININE-BSD FRML MDRD: 17 ML/MIN/1.73M2
GLUCOSE BLD-MCNC: 79 MG/DL (ref 70–99)
HCT VFR BLD AUTO: 27.2 % (ref 35–47)
HGB BLD-MCNC: 8.9 G/DL (ref 11.7–15.7)
IMM GRANULOCYTES # BLD: 0 10E3/UL
IMM GRANULOCYTES NFR BLD: 0 %
LYMPHOCYTES # BLD AUTO: 1.4 10E3/UL (ref 0.8–5.3)
LYMPHOCYTES NFR BLD AUTO: 13 %
MCH RBC QN AUTO: 28.5 PG (ref 26.5–33)
MCHC RBC AUTO-ENTMCNC: 32.7 G/DL (ref 31.5–36.5)
MCV RBC AUTO: 87 FL (ref 78–100)
MONOCYTES # BLD AUTO: 0.5 10E3/UL (ref 0–1.3)
MONOCYTES NFR BLD AUTO: 5 %
NEUTROPHILS # BLD AUTO: 8.5 10E3/UL (ref 1.6–8.3)
NEUTROPHILS NFR BLD AUTO: 79 %
NRBC # BLD AUTO: 0 10E3/UL
NRBC BLD AUTO-RTO: 0 /100
PLATELET # BLD AUTO: 443 10E3/UL (ref 150–450)
POTASSIUM BLD-SCNC: 4.2 MMOL/L (ref 3.4–5.3)
RBC # BLD AUTO: 3.12 10E6/UL (ref 3.8–5.2)
SODIUM SERPL-SCNC: 137 MMOL/L (ref 133–144)
WBC # BLD AUTO: 10.8 10E3/UL (ref 4–11)

## 2022-11-03 PROCEDURE — 85025 COMPLETE CBC W/AUTO DIFF WBC: CPT | Performed by: INTERNAL MEDICINE

## 2022-11-03 PROCEDURE — 86140 C-REACTIVE PROTEIN: CPT | Performed by: INTERNAL MEDICINE

## 2022-11-03 PROCEDURE — 85652 RBC SED RATE AUTOMATED: CPT | Performed by: INTERNAL MEDICINE

## 2022-11-03 PROCEDURE — 80048 BASIC METABOLIC PNL TOTAL CA: CPT | Performed by: INTERNAL MEDICINE

## 2022-11-07 ENCOUNTER — TELEPHONE (OUTPATIENT)
Dept: INFECTIOUS DISEASES | Facility: CLINIC | Age: 47
End: 2022-11-07

## 2022-11-07 DIAGNOSIS — R79.89 ELEVATED SERUM CREATININE: Primary | ICD-10-CM

## 2022-11-08 ENCOUNTER — LAB (OUTPATIENT)
Dept: LAB | Facility: CLINIC | Age: 47
End: 2022-11-08
Payer: COMMERCIAL

## 2022-11-08 DIAGNOSIS — N18.30 CHRONIC RENAL IMPAIRMENT, STAGE 3 (MODERATE), UNSPECIFIED WHETHER STAGE 3A OR 3B CKD (H): ICD-10-CM

## 2022-11-08 DIAGNOSIS — R79.89 ELEVATED SERUM CREATININE: ICD-10-CM

## 2022-11-08 LAB
ALBUMIN MFR UR ELPH: >600 MG/DL
ALBUMIN SERPL BCG-MCNC: 3.5 G/DL (ref 3.5–5.2)
ALBUMIN UR-MCNC: >=300 MG/DL
ANION GAP SERPL CALCULATED.3IONS-SCNC: 16 MMOL/L (ref 7–15)
APPEARANCE UR: CLEAR
BACTERIA #/AREA URNS HPF: ABNORMAL /HPF
BILIRUB UR QL STRIP: NEGATIVE
BUN SERPL-MCNC: 34.4 MG/DL (ref 6–20)
CALCIUM SERPL-MCNC: 8.5 MG/DL (ref 8.6–10)
CHLORIDE SERPL-SCNC: 98 MMOL/L (ref 98–107)
COLOR UR AUTO: YELLOW
CREAT SERPL-MCNC: 3.54 MG/DL (ref 0.51–0.95)
CREAT UR-MCNC: 69.7 MG/DL
DEPRECATED HCO3 PLAS-SCNC: 22 MMOL/L (ref 22–29)
ERYTHROCYTE [DISTWIDTH] IN BLOOD BY AUTOMATED COUNT: 13.6 % (ref 10–15)
GFR SERPL CREATININE-BSD FRML MDRD: 15 ML/MIN/1.73M2
GLUCOSE SERPL-MCNC: 82 MG/DL (ref 70–99)
GLUCOSE UR STRIP-MCNC: 100 MG/DL
HCT VFR BLD AUTO: 28.2 % (ref 35–47)
HGB BLD-MCNC: 9.4 G/DL (ref 11.7–15.7)
HGB UR QL STRIP: ABNORMAL
HYALINE CASTS #/AREA URNS LPF: ABNORMAL /LPF
IRON BINDING CAPACITY (ROCHE): 239 UG/DL (ref 240–430)
IRON SATN MFR SERPL: 18 % (ref 15–46)
IRON SERPL-MCNC: 44 UG/DL (ref 37–145)
KETONES UR STRIP-MCNC: NEGATIVE MG/DL
LEUKOCYTE ESTERASE UR QL STRIP: NEGATIVE
MCH RBC QN AUTO: 29.6 PG (ref 26.5–33)
MCHC RBC AUTO-ENTMCNC: 33.3 G/DL (ref 31.5–36.5)
MCV RBC AUTO: 89 FL (ref 78–100)
MUCOUS THREADS #/AREA URNS LPF: PRESENT /LPF
NITRATE UR QL: NEGATIVE
PH UR STRIP: 7 [PH] (ref 5–8)
PHOSPHATE SERPL-MCNC: 4.9 MG/DL (ref 2.5–4.5)
PLATELET # BLD AUTO: 338 10E3/UL (ref 150–450)
POTASSIUM SERPL-SCNC: 4.1 MMOL/L (ref 3.4–5.3)
PROT/CREAT 24H UR: NORMAL MG/G{CREAT}
PTH-INTACT SERPL-MCNC: 220 PG/ML (ref 15–65)
RBC # BLD AUTO: 3.18 10E6/UL (ref 3.8–5.2)
RBC #/AREA URNS AUTO: ABNORMAL /HPF
SODIUM SERPL-SCNC: 136 MMOL/L (ref 136–145)
SP GR UR STRIP: 1.02 (ref 1–1.03)
SQUAMOUS #/AREA URNS AUTO: ABNORMAL /LPF
UROBILINOGEN UR STRIP-ACNC: 0.2 E.U./DL
WBC # BLD AUTO: 6.2 10E3/UL (ref 4–11)
WBC #/AREA URNS AUTO: ABNORMAL /HPF

## 2022-11-08 PROCEDURE — 36415 COLL VENOUS BLD VENIPUNCTURE: CPT

## 2022-11-08 PROCEDURE — 85027 COMPLETE CBC AUTOMATED: CPT

## 2022-11-08 PROCEDURE — 81001 URINALYSIS AUTO W/SCOPE: CPT

## 2022-11-08 PROCEDURE — 80069 RENAL FUNCTION PANEL: CPT

## 2022-11-08 PROCEDURE — 84156 ASSAY OF PROTEIN URINE: CPT

## 2022-11-08 PROCEDURE — 82306 VITAMIN D 25 HYDROXY: CPT

## 2022-11-08 PROCEDURE — 83550 IRON BINDING TEST: CPT

## 2022-11-08 PROCEDURE — 82728 ASSAY OF FERRITIN: CPT

## 2022-11-08 PROCEDURE — 83540 ASSAY OF IRON: CPT

## 2022-11-08 PROCEDURE — 83970 ASSAY OF PARATHORMONE: CPT

## 2022-11-09 LAB
DEPRECATED CALCIDIOL+CALCIFEROL SERPL-MC: 10 UG/L (ref 20–75)
FERRITIN SERPL-MCNC: 55 NG/ML (ref 6–175)

## 2022-11-10 ENCOUNTER — MYC MEDICAL ADVICE (OUTPATIENT)
Dept: ENDOCRINOLOGY | Facility: CLINIC | Age: 47
End: 2022-11-10

## 2022-11-10 ENCOUNTER — MYC MEDICAL ADVICE (OUTPATIENT)
Dept: NEPHROLOGY | Facility: CLINIC | Age: 47
End: 2022-11-10

## 2022-11-10 ENCOUNTER — OFFICE VISIT (OUTPATIENT)
Dept: INFECTIOUS DISEASES | Facility: CLINIC | Age: 47
End: 2022-11-10
Payer: COMMERCIAL

## 2022-11-10 ENCOUNTER — LAB REQUISITION (OUTPATIENT)
Dept: LAB | Facility: CLINIC | Age: 47
End: 2022-11-10
Payer: COMMERCIAL

## 2022-11-10 VITALS
HEART RATE: 109 BPM | TEMPERATURE: 98 F | SYSTOLIC BLOOD PRESSURE: 170 MMHG | DIASTOLIC BLOOD PRESSURE: 90 MMHG | OXYGEN SATURATION: 99 %

## 2022-11-10 DIAGNOSIS — R78.81 BACTEREMIA: ICD-10-CM

## 2022-11-10 DIAGNOSIS — N18.30 CHRONIC RENAL IMPAIRMENT, STAGE 3 (MODERATE), UNSPECIFIED WHETHER STAGE 3A OR 3B CKD (H): Primary | ICD-10-CM

## 2022-11-10 DIAGNOSIS — A41.9 SEPSIS WITH ENCEPHALOPATHY WITHOUT SEPTIC SHOCK, DUE TO UNSPECIFIED ORGANISM (H): Primary | ICD-10-CM

## 2022-11-10 DIAGNOSIS — R65.20 SEPSIS WITH ENCEPHALOPATHY WITHOUT SEPTIC SHOCK, DUE TO UNSPECIFIED ORGANISM (H): Primary | ICD-10-CM

## 2022-11-10 DIAGNOSIS — G93.41 SEPSIS WITH ENCEPHALOPATHY WITHOUT SEPTIC SHOCK, DUE TO UNSPECIFIED ORGANISM (H): Primary | ICD-10-CM

## 2022-11-10 LAB
ANION GAP SERPL CALCULATED.3IONS-SCNC: 13 MMOL/L (ref 7–15)
BASOPHILS # BLD AUTO: 0.1 10E3/UL (ref 0–0.2)
BASOPHILS NFR BLD AUTO: 1 %
BUN SERPL-MCNC: 36.3 MG/DL (ref 6–20)
CALCIUM SERPL-MCNC: 8.5 MG/DL (ref 8.6–10)
CHLORIDE SERPL-SCNC: 99 MMOL/L (ref 98–107)
CREAT SERPL-MCNC: 3.53 MG/DL (ref 0.51–0.95)
CRP SERPL-MCNC: <3 MG/L
DEPRECATED HCO3 PLAS-SCNC: 22 MMOL/L (ref 22–29)
EOSINOPHIL # BLD AUTO: 0.1 10E3/UL (ref 0–0.7)
EOSINOPHIL NFR BLD AUTO: 2 %
ERYTHROCYTE [DISTWIDTH] IN BLOOD BY AUTOMATED COUNT: 13.8 % (ref 10–15)
ERYTHROCYTE [SEDIMENTATION RATE] IN BLOOD BY WESTERGREN METHOD: 65 MM/HR (ref 0–20)
GFR SERPL CREATININE-BSD FRML MDRD: 16 ML/MIN/1.73M2
GLUCOSE SERPL-MCNC: 103 MG/DL (ref 70–99)
HCT VFR BLD AUTO: 28.4 % (ref 35–47)
HGB BLD-MCNC: 9.5 G/DL (ref 11.7–15.7)
IMM GRANULOCYTES # BLD: 0 10E3/UL
IMM GRANULOCYTES NFR BLD: 0 %
LYMPHOCYTES # BLD AUTO: 1 10E3/UL (ref 0.8–5.3)
LYMPHOCYTES NFR BLD AUTO: 15 %
MCH RBC QN AUTO: 29 PG (ref 26.5–33)
MCHC RBC AUTO-ENTMCNC: 33.5 G/DL (ref 31.5–36.5)
MCV RBC AUTO: 87 FL (ref 78–100)
MONOCYTES # BLD AUTO: 0.3 10E3/UL (ref 0–1.3)
MONOCYTES NFR BLD AUTO: 5 %
NEUTROPHILS # BLD AUTO: 5.1 10E3/UL (ref 1.6–8.3)
NEUTROPHILS NFR BLD AUTO: 77 %
NRBC # BLD AUTO: 0 10E3/UL
NRBC BLD AUTO-RTO: 0 /100
PLATELET # BLD AUTO: 355 10E3/UL (ref 150–450)
POTASSIUM SERPL-SCNC: 4.3 MMOL/L (ref 3.4–5.3)
RBC # BLD AUTO: 3.28 10E6/UL (ref 3.8–5.2)
SODIUM SERPL-SCNC: 134 MMOL/L (ref 136–145)
WBC # BLD AUTO: 6.6 10E3/UL (ref 4–11)

## 2022-11-10 PROCEDURE — 99213 OFFICE O/P EST LOW 20 MIN: CPT | Performed by: INTERNAL MEDICINE

## 2022-11-10 PROCEDURE — 80048 BASIC METABOLIC PNL TOTAL CA: CPT | Performed by: INTERNAL MEDICINE

## 2022-11-10 PROCEDURE — 86140 C-REACTIVE PROTEIN: CPT | Performed by: INTERNAL MEDICINE

## 2022-11-10 PROCEDURE — 85652 RBC SED RATE AUTOMATED: CPT | Performed by: INTERNAL MEDICINE

## 2022-11-10 PROCEDURE — 85025 COMPLETE CBC W/AUTO DIFF WBC: CPT | Performed by: INTERNAL MEDICINE

## 2022-11-10 RX ORDER — DOXYCYCLINE 100 MG/1
100 CAPSULE ORAL 2 TIMES DAILY
Qty: 20 CAPSULE | Refills: 0 | Status: ON HOLD | OUTPATIENT
Start: 2022-11-10 | End: 2022-11-28

## 2022-11-10 NOTE — PROGRESS NOTES
Pt here for Follow-up.   Her creatinine is rising.   She in on ancef and due for about 9 more days.     She smokes but does not currently use alcohol.    She has DM1 and a continuous glucose monitor.    Foot is in cam boot, see podiatry note for recent visit.           ASSESSMENT:  1. Bullous cellulitis of right foot with MSSA bacteremia.   Presenting with nausea, vomiting, noted to have temp to 103, elevated lactate. MRI not showing bone or joint infection. Low suspicion for necrotizing fasciitis clinically on exam. With lesion on left toe as well, question whether these skin areas could be embolic now that she is found to have bacteremia, however MARAH not showing endocarditis. Improved.    2. DM type 1  3. CKD. 4. Penicillin allergy as a child. Tolerated a dose of ceftriaxone here. Recalls she has tolerated amoxicillin since. Discussed use of cefazolin, should be safe.   5. Recently quit alcohol use.      Elevated creatinine:  Component      Latest Ref Rng & Units 10/21/2022 10/22/2022 10/23/2022 10/24/2022                Creatinine      0.51 - 0.95 mg/dL 2.46 (H) 2.45 (H) 2.47 (H) 2.65 (H)     Component      Latest Ref Rng & Units 10/27/2022 11/3/2022 11/8/2022 11/10/2022                Creatinine      0.51 - 0.95 mg/dL 2.84 (H) 3.25 (H) 3.54 (H) 3.53 (H)          PLAN:  Surveillance blood cultures--none + since oct 19  I am too worried about ancef driving renal failure, enough to stop the medication  I pulled picc line, the os bled a ton in the office, I put coban and gauze on it to tampon the bleed  Do ten final days PO doxy which should be safe for kidneys    Pt understands  Leave pressure bandage on for 24 hours

## 2022-11-15 ENCOUNTER — APPOINTMENT (OUTPATIENT)
Dept: RADIOLOGY | Facility: HOSPITAL | Age: 47
DRG: 623 | End: 2022-11-15
Attending: EMERGENCY MEDICINE
Payer: COMMERCIAL

## 2022-11-15 ENCOUNTER — HOSPITAL ENCOUNTER (EMERGENCY)
Facility: HOSPITAL | Age: 47
Discharge: HOME | DRG: 623 | End: 2022-11-15
Attending: EMERGENCY MEDICINE | Admitting: EMERGENCY MEDICINE
Payer: COMMERCIAL

## 2022-11-15 VITALS
HEART RATE: 107 BPM | HEIGHT: 67 IN | OXYGEN SATURATION: 96 % | TEMPERATURE: 97.6 F | SYSTOLIC BLOOD PRESSURE: 167 MMHG | BODY MASS INDEX: 20.4 KG/M2 | RESPIRATION RATE: 16 BRPM | WEIGHT: 130 LBS | DIASTOLIC BLOOD PRESSURE: 87 MMHG

## 2022-11-15 DIAGNOSIS — R11.2 NAUSEA AND VOMITING, UNSPECIFIED VOMITING TYPE: ICD-10-CM

## 2022-11-15 LAB
ANION GAP SERPL CALCULATED.3IONS-SCNC: 17 MMOL/L (ref 7–15)
BASOPHILS # BLD AUTO: 0.1 10E3/UL (ref 0–0.2)
BASOPHILS NFR BLD AUTO: 1 %
BUN SERPL-MCNC: 35.5 MG/DL (ref 6–20)
CALCIUM SERPL-MCNC: 9.4 MG/DL (ref 8.6–10)
CHLORIDE SERPL-SCNC: 97 MMOL/L (ref 98–107)
CREAT SERPL-MCNC: 3.98 MG/DL (ref 0.51–0.95)
CRP SERPL-MCNC: <3 MG/L
DEPRECATED HCO3 PLAS-SCNC: 24 MMOL/L (ref 22–29)
EOSINOPHIL # BLD AUTO: 0 10E3/UL (ref 0–0.7)
EOSINOPHIL NFR BLD AUTO: 0 %
ERYTHROCYTE [DISTWIDTH] IN BLOOD BY AUTOMATED COUNT: 13.5 % (ref 10–15)
ERYTHROCYTE [SEDIMENTATION RATE] IN BLOOD BY WESTERGREN METHOD: 50 MM/HR (ref 0–20)
GFR SERPL CREATININE-BSD FRML MDRD: 13 ML/MIN/1.73M2
GLUCOSE SERPL-MCNC: 243 MG/DL (ref 70–99)
HCT VFR BLD AUTO: 32.5 % (ref 35–47)
HGB BLD-MCNC: 11 G/DL (ref 11.7–15.7)
HOLD SPECIMEN: NORMAL
IMM GRANULOCYTES # BLD: 0.1 10E3/UL
IMM GRANULOCYTES NFR BLD: 1 %
LACTATE SERPL-SCNC: 1.6 MMOL/L (ref 0.7–2)
LYMPHOCYTES # BLD AUTO: 0.7 10E3/UL (ref 0.8–5.3)
LYMPHOCYTES NFR BLD AUTO: 7 %
MCH RBC QN AUTO: 29.2 PG (ref 26.5–33)
MCHC RBC AUTO-ENTMCNC: 33.8 G/DL (ref 31.5–36.5)
MCV RBC AUTO: 86 FL (ref 78–100)
MONOCYTES # BLD AUTO: 0.2 10E3/UL (ref 0–1.3)
MONOCYTES NFR BLD AUTO: 2 %
NEUTROPHILS # BLD AUTO: 9.7 10E3/UL (ref 1.6–8.3)
NEUTROPHILS NFR BLD AUTO: 89 %
NRBC # BLD AUTO: 0 10E3/UL
NRBC BLD AUTO-RTO: 0 /100
PLATELET # BLD AUTO: 436 10E3/UL (ref 150–450)
POTASSIUM SERPL-SCNC: 4.1 MMOL/L (ref 3.4–5.3)
RBC # BLD AUTO: 3.77 10E6/UL (ref 3.8–5.2)
SARS-COV-2 RNA RESP QL NAA+PROBE: NEGATIVE
SODIUM SERPL-SCNC: 138 MMOL/L (ref 136–145)
WBC # BLD AUTO: 10.7 10E3/UL (ref 4–11)

## 2022-11-15 PROCEDURE — 85025 COMPLETE CBC W/AUTO DIFF WBC: CPT | Performed by: EMERGENCY MEDICINE

## 2022-11-15 PROCEDURE — U0003 INFECTIOUS AGENT DETECTION BY NUCLEIC ACID (DNA OR RNA); SEVERE ACUTE RESPIRATORY SYNDROME CORONAVIRUS 2 (SARS-COV-2) (CORONAVIRUS DISEASE [COVID-19]), AMPLIFIED PROBE TECHNIQUE, MAKING USE OF HIGH THROUGHPUT TECHNOLOGIES AS DESCRIBED BY CMS-2020-01-R: HCPCS | Performed by: EMERGENCY MEDICINE

## 2022-11-15 PROCEDURE — 36415 COLL VENOUS BLD VENIPUNCTURE: CPT | Performed by: EMERGENCY MEDICINE

## 2022-11-15 PROCEDURE — 87040 BLOOD CULTURE FOR BACTERIA: CPT | Performed by: EMERGENCY MEDICINE

## 2022-11-15 PROCEDURE — C9803 HOPD COVID-19 SPEC COLLECT: HCPCS

## 2022-11-15 PROCEDURE — 83605 ASSAY OF LACTIC ACID: CPT | Performed by: EMERGENCY MEDICINE

## 2022-11-15 PROCEDURE — 85652 RBC SED RATE AUTOMATED: CPT | Performed by: EMERGENCY MEDICINE

## 2022-11-15 PROCEDURE — 86140 C-REACTIVE PROTEIN: CPT | Performed by: EMERGENCY MEDICINE

## 2022-11-15 PROCEDURE — 250N000011 HC RX IP 250 OP 636: Performed by: EMERGENCY MEDICINE

## 2022-11-15 PROCEDURE — 258N000003 HC RX IP 258 OP 636: Performed by: EMERGENCY MEDICINE

## 2022-11-15 PROCEDURE — 96361 HYDRATE IV INFUSION ADD-ON: CPT

## 2022-11-15 PROCEDURE — 36415 COLL VENOUS BLD VENIPUNCTURE: CPT | Performed by: STUDENT IN AN ORGANIZED HEALTH CARE EDUCATION/TRAINING PROGRAM

## 2022-11-15 PROCEDURE — 82310 ASSAY OF CALCIUM: CPT | Performed by: EMERGENCY MEDICINE

## 2022-11-15 PROCEDURE — 73630 X-RAY EXAM OF FOOT: CPT | Mod: RT

## 2022-11-15 PROCEDURE — 99285 EMERGENCY DEPT VISIT HI MDM: CPT | Mod: 25

## 2022-11-15 PROCEDURE — 96375 TX/PRO/DX INJ NEW DRUG ADDON: CPT

## 2022-11-15 PROCEDURE — 96374 THER/PROPH/DIAG INJ IV PUSH: CPT

## 2022-11-15 RX ORDER — ONDANSETRON 2 MG/ML
4 INJECTION INTRAMUSCULAR; INTRAVENOUS ONCE
Status: COMPLETED | OUTPATIENT
Start: 2022-11-15 | End: 2022-11-15

## 2022-11-15 RX ORDER — MORPHINE SULFATE 4 MG/ML
4 INJECTION, SOLUTION INTRAMUSCULAR; INTRAVENOUS ONCE
Status: COMPLETED | OUTPATIENT
Start: 2022-11-15 | End: 2022-11-15

## 2022-11-15 RX ORDER — ONDANSETRON 4 MG/1
4 TABLET, ORALLY DISINTEGRATING ORAL EVERY 8 HOURS PRN
Qty: 10 TABLET | Refills: 0 | Status: ON HOLD | OUTPATIENT
Start: 2022-11-15 | End: 2022-11-28

## 2022-11-15 RX ADMIN — MORPHINE SULFATE 4 MG: 4 INJECTION INTRAVENOUS at 14:46

## 2022-11-15 RX ADMIN — ONDANSETRON 4 MG: 2 INJECTION INTRAMUSCULAR; INTRAVENOUS at 14:12

## 2022-11-15 RX ADMIN — SODIUM CHLORIDE 500 ML: 9 INJECTION, SOLUTION INTRAVENOUS at 14:14

## 2022-11-15 ASSESSMENT — ACTIVITIES OF DAILY LIVING (ADL)
ADLS_ACUITY_SCORE: 35
ADLS_ACUITY_SCORE: 35

## 2022-11-15 NOTE — ED PROVIDER NOTES
EMERGENCY DEPARTMENT ENCOUnter      NAME: Josefa Curiel  AGE: 47 year old female  YOB: 1975  MRN: [id number]  EVALUATION DATE & TIME: 11/15/2022  2:22 PM    PCP: Domingo Costello    ED PROVIDER: Jayson Lora DO      Chief Complaint   Patient presents with     Emesis     Foot Problems         FINAL IMPRESSION:  1. Nausea and vomiting, unspecified vomiting type          ED COURSE & MEDICAL DECISION MAKING:      Medical Decision Making    Supplemental history from: N/A    External Record(s) Reviewed: N/A    Differential Diagnosis: See MDM charting for differential considered.     I performed an independent interpretation of the: N/A    Discussed with radiology regarding test interpretation: N/A    Discussion of management with another provider: See chart documentation, if applicable    The following testing was considered but ultimately not selected: None    I considered prescription management with: N/A    The patient's care impacted: None    Consideration of Admission/Observation: N/A - Patient discharged without consideration for admission    Care significantly affected by Social Determinants of Health including: N/A     2:37 PM I met with the patient in HW 7, obtained history, performed an initial exam, and discussed options and plan for diagnostics and treatment here in the ED.       The patient presented to the emergency department today with complaints of vomiting and body aches.  She is also being treated with antibiotics right now for wound infection on her foot.  On exam, she does have some necrotic appearing tissue on her right toe.  X-ray does not reveal evidence of subcutaneous gas or osteomyelitis.  Laboratory testing today in general has been unremarkable and she is feeling better after IV fluids and nausea medication.  At this time I feel that she can be safely discharged home to continue her oral outpatient antibiotics and follow-up with podiatry.  She is comfortable with this  plan.      At the conclusion of the encounter I discussed the results of all of the tests and the disposition. The questions were answered. The patient or family acknowledged understanding and was agreeable with the care plan.       MEDICATIONS GIVEN IN THE EMERGENCY:  Medications   0.9% sodium chloride BOLUS (0 mLs Intravenous Stopped 11/15/22 1644)   ondansetron (ZOFRAN) injection 4 mg (4 mg Intravenous Given 11/15/22 1412)   morphine (PF) injection 4 mg (4 mg Intravenous Given 11/15/22 1446)       NEW PRESCRIPTIONS STARTED AT TODAY'S ER VISIT  Discharge Medication List as of 11/15/2022  4:57 PM      START taking these medications    Details   ondansetron (ZOFRAN ODT) 4 MG ODT tab Take 1 tablet (4 mg) by mouth every 8 hours as needed for nausea, Disp-10 tablet, R-0, E-Prescribe                =================================================================    HPI    Josefa Curiel is a 47 year old female with a pertinent history of foot ulcers, leukocytosis, and sepsis who presents to this ED for evaluation of emesis and foot wound problems    The patient reports that last night she had started vomiting and having chills, body aches, nausea, and fever (subjective). She had recently had her PICC line taken out for her foot infection she had gotten from a wound, and had also switched from IV antibiotics to oral antiobiotics (doxycicline) starting Thursday (11/10). In addition she endorses lower abdominal pain (suprapubic region). She does not identify and palliating or provoking factors. No other complaints at this time.    REVIEW OF SYSTEMS     Constitutional:  Denies chills. Endorses fever (subjective), chills, and body aches.  HENT:  Denies sore throat   Respiratory:  Denies cough or shortness of breath   Cardiovascular:  Denies chest pain or palpitations  GI:  Endorses abdominal pain (lower/suprapubic region), nausea, and vomiting.  Musculoskeletal:  Denies any new extremity pain   Skin:  Denies rash  "  Neurologic:  Denies headache, focal weakness or sensory changes. Endorses   All other systems reviewed and are negative      PAST MEDICAL HISTORY:  Past Medical History:   Diagnosis Date     LORIN (acute kidney injury) (H)      Anxiety      Cannabis abuse      Depression      Diabetes Type 1, uncontrolled 1985    Onset age 10     DKA (diabetic ketoacidoses)      ETOH abuse      HLD (hyperlipidemia)      HTN (hypertension)      Lactic acidosis        PAST SURGICAL HISTORY:  Past Surgical History:   Procedure Laterality Date     ANKLE FRACTURE SURGERY Left      APPENDECTOMY       PICC SINGLE LUMEN PLACEMENT  10/23/2022                CURRENT MEDICATIONS:    ondansetron (ZOFRAN ODT) 4 MG ODT tab  amLODIPine (NORVASC) 2.5 MG tablet  ceFAZolin (ANCEF) 1 GM vial  cloNIDine (CATAPRES) 0.1 MG tablet  collagenase (SANTYL) 250 UNIT/GM external ointment  Continuous Blood Gluc Sensor (DEXCOM G6 SENSOR) MISC  doxycycline hyclate (VIBRAMYCIN) 100 MG capsule  DULoxetine (CYMBALTA) 60 MG capsule  famotidine (PEPCID) 20 MG tablet  insulin degludec (TRESIBA) 100 UNIT/ML pen  insulin glargine (LANTUS PEN) 100 UNIT/ML pen  insulin lispro (HUMALOG KWIKPEN) 100 UNIT/ML (1 unit dial) KWIKPEN  rosuvastatin (CRESTOR) 40 MG tablet  traZODone (DESYREL) 100 MG tablet        ALLERGIES:  Allergies   Allergen Reactions     Colestipol GI Disturbance     Nickel Rash     Penicillins Rash       FAMILY HISTORY:  Family History   Problem Relation Age of Onset     Clotting Disorder Mother         \"thin blood\"     Arthritis Mother      Hyperlipidemia Mother      Skin Cancer Father         face/nose      Depression Father      Other - See Comments Sister         eye surgeries     No Known Problems Brother      Coronary Artery Disease Maternal Grandmother      Alcoholism Maternal Grandfather      Coronary Artery Disease Maternal Grandfather      No Known Problems Paternal Grandmother      No Known Problems Paternal Grandfather        SOCIAL HISTORY: " "  Social History     Socioeconomic History     Marital status: Single   Tobacco Use     Smoking status: Some Days     Smokeless tobacco: Never     Tobacco comments:     occasional   Substance and Sexual Activity     Alcohol use: Yes     Alcohol/week: 35.0 standard drinks     Comment: Alcoholic Drinks/day: ~750 mL wine daily     Drug use: Yes     Types: Marijuana     Comment: Drug use: 4-5x/week     Sexual activity: Yes     Partners: Male     Birth control/protection: Condom       VITALS:  Patient Vitals for the past 24 hrs:   BP Temp Temp src Pulse Resp SpO2 Height Weight   11/15/22 1700 (!) 167/87 -- -- -- 16 96 % -- --   11/15/22 1600 (!) 140/72 -- -- 107 18 97 % -- --   11/15/22 1530 (!) 142/71 -- -- 105 16 98 % -- --   11/15/22 1500 (!) 194/95 -- -- 105 18 100 % -- --   11/15/22 1221 (!) 215/119 -- -- 108 20 100 % -- --   11/15/22 1054 -- 97.6  F (36.4  C) Temporal 109 16 100 % 1.702 m (5' 7\") 59 kg (130 lb)       PHYSICAL EXAM    Constitutional:  Well developed, Well nourished,  HENT:  Normocephalic, Atraumatic, Bilateral external ears normal, Oropharynx moist, Nose normal.   Neck:  Normal range of motion, No meningismus, No stridor.   Eyes:  EOMI, Conjunctiva normal, No discharge.   Respiratory:  Normal breath sounds, No respiratory distress, No wheezing  Cardiovascular: Mild tachycardia, normal rhythm, No murmurs  GI:  Soft, No tenderness, No guarding  Musculoskeletal:   No tenderness to palpation or major deformities noted.  Necrotic tissue of the right great toe.  Integument:  Warm, Dry, No erythema, No rash.   Neurologic:  Alert & oriented x 3, No focal deficits noted.   Psychiatric:  Affect normal, Judgment normal, Mood normal.      LAB:  All pertinent labs reviewed and interpreted.  Results for orders placed or performed during the hospital encounter of 11/15/22                                                                                                                Erythrocyte sedimentation rate " auto   Result Value Ref Range    Erythrocyte Sedimentation Rate 50 (H) 0 - 20 mm/hr   Basic metabolic panel   Result Value Ref Range    Sodium 138 136 - 145 mmol/L    Potassium 4.1 3.4 - 5.3 mmol/L    Chloride 97 (L) 98 - 107 mmol/L    Carbon Dioxide (CO2) 24 22 - 29 mmol/L    Anion Gap 17 (H) 7 - 15 mmol/L    Urea Nitrogen 35.5 (H) 6.0 - 20.0 mg/dL    Creatinine 3.98 (H) 0.51 - 0.95 mg/dL    Calcium 9.4 8.6 - 10.0 mg/dL    Glucose 243 (H) 70 - 99 mg/dL    GFR Estimate 13 (L) >60 mL/min/1.73m2   Result Value Ref Range    CRP Inflammation <3.00 <5.00 mg/L   Lactic acid whole blood   Result Value Ref Range    Lactic Acid 1.6 0.7 - 2.0 mmol/L   CBC with platelets and differential   Result Value Ref Range    WBC Count 10.7 4.0 - 11.0 10e3/uL    RBC Count 3.77 (L) 3.80 - 5.20 10e6/uL    Hemoglobin 11.0 (L) 11.7 - 15.7 g/dL    Hematocrit 32.5 (L) 35.0 - 47.0 %    MCV 86 78 - 100 fL    MCH 29.2 26.5 - 33.0 pg    MCHC 33.8 31.5 - 36.5 g/dL    RDW 13.5 10.0 - 15.0 %    Platelet Count 436 150 - 450 10e3/uL    % Neutrophils 89 %    % Lymphocytes 7 %    % Monocytes 2 %    % Eosinophils 0 %    % Basophils 1 %    % Immature Granulocytes 1 %    NRBCs per 100 WBC 0 <1 /100    Absolute Neutrophils 9.7 (H) 1.6 - 8.3 10e3/uL    Absolute Lymphocytes 0.7 (L) 0.8 - 5.3 10e3/uL    Absolute Monocytes 0.2 0.0 - 1.3 10e3/uL    Absolute Eosinophils 0.0 0.0 - 0.7 10e3/uL    Absolute Basophils 0.1 0.0 - 0.2 10e3/uL    Absolute Immature Granulocytes 0.1 <=0.4 10e3/uL    Absolute NRBCs 0.0 10e3/uL   Symptomatic; Unknown COVID-19 Virus (Coronavirus) by PCR Nasopharyngeal    Specimen: Nasopharyngeal; Swab   Result Value Ref Range    SARS CoV2 PCR Negative Negative       RADIOLOGY:  I have independently reviewed and interpreted the above imaging, pending the final radiology read.  XR Foot Right G/E 3 Views   Final Result   IMPRESSION: Soft tissue bandage material and swelling within the toes; hyperdense material along the plantar aspect of the  forefoot is likely ointment. There is no evidence of soft tissue gas or osteomyelitis by radiograph. No acute fracture.       There is a metallic foreign body in the soft tissues of the posterior ankle/distal calf measuring 1.1 cm as seen on the lateral view.            I, Mumtaz Chapman , am serving as a scribe to document services personally performed by Dr. Lora based on my observation and the provider's statements to me. I, Jayson Lora, DO attest that Mumtaz Chapman is acting in a scribe capacity, has observed my performance of the services and has documented them in accordance with my direction.    Jayson Lora DO  Emergency Medicine  [facility] EMERGENCY DEPARTMENT  [facility] [facility] [id number] [phone]  Dept: [phone]     Jayson Lora MD  11/15/22 3712

## 2022-11-15 NOTE — TELEPHONE ENCOUNTER
I just called the pt to discuss labs, appears she is in our ER now.  Will review those notes.     Dr Cosme

## 2022-11-15 NOTE — DISCHARGE INSTRUCTIONS
Take the prescribed medications as directed and follow-up closely with your podiatrist as an outpatient for ongoing treatment.  Return to the ER for any worsening symptoms or other concerns.

## 2022-11-15 NOTE — ED NOTES
"ED Triage Provider Note  [facility] EMERGENCY DEPARTMENT  Encounter Date: Nov 15, 2022    History:  Chief Complaint   Patient presents with     Emesis     Foot Problems     Josefa Curiel is a 47 year old female who presents to the ED with complaints of not feeling well and persistent right toe infection, now block and what appears to be dry gangrene.    Review of Systems:  Patient just not feeling well, persistent right toe infection.    Exam:  BP (!) 215/119   Pulse 108   Temp 97.6  F (36.4  C) (Temporal)   Resp 20   Ht 1.702 m (5' 7\")   Wt 59 kg (130 lb)   LMP 10/05/2022   SpO2 100%   BMI 20.36 kg/m    General: No acute distress. Appears stated age.       Medical Decision Making:  Patient arriving to the ED with problem as above. A medical screening exam was performed.  Differential diagnosis includes foot infection, osteomyelitis, dry gangrene, peripheral arterial disease, or other exacerbation of diabetes.    12:32 PM.  Orders initiated from Triage. The patient is most appropriate to be immediately roomed.       Jayson Mcdermott MD  11/15/2022 at 12:31 PM     Jayson Mcdermott MD  11/15/22 1232    " show

## 2022-11-15 NOTE — ED TRIAGE NOTES
Triage Assessment     Row Name 11/15/22 1055       Triage Assessment (Adult)    Airway WDL WDL       Respiratory WDL    Respiratory WDL WDL       Skin Circulation/Temperature WDL    Skin Circulation/Temperature WDL WDL       Cardiac WDL    Cardiac WDL WDL       Peripheral/Neurovascular WDL    Peripheral Neurovascular WDL WDL       Cognitive/Neuro/Behavioral WDL    Cognitive/Neuro/Behavioral WDL WDL

## 2022-11-15 NOTE — ED TRIAGE NOTES
Patient presents to ed for emesis. Had cut on foot, infected was getting infusions but stopped now emesis and having more pain. Boot on foot     Triage Assessment     Row Name 11/15/22 105       Triage Assessment (Adult)    Airway WDL WDL       Respiratory WDL    Respiratory WDL WDL       Skin Circulation/Temperature WDL    Skin Circulation/Temperature WDL WDL       Cardiac WDL    Cardiac WDL WDL       Peripheral/Neurovascular WDL    Peripheral Neurovascular WDL WDL       Cognitive/Neuro/Behavioral WDL    Cognitive/Neuro/Behavioral WDL WDL

## 2022-11-15 NOTE — ED NOTES
Patient reevaluation in triage: Re evaluation with lab draw reveals that patient is a diabetic with a wound to her right foot that has been treated with antibiotic therapy. With inspection, it is noticed that the wound is open and draining. Additionally, the plantar aspect of her 5th toe is black. She states that her blood glucoses was 260's today. Skin is pale, clammy and she is tachycardic. She is also nauseous. Blood pressure is hypertensive. Discussion with Provider in triage for orders and notified charge nurse for need for ED bed.

## 2022-11-16 ENCOUNTER — HOSPITAL ENCOUNTER (INPATIENT)
Facility: HOSPITAL | Age: 47
LOS: 11 days | Discharge: HOME | DRG: 623 | End: 2022-11-28
Attending: EMERGENCY MEDICINE | Admitting: INTERNAL MEDICINE
Payer: COMMERCIAL

## 2022-11-16 ENCOUNTER — PRE VISIT (OUTPATIENT)
Dept: NEPHROLOGY | Facility: CLINIC | Age: 47
End: 2022-11-16

## 2022-11-16 DIAGNOSIS — E10.69 TYPE 1 DIABETES MELLITUS WITH OTHER SPECIFIED COMPLICATION (H): Primary | ICD-10-CM

## 2022-11-16 DIAGNOSIS — E10.621: ICD-10-CM

## 2022-11-16 DIAGNOSIS — I16.0 HYPERTENSIVE URGENCY: ICD-10-CM

## 2022-11-16 DIAGNOSIS — I10 HYPERTENSION, UNSPECIFIED TYPE: ICD-10-CM

## 2022-11-16 DIAGNOSIS — R11.2 NAUSEA AND VOMITING, UNSPECIFIED VOMITING TYPE: ICD-10-CM

## 2022-11-16 DIAGNOSIS — L03.115 CELLULITIS OF RIGHT FOOT: ICD-10-CM

## 2022-11-16 DIAGNOSIS — L97.511 ULCER OF RIGHT FOOT, LIMITED TO BREAKDOWN OF SKIN (H): ICD-10-CM

## 2022-11-16 DIAGNOSIS — D63.1 ANEMIA DUE TO CHRONIC KIDNEY DISEASE, UNSPECIFIED CKD STAGE: ICD-10-CM

## 2022-11-16 DIAGNOSIS — N18.30 CHRONIC RENAL IMPAIRMENT, STAGE 3 (MODERATE), UNSPECIFIED WHETHER STAGE 3A OR 3B CKD (H): ICD-10-CM

## 2022-11-16 DIAGNOSIS — L97.521 ULCER OF LEFT FOOT, LIMITED TO BREAKDOWN OF SKIN (H): ICD-10-CM

## 2022-11-16 DIAGNOSIS — L97.513: ICD-10-CM

## 2022-11-16 DIAGNOSIS — N18.9 ANEMIA DUE TO CHRONIC KIDNEY DISEASE, UNSPECIFIED CKD STAGE: ICD-10-CM

## 2022-11-16 LAB
ANION GAP SERPL CALCULATED.3IONS-SCNC: 20 MMOL/L (ref 7–15)
BASOPHILS # BLD AUTO: 0.1 10E3/UL (ref 0–0.2)
BASOPHILS NFR BLD AUTO: 1 %
BUN SERPL-MCNC: 35.1 MG/DL (ref 6–20)
CALCIUM SERPL-MCNC: 9.4 MG/DL (ref 8.6–10)
CHLORIDE SERPL-SCNC: 95 MMOL/L (ref 98–107)
CREAT SERPL-MCNC: 4.36 MG/DL (ref 0.51–0.95)
CRP SERPL-MCNC: <3 MG/L
DEPRECATED HCO3 PLAS-SCNC: 29 MMOL/L (ref 22–29)
EOSINOPHIL # BLD AUTO: 0 10E3/UL (ref 0–0.7)
EOSINOPHIL NFR BLD AUTO: 0 %
ERYTHROCYTE [DISTWIDTH] IN BLOOD BY AUTOMATED COUNT: 13.9 % (ref 10–15)
GFR SERPL CREATININE-BSD FRML MDRD: 12 ML/MIN/1.73M2
GLUCOSE SERPL-MCNC: 213 MG/DL (ref 70–99)
HCT VFR BLD AUTO: 35 % (ref 35–47)
HGB BLD-MCNC: 11.7 G/DL (ref 11.7–15.7)
IMM GRANULOCYTES # BLD: 0.1 10E3/UL
IMM GRANULOCYTES NFR BLD: 1 %
LYMPHOCYTES # BLD AUTO: 1.1 10E3/UL (ref 0.8–5.3)
LYMPHOCYTES NFR BLD AUTO: 11 %
MCH RBC QN AUTO: 29.1 PG (ref 26.5–33)
MCHC RBC AUTO-ENTMCNC: 33.4 G/DL (ref 31.5–36.5)
MCV RBC AUTO: 87 FL (ref 78–100)
MONOCYTES # BLD AUTO: 0.3 10E3/UL (ref 0–1.3)
MONOCYTES NFR BLD AUTO: 3 %
NEUTROPHILS # BLD AUTO: 9 10E3/UL (ref 1.6–8.3)
NEUTROPHILS NFR BLD AUTO: 84 %
NRBC # BLD AUTO: 0 10E3/UL
NRBC BLD AUTO-RTO: 0 /100
PLATELET # BLD AUTO: 484 10E3/UL (ref 150–450)
POTASSIUM SERPL-SCNC: 3.5 MMOL/L (ref 3.4–5.3)
RBC # BLD AUTO: 4.02 10E6/UL (ref 3.8–5.2)
SODIUM SERPL-SCNC: 144 MMOL/L (ref 136–145)
WBC # BLD AUTO: 10.5 10E3/UL (ref 4–11)

## 2022-11-16 PROCEDURE — 84703 CHORIONIC GONADOTROPIN ASSAY: CPT | Performed by: EMERGENCY MEDICINE

## 2022-11-16 PROCEDURE — 258N000003 HC RX IP 258 OP 636: Performed by: EMERGENCY MEDICINE

## 2022-11-16 PROCEDURE — 80048 BASIC METABOLIC PNL TOTAL CA: CPT | Performed by: EMERGENCY MEDICINE

## 2022-11-16 PROCEDURE — 86140 C-REACTIVE PROTEIN: CPT | Performed by: EMERGENCY MEDICINE

## 2022-11-16 PROCEDURE — 99285 EMERGENCY DEPT VISIT HI MDM: CPT | Mod: CS,25

## 2022-11-16 PROCEDURE — 93005 ELECTROCARDIOGRAM TRACING: CPT | Performed by: EMERGENCY MEDICINE

## 2022-11-16 PROCEDURE — 87070 CULTURE OTHR SPECIMN AEROBIC: CPT | Performed by: EMERGENCY MEDICINE

## 2022-11-16 PROCEDURE — 83605 ASSAY OF LACTIC ACID: CPT | Performed by: EMERGENCY MEDICINE

## 2022-11-16 PROCEDURE — 85652 RBC SED RATE AUTOMATED: CPT | Performed by: EMERGENCY MEDICINE

## 2022-11-16 PROCEDURE — 83735 ASSAY OF MAGNESIUM: CPT | Performed by: INTERNAL MEDICINE

## 2022-11-16 PROCEDURE — 36415 COLL VENOUS BLD VENIPUNCTURE: CPT | Performed by: EMERGENCY MEDICINE

## 2022-11-16 PROCEDURE — 96360 HYDRATION IV INFUSION INIT: CPT

## 2022-11-16 PROCEDURE — C9803 HOPD COVID-19 SPEC COLLECT: HCPCS

## 2022-11-16 PROCEDURE — 85014 HEMATOCRIT: CPT | Performed by: EMERGENCY MEDICINE

## 2022-11-16 RX ORDER — CEFTRIAXONE 1 G/1
1 INJECTION, POWDER, FOR SOLUTION INTRAMUSCULAR; INTRAVENOUS ONCE
Status: COMPLETED | OUTPATIENT
Start: 2022-11-17 | End: 2022-11-17

## 2022-11-16 RX ORDER — MORPHINE SULFATE 4 MG/ML
4 INJECTION, SOLUTION INTRAMUSCULAR; INTRAVENOUS
Status: DISCONTINUED | OUTPATIENT
Start: 2022-11-16 | End: 2022-11-17

## 2022-11-16 RX ORDER — METRONIDAZOLE 500 MG/100ML
500 INJECTION, SOLUTION INTRAVENOUS ONCE
Status: COMPLETED | OUTPATIENT
Start: 2022-11-16 | End: 2022-11-17

## 2022-11-16 RX ADMIN — SODIUM CHLORIDE 1000 ML: 9 INJECTION, SOLUTION INTRAVENOUS at 23:13

## 2022-11-16 ASSESSMENT — ACTIVITIES OF DAILY LIVING (ADL): ADLS_ACUITY_SCORE: 35

## 2022-11-16 ASSESSMENT — ENCOUNTER SYMPTOMS
MYALGIAS: 0
DIARRHEA: 0
VOMITING: 1
SHORTNESS OF BREATH: 0
NAUSEA: 1
CHILLS: 1
COUGH: 0

## 2022-11-17 ENCOUNTER — APPOINTMENT (OUTPATIENT)
Dept: MRI IMAGING | Facility: HOSPITAL | Age: 47
DRG: 623 | End: 2022-11-17
Attending: PODIATRIST
Payer: COMMERCIAL

## 2022-11-17 ENCOUNTER — DOCUMENTATION ONLY (OUTPATIENT)
Dept: VASCULAR SURGERY | Facility: CLINIC | Age: 47
End: 2022-11-17

## 2022-11-17 ENCOUNTER — PREP FOR PROCEDURE (OUTPATIENT)
Dept: OTHER | Facility: CLINIC | Age: 47
End: 2022-11-17

## 2022-11-17 DIAGNOSIS — L02.619 CELLULITIS AND ABSCESS OF FOOT EXCLUDING TOE: Primary | ICD-10-CM

## 2022-11-17 DIAGNOSIS — L03.119 CELLULITIS AND ABSCESS OF FOOT EXCLUDING TOE: Primary | ICD-10-CM

## 2022-11-17 PROBLEM — I10 HYPERTENSION, UNSPECIFIED TYPE: Status: ACTIVE | Noted: 2022-11-17

## 2022-11-17 PROBLEM — L97.513: Status: ACTIVE | Noted: 2022-11-17

## 2022-11-17 PROBLEM — R11.2 NAUSEA AND VOMITING, UNSPECIFIED VOMITING TYPE: Status: ACTIVE | Noted: 2022-11-17

## 2022-11-17 PROBLEM — E10.621: Status: ACTIVE | Noted: 2022-11-17

## 2022-11-17 LAB
AMPHETAMINES UR QL SCN: ABNORMAL
BARBITURATES UR QL SCN: ABNORMAL
BENZODIAZ UR QL SCN: ABNORMAL
BZE UR QL SCN: ABNORMAL
CANNABINOIDS UR QL SCN: ABNORMAL
ERYTHROCYTE [SEDIMENTATION RATE] IN BLOOD BY WESTERGREN METHOD: 58 MM/HR (ref 0–20)
GLUCOSE BLDC GLUCOMTR-MCNC: 105 MG/DL (ref 70–99)
GLUCOSE BLDC GLUCOMTR-MCNC: 107 MG/DL (ref 70–99)
GLUCOSE BLDC GLUCOMTR-MCNC: 154 MG/DL (ref 70–99)
GLUCOSE BLDC GLUCOMTR-MCNC: 207 MG/DL (ref 70–99)
GLUCOSE BLDC GLUCOMTR-MCNC: 231 MG/DL (ref 70–99)
GLUCOSE BLDC GLUCOMTR-MCNC: 305 MG/DL (ref 70–99)
GLUCOSE BLDC GLUCOMTR-MCNC: 35 MG/DL (ref 70–99)
GLUCOSE BLDC GLUCOMTR-MCNC: 37 MG/DL (ref 70–99)
GLUCOSE BLDC GLUCOMTR-MCNC: 55 MG/DL (ref 70–99)
HCG SERPL QL: NEGATIVE
HOLD SPECIMEN: NORMAL
LACTATE SERPL-SCNC: 0.6 MMOL/L (ref 0.7–2)
LACTATE SERPL-SCNC: 1.6 MMOL/L (ref 0.7–2)
MAGNESIUM SERPL-MCNC: 1.9 MG/DL (ref 1.7–2.3)
OPIATES UR QL SCN: ABNORMAL
PCP QUAL URINE (ROCHE): ABNORMAL

## 2022-11-17 PROCEDURE — 258N000001 HC RX 258: Performed by: INTERNAL MEDICINE

## 2022-11-17 PROCEDURE — 99207 PR APP CREDIT; MD BILLING SHARED VISIT: CPT | Performed by: INTERNAL MEDICINE

## 2022-11-17 PROCEDURE — 87149 DNA/RNA DIRECT PROBE: CPT | Performed by: EMERGENCY MEDICINE

## 2022-11-17 PROCEDURE — 11046 DBRDMT MUSC&/FSCA EA ADDL: CPT | Mod: 59 | Performed by: PODIATRIST

## 2022-11-17 PROCEDURE — 250N000012 HC RX MED GY IP 250 OP 636 PS 637: Performed by: INTERNAL MEDICINE

## 2022-11-17 PROCEDURE — 99223 1ST HOSP IP/OBS HIGH 75: CPT | Mod: AI | Performed by: INTERNAL MEDICINE

## 2022-11-17 PROCEDURE — 11043 DBRDMT MUSC&/FSCA 1ST 20/<: CPT | Mod: 59 | Performed by: PODIATRIST

## 2022-11-17 PROCEDURE — 36415 COLL VENOUS BLD VENIPUNCTURE: CPT | Performed by: INTERNAL MEDICINE

## 2022-11-17 PROCEDURE — 83605 ASSAY OF LACTIC ACID: CPT | Performed by: INTERNAL MEDICINE

## 2022-11-17 PROCEDURE — 250N000011 HC RX IP 250 OP 636: Performed by: INTERNAL MEDICINE

## 2022-11-17 PROCEDURE — 36415 COLL VENOUS BLD VENIPUNCTURE: CPT | Performed by: EMERGENCY MEDICINE

## 2022-11-17 PROCEDURE — 73718 MRI LOWER EXTREMITY W/O DYE: CPT | Mod: RT

## 2022-11-17 PROCEDURE — 87077 CULTURE AEROBIC IDENTIFY: CPT | Performed by: EMERGENCY MEDICINE

## 2022-11-17 PROCEDURE — 80307 DRUG TEST PRSMV CHEM ANLYZR: CPT | Performed by: EMERGENCY MEDICINE

## 2022-11-17 PROCEDURE — 99222 1ST HOSP IP/OBS MODERATE 55: CPT | Performed by: INTERNAL MEDICINE

## 2022-11-17 PROCEDURE — 120N000001 HC R&B MED SURG/OB

## 2022-11-17 PROCEDURE — 250N000009 HC RX 250: Performed by: INTERNAL MEDICINE

## 2022-11-17 PROCEDURE — 258N000003 HC RX IP 258 OP 636: Performed by: INTERNAL MEDICINE

## 2022-11-17 PROCEDURE — 250N000013 HC RX MED GY IP 250 OP 250 PS 637: Performed by: INTERNAL MEDICINE

## 2022-11-17 PROCEDURE — 250N000011 HC RX IP 250 OP 636: Performed by: EMERGENCY MEDICINE

## 2022-11-17 PROCEDURE — 99223 1ST HOSP IP/OBS HIGH 75: CPT | Mod: 57 | Performed by: PODIATRIST

## 2022-11-17 RX ORDER — LIDOCAINE 40 MG/G
CREAM TOPICAL
Status: DISCONTINUED | OUTPATIENT
Start: 2022-11-17 | End: 2022-11-18

## 2022-11-17 RX ORDER — DEXTROSE MONOHYDRATE 25 G/50ML
25-50 INJECTION, SOLUTION INTRAVENOUS
Status: DISCONTINUED | OUTPATIENT
Start: 2022-11-17 | End: 2022-11-28 | Stop reason: HOSPADM

## 2022-11-17 RX ORDER — AMLODIPINE BESYLATE 2.5 MG/1
2.5 TABLET ORAL AT BEDTIME
Status: CANCELLED | OUTPATIENT
Start: 2022-11-17

## 2022-11-17 RX ORDER — TRAZODONE HYDROCHLORIDE 50 MG/1
50 TABLET, FILM COATED ORAL
Status: DISCONTINUED | OUTPATIENT
Start: 2022-11-17 | End: 2022-11-28 | Stop reason: HOSPADM

## 2022-11-17 RX ORDER — NICOTINE POLACRILEX 4 MG
15-30 LOZENGE BUCCAL
Status: DISCONTINUED | OUTPATIENT
Start: 2022-11-17 | End: 2022-11-28 | Stop reason: HOSPADM

## 2022-11-17 RX ORDER — CEFTRIAXONE 1 G/1
1 INJECTION, POWDER, FOR SOLUTION INTRAMUSCULAR; INTRAVENOUS EVERY 24 HOURS
Status: DISCONTINUED | OUTPATIENT
Start: 2022-11-17 | End: 2022-11-19

## 2022-11-17 RX ORDER — ROSUVASTATIN CALCIUM 40 MG/1
40 TABLET, COATED ORAL DAILY
Status: CANCELLED | OUTPATIENT
Start: 2022-11-17

## 2022-11-17 RX ORDER — HYDRALAZINE HYDROCHLORIDE 20 MG/ML
10 INJECTION INTRAMUSCULAR; INTRAVENOUS EVERY 6 HOURS PRN
Status: DISCONTINUED | OUTPATIENT
Start: 2022-11-17 | End: 2022-11-28 | Stop reason: HOSPADM

## 2022-11-17 RX ORDER — METRONIDAZOLE 500 MG/100ML
500 INJECTION, SOLUTION INTRAVENOUS EVERY 12 HOURS
Status: DISCONTINUED | OUTPATIENT
Start: 2022-11-17 | End: 2022-11-21

## 2022-11-17 RX ORDER — HYDRALAZINE HYDROCHLORIDE 20 MG/ML
10 INJECTION INTRAMUSCULAR; INTRAVENOUS EVERY 6 HOURS PRN
Status: DISCONTINUED | OUTPATIENT
Start: 2022-11-17 | End: 2022-11-17

## 2022-11-17 RX ORDER — PROCHLORPERAZINE 25 MG
25 SUPPOSITORY, RECTAL RECTAL EVERY 12 HOURS PRN
Status: DISCONTINUED | OUTPATIENT
Start: 2022-11-17 | End: 2022-11-18

## 2022-11-17 RX ORDER — PROCHLORPERAZINE MALEATE 10 MG
10 TABLET ORAL EVERY 6 HOURS PRN
Status: DISCONTINUED | OUTPATIENT
Start: 2022-11-17 | End: 2022-11-18

## 2022-11-17 RX ORDER — ACETAMINOPHEN 650 MG/1
650 SUPPOSITORY RECTAL EVERY 6 HOURS PRN
Status: DISCONTINUED | OUTPATIENT
Start: 2022-11-17 | End: 2022-11-18

## 2022-11-17 RX ORDER — ACETAMINOPHEN 325 MG/1
650 TABLET ORAL EVERY 6 HOURS PRN
Status: DISCONTINUED | OUTPATIENT
Start: 2022-11-17 | End: 2022-11-18

## 2022-11-17 RX ORDER — MORPHINE SULFATE 4 MG/ML
4 INJECTION, SOLUTION INTRAMUSCULAR; INTRAVENOUS ONCE
Status: COMPLETED | OUTPATIENT
Start: 2022-11-17 | End: 2022-11-17

## 2022-11-17 RX ORDER — CLONIDINE 0.2 MG/24H
1 PATCH, EXTENDED RELEASE TRANSDERMAL WEEKLY
Status: DISCONTINUED | OUTPATIENT
Start: 2022-11-17 | End: 2022-11-19

## 2022-11-17 RX ORDER — DULOXETIN HYDROCHLORIDE 60 MG/1
60 CAPSULE, DELAYED RELEASE ORAL EVERY EVENING
Status: DISCONTINUED | OUTPATIENT
Start: 2022-11-17 | End: 2022-11-17

## 2022-11-17 RX ORDER — OXYCODONE HYDROCHLORIDE 5 MG/1
5 TABLET ORAL EVERY 4 HOURS PRN
Status: DISCONTINUED | OUTPATIENT
Start: 2022-11-17 | End: 2022-11-19

## 2022-11-17 RX ORDER — METOPROLOL TARTRATE 1 MG/ML
5 INJECTION, SOLUTION INTRAVENOUS EVERY 6 HOURS PRN
Status: DISCONTINUED | OUTPATIENT
Start: 2022-11-17 | End: 2022-11-19

## 2022-11-17 RX ORDER — ONDANSETRON 2 MG/ML
4 INJECTION INTRAMUSCULAR; INTRAVENOUS ONCE
Status: COMPLETED | OUTPATIENT
Start: 2022-11-17 | End: 2022-11-17

## 2022-11-17 RX ORDER — CLONIDINE 0.2 MG/24H
1 PATCH, EXTENDED RELEASE TRANSDERMAL WEEKLY
Status: DISCONTINUED | OUTPATIENT
Start: 2022-11-17 | End: 2022-11-17

## 2022-11-17 RX ORDER — SODIUM CHLORIDE 9 MG/ML
INJECTION, SOLUTION INTRAVENOUS CONTINUOUS
Status: DISCONTINUED | OUTPATIENT
Start: 2022-11-17 | End: 2022-11-17

## 2022-11-17 RX ADMIN — METRONIDAZOLE 500 MG: 500 INJECTION, SOLUTION INTRAVENOUS at 01:33

## 2022-11-17 RX ADMIN — CEFTRIAXONE SODIUM 1 G: 1 INJECTION, POWDER, FOR SOLUTION INTRAMUSCULAR; INTRAVENOUS at 00:55

## 2022-11-17 RX ADMIN — HYDRALAZINE HYDROCHLORIDE 10 MG: 20 INJECTION, SOLUTION INTRAMUSCULAR; INTRAVENOUS at 03:35

## 2022-11-17 RX ADMIN — SODIUM CHLORIDE: 9 INJECTION, SOLUTION INTRAVENOUS at 15:39

## 2022-11-17 RX ADMIN — CLONIDINE 1 PATCH: 0.2 PATCH TRANSDERMAL at 16:56

## 2022-11-17 RX ADMIN — METOPROLOL TARTRATE 5 MG: 5 INJECTION INTRAVENOUS at 05:12

## 2022-11-17 RX ADMIN — MORPHINE SULFATE 4 MG: 4 INJECTION INTRAVENOUS at 00:30

## 2022-11-17 RX ADMIN — OXYCODONE HYDROCHLORIDE 5 MG: 5 TABLET ORAL at 04:51

## 2022-11-17 RX ADMIN — INSULIN GLARGINE 20 UNITS: 100 INJECTION, SOLUTION SUBCUTANEOUS at 03:33

## 2022-11-17 RX ADMIN — SODIUM CHLORIDE: 9 INJECTION, SOLUTION INTRAVENOUS at 03:30

## 2022-11-17 RX ADMIN — HYDRALAZINE HYDROCHLORIDE 10 MG: 20 INJECTION INTRAMUSCULAR; INTRAVENOUS at 16:42

## 2022-11-17 RX ADMIN — INSULIN ASPART 4 UNITS: 100 INJECTION, SOLUTION INTRAVENOUS; SUBCUTANEOUS at 21:13

## 2022-11-17 RX ADMIN — FAMOTIDINE 10 MG: 10 INJECTION, SOLUTION INTRAVENOUS at 09:56

## 2022-11-17 RX ADMIN — DEXTROSE AND SODIUM CHLORIDE: 5; 450 INJECTION, SOLUTION INTRAVENOUS at 16:40

## 2022-11-17 RX ADMIN — PROCHLORPERAZINE EDISYLATE 10 MG: 5 INJECTION INTRAMUSCULAR; INTRAVENOUS at 03:31

## 2022-11-17 RX ADMIN — METRONIDAZOLE 500 MG: 500 INJECTION, SOLUTION INTRAVENOUS at 15:40

## 2022-11-17 RX ADMIN — SODIUM CHLORIDE: 9 INJECTION, SOLUTION INTRAVENOUS at 13:25

## 2022-11-17 RX ADMIN — PROCHLORPERAZINE EDISYLATE 10 MG: 5 INJECTION INTRAMUSCULAR; INTRAVENOUS at 16:32

## 2022-11-17 RX ADMIN — ONDANSETRON 4 MG: 2 INJECTION INTRAMUSCULAR; INTRAVENOUS at 00:29

## 2022-11-17 RX ADMIN — CLONIDINE 1 PATCH: 0.2 PATCH TRANSDERMAL at 04:35

## 2022-11-17 RX ADMIN — DEXTROSE MONOHYDRATE 25 ML: 25 INJECTION, SOLUTION INTRAVENOUS at 11:36

## 2022-11-17 RX ADMIN — CEFTRIAXONE SODIUM 1 G: 1 INJECTION, POWDER, FOR SOLUTION INTRAMUSCULAR; INTRAVENOUS at 21:16

## 2022-11-17 ASSESSMENT — ACTIVITIES OF DAILY LIVING (ADL)
ADLS_ACUITY_SCORE: 35
WALKING_OR_CLIMBING_STAIRS_DIFFICULTY: YES
WEAR_GLASSES_OR_BLIND: NO
CHANGE_IN_FUNCTIONAL_STATUS_SINCE_ONSET_OF_CURRENT_ILLNESS/INJURY: NO
ADLS_ACUITY_SCORE: 35
FALL_HISTORY_WITHIN_LAST_SIX_MONTHS: NO
DEPENDENT_IADLS:: INDEPENDENT
ADLS_ACUITY_SCORE: 35
TRANSFERRING: 0-->INDEPENDENT
DIFFICULTY_EATING/SWALLOWING: NO
WALKING_OR_CLIMBING_STAIRS: AMBULATION DIFFICULTY, REQUIRES EQUIPMENT
ADLS_ACUITY_SCORE: 19
ADLS_ACUITY_SCORE: 35
ADLS_ACUITY_SCORE: 35
DRESSING/BATHING_DIFFICULTY: NO
CONCENTRATING,_REMEMBERING_OR_MAKING_DECISIONS_DIFFICULTY: NO
ADLS_ACUITY_SCORE: 19
ADLS_ACUITY_SCORE: 35
ADLS_ACUITY_SCORE: 35
TOILETING_ISSUES: NO
DOING_ERRANDS_INDEPENDENTLY_DIFFICULTY: NO
ADLS_ACUITY_SCORE: 19
ADLS_ACUITY_SCORE: 35
ADLS_ACUITY_SCORE: 19

## 2022-11-17 NOTE — PROGRESS NOTES
Surgery Scheduled      Surgery/Procedure: I&D right foot     Special Equipment: pulse lavage    Location: [facility]:  [facility] [place] [address] (phone: [phone], Fax: [phone])    Date: 11/18/22    Time: 4:30PM    Admission Type: Inpatient    Surgeon: Dr. Patel    OR Confirmed & :  Yes with Ana on 11/17/2022    Covid Scheduled: pt admitted to ED    Post Op: 12/2/22 9:40AM - [facility]    Wound Vac Needed: tbd    Home Care Needed: tbd    Blood Thinners Addressed: hospitalists managing

## 2022-11-17 NOTE — ED NOTES
Pt felt low (blood glucose) and called staff for recheck.  B.  Pt wakens to voice, appropriate.  Given D50 per MAR.    Will recheck BG at 1200.

## 2022-11-17 NOTE — ED TRIAGE NOTES
Pt here d/t emesis and HTN. Has not been able to take her meds. /118. Tachy to 113. Pt was seen here yesterday for same, but zofran is not working. Having ABD pain and sore throat d/t vomiting. Pt states she is also anxious about all of her Sx.      Triage Assessment     Row Name 11/16/22 1496       Triage Assessment (Adult)    Airway WDL WDL

## 2022-11-17 NOTE — H&P
Admission History and Physical   Josefa Curiel,  1975, MRN [id number]      Nausea and vomiting, unspecified vomiting type [R11.2]    PCP: Domingo Costello, [unfilled], [phone]   Code status:  Prior       Extended Emergency Contact Information  Primary Emergency Contact: Wilbert Peña   [facility]  Mobile Phone: [phone]  Relation: Significant other       Assessment and Plan     Assessment:  47-year-old with known diabetic foot ulcer, type 1 diabetes, CKD, smoker, marijuana use, hypertension who presented with 3 days of ongoing nausea and vomiting after doxycycline use.  She was unable to keep medicine and food down.  She was quite hypertensive on presentation.  Improving with IV fluids.  She was noted to have necrotic looking right foot ulcer and therefore was empirically started on Rocephin and Flagyl.  Will hold doxycycline even nausea and vomiting.  Consult ID and podiatry    Nausea and vomiting likely side effect of doxycycline and ongoing chronic marijuana use  -- Hold doxycycline  -- Order supportive treatment with IV Protonix, Compazine, IV fluids    Acute renal failure on chronic kidney disease stage III  -- Creatinine has worsened to 4.3 likely due to poor p.o. intake for 3 days  -- Order gentle IV hydration avoid nephrotoxic  -- Lactic acid is normal    Tobacco and marijuana use  -- Patient counseled to quit smoking  -- Urine tox is positive for opiates and cannabinoid    Past ethanol use  -- Patient claims she quit alcohol 6 years ago    Type 1 insulin-dependent diabetes  -- Continue long-term insulin and order NovoLog with meals    Hypertensive urgency due to medical nonadherence due to ongoing nausea vomiting  -- Order as needed hydralazine for systolic blood pressure more than 200  -- Does not appear to have alcohol withdrawal  -- Order clonidine patch since patient is actively vomiting.    Diabetic foot ulcer  -- Was treated cefazolin for MSSA but switched to doxycycline due to  worsening kidney function  -- ER started the patient on IV Rocephin and Flagyl  -- Continue for now consult ID  -- Wound and blood culture ordered by ER physician  -- Interestingly CRP is within normal limits.  But sed rate is 58.    Thrombocytosis likely due to infection    Anxiety depression  -- Continue home medication  Denies suicidal ideation    History of prolonged QTC  QTC of 506 ms on 11/16  --order Mag  level            Total unit/floor time 70 minutes. Greater than 50% was spent in counseling with the patient on discussions regarding tobacco and marijuana use.     Chief Complaint:  Vomiting and body aches 3 days     HPI:    Informant is patient reliable  Josefa Curiel is a 47 year old old female with history of type 1 diabetes, diabetic foot, CKD 3, hypertension, smoker, marijuana use, past ethanol use, anxiety depression who was discharged from [facility] on 10/24/2022 after being treated for diabetic foot infection.  She was given IV cefazolin via PICC line.  She was seen by infectious disease on 11/10/2022 and switched to doxycycline due to concern for Ancef causing acute renal failure.  PICC line was then discontinued.  Since then patient was unable to keep the medications and food down.  She had not taken her BP medication for 3 days.  Did take her insulin.  On arrival in the ER her blood pressure was greater than 200.  She was tachycardic.  Creatinine fina to 4.3.  Patient claims that did she did smoke marijuana 3 days ago.  ER physician had concern about diet fatty food and therefore started Rocephin and Flagyl.  Patient is feeling slightly better.  Has not vomited in the ER.                     Medical History      Past Medical History:   Diagnosis Date     LORIN (acute kidney injury) (H)      Anxiety      Cannabis abuse      Depression      Diabetes Type 1, uncontrolled 1985    Onset age 10     DKA (diabetic ketoacidoses)      ETOH abuse      HLD (hyperlipidemia)      HTN (hypertension)       Lactic acidosis       Family History  Reviewed, and family history includes Alcoholism in her maternal grandfather; Arthritis in her mother; Clotting Disorder in her mother; Coronary Artery Disease in her maternal grandfather and maternal grandmother; Depression in her father; Hyperlipidemia in her mother; No Known Problems in her brother, paternal grandfather, and paternal grandmother; Other - See Comments in her sister; Skin Cancer in her father.          Allergies  Allergies   Allergen Reactions     Colestipol GI Disturbance     Nickel Rash     Penicillins Rash    Social History  Reviewed, and she  reports that she has been smoking. She has never used smokeless tobacco. She reports current alcohol use of about 35.0 standard drinks per week. She reports current drug use. Drug: Marijuana.    Review of Systems:  10-point ROS negative, except as noted in HPI            Prior to Admission Medications   (Not in a hospital admission)         Physical Exam:  Temp:  [96.4  F (35.8  C)] 96.4  F (35.8  C)  Pulse:  [111-114] 111  Resp:  [9-20] 14  BP: (202-222)/(100-118) 202/100  SpO2:  [100 %] 100 %  Wt Readings from Last 5 Encounters:   11/16/22 59.4 kg (131 lb)   11/15/22 59 kg (130 lb)   11/01/22 59 kg (130 lb)   10/19/22 59 kg (130 lb)   09/28/22 59 kg (130 lb)     Body mass index is 20.52 kg/m .  Alert, oriented*3  Does not appear to be in distress  Well-nourished  No sinus tenderness  Dry mucus membranes  Neck supple, but thin  CVS: S1 S2-N, no murmurs, gallops, rubs  Resp: B/L vesicular breath sounds, no wheezing, crackles   Abd: soft, No t/g/r  Neuro: No tremors of the outstretched hand  Vasc: no leg edema  No clubbing  Skin--no generalized skin rash    Large deep necrotic ulcer on the medial wall of the right foot.     Pertinent Labs  Lab Results:  Recent Results (from the past 24 hour(s))   Extra Blue Top Tube    Collection Time: 11/16/22 10:47 PM   Result Value Ref Range    Hold Specimen JIC    Extra Red Top  Tube    Collection Time: 11/16/22 10:47 PM   Result Value Ref Range    Hold Specimen JIC    Extra Green Top (Lithium Heparin) Tube    Collection Time: 11/16/22 10:47 PM   Result Value Ref Range    Hold Specimen JIC    Extra Purple Top Tube    Collection Time: 11/16/22 10:47 PM   Result Value Ref Range    Hold Specimen JIC    Basic metabolic panel    Collection Time: 11/16/22 10:47 PM   Result Value Ref Range    Sodium 144 136 - 145 mmol/L    Potassium 3.5 3.4 - 5.3 mmol/L    Chloride 95 (L) 98 - 107 mmol/L    Carbon Dioxide (CO2) 29 22 - 29 mmol/L    Anion Gap 20 (H) 7 - 15 mmol/L    Urea Nitrogen 35.1 (H) 6.0 - 20.0 mg/dL    Creatinine 4.36 (H) 0.51 - 0.95 mg/dL    Calcium 9.4 8.6 - 10.0 mg/dL    Glucose 213 (H) 70 - 99 mg/dL    GFR Estimate 12 (L) >60 mL/min/1.73m2   CRP inflammation    Collection Time: 11/16/22 10:47 PM   Result Value Ref Range    CRP Inflammation <3.00 <5.00 mg/L   CBC with platelets and differential    Collection Time: 11/16/22 10:47 PM   Result Value Ref Range    WBC Count 10.5 4.0 - 11.0 10e3/uL    RBC Count 4.02 3.80 - 5.20 10e6/uL    Hemoglobin 11.7 11.7 - 15.7 g/dL    Hematocrit 35.0 35.0 - 47.0 %    MCV 87 78 - 100 fL    MCH 29.1 26.5 - 33.0 pg    MCHC 33.4 31.5 - 36.5 g/dL    RDW 13.9 10.0 - 15.0 %    Platelet Count 484 (H) 150 - 450 10e3/uL    % Neutrophils 84 %    % Lymphocytes 11 %    % Monocytes 3 %    % Eosinophils 0 %    % Basophils 1 %    % Immature Granulocytes 1 %    NRBCs per 100 WBC 0 <1 /100    Absolute Neutrophils 9.0 (H) 1.6 - 8.3 10e3/uL    Absolute Lymphocytes 1.1 0.8 - 5.3 10e3/uL    Absolute Monocytes 0.3 0.0 - 1.3 10e3/uL    Absolute Eosinophils 0.0 0.0 - 0.7 10e3/uL    Absolute Basophils 0.1 0.0 - 0.2 10e3/uL    Absolute Immature Granulocytes 0.1 <=0.4 10e3/uL    Absolute NRBCs 0.0 10e3/uL   Lactic acid whole blood    Collection Time: 11/16/22 11:54 PM   Result Value Ref Range    Lactic Acid 1.6 0.7 - 2.0 mmol/L   Erythrocyte sedimentation rate auto    Collection  Time: 11/16/22 11:54 PM   Result Value Ref Range    Erythrocyte Sedimentation Rate 58 (H) 0 - 20 mm/hr   HCG qualitative Blood    Collection Time: 11/16/22 11:54 PM   Result Value Ref Range    hCG Serum Qualitative Negative Negative   Drug abuse screen 77 urine (FL, RH, SH)    Collection Time: 11/17/22 12:41 AM   Result Value Ref Range    Amphetamines Urine Screen Negative Screen Negative    Barbituates Urine Screen Negative Screen Negative    Benzodiazepine Urine Screen Negative Screen Negative    Cannabinoids Urine Screen Positive (A) Screen Negative    Opiates Urine Screen Positive (A) Screen Negative    PCP Urine Screen Negative Screen Negative    Cocaine Urine Screen Negative Screen Negative     No results found for: HGBA1C  Lab Results   Component Value Date    IRON 44 11/08/2022     No results found for: CKTOTAL, CKMB, TROPONINI  No results found for: TSH, T1WTUKB    Pertinent Radiology  Radiology Results:  MR Foot Right w/o Contrast    Result Date: 10/19/2022  EXAM: MR FOOT RIGHT W/O CONTRAST LOCATION: [facility] DATE/TIME: 10/19/2022 12:03 PM INDICATION: Sepsis, wound, ?osteo. COMPARISON: None. TECHNIQUE: Unenhanced. FINDINGS: JOINTS AND BONES: -No evidence for fracture or marrow signal abnormality. No evidence for osteomyelitis. No significant effusion to suggest a septic arthropathy. Hammertoe deformities. TENDONS: -The flexor and extensor tendons are negative for tendinopathy, tendosynovitis, or tearing. The hallucis tendons are negative. LIGAMENTS: -The ligament of Lisfranc is intact. The collateral ligaments at the MTP joints are all intact. MUSCLES AND SOFT TISSUES: -There is edema or cellulitis along the dorsal aspect of the foot. There are some bullous/blistering lesions involving the 3rd and 5th toes which are minimal to direct visual inspection. Fatty infiltration and atrophy of the plantar and interosseous musculature.     IMPRESSION: 1.  No evidence for fracture. 2.  No  evidence for osteomyelitis, septic arthropathy, or abscess. 3.  Bullous/blistering lesions involving the 3rd and 5th toes. These would be amenable to direct visual inspection. 4.  Edema or cellulitis along the dorsal aspect of the foot extending into the toes. 5.  Fatty infiltration or atrophy of the plantar and interosseous musculature. 6.  Hammertoe deformities.     XR Foot Right G/E 3 Views    Result Date: 11/15/2022  EXAM: XR FOOT RIGHT G/E 3 VIEWS LOCATION: [facility] DATE/TIME: 11/15/2022 1:09 PM INDICATION: Suspect toe gangrene, osteomyelitis. COMPARISON: None.     IMPRESSION: Soft tissue bandage material and swelling within the toes; hyperdense material along the plantar aspect of the forefoot is likely ointment. There is no evidence of soft tissue gas or osteomyelitis by radiograph. No acute fracture. There is a metallic foreign body in the soft tissues of the posterior ankle/distal calf measuring 1.1 cm as seen on the lateral view.    Echocardiogram MARAH    Result Date: 10/21/2022  [id number] [id number]^ZULEMA^JOSHUA^[place] [address]  Name: DONITA FREITAS MRN: [id number] : 1975 Study Date: 10/21/2022 09:54 AM Age: 47 yrs Gender: Female Patient Location: [facility] Reason For Study: Murmur Ordering Physician: JOSHUA POOLE Performed By: MAHESH/ROSIE  BSA: 1.7 m2 Height: 67 in Weight: 130 lb HR: 73 ______________________________________________________________________________ Procedure Complete MARAH Adult. Good quality two-dimensional was performed and interpreted. Good quality color and spectral Doppler were performed and interpreted. ______________________________________________________________________________ Interpretation Summary  Left ventricular size, wall motion and function are normal. The ejection fraction is 60-65%. Normal right ventricle size and systolic function. No vegetations present. No hemodynamically  significant valvular abnormalities on 2D or color flow imaging. ______________________________________________________________________________ MARAH I determined this patient to be an appropriate candidate for the planned sedation and procedure and have reassessed the patient immediately prior to sedation and procedure. Total sedation time: 18 mins minutes of continuous bedside 1:1 monitoring. Versed (2mg) was given intravenously. Fentanyl (50mcg) was given intravenously. 4 sprays benzocaine 15 mL viscous lidocaine. Consent to the procedure was obtained prior to sedation. There were no complications associated with this procedure. The Transducer was inserted without difficulty . The procedure was performed in the Echo Lab.  Left Ventricle Left ventricular size, wall motion and function are normal. The ejection fraction is 60-65%.  Right Ventricle Normal right ventricle size and systolic function.  Atria Normal left atrial size. Right atrial size is normal. There is no color Doppler evidence of an atrial shunt. No thrombus is detected in the left atrial appendage.  Mitral Valve Mitral valve leaflets appear normal. There is no evidence of mitral stenosis or clinically significant mitral regurgitation.  Tricuspid Valve The tricuspid valve is normal in structure and function. This degree of valvular regurgitation is within normal limits. There is no tricuspid stenosis.  Aortic Valve The aortic valve is trileaflet. Lambl's excescences noted. No aortic regurgitation is present. No aortic stenosis is present.  Pulmonic Valve The pulmonic valve is not well seen, but is grossly normal. There is trace pulmonic valvular regurgitation.  Vessels The aortic root is normal size. Normal size ascending aorta. The inferior vena cava is normal.  Pericardial/Pleural There is no pericardial effusion. ______________________________________________________________________________ Report approved by: Eli Nicole 10/21/2022 11:07 AM   ______________________________________________________________________________        EKG Results: Reviewed  Echo: No results found.

## 2022-11-17 NOTE — PROGRESS NOTES
[facility]    Medicine Progress Note - Hospitalist Service    Date of Admission:  11/16/2022    Assessment & Plan     47-year-old with known diabetic foot ulcer, type 1 diabetes, CKD, smoker, marijuana use, hypertension who presented with 3 days of nausea and vomiting after doxycycline use.  She was unable to keep medicine and food down.       Intractable nausea and vomiting: likely side effect of doxycycline, ongoing chronic marijuana use and uremia  - Hold doxycycline  - Supportive treatment with IV Protonix, Compazine, IV fluids     Acute renal failure on chronic kidney disease stage IV: with metabolic acidosis. Baseline creatinine is 2.5-3, elevated to 4.3 on admission. Likely due to poor p.o. intake for 3 days  - Continue gentle IV hydration avoid nephrotoxic    Hypertensive urgency due to medical nonadherence due to ongoing nausea vomiting. Not able to take PTA amlodipine  - Continue clonidine patch and as needed hydralazine     Type 1 insulin-dependent diabetes with hypoglycemia: PTA Lantus 20 units, Novolog 1:15 carb count tidac  - Patient is not able to have oral intake today. She developed hypoglycemia with blood sugar 35. Will start gentle D5 drip. Reduce Lantus to 10 units at bedtime. NovoLog sliding scale.     Right foot ulcer: Ulcer persists despite antibiotic treatment. MRI reveals no osteomyelitis, septic arthritis, and abscess.  - Started Ceftriaxone and flagyl. Will continue  - Per podiatry, plan I&D tomorrow  - ID consulted and input appreciated     Tobacco and marijuana use: Urine tox is positive for opiates and cannabinoid  - Patient counseled to quit smoking     Past ethanol use: Patient claims she quit alcohol 6 years ago      Thrombocytosis: likely due to infection. Monitor     Anxiety depression: Continue home medication.            Diet: Combination Diet Clear Liquid; Moderate Consistent Carb (60 g CHO per Meal) Diet    DVT Prophylaxis: Pneumatic Compression  Devices. Will start heparin postop tomorrow  Williamson Catheter: Not present  Central Lines: None  Cardiac Monitoring: None  Code Status: Full Code      Disposition Plan      Expected Discharge Date: 11/19/2022                The patient's care was discussed with the Bedside Nurse, Care Coordinator/ and Patient.    Dee Devine MD  Hospitalist Service  [facility]  Securely message with the Vocera Web Console (learn more here)  Text page via [employer] Paging/Directory         ______________________________________________________________________    Interval History   Patient reports that she continues to feel nausea. She was not able to eat anything today. Her blood sugar was low in the afternoon.    Data reviewed today: I reviewed all medications, new labs and imaging results over the last 24 hours.    Physical Exam   Vital Signs: Temp: 97.5  F (36.4  C) Temp src: Oral BP: (!) 184/94 Pulse: 107   Resp: 18 SpO2: 97 % O2 Device: None (Room air) Oxygen Delivery: 1 LPM  Weight: 131 lbs 0 oz    General appearance: not in acute distress  HEENT: PERRL, EOMI  Lungs: Clear breath sounds in bilateral lung fields  Cardiovascular: Regular rate and rhythm, normal S1-S2  Abdomen: Soft, non tender, no distension  Musculoskeletal: No joint swelling  Skin: No rash and no edema. Right foot plantar deep ulcer. Eschar of left big toe and 3th toe on the medial side  Neurology: AAO ×3.  Cranial nerves II - XII normal.  Normal muscle strength in all four extremities.    Data   Recent Labs   Lab 11/17/22  1614 11/17/22  1556 11/17/22  1201 11/17/22  0327 11/16/22  2247 11/15/22  1230   WBC  --   --   --   --  10.5 10.7   HGB  --   --   --   --  11.7 11.0*   MCV  --   --   --   --  87 86   PLT  --   --   --   --  484* 436   NA  --   --   --   --  144 138   POTASSIUM  --   --   --   --  3.5 4.1   CHLORIDE  --   --   --   --  95* 97*   CO2  --   --   --   --  29 24   BUN  --   --   --   --  35.1* 35.5*   CR  --    --   --   --  4.36* 3.98*   ANIONGAP  --   --   --   --  20* 17*   ZACH  --   --   --   --  9.4 9.4   GLC 55* 37* 154*   < > 213* 243*    < > = values in this interval not displayed.

## 2022-11-17 NOTE — CONSULTS
"Care Management Initial Consult    General Information  Assessment completed with: Josefa Bolanos  Type of CM/SW Visit: Initial Assessment    Primary Care Provider verified and updated as needed: Yes   Readmission within the last 30 days: previous discharge plan unsuccessful (10/19/22 - 10/24/22)   Return Category: Exacerbation of disease  Reason for Consult: discharge planning  Advance Care Planning: Advance Care Planning Reviewed: no concerns identified          Communication Assessment  Patient's communication style: spoken language (English or Bilingual)             Cognitive  Cognitive/Neuro/Behavioral: WDL                      Living Environment:   People in home: significant other  Josefa, Boyfriend Wilbert; and older couple lives on the upper level of the house  Current living Arrangements: house (\"My boyfriend and I live on the lower level of a house. I have to use 3 steps.\")      Able to return to prior arrangements: yes       Family/Social Support:  Care provided by: self  Provides care for: no one  Marital Status: Lives with Significant Other  Significant Other       Wilbert  Description of Support System: Involved, Supportive    Support Assessment: Adequate family and caregiver support, Adequate social supports, Patient communicates needs well met    Current Resources:   Patient receiving home care services: No     Community Resources: None  Equipment currently used at home: crutches, glucometer, other (see comments) (\"knee stroller that I use most of the time; also crutches that I use occasionally\")  Supplies currently used at home: Diabetic Supplies, Wound Care Supplies    Employment/Financial:  Employment Status: employed full-time     Employment/ Comments: \"no  benefits\"  Financial Concerns:     Referral to Financial Worker: No       Lifestyle & Psychosocial Needs:  Social Determinants of Health     Tobacco Use: High Risk     Smoking Tobacco Use: Some Days     Smokeless Tobacco Use: Never " "    Passive Exposure: Not on file   Alcohol Use: Not on file   Financial Resource Strain: Not on file   Food Insecurity: Not on file   Transportation Needs: Not on file   Physical Activity: Not on file   Stress: Not on file   Social Connections: Not on file   Intimate Partner Violence: Not on file   Depression: Not on file   Housing Stability: Not on file       Functional Status:  Prior to admission patient needed assistance:   Dependent ADLs:: Ambulation-walker, Independent (\"knee walker\")  Dependent IADLs:: Independent  Assesssment of Functional Status: At functional baseline    Mental Health Status:  Mental Health Status: Past Concern  Mental Health Management: Medication, Individual Therapy, Psychiatrist    Chemical Dependency Status:  Chemical Dependency Status: Current Concern  Chemical Dependency Management:  (\"I have looked into AA in the past\")          Values/Beliefs:  Spiritual, Cultural Beliefs, Hoahaoism Practices, Values that affect care:                 Additional Information:  Josefa lives in a house with her boyfriend. \"My boyfriend and I live on the lower level of a house. I have to use 3 steps. An older couples lives on the main level of the house\".    She is independent with ADLs at baseline. Uses a \"knee stroller that I use most of the time; also crutches that I use occasionally\".    Unknown discharge needs at this time.     Boyfriend to transport at discharge.    Jeanne Duque RN      "

## 2022-11-17 NOTE — CONSULTS
Consultation - Foot and Ankle/Podiatry  Josefa Curiel,  1975, MRN [id number]    Admitting Dx: Nausea and vomiting, unspecified vomiting type [R11.2]    PCP: Domingo Costello, [phone]   Code status:  Full Code       Extended Emergency Contact Information  Primary Emergency Contact: Wilbert Peña   [facility]  Mobile Phone: [phone]  Relation: Significant other       ASSESSMENT   Principal Problem:    Nausea and vomiting, unspecified vomiting type  Active Problems:    Diabetic ulcer of right foot associated with type 1 diabetes mellitus, with necrosis of muscle, unspecified part of foot (H)    Hypertension, unspecified type       PLAN   -Fibrotic tissue with slough plantar right foot ulcer and distal ulcer 3rd digit left foot, no erythema bilateral. Stable eschar distal left hallux and 5th digit right foot. WBC 10.5. Afebrile.     -I discussed with the patient that the amount of slough along the plantar right foot has increased since her last visit with me. I recommend and have referred her for an MRI to evaluate for osteomyelitis.     -We discussed surgical I&D of the right foot to remove non-viable tissue with possible use of wound vac post-op. All questions invited and answered. She consents to surgery.     -Medical management per hospitalist. Antibiotics per ID. Right foot surgery to be scheduled for tomorrow. NPO at midnight.     -I will contact the patient with the MRI report when available and we will be guided by the results.     Thank you for the consultation. I will follow.     David Patel DPM  [facility] Podiatry/Foot & Ankle Surgery  On-Call: [phone]  ______________________________________________________________________        Reason For Consult: Nausea and vomiting, unspecified vomiting type  Diabetic Ulceration right foot        HPI    I have been requested by Dr. Dewey to evaluate Josefa Curiel who is a 47 year old year old female for the above. The patient was  admitted to [facility] for nausea/vomiting. She is known to me having recently seen her for a right foot ulceration. The patient is not sure if her right foot is contributing to her symptoms. PMH significant for DM1.          Medical History  Past Medical History:   Diagnosis Date     LORIN (acute kidney injury) (H)      Anxiety      Cannabis abuse      Depression      Diabetes Type 1, uncontrolled 1985    Onset age 10     DKA (diabetic ketoacidoses)      ETOH abuse      HLD (hyperlipidemia)      HTN (hypertension)      Lactic acidosis      Surgical History  She  has a past surgical history that includes appendectomy; Ankle Fracture Surgery (Left); and PICC/Midline Placement (10/23/2022).   Social History  Reviewed, and she  reports that she has been smoking. She has never used smokeless tobacco. She reports current alcohol use of about 35.0 standard drinks per week. She reports current drug use. Drug: Marijuana.   Allergies  Allergies   Allergen Reactions     Colestipol GI Disturbance     Nickel Rash     Penicillins Rash    Family History  family history includes Alcoholism in her maternal grandfather; Arthritis in her mother; Clotting Disorder in her mother; Coronary Artery Disease in her maternal grandfather and maternal grandmother; Depression in her father; Hyperlipidemia in her mother; No Known Problems in her brother, paternal grandfather, and paternal grandmother; Other - See Comments in her sister; Skin Cancer in her father.  family history not pertinent to presenting problem.    Psychosocial Needs  Social History     Social History Narrative     Not on file     Additional psychosocial needs reviewed per nursing assessment.       Prior to Admission Medications   (Not in a hospital admission)         Imaging: reviewed images          Review of Systems:  All other systems negative in detail except what is noted in HPI.  Physical Exam:  Temp:  [96.4  F (35.8  C)-97.6  F (36.4  C)] 97.6  F (36.4  C)  Pulse:   "[100-141] 109  Resp:  [9-29] 17  BP: (162-222)/() 169/87  SpO2:  [99 %-100 %] 100 %    General appearance: Patient is alert and fully cooperative with history & exam.  No sign of distress is noted during the visit.  Psychiatric: Affect is pleasant & appropriate.  Patient appears motivated to improve health.  Respiratory: Breathing is regular & unlabored while sitting.  HEENT: Hearing is intact to spoken word.  Speech is clear.  No gross evidence of visual impairment that would impact ambulation.    Vascular: Dorsalis pedis palpable right. There is no appreciable edema noted.  Dermatologic: Fibrotic tissue with slough plantar right foot ulcer and distal ulcer 3rd digit left foot, no erythema bilateral. Stable eschar distal left hallux and 5th digit right foot.   Neurologic: Diminished to light touch bilateral.   Musculoskeletal: Contracted digits bilateral.      BP (!) 169/87   Pulse 109   Temp 97.6  F (36.4  C) (Oral)   Resp 17   Ht 1.702 m (5' 7\")   Wt 59.4 kg (131 lb)   LMP 10/05/2022   SpO2 100%   BMI 20.52 kg/m      BMI= Body mass index is 20.52 kg/m .    Pertinent Labs    [unfilled]       Recent Labs   Lab 11/16/22  2247 11/15/22  1230    138   CO2 29 24   BUN 35.1* 35.5*       Lab Results   Component Value Date    ALT 21 10/20/2022    AST 35 10/20/2022    ALKPHOS 98 10/20/2022          Lab Results   Component Value Date    CRP <3.00 11/16/2022    CRP <3.00 11/15/2022    CRP <3.00 11/10/2022      [unfilled]     Pertinent Radiology  Radiology Results: Reviewed  MR Foot Right w/o Contrast    Result Date: 10/19/2022  EXAM: MR FOOT RIGHT W/O CONTRAST LOCATION: [facility] DATE/TIME: 10/19/2022 12:03 PM INDICATION: Sepsis, wound, ?osteo. COMPARISON: None. TECHNIQUE: Unenhanced. FINDINGS: JOINTS AND BONES: -No evidence for fracture or marrow signal abnormality. No evidence for osteomyelitis. No significant effusion to suggest a septic arthropathy. Hammertoe deformities. " TENDONS: -The flexor and extensor tendons are negative for tendinopathy, tendosynovitis, or tearing. The hallucis tendons are negative. LIGAMENTS: -The ligament of Lisfranc is intact. The collateral ligaments at the MTP joints are all intact. MUSCLES AND SOFT TISSUES: -There is edema or cellulitis along the dorsal aspect of the foot. There are some bullous/blistering lesions involving the 3rd and 5th toes which are minimal to direct visual inspection. Fatty infiltration and atrophy of the plantar and interosseous musculature.     IMPRESSION: 1.  No evidence for fracture. 2.  No evidence for osteomyelitis, septic arthropathy, or abscess. 3.  Bullous/blistering lesions involving the 3rd and 5th toes. These would be amenable to direct visual inspection. 4.  Edema or cellulitis along the dorsal aspect of the foot extending into the toes. 5.  Fatty infiltration or atrophy of the plantar and interosseous musculature. 6.  Hammertoe deformities.     XR Foot Right G/E 3 Views    Result Date: 11/15/2022  EXAM: XR FOOT RIGHT G/E 3 VIEWS LOCATION: [facility] DATE/TIME: 11/15/2022 1:09 PM INDICATION: Suspect toe gangrene, osteomyelitis. COMPARISON: None.     IMPRESSION: Soft tissue bandage material and swelling within the toes; hyperdense material along the plantar aspect of the forefoot is likely ointment. There is no evidence of soft tissue gas or osteomyelitis by radiograph. No acute fracture. There is a metallic foreign body in the soft tissues of the posterior ankle/distal calf measuring 1.1 cm as seen on the lateral view.    Echocardiogram MARAH    Result Date: 10/21/2022  [id number] [id number]^ZULEMA^JOSHUA^[place] [address]  Name: DONITA FREITAS MRN: [id number] : 1975 Study Date: 10/21/2022 09:54 AM Age: 47 yrs Gender: Female Patient [facility]: [facility] Reason For Study: Murmur Ordering Physician: JOSHUA POOLE Performed By: CM/TZ   BSA: 1.7 m2 Height: 67 in Weight: 130 lb HR: 73 ______________________________________________________________________________ Procedure Complete MARAH Adult. Good quality two-dimensional was performed and interpreted. Good quality color and spectral Doppler were performed and interpreted. ______________________________________________________________________________ Interpretation Summary  Left ventricular size, wall motion and function are normal. The ejection fraction is 60-65%. Normal right ventricle size and systolic function. No vegetations present. No hemodynamically significant valvular abnormalities on 2D or color flow imaging. ______________________________________________________________________________ MARAH I determined this patient to be an appropriate candidate for the planned sedation and procedure and have reassessed the patient immediately prior to sedation and procedure. Total sedation time: 18 mins minutes of continuous bedside 1:1 monitoring. Versed (2mg) was given intravenously. Fentanyl (50mcg) was given intravenously. 4 sprays benzocaine 15 mL viscous lidocaine. Consent to the procedure was obtained prior to sedation. There were no complications associated with this procedure. The Transducer was inserted without difficulty . The procedure was performed in the Echo Lab.  Left Ventricle Left ventricular size, wall motion and function are normal. The ejection fraction is 60-65%.  Right Ventricle Normal right ventricle size and systolic function.  Atria Normal left atrial size. Right atrial size is normal. There is no color Doppler evidence of an atrial shunt. No thrombus is detected in the left atrial appendage.  Mitral Valve Mitral valve leaflets appear normal. There is no evidence of mitral stenosis or clinically significant mitral regurgitation.  Tricuspid Valve The tricuspid valve is normal in structure and function. This degree of valvular regurgitation is within normal limits. There is no  tricuspid stenosis.  Aortic Valve The aortic valve is trileaflet. Lambl's excescences noted. No aortic regurgitation is present. No aortic stenosis is present.  Pulmonic Valve The pulmonic valve is not well seen, but is grossly normal. There is trace pulmonic valvular regurgitation.  Vessels The aortic root is normal size. Normal size ascending aorta. The inferior vena cava is normal.  Pericardial/Pleural There is no pericardial effusion. ______________________________________________________________________________ Report approved by: Eli Nicole 10/21/2022 11:07 AM  ______________________________________________________________________________

## 2022-11-17 NOTE — CONSULTS
"Infectious Disease Consultation:  Requesting MD:  Reason for consult:      HISTORY:  I saw pt 7 days ago in my office, had to stop ancef at about 70% of the duration I had planned due to ARF.  I put her on oral doxy.  She is in with worse ARF, nausea, malaise.  The wound has more slough and an operation will be done to clean wound, after MRI is done.  She is on abx.  [facility] has been [facility] over a day waiting for bed.           Pertinent past history, past infectious disease history:  See prior consultation(s) reviewed no additions  Smoker  DM    Medications:  Reviewed prior to admission meds as applicable in chart review.  Current meds are reviewed in the EMR listed MAR.     ANTIBIOTICS:    Current:CTX, flagyl   Prior:   Allergy to:    SH/FH and  travel history(if applicable to consult):smoker, reviewed sh and fh from prior ID consultation no addition    REVIEW OF SYSTEMS:  All other systems negative    EXAMINATION:  BP (!) 169/87   Pulse 109   Temp 97.6  F (36.4  C) (Oral)   Resp 17   Ht 1.702 m (5' 7\")   Wt 59.4 kg (131 lb)   LMP 10/05/2022   SpO2 100%   BMI 20.52 kg/m    Alert, awake  Vitals tabulated above, reviewed  HEENT:disheveled hair, sleeping on ER gurney  Neck supple without lymphadenopathy  Sclera clear  CARDIOVASCULAR regular rate and rhythm, no murmur  Lungs CLEAR TO AUSCULTATION   Abdomen soft, NT/ND, absent HEPATOSPLENOMEGALY  Skin normal  Joints normal  Neurologic exam non focal  Wound:  NA          This picture is nov 1, and right now the tissue is more necrotic      CLINICAL DATABASE FOR---LAB/MICRO/CULTURES/IMAGING STUDIES:  Lab Results   Component Value Date    WBC 10.5 11/16/2022     Lab Results   Component Value Date    RBC 4.02 11/16/2022     Lab Results   Component Value Date    HGB 11.7 11/16/2022     Lab Results   Component Value Date    HCT 35.0 11/16/2022     No components found for: MCT  Lab Results   Component Value Date    MCV 87 11/16/2022     Lab Results   Component Value " Date    MCH 29.1 11/16/2022     Lab Results   Component Value Date    MCHC 33.4 11/16/2022     Lab Results   Component Value Date    RDW 13.9 11/16/2022     Lab Results   Component Value Date     11/16/2022       Last Comprehensive Metabolic Panel:  Sodium   Date Value Ref Range Status   11/16/2022 144 136 - 145 mmol/L Final     Potassium   Date Value Ref Range Status   11/16/2022 3.5 3.4 - 5.3 mmol/L Final   11/03/2022 4.2 3.4 - 5.3 mmol/L Final     Chloride   Date Value Ref Range Status   11/16/2022 95 (L) 98 - 107 mmol/L Final   11/03/2022 103 94 - 109 mmol/L Final     Carbon Dioxide (CO2)   Date Value Ref Range Status   11/16/2022 29 22 - 29 mmol/L Final   11/03/2022 25 20 - 32 mmol/L Final     Anion Gap   Date Value Ref Range Status   11/16/2022 20 (H) 7 - 15 mmol/L Final   11/03/2022 9 3 - 14 mmol/L Final     Glucose   Date Value Ref Range Status   11/03/2022 79 70 - 99 mg/dL Final     GLUCOSE BY METER POCT   Date Value Ref Range Status   11/17/2022 154 (H) 70 - 99 mg/dL Final     Urea Nitrogen   Date Value Ref Range Status   11/16/2022 35.1 (H) 6.0 - 20.0 mg/dL Final   11/03/2022 37 (H) 7 - 30 mg/dL Final     Creatinine   Date Value Ref Range Status   11/16/2022 4.36 (H) 0.51 - 0.95 mg/dL Final     GFR Estimate   Date Value Ref Range Status   11/16/2022 12 (L) >60 mL/min/1.73m2 Final     Comment:     Effective December 21, 2021 eGFRcr in adults is calculated using the 2021 CKD-EPI creatinine equation which includes age and gender (Deana et al., NEJM, DOI: 10.1056/EACSht0254105)   10/03/2020 32 (L) >60 mL/min/1.73m2 Final     Calcium   Date Value Ref Range Status   11/16/2022 9.4 8.6 - 10.0 mg/dL Final       Liver Function Studies -   Recent Labs   Lab Test 11/08/22  1440 10/20/22  0714   PROTTOTAL  --  5.8*   ALBUMIN 3.5 2.9*   BILITOTAL  --  0.2   ALKPHOS  --  98   AST  --  35   ALT  --  21       Erythrocyte Sedimentation Rate   Date Value Ref Range Status   11/16/2022 58 (H) 0 - 20 mm/hr Final        CRP Inflammation   Date Value Ref Range Status   11/16/2022 <3.00 <5.00 mg/L Final   11/03/2022 3.9 0.0 - 8.0 mg/L Final           0 Result Notes  Gram Stain Result   Abnormal   1+ Gram negative bacilli      1+ Gram positive cocci      No white blood cells seen                      IMPRESSION:  Threatened foot  Recent course of SAB rx with ancef had to abort due to renal failure, oral doxy for the past 7 days, plan was to go to nov 20  ARF, worsening.  I had called her this week to continue to monitor this, ended up in ER anyway  DM    PLAN:  Ok with present abx, focused on staph and anaerobes for foot coverage.  Appears to have gram neg on skin swab too but this has no white cells and is likely not clinically all that meaningful...  MRI pending    DEYSI LEYVA MD  [facility]  Office [phone]

## 2022-11-17 NOTE — PHARMACY-ADMISSION MEDICATION HISTORY
Pharmacy Note - Admission Medication History    Pertinent Provider Information: Patient states that she has not been taking her medications for the last 3 days because of her nausea and vomiting     ______________________________________________________________________    Prior To Admission (PTA) med list completed and updated in EMR.       PTA Med List   Medication Sig Note Last Dose     amLODIPine (NORVASC) 2.5 MG tablet Take 1 tablet (2.5 mg) by mouth At Bedtime  Past Week     collagenase (SANTYL) 250 UNIT/GM external ointment Apply topically daily Total square centimeters of wound(s) being treated is         30 day supply 11/17/2022: Patient states she has ran out Past Week     doxycycline hyclate (VIBRAMYCIN) 100 MG capsule Take 1 capsule (100 mg) by mouth 2 times daily for 10 days  Past Week     DULoxetine (CYMBALTA) 60 MG capsule [DULOXETINE (CYMBALTA) 30 MG CAPSULE] Take 60 mg by mouth every evening.   Past Week     insulin degludec (TRESIBA) 100 UNIT/ML pen 20 Units every morning  Past Week     insulin lispro (HUMALOG KWIKPEN) 100 UNIT/ML (1 unit dial) KWIKPEN 1 unit per 15 gm Carbs, hold if glucose < 80 (Patient taking differently: Inject 1 unit for every 6g carbs for breakfast, 1 unit for every 4g carbs for lunch, & 1 unit for every 3g carbs for dinner plus 1 unit for every 70 blood sugar above 130. Max 50 units per day)  Past Week     ondansetron (ZOFRAN ODT) 4 MG ODT tab Take 1 tablet (4 mg) by mouth every 8 hours as needed for nausea  11/16/2022     rosuvastatin (CRESTOR) 40 MG tablet Take 40 mg by mouth daily  Past Month     traZODone (DESYREL) 100 MG tablet Take 25 mg by mouth nightly as needed for sleep  Past Month       Information source(s): Patient  Method of interview communication: in-person    Summary of Changes to PTA Med List  New: doxycycline  Discontinued: n/a  Changed: n/a    Patient was asked about OTC/herbal products specifically.  PTA med list reflects this.    In the past week,  patient estimated taking medication this percent of the time:  50-90% due to illness.    Allergies were reviewed, assessed, and updated with the patient.      Patient did not bring any medications to the hospital and can't retrieve from home. No multi-dose medications are available for use during hospital stay.     The information provided in this note is only as accurate as the sources available at the time of the update(s).    Thank you for the opportunity to participate in the care of this patient.    Ana Nolen RPH  11/17/2022 8:14 AM

## 2022-11-17 NOTE — ED NOTES
"ER NURSING BOARDING NOTE  Josefa Curiel MRN: [id number]      Admitting dx:   (E10.621,  L97.513) Diabetic ulcer of right foot associated with type 1 diabetes mellitus, with necrosis of muscle, unspecified part of foot (H)    (I10) Hypertension, unspecified type    (R11.2) Nausea and vomiting, unspecified vomiting type    Current length of ER stay: ~12 hours    Back story: ER visit on 11/16/22 for c/o vomiting and high blood pressure.  Hx of DM1.      Medical Hx:   Past Medical History:   Diagnosis Date     LORIN (acute kidney injury) (H)      Anxiety      Cannabis abuse      Depression      Diabetes Type 1, uncontrolled 1985    Onset age 10     DKA (diabetic ketoacidoses)      ETOH abuse      HLD (hyperlipidemia)      HTN (hypertension)      Lactic acidosis         Current status:    Neuro: alert and oriented.  Uses crutches at baseline for left foot infection.  Cardiac: on full cardiac monitor.  HR 100s.  Denies CP.    Respiratory: on 2L of O2 via NC, sats 100%, turned down to 1L.  No increase work of breathing.  COVID swab: negative  Digestive: nausea with retching this AM.  IV pepcid given.    Urinary: left food   Mobility: +Crutches.  Independent.    Skin: necrotic left foot ulcer  IV Access site: 20g to right hand and right AC.  Psych: calm/cooperative  Current living status: lives with SO.    Visitor at bedside: none at this time.  Preferred language: English  VS obtained: BP (!) 188/100   Pulse 102   Temp 97.6  F (36.4  C) (Oral)   Resp 21   Ht 1.702 m (5' 7\")   Wt 59.4 kg (131 lb)   LMP 10/05/2022   SpO2 99%   BMI 20.52 kg/m  .  Plan: Admitting to: [facility]  Consults: Podiatry, ID  "

## 2022-11-17 NOTE — ED PROVIDER NOTES
Emergency Department Encounter      NAME: Josefa Curiel  AGE: 47 year old female  YOB: 1975  MRN: [id number]  EVALUATION DATE & TIME: 11/16/2022 10:14 PM    PCP: Domingo Costello    ED PROVIDER: Frank Mcgee M.D.      Chief Complaint   Patient presents with     Vomiting         FINAL IMPRESSION:  1. Diabetic ulcer of right foot associated with type 1 diabetes mellitus, with necrosis of muscle, unspecified part of foot (H)    2. Hypertension, unspecified type    3. Nausea and vomiting, unspecified vomiting type        MEDICAL DECISION MAKING:    10:55 PM I met with the patient, obtained history, performed an initial exam, and discussed options and plan for diagnostics and treatment here in the ED. PPE worn: surgical mask.  12:00 AM I rechecked and updated the patient with results.  12:21 AM I discussed the patient with Dr. Bautista from the hospitalist service who agrees to admit the patient.     This patient is a 47-year-old female with a history of type 1 diabetes, right kidney disease, alcohol abuse and THC use who presents with vomiting.  The patient was seen on 29 September for nausea and vomiting.  She was admitted on 19 October until 24 October for a diabetic foot ulcer.  She had had a fever 103 at that time with an elevated lactic acid.  An MRI of the foot was negative.  She underwent 4 weeks of IV Ancef through a PICC line.  The PICC line was discontinued on 10 November and she was switched to p.o. doxycycline.  The ulcer culture grew out methicillin sensitive staph aureus.  Dr. Rodriguez is consulted for infectious disease.  Dr. Patel is the patient's podiatrist.  The patient says that for the past few days she has had nausea and vomiting, chills, myalgia, nausea and feverishness.  She was seen yesterday in the ER where an x-ray was done of the foot which was negative for any sign of osteomyelitis or gas.  The patient returns today as she says that the Zofran that was prescribed is not helping  with her nausea and vomiting.  The patient denies withdrawing from any narcotics or alcohol.  She says she uses marijuana daily.  Yesterday she received IV fluids and morphine.  It appeared that she was quite hypertensive when she arrived but quickly the hypotension resolved.  She was little agitated nauseous and had goosebumps on her arms.  I am going to give the patient a dose of morphine and Zofran to see if this relieves her symptoms as I am suspicious of withdrawal as a cause of her symptoms.  However she has a nasty looking necrotic diabetic foot ulcer on the ball of her right foot which appears worse than the pictures from her October medical record.  I did have the nursing staff swab the ulcer for culture and gram stain.  2 sets of blood cultures were sent.  The patient was ordered Rocephin and Flagyl.  I have admitted the patient to the hospitalist service for evaluation of the patient's right foot.  They can also take care of the patient's renal failure and hypertension while she was in the hospital.        Pertinent Labs & Imaging studies reviewed. (See chart for details)    Medical Decision Making    History:    Supplemental history from: N/A    External Record(s) reviewed: Inpatient Record and Outpatient Record (Details documented in chart).    Work Up:    Chart documentation includes differential considered and any EKGs or imaging interpreted by provider.    In additional to work up documented, I considered the following work up: See chart documentation, if applicable.    External consultation:    Discussion of management with another provider: See chart documentation, if applicable    Discussed with radiology regarding test interpretation: N/A    Complicating factors:    Care impacted by chronic illness: Chronic Kidney Disease and Diabetes    Care affected by social determinants of health: Alcohol Abuse and/or Recreational Drug Use    Disposition considerations: Admit.          The importance of close  follow up was discussed. We reviewed warning signs and symptoms, and I instructed Ms. Curiel to return to the emergency department immediately if she develops any new or worsening symptoms. I provided additional verbal discharge instructions. Ms. Curiel expressed understanding and agreement with this plan of care, her questions were answered, and she was discharged in stable condition.       MEDICATIONS GIVEN IN THE EMERGENCY:  Medications   cefTRIAXone (ROCEPHIN) 1 g vial to attach to  mL bag for ADULTS or NS 50 mL bag for PEDS (has no administration in time range)   metroNIDAZOLE (FLAGYL) infusion 500 mg (has no administration in time range)   morphine (PF) injection 4 mg (has no administration in time range)   ondansetron (ZOFRAN) injection 4 mg (has no administration in time range)   0.9% sodium chloride BOLUS (1,000 mLs Intravenous New Bag 11/16/22 7524)       NEW PRESCRIPTIONS STARTED AT TODAY'S ER VISIT:  New Prescriptions    No medications on file          =================================================================    HPI    Patient information was obtained from: patient     Use of : N/A       Josefa Curiel is a 47 year old female with a past medical history of DM1, CKD3b, anxiety/depression, prolonged QTc, alcohol abuse, and THC use, who presents for evaluation of vomiting.     Per chart review, the patient was admitted to [facility] for 5 days in mid October for nausea/vomiting and fevers, was found to have a right foot cellulitis with MSSA bacteremia. MARAH was negative for vegetations and MR right foot without osteomyelitis or abscess. Patient had PICC placed with a plan for 4 weeks of IV antibiotics per ID rec. She had ID follow up on 11/10 - PICC was removed and was switched to PO doxy for ten final days. More recently, patient presented to the ED yesterday for vomiting and malaise. Labs were unremarkable and foot XR was without evidence of subcutaneous gas or  osteomyelitis. Patient felt improved after IV fluids, antiemetics, and morphine - was discharged with a Zofran prescription.     Patient reports continued nausea and vomiting for the past 2 days. She has been unable to tolerate PO intake today. States the Zofran that she is taking does not provide adequate relief. Complains of associating shakes and chills. Denies diarrhea, malaise, cough, shortness of breath, congestion. She reports daily marijuana use. Denies narcotic use. Patient states she has not had any alcohol for 6 weeks (states she was drinking 3 days per week prior to this). No sick contacts.      REVIEW OF SYSTEMS   Review of Systems   Constitutional: Positive for chills.        Positive for shakiness.   HENT: Negative for congestion.    Respiratory: Negative for cough and shortness of breath.    Gastrointestinal: Positive for nausea and vomiting. Negative for diarrhea.   Musculoskeletal: Negative for myalgias.   All other systems reviewed and are negative.       PAST MEDICAL HISTORY:  Past Medical History:   Diagnosis Date     LORIN (acute kidney injury) (H)      Anxiety      Cannabis abuse      Depression      Diabetes Type 1, uncontrolled 1985    Onset age 10     DKA (diabetic ketoacidoses)      ETOH abuse      HLD (hyperlipidemia)      HTN (hypertension)      Lactic acidosis        PAST SURGICAL HISTORY:  Past Surgical History:   Procedure Laterality Date     ANKLE FRACTURE SURGERY Left      APPENDECTOMY       PICC SINGLE LUMEN PLACEMENT  10/23/2022            CURRENT MEDICATIONS:      Current Facility-Administered Medications:      cefTRIAXone (ROCEPHIN) 1 g vial to attach to  mL bag for ADULTS or NS 50 mL bag for PEDS, 1 g, Intravenous, Once, Frank Mcgee MD     metroNIDAZOLE (FLAGYL) infusion 500 mg, 500 mg, Intravenous, Once, Frank Mcgee MD     morphine (PF) injection 4 mg, 4 mg, Intravenous, Once, Frank Mcgee MD     ondansetron (ZOFRAN) injection 4 mg, 4 mg, Intravenous,  Once, Frank Mcgee MD    Current Outpatient Medications:      amLODIPine (NORVASC) 2.5 MG tablet, Take 1 tablet (2.5 mg) by mouth At Bedtime, Disp: 30 tablet, Rfl: 0     cloNIDine (CATAPRES) 0.1 MG tablet, Take 2 tablets (0.2 mg) by mouth 2 times daily, Disp: 120 tablet, Rfl: 1     collagenase (SANTYL) 250 UNIT/GM external ointment, Apply topically daily Total square centimeters of wound(s) being treated is         30 day supply, Disp: 30 g, Rfl: 3     Continuous Blood Gluc Sensor (DEXCOM G6 SENSOR) MISC, , Disp: , Rfl:      doxycycline hyclate (VIBRAMYCIN) 100 MG capsule, Take 1 capsule (100 mg) by mouth 2 times daily for 10 days, Disp: 20 capsule, Rfl: 0     DULoxetine (CYMBALTA) 60 MG capsule, [DULOXETINE (CYMBALTA) 30 MG CAPSULE] Take 60 mg by mouth every evening. , Disp: , Rfl:      famotidine (PEPCID) 20 MG tablet, Take 1 tablet (20 mg) by mouth 2 times daily as needed (heartburn) For 1 week then 1 twice a day as needed for heartburn (Patient not taking: Reported on 11/10/2022), Disp: , Rfl:      insulin degludec (TRESIBA) 100 UNIT/ML pen, Inject 35 units under the skin in the morning., Disp: , Rfl:      insulin glargine (LANTUS PEN) 100 UNIT/ML pen, Inject 20 Units Subcutaneous At Bedtime, Disp: , Rfl:      insulin lispro (HUMALOG KWIKPEN) 100 UNIT/ML (1 unit dial) KWIKPEN, 1 unit per 15 gm Carbs, hold if glucose < 80 (Patient taking differently: Inject 1 unit for every 6g carbs for breakfast, 1 unit for every 4g carbs for lunch, & 1 unit for every 3g carbs for dinner plus 1 unit for every 70 blood sugar above 130. Max 50 units per day), Disp: 15 mL, Rfl: 0     ondansetron (ZOFRAN ODT) 4 MG ODT tab, Take 1 tablet (4 mg) by mouth every 8 hours as needed for nausea, Disp: 10 tablet, Rfl: 0     rosuvastatin (CRESTOR) 40 MG tablet, Take 40 mg by mouth daily, Disp: , Rfl:      traZODone (DESYREL) 100 MG tablet, Take 25 mg by mouth nightly as needed for sleep, Disp: , Rfl:     ALLERGIES:  Allergies  "  Allergen Reactions     Colestipol GI Disturbance     Nickel Rash     Penicillins Rash       FAMILY HISTORY:  Family History   Problem Relation Age of Onset     Clotting Disorder Mother         \"thin blood\"     Arthritis Mother      Hyperlipidemia Mother      Skin Cancer Father         face/nose      Depression Father      Other - See Comments Sister         eye surgeries     No Known Problems Brother      Coronary Artery Disease Maternal Grandmother      Alcoholism Maternal Grandfather      Coronary Artery Disease Maternal Grandfather      No Known Problems Paternal Grandmother      No Known Problems Paternal Grandfather        SOCIAL HISTORY:   Social History     Socioeconomic History     Marital status: Single   Tobacco Use     Smoking status: Some Days     Smokeless tobacco: Never     Tobacco comments:     occasional   Substance and Sexual Activity     Alcohol use: Yes     Alcohol/week: 35.0 standard drinks     Comment: Alcoholic Drinks/day: ~750 mL wine daily     Drug use: Yes     Types: Marijuana     Comment: Drug use: 4-5x/week     Sexual activity: Yes     Partners: Male     Birth control/protection: Condom       PHYSICAL EXAM:    Vitals: BP (!) 202/100   Pulse 114   Temp (!) 96.4  F (35.8  C) (Temporal)   Resp 20   Ht 1.702 m (5' 7\")   Wt 59.4 kg (131 lb)   LMP 10/05/2022   SpO2 100%   BMI 20.52 kg/m     Constitutional: Well developed, well nourished. Fidgety, disheveled female in moderate distress.  HEAD:Normocephalic, atraumatic,   Eyes: PERRLA, EOM intact, conjunctiva clear, no discharge  ENT: mucous membranes moist, nose normal.   Neck- Supple, gross ROM intact.  No JVD.  No palpable nodes.  Pulmonary: Clear to auscultation bilaterally, no respiratory distress, no wheezing, speaks full sentences easily.  Chest: No chest wall tenderness  Cardiovascular: Normal heart rate, regular rhythm, no murmurs. No lower extremity edema, 2+ DP pulses.   GI: Soft, no tenderness to deep palpation in all " quadrants, not distended, no masses.  No hepatosplenomegaly.  Musculoskeletal: Moving all 4 extremities intentionally and without pain. No obvious deformity. Large deep necrotic ulcer across the medial ball of her right foot.   Back: No CVA tenderness  Skin: Warm, dry, no rash. Piloerection seen on the arms.   Neurologic: Alert & oriented x 3, speech clear, moving all extremities spontaneously   Psychiatric: Affect normal, cooperative.     LAB:  All pertinent labs reviewed and interpreted.  Labs Ordered and Resulted from Time of ED Arrival to Time of ED Departure   BASIC METABOLIC PANEL - Abnormal       Result Value    Sodium 144      Potassium 3.5      Chloride 95 (*)     Carbon Dioxide (CO2) 29      Anion Gap 20 (*)     Urea Nitrogen 35.1 (*)     Creatinine 4.36 (*)     Calcium 9.4      Glucose 213 (*)     GFR Estimate 12 (*)    CBC WITH PLATELETS AND DIFFERENTIAL - Abnormal    WBC Count 10.5      RBC Count 4.02      Hemoglobin 11.7      Hematocrit 35.0      MCV 87      MCH 29.1      MCHC 33.4      RDW 13.9      Platelet Count 484 (*)     % Neutrophils 84      % Lymphocytes 11      % Monocytes 3      % Eosinophils 0      % Basophils 1      % Immature Granulocytes 1      NRBCs per 100 WBC 0      Absolute Neutrophils 9.0 (*)     Absolute Lymphocytes 1.1      Absolute Monocytes 0.3      Absolute Eosinophils 0.0      Absolute Basophils 0.1      Absolute Immature Granulocytes 0.1      Absolute NRBCs 0.0     LACTIC ACID WHOLE BLOOD - Normal    Lactic Acid 1.6     CRP INFLAMMATION - Normal    CRP Inflammation <3.00     ERYTHROCYTE SEDIMENTATION RATE AUTO   HCG QUALITATIVE PREGNANCY   DRUG ABUSE SCREEN 77 URINE (FL, RH, SH)   AEROBIC BACTERIAL CULTURE ROUTINE       RADIOLOGY:  No orders to display       EKG:   Performed at: 23:23 on 11/16/2022   Impression: Sinus tachycardia, possible left atrial enlargement, incomplete RBBB  Rate: 115 bpm  Rhythm: Sinus  QRS Interval: 104 ms  QTc Interval: 506 ms  Comparison: Compared to  EKG of 11/16/22 at 23:21; no significant change was found  I have independently reviewed and interpreted the EKG(s) documented above.     PROCEDURES:   Procedures       I, Timmy Alegre, am serving as a scribe to document services personally performed by Dr. Frank Mcgee based on my observation and the provider's statements to me. I, Frank Mcgee M.D. attest that Timmy Alegre is acting in a scribe capacity, has observed my performance of the services and has documented them in accordance with my direction.      Frank Mcgee M.D.  Emergency Medicine  [facility] EMERGENCY DEPARTMENT  [facility] [facility] [id number] [phone]  Dept: [phone]     Frank Mcgee MD  11/17/22 0108

## 2022-11-18 ENCOUNTER — ANESTHESIA (OUTPATIENT)
Dept: SURGERY | Facility: HOSPITAL | Age: 47
DRG: 623 | End: 2022-11-18
Payer: COMMERCIAL

## 2022-11-18 ENCOUNTER — ANESTHESIA EVENT (OUTPATIENT)
Dept: SURGERY | Facility: HOSPITAL | Age: 47
DRG: 623 | End: 2022-11-18
Payer: COMMERCIAL

## 2022-11-18 LAB
ALBUMIN SERPL BCG-MCNC: 2.8 G/DL (ref 3.5–5.2)
ALP SERPL-CCNC: 79 U/L (ref 35–104)
ALT SERPL W P-5'-P-CCNC: 5 U/L (ref 10–35)
ANION GAP SERPL CALCULATED.3IONS-SCNC: 12 MMOL/L (ref 7–15)
AST SERPL W P-5'-P-CCNC: 18 U/L (ref 10–35)
BILIRUB SERPL-MCNC: <0.2 MG/DL
BUN SERPL-MCNC: 32.4 MG/DL (ref 6–20)
CALCIUM SERPL-MCNC: 7.8 MG/DL (ref 8.6–10)
CHLORIDE SERPL-SCNC: 106 MMOL/L (ref 98–107)
CREAT SERPL-MCNC: 4.36 MG/DL (ref 0.51–0.95)
DEPRECATED HCO3 PLAS-SCNC: 28 MMOL/L (ref 22–29)
ENTEROCOCCUS FAECALIS: NOT DETECTED
ENTEROCOCCUS FAECIUM: NOT DETECTED
GFR SERPL CREATININE-BSD FRML MDRD: 12 ML/MIN/1.73M2
GLUCOSE BLDC GLUCOMTR-MCNC: 113 MG/DL (ref 70–99)
GLUCOSE BLDC GLUCOMTR-MCNC: 133 MG/DL (ref 70–99)
GLUCOSE BLDC GLUCOMTR-MCNC: 168 MG/DL (ref 70–99)
GLUCOSE BLDC GLUCOMTR-MCNC: 234 MG/DL (ref 70–99)
GLUCOSE BLDC GLUCOMTR-MCNC: 61 MG/DL (ref 70–99)
GLUCOSE BLDC GLUCOMTR-MCNC: 69 MG/DL (ref 70–99)
GLUCOSE BLDC GLUCOMTR-MCNC: 84 MG/DL (ref 70–99)
GLUCOSE BLDC GLUCOMTR-MCNC: 87 MG/DL (ref 70–99)
GLUCOSE BLDC GLUCOMTR-MCNC: 95 MG/DL (ref 70–99)
GLUCOSE BLDC GLUCOMTR-MCNC: 98 MG/DL (ref 70–99)
GLUCOSE SERPL-MCNC: 88 MG/DL (ref 70–99)
GRAM STAIN RESULT: ABNORMAL
GRAM STAIN RESULT: ABNORMAL
LISTERIA SPECIES (DETECTED/NOT DETECTED): NOT DETECTED
Lab: DETECTED
MAGNESIUM SERPL-MCNC: 2.3 MG/DL (ref 1.7–2.3)
POTASSIUM SERPL-SCNC: 2.9 MMOL/L (ref 3.4–5.3)
POTASSIUM SERPL-SCNC: 3.4 MMOL/L (ref 3.4–5.3)
PROT SERPL-MCNC: 5.5 G/DL (ref 6.4–8.3)
SODIUM SERPL-SCNC: 146 MMOL/L (ref 136–145)
STAPHYLOCOCCUS AUREUS: DETECTED
STAPHYLOCOCCUS EPIDERMIDIS: DETECTED
STAPHYLOCOCCUS LUGDUNENSIS: DETECTED
STREPTOCOCCUS AGALACTIAE: NOT DETECTED
STREPTOCOCCUS ANGINOSUS GROUP: NOT DETECTED
STREPTOCOCCUS PNEUMONIAE: NOT DETECTED
STREPTOCOCCUS PYOGENES: NOT DETECTED
STREPTOCOCCUS SPECIES: NOT DETECTED

## 2022-11-18 PROCEDURE — 250N000013 HC RX MED GY IP 250 OP 250 PS 637: Performed by: PODIATRIST

## 2022-11-18 PROCEDURE — 0KBV0ZZ EXCISION OF RIGHT FOOT MUSCLE, OPEN APPROACH: ICD-10-PCS | Performed by: PODIATRIST

## 2022-11-18 PROCEDURE — 80053 COMPREHEN METABOLIC PANEL: CPT | Performed by: INTERNAL MEDICINE

## 2022-11-18 PROCEDURE — 258N000001 HC RX 258: Performed by: INTERNAL MEDICINE

## 2022-11-18 PROCEDURE — 28002 TREATMENT OF FOOT INFECTION: CPT | Mod: RT | Performed by: PODIATRIST

## 2022-11-18 PROCEDURE — 250N000009 HC RX 250: Performed by: ANESTHESIOLOGY

## 2022-11-18 PROCEDURE — 250N000013 HC RX MED GY IP 250 OP 250 PS 637: Performed by: ANESTHESIOLOGY

## 2022-11-18 PROCEDURE — 250N000011 HC RX IP 250 OP 636: Performed by: PODIATRIST

## 2022-11-18 PROCEDURE — 120N000001 HC R&B MED SURG/OB

## 2022-11-18 PROCEDURE — 99232 SBSQ HOSP IP/OBS MODERATE 35: CPT | Performed by: INTERNAL MEDICINE

## 2022-11-18 PROCEDURE — 87205 SMEAR GRAM STAIN: CPT | Performed by: PODIATRIST

## 2022-11-18 PROCEDURE — 272N000001 HC OR GENERAL SUPPLY STERILE: Performed by: PODIATRIST

## 2022-11-18 PROCEDURE — 999N000141 HC STATISTIC PRE-PROCEDURE NURSING ASSESSMENT: Performed by: PODIATRIST

## 2022-11-18 PROCEDURE — 250N000011 HC RX IP 250 OP 636: Performed by: INTERNAL MEDICINE

## 2022-11-18 PROCEDURE — 370N000017 HC ANESTHESIA TECHNICAL FEE, PER MIN: Performed by: PODIATRIST

## 2022-11-18 PROCEDURE — 87077 CULTURE AEROBIC IDENTIFY: CPT | Performed by: PODIATRIST

## 2022-11-18 PROCEDURE — 99233 SBSQ HOSP IP/OBS HIGH 50: CPT | Performed by: INTERNAL MEDICINE

## 2022-11-18 PROCEDURE — 36415 COLL VENOUS BLD VENIPUNCTURE: CPT | Performed by: INTERNAL MEDICINE

## 2022-11-18 PROCEDURE — 87070 CULTURE OTHR SPECIMN AEROBIC: CPT | Performed by: PODIATRIST

## 2022-11-18 PROCEDURE — 250N000013 HC RX MED GY IP 250 OP 250 PS 637: Performed by: INTERNAL MEDICINE

## 2022-11-18 PROCEDURE — 84132 ASSAY OF SERUM POTASSIUM: CPT | Performed by: INTERNAL MEDICINE

## 2022-11-18 PROCEDURE — 87075 CULTR BACTERIA EXCEPT BLOOD: CPT | Performed by: PODIATRIST

## 2022-11-18 PROCEDURE — 258N000003 HC RX IP 258 OP 636: Performed by: ANESTHESIOLOGY

## 2022-11-18 PROCEDURE — 250N000011 HC RX IP 250 OP 636: Performed by: ANESTHESIOLOGY

## 2022-11-18 PROCEDURE — 250N000011 HC RX IP 250 OP 636: Performed by: NURSE ANESTHETIST, CERTIFIED REGISTERED

## 2022-11-18 PROCEDURE — 83735 ASSAY OF MAGNESIUM: CPT | Performed by: INTERNAL MEDICINE

## 2022-11-18 PROCEDURE — 360N000075 HC SURGERY LEVEL 2, PER MIN: Performed by: PODIATRIST

## 2022-11-18 RX ORDER — NALOXONE HYDROCHLORIDE 0.4 MG/ML
0.2 INJECTION, SOLUTION INTRAMUSCULAR; INTRAVENOUS; SUBCUTANEOUS
Status: DISCONTINUED | OUTPATIENT
Start: 2022-11-18 | End: 2022-11-28 | Stop reason: HOSPADM

## 2022-11-18 RX ORDER — ONDANSETRON 4 MG/1
4 TABLET, ORALLY DISINTEGRATING ORAL EVERY 6 HOURS PRN
Status: DISCONTINUED | OUTPATIENT
Start: 2022-11-18 | End: 2022-11-28 | Stop reason: HOSPADM

## 2022-11-18 RX ORDER — ONDANSETRON 2 MG/ML
4 INJECTION INTRAMUSCULAR; INTRAVENOUS EVERY 6 HOURS PRN
Status: DISCONTINUED | OUTPATIENT
Start: 2022-11-18 | End: 2022-11-28 | Stop reason: HOSPADM

## 2022-11-18 RX ORDER — HYDROMORPHONE HCL IN WATER/PF 6 MG/30 ML
0.2 PATIENT CONTROLLED ANALGESIA SYRINGE INTRAVENOUS
Status: DISCONTINUED | OUTPATIENT
Start: 2022-11-18 | End: 2022-11-28 | Stop reason: HOSPADM

## 2022-11-18 RX ORDER — ACETAMINOPHEN 325 MG/1
975 TABLET ORAL ONCE
Status: COMPLETED | OUTPATIENT
Start: 2022-11-18 | End: 2022-11-18

## 2022-11-18 RX ORDER — FENTANYL CITRATE 50 UG/ML
100 INJECTION, SOLUTION INTRAMUSCULAR; INTRAVENOUS ONCE
Status: COMPLETED | OUTPATIENT
Start: 2022-11-18 | End: 2022-11-18

## 2022-11-18 RX ORDER — SODIUM CHLORIDE, SODIUM LACTATE, POTASSIUM CHLORIDE, CALCIUM CHLORIDE 600; 310; 30; 20 MG/100ML; MG/100ML; MG/100ML; MG/100ML
INJECTION, SOLUTION INTRAVENOUS CONTINUOUS
Status: DISCONTINUED | OUTPATIENT
Start: 2022-11-18 | End: 2022-11-18 | Stop reason: HOSPADM

## 2022-11-18 RX ORDER — HYDROMORPHONE HCL IN WATER/PF 6 MG/30 ML
0.4 PATIENT CONTROLLED ANALGESIA SYRINGE INTRAVENOUS
Status: DISCONTINUED | OUTPATIENT
Start: 2022-11-18 | End: 2022-11-28 | Stop reason: HOSPADM

## 2022-11-18 RX ORDER — FENTANYL CITRATE 50 UG/ML
25 INJECTION, SOLUTION INTRAMUSCULAR; INTRAVENOUS EVERY 5 MIN PRN
Status: DISCONTINUED | OUTPATIENT
Start: 2022-11-18 | End: 2022-11-18 | Stop reason: HOSPADM

## 2022-11-18 RX ORDER — ONDANSETRON 2 MG/ML
4 INJECTION INTRAMUSCULAR; INTRAVENOUS EVERY 30 MIN PRN
Status: DISCONTINUED | OUTPATIENT
Start: 2022-11-18 | End: 2022-11-18 | Stop reason: HOSPADM

## 2022-11-18 RX ORDER — SODIUM CHLORIDE, SODIUM LACTATE, POTASSIUM CHLORIDE, CALCIUM CHLORIDE 600; 310; 30; 20 MG/100ML; MG/100ML; MG/100ML; MG/100ML
INJECTION, SOLUTION INTRAVENOUS CONTINUOUS
Status: DISCONTINUED | OUTPATIENT
Start: 2022-11-18 | End: 2022-11-19

## 2022-11-18 RX ORDER — MEPERIDINE HYDROCHLORIDE 25 MG/ML
12.5 INJECTION INTRAMUSCULAR; INTRAVENOUS; SUBCUTANEOUS EVERY 5 MIN PRN
Status: DISCONTINUED | OUTPATIENT
Start: 2022-11-18 | End: 2022-11-18 | Stop reason: HOSPADM

## 2022-11-18 RX ORDER — AMOXICILLIN 250 MG
1 CAPSULE ORAL 2 TIMES DAILY
Status: DISCONTINUED | OUTPATIENT
Start: 2022-11-18 | End: 2022-11-28 | Stop reason: HOSPADM

## 2022-11-18 RX ORDER — LIDOCAINE 40 MG/G
CREAM TOPICAL
Status: DISCONTINUED | OUTPATIENT
Start: 2022-11-18 | End: 2022-11-28 | Stop reason: HOSPADM

## 2022-11-18 RX ORDER — OXYCODONE HYDROCHLORIDE 5 MG/1
5 TABLET ORAL EVERY 4 HOURS PRN
Status: DISCONTINUED | OUTPATIENT
Start: 2022-11-18 | End: 2022-11-20

## 2022-11-18 RX ORDER — MAGNESIUM SULFATE 4 G/50ML
4 INJECTION INTRAVENOUS ONCE
Status: COMPLETED | OUTPATIENT
Start: 2022-11-18 | End: 2022-11-18

## 2022-11-18 RX ORDER — FENTANYL CITRATE 50 UG/ML
50 INJECTION, SOLUTION INTRAMUSCULAR; INTRAVENOUS EVERY 5 MIN PRN
Status: DISCONTINUED | OUTPATIENT
Start: 2022-11-18 | End: 2022-11-18 | Stop reason: HOSPADM

## 2022-11-18 RX ORDER — PROPOFOL 10 MG/ML
INJECTION, EMULSION INTRAVENOUS PRN
Status: DISCONTINUED | OUTPATIENT
Start: 2022-11-18 | End: 2022-11-18

## 2022-11-18 RX ORDER — ACETAMINOPHEN 325 MG/1
975 TABLET ORAL EVERY 8 HOURS
Status: COMPLETED | OUTPATIENT
Start: 2022-11-18 | End: 2022-11-21

## 2022-11-18 RX ORDER — NALOXONE HYDROCHLORIDE 0.4 MG/ML
0.4 INJECTION, SOLUTION INTRAMUSCULAR; INTRAVENOUS; SUBCUTANEOUS
Status: DISCONTINUED | OUTPATIENT
Start: 2022-11-18 | End: 2022-11-28 | Stop reason: HOSPADM

## 2022-11-18 RX ORDER — PROCHLORPERAZINE MALEATE 10 MG
10 TABLET ORAL EVERY 6 HOURS PRN
Status: DISCONTINUED | OUTPATIENT
Start: 2022-11-18 | End: 2022-11-19

## 2022-11-18 RX ORDER — PROPOFOL 10 MG/ML
INJECTION, EMULSION INTRAVENOUS CONTINUOUS PRN
Status: DISCONTINUED | OUTPATIENT
Start: 2022-11-18 | End: 2022-11-18

## 2022-11-18 RX ORDER — OXYCODONE HYDROCHLORIDE 5 MG/1
10 TABLET ORAL EVERY 4 HOURS PRN
Status: DISCONTINUED | OUTPATIENT
Start: 2022-11-18 | End: 2022-11-20

## 2022-11-18 RX ORDER — ACETAMINOPHEN 325 MG/1
650 TABLET ORAL EVERY 4 HOURS PRN
Status: DISCONTINUED | OUTPATIENT
Start: 2022-11-21 | End: 2022-11-28 | Stop reason: HOSPADM

## 2022-11-18 RX ORDER — LIDOCAINE 40 MG/G
CREAM TOPICAL
Status: DISCONTINUED | OUTPATIENT
Start: 2022-11-18 | End: 2022-11-18

## 2022-11-18 RX ORDER — POLYETHYLENE GLYCOL 3350 17 G/17G
17 POWDER, FOR SOLUTION ORAL DAILY
Status: DISCONTINUED | OUTPATIENT
Start: 2022-11-19 | End: 2022-11-28 | Stop reason: HOSPADM

## 2022-11-18 RX ORDER — POTASSIUM CHLORIDE 7.45 MG/ML
10 INJECTION INTRAVENOUS
Status: COMPLETED | OUTPATIENT
Start: 2022-11-18 | End: 2022-11-18

## 2022-11-18 RX ORDER — BUPIVACAINE HYDROCHLORIDE AND EPINEPHRINE 5; 5 MG/ML; UG/ML
INJECTION, SOLUTION PERINEURAL
Status: COMPLETED | OUTPATIENT
Start: 2022-11-18 | End: 2022-11-18

## 2022-11-18 RX ORDER — ONDANSETRON 4 MG/1
4 TABLET, ORALLY DISINTEGRATING ORAL EVERY 30 MIN PRN
Status: DISCONTINUED | OUTPATIENT
Start: 2022-11-18 | End: 2022-11-18 | Stop reason: HOSPADM

## 2022-11-18 RX ORDER — LORAZEPAM 2 MG/ML
1 INJECTION INTRAMUSCULAR ONCE
Status: COMPLETED | OUTPATIENT
Start: 2022-11-18 | End: 2022-11-18

## 2022-11-18 RX ORDER — HALOPERIDOL 5 MG/ML
1 INJECTION INTRAMUSCULAR
Status: DISCONTINUED | OUTPATIENT
Start: 2022-11-18 | End: 2022-11-18 | Stop reason: HOSPADM

## 2022-11-18 RX ORDER — BISACODYL 10 MG
10 SUPPOSITORY, RECTAL RECTAL DAILY PRN
Status: DISCONTINUED | OUTPATIENT
Start: 2022-11-18 | End: 2022-11-28 | Stop reason: HOSPADM

## 2022-11-18 RX ADMIN — FENTANYL CITRATE 100 MCG: 50 INJECTION INTRAMUSCULAR; INTRAVENOUS at 15:44

## 2022-11-18 RX ADMIN — DEXTROSE MONOHYDRATE 25 ML: 25 INJECTION, SOLUTION INTRAVENOUS at 02:17

## 2022-11-18 RX ADMIN — INSULIN ASPART 2 UNITS: 100 INJECTION, SOLUTION INTRAVENOUS; SUBCUTANEOUS at 22:13

## 2022-11-18 RX ADMIN — DEXTROSE MONOHYDRATE 25 ML: 25 INJECTION, SOLUTION INTRAVENOUS at 09:04

## 2022-11-18 RX ADMIN — POTASSIUM CHLORIDE 10 MEQ: 10 INJECTION, SOLUTION INTRAVENOUS at 11:16

## 2022-11-18 RX ADMIN — CEFTRIAXONE SODIUM 1 G: 1 INJECTION, POWDER, FOR SOLUTION INTRAMUSCULAR; INTRAVENOUS at 21:24

## 2022-11-18 RX ADMIN — METRONIDAZOLE 500 MG: 500 INJECTION, SOLUTION INTRAVENOUS at 14:07

## 2022-11-18 RX ADMIN — DEXTROSE AND SODIUM CHLORIDE: 5; 450 INJECTION, SOLUTION INTRAVENOUS at 03:21

## 2022-11-18 RX ADMIN — POTASSIUM CHLORIDE 10 MEQ: 10 INJECTION, SOLUTION INTRAVENOUS at 12:27

## 2022-11-18 RX ADMIN — PROPOFOL 50 MG: 10 INJECTION, EMULSION INTRAVENOUS at 16:23

## 2022-11-18 RX ADMIN — LORAZEPAM 1 MG: 2 INJECTION INTRAMUSCULAR; INTRAVENOUS at 11:15

## 2022-11-18 RX ADMIN — BUPIVACAINE HYDROCHLORIDE AND EPINEPHRINE BITARTRATE 20 ML: 5; .005 INJECTION, SOLUTION PERINEURAL at 15:51

## 2022-11-18 RX ADMIN — SODIUM CHLORIDE, POTASSIUM CHLORIDE, SODIUM LACTATE AND CALCIUM CHLORIDE: 600; 310; 30; 20 INJECTION, SOLUTION INTRAVENOUS at 16:18

## 2022-11-18 RX ADMIN — ACETAMINOPHEN 975 MG: 325 TABLET, FILM COATED ORAL at 18:21

## 2022-11-18 RX ADMIN — SENNOSIDES AND DOCUSATE SODIUM 1 TABLET: 50; 8.6 TABLET ORAL at 21:29

## 2022-11-18 RX ADMIN — MIDAZOLAM HYDROCHLORIDE 1 MG: 1 INJECTION, SOLUTION INTRAMUSCULAR; INTRAVENOUS at 15:44

## 2022-11-18 RX ADMIN — FAMOTIDINE 10 MG: 10 INJECTION, SOLUTION INTRAVENOUS at 09:14

## 2022-11-18 RX ADMIN — HYDRALAZINE HYDROCHLORIDE 10 MG: 20 INJECTION INTRAMUSCULAR; INTRAVENOUS at 18:11

## 2022-11-18 RX ADMIN — HYDRALAZINE HYDROCHLORIDE 10 MG: 20 INJECTION INTRAMUSCULAR; INTRAVENOUS at 09:53

## 2022-11-18 RX ADMIN — POTASSIUM CHLORIDE 10 MEQ: 10 INJECTION, SOLUTION INTRAVENOUS at 19:15

## 2022-11-18 RX ADMIN — PROPOFOL 150 MCG/KG/MIN: 10 INJECTION, EMULSION INTRAVENOUS at 16:23

## 2022-11-18 RX ADMIN — ACETAMINOPHEN 975 MG: 325 TABLET, FILM COATED ORAL at 15:14

## 2022-11-18 RX ADMIN — MAGNESIUM SULFATE HEPTAHYDRATE 4 G: 80 INJECTION, SOLUTION INTRAVENOUS at 15:14

## 2022-11-18 RX ADMIN — POTASSIUM CHLORIDE 10 MEQ: 10 INJECTION, SOLUTION INTRAVENOUS at 14:10

## 2022-11-18 RX ADMIN — METRONIDAZOLE 500 MG: 500 INJECTION, SOLUTION INTRAVENOUS at 00:54

## 2022-11-18 ASSESSMENT — ACTIVITIES OF DAILY LIVING (ADL)
ADLS_ACUITY_SCORE: 19
ADLS_ACUITY_SCORE: 21
ADLS_ACUITY_SCORE: 21
ADLS_ACUITY_SCORE: 19
ADLS_ACUITY_SCORE: 21
ADLS_ACUITY_SCORE: 19
ADLS_ACUITY_SCORE: 21

## 2022-11-18 NOTE — ANESTHESIA CARE TRANSFER NOTE
Patient: Josefa Curiel    Procedure: Procedure(s):  INCISION AND DRAINAGE, right foot       Diagnosis: Cellulitis and abscess of foot excluding toe [L03.119, L02.619]  Diagnosis Additional Information: No value filed.    Anesthesia Type:   General     Note:    Oropharynx: oropharynx clear of all foreign objects and spontaneously breathing  Level of Consciousness: awake  Oxygen Supplementation: room air    Independent Airway: airway patency satisfactory and stable  Dentition: dentition unchanged  Vital Signs Stable: post-procedure vital signs reviewed and stable  Report to RN Given: handoff report given  Patient transferred to: Medical/Surgical Unit    Handoff Report: Identifed the Patient, Identified the Reponsible Provider, Reviewed the pertinent medical history, Discussed the surgical course, Reviewed Intra-OP anesthesia mangement and issues during anesthesia, Set expectations for post-procedure period and Allowed opportunity for questions and acknowledgement of understanding      Vitals:  Vitals Value Taken Time   /57 1641 11/18/22   Temp 36 1641 11/18/22   Pulse 97 1641 11/18/22   Resp     SpO2 100 1641 11/18/22       Electronically Signed By: NATALIA Sosa CRNA  November 18, 2022  4:41 PM

## 2022-11-18 NOTE — PLAN OF CARE
Problem: Plan of Care - These are the overarching goals to be used throughout the patient stay.    Goal: Optimal Comfort and Wellbeing  Outcome: Progressing     Problem: Diabetes Comorbidity  Goal: Blood Glucose Level Within Targeted Range  Outcome: Progressing     Problem: Hypertension Comorbidity  Goal: Blood Pressure in Desired Range  Intervention: Maintain Blood Pressure Management  Recent Flowsheet Documentation  Taken 11/17/2022 1600 by Juan Hirsch RN  Medication Review/Management: medications reviewed     Problem: Fall Injury Risk  Goal: Absence of Fall and Fall-Related Injury  Intervention: Promote Injury-Free Environment  Recent Flowsheet Documentation  Taken 11/17/2022 1600 by Juan Hirsch RN  Safety Promotion/Fall Prevention:   activity supervised   assistive device/personal items within reach   bed alarm on   nonskid shoes/slippers when out of bed   patient and family education   mobility aid in reach     Patient is alert and orientated, able to make needs known. Denies pain during this shift. Did report nausea, administered PRN compazine, which was effective in relieving patient's nausea.  When patient arrived on the unit, hypertensive at 191/103, and hypoglycemic with a blood sugar of 37. Patient drank two juices, her blood sugar came up to 55. Patient drank two more juices which brought sugar up to 105. Hospitalist notified, lowered the parameters for PRN hydralazine and started D5.45NS at 100 ml/hr. Administered PRN hydralazine, with desired effect BP decreased to 174/90. Will be NPO at midnight for I&D tomorrow to right foot.     Juan Hirsch RN

## 2022-11-18 NOTE — PLAN OF CARE
Problem: Plan of Care - These are the overarching goals to be used throughout the patient stay.    Goal: Plan of Care Review  Description: The Plan of Care Review/Shift note should be completed every shift.  The Outcome Evaluation is a brief statement about your assessment that the patient is improving, declining, or no change.  This information will be displayed automatically on your shift note.  Outcome: Progressing     Problem: Diabetes Comorbidity  Goal: Blood Glucose Level Within Targeted Range  Outcome: Progressing   Goal Outcome Evaluation:       Pt's blood sugar has been monitored. Checks at 02:00 was low- 69. D50 25ml given. Rechecked was 98. Pt D5.45NS at 100 ml/hr. Reminded to be on NPO for surgical procedure. Denies any pain, nausea and discomfort. Slept well overnight. BP elevated, asymptomatic. Will continue to monitor.

## 2022-11-18 NOTE — PLAN OF CARE
Problem: Plan of Care - These are the overarching goals to be used throughout the patient stay.    Goal: Absence of Hospital-Acquired Illness or Injury  Intervention: Identify and Manage Fall Risk  Recent Flowsheet Documentation  Taken 11/18/2022 0900 by Carlene Smart RN  Safety Promotion/Fall Prevention: activity supervised     Problem: Hypertension Comorbidity  Goal: Blood Pressure in Desired Range  Outcome: Progressing     Problem: Plan of Care - These are the overarching goals to be used throughout the patient stay.    Goal: Absence of Hospital-Acquired Illness or Injury  Outcome: Progressing  Intervention: Identify and Manage Fall Risk  Recent Flowsheet Documentation  Taken 11/18/2022 0900 by Carlene Smart RN  Safety Promotion/Fall Prevention: activity supervised   Goal Outcome Evaluation:       Patient alert and oriented denied any pain or discomfort. Had low blood sugar earlier, dextrose IV was given and blood sugar went up to 133. She received potassium replacement x 3, prior to going down for procedure. She also received hydralazine for elevated blood pressure, blood pressure upon recheck 140/69. Patient was taken down for procedure about 1435. Blood culture results called to provider. Followed up with provider about tele orders, per provider patient does not need tele.

## 2022-11-18 NOTE — ANESTHESIA PROCEDURE NOTES
Sciatic Procedure Note    Pre-Procedure   Staff -        Anesthesiologist:  Guido Cohn MD       Performed By: anesthesiologist       Location: pre-op       Procedure Start/Stop Times: 11/18/2022 3:41 PM and 11/18/2022 3:51 PM       Pre-Anesthestic Checklist: patient identified, IV checked, site marked, risks and benefits discussed, informed consent, monitors and equipment checked, pre-op evaluation, at physician/surgeon's request and post-op pain management  Timeout:       Correct Patient: Yes        Correct Procedure: Yes        Correct Site: Yes        Correct Position: Yes        Correct Laterality: Yes        Site Marked: Yes  Procedure Documentation  Procedure: Sciatic       Laterality: right       Patient Position: supine       Patient Prep/Sterile Barriers: sterile gloves, mask       Skin prep: Chloraprep (gluteal (Block done at the bifurcation of the sciatic nerve) approach).       Needle Type: short bevel       Needle Gauge: 21.        Needle Length (Inches): 4        Ultrasound guided       1. Ultrasound was used to identify targeted nerve, plexus, vascular marker, or fascial plane and place a needle adjacent to it in real-time.       2. Ultrasound was used to visualize the spread of anesthetic in close proximity to the above referenced structure.       3. A permanent image is entered into the patient's record.       4. The visualized anatomic structures appeared normal.       5. There were no apparent abnormal pathologic findings.    Assessment/Narrative         The placement was negative for: blood aspirated, painful injection and site bleeding       Paresthesias: No.       Test dose of mL at.         Test dose negative, 3 minutes after injection, for signs of intravascular, subdural, or intrathecal injection.       Bolus given via needle..        Secured via.        Insertion/Infusion Method: Single Shot       Complications: none       Injection made incrementally with aspirations every 5  "mL.    Medication(s) Administered   Bupivacaine 0.5% w/ 1:200K Epi (Other) - Other   20 mL - 11/18/2022 3:51:00 PM  Medication Administration Time: 11/18/2022 3:41 PM      FOR [facility] ONLY:   Pain Team Contact information: please page the Pain Team Via [employer]. Search \"Pain\". During daytime hours, please page the attending first. At night please page the resident first.    "

## 2022-11-18 NOTE — OP NOTE
Date: 11/18/2022     Surgeon: JOSÉ LUIS Patel DPM    Preoperative diagnosis:   1. Abscess right foot  2. Ulceration right foot    Postoperative diagnosis: Same    Procedure:   1. Incision and drainage right foot  2. Excisional Debridement ulceration right foot into muscle    Anesthesia: MAC with Block     Hemostasis: Pneumatic ankle tourniquet 250 mmHg    Pathology:   ID Type Source Tests Collected by Time Destination   A :  Tissue Foot, Right ANAEROBIC BACTERIAL CULTURE ROUTINE, GRAM STAIN, AEROBIC BACTERIAL CULTURE ROUTINE David Patel DPM 11/18/2022  4:32 PM         Injectables: None    Materials: None    Complications: None    Blood loss: 2 ml     Findings: Patient presents for operative intervention for an infected wound on the right foot. Reviewed MRI report with the patient in pre-op which is negative for osteomyelitis. I discussed today's procedure to include removal of all non-viable tissue with likely use of a wound vac post-op. She consents to surgery. Patient questions invited and answered, including appropriate risk, benefits and complications. No guarantees given or implied. Patient has been NPO.    Description: Patient was brought to the operating room and placed on the table in supine position. IV-sedation and popliteal block was administered by the anesthesia department. The foot was then prepped and draped in usual aseptic manner. The extremity was elevated and exsanguinated. Well-padded ankle pneumatic tourniquet was inflated to 250mmHg and the following procedure was then performed: Attention was directed to the ulceration along the distal right forefoot where non-viable tissue was noted. A #15 blade was used to make an incision into subcutaneous tissue were a small area of purulent drainage was noted. The non-viable tissue was sharply excisionally debrided with a #15 blade and removed from the surgical site. Tracking to other foot compartments was not noted. Soft tissue sent for aerobic and  "anaerobic culture. Next, a 1000cc pulse lavage was used to irrigate the surgical site. Following irrigation and debridement, no remaining non-viable tissue was noted.    The resulting ulceration measured 6x4.5x0.6cm. Sharp, excisional debridement was performed with a #15 blade into muscle debriding 27 sq cm.     The wound was packed with 1/4\" gauze, and dressings consistent of 4x4's, ABD, kerlix roll and an ace wrap. The pneumatic tourniquet was released and a hyperemic response was noted to the digits on the right foot.     The patient appeared to tolerate all the procedures and anesthesia well without apparent complications. Patient was transported from the operating room to the recovery room with vital signs stable and neurovascular status as it was pre-operatively to the right foot. Patient to be returned to her in-house room for continued IV antibiotics per ID. Medical management per hospitalist. She will remain non-weight bearing on the right foot. C nurse consulted for wound vac application. Surgical dressing to remain intact until the wound vac is applied.     David Patel DPM          "

## 2022-11-18 NOTE — ANESTHESIA PREPROCEDURE EVALUATION
Anesthesia Pre-Procedure Evaluation    Patient: Josefa Curiel   MRN: [id number] : 1975        Procedure : Procedure(s):  INCISION AND DRAINAGE, right foot          Past Medical History:   Diagnosis Date     LORIN (acute kidney injury) (H)      Anxiety      Cannabis abuse      Depression      Diabetes Type 1, uncontrolled 1985    Onset age 10     DKA (diabetic ketoacidoses)      ETOH abuse      HLD (hyperlipidemia)      HTN (hypertension)      Lactic acidosis       Past Surgical History:   Procedure Laterality Date     ANKLE FRACTURE SURGERY Left      APPENDECTOMY       PICC SINGLE LUMEN PLACEMENT  10/23/2022           Allergies   Allergen Reactions     Colestipol GI Disturbance     Nickel Rash     Penicillins Rash      Social History     Tobacco Use     Smoking status: Some Days     Smokeless tobacco: Never     Tobacco comments:     occasional   Substance Use Topics     Alcohol use: Yes     Alcohol/week: 35.0 standard drinks     Comment: Alcoholic Drinks/day: ~750 mL wine daily      Wt Readings from Last 1 Encounters:   22 59.4 kg (131 lb)        Anesthesia Evaluation   Pt has had prior anesthetic. Type: General and MAC.    History of anesthetic complications  - PONV.      ROS/MED HX  ENT/Pulmonary:       Neurologic:     (+) peripheral neuropathy,     Cardiovascular:     (+) Dyslipidemia hypertension-Peripheral Vascular Disease----    METS/Exercise Tolerance:     Hematologic:       Musculoskeletal:       GI/Hepatic: Comment: Upper GIB.      Renal/Genitourinary:     (+) renal disease,     Endo:     (+) type II DM,     Psychiatric/Substance Use:     (+) psychiatric history anxiety and depression alcohol abuse     Infectious Disease:       Malignancy:       Other:            Physical Exam    Airway        Mallampati: II   TM distance: > 3 FB   Neck ROM: full     Respiratory Devices and Support         Dental     Comment: Fair dentition.        Cardiovascular   cardiovascular exam normal           Pulmonary   pulmonary exam normal                OUTSIDE LABS:  CBC:   Lab Results   Component Value Date    WBC 10.5 11/16/2022    WBC 10.7 11/15/2022    HGB 11.7 11/16/2022    HGB 11.0 (L) 11/15/2022    HCT 35.0 11/16/2022    HCT 32.5 (L) 11/15/2022     (H) 11/16/2022     11/15/2022     BMP:   Lab Results   Component Value Date     (H) 11/18/2022     11/16/2022    POTASSIUM 2.9 (L) 11/18/2022    POTASSIUM 3.5 11/16/2022    CHLORIDE 106 11/18/2022    CHLORIDE 95 (L) 11/16/2022    CO2 28 11/18/2022    CO2 29 11/16/2022    BUN 32.4 (H) 11/18/2022    BUN 35.1 (H) 11/16/2022    CR 4.36 (H) 11/18/2022    CR 4.36 (H) 11/16/2022     (H) 11/18/2022     (H) 11/18/2022     COAGS:   Lab Results   Component Value Date    PTT 25 03/23/2018    INR 0.87 (L) 03/23/2018     POC:   Lab Results   Component Value Date    HCGS Negative 11/16/2022     HEPATIC:   Lab Results   Component Value Date    ALBUMIN 2.8 (L) 11/18/2022    PROTTOTAL 5.5 (L) 11/18/2022    ALT 5 (L) 11/18/2022    AST 18 11/18/2022    ALKPHOS 79 11/18/2022    BILITOTAL <0.2 11/18/2022     OTHER:   Lab Results   Component Value Date    LACT 0.6 (L) 11/17/2022    A1C 8.8 (H) 10/19/2022    ZACH 7.8 (L) 11/18/2022    PHOS 4.9 (H) 11/08/2022    MAG 1.9 11/16/2022    LIPASE 24 10/19/2022    CRP <3.00 11/16/2022    SED 58 (H) 11/16/2022       Anesthesia Plan    ASA Status:  3   NPO Status:  NPO Appropriate    Anesthesia Type: General.     - Airway: Mask Only      Maintenance: TIVA.        Consents    Anesthesia Plan(s) and associated risks, benefits, and realistic alternatives discussed. Questions answered and patient/representative(s) expressed understanding.    - Discussed:     - Discussed with:  Patient      - Extended Intubation/Ventilatory Support Discussed: No.      - Patient is DNR/DNI Status: No    Use of blood products discussed: No .     Postoperative Care    Pain management: Peripheral nerve block (Single Shot), IV  analgesics, Oral pain medications, Multi-modal analgesia.   PONV prophylaxis: Dexamethasone or Solumedrol, Ondansetron (or other 5HT-3)     Comments:    Other Comments: Decadron, Zofran.  Diprivan infusion.  Sciatic nerve block for POP per surgeon request.            Guido Cohn MD

## 2022-11-18 NOTE — PROGRESS NOTES
CLINICAL NUTRITION SERVICES - ASSESSMENT NOTE     Nutrition Prescription    RECOMMENDATIONS FOR MDs/PROVIDERS TO ORDER:  Consider Jamar 1x/day for wound healing needs.    Malnutrition Status:    unable    Recommendations already ordered by Registered Dietitian (RD):  none    Future/Additional Recommendations:  NFPE     REASON FOR ASSESSMENT  Josefa Curiel is a/an 47 year old female assessed by the dietitian for Admission Nutrition Risk Screen for wt loss and decrease po intake.      NUTRITION HISTORY  Pt currently in surgery.  Chart reviewed.  Pt with hx of DM type 1, Foot wound.  At admission patient had 3 days of nausea and vomiting probably related to antibiotic.        CURRENT NUTRITION ORDERS  Diet: NPO.  Previously on consistent carbohydrate (60g CHO/meal)  Intake/Tolerance: no intake recorded yet.  D5% started today for low BG.    LABS  Labs:  Electrolytes  Potassium (mmol/L)   Date Value   11/18/2022 2.9 (L)   11/16/2022 3.5   11/15/2022 4.1   11/03/2022 4.2   10/03/2020 3.8   10/02/2020 3.3 (L)     Phosphorus (mg/dL)   Date Value   11/08/2022 4.9 (H)   09/29/2022 3.8   10/03/2020 3.1   10/02/2020 2.6   10/02/2020 2.7    Blood Glucose  Glucose (mg/dL)   Date Value   11/18/2022 88   11/03/2022 79   10/03/2020 138 (H)   10/02/2020 55 (LL)   10/02/2020 231 (H)   10/01/2020 74     GLUCOSE BY METER POCT (mg/dL)   Date Value   11/18/2022 95   11/18/2022 113 (H)   11/18/2022 133 (H)   11/18/2022 61 (L)   11/18/2022 84     Hemoglobin A1C (%)   Date Value   10/19/2022 8.8 (H)   10/02/2020 10.0 (H)   03/25/2018 8.9 (H)   04/09/2017 10.7 (H)   11/15/2015 10.0 (H)    Inflammatory Markers  CRP Inflammation (mg/L)   Date Value   11/16/2022 <3.00   11/15/2022 <3.00   11/10/2022 <3.00   11/03/2022 3.9     WBC Count (10e3/uL)   Date Value   11/16/2022 10.5   11/15/2022 10.7   11/10/2022 6.6     Albumin (g/dL)   Date Value   11/18/2022 2.8 (L)   11/08/2022 3.5   10/20/2022 2.9 (L)   10/02/2020 3.0 (L)   10/01/2020 4.5    07/27/2018 3.7      Magnesium (mg/dL)   Date Value   11/16/2022 1.9   10/19/2022 1.5 (L)   09/30/2022 1.6 (L)     Sodium (mmol/L)   Date Value   11/18/2022 146 (H)   11/16/2022 144   11/15/2022 138    Renal  Urea Nitrogen (mg/dL)   Date Value   11/18/2022 32.4 (H)   11/16/2022 35.1 (H)   11/15/2022 35.5 (H)   11/03/2022 37 (H)   10/03/2020 12   10/02/2020 19     Creatinine (mg/dL)   Date Value   11/18/2022 4.36 (H)   11/16/2022 4.36 (H)   11/15/2022 3.98 (H)     Additional  Triglycerides (mg/dL)   Date Value   04/10/2017 134     Ketones Urine (mg/dL)   Date Value   11/08/2022 Negative        Labs reviewed    MEDICATIONS    [Auto Hold] cefTRIAXone  1 g Intravenous Q24H     [Auto Hold] cloNIDine   Transdermal Q8H     [Auto Hold] cloNIDine  1 patch Transdermal Weekly     [Auto Hold] famotidine  10 mg Intravenous Daily     fentaNYL  100 mcg Intravenous Once     [Held by provider] insulin aspart  1-5 Units Subcutaneous At Bedtime     [Held by provider] insulin aspart   Subcutaneous Daily with breakfast     [Held by provider] insulin aspart   Subcutaneous Daily with lunch     [Held by provider] insulin aspart   Subcutaneous Daily with supper     [Auto Hold] insulin aspart  1-7 Units Subcutaneous Q4H     [Held by provider] insulin glargine  10 Units Subcutaneous At Bedtime     magnesium sulfate  4 g Intravenous Once     [Auto Hold] metroNIDAZOLE  500 mg Intravenous Q12H     midazolam  2 mg Intravenous Once     sodium chloride (PF)  3 mL Intracatheter Q8H     [Auto Hold] sodium chloride (PF)  3 mL Intracatheter Q8H        dextrose 5% and 0.45% NaCl 100 mL/hr at 11/18/22 0321     lactated ringers       Patient RECEIVING antibiotic to treat a different condition and it provides ADEQUATE COVERAGE for this surgical procedure.        [Auto Hold] acetaminophen **OR** [Auto Hold] acetaminophen, [Auto Hold] glucose **OR** [Auto Hold] dextrose **OR** [Auto Hold] glucagon, [Auto Hold] hydrALAZINE, lidocaine 4%, [Auto Hold]  "lidocaine 4%, lidocaine (buffered or not buffered), [Auto Hold] lidocaine (buffered or not buffered), [Auto Hold] melatonin, [Auto Hold] metoprolol, naloxone **OR** naloxone **OR** naloxone **OR** naloxone, [Auto Hold] oxyCODONE, [Auto Hold] oxyCODONE IR, [Auto Hold] prochlorperazine **OR** [Auto Hold] prochlorperazine **OR** [Auto Hold] prochlorperazine, sodium chloride (PF), [Auto Hold] sodium chloride (PF), [Auto Hold] traZODone   Medications reviewed    ANTHROPOMETRICS  Height: 170.2 cm (5' 7\")  Most Recent Weight: 59.4 kg (131 lb)    BMI: Normal BMI  Weight History:   Wt Readings from Last 8 Encounters:   11/16/22 59.4 kg (131 lb)   11/15/22 59 kg (130 lb)   11/01/22 59 kg (130 lb)   10/19/22 59 kg (130 lb)   09/28/22 59 kg (130 lb)   08/23/19 69.2 kg (152 lb 8 oz)   No wt loss found per chart review.    Dosing Weight: 59.4 kg    ASSESSED NUTRITION NEEDS  Estimated Energy Needs: 1485+ kcals/day (25+)  Justification: Wound healing  Estimated Protein Needs: 59-83 grams protein/day (1.1 - 1.4 grams of pro/kg)  Justification: CKD and Wound healing  Estimated Fluid Needs: 1485+ mL/day (1 mL/kcal)   Justification: Maintenance    PHYSICAL FINDINGS  See malnutrition section below.  Diabetic foot wound.      MALNUTRITION:  % Weight Loss:  None noted  % Intake:  Decreased intake does not meet criteria for malnutrition, 3 days or more of decreased intake  Subcutaneous Fat Loss:  Unable, patient not available  Muscle Loss:  unable  Fluid Retention:  unable    Malnutrition Diagnosis: Unable to determine due to need NFPE, po hx    NUTRITION DIAGNOSIS  Increased nutrient needs related to wound healing as evidenced by wound      INTERVENTIONS  Implementation  Nutrition Education: Per provider order if indicated   Collaboration with other providers   Recommend Jamar 1x/day for wound healing needs, limit to 1x/day due to renal failure.  F/u early next week for NFPE and further wound healing needs    Goals  Meet nutrition " needs  Improve wound  BG <180       Monitoring/Evaluation  Progress toward goals will be monitored and evaluated per protocol.

## 2022-11-19 ENCOUNTER — APPOINTMENT (OUTPATIENT)
Dept: PHYSICAL THERAPY | Facility: HOSPITAL | Age: 47
DRG: 623 | End: 2022-11-19
Attending: PODIATRIST
Payer: COMMERCIAL

## 2022-11-19 LAB
ALBUMIN SERPL BCG-MCNC: 2.7 G/DL (ref 3.5–5.2)
ALP SERPL-CCNC: 69 U/L (ref 35–104)
ALT SERPL W P-5'-P-CCNC: <5 U/L (ref 10–35)
ANION GAP SERPL CALCULATED.3IONS-SCNC: 10 MMOL/L (ref 7–15)
AST SERPL W P-5'-P-CCNC: 16 U/L (ref 10–35)
BILIRUB SERPL-MCNC: <0.2 MG/DL
BUN SERPL-MCNC: 27.2 MG/DL (ref 6–20)
CALCIUM SERPL-MCNC: 7.8 MG/DL (ref 8.6–10)
CHLORIDE SERPL-SCNC: 98 MMOL/L (ref 98–107)
CREAT SERPL-MCNC: 4.24 MG/DL (ref 0.51–0.95)
DEPRECATED HCO3 PLAS-SCNC: 25 MMOL/L (ref 22–29)
GFR SERPL CREATININE-BSD FRML MDRD: 12 ML/MIN/1.73M2
GLUCOSE BLDC GLUCOMTR-MCNC: 137 MG/DL (ref 70–99)
GLUCOSE BLDC GLUCOMTR-MCNC: 155 MG/DL (ref 70–99)
GLUCOSE BLDC GLUCOMTR-MCNC: 167 MG/DL (ref 70–99)
GLUCOSE BLDC GLUCOMTR-MCNC: 200 MG/DL (ref 70–99)
GLUCOSE BLDC GLUCOMTR-MCNC: 411 MG/DL (ref 70–99)
GLUCOSE BLDC GLUCOMTR-MCNC: 96 MG/DL (ref 70–99)
GLUCOSE BLDC GLUCOMTR-MCNC: 98 MG/DL (ref 70–99)
GLUCOSE SERPL-MCNC: 150 MG/DL (ref 70–99)
HOLD SPECIMEN: NORMAL
MAGNESIUM SERPL-MCNC: 2 MG/DL (ref 1.7–2.3)
POTASSIUM SERPL-SCNC: 3.1 MMOL/L (ref 3.4–5.3)
POTASSIUM SERPL-SCNC: 3.6 MMOL/L (ref 3.4–5.3)
POTASSIUM SERPL-SCNC: 3.6 MMOL/L (ref 3.4–5.3)
PROT SERPL-MCNC: 5 G/DL (ref 6.4–8.3)
SODIUM SERPL-SCNC: 133 MMOL/L (ref 136–145)

## 2022-11-19 PROCEDURE — 250N000013 HC RX MED GY IP 250 OP 250 PS 637: Performed by: INTERNAL MEDICINE

## 2022-11-19 PROCEDURE — 84132 ASSAY OF SERUM POTASSIUM: CPT | Performed by: INTERNAL MEDICINE

## 2022-11-19 PROCEDURE — 97161 PT EVAL LOW COMPLEX 20 MIN: CPT | Mod: GP

## 2022-11-19 PROCEDURE — 258N000003 HC RX IP 258 OP 636: Performed by: INTERNAL MEDICINE

## 2022-11-19 PROCEDURE — 99233 SBSQ HOSP IP/OBS HIGH 50: CPT | Performed by: INTERNAL MEDICINE

## 2022-11-19 PROCEDURE — 250N000011 HC RX IP 250 OP 636: Performed by: PODIATRIST

## 2022-11-19 PROCEDURE — 99232 SBSQ HOSP IP/OBS MODERATE 35: CPT | Performed by: INTERNAL MEDICINE

## 2022-11-19 PROCEDURE — 250N000011 HC RX IP 250 OP 636: Performed by: INTERNAL MEDICINE

## 2022-11-19 PROCEDURE — 87040 BLOOD CULTURE FOR BACTERIA: CPT | Performed by: INTERNAL MEDICINE

## 2022-11-19 PROCEDURE — 97116 GAIT TRAINING THERAPY: CPT | Mod: GP

## 2022-11-19 PROCEDURE — 120N000001 HC R&B MED SURG/OB

## 2022-11-19 PROCEDURE — 250N000013 HC RX MED GY IP 250 OP 250 PS 637: Performed by: PODIATRIST

## 2022-11-19 PROCEDURE — 36415 COLL VENOUS BLD VENIPUNCTURE: CPT | Performed by: INTERNAL MEDICINE

## 2022-11-19 PROCEDURE — 36415 COLL VENOUS BLD VENIPUNCTURE: CPT | Performed by: PODIATRIST

## 2022-11-19 PROCEDURE — 83735 ASSAY OF MAGNESIUM: CPT | Performed by: INTERNAL MEDICINE

## 2022-11-19 RX ORDER — POTASSIUM CHLORIDE 1500 MG/1
20 TABLET, EXTENDED RELEASE ORAL ONCE
Status: COMPLETED | OUTPATIENT
Start: 2022-11-19 | End: 2022-11-19

## 2022-11-19 RX ORDER — CEFEPIME HYDROCHLORIDE 1 G/1
1 INJECTION, POWDER, FOR SOLUTION INTRAMUSCULAR; INTRAVENOUS EVERY 24 HOURS
Status: DISCONTINUED | OUTPATIENT
Start: 2022-11-19 | End: 2022-11-22

## 2022-11-19 RX ORDER — SODIUM CHLORIDE 9 MG/ML
INJECTION, SOLUTION INTRAVENOUS CONTINUOUS
Status: DISCONTINUED | OUTPATIENT
Start: 2022-11-19 | End: 2022-11-20

## 2022-11-19 RX ORDER — AMLODIPINE BESYLATE 2.5 MG/1
2.5 TABLET ORAL AT BEDTIME
Status: DISCONTINUED | OUTPATIENT
Start: 2022-11-19 | End: 2022-11-20

## 2022-11-19 RX ADMIN — TRAZODONE HYDROCHLORIDE 50 MG: 50 TABLET ORAL at 21:18

## 2022-11-19 RX ADMIN — SENNOSIDES AND DOCUSATE SODIUM 1 TABLET: 50; 8.6 TABLET ORAL at 08:37

## 2022-11-19 RX ADMIN — ACETAMINOPHEN 975 MG: 325 TABLET, FILM COATED ORAL at 02:45

## 2022-11-19 RX ADMIN — POLYETHYLENE GLYCOL 3350 17 G: 17 POWDER, FOR SOLUTION ORAL at 08:38

## 2022-11-19 RX ADMIN — METRONIDAZOLE 500 MG: 500 INJECTION, SOLUTION INTRAVENOUS at 02:46

## 2022-11-19 RX ADMIN — FAMOTIDINE 10 MG: 10 INJECTION, SOLUTION INTRAVENOUS at 08:38

## 2022-11-19 RX ADMIN — CEFEPIME HYDROCHLORIDE 1 G: 1 INJECTION, POWDER, FOR SOLUTION INTRAMUSCULAR; INTRAVENOUS at 08:38

## 2022-11-19 RX ADMIN — ACETAMINOPHEN 975 MG: 325 TABLET, FILM COATED ORAL at 09:42

## 2022-11-19 RX ADMIN — SENNOSIDES AND DOCUSATE SODIUM 1 TABLET: 50; 8.6 TABLET ORAL at 21:13

## 2022-11-19 RX ADMIN — POTASSIUM CHLORIDE 20 MEQ: 1500 TABLET, EXTENDED RELEASE ORAL at 02:45

## 2022-11-19 RX ADMIN — AMLODIPINE BESYLATE 2.5 MG: 2.5 TABLET ORAL at 21:13

## 2022-11-19 RX ADMIN — ACETAMINOPHEN 975 MG: 325 TABLET, FILM COATED ORAL at 18:21

## 2022-11-19 RX ADMIN — SODIUM CHLORIDE: 9 INJECTION, SOLUTION INTRAVENOUS at 22:57

## 2022-11-19 RX ADMIN — HYDRALAZINE HYDROCHLORIDE 10 MG: 20 INJECTION INTRAMUSCULAR; INTRAVENOUS at 08:38

## 2022-11-19 RX ADMIN — METRONIDAZOLE 500 MG: 500 INJECTION, SOLUTION INTRAVENOUS at 13:55

## 2022-11-19 RX ADMIN — SODIUM CHLORIDE: 9 INJECTION, SOLUTION INTRAVENOUS at 08:37

## 2022-11-19 ASSESSMENT — ACTIVITIES OF DAILY LIVING (ADL)
ADLS_ACUITY_SCORE: 21
ADLS_ACUITY_SCORE: 23
ADLS_ACUITY_SCORE: 21
ADLS_ACUITY_SCORE: 23
ADLS_ACUITY_SCORE: 21
ADLS_ACUITY_SCORE: 23

## 2022-11-19 NOTE — PLAN OF CARE
Problem: Plan of Care - These are the overarching goals to be used throughout the patient stay.    Goal: Plan of Care Review  Description: The Plan of Care Review/Shift note should be completed every shift.  The Outcome Evaluation is a brief statement about your assessment that the patient is improving, declining, or no change.  This information will be displayed automatically on your shift note.  Outcome: Progressing   Goal Outcome Evaluation:       Patient slept well over night. She denied pain. Dressing CDI.

## 2022-11-19 NOTE — ANESTHESIA POSTPROCEDURE EVALUATION
Patient: Josefa Curiel    Procedure: Procedure(s):  INCISION AND DRAINAGE, right foot       Anesthesia Type:  General    Note:  Disposition: Inpatient   Postop Pain Control: Uneventful            Sign Out: Well controlled pain   PONV: No   Neuro/Psych: Uneventful            Sign Out: Acceptable/Baseline neuro status   Airway/Respiratory: Uneventful            Sign Out: Acceptable/Baseline resp. status   CV/Hemodynamics: Uneventful            Sign Out: Acceptable CV status; No obvious hypovolemia; No obvious fluid overload   Other NRE: NONE   DID A NON-ROUTINE EVENT OCCUR? No           Last vitals:  Vitals:    11/18/22 1545 11/18/22 1550 11/18/22 1801   BP: (!) 167/91 (!) 144/74 (!) 184/90   Pulse: 87 86 99   Resp: 12 20 18   Temp:   (!) 35.2  C (95.3  F)   SpO2: 99% 97% 100%       Electronically Signed By: Guido Cohn MD  November 18, 2022  6:09 PM

## 2022-11-19 NOTE — PLAN OF CARE
Problem: Diabetes Comorbidity  Goal: Blood Glucose Level Within Targeted Range  Outcome: Not Progressing     Problem: Pain Chronic (Persistent) (Comorbidity Management)  Goal: Acceptable Pain Control and Functional Ability  Outcome: Progressing  Intervention: Manage Persistent Pain  Recent Flowsheet Documentation  Taken 11/19/2022 0842 by Sena Brown RN  Medication Review/Management: medications reviewed     Problem: Hypertension Comorbidity  Goal: Blood Pressure in Desired Range  Intervention: Maintain Blood Pressure Management  Recent Flowsheet Documentation  Taken 11/19/2022 0842 by Sena Brown RN  Medication Review/Management: medications reviewed   Goal Outcome Evaluation:         Blood glucose 411 before lunch. MD updated, insulin orders modified. 4 additional units of Novolog added to carb count insulin per Dr. Devine.   Appetite is fair. Patient tolerating diet. Patient denies having pain or nausea.   Magnesium and potassium rechecks in AM per protocols.   Dressing to RLE CDI. NWB to RLE.

## 2022-11-19 NOTE — PROGRESS NOTES
11/19/22 0671   Appointment Info   Signing Clinician's Name / Credentials (PT) Lurdes Graves, NALINI   Living Environment   People in Home significant other   Current Living Arrangements house   Home Accessibility stairs to enter home   Number of Stairs, Main Entrance 3   Stair Railings, Main Entrance none   Transportation Anticipated family or friend will provide   Living Environment Comments Can stay on one level at home.   Self-Care   Usual Activity Tolerance good   Current Activity Tolerance moderate   Equipment Currently Used at Home crutches;other (see comments)   Fall history within last six months no   Activity/Exercise/Self-Care Comment Pt uses the knee walker or the crutches at home and is indep.  (knee walker.)   General Information   Onset of Illness/Injury or Date of Surgery 11/16/22   Referring Physician David Denise   Patient/Family Therapy Goals Statement (PT) Return home   Pertinent History of Current Problem (include personal factors and/or comorbidities that impact the POC) Per the chart: 47-year-old with known diabetic foot ulcer, type 1 diabetes, CKD, smoker, marijuana use, hypertension who presented with 3 days of ongoing nausea and vomiting after doxycycline use.  She was unable to keep medicine and food down.  She was quite hypertensive on presentation.  Improving with IV fluids.  She was noted to have necrotic looking right foot ulcer and therefore was empirically started on Rocephin and Flagyl.  (Pt is s/p I and D right foot.)   Weight-Bearing Status - LLE full weight-bearing   Weight-Bearing Status - RLE nonweight-bearing   Cognition   Affect/Mental Status (Cognition) WNL   Orientation Status (Cognition) oriented x 4;person;place;situation;time   Pain Assessment   Patient Currently in Pain No   Range of Motion (ROM)   Range of Motion ROM is WNL   ROM Comment R foot not tested.  It is ace wrapped.   Strength (Manual Muscle Testing)   Strength Comments L LE WFL with R knee and hip WFL   Bed  Mobility   Comment, (Bed Mobility) Supine<>sit indep.   Transfers   Comment, (Transfers) Bed <>knee scooter with CGA with cues for techniwue.  Some assist with the knee scooter and the IV.   Sit-Stand Transfer   Sit-Stand Labelle (Transfers) contact guard;verbal cues   Assistive Device (Sit-Stand Transfers) walker, knee scooter   Gait/Stairs (Locomotion)   Labelle Level (Gait) contact guard;verbal cues   Assistive Device (Gait) walker, knee scooter   Distance in Feet (Required for LE Total Joints) 20'   Distance in Feet (Gait) 140;   Comment, (Gait/Stairs) Pt was able to manage the knee scooter weil in the [place]. Some assist with the scooter in the room.  Pt was able to keep NWB well R LE   Balance   Balance Comments good bal.   Sensory Examination   Sensory Perception WNL  (LEs)   Clinical Impression   Criteria for Skilled Therapeutic Intervention Yes, treatment indicated   PT Diagnosis (PT) impaired functional mobility.   Influenced by the following impairments NWB R LE, impaired bal, dec strength.   Functional limitations due to impairments transfers, gait and steps.   Clinical Presentation (PT Evaluation Complexity) Stable/Uncomplicated   Clinical Presentation Rationale Pt presents as medically diagnosed.   Clinical Decision Making (Complexity) low complexity   Planned Therapy Interventions (PT) balance training;gait training;patient/family education;stair training;transfer training;home program guidelines   Anticipated Equipment Needs at Discharge (PT) crutches, axillary;other (see comments)  (knee walker. Pt has both at here.)   Risk & Benefits of therapy have been explained evaluation/treatment results reviewed;care plan/treatment goals reviewed;patient   PT Total Evaluation Time   PT Eval, Low Complexity Minutes (24805) 15   Plan of Care Review   Plan of Care Reviewed With patient   Physical Therapy Goals   PT Frequency Daily   PT Predicted Duration/Target Date for Goal Attainment 11/26/22   PT  Goals Transfers;Gait;Stairs   PT: Transfers Modified independent   PT: Gait Supervision/stand-by assist;Greater than 200 feet  (knee walker.)   PT: Stairs Supervision/stand-by assist;Assistive device;Within precautions;3 stairs   Interventions   Interventions Quick Adds Gait Training   Therapeutic Activity   Therapeutic Activities: dynamic activities to improve functional performance Minutes (30943) 5   Symptoms Noted During/After Treatment Fatigue   Treatment Detail/Skilled Intervention Supine<>sit indep, bed<>knee walker with CGA with cues for safety and hand placement.  Some assist with the knee walker.   Gait Training   Gait Training Minutes (58081) 10   Symptoms Noted During/After Treatment (Gait Training) fatigue   Treatment Detail/Skilled Intervention knee walker with NWB R LE   Watts Level (Gait Training) contact guard   Physical Assistance Level (Gait Training) verbal cues;1 person assist  (assist with the IV and some assist with the knee walker in the room.)   Weight Bearing (Gait Training) nonweight-bearing  (R LE)   Assistive Device (Gait Training) other (see comments)  (knee walker.)   PT Discharge Planning   PT Plan PT: Transfers and ambulation with knee scooter, stairs with crutches vs B railings (if put in at home).   PT Discharge Recommendation (DC Rec) home with assist   PT Rationale for DC Rec Pt should be able to progress to dc to home with assist.  Pt is moving fairlly well with the knee walker and does need CGA with transfers.  Pt to progress to steps tomorrow.   PT Brief overview of current status Indep with bed mobility CGA with bed<>knee walker with NWB R LE.  160' on the knee walker with NWB R LE with CGA   Total Session Time   Timed Code Treatment Minutes 15   Total Session Time (sum of timed and untimed services) 30

## 2022-11-19 NOTE — PROGRESS NOTES
[facility]    Medicine Progress Note - Hospitalist Service    Date of Admission:  11/16/2022    Assessment & Plan     47-year-old with known diabetic foot ulcer, type 1 diabetes, CKD, smoker, marijuana use, hypertension who presented with 3 days of nausea and vomiting after doxycycline use.  She was unable to keep medicine and food down.       Intractable nausea and vomiting: likely side effect of doxycycline, ongoing chronic marijuana use and uremia  - Hold doxycycline  - Supportive treatment with IV Protonix, Compazine, IV fluids     Acute renal failure on chronic kidney disease stage IV: with metabolic acidosis. Baseline creatinine is 2.5-3, elevated to 4.36 on admission. Likely due to poor p.o. intake for 3 days. Creatinine remains high at 4.36 today.  - Continue gentle IV hydration avoid nephrotoxic    Hypertensive urgency due to medical nonadherence due to ongoing nausea vomiting. Not able to take PTA amlodipine  - Continue clonidine patch and as needed hydralazine     Type 1 insulin-dependent diabetes with hypoglycemia: PTA Lantus 20 units, Novolog 1:15 carb count tidac  - Patient is not able to have oral intake today. She developed hypoglycemia with blood sugar 35 yesterday. Continue gentle D5 drip. Hold Lantus at bedtime. NovoLog sliding scale.     Right foot ulcer: Ulcer persists despite antibiotic treatment. MRI reveals no osteomyelitis, septic arthritis, and abscess.  - Started Ceftriaxone and flagyl. Will continue  - Per podiatry, plan I&D today  - ID consulted and input appreciated     Tobacco and marijuana use: Urine tox is positive for opiates and cannabinoid  - Patient counseled to quit smoking     Past ethanol use: Patient claims she quit alcohol 6 years ago      Thrombocytosis: likely due to infection. Monitor     Anxiety depression: Continue home medication.            Diet: Advance Diet as Tolerated: Clear Liquid Diet    DVT Prophylaxis: Pneumatic Compression Devices.  Will start heparin postop tomorrow  Williamson Catheter: Not present  Central Lines: None  Cardiac Monitoring: None  Code Status: Full Code      Disposition Plan      Expected Discharge Date: 11/21/2022    Discharge Delays: Procedure Pending (enter procedure & time in comments)    Discharge Comments: surgery today and pending cultures        The patient's care was discussed with the Bedside Nurse, Care Coordinator/ and Patient.    Dee Devine MD  Hospitalist [facility]  Securely message with the Vocera Web Console (learn more here)  Text page via [employer] Paging/Directory         ______________________________________________________________________    Interval History   Patient reports that she continues to feel nausea. She did not sleep well last night and would like to have a medication to help her sleep prior to surgery this afternoon. She does not think she can take oral pills.     Data reviewed today: I reviewed all medications, new labs and imaging results over the last 24 hours.    Physical Exam   Vital Signs: Temp: 98.7  F (37.1  C) Temp src: Oral BP: (!) 144/74 Pulse: 86   Resp: 20 SpO2: 97 % O2 Device: None (Room air)    Weight: 131 lbs 0 oz    General appearance: not in acute distress  HEENT: PERRL, EOMI  Lungs: Clear breath sounds in bilateral lung fields  Cardiovascular: Regular rate and rhythm, normal S1-S2  Abdomen: Soft, non tender, no distension  Musculoskeletal: No joint swelling  Skin: No rash and no edema. Right foot plantar deep ulcer. Eschar of left big toe and 3th toe on the medial side  Neurology: AAO ×3.  Cranial nerves II - XII normal.  Normal muscle strength in all four extremities.    Data   Recent Labs   Lab 11/18/22  1722 11/18/22  1503 11/18/22  1228 11/18/22  0940 11/18/22  0542 11/18/22  0532 11/17/22  0327 11/16/22  2247 11/15/22  1230   WBC  --   --   --   --   --   --   --  10.5 10.7   HGB  --   --   --   --   --   --   --  11.7 11.0*    MCV  --   --   --   --   --   --   --  87 86   PLT  --   --   --   --   --   --   --  484* 436   NA  --   --   --   --   --  146*  --  144 138   POTASSIUM 3.4  --   --   --   --  2.9*  --  3.5 4.1   CHLORIDE  --   --   --   --   --  106  --  95* 97*   CO2  --   --   --   --   --  28  --  29 24   BUN  --   --   --   --   --  32.4*  --  35.1* 35.5*   CR  --   --   --   --   --  4.36*  --  4.36* 3.98*   ANIONGAP  --   --   --   --   --  12  --  20* 17*   ZACH  --   --   --   --   --  7.8*  --  9.4 9.4   GLC  --  95 113* 133*   < > 88   < > 213* 243*   ALBUMIN  --   --   --   --   --  2.8*  --   --   --    PROTTOTAL  --   --   --   --   --  5.5*  --   --   --    BILITOTAL  --   --   --   --   --  <0.2  --   --   --    ALKPHOS  --   --   --   --   --  79  --   --   --    ALT  --   --   --   --   --  5*  --   --   --    AST  --   --   --   --   --  18  --   --   --     < > = values in this interval not displayed.

## 2022-11-19 NOTE — PROGRESS NOTES
FOOT AND ANKLE SURGERY/PODIATRY Progress Note        ASSESSMENT:   S/p I&D right foot   DM2      TREATMENT:  -Doing well today. Denies foot pain. Cultures pending. Afebrile.    -I reviewed yesterday's surgery with the patient to include removal of non-viable tissue along the distal right foot.     -Recommend continued non-weight bearing on the right foot. Surgical dressing to remain intact until wound vac is applied on Monday.     -Medical management per hospitalist. Antibiotics per ID. Patient is open to TCU at time of discharge. Follow-up with me in two weeks.     David Patel DPM  [facility] Podiatry/Foot & Ankle Surgery      HPI: Josefa Curiel was seen again today s/p I&D right foot. Doing well this morning. Denies foot pain.     Past Medical History:   Diagnosis Date     LORIN (acute kidney injury) (H)      Anxiety      Cannabis abuse      Depression      Diabetes Type 1, uncontrolled 1985    Onset age 10     DKA (diabetic ketoacidoses)      ETOH abuse      HLD (hyperlipidemia)      HTN (hypertension)      Lactic acidosis        Past Surgical History:   Procedure Laterality Date     ANKLE FRACTURE SURGERY Left      APPENDECTOMY       PICC SINGLE LUMEN PLACEMENT  10/23/2022            Allergies   Allergen Reactions     Colestipol GI Disturbance     Nickel Rash     Penicillins Rash         Current Facility-Administered Medications:      [START ON 11/21/2022] acetaminophen (TYLENOL) tablet 650 mg, 650 mg, Oral, Q4H PRN, David Patel DPM     acetaminophen (TYLENOL) tablet 975 mg, 975 mg, Oral, Q8H, David Patel DPM, 975 mg at 11/19/22 0245     bisacodyl (DULCOLAX) suppository 10 mg, 10 mg, Rectal, Daily PRN, David Patel DPM     ceFEPIme (MAXIPIME) 1g vial to attach to  ml bag for ADULTS or NS 50 ml bag for PEDS, 1 g, Intravenous, Q24H, Carlos Cosme MD     cloNIDine (CATAPRES-TTS) Patch in Place, , Transdermal, Q8H, David Patel DPM     cloNIDine  (CATAPRES-TTS2) 0.2 MG/24HR WK patch 1 patch, 1 patch, Transdermal, Weekly, David Patel DPM, 1 patch at 11/17/22 1656     glucose gel 15-30 g, 15-30 g, Oral, Q15 Min PRN **OR** dextrose 50 % injection 25-50 mL, 25-50 mL, Intravenous, Q15 Min PRN, 25 mL at 11/18/22 0904 **OR** glucagon injection 1 mg, 1 mg, Subcutaneous, Q15 Min PRN, David Patel DPM     famotidine (PEPCID) injection 10 mg, 10 mg, Intravenous, Daily, David Patel DPM, 10 mg at 11/18/22 0914     hydrALAZINE (APRESOLINE) injection 10 mg, 10 mg, Intravenous, Q6H PRN, David Patel DPM, 10 mg at 11/18/22 1811     HYDROmorphone (DILAUDID) injection 0.2 mg, 0.2 mg, Intravenous, Q2H PRN **OR** HYDROmorphone (DILAUDID) injection 0.4 mg, 0.4 mg, Intravenous, Q2H PRN, David Patel DPM     insulin aspart (NovoLOG) injection (RAPID ACTING), 1-7 Units, Subcutaneous, TID w/meals, Dee Devine MD     insulin aspart (NovoLOG) injection (RAPID ACTING), 1-5 Units, Subcutaneous, At Bedtime, Dharmesh Dewey MD     insulin aspart (NovoLOG) injection (RAPID ACTING), , Subcutaneous, Daily with breakfast, Dharmesh Dewey MD     insulin aspart (NovoLOG) injection (RAPID ACTING), , Subcutaneous, Daily with lunch, Dharmesh Dewey MD     insulin aspart (NovoLOG) injection (RAPID ACTING), , Subcutaneous, Daily with supper, Dharmesh Dewey MD     insulin glargine (LANTUS PEN) injection 10 Units, 10 Units, Subcutaneous, At Bedtime, Dee Devine MD, 10 Units at 11/17/22 2116     lidocaine (LMX4) cream, , Topical, Q1H PRN, David Patel DPM     lidocaine 1 % 0.1-1 mL, 0.1-1 mL, Other, Q1H PRN, David Patel DPM     magnesium hydroxide (MILK OF MAGNESIA) suspension 30 mL, 30 mL, Oral, Daily PRN, David Patel DPM     melatonin tablet 1 mg, 1 mg, Oral, At Bedtime PRN, David Patel DPM     metoprolol (LOPRESSOR) injection 5 mg, 5 mg, Intravenous, Q6H PRN, David Patel DPM, 5 mg at 11/17/22  "0512     metroNIDAZOLE (FLAGYL) infusion 500 mg, 500 mg, Intravenous, Q12H, David Patel DPM, 500 mg at 11/19/22 0246     naloxone (NARCAN) injection 0.2 mg, 0.2 mg, Intravenous, Q2 Min PRN **OR** naloxone (NARCAN) injection 0.4 mg, 0.4 mg, Intravenous, Q2 Min PRN **OR** naloxone (NARCAN) injection 0.2 mg, 0.2 mg, Intramuscular, Q2 Min PRN **OR** naloxone (NARCAN) injection 0.4 mg, 0.4 mg, Intramuscular, Q2 Min PRN, David Patel DPM     ondansetron (ZOFRAN ODT) ODT tab 4 mg, 4 mg, Oral, Q6H PRN **OR** ondansetron (ZOFRAN) injection 4 mg, 4 mg, Intravenous, Q6H PRN, David Patel DPM     oxyCODONE (ROXICODONE) tablet 5 mg, 5 mg, Oral, Q4H PRN **OR** oxyCODONE (ROXICODONE) tablet 10 mg, 10 mg, Oral, Q4H PRN, Dvaid Patel DPM     Patient RECEIVING antibiotic to treat a different condition and it provides ADEQUATE COVERAGE for this surgical procedure., 1 each, As instructed, Continuous, David Patel DPM     polyethylene glycol (MIRALAX) Packet 17 g, 17 g, Oral, Daily, David Patel DPM     prochlorperazine (COMPAZINE) injection 10 mg, 10 mg, Intravenous, Q6H PRN **OR** prochlorperazine (COMPAZINE) tablet 10 mg, 10 mg, Oral, Q6H PRN, David Patel DPM     senna-docusate (SENOKOT-S/PERICOLACE) 8.6-50 MG per tablet 1 tablet, 1 tablet, Oral, BID, David Patel DPM, 1 tablet at 11/18/22 2129     sodium chloride (PF) 0.9% PF flush 3 mL, 3 mL, Intracatheter, Q8H, David Patel DPM, 3 mL at 11/19/22 0245     sodium chloride (PF) 0.9% PF flush 3 mL, 3 mL, Intracatheter, q1 min prn, David Patel DPM     sodium chloride 0.9% infusion, , Intravenous, Continuous, Dee Devine MD     traZODone (DESYREL) tablet 50 mg, 50 mg, Oral, At Bedtime PRN, David Patel DPM    Family History   Problem Relation Age of Onset     Clotting Disorder Mother         \"thin blood\"     Arthritis Mother      Hyperlipidemia Mother      Skin Cancer " "Father         face/nose      Depression Father      Other - See Comments Sister         eye surgeries     No Known Problems Brother      Coronary Artery Disease Maternal Grandmother      Alcoholism Maternal Grandfather      Coronary Artery Disease Maternal Grandfather      No Known Problems Paternal Grandmother      No Known Problems Paternal Grandfather        Social History     Socioeconomic History     Marital status: Single     Spouse name: Not on file     Number of children: Not on file     Years of education: Not on file     Highest education level: Not on file   Occupational History     Not on file   Tobacco Use     Smoking status: Some Days     Smokeless tobacco: Never     Tobacco comments:     occasional   Substance and Sexual Activity     Alcohol use: Yes     Alcohol/week: 35.0 standard drinks     Comment: Alcoholic Drinks/day: ~750 mL wine daily     Drug use: Yes     Types: Marijuana     Comment: Drug use: 4-5x/week     Sexual activity: Yes     Partners: Male     Birth control/protection: Condom   Other Topics Concern     Not on file   Social History Narrative     Not on file     Social Determinants of Health     Financial Resource Strain: Not on file   Food Insecurity: Not on file   Transportation Needs: Not on file   Physical Activity: Not on file   Stress: Not on file   Social Connections: Not on file   Intimate Partner Violence: Not on file   Housing Stability: Not on file       10 point Review of Systems is negative except for right foot infection which is noted in HPI.     BP (!) 165/88 (BP Location: Right arm)   Pulse 84   Temp 98.5  F (36.9  C) (Oral)   Resp 16   Ht 1.702 m (5' 7\")   Wt 59.4 kg (131 lb)   LMP 11/02/2022   SpO2 99%   BMI 20.52 kg/m      BMI= Body mass index is 20.52 kg/m .    OBJECTIVE:  General appearance: Patient is alert and fully cooperative with history & exam.  No sign of distress is noted during the visit.  Surgical dressing c/d/I right foot.      Imaging:     MR " Foot Right w/o Contrast    Result Date: 11/17/2022  EXAM: MR FOOT RIGHT W/O CONTRAST LOCATION: [facility] DATE/TIME: 11/17/2022 3:42 PM INDICATION: 47-year-old patient with a right foot infection. Clinical concern for osteomyelitis. COMPARISON: 10/19/2022 MRI. TECHNIQUE: Unenhanced. FINDINGS: JOINTS AND BONES: -No fracture or bone contusion. Normal articular cartilage. No effusion. TENDONS: -No tendon tear, tendinopathy, or tenosynovitis. LIGAMENTS: -Lisfranc ligament: Intact. No subluxation. MUSCLES AND SOFT TISSUES: -Moderate fatty atrophy of the mid and forefoot musculature. -Increased T2 signal in the foot musculature, consistent with denervation change. No intramuscular fluid collection. -Mild subcutaneous thickening and edema type signal in the plantar forefoot at the level of the TMT joints. No soft tissue fluid collection or mass.     IMPRESSION: 1.  Negative for osteomyelitis, septic arthritis, and abscess. 2.  Mild subcutaneous soft tissue thickening and edema type signal in the plantar forefoot at the level of the TMT joints. This could represent cellulitis in the correct clinical setting. 3.  Muscle fatty atrophy and denervation change, stable.    XR Foot Right G/E 3 Views    Result Date: 11/15/2022  EXAM: XR FOOT RIGHT G/E 3 VIEWS LOCATION: [facility] DATE/TIME: 11/15/2022 1:09 PM INDICATION: Suspect toe gangrene, osteomyelitis. COMPARISON: None.     IMPRESSION: Soft tissue bandage material and swelling within the toes; hyperdense material along the plantar aspect of the forefoot is likely ointment. There is no evidence of soft tissue gas or osteomyelitis by radiograph. No acute fracture. There is a metallic foreign body in the soft tissues of the posterior ankle/distal calf measuring 1.1 cm as seen on the lateral view.    Echocardiogram MARAH    Result Date: 10/21/2022  [id number] [id number]^[email or web address]^JOSHUA^DEYANIRA  [facility] [address] [place] [address]  Name: DONITA FREITAS MRN: [id number] : 1975 Study Date: 10/21/2022 09:54 AM Age: 47 yrs Gender: Female Patient Location: [facility] Reason For Study: Murmur Ordering Physician: JOSHUA POOLE Performed By: MAHESH/ROSIE  BSA: 1.7 m2 Height: 67 in Weight: 130 lb HR: 73 ______________________________________________________________________________ Procedure Complete MARAH Adult. Good quality two-dimensional was performed and interpreted. Good quality color and spectral Doppler were performed and interpreted. ______________________________________________________________________________ Interpretation Summary  Left ventricular size, wall motion and function are normal. The ejection fraction is 60-65%. Normal right ventricle size and systolic function. No vegetations present. No hemodynamically significant valvular abnormalities on 2D or color flow imaging. ______________________________________________________________________________ MARAH I determined this patient to be an appropriate candidate for the planned sedation and procedure and have reassessed the patient immediately prior to sedation and procedure. Total sedation time: 18 mins minutes of continuous bedside 1:1 monitoring. Versed (2mg) was given intravenously. Fentanyl (50mcg) was given intravenously. 4 sprays benzocaine 15 mL viscous lidocaine. Consent to the procedure was obtained prior to sedation. There were no complications associated with this procedure. The Transducer was inserted without difficulty . The procedure was performed in the Echo Lab.  Left Ventricle Left ventricular size, wall motion and function are normal. The ejection fraction is 60-65%.  Right Ventricle Normal right ventricle size and systolic function.  Atria Normal left atrial size. Right atrial size is normal. There is no color Doppler evidence of an atrial shunt. No thrombus is detected in the left atrial appendage.  Mitral Valve Mitral valve leaflets  "appear normal. There is no evidence of mitral stenosis or clinically significant mitral regurgitation.  Tricuspid Valve The tricuspid valve is normal in structure and function. This degree of valvular regurgitation is within normal limits. There is no tricuspid stenosis.  Aortic Valve The aortic valve is trileaflet. Lambl's excescences noted. No aortic regurgitation is present. No aortic stenosis is present.  Pulmonic Valve The pulmonic valve is not well seen, but is grossly normal. There is trace pulmonic valvular regurgitation.  Vessels The aortic root is normal size. Normal size ascending aorta. The inferior vena cava is normal.  Pericardial/Pleural There is no pericardial effusion. ______________________________________________________________________________ Report approved by: Eli Nicole 10/21/2022 11:07 AM  ______________________________________________________________________________      POC US Guidance Needle Placement    Result Date: 11/18/2022  Ultrasound was performed as guidance to an anesthesia procedure.  Click \"PACS images\" hyperlink below to view any stored images.  For specific procedure details, view procedure note authored by anesthesia.       "

## 2022-11-19 NOTE — PLAN OF CARE
"  Problem: Infection  Goal: Absence of Infection Signs and Symptoms  Outcome: Progressing     Problem: Hypertension Comorbidity  Goal: Blood Pressure in Desired Range  Outcome: Progressing     Problem: Pain Chronic (Persistent) (Comorbidity Management)  Goal: Acceptable Pain Control and Functional Ability  Outcome: Progressing     Goal Outcome Evaluation:       Pt underwent I&D of an ulceration on the right foot today. Dressing is clean, dry and intact to RLE. Pt denies pain. NWB to the RLE. She is A&O x4. Pleasant and cooperative with cares. Receiving IV abx in the form of Rocephin and Flagyl. Maintaining O2 sats >92% on RA. On continuous pulse ox. Lungs are clear. ID consulting along with podiatry. Pt had been hypertensive. She notes a hx of \"white coat hypertension\". Received dose of prn IV Hydralazine at 1810, which was helpful. Heart rate has been tachy. She is tolerating a clear liquid diet. Good fluid intake. Appetite is adequate. She does have a dx of stage IV CKD. Creatinine is 4.3. Above her baseline of 2.5 to 3.. Will continue to monitor. She is on Mg and K+ protocols. Received replacement doses. Recheck Mg in AM. Repeat K+ at 0030. Blood sugar was 168 before meal and 234 at hs. Receiving s/s insulin coverage.                  "

## 2022-11-19 NOTE — PROGRESS NOTES
"[facility] Progress Note    SUBJECTIVE:  Operation done yesterday around 5pm, note reviewed.  No fever.      REVIEW OF SYSTEMS:  Negative unless as listed above.  Social history, Family history, Medications: reviewed.    OBJECTIVE:  /72 (BP Location: Right arm)   Pulse 87   Temp 97.7  F (36.5  C) (Oral)   Resp 16   Ht 1.702 m (5' 7\")   Wt 59.4 kg (131 lb)   LMP 11/02/2022   SpO2 97%   BMI 20.52 kg/m                PHYSICAL EXAM:  Alert, awake  Vitals tabulated above, reviewed  Neck supple without lymphadenopathy  Sclera normal color, not injected  CARDIOVASCULAR regular rate and rhythm, no murmur  Lungs CLEAR TO AUSCULTATION   Abdomen soft, NT/ND, absent HEPATOSPLENOMEGALY  Skin normal  Joints normal  Neurologic exam non focal  Foot wound reviewed    Antibiotics:CTX, flagyl    Pertinent labs:  Lab Results   Component Value Date    WBC 10.5 11/16/2022     Lab Results   Component Value Date    RBC 4.02 11/16/2022     Lab Results   Component Value Date    HGB 11.7 11/16/2022     Lab Results   Component Value Date    HCT 35.0 11/16/2022     No components found for: MCT  Lab Results   Component Value Date    MCV 87 11/16/2022     Lab Results   Component Value Date    MCH 29.1 11/16/2022     Lab Results   Component Value Date    MCHC 33.4 11/16/2022     Lab Results   Component Value Date    RDW 13.9 11/16/2022     Lab Results   Component Value Date     11/16/2022       Last Comprehensive Metabolic Panel:  Sodium   Date Value Ref Range Status   11/18/2022 146 (H) 136 - 145 mmol/L Final     Potassium   Date Value Ref Range Status   11/19/2022 3.1 (L) 3.4 - 5.3 mmol/L Final   11/03/2022 4.2 3.4 - 5.3 mmol/L Final     Chloride   Date Value Ref Range Status   11/18/2022 106 98 - 107 mmol/L Final   11/03/2022 103 94 - 109 mmol/L Final     Carbon Dioxide (CO2)   Date Value Ref Range Status   11/18/2022 28 22 - 29 mmol/L Final   11/03/2022 25 20 - 32 mmol/L Final     Anion Gap   Date Value " Ref Range Status   11/18/2022 12 7 - 15 mmol/L Final   11/03/2022 9 3 - 14 mmol/L Final     Glucose   Date Value Ref Range Status   11/03/2022 79 70 - 99 mg/dL Final     GLUCOSE BY METER POCT   Date Value Ref Range Status   11/19/2022 137 (H) 70 - 99 mg/dL Final     Urea Nitrogen   Date Value Ref Range Status   11/18/2022 32.4 (H) 6.0 - 20.0 mg/dL Final   11/03/2022 37 (H) 7 - 30 mg/dL Final     Creatinine   Date Value Ref Range Status   11/18/2022 4.36 (H) 0.51 - 0.95 mg/dL Final     GFR Estimate   Date Value Ref Range Status   11/18/2022 12 (L) >60 mL/min/1.73m2 Final     Comment:     Effective December 21, 2021 eGFRcr in adults is calculated using the 2021 CKD-EPI creatinine equation which includes age and gender (Deana et al., [place], DOI: 10.1056/POOZii9856768)   10/03/2020 32 (L) >60 mL/min/1.73m2 Final     Calcium   Date Value Ref Range Status   11/18/2022 7.8 (L) 8.6 - 10.0 mg/dL Final       Liver Function Studies -   Recent Labs   Lab Test 11/18/22  0532   PROTTOTAL 5.5*   ALBUMIN 2.8*   BILITOTAL <0.2   ALKPHOS 79   AST 18   ALT 5*       Erythrocyte Sedimentation Rate   Date Value Ref Range Status   11/16/2022 58 (H) 0 - 20 mm/hr Final       CRP Inflammation   Date Value Ref Range Status   11/16/2022 <3.00 <5.00 mg/L Final   11/03/2022 3.9 0.0 - 8.0 mg/L Final               MICROBIOLOGY DATA:  Foot swab enterobacter and PA  BC is contam most likely        IMAGING/RADIOLOGY:  MRI no osteo        ASSESSMENT:  Open foot wound, no osteo  DM  Recent 3 wk course ancef for MSSA bacteremia, aborted due to ARF, then given doxy up to admission  Smoker  Superficial gram neg on wound swab  contam BC (I hope)    RECOMMENDATION:  Waiting on confirmation of blood cx  Will change CTX to cefepime  Flagyl ok also  .  DEYSI Cosme MD  Office [phone] option 2 to desk staff

## 2022-11-19 NOTE — PROGRESS NOTES
[facility]    Medicine Progress Note - Hospitalist Service    Date of Admission:  11/16/2022    Assessment & Plan     47-year-old with known diabetic foot ulcer, type 1 diabetes, CKD, smoker, marijuana use, hypertension who presented with 3 days of nausea and vomiting after doxycycline use.  She was unable to keep medicine and food down.       Intractable nausea and vomiting: likely side effect of doxycycline, ongoing chronic marijuana use and uremia. Discontinued doxycycline. Symptoms now resolved.      Acute renal failure on chronic kidney disease stage IV: with metabolic acidosis. Baseline creatinine is 2.5-3, elevated to 4.36 on admission. Likely due to poor p.o. intake for 3 days. Creatinine remains high at 4.24 today.  - Continue gentle IV hydration avoid nephrotoxic    Right foot ulcer: Ulcer persists despite antibiotic treatment. MRI reveals no osteomyelitis, septic arthritis, and abscess.  S/p I&D by podiatry on 11/18.   Wound culture revealed enterococcus, pseudomonas.  One out of 2 blood culture sent on 1/17 shows Staph lugdunensis  - Started Ceftriaxone and flagyl. Will change to Cefepime and flagyl today per ID  - Repeat blood culture  - Podiatry and ID input appreciated    Hypertensive urgency: due to medical nonadherence due to ongoing nausea vomiting. Not able to take PTA amlodipine. Was started clonidine patch since admission  - Resume PTA amlodipine. Discontinue clonidine patch. As needed hydralazine     Type 1 insulin-dependent diabetes with hypoglycemia: Recent HbA1C 8.8. PTA Lantus 20 units, Novolog carb count tidac  Patient developed hypoglycemia after admission while not able to have oral intake and was put on a D5 drip.  - Patient is able to resume oral intake today. Resume Lantus at 20 units at bedtime. NovoLog carb count 1:10 and sliding scale tidac    Hypokalemia: Potassium 2.9. Replaced and now normal. Monitor.     Tobacco and marijuana use: Urine tox is positive  for opiates and cannabinoid  - Patient counseled to quit smoking     Past ethanol use: Patient claims she quit alcohol 6 years ago      Thrombocytosis: likely due to infection. Monitor     Anxiety depression: Continue home medication.            Diet: Moderate Consistent Carb (60 g CHO per Meal) Diet    DVT Prophylaxis: Pneumatic Compression Devices. Will start heparin postop tomorrow  Williamson Catheter: Not present  Central Lines: None  Cardiac Monitoring: None  Code Status: Full Code      Disposition Plan      Expected Discharge Date: 11/21/2022    Discharge Delays: Procedure Pending (enter procedure & time in comments)    Discharge Comments: surgery today and pending cultures IV abx        The patient's care was discussed with the Bedside Nurse, Care Coordinator/ and Patient.    Dee Devine MD  Hospitalist Service  [facility] [place]  Securely message with the Vocera Web Console (learn more here)  Text page via [employer] Paging/Directory         ______________________________________________________________________    Interval History   Patient reports feeling well today. Her right foot pain is controlled. She reports that her nausea and vomiting have resolved. She is able to start diet.     Data reviewed today: I reviewed all medications, new labs and imaging results over the last 24 hours.    Physical Exam   Vital Signs: Temp: 98.5  F (36.9  C) Temp src: Oral BP: 136/78 Pulse: 88   Resp: 16 SpO2: 99 % O2 Device: None (Room air)    Weight: 131 lbs 0 oz    General appearance: not in acute distress  HEENT: PERRL, EOMI  Lungs: Clear breath sounds in bilateral lung fields  Cardiovascular: Regular rate and rhythm, normal S1-S2  Abdomen: Soft, non tender, no distension  Musculoskeletal: No joint swelling  Skin: No rash and no edema. Right foot plantar deep ulcer s/p I&D. Eschar of left big toe and 3th toe on the medial side  Neurology: AAO ×3.  Cranial nerves II - XII normal.  Normal muscle  strength in all four extremities.    Data   Recent Labs   Lab 11/19/22  1146 11/19/22  0824 11/19/22  0811 11/19/22  0249 11/19/22  0042 11/18/22  1804 11/18/22  1722 11/18/22  0542 11/18/22  0532 11/17/22  0327 11/16/22  2247 11/15/22  1230   WBC  --   --   --   --   --   --   --   --   --   --  10.5 10.7   HGB  --   --   --   --   --   --   --   --   --   --  11.7 11.0*   MCV  --   --   --   --   --   --   --   --   --   --  87 86   PLT  --   --   --   --   --   --   --   --   --   --  484* 436   NA  --   --  133*  --   --   --   --   --  146*  --  144 138   POTASSIUM  --   --  3.6  3.6  --  3.1*  --  3.4  --  2.9*  --  3.5 4.1   CHLORIDE  --   --  98  --   --   --   --   --  106  --  95* 97*   CO2  --   --  25  --   --   --   --   --  28  --  29 24   BUN  --   --  27.2*  --   --   --   --   --  32.4*  --  35.1* 35.5*   CR  --   --  4.24*  --   --   --   --   --  4.36*  --  4.36* 3.98*   ANIONGAP  --   --  10  --   --   --   --   --  12  --  20* 17*   ZACH  --   --  7.8*  --   --   --   --   --  7.8*  --  9.4 9.4   * 167* 150*   < >  --    < >  --    < > 88   < > 213* 243*   ALBUMIN  --   --  2.7*  --   --   --   --   --  2.8*  --   --   --    PROTTOTAL  --   --  5.0*  --   --   --   --   --  5.5*  --   --   --    BILITOTAL  --   --  <0.2  --   --   --   --   --  <0.2  --   --   --    ALKPHOS  --   --  69  --   --   --   --   --  79  --   --   --    ALT  --   --  <5*  --   --   --   --   --  5*  --   --   --    AST  --   --  16  --   --   --   --   --  18  --   --   --     < > = values in this interval not displayed.

## 2022-11-20 LAB
ANION GAP SERPL CALCULATED.3IONS-SCNC: 9 MMOL/L (ref 7–15)
BACTERIA BLD CULT: NO GROWTH
BACTERIA WND CULT: ABNORMAL
BUN SERPL-MCNC: 33.7 MG/DL (ref 6–20)
CALCIUM SERPL-MCNC: 7.7 MG/DL (ref 8.6–10)
CHLORIDE SERPL-SCNC: 97 MMOL/L (ref 98–107)
CREAT SERPL-MCNC: 4.4 MG/DL (ref 0.51–0.95)
DEPRECATED HCO3 PLAS-SCNC: 25 MMOL/L (ref 22–29)
GFR SERPL CREATININE-BSD FRML MDRD: 12 ML/MIN/1.73M2
GLUCOSE BLDC GLUCOMTR-MCNC: 103 MG/DL (ref 70–99)
GLUCOSE BLDC GLUCOMTR-MCNC: 121 MG/DL (ref 70–99)
GLUCOSE BLDC GLUCOMTR-MCNC: 138 MG/DL (ref 70–99)
GLUCOSE BLDC GLUCOMTR-MCNC: 145 MG/DL (ref 70–99)
GLUCOSE BLDC GLUCOMTR-MCNC: 43 MG/DL (ref 70–99)
GLUCOSE BLDC GLUCOMTR-MCNC: 58 MG/DL (ref 70–99)
GLUCOSE BLDC GLUCOMTR-MCNC: 58 MG/DL (ref 70–99)
GLUCOSE BLDC GLUCOMTR-MCNC: 65 MG/DL (ref 70–99)
GLUCOSE BLDC GLUCOMTR-MCNC: 68 MG/DL (ref 70–99)
GLUCOSE BLDC GLUCOMTR-MCNC: 98 MG/DL (ref 70–99)
GLUCOSE SERPL-MCNC: 131 MG/DL (ref 70–99)
GRAM STAIN RESULT: ABNORMAL
MAGNESIUM SERPL-MCNC: 1.9 MG/DL (ref 1.7–2.3)
POTASSIUM SERPL-SCNC: 4.1 MMOL/L (ref 3.4–5.3)
SODIUM SERPL-SCNC: 131 MMOL/L (ref 136–145)

## 2022-11-20 PROCEDURE — 99233 SBSQ HOSP IP/OBS HIGH 50: CPT | Performed by: INTERNAL MEDICINE

## 2022-11-20 PROCEDURE — 36415 COLL VENOUS BLD VENIPUNCTURE: CPT | Performed by: INTERNAL MEDICINE

## 2022-11-20 PROCEDURE — 258N000001 HC RX 258: Performed by: PODIATRIST

## 2022-11-20 PROCEDURE — 83735 ASSAY OF MAGNESIUM: CPT | Performed by: INTERNAL MEDICINE

## 2022-11-20 PROCEDURE — 99232 SBSQ HOSP IP/OBS MODERATE 35: CPT | Performed by: INTERNAL MEDICINE

## 2022-11-20 PROCEDURE — 250N000011 HC RX IP 250 OP 636: Performed by: HOSPITALIST

## 2022-11-20 PROCEDURE — 250N000011 HC RX IP 250 OP 636: Performed by: INTERNAL MEDICINE

## 2022-11-20 PROCEDURE — 250N000013 HC RX MED GY IP 250 OP 250 PS 637: Performed by: PODIATRIST

## 2022-11-20 PROCEDURE — 120N000001 HC R&B MED SURG/OB

## 2022-11-20 PROCEDURE — 80048 BASIC METABOLIC PNL TOTAL CA: CPT | Performed by: INTERNAL MEDICINE

## 2022-11-20 PROCEDURE — 250N000011 HC RX IP 250 OP 636: Performed by: PODIATRIST

## 2022-11-20 RX ORDER — OXYCODONE HYDROCHLORIDE 5 MG/1
5 TABLET ORAL EVERY 4 HOURS PRN
Status: DISCONTINUED | OUTPATIENT
Start: 2022-11-20 | End: 2022-11-28 | Stop reason: HOSPADM

## 2022-11-20 RX ORDER — AMLODIPINE BESYLATE 5 MG/1
5 TABLET ORAL DAILY
Status: DISCONTINUED | OUTPATIENT
Start: 2022-11-21 | End: 2022-11-22

## 2022-11-20 RX ADMIN — SENNOSIDES AND DOCUSATE SODIUM 1 TABLET: 50; 8.6 TABLET ORAL at 08:05

## 2022-11-20 RX ADMIN — ACETAMINOPHEN 975 MG: 325 TABLET, FILM COATED ORAL at 17:00

## 2022-11-20 RX ADMIN — METRONIDAZOLE 500 MG: 500 INJECTION, SOLUTION INTRAVENOUS at 02:19

## 2022-11-20 RX ADMIN — DEXTROSE MONOHYDRATE 25 ML: 25 INJECTION, SOLUTION INTRAVENOUS at 12:00

## 2022-11-20 RX ADMIN — ACETAMINOPHEN 975 MG: 325 TABLET, FILM COATED ORAL at 09:19

## 2022-11-20 RX ADMIN — HYDRALAZINE HYDROCHLORIDE 10 MG: 20 INJECTION INTRAMUSCULAR; INTRAVENOUS at 16:45

## 2022-11-20 RX ADMIN — METRONIDAZOLE 500 MG: 500 INJECTION, SOLUTION INTRAVENOUS at 12:20

## 2022-11-20 RX ADMIN — OXYCODONE HYDROCHLORIDE 5 MG: 5 TABLET ORAL at 14:43

## 2022-11-20 RX ADMIN — ACETAMINOPHEN 975 MG: 325 TABLET, FILM COATED ORAL at 02:19

## 2022-11-20 RX ADMIN — POLYETHYLENE GLYCOL 3350 17 G: 17 POWDER, FOR SOLUTION ORAL at 08:05

## 2022-11-20 RX ADMIN — ONDANSETRON 4 MG: 4 TABLET, ORALLY DISINTEGRATING ORAL at 16:58

## 2022-11-20 RX ADMIN — CEFEPIME HYDROCHLORIDE 1 G: 1 INJECTION, POWDER, FOR SOLUTION INTRAMUSCULAR; INTRAVENOUS at 08:05

## 2022-11-20 RX ADMIN — PROCHLORPERAZINE EDISYLATE 5 MG: 5 INJECTION INTRAMUSCULAR; INTRAVENOUS at 20:23

## 2022-11-20 RX ADMIN — SENNOSIDES AND DOCUSATE SODIUM 1 TABLET: 50; 8.6 TABLET ORAL at 21:11

## 2022-11-20 ASSESSMENT — ACTIVITIES OF DAILY LIVING (ADL)
ADLS_ACUITY_SCORE: 23
ADLS_ACUITY_SCORE: 27
ADLS_ACUITY_SCORE: 23
ADLS_ACUITY_SCORE: 27
ADLS_ACUITY_SCORE: 27
ADLS_ACUITY_SCORE: 23

## 2022-11-20 NOTE — SIGNIFICANT EVENT
"Spot check blood sugar was 43. She ate gram crackers and juice along with milk. Blood sugar responded and went up to 68 and then 98 on recheck. She reported feeling a little disorientated and \"depressed\" and \"just off\". Her symptoms improved when blood sugar improved.   "

## 2022-11-20 NOTE — PLAN OF CARE
Problem: Infection  Goal: Absence of Infection Signs and Symptoms  Outcome: Not Progressing     Problem: Hypertension Comorbidity  Goal: Blood Pressure in Desired Range  Outcome: Progressing     Problem: Diabetes Comorbidity  Goal: Blood Glucose Level Within Targeted Range  Outcome: Progressing     Problem: Pain Chronic (Persistent) (Comorbidity Management)  Goal: Acceptable Pain Control and Functional Ability  Outcome: Progressing     Goal Outcome Evaluation:       Pt is POD #1 s/p I&D of right foot ulcer.  Dressing is clean, dry and intact. Anticipate wound VAC placement on Monday. Denies pain. NWB to the RLE. She ambulates with SBA using crutches. Receiving PT and OT services. Blood cultures came back positive. Repeat cultures obtained today. IV abx changed to Maxipime. She continues on IV Flagyl. ID consulting. She is tolerating a diabetic diet. Appetite is adequate. Home Lantus dose was resumed at hs and carb coverage added to insulin regimen following an episode of hyperglycemia at noon. Blood sugar was 97 at hs. She is on Mg and K+ protocols. Recheck in AM. IV fluids were resumed today at 75 ml/hr of NS for elevated creatinine of 4.2. Pt does have dx of stage IV CKD. Received prn Trazodone at hs to promote sleep. BP has been stable this shift.

## 2022-11-20 NOTE — PROGRESS NOTES
[facility]    Medicine Progress Note - Hospitalist Service    Date of Admission:  11/16/2022    Assessment & Plan     47-year-old with known diabetic foot ulcer, type 1 diabetes, CKD, smoker, marijuana use, hypertension who presented with 3 days of nausea and vomiting after doxycycline use.  She was unable to keep medicine and food down.       Intractable nausea and vomiting: likely side effect of doxycycline, ongoing chronic marijuana use and uremia. Discontinued doxycycline. Symptoms now resolved.      Acute renal failure on chronic kidney disease stage IV: with metabolic acidosis. Baseline creatinine is 2.5-3, elevated to 4.36 on admission. Likely due to poor p.o. intake for 3 days or nephrotoxicity from doxycycline. Creatinine remains high at 4.4 today.  - Patient's creatinine does not improve despite giving fluid. Not clear whether the nephrotoxicity caused by doxycycline is reversible. Will consult nephrology.   - Patient is now able to have good oral intake. Will discontinue IV hydration   - Avoid nephrotoxic    Right foot ulcer: Had recent admission and the plan was IV antibiotics for 4 weeks. Had 3 week course of ancef, aborted due to ARF. Antibiotic was then switched to doxycycline. Ulcer persists despite antibiotic treatment. MRI reveals no osteomyelitis, septic arthritis, and abscess.  S/p I&D by podiatry on 11/18. Wound culture revealed enterococcus, pseudomonas.   - Started Ceftriaxone and flagyl. Changed to Cefepime and flagyl 11/19 per ID  - Podiatry and ID input appreciated    Positive blood culture: One out of 2 blood culture sent on 1/17 shows Staph lugdunensis. She had MSSA bacteremia during her last admission.   - Repeat blood culture: no growth so far  - Antibiotics as above    Type 1 insulin-dependent diabetes with hypoglycemia: Recent HbA1C 8.8. PTA Lantus 20 units, Novolog carb count tidac. Patient reports using carb count 1:15. But on admission, she reported to  pharmacist that she uses 1: 6 for breakfast, 1:4 for lunch and 1:3 for dinner.   Patient developed hypoglycemia after admission while not able to have oral intake and received D5 drip.  She had recurrent hypoglycemia again today despite she eats more than she normally does at home and uses less carb ratio than home.  - Hold NovoLog carb count tidac today. Will resume at 1:15 carb count when hypoglycemia resolves.  - Decrease Lantus to 10 units qhs  - Continue Novolog sliding scale tidac    Hypertensive urgency: /103 in ER. Not able to take PTA amlodipine when she had GI symptoms. Was started clonidine patch on admission  - Resumed PTA amlodipine and discontinued clonidine patch 11/19. BP not controlled. Increase amlodipine to 5 mg daily.   - As needed hydralazine     Hypokalemia: Potassium 2.9. Replaced and now normal. Monitor.     Tobacco and marijuana use: Urine tox is positive for opiates and cannabinoid  - Patient counseled to quit smoking     Past ethanol use: Patient claims she quit alcohol 6 years ago      Thrombocytosis: likely due to infection. Monitor     Anxiety depression: Continue home medication.            Diet: Moderate Consistent Carb (60 g CHO per Meal) Diet    DVT Prophylaxis: Pneumatic Compression Devices. Will start heparin postop tomorrow  Williamson Catheter: Not present  Central Lines: None  Cardiac Monitoring: None  Code Status: Full Code      Disposition Plan      Expected Discharge Date: 11/22/2022    Discharge Delays: Procedure Pending (enter procedure & time in comments)    Discharge Comments: pending cultures IV abx low blood sugars        The patient's care was discussed with the Bedside Nurse, Care Coordinator/ and Patient.    Dee Devine MD  Hospitalist Service  [facility]  Securely message with the Vocera Web Console (learn more here)  Text page via [employer] Paging/Directory          ______________________________________________________________________    Interval History   Patient reports that her right leg pain is controlled. She tried to use the crutches to walk without putting weight on her right foot. Her blood sugar was low this morning. Patient is upset that staff keepa telling her that her blood sugar has big variation. She states that she actually eats more here in the hospital. She normally eats only two meals a day at home. She uses carb count 1:15. She does take Lantus 20 units daily. She reports that at home, she uses a continuous glucose meter that she would eat something if her blood sugar is low.     Data reviewed today: I reviewed all medications, new labs and imaging results over the last 24 hours.    Physical Exam   Vital Signs: Temp: 97.9  F (36.6  C) Temp src: Oral BP: (!) 147/80 Pulse: 83   Resp: 18 SpO2: 99 % O2 Device: None (Room air)    Weight: 131 lbs 0 oz    General appearance: not in acute distress  HEENT: PERRL, EOMI  Lungs: Clear breath sounds in bilateral lung fields  Cardiovascular: Regular rate and rhythm, normal S1-S2  Abdomen: Soft, non tender, no distension  Musculoskeletal: No joint swelling  Skin: No rash and no edema. Right foot plantar deep ulcer s/p I&D. Eschar of left big toe and 3th toe on the medial side  Neurology: AAO ×3.  Cranial nerves II - XII normal.  Normal muscle strength in all four extremities.    Data   Recent Labs   Lab 11/20/22  1218 11/20/22  1148 11/20/22  0853 11/20/22  0754 11/20/22  0531 11/19/22  0824 11/19/22  0811 11/19/22  0249 11/19/22  0042 11/18/22  0542 11/18/22  0532 11/17/22  0327 11/16/22  2247 11/15/22  1230   WBC  --   --   --   --   --   --   --   --   --   --   --   --  10.5 10.7   HGB  --   --   --   --   --   --   --   --   --   --   --   --  11.7 11.0*   MCV  --   --   --   --   --   --   --   --   --   --   --   --  87 86   PLT  --   --   --   --   --   --   --   --   --   --   --   --  484* 436   NA  --    --   --   --  131*  --  133*  --   --   --  146*  --  144 138   POTASSIUM  --   --   --   --  4.1  --  3.6  3.6  --  3.1*   < > 2.9*  --  3.5 4.1   CHLORIDE  --   --   --   --  97*  --  98  --   --   --  106  --  95* 97*   CO2  --   --   --   --  25  --  25  --   --   --  28  --  29 24   BUN  --   --   --   --  33.7*  --  27.2*  --   --   --  32.4*  --  35.1* 35.5*   CR  --   --   --   --  4.40*  --  4.24*  --   --   --  4.36*  --  4.36* 3.98*   ANIONGAP  --   --   --   --  9  --  10  --   --   --  12  --  20* 17*   ZACH  --   --   --   --  7.7*  --  7.8*  --   --   --  7.8*  --  9.4 9.4   * 58* 121*   < > 131*   < > 150*   < >  --    < > 88   < > 213* 243*   ALBUMIN  --   --   --   --   --   --  2.7*  --   --   --  2.8*  --   --   --    PROTTOTAL  --   --   --   --   --   --  5.0*  --   --   --  5.5*  --   --   --    BILITOTAL  --   --   --   --   --   --  <0.2  --   --   --  <0.2  --   --   --    ALKPHOS  --   --   --   --   --   --  69  --   --   --  79  --   --   --    ALT  --   --   --   --   --   --  <5*  --   --   --  5*  --   --   --    AST  --   --   --   --   --   --  16  --   --   --  18  --   --   --     < > = values in this interval not displayed.

## 2022-11-20 NOTE — PLAN OF CARE
Problem: Plan of Care - These are the overarching goals to be used throughout the patient stay.    Goal: Plan of Care Review  Description: The Plan of Care Review/Shift note should be completed every shift.  The Outcome Evaluation is a brief statement about your assessment that the patient is improving, declining, or no change.  This information will be displayed automatically on your shift note.  Outcome: Progressing   Goal Outcome Evaluation:       Patient denied pain over night. Hypoglycemic episode. VSS.  Denied nausea. No stools reported.

## 2022-11-20 NOTE — PLAN OF CARE
Problem: Diabetes Comorbidity  Goal: Blood Glucose Level Within Targeted Range  Outcome: Progressing   Goal Outcome Evaluation:       Blood sugars have been unpredictable.  She went from 58 to 65 to 121 finally after eating 100% of her breakfast which consisted of 64 grams of carbohydrates and the 8oz of orange juice she drank when it only went up to 65.  She said she cannot swallow the glucose gel so she refused that intervention.  Dr. Devine was contacted and she said to give the carb coverage insulin so she was then administered 6 units of Novolog.      Problem: Hypertension Comorbidity  Goal: Blood Pressure in Desired Range  Outcome: Progressing   BP was 151/78 this morning.    Problem: Pain Chronic (Persistent) (Comorbidity Management)  Goal: Acceptable Pain Control and Functional Ability  Outcome: Progressing  Patient rated pain in her right foot as a 1/10.  She was given just her scheduled Tylenol.

## 2022-11-20 NOTE — PROGRESS NOTES
"[facility] Progress Note    SUBJECTIVE: stable.     REVIEW OF SYSTEMS:  Negative unless as listed above.  Social history, Family history, Medications: reviewed.    OBJECTIVE:  BP (!) 147/80   Pulse 83   Temp 97.9  F (36.6  C) (Oral)   Resp 18   Ht 1.702 m (5' 7\")   Wt 59.4 kg (131 lb)   LMP 11/02/2022   SpO2 99%   BMI 20.52 kg/m                PHYSICAL EXAM:  Alert, awake  Vitals tabulated above, reviewed  Neck supple without lymphadenopathy  Sclera normal color, not injected  CARDIOVASCULAR regular rate and rhythm, no murmur  Lungs CLEAR TO AUSCULTATION   Abdomen soft, NT/ND, absent HEPATOSPLENOMEGALY  Skin normal  Joints normal  Neurologic exam non focal  Foot wound reviewed    Antibiotics:CTX, flagyl    Pertinent labs:  Lab Results   Component Value Date    WBC 10.5 11/16/2022     Lab Results   Component Value Date    RBC 4.02 11/16/2022     Lab Results   Component Value Date    HGB 11.7 11/16/2022     Lab Results   Component Value Date    HCT 35.0 11/16/2022     No components found for: MCT  Lab Results   Component Value Date    MCV 87 11/16/2022     Lab Results   Component Value Date    MCH 29.1 11/16/2022     Lab Results   Component Value Date    MCHC 33.4 11/16/2022     Lab Results   Component Value Date    RDW 13.9 11/16/2022     Lab Results   Component Value Date     11/16/2022       Last Comprehensive Metabolic Panel:  Sodium   Date Value Ref Range Status   11/20/2022 131 (L) 136 - 145 mmol/L Final     Potassium   Date Value Ref Range Status   11/20/2022 4.1 3.4 - 5.3 mmol/L Final   11/03/2022 4.2 3.4 - 5.3 mmol/L Final     Chloride   Date Value Ref Range Status   11/20/2022 97 (L) 98 - 107 mmol/L Final   11/03/2022 103 94 - 109 mmol/L Final     Carbon Dioxide (CO2)   Date Value Ref Range Status   11/20/2022 25 22 - 29 mmol/L Final   11/03/2022 25 20 - 32 mmol/L Final     Anion Gap   Date Value Ref Range Status   11/20/2022 9 7 - 15 mmol/L Final   11/03/2022 9 3 - 14 mmol/L " Final     Glucose   Date Value Ref Range Status   11/20/2022 131 (H) 70 - 99 mg/dL Final   11/03/2022 79 70 - 99 mg/dL Final     GLUCOSE BY METER POCT   Date Value Ref Range Status   11/20/2022 121 (H) 70 - 99 mg/dL Final     Urea Nitrogen   Date Value Ref Range Status   11/20/2022 33.7 (H) 6.0 - 20.0 mg/dL Final   11/03/2022 37 (H) 7 - 30 mg/dL Final     Creatinine   Date Value Ref Range Status   11/20/2022 4.40 (H) 0.51 - 0.95 mg/dL Final     GFR Estimate   Date Value Ref Range Status   11/20/2022 12 (L) >60 mL/min/1.73m2 Final     Comment:     Effective December 21, 2021 eGFRcr in adults is calculated using the 2021 CKD-EPI creatinine equation which includes age and gender (Deana et al., NEJ, DOI: 10.1056/SBIQub5039971)   10/03/2020 32 (L) >60 mL/min/1.73m2 Final     Calcium   Date Value Ref Range Status   11/20/2022 7.7 (L) 8.6 - 10.0 mg/dL Final       Liver Function Studies -   Recent Labs   Lab Test 11/18/22  0532   PROTTOTAL 5.5*   ALBUMIN 2.8*   BILITOTAL <0.2   ALKPHOS 79   AST 18   ALT 5*       Erythrocyte Sedimentation Rate   Date Value Ref Range Status   11/16/2022 58 (H) 0 - 20 mm/hr Final       CRP Inflammation   Date Value Ref Range Status   11/16/2022 <3.00 <5.00 mg/L Final   11/03/2022 3.9 0.0 - 8.0 mg/L Final               MICROBIOLOGY DATA:  Foot swab enterobacter and PA  BC is contam most likely (3 different skin staphs)  Culture Culture in progress       1+ Enterobacter cloacae complex Abnormal             Resulting Agency: [facility]     Susceptibility     Enterobacter cloacae complex     ABHIJIT     Ampicillin  Resistant 1     Ampicillin/ Sulbactam  Resistant 1     Cefepime <=1 ug/mL Susceptible     Ceftazidime <=1 ug/mL Susceptible     Ceftriaxone <=1 ug/mL Susceptible     Ciprofloxacin <=0.25 ug/mL Susceptible     Gentamicin <=1 ug/mL Susceptible     Levofloxacin <=0.12 ug/mL Susceptible     Meropenem <=0.25 ug/mL Susceptible     Piperacillin/Tazobactam <=4 ug/mL Susceptible     Tobramycin <=1 ug/mL  Susceptible     Trimethoprim/Sulfamethoxazole <=1/19 ug/mL Susceptible                             IMAGING/RADIOLOGY:  MRI no osteo        ASSESSMENT:  Open foot wound, no osteo  DM  Recent 3 wk course ancef for MSSA bacteremia, aborted due to ARF, then given doxy up to admission  Smoker  Superficial gram neg on wound swab  Surgical cx pure enterobacter  contam BC (I hope)    RECOMMENDATION:  Continue cefepime  Flagyl ok also    Orals would be my impression of final treatment plan, will follow  .  DEYSI Cosme MD  Office [phone] option 2 to desk staff

## 2022-11-21 ENCOUNTER — APPOINTMENT (OUTPATIENT)
Dept: PHYSICAL THERAPY | Facility: HOSPITAL | Age: 47
DRG: 623 | End: 2022-11-21
Payer: COMMERCIAL

## 2022-11-21 LAB
ANION GAP SERPL CALCULATED.3IONS-SCNC: 8 MMOL/L (ref 7–15)
BACTERIA TISS BX CULT: ABNORMAL
BUN SERPL-MCNC: 34.1 MG/DL (ref 6–20)
CALCIUM SERPL-MCNC: 7.8 MG/DL (ref 8.6–10)
CHLORIDE SERPL-SCNC: 100 MMOL/L (ref 98–107)
CREAT SERPL-MCNC: 4.2 MG/DL (ref 0.51–0.95)
DEPRECATED HCO3 PLAS-SCNC: 23 MMOL/L (ref 22–29)
GFR SERPL CREATININE-BSD FRML MDRD: 13 ML/MIN/1.73M2
GLUCOSE BLDC GLUCOMTR-MCNC: 101 MG/DL (ref 70–99)
GLUCOSE BLDC GLUCOMTR-MCNC: 128 MG/DL (ref 70–99)
GLUCOSE BLDC GLUCOMTR-MCNC: 161 MG/DL (ref 70–99)
GLUCOSE BLDC GLUCOMTR-MCNC: 270 MG/DL (ref 70–99)
GLUCOSE BLDC GLUCOMTR-MCNC: 51 MG/DL (ref 70–99)
GLUCOSE BLDC GLUCOMTR-MCNC: 52 MG/DL (ref 70–99)
GLUCOSE BLDC GLUCOMTR-MCNC: 57 MG/DL (ref 70–99)
GLUCOSE BLDC GLUCOMTR-MCNC: 71 MG/DL (ref 70–99)
GLUCOSE BLDC GLUCOMTR-MCNC: 92 MG/DL (ref 70–99)
GLUCOSE SERPL-MCNC: 100 MG/DL (ref 70–99)
MAGNESIUM SERPL-MCNC: 1.8 MG/DL (ref 1.7–2.3)
POTASSIUM SERPL-SCNC: 3.9 MMOL/L (ref 3.4–5.3)
SODIUM SERPL-SCNC: 131 MMOL/L (ref 136–145)

## 2022-11-21 PROCEDURE — 99233 SBSQ HOSP IP/OBS HIGH 50: CPT | Performed by: INTERNAL MEDICINE

## 2022-11-21 PROCEDURE — 250N000011 HC RX IP 250 OP 636: Performed by: PODIATRIST

## 2022-11-21 PROCEDURE — 120N000001 HC R&B MED SURG/OB

## 2022-11-21 PROCEDURE — 250N000013 HC RX MED GY IP 250 OP 250 PS 637: Performed by: INTERNAL MEDICINE

## 2022-11-21 PROCEDURE — 80048 BASIC METABOLIC PNL TOTAL CA: CPT | Performed by: INTERNAL MEDICINE

## 2022-11-21 PROCEDURE — 250N000013 HC RX MED GY IP 250 OP 250 PS 637: Performed by: PODIATRIST

## 2022-11-21 PROCEDURE — 99232 SBSQ HOSP IP/OBS MODERATE 35: CPT | Performed by: INTERNAL MEDICINE

## 2022-11-21 PROCEDURE — G0463 HOSPITAL OUTPT CLINIC VISIT: HCPCS

## 2022-11-21 PROCEDURE — 36415 COLL VENOUS BLD VENIPUNCTURE: CPT | Performed by: INTERNAL MEDICINE

## 2022-11-21 PROCEDURE — 83735 ASSAY OF MAGNESIUM: CPT | Performed by: INTERNAL MEDICINE

## 2022-11-21 PROCEDURE — 250N000011 HC RX IP 250 OP 636: Performed by: INTERNAL MEDICINE

## 2022-11-21 PROCEDURE — 97116 GAIT TRAINING THERAPY: CPT | Mod: GP

## 2022-11-21 RX ORDER — MULTIVITAMIN,THERAPEUTIC
1 TABLET ORAL DAILY
Status: DISCONTINUED | OUTPATIENT
Start: 2022-11-21 | End: 2022-11-23

## 2022-11-21 RX ORDER — METRONIDAZOLE 500 MG/1
500 TABLET ORAL 3 TIMES DAILY
Status: DISCONTINUED | OUTPATIENT
Start: 2022-11-21 | End: 2022-11-22

## 2022-11-21 RX ADMIN — ONDANSETRON 4 MG: 2 INJECTION INTRAMUSCULAR; INTRAVENOUS at 16:01

## 2022-11-21 RX ADMIN — CEFEPIME HYDROCHLORIDE 1 G: 1 INJECTION, POWDER, FOR SOLUTION INTRAMUSCULAR; INTRAVENOUS at 10:13

## 2022-11-21 RX ADMIN — AMLODIPINE BESYLATE 5 MG: 5 TABLET ORAL at 10:10

## 2022-11-21 RX ADMIN — METRONIDAZOLE 500 MG: 500 TABLET ORAL at 20:49

## 2022-11-21 RX ADMIN — SENNOSIDES AND DOCUSATE SODIUM 1 TABLET: 50; 8.6 TABLET ORAL at 10:08

## 2022-11-21 RX ADMIN — ACETAMINOPHEN 975 MG: 325 TABLET, FILM COATED ORAL at 02:07

## 2022-11-21 RX ADMIN — ACETAMINOPHEN 975 MG: 325 TABLET, FILM COATED ORAL at 10:08

## 2022-11-21 RX ADMIN — METRONIDAZOLE 500 MG: 500 TABLET ORAL at 13:07

## 2022-11-21 RX ADMIN — SENNOSIDES AND DOCUSATE SODIUM 1 TABLET: 50; 8.6 TABLET ORAL at 20:49

## 2022-11-21 RX ADMIN — HYDRALAZINE HYDROCHLORIDE 10 MG: 20 INJECTION INTRAMUSCULAR; INTRAVENOUS at 19:46

## 2022-11-21 RX ADMIN — POLYETHYLENE GLYCOL 3350 17 G: 17 POWDER, FOR SOLUTION ORAL at 10:10

## 2022-11-21 RX ADMIN — THERA TABS 1 TABLET: TAB at 15:45

## 2022-11-21 RX ADMIN — METRONIDAZOLE 500 MG: 500 INJECTION, SOLUTION INTRAVENOUS at 02:08

## 2022-11-21 ASSESSMENT — ACTIVITIES OF DAILY LIVING (ADL)
ADLS_ACUITY_SCORE: 23
ADLS_ACUITY_SCORE: 27
ADLS_ACUITY_SCORE: 23
ADLS_ACUITY_SCORE: 23
ADLS_ACUITY_SCORE: 27
ADLS_ACUITY_SCORE: 23
ADLS_ACUITY_SCORE: 23
ADLS_ACUITY_SCORE: 27

## 2022-11-21 NOTE — PROGRESS NOTES
"CLINICAL NUTRITION SERVICES - REASSESSMENT NOTE     Nutrition Prescription    RECOMMENDATIONS FOR MDs/PROVIDERS TO ORDER:  Recommend mvi with minerals d/t not meeting RDIs and to promote wound healing    Malnutrition Status:    Does not qualify    Recommendations already ordered by Registered Dietitian (RD):  Add special k bar daily = 180 kcal, 12g protein    Future/Additional Recommendations:  Adjust supplements pending intake, weight, acceptance, wound healing       NUTRITION HISTORY  Pt reports decreased appetite past 2 months d/t depression, eating 1-2 meals/day. She reports she would \"forget to eat\" and had low motivation to make anything.   She does not like protein shakes      CURRENT NUTRITION ORDERS  Diet:Moderate consistent CHO  Intake/Tolerance: Fair, inadequate  Eating % of meals but ordering inadequately. Estimate intake yesterday 1040 kcal, 34 g protein over 3 meals meeting 70% of estimated kcal and 50% of estimated protein needs    Pt reports intake was less yesterday d/t N/V after oxy  Pt willing to try special k bar but reluctant to try others d/t being afraid of throwing up. Discussed current inadequate intake and increased nutrition needs for wound healing.     LABS  Labs reviewed  Na 131 (L), no change  Bun 34 (h), increased  CR 4.2 (H), improved  BG  mg/dl past 24 hours, in poor control with multiple hypoglycemia episodes- insulin adjustments made today    MEDICATIONS  Iv abx, novolog 1u: 15 g CHO, lantus oral abx, miralax, pericolace bid    ANTHROPOMETRICS  Height: 170.2 cm (5' 7\")  Most Recent Weight: 59.4 kg (131 lb) 1/16   BMI: Normal BMI  Weight History:   Wt Readings from Last 8 Encounters:   11/16/22 59.4 kg (131 lb)   11/15/22 59 kg (130 lb)   11/01/22 59 kg (130 lb)   10/19/22  10/11/22 59 kg (130 lb)  59.9 kg (132 lb) - office   09/28/22 05/18/22 59 kg (130 lb)  61.7 kg (136 lb) - office   08/23/19 69.2 kg (152 lb 8 oz)   Weight loss not clinically significant    Dosing " Weight: 59.4 kg    ASSESSED NUTRITION NEEDS  Estimated Energy Needs: 1485+ kcals/day (25+)  Justification: Wound healing  Estimated Protein Needs: 59-83 grams protein/day (1.1 - 1.4 grams of pro/kg)  Justification: CKD and Wound healing  Estimated Fluid Needs: 1485+ mL/day (1 mL/kcal)   Justification: Maintenance    PHYSICAL FINDINGS  See malnutrition section below.  Diabetic foot wound.  GI - N/V d/t narcotics. Last BM 11/16-constipated      MALNUTRITION:  % Weight Loss:  None noted  % Intake:Decreased intake does not meet criteria  Subcutaneous Fat Loss:  None noted  Muscle Loss: None noted  Fluid Retention:  None noted    Malnutrition Diagnosis: Does not meet criteria    NUTRITION DIAGNOSIS  Increased nutrient needs related to wound healing as evidenced by wound      Inadequate oral intake r/t N/V from pain meds evidenced by meeting 50% of protein and 70% of calorie needs    INTERVENTIONS  Implementation  Add special k bar daily = 180 kcal, 12g protein  Educated pt on protein foods and nutrition needs for wound healing  Recommend mvi with minerals d/t not meeting RDIs and to promote wound healing    Goals  Meet nutrition needs- not progressing  Improve wound  BG - not progressing- insulin being adjusted       Monitoring/Evaluation  Progress toward goals will be monitored and evaluated per protocol.

## 2022-11-21 NOTE — PLAN OF CARE
Problem: Hypertension Comorbidity  Goal: Blood Pressure in Desired Range  Outcome: Not Progressing     Problem: Diabetes Comorbidity  Goal: Blood Glucose Level Within Targeted Range  Outcome: Progressing     Problem: Pain Chronic (Persistent) (Comorbidity Management)  Goal: Acceptable Pain Control and Functional Ability  Outcome: Progressing     Goal Outcome Evaluation:       Pt is POD #2 s/p I&D of right foot ulcer. Dressing is currently clean, dry and intact. Anticipate wound VAC placement tomorrow. Numbness/tingling to bilateral feet. SBA with ambulation using crutches. NWB to the RLE. Currently receiving IV abx in the form of Maxipime and Flagyl for positive blood cultures. Repeat cultures are pending. ID consulting. Pt received prn Oxycodone and scheduled Tylenol today for complaints of lower back pain, which was effective. Unfortunately, pt developed some nausea following narcotic administration.  Emesis x1. Received prn oral Zofran at 1700, which was ineffective. MD notified and received order for prn IV Compazine, which was administered at 2025. Pt now reports relief of her symptoms. She is tolerating a diabetic diet. Pt was hypoglycemic all morning. Carb coverage held today and hs Lantus dose reduced by half. Blood sugar was 125 before meal and 138 at hs. On Mg and K+ protocols. Recheck in AM. Continent of bowel and bladder. Creatinine is continuing to creep up. Current level is 4.4. Nephrology consult placed. IV fluids discontinued . Pt continues to have elevated BP readings. Received prn IV Hydralazine at 1645 along with scheduled BP medications this shift.

## 2022-11-21 NOTE — PROGRESS NOTES
[facility]    PROGRESS NOTE - Hospitalist Service    ASSESSMENT AND PLAN     Principal Problem:    Nausea and vomiting, unspecified vomiting type  Active Problems:    Diabetic ulcer of right foot associated with type 1 diabetes mellitus, with necrosis of muscle, unspecified part of foot (H)    Hypertension, unspecified type    Josefa Curiel is a 47 year old female with known diabetic foot ulcer, type 1 diabetes, CKD, smoker, marijuana use, hypertension who presented with 3 days of nausea and vomiting after doxycycline use.  She was unable to keep medicine and food down.       Acute renal failure on chronic kidney disease stage IV: with metabolic acidosis. Baseline creatinine is 2.5-3, elevated to 4.36 on admission. Likely due to poor p.o. intake for 3 days or nephrotoxicity from doxycycline.    Creatinine remains high and is 4.20 on 11/21   Nephrology consulted 11/20.  Follow-up their recommendations.     Patient's creatinine has not improved despite giving fluid. Not clear whether the nephrotoxicity caused by doxycycline is reversible.  Avoid nephrotoxic medications     Right foot ulcer: Had recent admission and the plan was IV antibiotics for 4 weeks. Had 3 week course of ancef, aborted due to ARF. Antibiotic was then switched to doxycycline. Ulcer persisted despite antibiotic treatment. MRI reveals no osteomyelitis, septic arthritis, and abscess.  S/p I&D by podiatry on 11/18. Wound culture revealed enterococcus, pseudomonas.   - Started Ceftriaxone and flagyl. Changed to Cefepime and flagyl 11/19 per ID.  Continuing cefepime and Flagyl at this time.  Oral Cipro likely an option at time of discharge.   Podiatry-wound VAC today 11/21    Final ID recommendations appreciated when patient stable for discharge.      Positive blood culture: One out of 2 blood culture sent on 1/17 shows Staph lugdunensis and staph epidermis. She had MSSA bacteremia during her last admission.   - Repeat  blood culture: no growth so far  - Antibiotics as above    Intractable nausea and vomiting:    Likely side effect of doxycycline, ongoing chronic marijuana use and uremia. Discontinued doxycycline. Symptoms now resolved.       Type 1 insulin-dependent diabetes with hypoglycemia: Recent HbA1C 8.8. PTA Lantus 20 units, Novolog carb count tidac. Patient reports using carb count 1:15. But on admission, she reported to pharmacist that she uses 1: 6 for breakfast, 1:4 for lunch and 1:3 for dinner.   Patient developed hypoglycemia after admission while not able to have oral intake and received D5 drip.  She had recurrent hypoglycemia again today despite she eats more than she normally does at home and uses less carb ratio than home.  - Hold NovoLog carb count tidac today. Will resume at 1:15 carb count when hypoglycemia resolves.  - Decrease Lantus to 5 units qhs  - Continue Novolog sliding scale tidac     Hypertensive urgency: /103 in ER. Not able to take PTA amlodipine when she had GI symptoms.    Clonidine patch  initiated on admission and now discontinued   Home amlodipine 2.5 mg daily increased to 5 mg daily this admission.       Hypokalemia: Resolved after replacement      Tobacco and marijuana use: Urine tox is positive for opiates and cannabinoid  - Patient counseled to quit smoking     Past ethanol use: Patient claims she quit alcohol 6 years ago      Thrombocytosis: likely due to infection. Monitor     Anxiety depression: Continue home medication.     Barriers to discharge: Final ID recommendations, nephrology recommendations regarding creatinine    Anticipated length of stay: 2 to 3 days and then discharge to home with assist      Present on Admission:    Diabetic ulcer of right foot associated with type 1 diabetes mellitus, with necrosis of muscle, unspecified part of foot (H)    Hypertension, unspecified type    Nausea and vomiting, unspecified vomiting type      Subjective:  Patient resting comfortably  in bed.  Wound VAC was just placed.  No complaints of pain currently.  No breathing issues.  No other concerns today.    PHYSICAL EXAM  Temp:  [98.2  F (36.8  C)-98.7  F (37.1  C)] 98.7  F (37.1  C)  Pulse:  [78-90] 88  Resp:  [18-20] 20  BP: (133-170)/(78-92) 166/83  SpO2:  [98 %-100 %] 100 %  Wt Readings from Last 1 Encounters:   11/16/22 59.4 kg (131 lb)       Intake/Output Summary (Last 24 hours) at 11/21/2022 1511  Last data filed at 11/21/2022 1100  Gross per 24 hour   Intake 480 ml   Output --   Net 480 ml      Body mass index is 20.52 kg/m .    GENRL: Alert and answering questions appropriately. Not in acute distress. Sitting in bed   HEENT: no lymphadenopathy or thyromegaly  CHEST: Clear to auscultation bilaterally. No wheezes, rhonchi or crackles. Breathing easily   HEART: Regular rate and rhythm, S1S2 auscultated.  Positive murmur  ABDMN: Soft. Non-tender, non-distended. No organomegaly. No guarding or rigidity. Bowel sounds present   EXTRM: +trace pedal edema.  Wound VAC in place to right lower extremity.  NEURO: Cranial nerves II-XII grossly intact. No focal neurological deficit. No involuntary movements. Normal mentation  PSYCH: Normal affect and mood.   INTGM: No skin rash, no cyanosis or clubbing    PERTINENT LABS/IMAGING:  Results for orders placed or performed during the hospital encounter of 11/16/22   MR Foot Right w/o Contrast    Impression    IMPRESSION:  1.  Negative for osteomyelitis, septic arthritis, and abscess.  2.  Mild subcutaneous soft tissue thickening and edema type signal in the plantar forefoot at the level of the TMT joints. This could represent cellulitis in the correct clinical setting.  3.  Muscle fatty atrophy and denervation change, stable.     Most Recent 3 CBC's:Recent Labs   Lab Test 11/16/22  2247 11/15/22  1230 11/10/22  0930   WBC 10.5 10.7 6.6   HGB 11.7 11.0* 9.5*   MCV 87 86 87   * 436 355       Recent Labs   Lab Test 04/10/17  0507   CHOL 282*   HDL 85   LDL  170*   TRIG 134     Recent Labs   Lab Test 04/10/17  0507   *     Recent Labs   Lab Test 11/21/22  1221 11/21/22  0744 11/21/22  0511   NA  --   --  131*   POTASSIUM  --   --  3.9   CHLORIDE  --   --  100   CO2  --   --  23   *   < > 100*   BUN  --   --  34.1*   CR  --   --  4.20*   GFRESTIMATED  --   --  13*   ZACH  --   --  7.8*    < > = values in this interval not displayed.     Recent Labs   Lab Test 10/19/22  0923 10/02/20  0321 03/25/18  0755   A1C 8.8* 10.0* 8.9*     Recent Labs   Lab Test 11/16/22  2247 11/15/22  1230 11/10/22  0930   HGB 11.7 11.0* 9.5*     No results for input(s): TROPONINI in the last 28035 hours.  No results for input(s): BNP, NTBNPI, NTBNP in the last 54978 hours.  No results for input(s): TSH in the last 78923 hours.  Recent Labs   Lab Test 03/23/18  0930   INR 0.87*       Elsa Cruz,   Hospitalist Service  [facility]  Text page via [employer] Paging/Directory

## 2022-11-21 NOTE — DISCHARGE INSTRUCTIONS
"A diet high in protein is important for wound healing, we recommend getting 90 grams of protein per day. Taking protein shakes or bars are a good way to get extra protein in your diet.   Vitamins, A & D & zinc  Good sources of protein:  Sweet potatoes  nondairy protein powder - 24g per scoop (on average)  nondairy yogurt - 23g per 8oz   Chicken or Turkey - 23g per 3oz  Fish - 20-25g per 3oz  Beef - 18-23g per 3oz  Navy beans - 20g per cup  Lentils - 13g per 1/4 cup  Beef jerky 13g per 1oz  nondairy milk   Peanut butter - 8g per 2 tablespoons  Eggs - 6g per egg  Mixed nuts - 6g per 2oz       WOC DISCHARGE INSTRUCTIONS:  Negative pressure wound therapy plan:  Wound location: right foot   Change Days: Mon/Wed/Fri   Supplies (including all accessories) used: medium Black foam , Adapt barrier ring and Cavilon no sting barrier film  Cleanse with Normal saline prior to replacing VAC    Suction setting: -125   Methods used: Window paned all periwound skin with vac drape prior to applying sponge, Bridged trac pad off bony prominences and Placed barrier ring into periwound creases to improve seal    Staff RN to assess integrity of dressing and ensure suction is set at appropriate level every shift.   Date canister. Chart canister output every shift. Change cannister weekly and PRN if full/occluded     Remove foam dressing and replace with BID normal saline moist gauze dressing if:   -a dressing failure which cannot be repaired within 2 hours   -patient is discharging to home without a home pump   -patient is discharging to a facility outside the local area   -if a dressing is a \"Silver Foam\", remove before Radiation Therapy or MRI     The hospital VAC pump is not to be discharged with the patient.?Ensure to disconnect patient from machine prior to discharge. Then,    - If a home KCI VAC pump has been delivered, connect home cannister to dressing tubing then connect cannister to home pump and turn on machine    - If " transferring to a nearby facility with a KCI vac, can disconnect and clamp tubing then cover end with glove so can be reconnected within 2 hours

## 2022-11-21 NOTE — CONSULTS
[facility]  WO Nurse Inpatient Assessment     Consulted for: Right foot    Patient History (according to provider note(s):      Preoperative diagnosis:   1. Abscess right foot  2. Ulceration right foot  11/18 with Dr. Patel    Areas Assessed:      Areas visualized during today's visit: right foot    Negative pressure wound therapy applied to: Right foot   Last photo: 11/21     Wound due to: Surgical Wound   Wound history/plan of care:      Surgical date: 11/18     Date Negative Pressure Wound Therapy initiated: 11/21     Interventions in place: offloading    Is patient s nutritional status compromised? no   a. If yes, what interventions are in place? N/A    Reason for initiating vac therapy? Need for accelerated granulation tissue    Which?of?the?following?co-morbidities?apply? Diabetes  a. If diabetic is patient on a diabetic management program? Yes     Is osteomyelitis present in wound? no  a.  If yes what treatments are in place? N/A    Wound base: 100 % granulation     Palpation of the wound bed: normal       Drainage: small      Description of drainage: serosanguinous      Measurements (length x width x depth, in cm) 3  x 6  x  2 cm       Tunneling N/A      Undermining N/A   Periwound skin: Intact       Color: normal and consistent with surrounding tissue       Temperature: normal    Odor: none   Pain: denies   Treatment goal: Heal   STATUS: initial assessment   Supplies ordered: gathered    Number of foam pieces removed from a wound (excluding foam for bridge) : 0 - first VAC application   Verified this matched the number of foam pieces applied last dressing change: N/A   Number of foam pieces packed into wound (excluding foam for bridge) : 1 bridge and 1  GranuFoam Black          Treatment Plan:     Negative pressure wound therapy plan:  Wound location: right foot   Change Days: Mon/Wed/Fri by WOC RN    Supplies (including all accessories) used: medium Black foam , Adapt barrier  "ring and Cavilon no sting barrier film  Cleanse with Normal saline prior to replacing VAC    Suction setting: -125   Methods used: Window paned all periwound skin with vac drape prior to applying sponge, Bridged trac pad off bony prominences and Placed barrier ring into periwound creases to improve seal    Staff RN to assess integrity of dressing and ensure suction is set at appropriate level every shift.   Date canister. Chart canister output every shift. Change cannister weekly and PRN if full/occluded     Remove foam dressing and replace with BID normal saline moist gauze dressing if:   -a dressing failure which cannot be repaired within 2 hours   -patient is discharging to home without a home pump   -patient is discharging to a facility outside the local area   -if a dressing is a \"Silver Foam\", remove before Radiation Therapy or MRI     The hospital VAC pump is not to be discharged with the patient.?Ensure to disconnect patient from machine prior to discharge. Then,    - If a home KCI VAC pump has been delivered, connect home cannister to dressing tubing then connect cannister to home pump and turn on machine    - If transferring to a nearby facility with a KCI vac, can disconnect and clamp tubing then cover end with glove so can be reconnected within 2 hours          Orders: Written    RECOMMEND PRIMARY TEAM ORDER: None, at this time  Education provided: plan of care  Discussed plan of care with: Patient and Nurse  WOC nurse follow-up plan: Monday/WednesdayFriday  Notify WOC if wound(s) deteriorate.  Nursing to notify the Provider(s) and re-consult the WOC Nurse if new skin concern.    DATA:     Current support surface: Standard  Standard gel/foam mattress (IsoFlex, Atmos air, etc)  Containment of urine/stool: Continent of bladder and Continent of bowel  BMI: Body mass index is 20.52 kg/m .   Active diet order: Orders Placed This Encounter      Moderate Consistent Carb (60 g CHO per Meal) Diet     Output: I/O " last 3 completed shifts:  In: 480 [P.O.:480]  Out: -      Labs: Recent Labs   Lab 11/19/22  0811 11/18/22  0532 11/16/22  2247   ALBUMIN 2.7*   < >  --    HGB  --   --  11.7   WBC  --   --  10.5   CRP  --   --  <3.00    < > = values in this interval not displayed.     Pressure injury risk assessment:   Sensory Perception: 3-->slightly limited  Moisture: 4-->rarely moist  Activity: 3-->walks occasionally  Mobility: 3-->slightly limited  Nutrition: 3-->adequate  Friction and Shear: 3-->no apparent problem  Corey Score: 19    Ena Arzate, ANNIEN, RN, PHN, HNB-BC, CWOCN

## 2022-11-21 NOTE — PLAN OF CARE
Problem: Plan of Care - These are the overarching goals to be used throughout the patient stay.    Goal: Absence of Hospital-Acquired Illness or Injury  Intervention: Prevent Skin Injury  Recent Flowsheet Documentation  Taken 11/21/2022 0741 by Jackie Min RN  Body Position: position changed independently     Problem: Plan of Care - These are the overarching goals to be used throughout the patient stay.    Goal: Readiness for Transition of Care  Outcome: Progressing     Problem: Diabetes Comorbidity  Goal: Blood Glucose Level Within Targeted Range  Outcome: Progressing     Problem: Hypertension Comorbidity  Goal: Blood Pressure in Desired Range  Outcome: Progressing  Intervention: Maintain Blood Pressure Management  Recent Flowsheet Documentation  Taken 11/21/2022 0975 by Jackie Min RN  Medication Review/Management: medications reviewed     Goal Outcome Evaluation: Patient alert and oriented, reported no pain during the shift, Wound vac done, patient blood sugar was 50 mg/dl, at the beginning of the shift, 240 ml of orange juice was given, patient vomited half of the juice, a blood sugar re-check was done around 9 am her blood sugar was 101. Patient had 62 grams of carb's during breakfast and 53 gm during lunch. Non weight bearing on right foot.

## 2022-11-21 NOTE — PLAN OF CARE
Problem: Pain Chronic (Persistent) (Comorbidity Management)  Goal: Acceptable Pain Control and Functional Ability  Intervention: Manage Persistent Pain  Recent Flowsheet Documentation  Taken 11/21/2022 1615 by Ariela Delgado RN  Medication Review/Management: medications reviewed   Goal Outcome Evaluation:      Plan of Care Reviewed With: patient    Overall Patient Progress: improvingOverall Patient Progress: improving  Patient denies pain.      Problem: Hypertension Comorbidity  Goal: Blood Pressure in Desired Range  Outcome: Progressing  Intervention: Maintain Blood Pressure Management  Recent Flowsheet Documentation  Taken 11/21/2022 1615 by Ariela Delgado RN  Medication Review/Management: medications reviewed      Blood pressure was 174/93.  Will continue to monitor because this was shortly after she had vomited.      Patient vomited after multivitamin.  She was given Zofran and that helped.

## 2022-11-21 NOTE — PROGRESS NOTES
"[facility] Progress Note    ASSESSMENT:  Open foot wound, no osteo  DM  Recent 3 wk course ancef for MSSA bacteremia, aborted due to ARF, then given doxy up to admission  Smoker  Superficial gram neg on wound swab  Surgical cx enterobacter, pseudomonas, debrided 11/18.   contaminated Blood culture most likely.    RECOMMENDATION:  Continue cefepime, flagyl  Add doxy and cefazolin to intolerance/allergy list    Oral cipro likely an option at time of discharge    Silvestre Valdez MD  [facility]  [phone] clinic  [employer] paging  ______________________________________________________________________     SUBJECTIVE: pain controlled. Vomited yesterday after oxycodone.      REVIEW OF SYSTEMS:  Negative unless as listed above.  Social history, Family history, Medications: reviewed.    OBJECTIVE:  BP (!) 162/82 (BP Location: Right arm)   Pulse 85   Temp 98.2  F (36.8  C) (Oral)   Resp 20   Ht 1.702 m (5' 7\")   Wt 59.4 kg (131 lb)   LMP 11/02/2022   SpO2 100%   BMI 20.52 kg/m                PHYSICAL EXAM:  Alert, awake  Vitals tabulated above, reviewed  Neck supple without lymphadenopathy  Sclera normal color, not injected  CARDIOVASCULAR regular rate and rhythm, no murmur  Lungs normal respiratory effort    Abdomen deferred  Skin normal  Joints normal  Neurologic exam non focal  Foot wound reviewed, wound vac just placed    Antibiotics:CTX-->cefepime, flagyl    Pertinent labs:  Lab Results   Component Value Date    WBC 10.5 11/16/2022     Lab Results   Component Value Date    RBC 4.02 11/16/2022     Lab Results   Component Value Date    HGB 11.7 11/16/2022     Lab Results   Component Value Date    HCT 35.0 11/16/2022     No components found for: MCT  Lab Results   Component Value Date    MCV 87 11/16/2022     Lab Results   Component Value Date    MCH 29.1 11/16/2022     Lab Results   Component Value Date    MCHC 33.4 11/16/2022     Lab Results   Component Value Date    RDW " 13.9 11/16/2022     Lab Results   Component Value Date     11/16/2022       Last Comprehensive Metabolic Panel:  Sodium   Date Value Ref Range Status   11/21/2022 131 (L) 136 - 145 mmol/L Final     Potassium   Date Value Ref Range Status   11/21/2022 3.9 3.4 - 5.3 mmol/L Final   11/03/2022 4.2 3.4 - 5.3 mmol/L Final     Chloride   Date Value Ref Range Status   11/21/2022 100 98 - 107 mmol/L Final   11/03/2022 103 94 - 109 mmol/L Final     Carbon Dioxide (CO2)   Date Value Ref Range Status   11/21/2022 23 22 - 29 mmol/L Final   11/03/2022 25 20 - 32 mmol/L Final     Anion Gap   Date Value Ref Range Status   11/21/2022 8 7 - 15 mmol/L Final   11/03/2022 9 3 - 14 mmol/L Final     Glucose   Date Value Ref Range Status   11/21/2022 100 (H) 70 - 99 mg/dL Final   11/03/2022 79 70 - 99 mg/dL Final     GLUCOSE BY METER POCT   Date Value Ref Range Status   11/21/2022 101 (H) 70 - 99 mg/dL Final     Urea Nitrogen   Date Value Ref Range Status   11/21/2022 34.1 (H) 6.0 - 20.0 mg/dL Final   11/03/2022 37 (H) 7 - 30 mg/dL Final     Creatinine   Date Value Ref Range Status   11/21/2022 4.20 (H) 0.51 - 0.95 mg/dL Final     GFR Estimate   Date Value Ref Range Status   11/21/2022 13 (L) >60 mL/min/1.73m2 Final     Comment:     Effective December 21, 2021 eGFRcr in adults is calculated using the 2021 CKD-EPI creatinine equation which includes age and gender (Deana et al., NEJM, DOI: 10.1056/JUSFgw3896925)   10/03/2020 32 (L) >60 mL/min/1.73m2 Final     Calcium   Date Value Ref Range Status   11/21/2022 7.8 (L) 8.6 - 10.0 mg/dL Final       Liver Function Studies -   Recent Labs   Lab Test 11/18/22  0532   PROTTOTAL 5.5*   ALBUMIN 2.8*   BILITOTAL <0.2   ALKPHOS 79   AST 18   ALT 5*       Erythrocyte Sedimentation Rate   Date Value Ref Range Status   11/16/2022 58 (H) 0 - 20 mm/hr Final       CRP Inflammation   Date Value Ref Range Status   11/16/2022 <3.00 <5.00 mg/L Final   11/03/2022 3.9 0.0 - 8.0 mg/L Final                MICROBIOLOGY DATA:  Foot swab enterobacter and PA  BC is contam most likely (3 different skin staphs)  Culture Culture in progress       1+ Enterobacter cloacae complex Abnormal             Resulting Agency: [facility]     Susceptibility     Enterobacter cloacae complex     ABHIJIT     Ampicillin  Resistant 1     Ampicillin/ Sulbactam  Resistant 1     Cefepime <=1 ug/mL Susceptible     Ceftazidime <=1 ug/mL Susceptible     Ceftriaxone <=1 ug/mL Susceptible     Ciprofloxacin <=0.25 ug/mL Susceptible     Gentamicin <=1 ug/mL Susceptible     Levofloxacin <=0.12 ug/mL Susceptible     Meropenem <=0.25 ug/mL Susceptible     Piperacillin/Tazobactam <=4 ug/mL Susceptible     Tobramycin <=1 ug/mL Susceptible     Trimethoprim/Sulfamethoxazole <=1/19 ug/mL Susceptible                             IMAGING/RADIOLOGY:  MRI no osteo

## 2022-11-21 NOTE — PLAN OF CARE
Problem: Plan of Care - These are the overarching goals to be used throughout the patient stay.    Goal: Plan of Care Review  Description: The Plan of Care Review/Shift note should be completed every shift.  The Outcome Evaluation is a brief statement about your assessment that the patient is improving, declining, or no change.  This information will be displayed automatically on your shift note.  Outcome: Progressing   Goal Outcome Evaluation:       Patient denied pain. No nausea. VSS. Blood sugar at 0200 down to 57. Treated with juice and gram crackers. Improved to 92.

## 2022-11-22 ENCOUNTER — APPOINTMENT (OUTPATIENT)
Dept: ULTRASOUND IMAGING | Facility: HOSPITAL | Age: 47
DRG: 623 | End: 2022-11-22
Attending: INTERNAL MEDICINE
Payer: COMMERCIAL

## 2022-11-22 LAB
ALBUMIN MFR UR ELPH: 309.2 MG/DL
ANION GAP SERPL CALCULATED.3IONS-SCNC: 8 MMOL/L (ref 7–15)
BACTERIA BLD CULT: NO GROWTH
BUN SERPL-MCNC: 33.9 MG/DL (ref 6–20)
CALCIUM SERPL-MCNC: 8 MG/DL (ref 8.6–10)
CHLORIDE SERPL-SCNC: 100 MMOL/L (ref 98–107)
CREAT SERPL-MCNC: 4.27 MG/DL (ref 0.51–0.95)
CREAT UR-MCNC: 31.9 MG/DL
DEPRECATED HCO3 PLAS-SCNC: 24 MMOL/L (ref 22–29)
GFR SERPL CREATININE-BSD FRML MDRD: 12 ML/MIN/1.73M2
GLUCOSE BLDC GLUCOMTR-MCNC: 121 MG/DL (ref 70–99)
GLUCOSE BLDC GLUCOMTR-MCNC: 142 MG/DL (ref 70–99)
GLUCOSE BLDC GLUCOMTR-MCNC: 143 MG/DL (ref 70–99)
GLUCOSE BLDC GLUCOMTR-MCNC: 162 MG/DL (ref 70–99)
GLUCOSE BLDC GLUCOMTR-MCNC: 306 MG/DL (ref 70–99)
GLUCOSE SERPL-MCNC: 101 MG/DL (ref 70–99)
MAGNESIUM SERPL-MCNC: 1.9 MG/DL (ref 1.7–2.3)
POTASSIUM SERPL-SCNC: 4.2 MMOL/L (ref 3.4–5.3)
PROT/CREAT 24H UR: 9.69 MG/MG CR (ref 0–0.2)
SODIUM SERPL-SCNC: 132 MMOL/L (ref 136–145)

## 2022-11-22 PROCEDURE — 36415 COLL VENOUS BLD VENIPUNCTURE: CPT | Performed by: INTERNAL MEDICINE

## 2022-11-22 PROCEDURE — 84165 PROTEIN E-PHORESIS SERUM: CPT | Mod: 26

## 2022-11-22 PROCEDURE — 83735 ASSAY OF MAGNESIUM: CPT | Performed by: INTERNAL MEDICINE

## 2022-11-22 PROCEDURE — 250N000013 HC RX MED GY IP 250 OP 250 PS 637: Performed by: INTERNAL MEDICINE

## 2022-11-22 PROCEDURE — 250N000011 HC RX IP 250 OP 636: Performed by: INTERNAL MEDICINE

## 2022-11-22 PROCEDURE — 120N000001 HC R&B MED SURG/OB

## 2022-11-22 PROCEDURE — 84166 PROTEIN E-PHORESIS/URINE/CSF: CPT | Mod: 26

## 2022-11-22 PROCEDURE — 99232 SBSQ HOSP IP/OBS MODERATE 35: CPT | Performed by: INTERNAL MEDICINE

## 2022-11-22 PROCEDURE — 80048 BASIC METABOLIC PNL TOTAL CA: CPT | Performed by: INTERNAL MEDICINE

## 2022-11-22 PROCEDURE — 250N000013 HC RX MED GY IP 250 OP 250 PS 637: Performed by: PODIATRIST

## 2022-11-22 PROCEDURE — 99233 SBSQ HOSP IP/OBS HIGH 50: CPT | Performed by: INTERNAL MEDICINE

## 2022-11-22 PROCEDURE — 84155 ASSAY OF PROTEIN SERUM: CPT | Performed by: INTERNAL MEDICINE

## 2022-11-22 PROCEDURE — 84165 PROTEIN E-PHORESIS SERUM: CPT | Mod: TC | Performed by: PATHOLOGY

## 2022-11-22 PROCEDURE — 250N000011 HC RX IP 250 OP 636: Performed by: PODIATRIST

## 2022-11-22 PROCEDURE — 76770 US EXAM ABDO BACK WALL COMP: CPT

## 2022-11-22 PROCEDURE — 84166 PROTEIN E-PHORESIS/URINE/CSF: CPT | Performed by: PATHOLOGY

## 2022-11-22 PROCEDURE — 84156 ASSAY OF PROTEIN URINE: CPT | Performed by: INTERNAL MEDICINE

## 2022-11-22 PROCEDURE — 250N000011 HC RX IP 250 OP 636: Performed by: HOSPITALIST

## 2022-11-22 PROCEDURE — 258N000003 HC RX IP 258 OP 636: Performed by: INTERNAL MEDICINE

## 2022-11-22 RX ORDER — AMLODIPINE BESYLATE 5 MG/1
10 TABLET ORAL DAILY
Status: DISCONTINUED | OUTPATIENT
Start: 2022-11-23 | End: 2022-11-28 | Stop reason: HOSPADM

## 2022-11-22 RX ORDER — MEROPENEM 500 MG/1
500 INJECTION, POWDER, FOR SOLUTION INTRAVENOUS EVERY 12 HOURS
Status: DISCONTINUED | OUTPATIENT
Start: 2022-11-23 | End: 2022-11-23

## 2022-11-22 RX ORDER — SODIUM CHLORIDE 9 MG/ML
INJECTION, SOLUTION INTRAVENOUS CONTINUOUS
Status: DISCONTINUED | OUTPATIENT
Start: 2022-11-22 | End: 2022-11-23

## 2022-11-22 RX ADMIN — TRAZODONE HYDROCHLORIDE 50 MG: 50 TABLET ORAL at 00:15

## 2022-11-22 RX ADMIN — METOPROLOL TARTRATE 12.5 MG: 25 TABLET, FILM COATED ORAL at 21:11

## 2022-11-22 RX ADMIN — AMLODIPINE BESYLATE 5 MG: 5 TABLET ORAL at 08:16

## 2022-11-22 RX ADMIN — HYDRALAZINE HYDROCHLORIDE 10 MG: 20 INJECTION INTRAMUSCULAR; INTRAVENOUS at 18:31

## 2022-11-22 RX ADMIN — METRONIDAZOLE 500 MG: 500 TABLET ORAL at 08:17

## 2022-11-22 RX ADMIN — PROCHLORPERAZINE EDISYLATE 5 MG: 5 INJECTION INTRAMUSCULAR; INTRAVENOUS at 01:18

## 2022-11-22 RX ADMIN — PROCHLORPERAZINE EDISYLATE 5 MG: 5 INJECTION INTRAMUSCULAR; INTRAVENOUS at 08:24

## 2022-11-22 RX ADMIN — CEFEPIME HYDROCHLORIDE 1 G: 1 INJECTION, POWDER, FOR SOLUTION INTRAMUSCULAR; INTRAVENOUS at 08:16

## 2022-11-22 RX ADMIN — THERA TABS 1 TABLET: TAB at 08:17

## 2022-11-22 RX ADMIN — METRONIDAZOLE 500 MG: 500 TABLET ORAL at 13:06

## 2022-11-22 RX ADMIN — SODIUM CHLORIDE: 9 INJECTION, SOLUTION INTRAVENOUS at 14:21

## 2022-11-22 ASSESSMENT — ACTIVITIES OF DAILY LIVING (ADL)
ADLS_ACUITY_SCORE: 21
ADLS_ACUITY_SCORE: 23
ADLS_ACUITY_SCORE: 21
ADLS_ACUITY_SCORE: 21
ADLS_ACUITY_SCORE: 23

## 2022-11-22 NOTE — PROGRESS NOTES
"[facility] Progress Note    ASSESSMENT:  Diabetic foot infection, no osteomyelitis seen on imaging or at surgery.  DM  Recent 3 wk course ancef for MSSA bacteremia, aborted due to ARF, then given doxy up to admission  Smoker  Surgical cx enterobacter, pseudomonas, debrided 11/18.   contaminated Blood culture most likely -- 2 strains of staph epi, plus staph lugdunensis and now staph aureus (SA grew at 4 days).   Normal CRP on admission makes bacteremia unlikely -- I think Echo would be low yield.    Typically would not disregard staph aureus in blood culture as contaminant, but this was clearly a contaminated specimen based on other bacteria.     RECOMMENDATION:  Added doxy and cefazolin to intolerance/allergy list  Switch cefepime to meropenem for now -- avoid cephalosporins  Check urine eos  Renal to see  Oral cipro likely an option at time of discharge, however this interacts with duloxetine -- can duloxetine be stopped temporarily?    Silvestre Valdez MD  [facility]  [phone] clinic  [employer] paging  ______________________________________________________________________     SUBJECTIVE: still with nausea, vomited. Temp ok. Discussed results.     REVIEW OF SYSTEMS:  Negative unless as listed above.  Social history, Family history, Medications: reviewed.    OBJECTIVE:  BP (!) 157/82 (BP Location: Right arm)   Pulse 113   Temp 98.6  F (37  C) (Oral)   Resp 20   Ht 1.702 m (5' 7\")   Wt 59.4 kg (131 lb)   LMP 11/02/2022   SpO2 97%   BMI 20.52 kg/m                PHYSICAL EXAM:  Alert, awake  Sclera normal color, not injected  CARDIOVASCULAR regular rate and rhythm, has chronic murmur  Lungs normal respiratory effort    Abdomen deferred  Skin normal  Joints normal  Neurologic exam non focal  Foot wound reviewed, wound vac in place    Antibiotics:CTX-->cefepime, flagyl    Pertinent labs:  Lab Results   Component Value Date    WBC 10.5 11/16/2022     Lab Results "   Component Value Date    RBC 4.02 11/16/2022     Lab Results   Component Value Date    HGB 11.7 11/16/2022     Lab Results   Component Value Date    HCT 35.0 11/16/2022     No components found for: MCT  Lab Results   Component Value Date    MCV 87 11/16/2022     Lab Results   Component Value Date    MCH 29.1 11/16/2022     Lab Results   Component Value Date    MCHC 33.4 11/16/2022     Lab Results   Component Value Date    RDW 13.9 11/16/2022     Lab Results   Component Value Date     11/16/2022       Last Comprehensive Metabolic Panel:  Sodium   Date Value Ref Range Status   11/22/2022 132 (L) 136 - 145 mmol/L Final     Potassium   Date Value Ref Range Status   11/22/2022 4.2 3.4 - 5.3 mmol/L Final   11/03/2022 4.2 3.4 - 5.3 mmol/L Final     Chloride   Date Value Ref Range Status   11/22/2022 100 98 - 107 mmol/L Final   11/03/2022 103 94 - 109 mmol/L Final     Carbon Dioxide (CO2)   Date Value Ref Range Status   11/22/2022 24 22 - 29 mmol/L Final   11/03/2022 25 20 - 32 mmol/L Final     Anion Gap   Date Value Ref Range Status   11/22/2022 8 7 - 15 mmol/L Final   11/03/2022 9 3 - 14 mmol/L Final     Glucose   Date Value Ref Range Status   11/22/2022 101 (H) 70 - 99 mg/dL Final   11/03/2022 79 70 - 99 mg/dL Final     GLUCOSE BY METER POCT   Date Value Ref Range Status   11/22/2022 121 (H) 70 - 99 mg/dL Final     Urea Nitrogen   Date Value Ref Range Status   11/22/2022 33.9 (H) 6.0 - 20.0 mg/dL Final   11/03/2022 37 (H) 7 - 30 mg/dL Final     Creatinine   Date Value Ref Range Status   11/22/2022 4.27 (H) 0.51 - 0.95 mg/dL Final     GFR Estimate   Date Value Ref Range Status   11/22/2022 12 (L) >60 mL/min/1.73m2 Final     Comment:     Effective December 21, 2021 eGFRcr in adults is calculated using the 2021 CKD-EPI creatinine equation which includes age and gender ( et al., NEJM, DOI: 10.1056/CGUXcn4979740)   10/03/2020 32 (L) >60 mL/min/1.73m2 Final     Calcium   Date Value Ref Range Status   11/22/2022  8.0 (L) 8.6 - 10.0 mg/dL Final       Liver Function Studies -   Recent Labs   Lab Test 11/18/22  0532   PROTTOTAL 5.5*   ALBUMIN 2.8*   BILITOTAL <0.2   ALKPHOS 79   AST 18   ALT 5*       Erythrocyte Sedimentation Rate   Date Value Ref Range Status   11/16/2022 58 (H) 0 - 20 mm/hr Final       CRP Inflammation   Date Value Ref Range Status   11/16/2022 <3.00 <5.00 mg/L Final   11/03/2022 3.9 0.0 - 8.0 mg/L Final               MICROBIOLOGY DATA:  Foot swab enterobacter and PA  BC one of two sets on admission -- staph epi x2, staph lug, staph aureus  Culture Culture in progress       1+ Enterobacter cloacae complex Abnormal             Resulting Agency: [facility]     Susceptibility     Enterobacter cloacae complex     ABHIJIT     Ampicillin  Resistant 1     Ampicillin/ Sulbactam  Resistant 1     Cefepime <=1 ug/mL Susceptible     Ceftazidime <=1 ug/mL Susceptible     Ceftriaxone <=1 ug/mL Susceptible     Ciprofloxacin <=0.25 ug/mL Susceptible     Gentamicin <=1 ug/mL Susceptible     Levofloxacin <=0.12 ug/mL Susceptible     Meropenem <=0.25 ug/mL Susceptible     Piperacillin/Tazobactam <=4 ug/mL Susceptible     Tobramycin <=1 ug/mL Susceptible     Trimethoprim/Sulfamethoxazole <=1/19 ug/mL Susceptible                       Pseudomonas pan-susceptible.       IMAGING/RADIOLOGY:  MRI no osteo

## 2022-11-22 NOTE — PROGRESS NOTES
[facility]    PROGRESS NOTE - Hospitalist Service    ASSESSMENT AND PLAN     Principal Problem:    Nausea and vomiting, unspecified vomiting type  Active Problems:    Diabetic ulcer of right foot associated with type 1 diabetes mellitus, with necrosis of muscle, unspecified part of foot (H)    Hypertension, unspecified type    Josefa Curiel is a 47 year old female  with known diabetic foot ulcer, type 1 diabetes, CKD, smoker, marijuana use, hypertension who presented with 3 days of nausea and vomiting after doxycycline use.  She was unable to keep medicine and food down.       Acute renal failure on chronic kidney disease stage IV: with metabolic acidosis. Baseline creatinine is 2.5-3, elevated to 4.36 on admission. Likely due to poor p.o. intake for 3 days or nephrotoxicity from doxycycline.               Creatinine remains high and is 4.27 on 11/22              Nephrology consulted 11/20.  Follow-up recommendations.                Patient's creatinine has not improved despite giving fluid. Not clear whether the nephrotoxicity caused by doxycycline is reversible.  Avoid nephrotoxic medications      Right foot ulcer: Had recent admission and the plan was IV antibiotics for 4 weeks. Had 3 week course of ancef, aborted due to ARF. Antibiotic was then switched to doxycycline. Ulcer persisted despite antibiotic treatment. MRI reveals no osteomyelitis, septic arthritis, and abscess.  S/p I&D by podiatry on 11/18. Wound culture revealed enterococcus, pseudomonas.   - Started Ceftriaxone and flagyl. Changed to Cefepime and flagyl 11/19 per ID.  Switched to meropenem 11/22. Oral Cipro likely an option at time of discharge. Duloxetine held this admission. OK to hold if discharged on cipro              Podiatry-wound VAC 11/21               Final ID recommendations appreciated when patient stable for discharge.       Positive blood culture: One out of 2 blood culture sent on 1/17 shows Staph  lugdunensis and staph epidermis and staph aureus. She had MSSA bacteremia during her last admission.   - Repeat blood culture: no growth so far  - Antibiotics as above     Intractable nausea and vomiting:                 Likely side effect of doxycycline, ongoing chronic marijuana use and uremia. Discontinued doxycycline. Symptoms now resolved.       Type 1 insulin-dependent diabetes with hypoglycemia: Recent HbA1C 8.8. PTA Lantus 20 units, Novolog carb count tidac. Patient reports using carb count 1:15. But on admission, she reported to pharmacist that she uses 1: 6 for breakfast, 1:4 for lunch and 1:3 for dinner.   Patient developed hypoglycemia after admission while not able to have oral intake and received D5 drip.  She had recurrent hypoglycemia again today despite she eats more than she normally does at home and uses less carb ratio than home.  - Hold NovoLog carb count tidac today. Resumed mealtime 1:15 carb count   - Decreased Lantus to 5 units qhs  - Continue Novolog sliding scale tidac     Hypertensive urgency: /103 in ER. Not able to take PTA amlodipine when she had GI symptoms.               Clonidine patch  initiated on admission and now discontinued              Home amlodipine 2.5 mg daily increased to 5 mg daily this admission.     Added metoprolol 12.5 BID 11/22     Hypokalemia: Resolved after replacement      Tobacco and marijuana use: Urine tox is positive for opiates and cannabinoid  - Patient counseled to quit smoking     Past ethanol use: Patient claims she quit alcohol 6 years ago      Thrombocytosis: likely due to infection. Monitor     Anxiety depression: Continue home medication.     Barriers to discharge: Final ID recommendations, nephrology recommendations regarding creatinine     Anticipated length of stay: 1 to 3 days and then discharge to home with assist    Present on Admission:    Diabetic ulcer of right foot associated with type 1 diabetes mellitus, with necrosis of muscle,  unspecified part of foot (H)    Hypertension, unspecified type    Nausea and vomiting, unspecified vomiting type      Subjective:  Patient resting comfortably in bed.  Tolerating wound VAC.  No other complaints.    PHYSICAL EXAM  Temp:  [97.6  F (36.4  C)-98.6  F (37  C)] 98.6  F (37  C)  Pulse:  [] 113  Resp:  [16-20] 20  BP: (157-188)/(82-95) 157/82  SpO2:  [97 %-100 %] 97 %  Wt Readings from Last 1 Encounters:   11/16/22 59.4 kg (131 lb)       Intake/Output Summary (Last 24 hours) at 11/22/2022 1352  Last data filed at 11/22/2022 0955  Gross per 24 hour   Intake 236 ml   Output 700 ml   Net -464 ml      Body mass index is 20.52 kg/m .    GENRL: Alert and answering questions appropriately. Not in acute distress. Sitting in bed   HEENT: no lymphadenopathy or thyromegaly  CHEST: Clear to auscultation bilaterally. No wheezes, rhonchi or crackles. Breathing easily   HEART: Regular rate and rhythm, S1S2 auscultated.  Positive murmur  ABDMN: Soft. Non-tender, non-distended. No organomegaly. No guarding or rigidity. Bowel sounds present   EXTRM: +trace pedal edema.  Wound VAC in place to right lower extremity.  NEURO: Cranial nerves II-XII grossly intact. No focal neurological deficit. No involuntary movements. Normal mentation  PSYCH: Normal affect and mood.   INTGM: No skin rash, no cyanosis or clubbing    PERTINENT LABS/IMAGING:  Results for orders placed or performed during the hospital encounter of 11/16/22   MR Foot Right w/o Contrast    Impression    IMPRESSION:  1.  Negative for osteomyelitis, septic arthritis, and abscess.  2.  Mild subcutaneous soft tissue thickening and edema type signal in the plantar forefoot at the level of the TMT joints. This could represent cellulitis in the correct clinical setting.  3.  Muscle fatty atrophy and denervation change, stable.     Most Recent 3 CBC's:Recent Labs   Lab Test 11/16/22  2247 11/15/22  1230 11/10/22  0930   WBC 10.5 10.7 6.6   HGB 11.7 11.0* 9.5*   MCV 87  86 87   * 436 355       Recent Labs   Lab Test 04/10/17  0507   CHOL 282*   HDL 85   *   TRIG 134     Recent Labs   Lab Test 04/10/17  0507   *     Recent Labs   Lab Test 11/22/22  1257 11/22/22  0805 11/22/22  0558   NA  --   --  132*   POTASSIUM  --   --  4.2   CHLORIDE  --   --  100   CO2  --   --  24   *   < > 101*   BUN  --   --  33.9*   CR  --   --  4.27*   GFRESTIMATED  --   --  12*   ZACH  --   --  8.0*    < > = values in this interval not displayed.     Recent Labs   Lab Test 10/19/22  0923 10/02/20  0321 03/25/18  0755   A1C 8.8* 10.0* 8.9*     Recent Labs   Lab Test 11/16/22  2247 11/15/22  1230 11/10/22  0930   HGB 11.7 11.0* 9.5*     No results for input(s): TROPONINI in the last 93990 hours.  No results for input(s): BNP, NTBNPI, NTBNP in the last 29569 hours.  No results for input(s): TSH in the last 43011 hours.  Recent Labs   Lab Test 03/23/18  0930   INR 0.87*       Elsa Cruz,   Hospitalist Service  [facility]  Text page via [facility] Paging/Directory

## 2022-11-22 NOTE — PLAN OF CARE
"  Problem: Plan of Care - These are the overarching goals to be used throughout the patient stay.    Goal: Plan of Care Review  Description: The Plan of Care Review/Shift note should be completed every shift.  The Outcome Evaluation is a brief statement about your assessment that the patient is improving, declining, or no change.  This information will be displayed automatically on your shift note.  Outcome: Progressing  Goal: Patient-Specific Goal (Individualized)  Description: You can add care plan individualizations to a care plan. Examples of Individualization might be:  \"Parent requests to be called daily at 9am for status\", \"I have a hard time hearing out of my right ear\", or \"Do not touch me to wake me up as it startles me\".  Outcome: Progressing  Goal: Absence of Hospital-Acquired Illness or Injury  Outcome: Progressing  Intervention: Identify and Manage Fall Risk  Recent Flowsheet Documentation  Taken 11/22/2022 0400 by Lynn Oconnell RN  Safety Promotion/Fall Prevention: activity supervised  Intervention: Prevent Skin Injury  Recent Flowsheet Documentation  Taken 11/22/2022 0400 by Lynn Oconnell RN  Body Position: position changed independently  Goal: Optimal Comfort and Wellbeing  Outcome: Progressing  Goal: Readiness for Transition of Care  Outcome: Progressing     Problem: Fall Injury Risk  Goal: Absence of Fall and Fall-Related Injury  Outcome: Progressing  Intervention: Identify and Manage Contributors  Recent Flowsheet Documentation  Taken 11/22/2022 0400 by Lynn Oconnell RN  Medication Review/Management: medications reviewed  Intervention: Promote Injury-Free Environment  Recent Flowsheet Documentation  Taken 11/22/2022 0400 by Lynn Oconnell RN  Safety Promotion/Fall Prevention: activity supervised     Problem: Pain Chronic (Persistent) (Comorbidity Management)  Goal: Acceptable Pain Control and Functional Ability  Outcome: Progressing  Intervention: Manage Persistent Pain  Recent Flowsheet " Documentation  Taken 11/22/2022 0400 by Lynn Oconnell RN  Medication Review/Management: medications reviewed     Problem: Alcohol Withdrawal  Goal: Alcohol Withdrawal Symptom Control  Outcome: Progressing  Goal: Optimal Neurologic Function  Outcome: Progressing  Goal: Readiness for Change Identified  Outcome: Progressing     Problem: Infection  Goal: Absence of Infection Signs and Symptoms  Outcome: Progressing     Problem: Diabetes Comorbidity  Goal: Blood Glucose Level Within Targeted Range  Outcome: Progressing     Problem: Hypertension Comorbidity  Goal: Blood Pressure in Desired Range  Outcome: Progressing  Intervention: Maintain Blood Pressure Management  Recent Flowsheet Documentation  Taken 11/22/2022 0400 by Lynn Oconnell RN  Medication Review/Management: medications reviewed   Goal Outcome Evaluation:  Pt slept well overnight, awake only with cares. Pt up to bathroom with crutches . Pt denied pain, and blood sugar at 0200 was 142.

## 2022-11-22 NOTE — CONSULTS
NEPHROLOGY CONSULTATION - [facility]        REASON FOR CONSULTATION: LORIN/CKD 4     HISTORY OF PRESENT ILLNESS: 46 yo female with DM1 for > 30 years, HTN, tobacco abuse, marijuana use, recent rt foot ulcer/MSSA bacteremia treated with 3 weeks cefazolin, stopped due to LORIN, admit with nausea, vomiting. Creat today is  4.27, was 2.6 on 10/24.  She continues to have nausea/vomiting today.  Denies recent nsaids.  Has not seen renal in past.  Was on doxy PTA, then started on cefepime here, changing to meropenem today.      REVIEW OF SYSTEMS:Comprehensive review of systems obtained and otherwise negative.    Past Medical History:   Diagnosis Date     LORIN (acute kidney injury) (H)      Anxiety      Cannabis abuse      Depression      Diabetes Type 1, uncontrolled 1985    Onset age 10     DKA (diabetic ketoacidoses)      ETOH abuse      HLD (hyperlipidemia)      HTN (hypertension)      Lactic acidosis        Social History     Socioeconomic History     Marital status: Single     Spouse name: Not on file     Number of children: Not on file     Years of education: Not on file     Highest education level: Not on file   Occupational History     Not on file   Tobacco Use     Smoking status: Some Days     Smokeless tobacco: Never     Tobacco comments:     occasional   Substance and Sexual Activity     Alcohol use: Yes     Alcohol/week: 35.0 standard drinks     Comment: Alcoholic Drinks/day: ~750 mL wine daily     Drug use: Yes     Types: Marijuana     Comment: Drug use: 4-5x/week     Sexual activity: Yes     Partners: Male     Birth control/protection: Condom   Other Topics Concern     Not on file   Social History Narrative     Not on file     Social Determinants of Health     Financial Resource Strain: Not on file   Food Insecurity: Not on file   Transportation Needs: Not on file   Physical Activity: Not on file   Stress: Not on file   Social Connections: Not on file   Intimate Partner Violence: Not on file   Housing  "Stability: Not on file       Family History   Problem Relation Age of Onset     Clotting Disorder Mother         \"thin blood\"     Arthritis Mother      Hyperlipidemia Mother      Skin Cancer Father         face/nose      Depression Father      Other - See Comments Sister         eye surgeries     No Known Problems Brother      Coronary Artery Disease Maternal Grandmother      Alcoholism Maternal Grandfather      Coronary Artery Disease Maternal Grandfather      No Known Problems Paternal Grandmother      No Known Problems Paternal Grandfather        Allergies   Allergen Reactions     Cefazolin Nephrotoxicity     Colestipol GI Disturbance     Doxycycline Nausea and Vomiting     Nickel Rash     Penicillins Rash       MEDICATIONS:    amLODIPine  5 mg Oral Daily     insulin aspart   Subcutaneous TID w/meals     insulin aspart  1-10 Units Subcutaneous TID AC     insulin aspart  1-7 Units Subcutaneous At Bedtime     insulin glargine  5 Units Subcutaneous At Bedtime     [START ON 11/23/2022] meropenem  500 mg Intravenous Q12H     metoprolol tartrate  12.5 mg Oral BID     multivitamin, therapeutic  1 tablet Oral Daily     polyethylene glycol  17 g Oral Daily     senna-docusate  1 tablet Oral BID     sodium chloride (PF)  3 mL Intracatheter Q8H         PHYSICAL EXAM    BP (!) 157/82 (BP Location: Right arm)   Pulse 113   Temp 98.6  F (37  C) (Oral)   Resp 20   Ht 1.702 m (5' 7\")   Wt 59.4 kg (131 lb)   LMP 11/02/2022   SpO2 97%   BMI 20.52 kg/m          Gen: NAD, chronically ill appearing  HEENT: No icterus, NC/AT  CARDIOVASCULAR: RR, no rub,  Trace leg edema  PULMONARY: CTA ant No cyanosis  GASTROINTESTINAL: soft, NT/ND  MSK: Diffuse muscle atrophy, warm  NEURO: Alert, no gross focal findings  PSYCHIATRIC: Normal affect, answers questions appropriately  SKIN: Pale, no jaundice, no rash. Rt foot bandaged    LABORATORIES    Recent Labs   Lab 11/16/22  2247   WBC 10.5   HGB 11.7   HCT 35.0   *     Recent Labs "   Lab 11/22/22  0558 11/21/22  0511 11/20/22  0531 11/19/22  0811 11/18/22  0532   * 131* 131* 133* 146*   CO2 24 23 25 25 28   BUN 33.9* 34.1* 33.7* 27.2* 32.4*   ALKPHOS  --   --   --  69 79   ALT  --   --   --  <5* 5*   AST  --   --   --  16 18         ASSESSMENT:  46 yo female with DM1 for > 30 years, HTN, tobacco abuse, marijuana use, recent rt foot ulcer/MSSA bacteremia treated with 3 weeks cefazolin, stopped due to LORIN, admit with nausea, vomiting, progressive LORIN       PLAN:  1. LORIN/CKD 4 - baseline creat of 2.6, expect CKD 4 due to > 30 years of type 1 diabetes and HTN.  Now with LORIN after recent MSSA bacteremia and diabetic foot ulcer with 3 weeks of cefazolin followed by doxy, cefepime.  Now on meropenem. At risk for post infectious GN, interstitial nephritis, ATN.    -agree with avoidance of cephalosporins. Appears euvolemic on exam but with ongoing nausea/vomiting, will resume IVF today  -repeat UA, urine eos, urine prot:creat, c3, c4  -check spep, upep  -check renal US  -daily bmp  -would ideally see creat begin to trend down before discharge  -needs renal f/u after discharge as high risk for progressive CKD  2. HTN - not controlled  -increase amlodipine to 10 mg/d  3. Anemia - due to CKD, chronic inflammation  -check iron stores, spep, upep  4. Diabetic foot infection, recent MSSA bacteremia  - see above re: recent abx  -now on meropenem    Em Moss MD  [facility]  [phone]

## 2022-11-23 ENCOUNTER — TRANSFERRED RECORDS (OUTPATIENT)
Dept: MEDSURG UNIT | Facility: HOSPITAL | Age: 47
End: 2022-11-23

## 2022-11-23 ENCOUNTER — APPOINTMENT (OUTPATIENT)
Dept: PHYSICAL THERAPY | Facility: HOSPITAL | Age: 47
DRG: 623 | End: 2022-11-23
Payer: COMMERCIAL

## 2022-11-23 LAB
ALBUMIN SERPL BCG-MCNC: 2.8 G/DL (ref 3.5–5.2)
ALBUMIN UR-MCNC: 600 MG/DL
ANION GAP SERPL CALCULATED.3IONS-SCNC: 9 MMOL/L (ref 7–15)
APPEARANCE UR: CLEAR
BACTERIA BLD CULT: ABNORMAL
BILIRUB UR QL STRIP: NEGATIVE
BUN SERPL-MCNC: 34.5 MG/DL (ref 6–20)
C3 SERPL-MCNC: 56 MG/DL (ref 81–157)
C4 SERPL-MCNC: 15 MG/DL (ref 13–39)
CALCIUM SERPL-MCNC: 8 MG/DL (ref 8.6–10)
CHLORIDE SERPL-SCNC: 101 MMOL/L (ref 98–107)
COLOR UR AUTO: ABNORMAL
CREAT SERPL-MCNC: 4.1 MG/DL (ref 0.51–0.95)
DEPRECATED HCO3 PLAS-SCNC: 24 MMOL/L (ref 22–29)
FERRITIN SERPL-MCNC: 57 NG/ML (ref 6–175)
GFR SERPL CREATININE-BSD FRML MDRD: 13 ML/MIN/1.73M2
GLUCOSE BLDC GLUCOMTR-MCNC: 128 MG/DL (ref 70–99)
GLUCOSE BLDC GLUCOMTR-MCNC: 167 MG/DL (ref 70–99)
GLUCOSE BLDC GLUCOMTR-MCNC: 168 MG/DL (ref 70–99)
GLUCOSE BLDC GLUCOMTR-MCNC: 279 MG/DL (ref 70–99)
GLUCOSE BLDC GLUCOMTR-MCNC: 97 MG/DL (ref 70–99)
GLUCOSE SERPL-MCNC: 106 MG/DL (ref 70–99)
GLUCOSE UR STRIP-MCNC: 300 MG/DL
HGB UR QL STRIP: ABNORMAL
HOLD SPECIMEN: NORMAL
IRON BINDING CAPACITY (ROCHE): 159 UG/DL (ref 240–430)
IRON SATN MFR SERPL: 22 % (ref 15–46)
IRON SERPL-MCNC: 35 UG/DL (ref 37–145)
KETONES UR STRIP-MCNC: NEGATIVE MG/DL
LEUKOCYTE ESTERASE UR QL STRIP: NEGATIVE
MAGNESIUM SERPL-MCNC: 1.7 MG/DL (ref 1.7–2.3)
MUCOUS THREADS #/AREA URNS LPF: PRESENT /LPF
NITRATE UR QL: NEGATIVE
PH UR STRIP: 7 [PH] (ref 5–7)
PHOSPHATE SERPL-MCNC: 4.5 MG/DL (ref 2.5–4.5)
POTASSIUM SERPL-SCNC: 4.2 MMOL/L (ref 3.4–5.3)
RBC URINE: 42 /HPF
SODIUM SERPL-SCNC: 134 MMOL/L (ref 136–145)
SP GR UR STRIP: 1.01 (ref 1–1.03)
SQUAMOUS EPITHELIAL: 1 /HPF
TOTAL PROTEIN SERUM FOR ELP: 5.5 G/DL (ref 6.4–8.3)
TRANSITIONAL EPI: <1 /HPF
UROBILINOGEN UR STRIP-MCNC: <2 MG/DL
WBC URINE: 2 /HPF

## 2022-11-23 PROCEDURE — 86160 COMPLEMENT ANTIGEN: CPT | Performed by: INTERNAL MEDICINE

## 2022-11-23 PROCEDURE — 250N000013 HC RX MED GY IP 250 OP 250 PS 637: Performed by: INTERNAL MEDICINE

## 2022-11-23 PROCEDURE — 99233 SBSQ HOSP IP/OBS HIGH 50: CPT | Performed by: INTERNAL MEDICINE

## 2022-11-23 PROCEDURE — 258N000003 HC RX IP 258 OP 636: Performed by: INTERNAL MEDICINE

## 2022-11-23 PROCEDURE — 93010 ELECTROCARDIOGRAM REPORT: CPT | Performed by: INTERNAL MEDICINE

## 2022-11-23 PROCEDURE — 81001 URINALYSIS AUTO W/SCOPE: CPT | Performed by: INTERNAL MEDICINE

## 2022-11-23 PROCEDURE — 97605 NEG PRS WND THER DME<=50SQCM: CPT

## 2022-11-23 PROCEDURE — 82728 ASSAY OF FERRITIN: CPT | Performed by: INTERNAL MEDICINE

## 2022-11-23 PROCEDURE — 250N000011 HC RX IP 250 OP 636: Performed by: INTERNAL MEDICINE

## 2022-11-23 PROCEDURE — 82040 ASSAY OF SERUM ALBUMIN: CPT | Performed by: INTERNAL MEDICINE

## 2022-11-23 PROCEDURE — 36415 COLL VENOUS BLD VENIPUNCTURE: CPT | Performed by: INTERNAL MEDICINE

## 2022-11-23 PROCEDURE — 97116 GAIT TRAINING THERAPY: CPT | Mod: GP

## 2022-11-23 PROCEDURE — 93005 ELECTROCARDIOGRAM TRACING: CPT | Performed by: INTERNAL MEDICINE

## 2022-11-23 PROCEDURE — 120N000001 HC R&B MED SURG/OB

## 2022-11-23 PROCEDURE — 99232 SBSQ HOSP IP/OBS MODERATE 35: CPT | Performed by: INTERNAL MEDICINE

## 2022-11-23 PROCEDURE — 93005 ELECTROCARDIOGRAM TRACING: CPT

## 2022-11-23 PROCEDURE — 250N000011 HC RX IP 250 OP 636: Performed by: PODIATRIST

## 2022-11-23 PROCEDURE — 250N000011 HC RX IP 250 OP 636: Performed by: HOSPITALIST

## 2022-11-23 PROCEDURE — G0463 HOSPITAL OUTPT CLINIC VISIT: HCPCS | Mod: 25

## 2022-11-23 PROCEDURE — 83735 ASSAY OF MAGNESIUM: CPT | Performed by: INTERNAL MEDICINE

## 2022-11-23 PROCEDURE — 83550 IRON BINDING TEST: CPT | Performed by: INTERNAL MEDICINE

## 2022-11-23 RX ORDER — CIPROFLOXACIN 500 MG/1
500 TABLET, FILM COATED ORAL
Status: DISCONTINUED | OUTPATIENT
Start: 2022-11-23 | End: 2022-11-28

## 2022-11-23 RX ORDER — METOPROLOL TARTRATE 25 MG/1
75 TABLET, FILM COATED ORAL 2 TIMES DAILY
Status: DISCONTINUED | OUTPATIENT
Start: 2022-11-23 | End: 2022-11-28 | Stop reason: HOSPADM

## 2022-11-23 RX ORDER — METOPROLOL TARTRATE 25 MG/1
50 TABLET, FILM COATED ORAL 2 TIMES DAILY
Status: DISCONTINUED | OUTPATIENT
Start: 2022-11-23 | End: 2022-11-23

## 2022-11-23 RX ORDER — FERROUS SULFATE 325(65) MG
325 TABLET ORAL DAILY
Status: DISCONTINUED | OUTPATIENT
Start: 2022-11-23 | End: 2022-11-28 | Stop reason: HOSPADM

## 2022-11-23 RX ADMIN — SODIUM CHLORIDE: 9 INJECTION, SOLUTION INTRAVENOUS at 12:02

## 2022-11-23 RX ADMIN — THERA TABS 1 TABLET: TAB at 08:06

## 2022-11-23 RX ADMIN — SODIUM CHLORIDE: 9 INJECTION, SOLUTION INTRAVENOUS at 00:17

## 2022-11-23 RX ADMIN — MEROPENEM 500 MG: 500 INJECTION, POWDER, FOR SOLUTION INTRAVENOUS at 08:07

## 2022-11-23 RX ADMIN — METOPROLOL TARTRATE 50 MG: 25 TABLET, FILM COATED ORAL at 08:14

## 2022-11-23 RX ADMIN — AMLODIPINE BESYLATE 10 MG: 5 TABLET ORAL at 08:06

## 2022-11-23 RX ADMIN — HYDRALAZINE HYDROCHLORIDE 10 MG: 20 INJECTION INTRAMUSCULAR; INTRAVENOUS at 08:14

## 2022-11-23 RX ADMIN — PROCHLORPERAZINE EDISYLATE 5 MG: 5 INJECTION INTRAMUSCULAR; INTRAVENOUS at 17:48

## 2022-11-23 RX ADMIN — METOPROLOL TARTRATE 75 MG: 25 TABLET, FILM COATED ORAL at 20:30

## 2022-11-23 RX ADMIN — HYDRALAZINE HYDROCHLORIDE 10 MG: 20 INJECTION INTRAMUSCULAR; INTRAVENOUS at 02:18

## 2022-11-23 RX ADMIN — PROCHLORPERAZINE EDISYLATE 5 MG: 5 INJECTION INTRAMUSCULAR; INTRAVENOUS at 00:17

## 2022-11-23 RX ADMIN — CIPROFLOXACIN 500 MG: 500 TABLET, FILM COATED ORAL at 18:14

## 2022-11-23 RX ADMIN — FERROUS SULFATE TAB 325 MG (65 MG ELEMENTAL FE) 325 MG: 325 (65 FE) TAB at 14:41

## 2022-11-23 ASSESSMENT — ACTIVITIES OF DAILY LIVING (ADL)
ADLS_ACUITY_SCORE: 21
ADLS_ACUITY_SCORE: 22
ADLS_ACUITY_SCORE: 21

## 2022-11-23 NOTE — PLAN OF CARE
Physical Therapy Discharge Summary    Reason for therapy discharge:    All goals and outcomes met, no further needs identified.    Progress towards therapy goal(s). See goals on Care Plan in [facility] electronic health record for goal details.  Goals met    Therapy recommendation(s):    Continue home exercise program.

## 2022-11-23 NOTE — PROGRESS NOTES
[facility]    PROGRESS NOTE - Hospitalist Service    ASSESSMENT AND PLAN     Principal Problem:    Nausea and vomiting, unspecified vomiting type  Active Problems:    Diabetic ulcer of right foot associated with type 1 diabetes mellitus, with necrosis of muscle, unspecified part of foot (H)    Hypertension, unspecified type    Josefa Curiel is a 47 year old female with known diabetic foot ulcer, type 1 diabetes, CKD, smoker, marijuana use, hypertension who presented with 3 days of nausea and vomiting after doxycycline use.  She was unable to keep medicine and food down.       Acute renal failure on chronic kidney disease stage IV: with metabolic acidosis. Baseline creatinine is 2.5-3, elevated to 4.36 on admission. Likely due to poor p.o. intake for 3 days or nephrotoxicity from doxycycline.               Creatinine remains high and is in 4 range               Nephrology recommendations appreciated.  Work-up including SPEP, UPEP, renal ultrasound with echogenic kidneys consistent with CKD.  Avoid cephalosporins.              Patient's creatinine has not improved despite giving fluid.    Not clear whether the nephrotoxicity caused by doxycycline is reversible.  Avoid nephrotoxic medications      Right foot ulcer: Had recent admission and the plan was IV antibiotics for 4 weeks. Had 3 week course of ancef, aborted due to ARF. Antibiotic was then switched to doxycycline. Ulcer persisted despite antibiotic treatment. MRI reveals no osteomyelitis, septic arthritis, and abscess.   S/p I&D by podiatry on 11/18. Wound culture revealed enterococcus, pseudomonas.    Started Ceftriaxone and flagyl. Changed to Cefepime and flagyl 11/19 per ID.  Switched to meropenem 11/22. Oral Cipro likely an option at time of discharge. Duloxetine held this admission. OK to hold if discharged on cipro              Podiatry-wound VAC 11/21               Final ID recommendations appreciated when patient stable for  discharge.       Positive blood culture: One out of 2 blood culture sent on 1/17 shows Staph lugdunensis and staph epidermis and staph aureus. She had MSSA bacteremia during her last admission.    Repeat blood culture: no growth so far   Antibiotics as above     Intractable nausea and vomiting:                 Likely side effect of doxycycline, ongoing chronic marijuana use and uremia. Discontinued doxycycline. Symptoms now resolved.       Type 1 insulin-dependent diabetes with hypoglycemia: Recent HbA1C 8.8. PTA Lantus 20 units, Novolog carb count tidac. Patient reports using carb count 1:15. But on admission, she reported to pharmacist that she uses 1: 6 for breakfast, 1:4 for lunch and 1:3 for dinner.   Patient developed hypoglycemia after admission while not able to have oral intake and received D5 drip.   Recurrent hypoglycemia this admission.    Decreased Lantus to 5 units qhs.  Carb counting with meals 1:15 and high insulin sliding scale.  Continue to adjust as needed   Continue Novolog sliding scale tidac     Hypertensive urgency: /103 in ER. Not able to take PTA amlodipine when she had GI symptoms.               Clonidine patch  initiated on admission and now discontinued              Home amlodipine 2.5 mg daily increased to 10 mg daily this admission.                Increased metoprolol 50 BID 11/23     Hypokalemia: Resolved after replacement      Tobacco and marijuana use: Urine tox is positive for opiates and cannabinoid  - Patient counseled to quit smoking     Past ethanol use: Patient claims she quit alcohol 6 years ago      Thrombocytosis: likely due to infection. Monitor     Anxiety depression: Continue home medication.     Barriers to discharge: Final ID recommendations, nephrology recommendations regarding creatinine, BP control      Anticipated length of stay: 1 to 2 days and then discharge to home with assist    Present on Admission:    Diabetic ulcer of right foot associated with type 1  diabetes mellitus, with necrosis of muscle, unspecified part of foot (H)    Hypertension, unspecified type    Nausea and vomiting, unspecified vomiting type      Subjective:  Patient resting comfortably in bed.  No pain to her foot.  Answered all questions regarding kidney disease, wound VAC and blood pressure issues.  No other complaints today.    PHYSICAL EXAM  Temp:  [97.8  F (36.6  C)-98.4  F (36.9  C)] 98.2  F (36.8  C)  Pulse:  [] 110  Resp:  [16-19] 18  BP: (130-192)/(71-98) 151/85  SpO2:  [98 %-100 %] 100 %  Wt Readings from Last 1 Encounters:   11/16/22 59.4 kg (131 lb)       Intake/Output Summary (Last 24 hours) at 11/23/2022 1338  Last data filed at 11/23/2022 1300  Gross per 24 hour   Intake 3038 ml   Output 2000 ml   Net 1038 ml      Body mass index is 20.52 kg/m .    GENRL: Alert and answering questions appropriately. Not in acute distress. Sitting in bed   CHEST:  Breathing easily   EXTRM: +trace pedal edema.  Wound VAC in place to right lower extremity.  NEURO: Following commands moving all extremities   PSYCH: Normal affect and mood.   INTGM: No skin rash, no cyanosis or clubbing    PERTINENT LABS/IMAGING:  Results for orders placed or performed during the hospital encounter of 11/16/22   MR Foot Right w/o Contrast    Impression    IMPRESSION:  1.  Negative for osteomyelitis, septic arthritis, and abscess.  2.  Mild subcutaneous soft tissue thickening and edema type signal in the plantar forefoot at the level of the TMT joints. This could represent cellulitis in the correct clinical setting.  3.  Muscle fatty atrophy and denervation change, stable.   US Renal Complete Non-Vascular    Impression    IMPRESSION:  1.  Echogenic renal parenchyma consistent with medical renal disease. No hydronephrosis.     Most Recent 3 CBC's:Recent Labs   Lab Test 11/16/22  2247 11/15/22  1230 11/10/22  0930   WBC 10.5 10.7 6.6   HGB 11.7 11.0* 9.5*   MCV 87 86 87   * 436 355       Recent Labs   Lab Test  04/10/17  0507   CHOL 282*   HDL 85   *   TRIG 134     Recent Labs   Lab Test 04/10/17  0507   *     Recent Labs   Lab Test 11/23/22  1135 11/23/22  0754 11/23/22  0538   NA  --   --  134*   POTASSIUM  --   --  4.2   CHLORIDE  --   --  101   CO2  --   --  24   *   < > 106*   BUN  --   --  34.5*   CR  --   --  4.10*   GFRESTIMATED  --   --  13*   ZACH  --   --  8.0*    < > = values in this interval not displayed.     Recent Labs   Lab Test 10/19/22  0923 10/02/20  0321 03/25/18  0755   A1C 8.8* 10.0* 8.9*     Recent Labs   Lab Test 11/16/22  2247 11/15/22  1230 11/10/22  0930   HGB 11.7 11.0* 9.5*     No results for input(s): TROPONINI in the last 15337 hours.  No results for input(s): BNP, NTBNPI, NTBNP in the last 76907 hours.  No results for input(s): TSH in the last 43032 hours.  Recent Labs   Lab Test 03/23/18  0930   INR 0.87*       Elsa Cruz DO  Hospitalist Service  [facility]  Text page via [facility] Paging/Directory

## 2022-11-23 NOTE — PLAN OF CARE
Problem: Pain Chronic (Persistent) (Comorbidity Management)  Goal: Acceptable Pain Control and Functional Ability  Outcome: Progressing  Intervention: Manage Persistent Pain  Recent Flowsheet Documentation  Taken 11/23/2022 0017 by Elsa Ruby RN  Medication Review/Management: medications reviewed     Problem: Diabetes Comorbidity  Goal: Blood Glucose Level Within Targeted Range  Outcome: Progressing     Problem: Hypertension Comorbidity  Goal: Blood Pressure in Desired Range  Intervention: Maintain Blood Pressure Management  Recent Flowsheet Documentation  Taken 11/23/2022 0017 by Elsa Ruby RN  Medication Review/Management: medications reviewed   Goal Outcome Evaluation:  Pt pleasant and cooperative.  Reports some intermittent nausea and vomiting.  Compazine given.  Denies pain. Urine output 600cc.  Iv fluids infusing and 75ml/hr.  Blood glucose at 0200 97.  Crackers and peanut butter given.  BP elevated this shift 169/88.  Hydralazine prn given with recheck 130/71.  Pt using crutches for mobility out of bed.

## 2022-11-23 NOTE — PLAN OF CARE
[facility] - ICU    RN Progress Note:            Pertinent Assessments:      Please refer to flowsheet rows for full assessment     Alert and oriented.  Denied any pain  Hypertensive (190s)         Mobility Level:     5         Key Events - This Shift:     - Had a shower and the wound dressing at the right foot was changed by [facility] nurse  - Hypertensive (190s) = Hydralazine PRN was given and increased metoprolol doses  - UA sample were sent as ordered.              Plan:     Monitor blood pressure and give PRN order if above systolic 160mmHg

## 2022-11-23 NOTE — PROGRESS NOTES
Was asked by 4th floor CM to start the set up of the wound vac rental as pt will likely be discharged home and not to TCU.  Spoke to the patient as she wants to go home at discharge. The rental process was started on the [employer] portal and all patietn information was uploaded. Just waiting for the provider to e-sign the script and then for [employer] to ok the documents and approve the rental. CM unsure if they are open tomorrow so this may not be approved until Friday. CM will be here tomorrow to follow up on this.    Rental Order # [id number]

## 2022-11-23 NOTE — PROGRESS NOTES
"RENAL PROGRESS NOTE - [facility]        CC: f/u LORIN/CKD 4     Subjective: feels a little better today, had some nausea earlier. bp remains high.  Tolerating some po.       ASSESSMENT:  48 yo female with DM1 for > 30 years, HTN, tobacco abuse, marijuana use, recent rt foot ulcer/MSSA bacteremia treated with 3 weeks cefazolin, stopped due to LORIN, admit with nausea, vomiting, progressive LORIN         PLAN:  1. LORIN/CKD 4 - baseline creat of 2.6, expect CKD 4 due to > 30 years of type 1 diabetes and HTN.  Now with LORIN after recent MSSA bacteremia and diabetic foot ulcer with 3 weeks of cefazolin followed by doxy, cefepime.  Now on meropenem. At risk for post infectious GN, interstitial nephritis, ATN.   9.6 gm proteinuria, low c3. Echogenic kidneys on US consistent with CKD  -agree with avoidance of cephalosporins. Appears euvolemic on exam, will heplock iv  -repeat UA, urine eos  -check spep, upep  -check renal US  -daily bmp  -would ideally see creat begin to trend down before discharge  -needs renal f/u after discharge as high risk for progressive CKD, our office will call her for appt  2. HTN - not controlled  -increased amlodipine to 10 mg/d  -increase metoprolol to 75 mg bid  -heplock iv  3. Anemia - due to CKD, chronic inflammation  -repeat cbc  -check spep, upep  -low iron stores, will add po iron  4. Diabetic foot infection, recent MSSA bacteremia  - see above re: recent abx  -now on meropenem    Em Moss MD  [facility]  [phone]    Objective    PHYSICAL EXAM  BP (!) 151/85 (BP Location: Right arm)   Pulse 110   Temp 98.2  F (36.8  C) (Oral)   Resp 18   Ht 1.702 m (5' 7\")   Wt 59.4 kg (131 lb)   LMP 11/02/2022   SpO2 100%   BMI 20.52 kg/m    I/O last 3 completed shifts:  In: 2666 [P.O.:1436; I.V.:1230]  Out: 1900 [Urine:1600; Emesis/NG output:300]  Wt Readings from Last 3 Encounters:   11/16/22 59.4 kg (131 lb)   11/15/22 59 kg (130 lb)   11/01/22 59 kg (130 lb) "       GENERAL: nad  HEENT:normocephalic, atraumatic  CARDIOVASCULAR: rr, no rub,  Trace edema  PULMONARY:cta ant. No cyanosis  GASTROINTESTINAL: soft, nt/nd  MSK: diffuse muscle atrophy, warm, rt foot bandaged with wound vac  NEURO: Alert, no gross focal findings  PSYCHIATRIC: Adequate mood and interaction  SKIN: Pale, no jaundice, no rash.    LABORATORIES  Recent Labs   Lab 11/16/22  2247   WBC 10.5   HGB 11.7   HCT 35.0   *     Recent Labs   Lab 11/23/22  0538 11/22/22  0558 11/21/22  0511 11/20/22  0531 11/19/22  0811 11/18/22  0532   * 132* 131*   < > 133* 146*   CO2 24 24 23   < > 25 28   BUN 34.5* 33.9* 34.1*   < > 27.2* 32.4*   ALKPHOS  --   --   --   --  69 79   ALT  --   --   --   --  <5* 5*   AST  --   --   --   --  16 18    < > = values in this interval not displayed.         MEDICATIONS    amLODIPine  10 mg Oral Daily     [START ON 11/24/2022] childrens multivitamin with iron  1 tablet Oral Daily     insulin aspart   Subcutaneous TID w/meals     insulin aspart  1-10 Units Subcutaneous TID AC     insulin aspart  1-7 Units Subcutaneous At Bedtime     insulin glargine  5 Units Subcutaneous At Bedtime     meropenem  500 mg Intravenous Q12H     metoprolol tartrate  50 mg Oral BID     polyethylene glycol  17 g Oral Daily     senna-docusate  1 tablet Oral BID     sodium chloride (PF)  3 mL Intracatheter Q8H         Em Moss MD  [facility]  [phone]

## 2022-11-23 NOTE — PLAN OF CARE
Problem: Plan of Care - These are the overarching goals to be used throughout the patient stay.    Goal: Plan of Care Review  Description: The Plan of Care Review/Shift note should be completed every shift.  The Outcome Evaluation is a brief statement about your assessment that the patient is improving, declining, or no change.  This information will be displayed automatically on your shift note.  Outcome: Progressing   Goal Outcome Evaluation:  Wound vac, clean, dry and intact. Nephrology consult. Strict I &O. Urine specimen sent. BP this evening 170/87, Hydralazine 10mg given at 1835. New order for Renal US, awaiting to be completed.

## 2022-11-23 NOTE — PROGRESS NOTES
"[facility] Progress Note    ASSESSMENT:  Diabetic foot infection, no osteomyelitis seen on imaging or at surgery.  DM  Recent 3 wk course ancef for MSSA bacteremia, aborted due to ARF, then given doxy up to admission  Smoker  Surgical cx enterobacter, pseudomonas, debrided 11/18.   contaminated Blood culture most likely -- 2 strains of staph epi, plus staph lugdunensis and now staph aureus (SA grew at 4 days).   Normal CRP on admission makes bacteremia unlikely -- I think Echo would be low yield.    Typically would not disregard staph aureus in blood culture as contaminant, but this was clearly a contaminated specimen based on other bacteria.     RECOMMENDATION:  Added doxy and cefazolin to intolerance/allergy list  Plan cipro for one week, hold duloxetine while on this med   Avoid cipro dosing around time of vitamins (mag, Calcium, iron)   Monitor for hypo-/hyperglycemia while on cipro  Check urine eos  ID plan in place. I will sign off. Please call if questions.    ADDENDUM -- pharmacy alerted be to QTc result from 11/16-- viewed by clicking View MUSE EKG, was 506. Will repeat -- if this is ok will continue plan for cipro.    ADDENDUM #2 - QTc 461. Ok to continue cipro plan.      Silvestre Valdez MD  [facility]  [phone] clinic  [facility] paging  ______________________________________________________________________     SUBJECTIVE: feels better overall. Still some nausea. Hopes to go home.     REVIEW OF SYSTEMS:  Negative unless as listed above.  Social history, Family history, Medications: reviewed.    OBJECTIVE:  BP (!) 159/88 (BP Location: Right arm)   Pulse 86   Temp 98.1  F (36.7  C) (Oral)   Resp 20   Ht 1.702 m (5' 7\")   Wt 59.4 kg (131 lb)   LMP 11/02/2022   SpO2 99%   BMI 20.52 kg/m                PHYSICAL EXAM:  Alert, awake  Sclera normal color, not injected  CARDIOVASCULAR regular rate and rhythm, has chronic murmur  Lungs normal respiratory effort  "   Abdomen deferred  Skin normal  Joints normal  Neurologic exam non focal  Foot wound reviewed, wound vac in place    Antibiotics:CTX-->cefepime, flagyl--> meropenem    Pertinent labs:  Lab Results   Component Value Date    WBC 10.5 11/16/2022     Lab Results   Component Value Date    RBC 4.02 11/16/2022     Lab Results   Component Value Date    HGB 11.7 11/16/2022     Lab Results   Component Value Date    HCT 35.0 11/16/2022     No components found for: MCT  Lab Results   Component Value Date    MCV 87 11/16/2022     Lab Results   Component Value Date    MCH 29.1 11/16/2022     Lab Results   Component Value Date    MCHC 33.4 11/16/2022     Lab Results   Component Value Date    RDW 13.9 11/16/2022     Lab Results   Component Value Date     11/16/2022       Last Comprehensive Metabolic Panel:  Sodium   Date Value Ref Range Status   11/23/2022 134 (L) 136 - 145 mmol/L Final     Potassium   Date Value Ref Range Status   11/23/2022 4.2 3.4 - 5.3 mmol/L Final   11/03/2022 4.2 3.4 - 5.3 mmol/L Final     Chloride   Date Value Ref Range Status   11/23/2022 101 98 - 107 mmol/L Final   11/03/2022 103 94 - 109 mmol/L Final     Carbon Dioxide (CO2)   Date Value Ref Range Status   11/23/2022 24 22 - 29 mmol/L Final   11/03/2022 25 20 - 32 mmol/L Final     Anion Gap   Date Value Ref Range Status   11/23/2022 9 7 - 15 mmol/L Final   11/03/2022 9 3 - 14 mmol/L Final     Glucose   Date Value Ref Range Status   11/23/2022 106 (H) 70 - 99 mg/dL Final   11/03/2022 79 70 - 99 mg/dL Final     GLUCOSE BY METER POCT   Date Value Ref Range Status   11/23/2022 168 (H) 70 - 99 mg/dL Final     Urea Nitrogen   Date Value Ref Range Status   11/23/2022 34.5 (H) 6.0 - 20.0 mg/dL Final   11/03/2022 37 (H) 7 - 30 mg/dL Final     Creatinine   Date Value Ref Range Status   11/23/2022 4.10 (H) 0.51 - 0.95 mg/dL Final     GFR Estimate   Date Value Ref Range Status   11/23/2022 13 (L) >60 mL/min/1.73m2 Final     Comment:     Effective December 21,  2021 eGFRcr in adults is calculated using the 2021 CKD-EPI creatinine equation which includes age and gender (Deana et al., [place], DOI: 10.1056/GUIGbo7513907)   10/03/2020 32 (L) >60 mL/min/1.73m2 Final     Calcium   Date Value Ref Range Status   11/23/2022 8.0 (L) 8.6 - 10.0 mg/dL Final       Liver Function Studies -   Recent Labs   Lab Test 11/18/22  0532   PROTTOTAL 5.5*   ALBUMIN 2.8*   BILITOTAL <0.2   ALKPHOS 79   AST 18   ALT 5*       Erythrocyte Sedimentation Rate   Date Value Ref Range Status   11/16/2022 58 (H) 0 - 20 mm/hr Final       CRP Inflammation   Date Value Ref Range Status   11/16/2022 <3.00 <5.00 mg/L Final   11/03/2022 3.9 0.0 - 8.0 mg/L Final               MICROBIOLOGY DATA:  Foot swab enterobacter and PA  BC one of two sets on admission -- staph epi x2, staph lug, staph aureus  Culture Culture in progress       1+ Enterobacter cloacae complex Abnormal             Resulting Agency: [facility]     Susceptibility     Enterobacter cloacae complex     ABHIJIT     Ampicillin  Resistant 1     Ampicillin/ Sulbactam  Resistant 1     Cefepime <=1 ug/mL Susceptible     Ceftazidime <=1 ug/mL Susceptible     Ceftriaxone <=1 ug/mL Susceptible     Ciprofloxacin <=0.25 ug/mL Susceptible     Gentamicin <=1 ug/mL Susceptible     Levofloxacin <=0.12 ug/mL Susceptible     Meropenem <=0.25 ug/mL Susceptible     Piperacillin/Tazobactam <=4 ug/mL Susceptible     Tobramycin <=1 ug/mL Susceptible     Trimethoprim/Sulfamethoxazole <=1/19 ug/mL Susceptible                       Pseudomonas pan-susceptible.       IMAGING/RADIOLOGY:  MRI no osteo

## 2022-11-23 NOTE — PLAN OF CARE
Problem: Plan of Care - These are the overarching goals to be used throughout the patient stay.    Goal: Optimal Comfort and Wellbeing  Outcome: Progressing     Pt a/o 4x, comfortable. Wound vac in place, patent.   No pain reported. One assist w/ crutches.

## 2022-11-23 NOTE — PLAN OF CARE
"  Problem: Plan of Care - These are the overarching goals to be used throughout the patient stay.    Description: The Care Plan Review/Shift Note, Individualized Goals, Hospital-Acquired Illness or Injury, Comfort and Wellbeing, and Transition Planning are the \"Overarching Goals\" and should be updated throughout the hospitalization.  Please hover over the (i) for specific information on each goal topic.  Goal: Plan of Care Review  Description: The Plan of Care Review/Shift note should be completed every shift.  The Outcome Evaluation is a brief statement about your assessment that the patient is improving, declining, or no change.  This information will be displayed automatically on your shift note.  Outcome: Not Progressing  Flowsheets (Taken 11/23/2022 1124)  Outcome Evaluation: Pt with ongoing intermittent N/V. Pt is unsure what is causing this. Nausea this am but none now. Discussed current inadeuquat oral intake, yesterday med 57% of estimated kcal and 38% of estimated protein needs. Pt likes special k bar and is willing to increase. Pt reports she will likely eat more at home d/t liking her own food better and in her own environment. Educated pt on increased nutrition needs for wound healing. Reviewed protein foods. Pt motivated to meet her needs.  Plan of Care Reviewed With: patient  Overall Patient Progress: no change     Problem: Oral Intake Inadequate  Goal: Improved Oral Intake  Outcome: Not Progressing   Goal Outcome Evaluation:      Plan of Care Reviewed With: patient    Overall Patient Progress: no changeOverall Patient Progress: no change    Outcome Evaluation: Pt with ongoing intermittent N/V. Pt is unsure what is causing this. Nausea this am but none now. Discussed current inadequatet oral intake yesterday (856 kcal, 34 g protein) meeting 57% of estimated kcal and 38% of estimated protein needs. Pt likes special k bar and is willing to increase. Pt reports she will likely eat more at home d/t liking " her own food better and in her own environment. Educated pt on increased nutrition needs for wound healing. Reviewed protein foods. Pt motivated to meet her needs.    - Increased special k bar to tid = 540 kcal, 36 g protein  - Changed Mvi to chewable -pt reports nausea with pill form

## 2022-11-23 NOTE — CONSULTS
[facility]  WO Nurse Inpatient Assessment     Consulted for: Right foot    Patient History (according to provider note(s):      Preoperative diagnosis:   1. Abscess right foot  2. Ulceration right foot  11/18 with Dr. Patel    Areas Assessed:      Areas visualized during today's visit: right foot    Negative pressure wound therapy applied to: Right foot   Last photo: 11/21     Wound due to: Surgical Wound   Wound history/plan of care:      Surgical date: 11/18     Date Negative Pressure Wound Therapy initiated: 11/21     Interventions in place: offloading    Is patient s nutritional status compromised? no   a. If yes, what interventions are in place? N/A    Reason for initiating vac therapy? Need for accelerated granulation tissue    Which?of?the?following?co-morbidities?apply? Diabetes  a. If diabetic is patient on a diabetic management program? Yes     Is osteomyelitis present in wound? no  a.  If yes what treatments are in place? N/A    Wound base: 100 % granulation     Palpation of the wound bed: normal       Drainage: small      Description of drainage: serosanguinous      Measurements (length x width x depth, in cm) 3  x 6  x  1 cm  Decreased depth by 1 cm      Tunneling N/A      Undermining N/A   Periwound skin: Intact       Color: normal and consistent with surrounding tissue       Temperature: normal    Odor: none   Pain: denies   Treatment goal: build up granulation tissue and heal   STATUS: follow up improved   Supplies ordered: gathered    Number of foam pieces removed from a wound (excluding foam for bridge) : 1 bridge and 1 black foam   Verified this matched the number of foam pieces applied last dressing change: N/A   Number of foam pieces packed into wound (excluding foam for bridge) : 1 bridge and 1  GranuFoam Black     Treatment Plan:     Negative pressure wound therapy plan:  Wound location: right foot   Change Days: Mon/Wed/Fri by WOC RN    Supplies (including all  "accessories) used: medium Black foam , Adapt barrier ring and Cavilon no sting barrier film  Cleanse with Normal saline prior to replacing VAC    Suction setting: -125   Methods used: Window paned all periwound skin with vac drape prior to applying sponge, Bridged trac pad off bony prominences and Placed barrier ring into periwound creases to improve seal    Staff RN to assess integrity of dressing and ensure suction is set at appropriate level every shift.   Date canister. Chart canister output every shift. Change cannister weekly and PRN if full/occluded     Remove foam dressing and replace with BID normal saline moist gauze dressing if:   -a dressing failure which cannot be repaired within 2 hours   -patient is discharging to home without a home pump   -patient is discharging to a facility outside the local area   -if a dressing is a \"Silver Foam\", remove before Radiation Therapy or MRI     The hospital VAC pump is not to be discharged with the patient.?Ensure to disconnect patient from machine prior to discharge. Then,    - If a home KCI VAC pump has been delivered, connect home cannister to dressing tubing then connect cannister to home pump and turn on machine    - If transferring to a nearby facility with a KCI vac, can disconnect and clamp tubing then cover end with glove so can be reconnected within 2 hours    Orders: Written    RECOMMEND PRIMARY TEAM ORDER: None, at this time  Education provided: plan of care  Discussed plan of care with: Patient and Nurse  WOC nurse follow-up plan: Monday/WednesdayFriday  Notify WOC if wound(s) deteriorate.  Nursing to notify the Provider(s) and re-consult the WOC Nurse if new skin concern.    DATA:     Current support surface: Standard  Standard gel/foam mattress (IsoFlex, Atmos air, etc)  Containment of urine/stool: Continent of bladder and Continent of bowel  BMI: Body mass index is 20.52 kg/m .   Active diet order: Orders Placed This Encounter      Moderate Consistent " Carb (60 g CHO per Meal) Diet     Output: I/O last 3 completed shifts:  In: 2666 [P.O.:1436; I.V.:1230]  Out: 1900 [Urine:1600; Emesis/NG output:300]     Labs:   Recent Labs   Lab 11/23/22  0538 11/18/22  0532 11/16/22  2247   ALBUMIN 2.8*   < >  --    HGB  --   --  11.7   WBC  --   --  10.5   CRP  --   --  <3.00    < > = values in this interval not displayed.     Pressure injury risk assessment:   Sensory Perception: 3-->slightly limited  Moisture: 4-->rarely moist  Activity: 3-->walks occasionally  Mobility: 3-->slightly limited  Nutrition: 3-->adequate  Friction and Shear: 3-->no apparent problem  Corey Score: 19    Kaykay Sandhu RN BS CWOCN

## 2022-11-24 LAB
ALBUMIN SERPL BCG-MCNC: 3.1 G/DL (ref 3.5–5.2)
ANION GAP SERPL CALCULATED.3IONS-SCNC: 14 MMOL/L (ref 7–15)
BACTERIA BLD CULT: NO GROWTH
BACTERIA BLD CULT: NO GROWTH
BUN SERPL-MCNC: 34.3 MG/DL (ref 6–20)
CALCIUM SERPL-MCNC: 8.3 MG/DL (ref 8.6–10)
CHLORIDE SERPL-SCNC: 96 MMOL/L (ref 98–107)
CREAT SERPL-MCNC: 4.16 MG/DL (ref 0.51–0.95)
DEPRECATED HCO3 PLAS-SCNC: 24 MMOL/L (ref 22–29)
EOSINOPHIL SPEC QL WRIGHT STN: NORMAL
ERYTHROCYTE [DISTWIDTH] IN BLOOD BY AUTOMATED COUNT: 13.9 % (ref 10–15)
GFR SERPL CREATININE-BSD FRML MDRD: 13 ML/MIN/1.73M2
GLUCOSE BLDC GLUCOMTR-MCNC: 131 MG/DL (ref 70–99)
GLUCOSE BLDC GLUCOMTR-MCNC: 194 MG/DL (ref 70–99)
GLUCOSE BLDC GLUCOMTR-MCNC: 326 MG/DL (ref 70–99)
GLUCOSE SERPL-MCNC: 156 MG/DL (ref 70–99)
HCT VFR BLD AUTO: 24 % (ref 35–47)
HGB BLD-MCNC: 7.9 G/DL (ref 11.7–15.7)
MAGNESIUM SERPL-MCNC: 1.8 MG/DL (ref 1.7–2.3)
MCH RBC QN AUTO: 29 PG (ref 26.5–33)
MCHC RBC AUTO-ENTMCNC: 32.9 G/DL (ref 31.5–36.5)
MCV RBC AUTO: 88 FL (ref 78–100)
PHOSPHATE SERPL-MCNC: 4.9 MG/DL (ref 2.5–4.5)
PLATELET # BLD AUTO: 243 10E3/UL (ref 150–450)
POTASSIUM SERPL-SCNC: 4.4 MMOL/L (ref 3.4–5.3)
RBC # BLD AUTO: 2.72 10E6/UL (ref 3.8–5.2)
SARS-COV-2 RNA RESP QL NAA+PROBE: NEGATIVE
SODIUM SERPL-SCNC: 134 MMOL/L (ref 136–145)
WBC # BLD AUTO: 10.1 10E3/UL (ref 4–11)

## 2022-11-24 PROCEDURE — 120N000001 HC R&B MED SURG/OB

## 2022-11-24 PROCEDURE — 89190 NASAL SMEAR FOR EOSINOPHILS: CPT | Performed by: INTERNAL MEDICINE

## 2022-11-24 PROCEDURE — 82040 ASSAY OF SERUM ALBUMIN: CPT | Performed by: INTERNAL MEDICINE

## 2022-11-24 PROCEDURE — 250N000013 HC RX MED GY IP 250 OP 250 PS 637: Performed by: PODIATRIST

## 2022-11-24 PROCEDURE — 250N000011 HC RX IP 250 OP 636: Performed by: PODIATRIST

## 2022-11-24 PROCEDURE — 99233 SBSQ HOSP IP/OBS HIGH 50: CPT | Performed by: INTERNAL MEDICINE

## 2022-11-24 PROCEDURE — 85027 COMPLETE CBC AUTOMATED: CPT | Performed by: INTERNAL MEDICINE

## 2022-11-24 PROCEDURE — 36415 COLL VENOUS BLD VENIPUNCTURE: CPT | Performed by: INTERNAL MEDICINE

## 2022-11-24 PROCEDURE — 250N000013 HC RX MED GY IP 250 OP 250 PS 637: Performed by: INTERNAL MEDICINE

## 2022-11-24 PROCEDURE — U0005 INFEC AGEN DETEC AMPLI PROBE: HCPCS | Performed by: INTERNAL MEDICINE

## 2022-11-24 PROCEDURE — 83735 ASSAY OF MAGNESIUM: CPT | Performed by: INTERNAL MEDICINE

## 2022-11-24 RX ADMIN — ONDANSETRON 4 MG: 4 TABLET, ORALLY DISINTEGRATING ORAL at 07:37

## 2022-11-24 RX ADMIN — METOPROLOL TARTRATE 75 MG: 25 TABLET, FILM COATED ORAL at 09:55

## 2022-11-24 RX ADMIN — METOPROLOL TARTRATE 75 MG: 25 TABLET, FILM COATED ORAL at 21:00

## 2022-11-24 RX ADMIN — CIPROFLOXACIN 500 MG: 500 TABLET, FILM COATED ORAL at 18:23

## 2022-11-24 RX ADMIN — FERROUS SULFATE TAB 325 MG (65 MG ELEMENTAL FE) 325 MG: 325 (65 FE) TAB at 09:55

## 2022-11-24 RX ADMIN — AMLODIPINE BESYLATE 10 MG: 5 TABLET ORAL at 09:56

## 2022-11-24 RX ADMIN — Medication 1 TABLET: at 09:55

## 2022-11-24 RX ADMIN — TRAZODONE HYDROCHLORIDE 50 MG: 50 TABLET ORAL at 21:04

## 2022-11-24 ASSESSMENT — ACTIVITIES OF DAILY LIVING (ADL)
ADLS_ACUITY_SCORE: 22

## 2022-11-24 NOTE — PROGRESS NOTES
"RENAL PROGRESS NOTE - [facility]        CC: f/u LORIN/CKD 4     Subjective: sitting up in bed. No sob. No pain. No issues with urination.        ASSESSMENT:  46 yo female with DM1 for > 30 years, HTN, tobacco abuse, marijuana use, recent rt foot ulcer/MSSA bacteremia treated with 3 weeks cefazolin, stopped due to LORIN, admit with nausea, vomiting, progressive LORIN         PLAN:  1. LORIN/CKD 4 - baseline creat of 2.6, expect CKD 4 due to > 30 years of type 1 diabetes and HTN.  Now with LORIN after recent MSSA bacteremia and diabetic foot ulcer with 3 weeks of cefazolin followed by doxy, cefepime.  Now on meropenem. At risk for post infectious GN, interstitial nephritis, ATN.   9.6 gm proteinuria, low c3. Echogenic kidneys on US consistent with CKD  -agree with avoidance of cephalosporins. Appears euvolemic on exam  -check spep, upep  -daily bmp  -would ideally see creat begin to trend down before discharge  -needs renal f/u after discharge as high risk for progressive CKD, our office will call her for appt    2. HTN   -amlodipine to 10 mg/d  -metoprolol to 75 mg bid  -heplock iv    3. Anemia - due to CKD, chronic inflammation  -repeat cbc  -check spep, upep  -low iron stores, will add po iron    4. Diabetic foot infection, recent MSSA bacteremia  - see above re: recent abx  -now on meropenem    Dale Green, DO  [facility]  [phone]    Objective    PHYSICAL EXAM  BP (!) 143/79 (BP Location: Right arm, Patient Position: Sitting, Cuff Size: Adult Regular)   Pulse 87   Temp 97.8  F (36.6  C) (Oral)   Resp 19   Ht 1.702 m (5' 7\")   Wt 65.8 kg (145 lb 1 oz)   LMP 11/02/2022   SpO2 100%   BMI 22.72 kg/m    I/O last 3 completed shifts:  In: 1488 [P.O.:950; I.V.:538]  Out: 1050 [Urine:1050]  Wt Readings from Last 3 Encounters:   11/24/22 65.8 kg (145 lb 1 oz)   11/15/22 59 kg (130 lb)   11/01/22 59 kg (130 lb)       GENERAL: nad  HEENT:normocephalic, atraumatic  CARDIOVASCULAR: rr, no rub,  " Trace edema  PULMONARY:cta ant. No cyanosis  GASTROINTESTINAL: soft, nt/nd  MSK: diffuse muscle atrophy, warm, rt foot bandaged with wound vac  NEURO: Alert, no gross focal findings  PSYCHIATRIC: Adequate mood and interaction  SKIN: Pale, no jaundice, no rash.    LABORATORIES  Recent Labs   Lab 11/24/22  0528   WBC 10.1   HGB 7.9*   HCT 24.0*        Recent Labs   Lab 11/24/22  0528 11/23/22  0538 11/22/22  0558 11/20/22  0531 11/19/22  0811 11/18/22  0532   * 134* 132*   < > 133* 146*   CO2 24 24 24   < > 25 28   BUN 34.3* 34.5* 33.9*   < > 27.2* 32.4*   ALKPHOS  --   --   --   --  69 79   ALT  --   --   --   --  <5* 5*   AST  --   --   --   --  16 18    < > = values in this interval not displayed.         MEDICATIONS    amLODIPine  10 mg Oral Daily     childrens multivitamin with iron  1 tablet Oral Daily     ciprofloxacin  500 mg Oral Q24H ALFREDO     ferrous sulfate  325 mg Oral Daily     insulin aspart   Subcutaneous TID w/meals     insulin aspart  1-10 Units Subcutaneous TID AC     insulin aspart  1-7 Units Subcutaneous At Bedtime     insulin glargine  5 Units Subcutaneous At Bedtime     metoprolol tartrate  75 mg Oral BID     polyethylene glycol  17 g Oral Daily     senna-docusate  1 tablet Oral BID     sodium chloride (PF)  3 mL Intracatheter Q8H

## 2022-11-24 NOTE — PROGRESS NOTES
[facility]    Medicine Progress Note - Hospitalist Service    Date of Admission:  11/16/2022    Assessment & Plan     Josefa Curiel is a 47 year old female with known diabetic foot ulcer, type 1 diabetes, CKD, smoker, marijuana use, hypertension who presented with 3 days of nausea and vomiting after doxycycline use.  She was unable to keep medicine and food down.       1.Acute renal failure on chronic kidney disease stage IV with hyponatremia: with metabolic acidosis.  -suspect n/v from doxy triggered pre-renal state on ckd  - Baseline creatinine is 2.5-3, elevated to 4.36 on admission. Likely due to poor p.o. intake for 3 days or nephrotoxicity from doxycycline.   -renal seeing and evaluating with  SPEP, UPEP, renal ultrasound with echogenic kidneys consistent with CKD.  Avoid cephalosporins.  -despite ivf, creat still 4 range and has not decreased much  -need to see some improvement here prior to discharge     2.Right foot ulcer: Had recent admission and the plan was IV antibiotics for 4 weeks. Had 3 week course of ancef, aborted due to ARF. Antibiotic was then switched to doxycycline. Ulcer persisted despite antibiotic treatment. MRI reveals no osteomyelitis, septic arthritis, and abscess.  -S/p I&D by podiatry on 11/18. Wound culture revealed enterococcus, pseudomonas.    -Started Ceftriaxone and flagyl. Changed to Cefepime and flagyl 11/19 per ID.    -Switched to meropenem 11/22.   -11/23--Id switched off derrick and trial of Cipro po- Duloxetine held this admission and will continue to hold while on Cipro-per ID 1 week then done. She did have some emesis yesterday-after taking po cipro, none yet today   -Podiatry-wound VAC 11/21   -needs Vac or home an care management team working on this           3.Positive blood culture: One out of 2 blood culture sent on 1/17 shows Staph lugdunensis and staph epidermis and staph aureus. She had MSSA bacteremia during her last admission.   -Repeat  blood culture: no growth so far  -Antibiotics as above per ID     4.Intractable nausea and vomiting:     - Likely side effect of doxycycline, ongoing chronic marijuana use and uremia. -Discontinued doxycycline.   -still has some as of yesterday      5.Type 1 insulin-dependent diabetes with hypoglycemia: Recent HbA1C 8.8. -PTA Lantus 20 units, Novolog carb count tidac. Patient reports using carb count 1:15. But on admission, she reported to pharmacist that she uses 1: 6 for breakfast, 1:4 for lunch and 1:3 for dinner.   Patient developed hypoglycemia after admission while not able to have oral intake and received D5 drip.  -Recurrent hypoglycemia this admission.   -Decreased Lantus to 5 units qhs.  Carb counting with meals 1:15 and high insulin sliding scale.  Continue to adjust as needed  -Continue Novolog sliding scale tidac  -she needs less with worsening renal fx     6.Hypertensive urgency: /103 in ER. Not able to take PTA amlodipine when she had GI symptoms.   -Clonidine patch  initiated on admission and now discontinued   -increased glogpykqtd73 mg daily   - Increased metoprolol 75 BID      7.Hypokalemia: Resolved after replacement      8.Tobacco and marijuana use: Urine tox is positive for opiates and cannabinoid  - Patient counseled to quit smoking     9.Past ethanol use: Patient states she quit alcohol 6 years ago      10.Thrombocytosis: likely due to infection. Monitor     11.Anxiety depression: holding Duloxetine while on cipro per ID .    12.Anemia  -hgb now 7.9  -she denied any signs of loss  -likely from CKD, acute illness  -appreciate renal working up          Diet: Moderate Consistent Carb (60 g CHO per Meal) Diet  Snacks/Supplements Adult: Other; special k bar; Between Meals    DVT Prophylaxis: Ambulate every shift/SCD  Williamson Catheter: Not present  Central Lines: None  Cardiac Monitoring: None  Code Status: Full Code      Disposition Plan      Expected Discharge Date: 11/25/2022    Discharge  Delays: Procedure Pending (enter procedure & time in comments)    Discharge Comments: pending cultures IV abx low blood sugars  WOUND VAC placement        The patient's care was discussed with the Care Coordinator/ and Patient.    Belle Rodriguez MD  Hospitalist Service  [facility]  Securely message with the Vocera Web Console (learn more here)  Text page via [employer] Paging/Directory           ______________________________________________________________________    Interval History   She feels better  Yesterday did have some emesis after cipro  No emesis today  Denied any dark or bloody stools      Data reviewed today: I reviewed all medications, new labs and imaging results over the last 24 hours. I personally reviewed labs     Physical Exam   Vital Signs: Temp: 97.8  F (36.6  C) Temp src: Oral BP: (!) 143/79 Pulse: 87   Resp: 19 SpO2: 100 % O2 Device: None (Room air)    Weight: 145 lbs 1 oz  Constitutional: awake, alert, cooperative and no apparent distress  Eyes: sclera clear  Respiratory: no increased work of breathing, good air exchange, no retractions and clear to auscultation  Cardiovascular: Normal apical impulse, regular rate and rhythm, normal S1 and S2, no S3 or S4, and no murmur noted  GI: normal bowel sounds, soft, non-distended and non-tender  Skin: wound vac on foot  Musculoskeletal: no lower extremity pitting edema present  Neurologic: Mental Status Exam:  Level of Alertness:   awake  Neuropsychiatric: General: normal, calm and normal eye contact    Data   Recent Labs   Lab 11/24/22  1217 11/24/22  0731 11/24/22  0528 11/23/22  0754 11/23/22  0538 11/22/22  0805 11/22/22  0558 11/19/22  0824 11/19/22  0811 11/18/22  0542 11/18/22  0532   WBC  --   --  10.1  --   --   --   --   --   --   --   --    HGB  --   --  7.9*  --   --   --   --   --   --   --   --    MCV  --   --  88  --   --   --   --   --   --   --   --    PLT  --   --  243  --   --   --   --   --   --    --   --    NA  --   --  134*  --  134*  --  132*   < > 133*  --  146*   POTASSIUM  --   --  4.4  --  4.2  --  4.2   < > 3.6  3.6   < > 2.9*   CHLORIDE  --   --  96*  --  101  --  100   < > 98  --  106   CO2  --   --  24  --  24  --  24   < > 25  --  28   BUN  --   --  34.3*  --  34.5*  --  33.9*   < > 27.2*  --  32.4*   CR  --   --  4.16*  --  4.10*  --  4.27*   < > 4.24*  --  4.36*   ANIONGAP  --   --  14  --  9  --  8   < > 10  --  12   ZACH  --   --  8.3*  --  8.0*  --  8.0*   < > 7.8*  --  7.8*   * 194* 156*   < > 106*   < > 101*   < > 150*   < > 88   ALBUMIN  --   --  3.1*  --  2.8*  --   --   --  2.7*  --  2.8*   PROTTOTAL  --   --   --   --   --   --   --   --  5.0*  --  5.5*   BILITOTAL  --   --   --   --   --   --   --   --  <0.2  --  <0.2   ALKPHOS  --   --   --   --   --   --   --   --  69  --  79   ALT  --   --   --   --   --   --   --   --  <5*  --  5*   AST  --   --   --   --   --   --   --   --  16  --  18    < > = values in this interval not displayed.     No results found for this or any previous visit (from the past 24 hour(s)).

## 2022-11-24 NOTE — PLAN OF CARE
Problem: Infection  Goal: Absence of Infection Signs and Symptoms  Outcome: Progressing  Vital signs stable.  R foot wound vac in place - scant serosanguinous drainage in canister.  PO abx ordered.  ID following.    Problem: Pain Chronic (Persistent) (Comorbidity Management)  Goal: Acceptable Pain Control and Functional Ability  Outcome: Progressing  Denies pain.    Mayi Ibrahim RN

## 2022-11-24 NOTE — PLAN OF CARE
Problem: Plan of Care - These are the overarching goals to be used throughout the patient stay.    Goal: Optimal Comfort and Wellbeing  Outcome: Progressing   Goal Outcome Evaluation:       Patient resting comfortably.

## 2022-11-25 LAB
ALBUMIN MFR UR ELPH: 71.8 %
ALBUMIN SERPL ELPH-MCNC: 3 G/DL (ref 3.7–5.1)
ALPHA1 GLOB MFR UR ELPH: 4.3 %
ALPHA1 GLOB SERPL ELPH-MCNC: 0.3 G/DL (ref 0.2–0.4)
ALPHA2 GLOB MFR UR ELPH: 7.5 %
ALPHA2 GLOB SERPL ELPH-MCNC: 0.8 G/DL (ref 0.5–0.9)
ANION GAP SERPL CALCULATED.3IONS-SCNC: 19 MMOL/L (ref 7–15)
ANION GAP SERPL CALCULATED.3IONS-SCNC: 8 MMOL/L (ref 7–15)
ATRIAL RATE - MUSE: 86 BPM
B-GLOBULIN MFR UR ELPH: 8.3 %
B-GLOBULIN SERPL ELPH-MCNC: 0.5 G/DL (ref 0.6–1)
BACTERIA TISS BX CULT: NORMAL
BUN SERPL-MCNC: 37.3 MG/DL (ref 6–20)
BUN SERPL-MCNC: 42.7 MG/DL (ref 6–20)
CALCIUM SERPL-MCNC: 8.2 MG/DL (ref 8.6–10)
CALCIUM SERPL-MCNC: 8.6 MG/DL (ref 8.6–10)
CHLORIDE SERPL-SCNC: 91 MMOL/L (ref 98–107)
CHLORIDE SERPL-SCNC: 91 MMOL/L (ref 98–107)
CREAT SERPL-MCNC: 3.94 MG/DL (ref 0.51–0.95)
CREAT SERPL-MCNC: 4.28 MG/DL (ref 0.51–0.95)
DEPRECATED HCO3 PLAS-SCNC: 16 MMOL/L (ref 22–29)
DEPRECATED HCO3 PLAS-SCNC: 26 MMOL/L (ref 22–29)
DIASTOLIC BLOOD PRESSURE - MUSE: NORMAL MMHG
ERYTHROCYTE [DISTWIDTH] IN BLOOD BY AUTOMATED COUNT: 13.5 % (ref 10–15)
GAMMA GLOB MFR UR ELPH: 8.1 %
GAMMA GLOB SERPL ELPH-MCNC: 0.8 G/DL (ref 0.7–1.6)
GFR SERPL CREATININE-BSD FRML MDRD: 12 ML/MIN/1.73M2
GFR SERPL CREATININE-BSD FRML MDRD: 14 ML/MIN/1.73M2
GLUCOSE BLDC GLUCOMTR-MCNC: 132 MG/DL (ref 70–99)
GLUCOSE BLDC GLUCOMTR-MCNC: 250 MG/DL (ref 70–99)
GLUCOSE BLDC GLUCOMTR-MCNC: 307 MG/DL (ref 70–99)
GLUCOSE BLDC GLUCOMTR-MCNC: 94 MG/DL (ref 70–99)
GLUCOSE SERPL-MCNC: 152 MG/DL (ref 70–99)
GLUCOSE SERPL-MCNC: 241 MG/DL (ref 70–99)
HCT VFR BLD AUTO: 31.8 % (ref 35–47)
HGB BLD-MCNC: 10.3 G/DL (ref 11.7–15.7)
INTERPRETATION ECG - MUSE: NORMAL
KETONES BLD-SCNC: 0.1 MMOL/L (ref 0–0.6)
M PROTEIN MFR UR ELPH: 0 %
M PROTEIN SERPL ELPH-MCNC: 0 G/DL
MAGNESIUM SERPL-MCNC: 1.9 MG/DL (ref 1.7–2.3)
MCH RBC QN AUTO: 29.3 PG (ref 26.5–33)
MCHC RBC AUTO-ENTMCNC: 32.4 G/DL (ref 31.5–36.5)
MCV RBC AUTO: 90 FL (ref 78–100)
P AXIS - MUSE: 67 DEGREES
PLATELET # BLD AUTO: 217 10E3/UL (ref 150–450)
POTASSIUM SERPL-SCNC: 4.8 MMOL/L (ref 3.4–5.3)
POTASSIUM SERPL-SCNC: 5.4 MMOL/L (ref 3.4–5.3)
PR INTERVAL - MUSE: 140 MS
PROT PATTERN SERPL ELPH-IMP: ABNORMAL
PROT PATTERN UR ELPH-IMP: ABNORMAL
QRS DURATION - MUSE: 88 MS
QT - MUSE: 386 MS
QTC - MUSE: 461 MS
R AXIS - MUSE: 80 DEGREES
RBC # BLD AUTO: 3.52 10E6/UL (ref 3.8–5.2)
SODIUM SERPL-SCNC: 125 MMOL/L (ref 136–145)
SODIUM SERPL-SCNC: 126 MMOL/L (ref 136–145)
SYSTOLIC BLOOD PRESSURE - MUSE: NORMAL MMHG
T AXIS - MUSE: 48 DEGREES
VENTRICULAR RATE- MUSE: 86 BPM
WBC # BLD AUTO: 10 10E3/UL (ref 4–11)

## 2022-11-25 PROCEDURE — 82374 ASSAY BLOOD CARBON DIOXIDE: CPT | Performed by: INTERNAL MEDICINE

## 2022-11-25 PROCEDURE — 250N000013 HC RX MED GY IP 250 OP 250 PS 637: Performed by: INTERNAL MEDICINE

## 2022-11-25 PROCEDURE — 82010 KETONE BODYS QUAN: CPT | Performed by: INTERNAL MEDICINE

## 2022-11-25 PROCEDURE — 80048 BASIC METABOLIC PNL TOTAL CA: CPT | Performed by: INTERNAL MEDICINE

## 2022-11-25 PROCEDURE — 120N000001 HC R&B MED SURG/OB

## 2022-11-25 PROCEDURE — 36415 COLL VENOUS BLD VENIPUNCTURE: CPT | Performed by: INTERNAL MEDICINE

## 2022-11-25 PROCEDURE — 258N000003 HC RX IP 258 OP 636: Performed by: INTERNAL MEDICINE

## 2022-11-25 PROCEDURE — 250N000013 HC RX MED GY IP 250 OP 250 PS 637: Performed by: PODIATRIST

## 2022-11-25 PROCEDURE — G0463 HOSPITAL OUTPT CLINIC VISIT: HCPCS | Mod: 25

## 2022-11-25 PROCEDURE — 999N000127 HC STATISTIC PERIPHERAL IV START W US GUIDANCE

## 2022-11-25 PROCEDURE — 258N000003 HC RX IP 258 OP 636: Performed by: HOSPITALIST

## 2022-11-25 PROCEDURE — 250N000011 HC RX IP 250 OP 636: Performed by: PODIATRIST

## 2022-11-25 PROCEDURE — 97605 NEG PRS WND THER DME<=50SQCM: CPT

## 2022-11-25 PROCEDURE — 250N000011 HC RX IP 250 OP 636: Performed by: HOSPITALIST

## 2022-11-25 PROCEDURE — 99233 SBSQ HOSP IP/OBS HIGH 50: CPT | Performed by: INTERNAL MEDICINE

## 2022-11-25 PROCEDURE — 83735 ASSAY OF MAGNESIUM: CPT | Performed by: INTERNAL MEDICINE

## 2022-11-25 PROCEDURE — 85027 COMPLETE CBC AUTOMATED: CPT | Performed by: INTERNAL MEDICINE

## 2022-11-25 RX ORDER — SODIUM CHLORIDE 9 MG/ML
INJECTION, SOLUTION INTRAVENOUS CONTINUOUS
Status: DISCONTINUED | OUTPATIENT
Start: 2022-11-25 | End: 2022-11-27

## 2022-11-25 RX ADMIN — METOPROLOL TARTRATE 75 MG: 25 TABLET, FILM COATED ORAL at 08:58

## 2022-11-25 RX ADMIN — METOPROLOL TARTRATE 75 MG: 25 TABLET, FILM COATED ORAL at 20:43

## 2022-11-25 RX ADMIN — SODIUM CHLORIDE: 9 INJECTION, SOLUTION INTRAVENOUS at 23:46

## 2022-11-25 RX ADMIN — SENNOSIDES AND DOCUSATE SODIUM 1 TABLET: 50; 8.6 TABLET ORAL at 20:43

## 2022-11-25 RX ADMIN — ONDANSETRON 4 MG: 4 TABLET, ORALLY DISINTEGRATING ORAL at 20:42

## 2022-11-25 RX ADMIN — ONDANSETRON 4 MG: 2 INJECTION INTRAMUSCULAR; INTRAVENOUS at 05:15

## 2022-11-25 RX ADMIN — FERROUS SULFATE TAB 325 MG (65 MG ELEMENTAL FE) 325 MG: 325 (65 FE) TAB at 08:58

## 2022-11-25 RX ADMIN — PROCHLORPERAZINE EDISYLATE 5 MG: 5 INJECTION INTRAMUSCULAR; INTRAVENOUS at 00:14

## 2022-11-25 RX ADMIN — PROCHLORPERAZINE EDISYLATE 5 MG: 5 INJECTION INTRAMUSCULAR; INTRAVENOUS at 15:51

## 2022-11-25 RX ADMIN — TRAZODONE HYDROCHLORIDE 50 MG: 50 TABLET ORAL at 23:51

## 2022-11-25 RX ADMIN — SODIUM CHLORIDE 500 ML: 9 INJECTION, SOLUTION INTRAVENOUS at 14:55

## 2022-11-25 RX ADMIN — AMLODIPINE BESYLATE 10 MG: 5 TABLET ORAL at 08:58

## 2022-11-25 RX ADMIN — Medication 1 TABLET: at 08:58

## 2022-11-25 RX ADMIN — CIPROFLOXACIN 500 MG: 500 TABLET, FILM COATED ORAL at 18:25

## 2022-11-25 ASSESSMENT — ACTIVITIES OF DAILY LIVING (ADL)
ADLS_ACUITY_SCORE: 22

## 2022-11-25 NOTE — PROGRESS NOTES
"Discharge planning assist    Length of Stay (days): 8    Expected Discharge Date:  To be determined.      Concerns to be Addressed:  Care post incision and drainage of right foot abscess, debridement of ulceration right foot in to muscle on 11/18/220.  1) Writer was asked by 4th floor TIA ARAGON to follow up on the status of the wound vac rental.  Patient's goal is to go home at discharge, therapy agrees. The rental process was started on the [employer] portal and all patient information was uploaded. CM is just waiting for the provider to e-sign the script and then for [employer] to ok the documents and approve the rental. Care management is following. Rental Order # [id number].  2) Alteration in renal function. Monitoring labs.   3) Will need to arrange home care for wound care.     Anticipated Discharge Disposition: Home.  Anticipated Discharge Services: Home care for wound VAC.   Anticipated Discharge DME: Per therapy (if indicated).  Has a rolling knee walker and crutches at home.     Referrals Placed by MAHESH/TAI: [facility];   Private pay costs discussed: See previous care management notes.     Additional Information:  Patient lives in the lower level of a house with her boyfriend. She needs to use 3 steps to access the property. An older couples lives on the main level of the house. She is independent with activities of daily living at baseline and uses a \"knee walker\" most of the time. She also has crutches that she uses occasionally. Her boyfriend plans to transport at discharge.  A referral has been made to [facility] for review, wound care. Note patient has [employer] insurance.   12:05 PM:  [facility] declined patient. Referral made to [facility] (they can review on Monday if still here).   3:46 PM:  Referral made to [facility] also. They declined as they are unable to staff. Left a message for [facility]. Spoke with Daiana who state " that they do not have nursing services available at this time. Called intake for [facility] (patient appears to have an [facility] PCP); will send an initial referral. They may be 'overbooked' for skilled nursing but will check.  3:57 PM:  Unfortunately, [facility] has no availability for home skilled nursing visits at this time.    Zuri Stroud RN

## 2022-11-25 NOTE — PLAN OF CARE
Problem: Pain Chronic (Persistent) (Comorbidity Management)  Goal: Acceptable Pain Control and Functional Ability  Outcome: Progressing  Intervention: Manage Persistent Pain  Recent Flowsheet Documentation  Taken 11/25/2022 0815 by Ariela Delgado RN  Medication Review/Management: medications reviewed   Goal Outcome Evaluation:      Plan of Care Reviewed With: patient  Patient denies pain.      Problem: Plan of Care - These are the overarching goals to be used throughout the patient stay.    Goal: Plan of Care Review  Description: The Plan of Care Review/Shift note should be completed every shift.  The Outcome Evaluation is a brief statement about your assessment that the patient is improving, declining, or no change.  This information will be displayed automatically on your shift note.  Outcome: Progressing  Flowsheets (Taken 11/25/2022 1120)  Plan of Care Reviewed With: patient   WO nurse came to see the patient and told her that she was going to see what the wound looked like on her foot and take a pic and send it to Dr. Patel.  She was saying that the wound is more superficial and she may not need the vac anymore.  Patient was hoping to possibly discharge home today but Dr. Rodriguez said regardless she will not discharge due to blood sugars and elevated creatinine.    [facility] saw patient again and replaced wound vac because Dr. Patel could not be reached and is off duty.  Patient will need home care for wound vac therapy as of now but still is not discharging today.

## 2022-11-25 NOTE — CONSULTS
[facility]  WO Nurse Inpatient Assessment     Consulted for: Right plantar foot    Patient History (according to provider note(s):      Preoperative diagnosis:   1. Abscess right foot  2. Ulceration right foot  11/18 with Dr. Patel    Areas Assessed:      Areas visualized during today's visit: right foot    Negative pressure wound therapy applied to: Right foot   Last photo: 11/21      Wound due to: Surgical Wound   Wound history/plan of care:      Surgical date: 11/25    Date Negative Pressure Wound Therapy initiated: 11/21     Interventions in place: offloading    Is patient s nutritional status compromised? no   a. If yes, what interventions are in place? N/A    Reason for initiating vac therapy? Need for accelerated granulation tissue    Which?of?the?following?co-morbidities?apply? Diabetes  a. If diabetic is patient on a diabetic management program? Yes     Is osteomyelitis present in wound? no  a.  If yes what treatments are in place? N/A  Wound base: 100 % granulation     Palpation of the wound bed: normal       Drainage: small      Description of drainage: serosanguinous      Measurements (length x width x depth, in cm) 3  x 6  x  0.3 cm  Decreased depth continues      Tunneling N/A      Undermining N/A   Periwound skin: Intact       Color: normal and consistent with surrounding tissue       Temperature: normal    Odor: none   Pain: denies   Treatment goal: build up granulation tissue and heal   STATUS: follow up improved   Supplies ordered: gathered    Number of foam pieces removed from a wound (excluding foam for bridge) : 1 bridge and 1 black foam   Verified this matched the number of foam pieces applied last dressing change: N/A   Number of foam pieces packed into wound (excluding foam for bridge) : 1 bridge and 1  GranuFoam Black     Treatment Plan:     Negative pressure wound therapy plan:  Wound location: right foot   Change Days: Mon/Wed/Fri by WOC RN    Supplies (including  "all accessories) used: medium Black foam , Adapt barrier ring and Cavilon no sting barrier film  Cleanse with Normal saline prior to replacing VAC    Suction setting: -125   Methods used: Window paned all periwound skin with vac drape prior to applying sponge, Bridged trac pad off bony prominences and Placed barrier ring into periwound creases to improve seal    Staff RN to assess integrity of dressing and ensure suction is set at appropriate level every shift.   Date canister. Chart canister output every shift. Change cannister weekly and PRN if full/occluded     Remove foam dressing and replace with BID normal saline moist gauze dressing if:   -a dressing failure which cannot be repaired within 2 hours   -patient is discharging to home without a home pump   -patient is discharging to a facility outside the local area   -if a dressing is a \"Silver Foam\", remove before Radiation Therapy or MRI     The hospital VAC pump is not to be discharged with the patient.?Ensure to disconnect patient from machine prior to discharge. Then,    - If a home [employer]CI VAC pump has been delivered, connect home cannister to dressing tubing then connect cannister to home pump and turn on machine    - If transferring to a nearby facility with a KCI vac, can disconnect and clamp tubing then cover end with glove so can be reconnected within 2 hours    Orders: Written    RECOMMEND PRIMARY TEAM ORDER: None, at this time  Education provided: plan of care  Discussed plan of care with: Patient and Nurse  WOC nurse follow-up plan: Monday/WednesdayFriday  Notify WOC if wound(s) deteriorate.  Nursing to notify the Provider(s) and re-consult the WOC Nurse if new skin concern.    DATA:     Current support surface: Standard  Standard gel/foam mattress (IsoFlex, Atmos air, etc)  Containment of urine/stool: Continent of bladder and Continent of bowel  BMI: Body mass index is 22.72 kg/m .   Active diet order: Orders Placed This Encounter      Moderate " Consistent Carb (60 g CHO per Meal) Diet     Output: I/O last 3 completed shifts:  In: 1440 [P.O.:1440]  Out: 725 [Urine:725]     Labs:   Recent Labs   Lab 11/24/22  0528   ALBUMIN 3.1*   HGB 7.9*   WBC 10.1     Pressure injury risk assessment:   Sensory Perception: 4-->no impairment  Moisture: 4-->rarely moist  Activity: 3-->walks occasionally  Mobility: 3-->slightly limited  Nutrition: 3-->adequate  Friction and Shear: 3-->no apparent problem  Corey Score: 20    Kaykay Sandhu RN BS CWOCN

## 2022-11-25 NOTE — PROGRESS NOTES
[facility]    Medicine Progress Note - Hospitalist Service    Date of Admission:  11/16/2022    Assessment & Plan     Josefa Curiel is a 47 year old female with known diabetic foot ulcer, type 1 diabetes, CKD, smoker, marijuana use, hypertension who presented with 3 days of nausea and vomiting after doxycycline use.  She was unable to keep medicine and food down.       1.Acute renal failure on chronic kidney disease stage IV with hyponatremia: with metabolic acidosis.  -suspect n/v from doxy triggered pre-renal state on ckd  - Baseline creatinine is 2.5-3, elevated to 4.36 on admission. Likely due to poor p.o. intake for 3 days or nephrotoxicity from doxycycline.   -renal seeing and evaluating with  SPEP, UPEP, renal ultrasound with echogenic kidneys consistent with CKD.  Avoid cephalosporins.  -despite ivf, creat still now 3.94    -Na today 126, even corrected for glucose 128--defer to renal     2.Right foot ulcer: Had recent admission and the plan was IV antibiotics for 4 weeks. Had 3 week course of ancef, aborted due to ARF. Antibiotic was then switched to doxycycline. Ulcer persisted despite antibiotic treatment. MRI reveals no osteomyelitis, septic arthritis, and abscess.  -S/p I&D by podiatry on 11/18. Wound culture revealed enterococcus, pseudomonas.    -Started Ceftriaxone and flagyl. Changed to Cefepime and flagyl 11/19 per ID.    -Switched to meropenem 11/22.   -11/23--Id switched off derrick and trial of Cipro po- Duloxetine held this admission and will continue to hold while on Cipro-per ID 1 week then done. She did have some emesis with it 2 days ago-after taking po cipro, still has nausea, no emesis today   -Podiatry-wound VAC 11/21   -? If needs Vac or home            3.Positive blood culture: One out of 2 blood culture sent on 1/17 shows Staph lugdunensis and staph epidermis and staph aureus. She had MSSA bacteremia during her last admission.   -Repeat blood culture: no  growth so far  -Antibiotics as above per ID     4.Intractable nausea and vomiting:     - Likely side effect of doxycycline, ongoing chronic marijuana use and uremia. -Discontinued doxycycline.   -still has some as of today      5.Type 1 insulin-dependent diabetes with hypoglycemia: Recent HbA1C 8.8. -PTA Lantus 20 units, Novolog carb count tidac. Patient reports using carb count 1:15. But on admission, she reported to pharmacist that she uses 1: 6 for breakfast, 1:4 for lunch and 1:3 for dinner.   Patient developed hypoglycemia after admission while not able to have oral intake and received D5 drip.  -Recurrent hypoglycemia this admission.   -Decreased Lantus to 5 units qhs.  With rising BS will increase lantus to 7 units tonight. Carb counting with meals increase to 1:12 and high insulin sliding scale.  Continue to adjust as needed  -Continue Novolog sliding scale tidac  -she needs less with worsening renal fx     6.Hypertensive urgency: /103 in ER. Not able to take PTA amlodipine when she had GI symptoms.   -Clonidine patch  initiated on admission and now discontinued   -increased [id number] mg daily   - Increased metoprolol 75 BID      7.Hypokalemia: Resolved after replacement      8.Tobacco and marijuana use: Urine tox is positive for opiates and cannabinoid  - Patient counseled to quit smoking     9.Past ethanol use: Patient states she quit alcohol 6 years ago      10.Thrombocytosis: likely due to infection. Monitor     11.Anxiety depression: holding Duloxetine while on cipro per ID .     12.Anemia  -hgb now 10.3--?if yesterday lab was incorrect now  -she denied any signs of loss  -likely from CKD, acute illness  -appreciate renal working up          Diet: Moderate Consistent Carb (60 g CHO per Meal) Diet  Snacks/Supplements Adult: Other; special k bar; Between Meals    DVT Prophylaxis: Pneumatic Compression Devices and Ambulate every shift  Williamson Catheter: Not present  Central Lines: None  Cardiac  Monitoring: None  Code Status: Full Code      Disposition Plan   Bs, NA, wound        The patient's care was discussed with the Patient.    Belle Rodriguez MD  Hospitalist Service  [facility]  Securely message with the Vocera Web Console (learn more here)  Text page via [email or web address] Paging/Directory           ______________________________________________________________________    Interval History   She     Data reviewed today: I reviewed all medications, new labs and imaging results over the last 24 hours. I personally reviewed labs    Physical Exam   Vital Signs: Temp: 98.1  F (36.7  C) Temp src: Oral BP: (!) 152/76 Pulse: 85   Resp: 20 SpO2: 99 % O2 Device: None (Room air)    Weight: 145 lbs 1 oz  Constitutional: awake, alert, cooperative and no apparent distress  Eyes: sclera clear  Respiratory: No increased work of breathing, good air exchange, clear to auscultation bilaterally, no crackles or wheezing  Cardiovascular: Normal apical impulse, regular rate and rhythm, normal S1 and S2, no S3 or S4, and no murmur noted  GI: normal bowel sounds, soft, non-distended and non-tender  Skin:wond vac was on when I saw her on her foot  Musculoskeletal: no lower extremity pitting edema present  Neurologic: Mental Status Exam:  Level of Alertness:   awake  Neuropsychiatric: General: normal, calm and normal eye contact    Data   Recent Labs   Lab 11/25/22  1155 11/25/22  0942 11/25/22  0837 11/24/22  0731 11/24/22  0528 11/23/22  0754 11/23/22  0538 11/19/22  0824 11/19/22  0811   WBC  --  10.0  --   --  10.1  --   --   --   --    HGB  --  10.3*  --   --  7.9*  --   --   --   --    MCV  --  90  --   --  88  --   --   --   --    PLT  --  217  --   --  243  --   --   --   --    NA  --  126*  --   --  134*  --  134*   < > 133*   POTASSIUM  --  4.8  --   --  4.4  --  4.2   < > 3.6  3.6   CHLORIDE  --  91*  --   --  96*  --  101   < > 98   CO2  --  16*  --   --  24  --  24   < > 25   BUN  --  37.3*  --   --   34.3*  --  34.5*   < > 27.2*   CR  --  3.94*  --   --  4.16*  --  4.10*   < > 4.24*   ANIONGAP  --  19*  --   --  14  --  9   < > 10   ZACH  --  8.6  --   --  8.3*  --  8.0*   < > 7.8*   * 241* 307*   < > 156*   < > 106*   < > 150*   ALBUMIN  --   --   --   --  3.1*  --  2.8*  --  2.7*   PROTTOTAL  --   --   --   --   --   --   --   --  5.0*   BILITOTAL  --   --   --   --   --   --   --   --  <0.2   ALKPHOS  --   --   --   --   --   --   --   --  69   ALT  --   --   --   --   --   --   --   --  <5*   AST  --   --   --   --   --   --   --   --  16    < > = values in this interval not displayed.     No results found for this or any previous visit (from the past 24 hour(s)).

## 2022-11-25 NOTE — PROGRESS NOTES
"RENAL PROGRESS NOTE - [facility]        CC: f/u LORIN/CKD 4     Subjective:        ASSESSMENT:  46 yo female with DM1 for > 30 years, HTN, tobacco abuse, marijuana use, recent rt foot ulcer/MSSA bacteremia treated with 3 weeks cefazolin, stopped due to LORIN, admit with nausea, vomiting, progressive LORIN         PLAN:  1. LORIN/CKD 4 - baseline creat of 2.6, expect CKD 4 due to > 30 years of type 1 diabetes and HTN.  Now with LORIN after recent MSSA bacteremia and diabetic foot ulcer with 3 weeks of cefazolin followed by doxy, cefepime.  Now on meropenem. At risk for post infectious GN, interstitial nephritis, ATN.   9.6 gm proteinuria, low c3. Echogenic kidneys on US consistent with CKD  -agree with avoidance of cephalosporins.  -daily bmp  -would ideally see creat begin to trend down before discharge  -needs renal f/u after discharge as high risk for progressive CKD, our office will call her for appt  -IVF bolus this afternoon. Repeat BMP this evening - concern for pre-renal with ongoing hyperglycemia.     2. HTN   -amlodipine to 10 mg/d  -metoprolol to 75 mg bid    3. Anemia - due to CKD, chronic inflammation  -repeat cbc  -check spep, upep  -low iron stores, will add po iron    4. Diabetic foot infection, recent MSSA bacteremia  - see above re: recent abx  -now on meropenem    5. Type 1 Dm: sugars elevated. Per IM. Needs improved control.   -Bicarb drop will check beta hydroxy.     6. Hyponatremia: 126 corrects up with glucose. IVF and repeat labs today. Needs ongoing glucose control.     Dale Green,   [facility]  [phone]    Objective    PHYSICAL EXAM  BP (!) 152/76 (BP Location: Right arm)   Pulse 85   Temp 98.1  F (36.7  C) (Oral)   Resp 20   Ht 1.702 m (5' 7\")   Wt 65.8 kg (145 lb 1 oz)   LMP 11/02/2022   SpO2 99%   BMI 22.72 kg/m    I/O last 3 completed shifts:  In: 1440 [P.O.:1440]  Out: 725 [Urine:725]  Wt Readings from Last 3 Encounters:   11/24/22 65.8 kg (145 lb 1 oz) "   11/15/22 59 kg (130 lb)   11/01/22 59 kg (130 lb)       GENERAL: nad  HEENT:normocephalic, atraumatic  CARDIOVASCULAR: , no edema  PULMONARY: normal effort. No cyanosis  GASTROINTESTINAL: ND  MSK: diffuse muscle atrophy, warm, rt foot bandaged with wound vac  NEURO: Alert, no gross focal findings  PSYCHIATRIC: Adequate mood and interaction  SKIN: Pale, no jaundice, no rash.    LABORATORIES  Recent Labs   Lab 11/25/22  0942 11/24/22  0528   WBC 10.0 10.1   HGB 10.3* 7.9*   HCT 31.8* 24.0*    243     Recent Labs   Lab 11/25/22  0942 11/24/22  0528 11/23/22  0538 11/20/22  0531 11/19/22  0811   * 134* 134*   < > 133*   CO2 16* 24 24   < > 25   BUN 37.3* 34.3* 34.5*   < > 27.2*   ALKPHOS  --   --   --   --  69   ALT  --   --   --   --  <5*   AST  --   --   --   --  16    < > = values in this interval not displayed.         MEDICATIONS    sodium chloride 0.9%  500 mL Intravenous Once     amLODIPine  10 mg Oral Daily     childrens multivitamin with iron  1 tablet Oral Daily     ciprofloxacin  500 mg Oral Q24H ALFREDO     ferrous sulfate  325 mg Oral Daily     insulin aspart   Subcutaneous TID w/meals     insulin aspart  1-10 Units Subcutaneous TID AC     insulin aspart  1-7 Units Subcutaneous At Bedtime     insulin glargine  8 Units Subcutaneous At Bedtime     metoprolol tartrate  75 mg Oral BID     polyethylene glycol  17 g Oral Daily     senna-docusate  1 tablet Oral BID     sodium chloride (PF)  3 mL Intracatheter Q8H

## 2022-11-25 NOTE — PLAN OF CARE
Problem: Plan of Care - These are the overarching goals to be used throughout the patient stay.    Goal: Optimal Comfort and Wellbeing  Outcome: Progressing   Goal Outcome Evaluation:       Patient received PRN Compazine for mild nausea with good relief. Patient otherwise resting comfortably.

## 2022-11-26 LAB
ANION GAP SERPL CALCULATED.3IONS-SCNC: 12 MMOL/L (ref 7–15)
ANION GAP SERPL CALCULATED.3IONS-SCNC: 13 MMOL/L (ref 7–15)
BUN SERPL-MCNC: 43.3 MG/DL (ref 6–20)
BUN SERPL-MCNC: 43.8 MG/DL (ref 6–20)
CALCIUM SERPL-MCNC: 8 MG/DL (ref 8.6–10)
CALCIUM SERPL-MCNC: 8.2 MG/DL (ref 8.6–10)
CHLORIDE SERPL-SCNC: 91 MMOL/L (ref 98–107)
CHLORIDE SERPL-SCNC: 92 MMOL/L (ref 98–107)
CREAT SERPL-MCNC: 4.26 MG/DL (ref 0.51–0.95)
CREAT SERPL-MCNC: 4.27 MG/DL (ref 0.51–0.95)
DEPRECATED HCO3 PLAS-SCNC: 21 MMOL/L (ref 22–29)
DEPRECATED HCO3 PLAS-SCNC: 23 MMOL/L (ref 22–29)
GFR SERPL CREATININE-BSD FRML MDRD: 12 ML/MIN/1.73M2
GFR SERPL CREATININE-BSD FRML MDRD: 12 ML/MIN/1.73M2
GLUCOSE BLDC GLUCOMTR-MCNC: 120 MG/DL (ref 70–99)
GLUCOSE BLDC GLUCOMTR-MCNC: 185 MG/DL (ref 70–99)
GLUCOSE BLDC GLUCOMTR-MCNC: 226 MG/DL (ref 70–99)
GLUCOSE BLDC GLUCOMTR-MCNC: 241 MG/DL (ref 70–99)
GLUCOSE BLDC GLUCOMTR-MCNC: 281 MG/DL (ref 70–99)
GLUCOSE SERPL-MCNC: 182 MG/DL (ref 70–99)
GLUCOSE SERPL-MCNC: 194 MG/DL (ref 70–99)
HOLD SPECIMEN: NORMAL
POTASSIUM SERPL-SCNC: 5.1 MMOL/L (ref 3.4–5.3)
POTASSIUM SERPL-SCNC: 5.3 MMOL/L (ref 3.4–5.3)
SODIUM SERPL-SCNC: 126 MMOL/L (ref 136–145)
SODIUM SERPL-SCNC: 126 MMOL/L (ref 136–145)

## 2022-11-26 PROCEDURE — 99233 SBSQ HOSP IP/OBS HIGH 50: CPT | Performed by: INTERNAL MEDICINE

## 2022-11-26 PROCEDURE — 36415 COLL VENOUS BLD VENIPUNCTURE: CPT | Performed by: INTERNAL MEDICINE

## 2022-11-26 PROCEDURE — 250N000013 HC RX MED GY IP 250 OP 250 PS 637: Performed by: INTERNAL MEDICINE

## 2022-11-26 PROCEDURE — 250N000011 HC RX IP 250 OP 636: Performed by: PODIATRIST

## 2022-11-26 PROCEDURE — 258N000003 HC RX IP 258 OP 636: Performed by: HOSPITALIST

## 2022-11-26 PROCEDURE — 250N000013 HC RX MED GY IP 250 OP 250 PS 637: Performed by: PODIATRIST

## 2022-11-26 PROCEDURE — 80048 BASIC METABOLIC PNL TOTAL CA: CPT | Performed by: INTERNAL MEDICINE

## 2022-11-26 PROCEDURE — 250N000011 HC RX IP 250 OP 636: Performed by: HOSPITALIST

## 2022-11-26 PROCEDURE — 250N000011 HC RX IP 250 OP 636: Performed by: INTERNAL MEDICINE

## 2022-11-26 PROCEDURE — 120N000001 HC R&B MED SURG/OB

## 2022-11-26 RX ORDER — HEPARIN SODIUM 5000 [USP'U]/.5ML
5000 INJECTION, SOLUTION INTRAVENOUS; SUBCUTANEOUS EVERY 12 HOURS
Status: DISCONTINUED | OUTPATIENT
Start: 2022-11-26 | End: 2022-11-28 | Stop reason: HOSPADM

## 2022-11-26 RX ADMIN — AMLODIPINE BESYLATE 10 MG: 5 TABLET ORAL at 08:22

## 2022-11-26 RX ADMIN — ONDANSETRON 4 MG: 4 TABLET, ORALLY DISINTEGRATING ORAL at 14:23

## 2022-11-26 RX ADMIN — METOPROLOL TARTRATE 75 MG: 25 TABLET, FILM COATED ORAL at 08:22

## 2022-11-26 RX ADMIN — Medication 1 TABLET: at 08:22

## 2022-11-26 RX ADMIN — ONDANSETRON 4 MG: 4 TABLET, ORALLY DISINTEGRATING ORAL at 23:00

## 2022-11-26 RX ADMIN — CIPROFLOXACIN 500 MG: 500 TABLET, FILM COATED ORAL at 18:22

## 2022-11-26 RX ADMIN — PROCHLORPERAZINE EDISYLATE 5 MG: 5 INJECTION INTRAMUSCULAR; INTRAVENOUS at 03:19

## 2022-11-26 RX ADMIN — SENNOSIDES AND DOCUSATE SODIUM 1 TABLET: 50; 8.6 TABLET ORAL at 21:02

## 2022-11-26 RX ADMIN — HEPARIN SODIUM 5000 UNITS: 5000 INJECTION, SOLUTION INTRAVENOUS; SUBCUTANEOUS at 16:17

## 2022-11-26 RX ADMIN — SODIUM CHLORIDE: 9 INJECTION, SOLUTION INTRAVENOUS at 13:40

## 2022-11-26 RX ADMIN — METOPROLOL TARTRATE 75 MG: 25 TABLET, FILM COATED ORAL at 21:02

## 2022-11-26 RX ADMIN — PROCHLORPERAZINE EDISYLATE 5 MG: 5 INJECTION INTRAMUSCULAR; INTRAVENOUS at 11:30

## 2022-11-26 RX ADMIN — FERROUS SULFATE TAB 325 MG (65 MG ELEMENTAL FE) 325 MG: 325 (65 FE) TAB at 08:22

## 2022-11-26 RX ADMIN — ACETAMINOPHEN 650 MG: 325 TABLET, FILM COATED ORAL at 08:22

## 2022-11-26 RX ADMIN — ONDANSETRON 4 MG: 4 TABLET, ORALLY DISINTEGRATING ORAL at 08:21

## 2022-11-26 RX ADMIN — PROCHLORPERAZINE EDISYLATE 5 MG: 5 INJECTION INTRAMUSCULAR; INTRAVENOUS at 20:58

## 2022-11-26 RX ADMIN — TRAZODONE HYDROCHLORIDE 50 MG: 50 TABLET ORAL at 21:02

## 2022-11-26 ASSESSMENT — ACTIVITIES OF DAILY LIVING (ADL)
ADLS_ACUITY_SCORE: 22

## 2022-11-26 NOTE — PLAN OF CARE
Problem: Plan of Care - These are the overarching goals to be used throughout the patient stay.    Goal: Plan of Care Review  Description: The Plan of Care Review/Shift note should be completed every shift.  The Outcome Evaluation is a brief statement about your assessment that the patient is improving, declining, or no change.  This information will be displayed automatically on your shift note.  Outcome: Progressing   Goal Outcome Evaluation:      Plan of Care Reviewed With: patient      Patient remains in hospital for blood sugar control, IV hydration for low sodium, and for worsening kidney function labs.  She remains on wound vac and blood sugars continue to range between 90's and 300's.  Patient verbalizes understanding of plan of care and the reason she is unable to discharge home at this time.      Problem: Pain Chronic (Persistent) (Comorbidity Management)  Goal: Acceptable Pain Control and Functional Ability  Intervention: Develop Pain Management Plan  Recent Flowsheet Documentation  Taken 11/26/2022 0905 by Ariela Delgado RN  Pain Management Interventions: medication (see MAR)   Patient denies pain in her surgical wound on her right foot but she did complain of a mild headache this morning for which she received Tylenol.  Headache subsided.      Problem: Hypertension Comorbidity  Goal: Blood Pressure in Desired Range  Outcome: Progressing   Blood pressures have improved with Amlodipine and Metoprolol.  BP was 158/79 this am.

## 2022-11-26 NOTE — PROGRESS NOTES
"RENAL PROGRESS NOTE - [facility]        CC: f/u LORIN/CKD 4     Subjective: Patient sitting up in bed. NO sob. Nausea stable but needs ongoing med's. Is not sure if making more urine. No issues with urination reported. No diarrhea.        ASSESSMENT:  46 yo female with DM1 for > 30 years, HTN, tobacco abuse, marijuana use, recent rt foot ulcer/MSSA bacteremia treated with 3 weeks cefazolin, stopped due to LORIN, admit with nausea, vomiting, progressive LORIN         PLAN:  1. LORIN/CKD 4 - baseline creat of 2.6, expect CKD 4 due to > 30 years of type 1 diabetes and HTN.  Now with LORIN after recent MSSA bacteremia and diabetic foot ulcer with 3 weeks of cefazolin followed by doxy, cefepime.  Now on meropenem. At risk for post infectious GN, interstitial nephritis, ATN.   9.6 gm proteinuria, low c3. Echogenic kidneys on US consistent with CKD  -agree with avoidance of cephalosporins.  -daily bmp  -would ideally see creat begin to trend down before discharge  -needs renal f/u after discharge as high risk for progressive CKD, our office will call her for appt  -Agree with ongoing IVF. Patient with poor intake with nausea, no diarrhea.     2. HTN   -amlodipine to 10 mg/d  -metoprolol to 75 mg bid    3. Anemia - due to CKD, chronic inflammation  -repeat cbc  -check spep, upep  -low iron stores, will add po iron    4. Diabetic foot infection, recent MSSA bacteremia  - see above re: recent abx  -cipro    5. Type 1 Dm: sugars elevated. Per IM.    6. Hyponatremia: 126 corrects up with glucose. Trending up since yesterday with IVF.     Dale Green,   [facility]  [phone]    Objective    PHYSICAL EXAM  BP (!) 158/79 (BP Location: Right arm)   Pulse 91   Temp 98.3  F (36.8  C) (Oral)   Resp 18   Ht 1.702 m (5' 7\")   Wt 65.8 kg (145 lb 1 oz)   LMP 11/02/2022   SpO2 97%   BMI 22.72 kg/m    I/O last 3 completed shifts:  In: 647.5 [P.O.:120; I.V.:527.5]  Out: -   Wt Readings from Last 3 Encounters: "   11/24/22 65.8 kg (145 lb 1 oz)   11/15/22 59 kg (130 lb)   11/01/22 59 kg (130 lb)       GENERAL: nad  HEENT:normocephalic, atraumatic  CARDIOVASCULAR: , no edema  PULMONARY: normal effort. No cyanosis  GASTROINTESTINAL: ND  MSK: diffuse muscle atrophy, warm, rt foot bandaged with wound vac  NEURO: Alert, no gross focal findings  PSYCHIATRIC: Adequate mood and interaction  SKIN: Pale, no jaundice, no rash.    LABORATORIES  Recent Labs   Lab 11/25/22  0942 11/24/22  0528   WBC 10.0 10.1   HGB 10.3* 7.9*   HCT 31.8* 24.0*    243     Recent Labs   Lab 11/26/22  0525 11/25/22  1833 11/25/22  0942   * 125* 126*   CO2 21* 26 16*   BUN 43.8* 42.7* 37.3*         MEDICATIONS    amLODIPine  10 mg Oral Daily     childrens multivitamin with iron  1 tablet Oral Daily     ciprofloxacin  500 mg Oral Q24H ALFREDO     ferrous sulfate  325 mg Oral Daily     insulin aspart   Subcutaneous TID w/meals     insulin aspart  1-10 Units Subcutaneous TID AC     insulin glargine  7 Units Subcutaneous At Bedtime     metoprolol tartrate  75 mg Oral BID     polyethylene glycol  17 g Oral Daily     senna-docusate  1 tablet Oral BID     sodium chloride (PF)  3 mL Intracatheter Q8H

## 2022-11-26 NOTE — PROGRESS NOTES
[facility]    Medicine Progress Note - Hospitalist Service    Date of Admission:  11/16/2022    Assessment & Plan     Josefa Curiel is a 47 year old female with known diabetic foot ulcer, type 1 diabetes, CKD, smoker, marijuana use, hypertension who presented with 3 days of nausea and vomiting after doxycycline use.  She was unable to keep medicine and food down.       1.Acute renal failure on chronic kidney disease stage IV with hyponatremia: with metabolic acidosis.  -suspect n/v from doxy triggered pre-renal state on ckd  - Baseline creatinine is 2.5-3, elevated to 4.36 on admission. Likely due to poor p.o. intake for 3 days or nephrotoxicity from doxycycline PTA.   -renal seeing and evaluating with  SPEP, UPEP, renal ultrasound with echogenic kidneys consistent with CKD.  Avoid cephalosporins.  -despite ivf, creat today 4. 27  -Na today 126, even corrected for glucose still low     2.Right foot ulcer: Had recent admission and the plan was IV antibiotics for 4 weeks. Had 3 week course of ancef, aborted due to ARF. Antibiotic was then switched to doxycycline. Ulcer persisted despite antibiotic treatment. MRI reveals no osteomyelitis, septic arthritis, and abscess.  -S/p I&D by podiatry on 11/18. Wound culture revealed enterococcus, pseudomonas.    -Started Ceftriaxone and flagyl. Changed to Cefepime and flagyl 11/19 per ID.    -Switched to meropenem 11/22.   -11/23--Id switched off derrick and trial of Cipro po- Duloxetine held this admission and will continue to hold while on Cipro-per ID 1 week then done. She is Day 4 out of 7 of this today. Intermit some emesis related to this   -Podiatry-wound VAC 11/21   -? If needs Vac or home            3.Positive blood culture: One out of 2 blood culture sent on 1/17 shows Staph lugdunensis and staph epidermis and staph aureus. She had MSSA bacteremia during her last admission.   -Repeat blood culture: no growth so far  -Antibiotics as above per  ID     4.Intractable nausea and vomiting:     - Likely side effect of doxycycline, ongoing chronic marijuana use and uremia. -Discontinued doxycycline.   -still has some as of today      5.Type 1 insulin-dependent diabetes with hypoglycemia: Recent HbA1C 8.8. -PTA Lantus 20 units, Novolog carb count tidac. Patient reports using carb count 1:15. But on admission, she reported to pharmacist that she uses 1: 6 for breakfast, 1:4 for lunch and 1:3 for dinner.   Patient developed hypoglycemia after admission while not able to have oral intake and received D5 drip.  -Recurrent hypoglycemia this admission.   -Decreased Lantus to 5 units qhs.  With rising BS will increase lantus to 8 units tonight. Carb counting with meals increase to 1:10 and high insulin sliding scale.  Continue to adjust as needed  -Continue Novolog sliding scale tidac  -she needs less with worsening renal fx     6.Hypertensive urgency: /103 in ER. Not able to take PTA amlodipine when she had GI symptoms.   -Clonidine patch  initiated on admission and now discontinued   -increased [id number] mg daily   - Increased metoprolol 75 BID      7.Hypokalemia: Resolved after replacement      8.Tobacco and marijuana use: Urine tox is positive for opiates and cannabinoid  - Patient counseled to quit smoking     9.Past ethanol use: Patient states she quit alcohol 6 years ago      10.Thrombocytosis: likely due to infection. Monitor  -improved now plt ar 217     11.Anxiety depression: holding Duloxetine while on cipro per ID .     12.Anemia  -hgb 11/25 was 10.3--?if day prior lab was incorrect now  -she denied any signs of loss  -likely from CKD, acute illness  -appreciate renal working up          Diet: Moderate Consistent Carb (60 g CHO per Meal) Diet  Snacks/Supplements Adult: Other; special k bar; Between Meals    DVT Prophylaxis: Heparin SQ  Williamson Catheter: Not present  Central Lines: None  Cardiac Monitoring: None  Code Status: Full Code      Disposition  Plan      Expected Discharge Date: 11/28/2022    Discharge Delays: Procedure Pending (enter procedure & time in comments)    Discharge Comments: pending cultures IV abx low blood sugars  WOUND VAC placement        The patient's care was discussed with the Bedside Nurse, Care Coordinator/ and Patient.    Belle Rodriguez MD  Hospitalist Service  [facility]  Securely message with the Vocera Web Console (learn more here)  Text page via [employer] Paging/Directory           ______________________________________________________________________    Interval History   She is tried ov being here  Shared with me that she was uncomfortable with certain staff at night, male NA(I did update RN and charge RN)  She notes some nausea  Overall feels tired  Really wants to leave  Went over why renal restarted ivf with her and how important it is for her to be here      Data reviewed today: I reviewed all medications, new labs and imaging results over the last 24 hours. I personally reviewed labs    Physical Exam   Vital Signs: Temp: 98.3  F (36.8  C) Temp src: Oral BP: (!) 158/79 Pulse: 91   Resp: 18 SpO2: 97 % O2 Device: None (Room air)    Weight: 145 lbs 1 oz  Constitutional: awake, alert, cooperative and no apparent distress  Respiratory: no increased work of breathing, good air exchange, no retractions and clear to auscultation  Cardiovascular: Normal apical impulse, regular rate and rhythm, normal S1 and S2, no S3 or S4, and no murmur noted  GI: normal bowel sounds, soft, non-distended and non-tender  Skin: vac on foot wound  Musculoskeletal: no lower extremity pitting edema present  Neurologic: Mental Status Exam:  Level of Alertness:   awake  Neuropsychiatric: General: normal, calm and normal eye contact  Affect: sad    Data   Recent Labs   Lab 11/26/22  1342 11/26/22  0806 11/26/22  0525 11/25/22  2056 11/25/22  1833 11/25/22  1155 11/25/22  0942 11/24/22  0731 11/24/22  0528 11/23/22  0754  11/23/22  0538   WBC  --   --   --   --   --   --  10.0  --  10.1  --   --    HGB  --   --   --   --   --   --  10.3*  --  7.9*  --   --    MCV  --   --   --   --   --   --  90  --  88  --   --    PLT  --   --   --   --   --   --  217  --  243  --   --    NA  --   --  126*  --  125*  --  126*  --  134*  --  134*   POTASSIUM  --   --  5.1  --  5.4*  --  4.8  --  4.4  --  4.2   CHLORIDE  --   --  92*  --  91*  --  91*  --  96*  --  101   CO2  --   --  21*  --  26  --  16*  --  24  --  24   BUN  --   --  43.8*  --  42.7*  --  37.3*  --  34.3*  --  34.5*   CR  --   --  4.27*  --  4.28*  --  3.94*  --  4.16*  --  4.10*   ANIONGAP  --   --  13  --  8  --  19*  --  14  --  9   ZACH  --   --  8.0*  --  8.2*  --  8.6  --  8.3*  --  8.0*   * 281* 194*   < > 152*   < > 241*   < > 156*   < > 106*   ALBUMIN  --   --   --   --   --   --   --   --  3.1*  --  2.8*    < > = values in this interval not displayed.     No results found for this or any previous visit (from the past 24 hour(s)).

## 2022-11-26 NOTE — PROGRESS NOTES
"Discharge planning assist    Length of Stay (days): 9    Expected Discharge Date:  To be determined.      Concerns to be Addressed:  Care post incision and drainage of right foot abscess, debridement of ulceration right foot in to muscle on 11/18/220.  1) Writer was asked by 4th floor TIA ARAGON to follow up on the status of the wound vac rental.  Patient's goal is to go home at discharge, therapy agrees. The rental process was started on the [employer] portal and all patient information was uploaded. CM is just waiting for the provider to e-sign the script and then for [employer] to ok the documents and approve the rental. Care management is following. Rental Order # [id number].  2) Alteration in renal function. Monitoring labs.   3) Will need to arrange home care for wound care.     Anticipated Discharge Disposition: Home.  Anticipated Discharge Services: Home care for wound VAC.   Anticipated Discharge DME: Per therapy (if indicated).  Has a rolling knee walker and crutches at home.     Referrals Placed by MAHESH/TIA: [facility];   Private pay costs discussed: See previous care management notes.     Additional Information:  Patient lives in the lower level of a house with her boyfriend. She needs to use 3 steps to access the property. An older couples lives on the main level of the house. She is independent with activities of daily living at baseline and uses a \"knee walker\" most of the time. She also has crutches that she uses occasionally. Her boyfriend plans to transport at discharge.  It appears that the surgeon has not yet signed the paperwork for the wound V.A.C.  Also, no home care has yet been obtained for patient.     Zuri Stroud RN      "

## 2022-11-26 NOTE — PLAN OF CARE
Patient is pleasant and cooperative with cares. She c/o nausea at start of shift and given prn Compazine. Wound vac dressing is intact. She is able to ambulate to bathroom independently. BG's were WNL this shift. Patient denies pain. C/o nausea later in shift and was given prn Zofran with relief.  Problem: Pain Chronic (Persistent) (Comorbidity Management)  Goal: Acceptable Pain Control and Functional Ability  Outcome: Progressing  Intervention: Manage Persistent Pain  Recent Flowsheet Documentation  Taken 11/25/2022 1600 by Mayi Topete RN  Medication Review/Management: medications reviewed     Problem: Infection  Goal: Absence of Infection Signs and Symptoms  Outcome: Progressing     Problem: Diabetes Comorbidity  Goal: Blood Glucose Level Within Targeted Range  Outcome: Progressing     Problem: Hypertension Comorbidity  Goal: Blood Pressure in Desired Range  Outcome: Progressing  Intervention: Maintain Blood Pressure Management  Recent Flowsheet Documentation  Taken 11/25/2022 1600 by Mayi Topete RN  Medication Review/Management: medications reviewed     Problem: Plan of Care - These are the overarching goals to be used throughout the patient stay.    Goal: Absence of Hospital-Acquired Illness or Injury  Intervention: Identify and Manage Fall Risk  Recent Flowsheet Documentation  Taken 11/25/2022 1600 by Mayi Topete RN  Safety Promotion/Fall Prevention: nonskid shoes/slippers when out of bed  Intervention: Prevent Skin Injury  Recent Flowsheet Documentation  Taken 11/25/2022 1600 by Mayi Topete RN  Body Position: position changed independently  Taken 11/25/2022 1533 by Mayi Topete RN  Body Position: position changed independently  Intervention: Prevent and Manage VTE (Venous Thromboembolism) Risk  Recent Flowsheet Documentation  Taken 11/25/2022 1600 by Mayi Topete RN  VTE Prevention/Management: SCDs (sequential compression devices) on  Goal: Optimal Comfort and Wellbeing  Intervention:  Provide Person-Centered Care  Recent Flowsheet Documentation  Taken 11/25/2022 1600 by Mayi Topete RN  Trust Relationship/Rapport: care explained     Problem: Fall Injury Risk  Goal: Absence of Fall and Fall-Related Injury  Intervention: Identify and Manage Contributors  Recent Flowsheet Documentation  Taken 11/25/2022 1600 by Mayi Topete, RN  Medication Review/Management: medications reviewed  Intervention: Promote Injury-Free Environment  Recent Flowsheet Documentation  Taken 11/25/2022 1600 by Mayi Topete, RN  Safety Promotion/Fall Prevention: nonskid shoes/slippers when out of bed   Goal Outcome Evaluation:

## 2022-11-26 NOTE — PLAN OF CARE
Problem: Plan of Care - These are the overarching goals to be used throughout the patient stay.    Goal: Optimal Comfort and Wellbeing  Outcome: Progressing     Problem: Pain Chronic (Persistent) (Comorbidity Management)  Goal: Acceptable Pain Control and Functional Ability  Intervention: Manage Persistent Pain  Recent Flowsheet Documentation  Taken 11/25/2022 3994 by Meghann Chatterjee RN  Medication Review/Management: medications reviewed     Problem: Diabetes Comorbidity  Goal: Blood Glucose Level Within Targeted Range  Outcome: Progressing     Problem: Infection  Goal: Absence of Infection Signs and Symptoms  Outcome: Progressing   Goal Outcome Evaluation:  Pt was comfortable at start, started NaCl 0.9  at 75 ml/hr as prescribed due to low Sodium level. BG at 0200AM is 120. C/o nausea at 0300AM Given Compazine 5mg, effective. Able to sleep at 0330AM. No c/o pain. Wound vac on with serosanguinous very minimal dressing.

## 2022-11-27 LAB
ANION GAP SERPL CALCULATED.3IONS-SCNC: 9 MMOL/L (ref 7–15)
BUN SERPL-MCNC: 40.8 MG/DL (ref 6–20)
CALCIUM SERPL-MCNC: 7.6 MG/DL (ref 8.6–10)
CHLORIDE SERPL-SCNC: 93 MMOL/L (ref 98–107)
CREAT SERPL-MCNC: 4.14 MG/DL (ref 0.51–0.95)
DEPRECATED HCO3 PLAS-SCNC: 22 MMOL/L (ref 22–29)
ERYTHROCYTE [DISTWIDTH] IN BLOOD BY AUTOMATED COUNT: 13.4 % (ref 10–15)
GFR SERPL CREATININE-BSD FRML MDRD: 13 ML/MIN/1.73M2
GLUCOSE BLDC GLUCOMTR-MCNC: 119 MG/DL (ref 70–99)
GLUCOSE BLDC GLUCOMTR-MCNC: 162 MG/DL (ref 70–99)
GLUCOSE BLDC GLUCOMTR-MCNC: 165 MG/DL (ref 70–99)
GLUCOSE BLDC GLUCOMTR-MCNC: 195 MG/DL (ref 70–99)
GLUCOSE SERPL-MCNC: 160 MG/DL (ref 70–99)
HCT VFR BLD AUTO: 22.4 % (ref 35–47)
HGB BLD-MCNC: 7.4 G/DL (ref 11.7–15.7)
HOLD SPECIMEN: NORMAL
MAGNESIUM SERPL-MCNC: 1.9 MG/DL (ref 1.7–2.3)
MCH RBC QN AUTO: 28.7 PG (ref 26.5–33)
MCHC RBC AUTO-ENTMCNC: 33 G/DL (ref 31.5–36.5)
MCV RBC AUTO: 87 FL (ref 78–100)
OSMOLALITY UR: 187 MMOL/KG (ref 100–1200)
PLATELET # BLD AUTO: 259 10E3/UL (ref 150–450)
POTASSIUM SERPL-SCNC: 4.7 MMOL/L (ref 3.4–5.3)
RBC # BLD AUTO: 2.58 10E6/UL (ref 3.8–5.2)
SODIUM SERPL-SCNC: 124 MMOL/L (ref 136–145)
SODIUM SERPL-SCNC: 126 MMOL/L (ref 136–145)
SODIUM SERPL-SCNC: 126 MMOL/L (ref 136–145)
SODIUM UR-SCNC: 39 MMOL/L
WBC # BLD AUTO: 6.9 10E3/UL (ref 4–11)

## 2022-11-27 PROCEDURE — 84295 ASSAY OF SERUM SODIUM: CPT | Performed by: INTERNAL MEDICINE

## 2022-11-27 PROCEDURE — 250N000013 HC RX MED GY IP 250 OP 250 PS 637: Performed by: INTERNAL MEDICINE

## 2022-11-27 PROCEDURE — 86335 IMMUNFIX E-PHORSIS/URINE/CSF: CPT | Mod: 26

## 2022-11-27 PROCEDURE — 250N000013 HC RX MED GY IP 250 OP 250 PS 637: Performed by: PODIATRIST

## 2022-11-27 PROCEDURE — 84300 ASSAY OF URINE SODIUM: CPT | Performed by: INTERNAL MEDICINE

## 2022-11-27 PROCEDURE — 83935 ASSAY OF URINE OSMOLALITY: CPT | Performed by: INTERNAL MEDICINE

## 2022-11-27 PROCEDURE — 86335 IMMUNFIX E-PHORSIS/URINE/CSF: CPT | Performed by: PATHOLOGY

## 2022-11-27 PROCEDURE — 258N000003 HC RX IP 258 OP 636: Performed by: HOSPITALIST

## 2022-11-27 PROCEDURE — 120N000001 HC R&B MED SURG/OB

## 2022-11-27 PROCEDURE — 250N000011 HC RX IP 250 OP 636: Performed by: HOSPITALIST

## 2022-11-27 PROCEDURE — 250N000011 HC RX IP 250 OP 636: Performed by: PODIATRIST

## 2022-11-27 PROCEDURE — 82310 ASSAY OF CALCIUM: CPT | Performed by: INTERNAL MEDICINE

## 2022-11-27 PROCEDURE — 99233 SBSQ HOSP IP/OBS HIGH 50: CPT | Performed by: INTERNAL MEDICINE

## 2022-11-27 PROCEDURE — 85027 COMPLETE CBC AUTOMATED: CPT | Performed by: INTERNAL MEDICINE

## 2022-11-27 PROCEDURE — 250N000011 HC RX IP 250 OP 636: Performed by: INTERNAL MEDICINE

## 2022-11-27 PROCEDURE — 83735 ASSAY OF MAGNESIUM: CPT | Performed by: INTERNAL MEDICINE

## 2022-11-27 PROCEDURE — 36415 COLL VENOUS BLD VENIPUNCTURE: CPT | Performed by: INTERNAL MEDICINE

## 2022-11-27 RX ORDER — TORSEMIDE 20 MG/1
20 TABLET ORAL ONCE
Status: COMPLETED | OUTPATIENT
Start: 2022-11-27 | End: 2022-11-27

## 2022-11-27 RX ADMIN — HEPARIN SODIUM 5000 UNITS: 5000 INJECTION, SOLUTION INTRAVENOUS; SUBCUTANEOUS at 16:02

## 2022-11-27 RX ADMIN — HEPARIN SODIUM 5000 UNITS: 5000 INJECTION, SOLUTION INTRAVENOUS; SUBCUTANEOUS at 03:00

## 2022-11-27 RX ADMIN — ONDANSETRON 4 MG: 2 INJECTION INTRAMUSCULAR; INTRAVENOUS at 08:39

## 2022-11-27 RX ADMIN — Medication 1 TABLET: at 08:23

## 2022-11-27 RX ADMIN — SENNOSIDES AND DOCUSATE SODIUM 1 TABLET: 50; 8.6 TABLET ORAL at 21:25

## 2022-11-27 RX ADMIN — SODIUM CHLORIDE: 9 INJECTION, SOLUTION INTRAVENOUS at 02:58

## 2022-11-27 RX ADMIN — AMLODIPINE BESYLATE 10 MG: 5 TABLET ORAL at 08:23

## 2022-11-27 RX ADMIN — FERROUS SULFATE TAB 325 MG (65 MG ELEMENTAL FE) 325 MG: 325 (65 FE) TAB at 08:23

## 2022-11-27 RX ADMIN — ONDANSETRON 4 MG: 4 TABLET, ORALLY DISINTEGRATING ORAL at 17:11

## 2022-11-27 RX ADMIN — POLYETHYLENE GLYCOL 3350 17 G: 17 POWDER, FOR SOLUTION ORAL at 08:46

## 2022-11-27 RX ADMIN — CIPROFLOXACIN 500 MG: 500 TABLET, FILM COATED ORAL at 18:46

## 2022-11-27 RX ADMIN — METOPROLOL TARTRATE 75 MG: 25 TABLET, FILM COATED ORAL at 21:37

## 2022-11-27 RX ADMIN — TORSEMIDE 20 MG: 20 TABLET ORAL at 14:32

## 2022-11-27 RX ADMIN — METOPROLOL TARTRATE 75 MG: 25 TABLET, FILM COATED ORAL at 08:23

## 2022-11-27 RX ADMIN — PROCHLORPERAZINE EDISYLATE 5 MG: 5 INJECTION INTRAMUSCULAR; INTRAVENOUS at 16:24

## 2022-11-27 ASSESSMENT — ACTIVITIES OF DAILY LIVING (ADL)
ADLS_ACUITY_SCORE: 22

## 2022-11-27 NOTE — PROGRESS NOTES
"Discharge planning assist  Assisting with finding home care:  Patient lives in the lower level of a house with her boyfriend. She needs to use 3 steps to access the property. An older couples lives on the main level of the house. She is independent with activities of daily living at baseline and uses a \"knee walker\" most of the time. She also has crutches that she uses occasionally. Her boyfriend plans to transport at discharge.  It appears that the surgeon has not yet signed the paperwork for the wound V.A.C.  Also, no home care has yet been obtained for patient.     Home Care referrals sent to:  [facility] (Declined; not in network for insurance)  [facility] (Declined; capped for wound care patients currently)  [facility] (Declined)  [facility] (Declined; unable to staff for a wound VAC)  [facility] (Will send to team for review)  [facility] (Declined for now. Call again on Monday if still needed)  [facility] (Declined)  [facility] (Left a message; not open on weekends)  [facility] (Left a message; not open on weekends)  [facility] (Declined)  [facility] (Declined)  [facility] (Declined)  [facility] (Please call back on Monday; not open for referrals on weekends)    Zuri Stroud RN      "

## 2022-11-27 NOTE — PROGRESS NOTES
"Care Management Follow Up    Length of Stay (days): 10    Expected Discharge Date: 11/28/2022     Concerns to be Addressed:     Arranging Home Care Services. Approval for Wound VAC-NEED Provider to e-sign Script and then await [facility] approval.   Patient plan of care discussed at interdisciplinary rounds: Yes    Anticipated Discharge Disposition:  Home with Home Care Services     Anticipated Discharge Services:  Home care RN services needed for Wound VAC  Anticipated Discharge DME:  [facility] wound VAC. Has rolling knee walker and crutches at home.     Patient/family educated on Medicare website which has current facility and service quality ratings:  Previously done  Education Provided on the Discharge Plan:  Per Team  Patient/Family in Agreement with the Plan:  Yes    Referrals Placed by CM/SW:  Home Care Referrals  Private pay costs discussed: Not applicable    Additional Information:  Needs Signature for wound VAC-follow up Monday 11/28. Working on setting up home care, additional referrals faxed, will follow up.     Per Previous note: Patient lives in the lower level of a house with her boyfriend. She needs to use 3 steps to access the property. An older couples lives on the main level of the house. She is independent with activities of daily living at baseline and uses a \"knee walker\" most of the time. She also has crutches that she uses occasionally. Her boyfriend plans to transport at discharge.      Elsa Shine RN       "

## 2022-11-27 NOTE — PROGRESS NOTES
RENAL PROGRESS NOTE - [facility]        CC: f/u LORIN/CKD 4     Subjective: Reports only nausea at times with pills overall improved. Eating alright. No issues with urination.No sob. No swelling. Discussed lab trends.        ASSESSMENT:  48 yo female with DM1 for > 30 years, HTN, tobacco abuse, marijuana use, recent rt foot ulcer/MSSA bacteremia treated with 3 weeks cefazolin, stopped due to LORIN, admit with nausea, vomiting, progressive LORIN         PLAN:  1. LORIN/CKD 4 - baseline creat of 2.6, expect CKD 4 due to > 30 years of type 1 diabetes and HTN.  Now with LORIN after recent MSSA bacteremia and diabetic foot ulcer with 3 weeks of cefazolin followed by doxy, cefepime.  Now on meropenem. At risk for post infectious GN, interstitial nephritis, ATN.   9.6 gm proteinuria, low c3. Echogenic kidneys on US consistent with CKD  -agree with avoidance of cephalosporins.  -daily bmp  -would ideally see creat begin to trend down before discharge  could consider biopsy but likely component of ATN, AIN possible but with infection and wound healing steroids would not be idea, Post infection GN treatment of choice is ongoing. If worsening will consider. NPO midnight.   -needs renal f/u after discharge as high risk for progressive CKD, our office will call her for appt  -Stop IVF as noted below.       2. HTN   -amlodipine to 10 mg/d  -metoprolol to 75 mg bid    3. Anemia - due to CKD, chronic inflammation, oral iron. Normal SPEP, with get urine immunofixation.     4. Diabetic foot infection, recent MSSA bacteremia  - see above re: recent abx  -cipro    5. Type 1 Dm: sugars elevated. Per IM.    6. Hyponatremia: worsening overnight with IVF. 126 last check, lack of free water clearance with LORIN on CKD. Fluid restriction, torsemide 20 mg x 1 today. q6 hours sodium.     Dale Green, DO  [facility]  [phone]    Objective    PHYSICAL EXAM  BP (!) 151/78 (BP Location: Right arm)   Pulse 90   Temp 98.2  F  "(36.8  C) (Oral)   Resp 18   Ht 1.702 m (5' 7\")   Wt 65.8 kg (145 lb 1 oz)   LMP 11/02/2022   SpO2 95%   BMI 22.72 kg/m    I/O last 3 completed shifts:  In: 844 [P.O.:360; I.V.:484]  Out: 510 [Urine:400; Emesis/NG output:100; Drains:10]  Wt Readings from Last 3 Encounters:   11/24/22 65.8 kg (145 lb 1 oz)   11/15/22 59 kg (130 lb)   11/01/22 59 kg (130 lb)       GENERAL: nad  HEENT:normocephalic, atraumatic  CARDIOVASCULAR: , no edema  PULMONARY: normal effort. No cyanosis  GASTROINTESTINAL: ND  MSK: diffuse muscle atrophy, warm, rt foot bandaged with wound vac  NEURO: Alert, no gross focal findings  PSYCHIATRIC: Adequate mood and interaction  SKIN: Pale, no jaundice, no rash.    LABORATORIES  Recent Labs   Lab 11/27/22  0551 11/25/22  0942 11/24/22  0528   WBC 6.9 10.0 10.1   HGB 7.4* 10.3* 7.9*   HCT 22.4* 31.8* 24.0*    217 243     Recent Labs   Lab 11/27/22  1301 11/27/22  0551 11/26/22  1746 11/26/22  0525   * 124* 126* 126*   CO2  --  22 23 21*   BUN  --  40.8* 43.3* 43.8*         MEDICATIONS    amLODIPine  10 mg Oral Daily     childrens multivitamin with iron  1 tablet Oral Daily     ciprofloxacin  500 mg Oral Q24H ALFREDO     ferrous sulfate  325 mg Oral Daily     heparin ANTICOAGULANT  5,000 Units Subcutaneous Q12H     insulin aspart   Subcutaneous TID w/meals     insulin aspart  1-10 Units Subcutaneous TID AC     insulin glargine  8 Units Subcutaneous At Bedtime     metoprolol tartrate  75 mg Oral BID     polyethylene glycol  17 g Oral Daily     senna-docusate  1 tablet Oral BID     sodium chloride (PF)  3 mL Intracatheter Q8H     torsemide  20 mg Oral Once             "

## 2022-11-27 NOTE — PLAN OF CARE
Patient is A&O. She is pleasant and cooperative with cares. Patient c/o nausea at  Start of shift. Was offered Zofran but patient wanted Compazine.  Pt denies pain. She is able to ambulate with crutches to bathroom. Patient's IV infiltrated and a new one was placed. IV fluids continue at 75 ml/hr. Wound vac is set at 125 and dressing is intact.    Problem: Fall Injury Risk  Goal: Absence of Fall and Fall-Related Injury  Outcome: Progressing  Intervention: Identify and Manage Contributors  Recent Flowsheet Documentation  Taken 11/26/2022 1900 by Mayi Topete RN  Medication Review/Management: medications reviewed  Intervention: Promote Injury-Free Environment  Recent Flowsheet Documentation  Taken 11/26/2022 1900 by Mayi Topete RN  Safety Promotion/Fall Prevention: activity supervised     Problem: Pain Chronic (Persistent) (Comorbidity Management)  Goal: Acceptable Pain Control and Functional Ability  Outcome: Progressing  Intervention: Manage Persistent Pain  Recent Flowsheet Documentation  Taken 11/26/2022 1900 by Mayi Topete RN  Medication Review/Management: medications reviewed     Problem: Oral Intake Inadequate  Goal: Improved Oral Intake  Outcome: Progressing     Problem: Plan of Care - These are the overarching goals to be used throughout the patient stay.    Goal: Absence of Hospital-Acquired Illness or Injury  Intervention: Identify and Manage Fall Risk  Recent Flowsheet Documentation  Taken 11/26/2022 1900 by Mayi Topete RN  Safety Promotion/Fall Prevention: activity supervised  Intervention: Prevent Skin Injury  Recent Flowsheet Documentation  Taken 11/26/2022 1900 by Mayi Topete RN  Body Position: position changed independently  Intervention: Prevent and Manage VTE (Venous Thromboembolism) Risk  Recent Flowsheet Documentation  Taken 11/26/2022 2105 by Mayi Topete RN  VTE Prevention/Management: SCDs (sequential compression devices) on  Taken 11/26/2022 1900 by Mayi Topete RN  VTE  Prevention/Management: SCDs (sequential compression devices) on     Problem: Hypertension Comorbidity  Goal: Blood Pressure in Desired Range  Intervention: Maintain Blood Pressure Management  Recent Flowsheet Documentation  Taken 11/26/2022 1900 by Mayi Topete, RN  Medication Review/Management: medications reviewed   Goal Outcome Evaluation:

## 2022-11-27 NOTE — PLAN OF CARE
Problem: Plan of Care - These are the overarching goals to be used throughout the patient stay.    Goal: Absence of Hospital-Acquired Illness or Injury  Intervention: Identify and Manage Fall Risk  Recent Flowsheet Documentation  Taken 11/27/2022 0034 by Tere Pedroza RN  Safety Promotion/Fall Prevention: activity supervised  Intervention: Prevent Skin Injury  Recent Flowsheet Documentation  Taken 11/27/2022 0034 by Tere Pedroza RN  Body Position: position changed independently  Intervention: Prevent and Manage VTE (Venous Thromboembolism) Risk  Recent Flowsheet Documentation  Taken 11/27/2022 0034 by Tere Pedroza RN  VTE Prevention/Management: SCDs (sequential compression devices) on     Problem: Fall Injury Risk  Goal: Absence of Fall and Fall-Related Injury  Intervention: Identify and Manage Contributors  Recent Flowsheet Documentation  Taken 11/27/2022 0034 by Tere Pedroza RN  Medication Review/Management: medications reviewed  Intervention: Promote Injury-Free Environment  Recent Flowsheet Documentation  Taken 11/27/2022 0034 by Tere Pedroza RN  Safety Promotion/Fall Prevention: activity supervised     Problem: Pain Chronic (Persistent) (Comorbidity Management)  Goal: Acceptable Pain Control and Functional Ability  Intervention: Manage Persistent Pain  Recent Flowsheet Documentation  Taken 11/27/2022 0034 by Tere Pedroza RN  Medication Review/Management: medications reviewed     Problem: Hypertension Comorbidity  Goal: Blood Pressure in Desired Range  Intervention: Maintain Blood Pressure Management  Recent Flowsheet Documentation  Taken 11/27/2022 0034 by Tere Pedroza RN  Medication Review/Management: medications reviewed   Goal Outcome Evaluation:         Pt had small emesis at start of shift,  Zofran PRN was given at 2300. No farther c/o N/V,pt slept most of the night,right foot wound vac patent. Will continue to monitor.

## 2022-11-27 NOTE — PLAN OF CARE
Problem: Oral Intake Inadequate  Goal: Improved Oral Intake  Outcome: Progressing     Problem: Fall Injury Risk  Goal: Absence of Fall and Fall-Related Injury  Outcome: Adequate for Care Transition  Intervention: Identify and Manage Contributors  Recent Flowsheet Documentation  Taken 11/27/2022 1100 by Timmy Sparrow RN  Medication Review/Management: medications reviewed  Intervention: Promote Injury-Free Environment  Recent Flowsheet Documentation  Taken 11/27/2022 1100 by Timmy Sparrow RN  Safety Promotion/Fall Prevention:   nonskid shoes/slippers when out of bed   clutter free environment maintained   assistive device/personal items within reach     Problem: Pain Chronic (Persistent) (Comorbidity Management)  Goal: Acceptable Pain Control and Functional Ability  Outcome: Adequate for Care Transition  Intervention: Manage Persistent Pain  Recent Flowsheet Documentation  Taken 11/27/2022 1100 by Timmy Sparrow RN  Medication Review/Management: medications reviewed     Problem: Infection  Goal: Absence of Infection Signs and Symptoms  Outcome: Adequate for Care Transition     Problem: Hypertension Comorbidity  Goal: Blood Pressure in Desired Range  Outcome: Adequate for Care Transition  Intervention: Maintain Blood Pressure Management  Recent Flowsheet Documentation  Taken 11/27/2022 1100 by Timmy Sparrow RN  Medication Review/Management: medications reviewed   Goal Outcome Evaluation:    Patient is A/O x4. BP elevated at 151/78 this morning. Patient asymptomatic for HTN symptoms. No PRN given, under parameters. Patient has no c/o pain but was given zofran in 0800 hour for nausea.     Appetite ok this shift.

## 2022-11-27 NOTE — PROGRESS NOTES
[facility] [place] [facility] Progress Note      ASSESSMENT:    Principal Problem:    Nausea and vomiting, unspecified vomiting type  Active Problems:    Diabetic ulcer of right foot associated with type 1 diabetes mellitus, with necrosis of muscle, unspecified part of foot (H)    Hypertension, unspecified type      PLAN:   47 year old female with known diabetic foot ulcer, type 1 diabetes, CKD, smoker, marijuana use, hypertension who presented with 3 days of nausea and vomiting after doxycycline use.         Acute renal failure on CKD 4 with hyponatremia:    Suspect n/v from doxy triggered pre-renal state on ckd however lack of recovery might need further evaluation  -- nephrology following  -- possible need for renal biopsy 11/28. NPO midnight, hold subcutaneous heparin in AM      Right foot ulcer: Had recent admission and the plan was IV antibiotics for 4 weeks. Had 3 week course of ancef, aborted due to ARF. Antibiotic was then switched to doxycycline. Ulcer persisted despite antibiotic treatment. MRI reveals no osteomyelitis, septic arthritis, and abscess.  S/p I&D by podiatry on 11/18. Wound culture revealed enterococcus, pseudomonas.   -11/23--Id switched off derrick and trial of Cipro po- Duloxetine held this admission and will continue to hold while on Cipro  - per ID will plan 1 week total ciprofloxacin to be completed 11/29 then done.    -Podiatry-wound VAC 11/21   -? If needs Vac or home              Positive blood culture: One out of 2 blood culture sent on 1/17 shows Staph lugdunensis and staph epidermis and staph aureus. She had MSSA bacteremia during her last admission.   -Repeat blood culture: no growth  -Antibiotics as above per ID       Intractable nausea and vomiting:     - Likely side effect of doxycycline, ongoing chronic marijuana use and uremia.   -Discontinued doxycycline.           Type 1 insulin-dependent diabetes with hypoglycemia: Recent HbA1C 8.8. -PTA Lantus 20 units, Novolog carb count tidac.  "Patient reports using carb count 1:15. But on admission, she reported to pharmacist that she uses 1: 6 for breakfast, 1:4 for lunch and 1:3 for dinner.   Patient developed hypoglycemia after admission while not able to have oral intake and received D5 drip.  -Decreased Lantus to 5 units qhs.  With rising BS will increase lantus to 8 units tonight. Carb counting with meals increase to 1:10 and high insulin sliding scale.  Continue to adjust as needed  -Continue Novolog sliding scale tidac       Hypertensive urgency: /103 in ER. Not able to take PTA amlodipine when she had GI symptoms.   -Clonidine patch  initiated on admission and now discontinued   -increased [id number] mg daily   - Increased metoprolol 75 BID       Anemia   Hgb 7.9 11/24, then up to 10.3 11/25 and now 7.4 11/27  -- trend in the AM for now         Tobacco and marijuana use: Urine tox is positive for opiates and cannabinoid  - Patient counseled to quit smoking         Past ethanol use:quit alcohol 6 years ago             Anxiety depression: holding Duloxetine while on cipro per ID .                  DVT PPX: Heparin SQ               Needs for Discharge: slow clinical improvement, testing/consultation and mobility progress     ESTIMATED DISCHARGE: ?1-2 days      Ed España D.O.              -------------------------------------------------------------------------------------------------------------  SUBJECTIVE: NAD. Denies any nausea, vomiting, abdominal pain, chest pain, SOB, new swelling, fevers, chills, confusion or headache.     Exam:  BP (!) 151/78 (BP Location: Right arm)   Pulse 90   Temp 98.2  F (36.8  C) (Oral)   Resp 18   Ht 1.702 m (5' 7\")   Wt 65.8 kg (145 lb 1 oz)   LMP 11/02/2022   SpO2 95%   BMI 22.72 kg/m    General: NAD  RESPIRATORY: Breathing nonlabored  CARDIOVASCULAR: No le edema bilat.   NEUROLOGIC: Motor intact, speech clear       Diagnostics Reviewed:      Recent Results (from the past 24 hour(s)) "   Glucose by meter    Collection Time: 11/26/22  1:42 PM   Result Value Ref Range    GLUCOSE BY METER POCT 241 (H) 70 - 99 mg/dL   Glucose by meter    Collection Time: 11/26/22  5:14 PM   Result Value Ref Range    GLUCOSE BY METER POCT 185 (H) 70 - 99 mg/dL   Basic metabolic panel    Collection Time: 11/26/22  5:46 PM   Result Value Ref Range    Sodium 126 (L) 136 - 145 mmol/L    Potassium 5.3 3.4 - 5.3 mmol/L    Chloride 91 (L) 98 - 107 mmol/L    Carbon Dioxide (CO2) 23 22 - 29 mmol/L    Anion Gap 12 7 - 15 mmol/L    Urea Nitrogen 43.3 (H) 6.0 - 20.0 mg/dL    Creatinine 4.26 (H) 0.51 - 0.95 mg/dL    Calcium 8.2 (L) 8.6 - 10.0 mg/dL    Glucose 182 (H) 70 - 99 mg/dL    GFR Estimate 12 (L) >60 mL/min/1.73m2   Glucose by meter    Collection Time: 11/26/22  9:38 PM   Result Value Ref Range    GLUCOSE BY METER POCT 226 (H) 70 - 99 mg/dL   Basic metabolic panel    Collection Time: 11/27/22  5:51 AM   Result Value Ref Range    Sodium 124 (L) 136 - 145 mmol/L    Potassium 4.7 3.4 - 5.3 mmol/L    Chloride 93 (L) 98 - 107 mmol/L    Carbon Dioxide (CO2) 22 22 - 29 mmol/L    Anion Gap 9 7 - 15 mmol/L    Urea Nitrogen 40.8 (H) 6.0 - 20.0 mg/dL    Creatinine 4.14 (H) 0.51 - 0.95 mg/dL    Calcium 7.6 (L) 8.6 - 10.0 mg/dL    Glucose 160 (H) 70 - 99 mg/dL    GFR Estimate 13 (L) >60 mL/min/1.73m2   CBC with platelets    Collection Time: 11/27/22  5:51 AM   Result Value Ref Range    WBC Count 6.9 4.0 - 11.0 10e3/uL    RBC Count 2.58 (L) 3.80 - 5.20 10e6/uL    Hemoglobin 7.4 (L) 11.7 - 15.7 g/dL    Hematocrit 22.4 (L) 35.0 - 47.0 %    MCV 87 78 - 100 fL    MCH 28.7 26.5 - 33.0 pg    MCHC 33.0 31.5 - 36.5 g/dL    RDW 13.4 10.0 - 15.0 %    Platelet Count 259 150 - 450 10e3/uL   Glucose by meter    Collection Time: 11/27/22  8:04 AM   Result Value Ref Range    GLUCOSE BY METER POCT 195 (H) 70 - 99 mg/dL   Sodium random urine    Collection Time: 11/27/22  8:21 AM   Result Value Ref Range    Sodium Urine mmol/L 39 mmol/L

## 2022-11-28 ENCOUNTER — TELEPHONE (OUTPATIENT)
Dept: VASCULAR SURGERY | Facility: CLINIC | Age: 47
End: 2022-11-28

## 2022-11-28 VITALS
HEART RATE: 74 BPM | WEIGHT: 145.06 LBS | OXYGEN SATURATION: 96 % | HEIGHT: 67 IN | TEMPERATURE: 97.7 F | BODY MASS INDEX: 22.77 KG/M2 | SYSTOLIC BLOOD PRESSURE: 151 MMHG | RESPIRATION RATE: 18 BRPM | DIASTOLIC BLOOD PRESSURE: 82 MMHG

## 2022-11-28 LAB
ANION GAP SERPL CALCULATED.3IONS-SCNC: 11 MMOL/L (ref 7–15)
BUN SERPL-MCNC: 38.8 MG/DL (ref 6–20)
CALCIUM SERPL-MCNC: 8.2 MG/DL (ref 8.6–10)
CHLORIDE SERPL-SCNC: 93 MMOL/L (ref 98–107)
CREAT SERPL-MCNC: 4.22 MG/DL (ref 0.51–0.95)
DEPRECATED HCO3 PLAS-SCNC: 20 MMOL/L (ref 22–29)
ERYTHROCYTE [DISTWIDTH] IN BLOOD BY AUTOMATED COUNT: 13.6 % (ref 10–15)
GFR SERPL CREATININE-BSD FRML MDRD: 13 ML/MIN/1.73M2
GLUCOSE BLDC GLUCOMTR-MCNC: 138 MG/DL (ref 70–99)
GLUCOSE BLDC GLUCOMTR-MCNC: 165 MG/DL (ref 70–99)
GLUCOSE BLDC GLUCOMTR-MCNC: 197 MG/DL (ref 70–99)
GLUCOSE SERPL-MCNC: 146 MG/DL (ref 70–99)
HCT VFR BLD AUTO: 23.2 % (ref 35–47)
HGB BLD-MCNC: 7.4 G/DL (ref 11.7–15.7)
MAGNESIUM SERPL-MCNC: 1.9 MG/DL (ref 1.7–2.3)
MCH RBC QN AUTO: 28.8 PG (ref 26.5–33)
MCHC RBC AUTO-ENTMCNC: 31.9 G/DL (ref 31.5–36.5)
MCV RBC AUTO: 90 FL (ref 78–100)
PLATELET # BLD AUTO: 252 10E3/UL (ref 150–450)
POTASSIUM SERPL-SCNC: 4.7 MMOL/L (ref 3.4–5.3)
RBC # BLD AUTO: 2.57 10E6/UL (ref 3.8–5.2)
SODIUM SERPL-SCNC: 124 MMOL/L (ref 136–145)
SODIUM SERPL-SCNC: 126 MMOL/L (ref 136–145)
WBC # BLD AUTO: 6 10E3/UL (ref 4–11)

## 2022-11-28 PROCEDURE — 250N000011 HC RX IP 250 OP 636: Performed by: HOSPITALIST

## 2022-11-28 PROCEDURE — 36415 COLL VENOUS BLD VENIPUNCTURE: CPT | Performed by: INTERNAL MEDICINE

## 2022-11-28 PROCEDURE — 97605 NEG PRS WND THER DME<=50SQCM: CPT

## 2022-11-28 PROCEDURE — 83735 ASSAY OF MAGNESIUM: CPT | Performed by: INTERNAL MEDICINE

## 2022-11-28 PROCEDURE — 82310 ASSAY OF CALCIUM: CPT | Performed by: INTERNAL MEDICINE

## 2022-11-28 PROCEDURE — 250N000011 HC RX IP 250 OP 636: Performed by: INTERNAL MEDICINE

## 2022-11-28 PROCEDURE — 99239 HOSP IP/OBS DSCHRG MGMT >30: CPT | Performed by: INTERNAL MEDICINE

## 2022-11-28 PROCEDURE — 250N000013 HC RX MED GY IP 250 OP 250 PS 637: Performed by: INTERNAL MEDICINE

## 2022-11-28 PROCEDURE — 250N000011 HC RX IP 250 OP 636: Performed by: PODIATRIST

## 2022-11-28 PROCEDURE — 84295 ASSAY OF SERUM SODIUM: CPT | Performed by: INTERNAL MEDICINE

## 2022-11-28 PROCEDURE — 85027 COMPLETE CBC AUTOMATED: CPT | Performed by: INTERNAL MEDICINE

## 2022-11-28 PROCEDURE — 250N000013 HC RX MED GY IP 250 OP 250 PS 637: Performed by: PODIATRIST

## 2022-11-28 RX ORDER — AMLODIPINE BESYLATE 10 MG/1
10 TABLET ORAL DAILY
Qty: 30 TABLET | Refills: 3 | Status: SHIPPED | OUTPATIENT
Start: 2022-11-29 | End: 2023-07-14

## 2022-11-28 RX ORDER — FERROUS SULFATE 325(65) MG
325 TABLET ORAL DAILY
Qty: 30 TABLET | Refills: 3 | Status: SHIPPED | OUTPATIENT
Start: 2022-11-29

## 2022-11-28 RX ORDER — ACETAMINOPHEN 500 MG
1000 TABLET ORAL EVERY 6 HOURS PRN
COMMUNITY
Start: 2022-11-28

## 2022-11-28 RX ORDER — METOPROLOL TARTRATE 75 MG/1
75 TABLET, FILM COATED ORAL 2 TIMES DAILY
Qty: 60 TABLET | Refills: 3 | Status: SHIPPED | OUTPATIENT
Start: 2022-11-28 | End: 2023-07-14

## 2022-11-28 RX ORDER — CIPROFLOXACIN 500 MG/1
500 TABLET, FILM COATED ORAL
Status: DISCONTINUED | OUTPATIENT
Start: 2022-11-28 | End: 2022-11-28 | Stop reason: HOSPADM

## 2022-11-28 RX ORDER — CIPROFLOXACIN 500 MG/1
500 TABLET, FILM COATED ORAL EVERY 24 HOURS
Qty: 2 TABLET | Refills: 0 | Status: SHIPPED | OUTPATIENT
Start: 2022-11-28 | End: 2022-11-30

## 2022-11-28 RX ADMIN — PROCHLORPERAZINE EDISYLATE 5 MG: 5 INJECTION INTRAMUSCULAR; INTRAVENOUS at 04:56

## 2022-11-28 RX ADMIN — Medication 1 TABLET: at 09:11

## 2022-11-28 RX ADMIN — HEPARIN SODIUM 5000 UNITS: 5000 INJECTION, SOLUTION INTRAVENOUS; SUBCUTANEOUS at 04:47

## 2022-11-28 RX ADMIN — ONDANSETRON 4 MG: 2 INJECTION INTRAMUSCULAR; INTRAVENOUS at 00:28

## 2022-11-28 RX ADMIN — ONDANSETRON 4 MG: 2 INJECTION INTRAMUSCULAR; INTRAVENOUS at 09:49

## 2022-11-28 RX ADMIN — METOPROLOL TARTRATE 75 MG: 25 TABLET, FILM COATED ORAL at 09:10

## 2022-11-28 RX ADMIN — ACETAMINOPHEN 650 MG: 325 TABLET, FILM COATED ORAL at 13:31

## 2022-11-28 RX ADMIN — TRAZODONE HYDROCHLORIDE 50 MG: 50 TABLET ORAL at 00:23

## 2022-11-28 RX ADMIN — FERROUS SULFATE TAB 325 MG (65 MG ELEMENTAL FE) 325 MG: 325 (65 FE) TAB at 09:11

## 2022-11-28 RX ADMIN — AMLODIPINE BESYLATE 10 MG: 5 TABLET ORAL at 09:10

## 2022-11-28 ASSESSMENT — ACTIVITIES OF DAILY LIVING (ADL)
ADLS_ACUITY_SCORE: 22

## 2022-11-28 NOTE — PLAN OF CARE
Goal Outcome Evaluation:      Plan of Care Reviewed With: patient          Outcome Evaluation: WOC RN here earlier and changed vac dressing on right foot. nephrology ok for dc. pt awaiting orders to discharge home with clinic follow ups to change her vac dressing due to no home care secured at this time. tylenol effective for headache and was given zofran late am for nausea, with relief.

## 2022-11-28 NOTE — PROGRESS NOTES
Care Management Follow Up    Length of Stay (days): 11    Expected Discharge Date: 11/28/2022     Concerns to be Addressed:  Needs clarification that wound vac may be needed for home      Patient plan of care discussed at interdisciplinary rounds: Yes    Anticipated Discharge Disposition:  Home with home care RN      Anticipated Discharge Services:  Home with home care RN  Anticipated Discharge DME:   TBD    Education Provided on the Discharge Plan: Per Care Team    Patient/Family in Agreement with the Plan:  Yes    Referrals Placed by CM/SW:    Private pay costs discussed: Not applicable    Additional Information:  Chart reviewed.    RNCM reached out to Dr. Patel via page to see if he wants home VAC ordered for the patient. Wound vac put on 11/21. [facility] has pt registered, but is  Missing e prescription signature from doctor.        RNCM sent a page to Dr. Patel, the [facility] portal will not allow to resubmit e signature, so hand copy provided from another RNCM. Awaiting response, to see if he can hand sign.      Home Care TBD still referrals sent (pending).  [facility] declined- at capacity for payer source.  [facility]- declined dont have a nurse that does wound Vacs.  [facility]- Left VM awaiting call back  [facility]- Left VM awaiting call back.     CM will continue to follow plan of care, review recommendations, and assist with any discharge needs anticipated.      Carol Whitney RN       Care Management Discharge Note    Discharge Date: 11/29/2022       Discharge Disposition:  Home with OP clinic visits for wound care    Discharge Services:  Home with OP clinic visits for wound care    Discharge DME:  Wound Vac     Discharge Transportation: family or friend will provide    Private pay costs discussed: Not applicable      Education Provided on the Discharge Plan:  Per Care Team   Persons Notified of Discharge Plans: Yes  Patient/Family in Agreement with the Plan:      Handoff Referral  Completed: Yes    Additional Information:    Final discharge plan is for patient to return home with crutches, and home wound vac. Wound vac was approved,  delivered, and paperwork signed by pt. Copy of paperwork was made, and sent to [facility]. RNCM called podiatry clinic, pt has a scheduled visit for Friday, where they will make all nurse visits for wound care according to , the clinic will be in contact with patient to schedule a time for a nurse visit on Weds, 11/30 as well. Patient is agreeable. Family is on their way to hang out with patient, and can bring the patient home.    Bedside, and Doctor notified.       Carol Whitney RN

## 2022-11-28 NOTE — PROGRESS NOTES
RENAL PROGRESS NOTE   CC: f/u LORIN/CKD 4     Subjective:Patient states feeing better overall. No pain, dizziness. Appetite better. Na improved (Corrects to 128 with hyperglycemia.) Discussed urgent need for nephrology follow up as eGFR 13 ml/min and she will likely need dialysis in the coming year given rate of decline and multiple risk factors. Avoid NSAIDs.         ASSESSMENT:  48 yo female with DM1 for > 30 years, HTN, tobacco abuse, marijuana use, recent rt foot ulcer/MSSA bacteremia treated with 3 weeks cefazolin, stopped due to LORIN, admit with nausea, vomiting, progressive LORIN         PLAN:  1. LORIN/CKD 4 - baseline creat of 2.6, expect CKD 4 due to > 30 years of type 1 diabetes and HTN.  Now with LORIN after recent MSSA bacteremia and diabetic foot ulcer with 3 weeks of cefazolin followed by doxy, cefepime.  Now on meropenem. At risk for post infectious GN, interstitial nephritis, ATN.      -9.6 gm proteinuria likley due to DN   - low c3. Echogenic kidneys on US consistent with CKD   -agree with avoidance of cephalosporins.  -daily bm Patient agreeable to Renal f/u at [facility]. Appointment will be called to her ASAP with SHUBHAM. (Pre-visit labs ordered)   - Discussed option of kidney Bx but likely significant underlying DN and component of ATN, AIN possible but with infection and wound healing, steroids/ immune suppression would not be safe/feasible at present. PIGN also possible. -[facility] will call pt with ASAP follow up - pt expressed willingness. Our office will call her for appt  -Stop IVF as noted below.     2. HTN : Still suboptimally controlled. OK to D?C but will need aggressive management as outpt to preserve renal function.   -amlodipine to 10 mg/d  -metoprolol to 75 mg bid    3. Anemia - due to CKD, chronic inflammation, oral iron. Normal SPEP, with get urine immunofixation.     4. Diabetic foot infection, recent MSSA bacteremia  - see above re: recent abx  -cipro    5. Type 1 DM with nephropathy/ PAD: Per  "IM.    6. Hyponatremia: worsened since admit. BS elevated. Not eating well but drinking fluids. Encouraged 1.5 L/day free water restriction and recommend PCP repeat labs in 1 week.       Care d/w RN and Dr. España. OK to discharge home. [facility] will contact pt for ASAP Renal HFU.  TT = 35 minutes.     Remi Hassan MD  [facility]  [phone] (off)       Objective    PHYSICAL EXAM  BP (!) 161/82 (BP Location: Right arm)   Pulse 82   Temp 97.8  F (36.6  C) (Oral)   Resp 18   Ht 1.702 m (5' 7\")   Wt 65.8 kg (145 lb 1 oz)   LMP 11/02/2022   SpO2 99%   BMI 22.72 kg/m    I/O last 3 completed shifts:  In: 1928 [P.O.:800; I.V.:1128]  Out: 350 [Urine:350]  Wt Readings from Last 3 Encounters:   11/24/22 65.8 kg (145 lb 1 oz)   11/15/22 59 kg (130 lb)   11/01/22 59 kg (130 lb)       GENERAL: nad, alert  HEENT:normocephalic, atraumatic  CARDIOVASCULAR: RRR, no edema  PULMONARY: normal effort. No cyanosis  GASTROINTESTINAL: ND, NT, BS present  MSK: diffuse muscle atrophy, warm, rt foot bandaged with wound vac  NEURO: Alert, no gross focal findings  PSYCHIATRIC: Appropriate mood and interaction  SKIN: Pale, right foot wound vac    LABORATORIES  Recent Labs   Lab 11/28/22  0513 11/27/22  0551 11/25/22  0942   WBC 6.0 6.9 10.0   HGB 7.4* 7.4* 10.3*   HCT 23.2* 22.4* 31.8*    259 217     Recent Labs   Lab 11/28/22  1148 11/28/22  0512 11/28/22  0006 11/27/22  1301 11/27/22  0551 11/26/22  1746   * 124*  124* 124*   < > 124* 126*   CO2  --  20*  --   --  22 23   BUN  --  38.8*  --   --  40.8* 43.3*    < > = values in this interval not displayed.         MEDICATIONS    amLODIPine  10 mg Oral Daily     childrens multivitamin with iron  1 tablet Oral Daily     ciprofloxacin  500 mg Oral Q24H ALFREDO     ferrous sulfate  325 mg Oral Daily     [Held by provider] heparin ANTICOAGULANT  5,000 Units Subcutaneous Q12H     insulin aspart   Subcutaneous TID w/meals     insulin aspart  1-10 Units " Subcutaneous TID AC     insulin glargine  8 Units Subcutaneous At Bedtime     metoprolol tartrate  75 mg Oral BID     polyethylene glycol  17 g Oral Daily     senna-docusate  1 tablet Oral BID     sodium chloride (PF)  3 mL Intracatheter Q8H

## 2022-11-28 NOTE — PLAN OF CARE
"  Problem: Plan of Care - These are the overarching goals to be used throughout the patient stay.    Description: The Care Plan Review/Shift Note, Individualized Goals, Hospital-Acquired Illness or Injury, Comfort and Wellbeing, and Transition Planning are the \"Overarching Goals\" and should be updated throughout the hospitalization.  Please hover over the (i) for specific information on each goal topic.  Goal: Plan of Care Review  Description: The Plan of Care Review/Shift note should be completed every shift.  The Outcome Evaluation is a brief statement about your assessment that the patient is improving, declining, or no change.  This information will be displayed automatically on your shift note.  Outcome: Progressing  Flowsheets (Taken 11/28/2022 1023)  Outcome Evaluation: Pt meeting > 75% of estimated needs with meals and supplements. Wound improving per WOC.  Plan of Care Reviewed With: patient  Overall Patient Progress: improving     Problem: Oral Intake Inadequate  Goal: Improved Oral Intake  Outcome: Progressing   Goal Outcome Evaluation:      Plan of Care Reviewed With: patient    Overall Patient Progress: improvingOverall Patient Progress: improving    Outcome Evaluation: Pt meeting > 75% of estimated needs with meals and supplements. Wound improving per WOC.      "

## 2022-11-28 NOTE — PROGRESS NOTES
"CLINICAL NUTRITION SERVICES - REASSESSMENT NOTE     Nutrition Prescription    RECOMMENDATIONS FOR MDs/PROVIDERS TO ORDER:  None    Malnutrition Status:    Does not qualify    Recommendations already ordered by Registered Dietitian (RD):  None    Future/Additional Recommendations:  Adjust supplements pending intake, weight, acceptance, wound healing       EVALUATION OF PROGRESS TOWARDS GOALS    CURRENT NUTRITION ORDERS  Diet:Moderate consistent CHO  Supplement: Special k bar tid = 540 kcall, 36 g protein/day    Intake/Tolerance: Good, >/= 75% at the meals that are documented  Pt ordering, with special k bars [id number] kcal, 59-83 g protein/day meeting >90% of estimated nutrition needs  Noted pt ordering extra special k bars    Pt reports meals are going ok. She is getting tired of the special k bars but is ok to continue receiving. Discussed currently meeting nutrition needs with meals and supplements.  Pt reports nausea today d/t needing to be NPO and medications. Nausea intermittent ongoing    LABS  Labs reviewed  Na 124 (L), decreased-renal following  BUN 38 (H), improved  Cr 4.2 (H), stable  -197 mg/dl past 24 hours, in fair control, improved    MEDICATIONS  novolog 1u: 10 g CHO, lantus, oral abx, miralax, pericolace bid, flinstones vitamin.  Feso4 added. novolog increased. Iv abx dc'd    ANTHROPOMETRICS  Height: 170.2 cm (5' 7\")  Most Recent Weight: 65.8 kg (145 lb 1 oz) 11/24, up 14 lb x 8 days  59.4 kg (131 lb) 11/16   BMI: Normal BMI  Weight History:   Wt Readings from Last 8 Encounters:   11/16/22 59.4 kg (131 lb)   11/15/22 59 kg (130 lb)   11/01/22 59 kg (130 lb)   10/19/22  10/11/22 59 kg (130 lb)  59.9 kg (132 lb) - office   09/28/22 05/18/22 59 kg (130 lb)  61.7 kg (136 lb) - office   08/23/19 69.2 kg (152 lb 8 oz)   Weight loss not clinically significant    Dosing Weight: 59.4 kg    ASSESSED NUTRITION NEEDS  Estimated Energy Needs: 1485+ kcals/day (25+)  Justification: Wound healing  Estimated " Protein Needs: 59-83 grams protein/day (1.1 - 1.4 grams of pro/kg)  Justification: CKD and Wound healing  Estimated Fluid Needs: 1485+ mL/day (1 mL/kcal)   Justification: Maintenance    PHYSICAL FINDINGS  See malnutrition section below.  Diabetic foot wound with wound vac-Surgical I & D today- improving per WOC 11/25  GI - ongoing intermittent nausea. Emesis 11/26  Last BM 11/26 formed x 1      MALNUTRITION:  % Weight Loss:  None noted  % Intake:Decreased intake does not meet criteria  Subcutaneous Fat Loss:  None noted  Muscle Loss: None noted  Fluid Retention:  None noted    Malnutrition Diagnosis: Does not meet criteria    NUTRITION DIAGNOSIS  Increased nutrient needs related to wound healing as evidenced by wound - progressing     Inadequate oral intake r/t N/V from pain meds evidenced by meeting 50% of protein and 70% of calorie needs- progressing     INTERVENTIONS  Implementation  None    Goals  Meet nutrition needs- progressing  Improve wound- progressing  BG - progressing       Monitoring/Evaluation  Progress toward goals will be monitored and evaluated per protocol.

## 2022-11-28 NOTE — TELEPHONE ENCOUNTER
Reviewed chart. Spoke with patient Nurse visits scheduled for VAC change Wed and Friday after see's Dr Patel. Patient is not getting homecare.

## 2022-11-28 NOTE — TELEPHONE ENCOUNTER
Patient is possibly discharging from the hospital today. The hospital was able to do the wound vac change today, however her next apt is on Friday with Dr. Patel.  Hospital staff are wanting to make sure its ok to wait until then to be seen or does she need a NV on on Wednesday?  Please call Josefa back with an update.

## 2022-11-28 NOTE — PROGRESS NOTES
Education regarding medication changes and updates reviewed with patient. Reviewed upcoming follow up appts.   Wound vac changed to home wound vac.  Pt was escorted via W/C to car transferred independently with all personal belongings.

## 2022-11-28 NOTE — PROGRESS NOTES
[facility]  WOC Nurse Inpatient Assessment     Consulted for: Right plantar foot    Patient History (according to provider note(s):      Preoperative diagnosis:   1. Abscess right foot  2. Ulceration right foot  11/18 with Dr. Patel    Areas Assessed:      Areas visualized during today's visit: right foot    Negative pressure wound therapy applied to: Right foot   Last photo: 11/28 11/21 11/25 11/28           Wound due to: Surgical Wound   Wound history/plan of care:      Surgical date: 11/25    Date Negative Pressure Wound Therapy initiated: 11/21     Interventions in place: offloading    Is patient s nutritional status compromised? no   a. If yes, what interventions are in place? N/A    Reason for initiating vac therapy? Need for accelerated granulation tissue    Which?of?the?following?co-morbidities?apply? Diabetes  a. If diabetic is patient on a diabetic management program? Yes     Is osteomyelitis present in wound? no  a.  If yes what treatments are in place? N/A  Wound base: 100 % granulation     Palpation of the wound bed: normal       Drainage: small      Description of drainage: serosanguinous      Measurements (length x width x depth, in cm) 3  x 6  x  0.3 cm       Tunneling N/A      Undermining N/A   Periwound skin: Intact       Color: normal and consistent with surrounding tissue       Temperature: normal    Odor: none   Pain: denies   Treatment goal: build up granulation tissue and heal   STATUS: stable  Supplies ordered: gathered    Number of foam pieces removed from a wound (excluding foam for bridge) : 1 bridge and 1 black foam   Verified this matched the number of foam pieces applied last dressing change: yes   Number of foam pieces packed into wound (excluding foam for bridge) : 1 (spiral cut includes bridge) Radham Black     Treatment Plan:     Negative pressure wound therapy plan:  Wound location: right foot   Change Days: Mon/Wed/Fri by WOC RN    Supplies  "(including all accessories) used: medium Black foam , Adapt barrier ring and Cavilon no sting barrier film  Cleanse with Normal saline prior to replacing VAC    Suction setting: -125   Methods used: Window paned all periwound skin with vac drape prior to applying sponge, Bridged trac pad off bony prominences and Placed barrier ring into periwound creases to improve seal    Staff RN to assess integrity of dressing and ensure suction is set at appropriate level every shift.   Date canister. Chart canister output every shift. Change cannister weekly and PRN if full/occluded     Remove foam dressing and replace with BID normal saline moist gauze dressing if:   -a dressing failure which cannot be repaired within 2 hours   -patient is discharging to home without a home pump   -patient is discharging to a facility outside the local area   -if a dressing is a \"Silver Foam\", remove before Radiation Therapy or MRI     The hospital VAC pump is not to be discharged with the patient.?Ensure to disconnect patient from machine prior to discharge. Then,    - If a home KCI VAC pump has been delivered, connect home cannister to dressing tubing then connect cannister to home pump and turn on machine    - If transferring to a nearby facility with a KCI vac, can disconnect and clamp tubing then cover end with glove so can be reconnected within 2 hours    Orders: Written    RECOMMEND PRIMARY TEAM ORDER: None, at this time  Education provided: plan of care  Discussed plan of care with: Patient and Nurse  WOC nurse follow-up plan: Monday/WednesdayFriday  Notify WOC if wound(s) deteriorate.  Nursing to notify the Provider(s) and re-consult the WOC Nurse if new skin concern.    DATA:     Current support surface: Standard  Standard gel/foam mattress (IsoFlex, Atmos air, etc)  Containment of urine/stool: Continent of bladder and Continent of bowel  BMI: Body mass index is 22.72 kg/m .   Active diet order: Orders Placed This Encounter      NPO " for Medical/Clinical Reasons Except for: Meds     Output: I/O last 3 completed shifts:  In: 1928 [P.O.:800; I.V.:1128]  Out: 350 [Urine:350]     Labs:   Recent Labs   Lab 11/28/22  0513 11/25/22  0942 11/24/22 0528   ALBUMIN  --   --  3.1*   HGB 7.4*   < > 7.9*   WBC 6.0   < > 10.1    < > = values in this interval not displayed.     Pressure injury risk assessment:   Sensory Perception: 3-->slightly limited  Moisture: 3-->occasionally moist  Activity: 3-->walks occasionally  Mobility: 3-->slightly limited  Nutrition: 3-->adequate  Friction and Shear: 3-->no apparent problem  Corey Score: 18    Ena Arzate, ANNIEN, RN, PHN, HNB-BC, CWOCN

## 2022-11-28 NOTE — PROGRESS NOTES
[facility] Progress Note      ASSESSMENT:    Principal Problem:    Nausea and vomiting, unspecified vomiting type  Active Problems:    Diabetic ulcer of right foot associated with type 1 diabetes mellitus, with necrosis of muscle, unspecified part of foot (H)    Hypertension, unspecified type      PLAN:   47 year old female with known diabetic foot ulcer, type 1 diabetes, CKD, smoker, marijuana use, hypertension who presented with 3 days of nausea and vomiting after doxycycline use.         Acute renal failure on CKD 4 with hyponatremia:    Suspect n/v from doxy triggered pre-renal state on ckd however lack of recovery might need further evaluation  -- nephrology following, plan close outpatient follow up with [facility]   -- see renal note for further details but ok to discharge from renal standpoint      Hyponatremia: Chronic, stable  --Continue current fluid restriction, continue 1.5 L/day fluid restriction after discharge with outpatient PCP and nephrology follow-up for recheck labs      Right foot ulcer: Had recent admission and the plan was IV antibiotics for 4 weeks. Had 3 week course of ancef, aborted due to ARF. Antibiotic was then switched to doxycycline. Ulcer persisted despite antibiotic treatment. MRI reveals no osteomyelitis, septic arthritis, and abscess.  S/p I&D by podiatry on 11/18. Wound culture revealed enterococcus, pseudomonas.   -11/23--Id switched off derrick and trial of Cipro po- Duloxetine held this admission and will continue to hold while on Cipro  - per ID will plan 1 week total ciprofloxacin to be completed 11/29 then done.    -Podiatry has ordered wound vac for home  - this is currently the only barrier to discharge since care management is having difficulty with getting patient set up for home wound VAC delivery and home RN management  -Patient otherwise medically stable to discharge when home care can be set up              Positive blood culture: One out of 2 blood culture sent  on 1/17 shows Staph lugdunensis and staph epidermis and staph aureus. She had MSSA bacteremia during her last admission.   -Repeat blood culture: no growth  -Antibiotics as above per ID       Intractable nausea and vomiting:   Resolved  - Likely side effect of doxycycline, ongoing chronic marijuana use and uremia.   -Discontinued doxycycline.           Type 1 insulin-dependent diabetes with hypoglycemia: Recent HbA1C 8.8.   -PTA Lantus 20 units and patient reports using carb count 1:15. But on admission, she reported to pharmacist that she uses 1: 6 for breakfast, 1:4 for lunch and 1:3 for dinner.   Patient developed hypoglycemia after admission while not able to have oral intake and received D5 drip.  -Continue current lantus at 8 units nightly. Carb counting with meals increased to 1:10 and will continue on high intensity sliding scale   - Continue to adjust as needed         Hypertensive urgency: /103 in ER. Not able to take PTA amlodipine when she had GI symptoms.   -Clonidine patch  initiated on admission and now discontinued   -increased [id number] mg daily   - Increased metoprolol 75 BID       Anemia   Hgb 7.9 11/24, then up to 10.3 11/25 and then was 7.4 11/27  -- Hemoglobin stable at 7.4 on 11/28/2022  -- Will stop trending hemoglobin, recheck as outpatient         Tobacco and marijuana use: Urine tox is positive for opiates and cannabinoid  - Patient counseled to quit smoking         Past ethanol use:quit alcohol 6 years ago             Anxiety depression: holding Duloxetine while on cipro per ID .                  DVT PPX: Heparin SQ               Needs for Discharge: Patient needs home care set up to deliver wound VAC and home RN to help manage wound VAC thereafter    ESTIMATED DISCHARGE:  11/29/2022      Ed España D.O.              -------------------------------------------------------------------------------------------------------------  SUBJECTIVE: NAD. Denies any nausea, vomiting,  "abdominal pain, chest pain, SOB, new swelling, fevers, chills, confusion or headache.   Wants to leave the hospital today  Refusing renal biopsy    Exam:  BP (!) 151/82 (BP Location: Right arm)   Pulse 74   Temp 97.7  F (36.5  C) (Oral)   Resp 18   Ht 1.702 m (5' 7\")   Wt 65.8 kg (145 lb 1 oz)   LMP 11/02/2022   SpO2 96%   BMI 22.72 kg/m    General: NAD  RESPIRATORY: Breathing nonlabored  CARDIOVASCULAR: No le edema bilat.   NEUROLOGIC: Motor intact, speech clear       Diagnostics Reviewed:      Recent Results (from the past 24 hour(s))   Glucose by meter    Collection Time: 11/27/22  6:32 PM   Result Value Ref Range    GLUCOSE BY METER POCT 119 (H) 70 - 99 mg/dL   Sodium    Collection Time: 11/27/22  8:11 PM   Result Value Ref Range    Sodium 126 (L) 136 - 145 mmol/L   Glucose by meter    Collection Time: 11/27/22  9:33 PM   Result Value Ref Range    GLUCOSE BY METER POCT 165 (H) 70 - 99 mg/dL   Sodium    Collection Time: 11/28/22 12:06 AM   Result Value Ref Range    Sodium 124 (L) 136 - 145 mmol/L   Basic metabolic panel    Collection Time: 11/28/22  5:12 AM   Result Value Ref Range    Sodium 124 (L) 136 - 145 mmol/L    Potassium 4.7 3.4 - 5.3 mmol/L    Chloride 93 (L) 98 - 107 mmol/L    Carbon Dioxide (CO2) 20 (L) 22 - 29 mmol/L    Anion Gap 11 7 - 15 mmol/L    Urea Nitrogen 38.8 (H) 6.0 - 20.0 mg/dL    Creatinine 4.22 (H) 0.51 - 0.95 mg/dL    Calcium 8.2 (L) 8.6 - 10.0 mg/dL    Glucose 146 (H) 70 - 99 mg/dL    GFR Estimate 13 (L) >60 mL/min/1.73m2   Sodium    Collection Time: 11/28/22  5:12 AM   Result Value Ref Range    Sodium 124 (L) 136 - 145 mmol/L   Magnesium    Collection Time: 11/28/22  5:12 AM   Result Value Ref Range    Magnesium 1.9 1.7 - 2.3 mg/dL   CBC with platelets    Collection Time: 11/28/22  5:13 AM   Result Value Ref Range    WBC Count 6.0 4.0 - 11.0 10e3/uL    RBC Count 2.57 (L) 3.80 - 5.20 10e6/uL    Hemoglobin 7.4 (L) 11.7 - 15.7 g/dL    Hematocrit 23.2 (L) 35.0 - 47.0 %    MCV 90 78 " - 100 fL    MCH 28.8 26.5 - 33.0 pg    MCHC 31.9 31.5 - 36.5 g/dL    RDW 13.6 10.0 - 15.0 %    Platelet Count 252 150 - 450 10e3/uL   Glucose by meter    Collection Time: 11/28/22  5:22 AM   Result Value Ref Range    GLUCOSE BY METER POCT 165 (H) 70 - 99 mg/dL   Glucose by meter    Collection Time: 11/28/22  8:45 AM   Result Value Ref Range    GLUCOSE BY METER POCT 197 (H) 70 - 99 mg/dL   Sodium    Collection Time: 11/28/22 11:48 AM   Result Value Ref Range    Sodium 126 (L) 136 - 145 mmol/L   Glucose by meter    Collection Time: 11/28/22 12:42 PM   Result Value Ref Range    GLUCOSE BY METER POCT 138 (H) 70 - 99 mg/dL

## 2022-11-28 NOTE — PLAN OF CARE
Problem: Plan of Care - These are the overarching goals to be used throughout the patient stay.    Goal: Plan of Care Review  Description: The Plan of Care Review/Shift note should be completed every shift.  The Outcome Evaluation is a brief statement about your assessment that the patient is improving, declining, or no change.  This information will be displayed automatically on your shift note.  Outcome: Progressing   Goal Outcome Evaluation:               VS stable,denies having pain,PRN Trazodone given for sleep and Zofran & compazine given for nausea, Pt slept most of the night,no emesis reported,NPO since midnight.

## 2022-11-28 NOTE — PLAN OF CARE
Problem: Diabetes Comorbidity  Goal: Blood Glucose Level Within Targeted Range  Outcome: Progressing     Problem: Pain Chronic (Persistent) (Comorbidity Management)  Goal: Acceptable Pain Control and Functional Ability  Intervention: Manage Persistent Pain  Recent Flowsheet Documentation  Taken 11/27/2022 1600 by RANDOLPH SANDHU  Medication Review/Management: medications reviewed     Pt ambulates well to toilet; ate small dinner of protein bar and two milks; wound vac little serosanguinous drainage; needed to call PICC to obtain IV access; Hx of CKF; Mag and K protocol with recheck in am; carb count diet; T1D; VSS with /77,

## 2022-11-28 NOTE — DISCHARGE SUMMARY
[facility] MEDICINE  DISCHARGE SUMMARY      Primary Care Physician: Domingo Costello              Admission Date: 11/16/2022    Discharge Provider: Ed España DO Discharge Date: 11/28/2022    Diet: see discharge orders below Code Status: Full Code    Activity: as tolerated       Condition at Discharge: Stable      REASON FOR ADMISSION (See Admission Note for Details)      Nausea and vomiting          PRINCIPAL DISCHARGE DIAGNOSIS    Principal Problem:    Nausea and vomiting, unspecified vomiting type  Active Problems:    Diabetic ulcer of right foot associated with type 1 diabetes mellitus, with necrosis of muscle, unspecified part of foot (H)    Hypertension, unspecified type        SIGNIFICANT FINDINGS (Imaging, labs):      See below    PENDING LABS      None    PROCEDURES ( this hospitalization only)       S/p I&D RLE by podiatry on 11/18    RECOMMENDATION FOR F/U VISIT      See below    DISPOSITION ( home, home care, TCU...)      Home    SUMMARY OF HOSPITAL COURSE:       47 year old female with known diabetic foot ulcer, type 1 diabetes, CKD, smoker, marijuana use, hypertension who presented with 3 days of nausea and vomiting after doxycycline use.          Acute renal failure on CKD 4 with hyponatremia:    Suspect n/v from doxy triggered pre-renal state on ckd however lack of recovery might need further evaluation  -- nephrology following, plan close outpatient follow up with [facility]   -- see renal note for further details but ok to discharge from renal standpoint        Hyponatremia: Chronic, stable  --Continue current fluid restriction, continue 1.5 L/day fluid restriction after discharge with outpatient PCP and nephrology follow-up for recheck labs        Right foot ulcer: Had recent admission and the plan was IV antibiotics for 4 weeks. Had 3 week course of ancef, aborted due to ARF. Antibiotic was then switched to doxycycline. Ulcer persisted despite antibiotic  treatment. MRI reveals no osteomyelitis, septic arthritis, and abscess.  S/p I&D by podiatry on 11/18. Wound culture revealed enterococcus, pseudomonas.   -11/23--Id switched off derrick and trial of Cipro po- Duloxetine held this admission and will continue to hold while on Cipro  - per ID will plan 1 week total ciprofloxacin to be completed 11/29 then done.    -Podiatry has ordered wound vac for home       Positive blood culture: One out of 2 blood culture sent on 1/17 shows Staph lugdunensis and staph epidermis and staph aureus. She had MSSA bacteremia during her last admission.   -Repeat blood culture: no growth  -Antibiotics as above per ID        Intractable nausea and vomiting:   Resolved  - Likely side effect of doxycycline, ongoing chronic marijuana use and uremia.   -Discontinued doxycycline.             Type 1 insulin-dependent diabetes with hypoglycemia: Recent HbA1C 8.8.   -PTA Lantus 20 units and patient reports using carb count 1:15. But on admission, she reported to pharmacist that she uses 1: 6 for breakfast, 1:4 for lunch and 1:3 for dinner.   Patient developed hypoglycemia after admission while not able to have oral intake and received D5 drip.  -for discharge will reduce home dose triseba to 10 U daily given reduced renal function and continue other home insulin management           Hypertensive urgency: /103 in ER. Not able to take PTA amlodipine when she had GI symptoms.    -increased [id number] mg daily   -started on metoprolol 75 BID         Anemia   Hgb 7.9 11/24, then up to 10.3 11/25 and then was 7.4 11/27  -- Hemoglobin stable at 7.4 on 11/28/2022  -- recheck as outpatient  -- start multivitamin and iron supplement            Tobacco and marijuana use: Urine tox is positive for opiates and cannabinoid  - Patient counseled to quit smoking           Past ethanol use:quit alcohol 6 years ago            Anxiety depression: holding Duloxetine while on cipro per ID. Can resume when cipro  completed 11/30/22             Discharge Medications with Med changes:         Review of your medicines      START taking      Dose / Directions   acetaminophen 500 MG tablet  Commonly known as: TYLENOL  Used for: Cellulitis of right foot      Dose: 1,000 mg  Take 2 tablets (1,000 mg) by mouth every 6 hours as needed for pain  Refills: 0     childrens multivitamin with iron Chew  Used for: Anemia due to chronic kidney disease, unspecified CKD stage      Dose: 1 tablet  Start taking on: November 29, 2022  Take 1 tablet by mouth daily  Refills: 0     ciprofloxacin 500 MG tablet  Commonly known as: CIPRO  Indication: Infection of the Skin and/or Soft Tissue  Used for: Cellulitis of right foot      Dose: 500 mg  Take 1 tablet (500 mg) by mouth every 24 hours for 2 days  Quantity: 2 tablet  Refills: 0     ferrous sulfate 325 (65 Fe) MG tablet  Commonly known as: FEROSUL  Used for: Anemia due to chronic kidney disease, unspecified CKD stage      Dose: 325 mg  Start taking on: November 29, 2022  Take 1 tablet (325 mg) by mouth daily  Quantity: 30 tablet  Refills: 3     Metoprolol Tartrate 75 MG Tabs  Used for: Hypertensive urgency      Dose: 75 mg  Take 75 mg by mouth 2 times daily  Quantity: 60 tablet  Refills: 3        CONTINUE these medicines which may have CHANGED, or have new prescriptions. If we are uncertain of the size of tablets/capsules you have at home, strength may be listed as something that might have changed.      Dose / Directions   amLODIPine 10 MG tablet  Commonly known as: NORVASC  This may have changed:   medication strength  how much to take  when to take this  Used for: Hypertension, unspecified type      Dose: 10 mg  Start taking on: November 29, 2022  Take 1 tablet (10 mg) by mouth daily  Quantity: 30 tablet  Refills: 3     insulin degludec 100 UNIT/ML pen  Commonly known as: TRESIBA  This may have changed:   how much to take  how to take this  Used for: Type 1 diabetes mellitus with other specified  complication (H)      Dose: 10 Units  Inject 10 Units Subcutaneous every morning  Quantity: 15 mL  Refills: 0     insulin lispro 100 UNIT/ML (1 unit dial) KWIKPEN  Commonly known as: HumaLOG KWIKPEN  This may have changed:   how to take this  additional instructions  Used for: Uncontrolled type 1 diabetes mellitus with hyperglycemia (H)      1 unit per 15 gm Carbs, hold if glucose < 80  Quantity: 15 mL  Refills: 0        CONTINUE these medicines which have NOT CHANGED      Dose / Directions   collagenase 250 UNIT/GM external ointment  Commonly known as: SANTYL  Used for: Ulcer of right foot, limited to breakdown of skin (H), Ulcer of left foot, limited to breakdown of skin (H)      Apply topically daily Total square centimeters of wound(s) being treated is         30 day supply  Quantity: 30 g  Refills: 3     Dexcom G6 Sensor Misc      Refills: 0     DULoxetine 60 MG capsule  Commonly known as: CYMBALTA      Dose: 60 mg  [DULOXETINE (CYMBALTA) 30 MG CAPSULE] Take 60 mg by mouth every evening.  Refills: 0     rosuvastatin 40 MG tablet  Commonly known as: CRESTOR      Dose: 40 mg  Take 40 mg by mouth daily  Refills: 0     traZODone 100 MG tablet  Commonly known as: DESYREL      Dose: 25 mg  Take 25 mg by mouth nightly as needed for sleep  Refills: 0        STOP taking    doxycycline hyclate 100 MG capsule  Commonly known as: VIBRAMYCIN        ondansetron 4 MG ODT tab  Commonly known as: ZOFRAN ODT              Where to get your medicines      These medications were sent to [id number] [address] AT [address] [place] [facility] [address]    Phone: [phone]   amLODIPine 10 MG tablet  ciprofloxacin 500 MG tablet  ferrous sulfate 325 (65 Fe) MG tablet  insulin degludec 100 UNIT/ML pen  Metoprolol Tartrate 75 MG Tabs     Some of these will need a paper prescription and others can be bought over the counter. Ask your nurse if you have questions.     You don't need a prescription for these medications  acetaminophen 500 MG tablet  childrens multivitamin with iron Chew                Discharge Procedure Orders   Medication Therapy Management Referral   Referral Priority: Routine Referral Type: Med Therapy Management   Requested Specialty: Pharmacist   Number of Visits Requested: 1     Reason for your hospital stay   Order Comments: Kidney injury, foot infection     Follow-up and recommended labs and tests    Order Comments: Follow up with primary care provider, Domingo Costello, within 3-5 days for hospital follow- up.     Activity   Order Comments: Your activity upon discharge: activity per podiatry recommendations     Order Specific Question Answer Comments   Is discharge order? Yes      Discharge Instructions   Order Comments: Hold cymbalta until you are done taking ciprofloxacin, ok to resume cymbalta 11/30/22  I have reduced your home Tresiba dose to prevent hypoglycemia now that your renal function is reduced. I recommend close PCP follow up. Please monitor and record home glucoses and bring to PCP appointment     Follow-up and recommended labs and tests    Order Comments: Follow up with nephrology, KSTERE, as directed     Diet   Order Comments: Follow this diet upon discharge: Orders Placed This Encounter      Snacks/Supplements Adult: Other; special k bar; Between Meals      Fluid restriction 1500 ML FLUID      NPO for Medical/Clinical Reasons Except for: Meds     Order Specific Question Answer Comments   Is discharge order? Yes      Diet   Order Comments: Follow this diet upon discharge: 1500ml fluid restriction per day until BMP can be rechecked either through PCP or nephrology     Order Specific Question Answer Comments   Is discharge order? Yes                     Please see EMR for more detailed significant labs, imaging, consultant notes etc.  Total time spent on discharge: >30 minutes    Ed España D.O.        CC:Domingo Costello

## 2022-11-29 LAB — PROT ELPH PNL UR ELPH: NORMAL

## 2022-11-29 NOTE — PROGRESS NOTES
Medication Therapy Management (MTM) Encounter    ASSESSMENT:                            Nausea/vomiting: Patient continues to have some nausea at home, I encouraged her to continue to use ondansetron as needed (added back to med list).  Encouraged her to call the alternative nephrology office so that she can be seen earlier than February.  Would recommend repeat renal panel today.  Discussed avoiding NSAIDs.    Right foot cellulitis with sepsis: Completed Cipro.  Patient to closely follow with  vascular.     Hypertension: Last blood pressure not at goal <130/80 mmhg.  She is now on metoprolol and amlodipine, will have her BP checked today.  We reviewed that amlodipine could be contributing to her edema and to review this with vascular.  She is to remain off lisinopril until she meets with nephrology.  Per chart review, clonidine was started for blood pressure control and also to help with alcohol withdrawal symptoms -she will remain off for now.    Type 1 Diabetes: Patient is closely following with endocrinology.  Per her report, sounds like her blood sugars are elevated --she will give it more time and I encouraged her to call her endocrinologist for insulin adjustments.  She may need a slightly higher dose of Tresiba.  Her last A1c was elevated.    We did discuss getting a primary care physician, since she mainly follows with endocrinology.    Supplements: last hemoglobin level was low, will need to be rechecked.  Last vitamin D level was quite low --encouraged her to continue vitamin D supplement and have her vitamin D level rechecked in about 6 to 8 weeks.  Congratulated her on remaining sober, continue multivitamin for now.    Mood/Insomnia: Stable at this time.  She is restarting duloxetine today.      PLAN:                            1.  Future BP check at upcoming appointments  2.  Reach out to endocrinology about your blood sugars  3.  Future hemoglobin and vitamin D level  4.  Discuss edema with vascular  today    Follow-up: As needed with MTM     SUBJECTIVE/OBJECTIVE:                          Josefa Curiel is a 47 year old female called for a transitions of care visit. She was discharged from [facility] on 11/28/2022 for nausea and vomiting. I last spoke with her on 10/27/22.     Reason for visit: Transitions of care.  Medication Adherence/Access: no issues reported.  Patient was familiar with her medications and had them in front of her.  She follows with endocrinology, does not have a primary physician.    Nausea/vomiting: Was in [facility] ED on 11/15 and discharged home with ondansetron. Returned on 11/16 and was admitted for N/V. Found to have LORIN and thought to be from doxy.   Recommended follow up with nephrology, but she does not have an appointment until February, therefore today she plans on calling another nephrology office.  She is still having some nausea, but no more vomiting. Ondansetron stopped at discharge, but she still has 2 left at home and she used it last night.   Creatinine   Date Value Ref Range Status   11/28/2022 4.22 (H) 0.51 - 0.95 mg/dL Final     Right foot cellulitis with sepsis: Patient completed 3 weeks of cefazolin, stopped due to ARF and switched to doxy, but no longer taking due to nausea/vomiting. MRI revealed no osteomyelitis, septic arthritis, and abscess. S/p I&D by podiatry on 11/18. Wound culture revealed enterococcus, pseudomonas. ID switched off derrick and trial of Cipro po. -   She completed her last dose last night.  Duloxetine held while on Cipro. Podiatry ordered wound vac for home.   Discharged home with APAP.   Reports that she was told that the foot looks good and she is increasing her protein.  Will be following up with vascular today.    Hypertension: Currently taking metoprolol tartrate 75 mg twice daily and amlodipine 10 mg daily. Metoprolol started and amlodipine increased from 2.5 mg at discharge.  She is no longer on lisinopril or clonidine.  Patient does not  monitor BP at home, but she bought a cuff.  Denies lightheadedness/dizziness. Left foot was slightly swollen. Did not notice until she removed her socks, not painful.  BP Readings from Last 3 Encounters:   11/28/22 (!) 151/82   11/15/22 (!) 167/87   11/10/22 (!) 170/90     Pulse Readings from Last 3 Encounters:   11/28/22 74   11/15/22 107   11/10/22 109       Type 1 Diabetes: Following with endocrinology, Dr. Costello.   Currently taking Tresiba 10 units daily and Humalog 1 unit for every 15 g CHO.  She was previously on Lantus but switched to Tresiba. Decreased from 20 units at discharge.   Using a DexCom G6.  Reports blood sugars have been a bit higher in the morning but she would like to give it more time.  This morning blood sugar was 264 and yesterday she believes 196.  Last night she had an episode of hypoglycemia value of 65.  Last A1c checked 10/19/22 = 8.8%.   Microalbumin checked 7/21/20   Is taking rosuvastatin 40 mg daily. Last lipids checked 5/18/22  Is not taking aspirin 81 mg    Supplements: Low hgb inpatient, was recommended to recheck as outpatient. Discharged to start chewable multivitamin and ferrous sulfate daily supplement. No issues with either.  She reports that she also started a vitamin D gummy 50 mcg daily.  Reports that she has been sober for about 60 days.  Lab Results   Component Value Date    VITDT 10 (L) 11/08/2022       Hemoglobin   Date Value Ref Range Status   11/28/2022 7.4 (L) 11.7 - 15.7 g/dL Final        Mood/Insomnia: Continues trazodone 100 mg -cutting into pieces. holding Duloxetine while on cipro per ID, but she took her last Cipro last night therefore she plans on restarting duloxetine.  Reports that her mood is okay.  She only uses the trazodone as needed and cuts it into small pieces.      Today's Vitals: LMP 11/02/2022      Allergies/ADRs: Reviewed in chart  Past Medical History: Reviewed in chart  Tobacco: She reports that she has been smoking. She has never used smokeless  tobacco.Nicotine/Tobacco Cessation Plan:   Information offered: Patient not interested at this time  Alcohol: See above.    ----------------  Post Discharge Medication Reconciliation Status: discharge medications reconciled and changed, per note/orders.    I spent 17 minutes with this patient today. I offer these suggestions with the understanding that I don't fully understand Josefa's past medical history and the complexity of her health conditions. Josefa should make no changes without the approval of her physician. A copy of the visit note was provided to the patient's provider(s).    The patient declined a summary of these recommendations.     Frieda Perez, Pharm.D., [facility]   Medication Therapy Management Pharmacist   [facility]     Telemedicine Visit Details  Type of service:  Telephone visit  Start Time: 9:04 AM  End Time: 9:21 AM  Originating Location (pt. Location): Home  Distant Location (provider location):  Off-site  Provider has received verbal consent for a visit from the patient? Yes     Medication Therapy Recommendations  Diabetes type 1, uncontrolled    Current Medication: amLODIPine (NORVASC) 2.5 MG tablet (Discontinued)   Rationale: Medication requires monitoring - Needs additional monitoring   Recommendation: Order Lab   Status: Contact Provider - Awaiting Response   Note: microalbuminuria

## 2022-11-30 ENCOUNTER — TELEPHONE (OUTPATIENT)
Dept: VASCULAR SURGERY | Facility: CLINIC | Age: 47
End: 2022-11-30

## 2022-11-30 ENCOUNTER — VIRTUAL VISIT (OUTPATIENT)
Dept: PHARMACY | Facility: CLINIC | Age: 47
End: 2022-11-30
Payer: COMMERCIAL

## 2022-11-30 ENCOUNTER — PATIENT OUTREACH (OUTPATIENT)
Dept: CARE COORDINATION | Facility: CLINIC | Age: 47
End: 2022-11-30

## 2022-11-30 ENCOUNTER — ALLIED HEALTH/NURSE VISIT (OUTPATIENT)
Dept: VASCULAR SURGERY | Facility: CLINIC | Age: 47
End: 2022-11-30
Attending: PODIATRIST
Payer: COMMERCIAL

## 2022-11-30 VITALS — TEMPERATURE: 97.4 F | RESPIRATION RATE: 16 BRPM | HEART RATE: 76 BPM

## 2022-11-30 DIAGNOSIS — L97.511 ULCER OF RIGHT FOOT, LIMITED TO BREAKDOWN OF SKIN (H): Primary | ICD-10-CM

## 2022-11-30 DIAGNOSIS — R11.2 NAUSEA AND VOMITING, UNSPECIFIED VOMITING TYPE: Primary | ICD-10-CM

## 2022-11-30 DIAGNOSIS — F33.1 MODERATE EPISODE OF RECURRENT MAJOR DEPRESSIVE DISORDER (H): ICD-10-CM

## 2022-11-30 DIAGNOSIS — F10.20 ALCOHOL USE DISORDER, MODERATE, DEPENDENCE (H): ICD-10-CM

## 2022-11-30 DIAGNOSIS — I16.0 HYPERTENSIVE URGENCY: ICD-10-CM

## 2022-11-30 DIAGNOSIS — L03.115 CELLULITIS OF RIGHT FOOT: ICD-10-CM

## 2022-11-30 DIAGNOSIS — E10.11 DIABETIC KETOACIDOSIS WITH COMA ASSOCIATED WITH TYPE 1 DIABETES MELLITUS (H): ICD-10-CM

## 2022-11-30 PROCEDURE — 99607 MTMS BY PHARM ADDL 15 MIN: CPT | Performed by: PHARMACIST

## 2022-11-30 PROCEDURE — 97605 NEG PRS WND THER DME<=50SQCM: CPT

## 2022-11-30 PROCEDURE — 99606 MTMS BY PHARM EST 15 MIN: CPT | Performed by: PHARMACIST

## 2022-11-30 PROCEDURE — 97607 NEG PRS WND THR NDME<=50SQCM: CPT

## 2022-11-30 RX ORDER — ONDANSETRON 4 MG/1
4 TABLET, ORALLY DISINTEGRATING ORAL EVERY 8 HOURS PRN
COMMUNITY
End: 2022-12-12

## 2022-11-30 ASSESSMENT — PAIN SCALES - GENERAL: PAINLEVEL: NO PAIN (0)

## 2022-11-30 NOTE — PROGRESS NOTES
[facility]  -  Nurse Visit        Date of Service:  November 30, 2022     Requesting Provider: MD David Patel    Diagnosis:     ICD-10-CM    1. Ulcer of right foot, limited to breakdown of skin (H)  L97.511           Chief Complaint: Josefa is being seen today at [facility] [place] for her right foot wound and vac dressing change. Reports pain of 0.  Denies any fevers, chills, or generalized ill feeling.     Dressing on Arrival: NWPT-had not been running since this morning due to leak. Upon removal of dressings light Serosanguinous drainage is noted. Discussed with Josefa today what to do for back up plan if wound vac fails due to leak. Sent some gauze pads, roll gauze and tape home with Josefa.         Right foot vac on arrival (has not been functioning since 8 am)        Right foot plantar 11/30        Right foot-post vac application 11/30    New Wounds noted: No    Vital Signs: Pulse 76   Temp 97.4  F (36.3  C) (Temporal)   Resp 16   LMP 11/02/2022     Assessment:      General:  Patient presents to clinic in no apparent distress.   Psychiatric:  Alert and oriented x3.   Lower extremity:  edema is present.    Integumentary:  Skin is WNL    Circumferential volume measures:  No flowsheet data found.    Wound info:  Negative Pressure Wound Therapy Foot Anterior;Right (Active)   Wound Type Surgical 11/28/22 0800   Cycle Continuous 11/27/22 1600   Target Pressure (mmHg) 125 11/27/22 1600   Drainage Color/Characteristics Serosanguineous 11/27/22 1600   Cannister changed? No 11/27/22 1600   Output (ml) 10 ml 11/26/22 0830       Wound Foot Other (comment) NA (Active)       Wound Toe (Comment  which one) Ulceration (Active)       VASC Wound Right plantar foot (Active)   Pre Size Length 5.8 11/30/22 1400   Pre Size Width 2.8 11/30/22 1400   Pre Size Depth 0.3 11/30/22 1400   Pre Total Sq cm 16.24 11/30/22 1400       VASC Wound left 3rd toe (Active)       VASC Wound left 3rd toe  (Active)   Post Size Length 1.3 11/01/22 1400   Post Size Width 0.8 11/01/22 1400   Post Size Depth 0.1 11/01/22 1400       Incision/Surgical Site 11/18/22 Right Foot (Active)   Incision Assessment UTV 11/28/22 0800   Celina-Incision Assessment UTV 11/28/22 0800   Closure JOHNNY 11/27/22 1600   Incision Drainage Amount UTV 11/27/22 1100   Drainage Description Serosanguinous 11/28/22 0800   Incision Care Therapy - negative pressure 11/28/22 0800   Dressing Intervention Clean, dry, intact 11/28/22 0800       Undermining is not present.    The periwoundskin is maceration      Plan:         1. Patient will return in 2 days for routine post op with Dr. Patel.            2. As listed below, treatment provided irrigation, mechanical cleansing, and dressings to promote autolytic debridement.             Cleansed with: Normal saline    Protected skin with: 3M Cavilon    Dressings Applied to wound: Negative wound vac therapy:      Applied vac drape to the periwound skin to protect from the drainage and sponge; window pane fashion     Cut the black vac foam to fit the size of the wound. Used 1 piece(s) of black sponge total.     Applied vac drape to wherever the bridging is going to be (NEVER apply black sponge to the intact skin)     Covered with vac drape for air tight seal. Cut a hole the size of a quarter and applied suction pad. Be mindful of the direction of the tubing.    Connected tubing and turned vac machine to 125mmHG continuous suction.     No alarm or leaks noted.  Stoma adhesive ring used between toe spaces to assist with seal.    Compression Applied to the right leg: None  Compression Applied to the left leg: None      Offloading used: CAM boot    Trial Products: No    Provider notified regarding concerns: No    Treatment Changes: No    Tolerated Dressing Change:  Yes    Taught Regarding: follow up appointment(s), wound cares, elevation, offloading, compliance and signs of infection, back up plan for wound vac if  there is a malfunction.  Educational Barriers: No barriers      Shauna Gallagher RN, WTA-C, CFCN

## 2022-11-30 NOTE — TELEPHONE ENCOUNTER
Daisy is calling from [employer] she is Josefa's , she wanted to make sure we had her contact info for future reference [phone] ext [phone] and call  [facility] [phone]  for Home care needs or medical equipment

## 2022-11-30 NOTE — PATIENT INSTRUCTIONS
If you do not have a back up plan in place: If the negative pressure wound therapy malfunctions or unable to maintain seal: dressing must be removed and reapplied within 2 hours of the incident. If unable to reapply negative pressure wound dressing, place Normal Saline moistened gauze in wound bed and cover with appropriate dressing to keep wound bed moist.  Change wet-to-dry dressing two times a day until healthcare staff can re-implement negative pressure therapy.   Change canister at least weekly.  [facility] Contact Center can be reached at [phone], 24 hours a day 7 days a week    What is V.A.C.  Therapy?   V.A.C.  Therapy is a medical device system that   promotes wound healing by delivering negative   pressure (a vacuum) to the wound through a   patented dressing and therapy unit creating an   environment that promotes the wound healing   process. This negative pressure helps draw wound   edges together, remove wound fluids and infectious   materials and promote granulation tissue formation   (the connective tissue in healing wounds).   Unlike gauze bandages that merely cover a wound,   V.A.C.  Therapy actively works to help the wound   healing process.   The V.A.C.  Therapy System helps:    Promote wound healing    Provide a moist wound healing environment    Draw wound edges together    Remove fluid and infectious materials    Reduce wound odor    Reduce the need for daily dressing changes   When should I call my clinician when on   V.A.C.  Therapy?   Immediately report to your clinician if you have any   of these symptoms:    Fever over 102     Diarrhea    Headache    Sore throat    Confusion    Sick to your stomach or throwing up    Dizziness or feel faint when you stand up    Redness around the wound    Skin itches or rash present    Wound is sore, red or swollen    Pus or bad smell from the wound    Area in or around wound feels very warm    Vacuum-Assisted Closure of a Wound  Vacuum-assisted closure  (VAC) of a wound is a type of treatment to help wounds heal. It s also known as negative pressure wound therapy. During the treatment, a device lowers air pressure on the wound. This can help the wound heal more quickly.  Understanding the wound VAC system  A wound VAC system has several parts. A foam or gauze dressing is put directly on the wound. The dressing is changed every 24 to 72 hours. An adhesive film covers and seals the dressing and wound. A drainage tube leads from under the adhesive film and connects to a portable vacuum pump. This pump removes air pressure over the wound. It may do this constantly. Or it may do it in cycles. During the treatment, you ll need to carry the portable pump everywhere you go.  Why wound VAC is used  You might need this therapy for a recent traumatic wound. Or you may need it for a chronic wound. This is a wound that does not heal the way it should over time. This can happen with wounds in people who have diabetes. You may need a wound VAC if you ve had a recent skin graft. And you may need a wound VAC for a large wound. Large wounds can take a longer time to heal.  A wound vacuum system may help your wound heal more quickly by:  Draining extra fluid from the wound  Reducing swelling  Reducing bacteria in the wound  Keeping your wound moist and warm  Helping draw together wound edges  Increasing blood flow to your wound  Decreasing inflammation  Wound VAC offers some other advantages over other types of wound care. It may decrease your overall discomfort. The dressings usually need to be changed less often. And they may be easier to keep in position.  Risks of wound VAC  Wound VAC has some rare risks, such as:  Bleeding (which may be severe)  Wound infection  An abnormal connection between the intestinal tract and the skin (enteric fistula)  Proper training in dressing changes can help reduce the risk for these complications. Also, your doctor will carefully evaluate you to  make sure you are a good candidate for the therapy. Certain problems can increase your risk for complications. These include:  Exposed organs or blood vessels  High risk of bleeding from another medical problem  Wound infection  Nearby bone infection  Dead wound tissue  Cancer tissue  Fragile skin, such as from aging or longtime use of topical steroids  Allergy to adhesive  Very poor blood flow to your wound  Wounds close to joints that may reopen because of movement  Your doctor will discuss the risks that apply to you. Make sure to talk with him or her about all of your questions and concerns.  Getting ready for wound VAC  You likely won t need to do much to get ready for wound VAC. In some cases, you may need to wait a while before having this therapy. For example, your doctor may first need to treat an infection in your wound. Dead or damaged tissue may also need to be removed from your wound.  You or a caregiver may need training on how to use the wound VAC device. This is done if you will be able to have your wound vacuum therapy at home. In other cases, you may need to have your wound vacuum therapy in a health care facility.  On the day of your procedure  A health care provider will cover your wound with foam or gauze wound dressing. An adhesive film will be put over the dressing and wound. This seals the wound. The foam connects to a drainage tube, which leads to a vacuum pump. This pump is portable. When the pump is turned on, it draws fluid through the foam and out the drainage tubing. The pump may run constantly, or it may cycle off and on. Your exact setup will depend on the specific type of wound vacuum system that you use.  Managing your wound  You may need the dressing changed about once a day. You may need it changed more or less often, depending on your wound. You or your caregiver may be trained to do this at home. Or it may be done by a visiting health care provider. Your doctor may prescribe a  pain medicine. This is to prevent or reduce pain during the dressing change.  You will likely need to use the wound VAC system for several weeks or months. During this time, you ll carry the portable pump everywhere you go.  Nutrition for wound healing  During this time, make sure you follow a healthy diet. This is needed so the wound can heal and to prevent infection. Your doctor can tell you more about what to include in your diet during this time.  follow up with your doctor if you have a medical condition that led to your wound, such as diabetes. He or she can help you prevent future wounds.  Follow-up care  Your doctor will carefully keep track of your healing. Make sure to keep all follow-up appointments.  When to call your health care provider  Call your health care provider right away if you have any of these:  Fever of 100.4 F (38.0 C) or higher  Increased redness, swelling, or warmth around wound  Increased pain  Bright red blood or blood clots in tubing or the collection chamber of the vacuum

## 2022-11-30 NOTE — PROGRESS NOTES
[facility]    Background: Transitional Care Management program identified per system criteria and reviewed by [facility] team for possible outreach.    Assessment: Upon chart review, [facility] Team member will not proceed with patient outreach related to this episode of Transitional Care Management program due to reason below:    Patient has a follow up appointment with an appropriate provider today for hospital discharge.    Plan: Transitional Care Management episode addressed appropriately per reason noted above.      Ana Guardado MA  [facility]    *Connected Care Resource Team does NOT follow patient ongoing. Referrals are identified based on internal discharge reports and the outreach is to ensure patient has an understanding of their discharge instructions.

## 2022-12-01 ENCOUNTER — MEDICAL CORRESPONDENCE (OUTPATIENT)
Dept: HEALTH INFORMATION MANAGEMENT | Facility: CLINIC | Age: 47
End: 2022-12-01

## 2022-12-02 ENCOUNTER — OFFICE VISIT (OUTPATIENT)
Dept: VASCULAR SURGERY | Facility: CLINIC | Age: 47
End: 2022-12-02
Attending: PODIATRIST
Payer: COMMERCIAL

## 2022-12-02 VITALS — HEART RATE: 76 BPM | DIASTOLIC BLOOD PRESSURE: 82 MMHG | TEMPERATURE: 98.1 F | SYSTOLIC BLOOD PRESSURE: 150 MMHG

## 2022-12-02 DIAGNOSIS — L97.521 ULCER OF LEFT FOOT, LIMITED TO BREAKDOWN OF SKIN (H): ICD-10-CM

## 2022-12-02 DIAGNOSIS — L97.511 ULCER OF RIGHT FOOT, LIMITED TO BREAKDOWN OF SKIN (H): Primary | ICD-10-CM

## 2022-12-02 PROCEDURE — 97597 DBRDMT OPN WND 1ST 20 CM/<: CPT | Performed by: PODIATRIST

## 2022-12-02 PROCEDURE — 11042 DBRDMT SUBQ TIS 1ST 20SQCM/<: CPT | Performed by: PODIATRIST

## 2022-12-02 ASSESSMENT — PAIN SCALES - GENERAL: PAINLEVEL: NO PAIN (0)

## 2022-12-02 NOTE — LETTER
2022             [facility]             Wound Dressing Rx and Order Form             Order Status:New Order             Verbal: Flory Kunz   [facility]   Account # [id number]  Fax: [phone]  Customer Service: [phone]          Patient Info:  Name: Josefa Curiel  : 1975  Address:   [address] [place] [address]  Phone: [phone]      Insurance Info:  PRIMARY INSURANCE: Payor: [employer] / Plan: [employer] / Product Type: PPO /   Primary Policy ID#: [id number]  SECONDARY INSURANCE:  N/A   Secondary Policy ID#: N/A    Physician Info:   Name:  David Patel DPM   Dept Address/Phones:   [facility] [address] [address] [phone]  Dept: [phone]  Fax: [phone]      Wound info:  Encounter Diagnoses   Name Primary?     Ulcer of right foot, limited to breakdown of skin (H) Yes     Ulcer of left foot, limited to breakdown of skin (H)      Negative Pressure Wound Therapy Foot Anterior;Right (Active)   Wound Type Surgical 22 0800   Cycle Continuous 22 1600   Target Pressure (mmHg) 125 22 1600   Drainage Color/Characteristics Serosanguineous 22 1600   Cannister changed? No 22 1600   Output (ml) 10 ml 22 0830       Wound Foot Other (comment) NA (Active)       Wound Toe (Comment  which one) Ulceration (Active)       VASC Wound Right plantar foot (Active)   Pre Size Length 6 22 0900   Pre Size Width 3 22 0900   Pre Size Depth 0.3 22 0900   Pre Total Sq cm 18 22 0900       VASC Wound left 3rd toe (Active)       VASC Wound left 3rd toe (Active)   Post Size Length 0.2 22 1000   Post Size Width 0.2 22 1000   Post Size Depth 0.1 22 1000   Post Total Sq cm 0.04 22 1000       Incision/Surgical Site 22 Right Foot (Active)   Incision Assessment UTV 22 0800    Celina-Incision Assessment UTV 11/28/22 0800   Closure JOHNNY 11/27/22 1600   Incision Drainage Amount UTV 11/27/22 1100   Drainage Description Serosanguinous 11/28/22 0800   Incision Care Therapy - negative pressure 11/28/22 0800   Dressing Intervention Clean, dry, intact 11/28/22 0800     Drainage: large  Thickness:  Full  Duration of Need: 30  Days Supply: 30  Start Date: 12/2/2022    Starter Kit: Ancillary Kit (saline, gloves, gauze)  Qualifying wound/Debridement: Yes     Dressing Type Brand Size Days Supply  15/30 Quantity  changes/week   Primary Endoform   2''x2'' 30 Weekly   Secondary Square gauze   4''x4'' 30 Daily    Sof form roll gauze   4''x75'' 30 Daily   Tape Papertape  1in 30 Daily             OK to forward to covered supplier.    Electronically Signed Physician: David Patel DPM Date: 12/2/2022

## 2022-12-02 NOTE — PATIENT INSTRUCTIONS
Important lnstructions      WEIGHT BEARING STATUS: You are to remain NON WEIGHT BEARING on your right foot. NON WEIGHT BEARING MEANS NO PRESSURE ON YOUR FOOT OR HEEL AT ANY TIME FOR ANY REASON!    2. OFFLOADING DEVICE: Must use a CRUTCHES at all times! (do not use affected foot to push wheelchair)    3. STABILIZATION DEVICE: Use a CAM BOOT . You will need to WEAR THIS ANYTIME YOU ARE UP AND OUT OF BED, IT IS OKAY TO REMOVE WHEN YOU ARE SLEEPING..       4. ELEVATE: Elevating your leg means laying with your head on a pillow and your foot ABOVE YOUR WAIST.     5. DO NOT MOVE YOUR FOOT.  There is a risk of worsening the wound or incision. To give yourself a higher chance of healing, please DO NOT swing foot back and forth and wiggle foot/toes especially when inside a stabilization device.        Dressing Change lnstructions    - Dressing Application of  Endoform    1. Endoform should be cut to the size of the wound.  It should touch the edges of the ulcer. It is okay if it overlap the edges a small amount.  Then, moisten the Endoform with saline.(If it is easier for you, you can moisten it before laying it in the wound)    2. Cover the wound with Endoform, followed by dry gauze, and secure with roll gauze if needed.     3. Endoform is naturally used by the body over time so you may not find it in the wound when you change your dressing.  If you do find some, gently cleanse the wound with saline. Do not remove the remaining Endoform, which may appear as an off-white to castle gel, just add Endoform on top.  Usually, more Endoform will need to be added every 5-7 days.     4. Change your top dressing every 1-2 days or as needed depending on drainage.    -Endoform is a collagen dressing created from ovine (sheep) fore-stomach tissue. The collagen extracellular matrix transforms into a soft conforming sheet, which is naturally incorporated into the wound over time.  Collagen dressings are used to stimulate wound healing.   ,          It IS NOT ok to get your wound wet in the bath or shower    SEEK MEDICAL CARE IF:  You have an increase in swelling, pain, or redness around the wound.  You have an increase in the amount of pus coming from the wound.  There is a bad smell coming from the wound.  The wound appears to be worsening/enlarging  You have a fever greater than 101.5 F      It is ok to continue current wound care treatment/products for the next 2-3 days until new wound care supplies are ordered and arrive. If longer than this please contact our office at [phone].        We want to hear from you!   In the next few weeks, you should receive a call or email to complete a survey about your visit at [facility]. Please help us improve your appointment experience by letting us know how we did today. We strive to make your experience good and value any ways in which we could do better.      We value your input and suggestions.    Thank you for choosing the [facility]!

## 2022-12-02 NOTE — PROGRESS NOTES
FOOT AND ANKLE SURGERY/PODIATRY Progress Note      ASSESSMENT:   Diabetic Ulceration right foot  Diabetic Ulceration 3rd digit left foot       TREATMENT:  -I discussed with the patient that the right foot ulceration has improved since her last visit with me, no significant depth on exam today.     -I recommend we hold the wound vac and begin endoform on both feet. Continue non-weight bearing right foot.     -After discussion of risk factors and consent obtained 2% Lidocaine HCL jelly was applied, under clean conditions, the right and foot ulceration(s) were debrided using currette.  Devitalized and nonviable tissue, along with any fibrin and slough, was removed to improve granulation tissue formation, stimulate wound healing, decrease overall bacteria load, disrupt biofilm formation and decrease edge senescence. Wound drainage was scant No. Total excisional debridement was 18 sq cm into the subcutaneous tissue with a depth of 0.3 cm.   Ulcers were improved afterwards and .  Measures were as noted on the flow sheet. Collagen with a gauze dresssing was applied. She will continue to apply Collagen with a gauze dresssing as directed.    -After discussion of risk factors and consent obtained 2% Lidocaine HCL jelly was applied, under clean conditions, the left and foot ulceration(s) were debrided using #15 blade scalpel.  Devitalized and nonviable tissue, along with any fibrin and slough, was removed to improve granulation tissue formation, stimulate wound healing, decrease overall bacteria load, disrupt biofilm formation and decrease edge senescence. Wound drainage was scant No. Total excisional debridement was 0.04 sq cm from the epidermis/dermis area with a depth of 0.1 cm.   Ulcers were improved afterwards and .  Measures were as noted on the flow sheet. Collagen with a gauze dresssing was applied. She will continue to apply Collagen with a gauze dresssing as directed.    -She will follow-up in 2  weeks.    David Patel DPM  [facility] [place]      HPI: Josefa Curiel was seen again today for bilateral foot ulcers. She has remained non-weight bearing on the right foot and using the wound vac as directed.       Past Medical History:   Diagnosis Date     LORIN (acute kidney injury) (H)      Anxiety      Cannabis abuse      Depression      Diabetes Type 1, uncontrolled 1985    Onset age 10     DKA (diabetic ketoacidoses)      ETOH abuse      HLD (hyperlipidemia)      HTN (hypertension)      Lactic acidosis        Past Surgical History:   Procedure Laterality Date     ANKLE FRACTURE SURGERY Left      APPENDECTOMY       INCISION AND DRAINAGE FOOT, COMBINED Right 11/18/2022    Procedure: INCISION AND DRAINAGE, right foot;  Surgeon: David Patel DPM;  [facility]     PICC SINGLE LUMEN PLACEMENT  10/23/2022            Allergies   Allergen Reactions     Cefazolin Nephrotoxicity     Colestipol GI Disturbance     Doxycycline Nausea and Vomiting     Nickel Rash     Penicillins Rash         Current Outpatient Medications:      acetaminophen (TYLENOL) 500 MG tablet, Take 2 tablets (1,000 mg) by mouth every 6 hours as needed for pain, Disp: , Rfl:      amLODIPine (NORVASC) 10 MG tablet, Take 1 tablet (10 mg) by mouth daily, Disp: 30 tablet, Rfl: 3     childrens multivitamin with iron (FLINTSTONES COMPLETE) CHEW, Take 1 tablet by mouth daily, Disp: , Rfl:      Cholecalciferol (VITAMIN D3) 50 MCG (2000 UT) CHEW, Take 50 mcg by mouth daily, Disp: , Rfl:      collagenase (SANTYL) 250 UNIT/GM external ointment, Apply topically daily Total square centimeters of wound(s) being treated is         30 day supply, Disp: 30 g, Rfl: 3     Continuous Blood Gluc Sensor (DEXCOM G6 SENSOR) MISC, , Disp: , Rfl:      DULoxetine (CYMBALTA) 60 MG capsule, [DULOXETINE (CYMBALTA) 30 MG CAPSULE] Take 60 mg by mouth every evening. , Disp: , Rfl:      ferrous sulfate (FEROSUL) 325 (65 Fe) MG tablet, Take 1  tablet (325 mg) by mouth daily, Disp: 30 tablet, Rfl: 3     insulin degludec (TRESIBA) 100 UNIT/ML pen, Inject 10 Units Subcutaneous every morning, Disp: 15 mL, Rfl: 0     insulin lispro (HUMALOG KWIKPEN) 100 UNIT/ML (1 unit dial) KWIKPEN, 1 unit per 15 gm Carbs, hold if glucose < 80 (Patient taking differently: Inject 1 unit for every 6g carbs for breakfast, 1 unit for every 4g carbs for lunch, & 1 unit for every 3g carbs for dinner plus 1 unit for every 70 blood sugar above 130. Max 50 units per day), Disp: 15 mL, Rfl: 0     metoprolol tartrate 75 MG TABS, Take 75 mg by mouth 2 times daily, Disp: 60 tablet, Rfl: 3     ondansetron (ZOFRAN ODT) 4 MG ODT tab, Take 4 mg by mouth every 8 hours as needed, Disp: , Rfl:      rosuvastatin (CRESTOR) 40 MG tablet, Take 40 mg by mouth daily, Disp: , Rfl:      traZODone (DESYREL) 100 MG tablet, Take 25 mg by mouth nightly as needed for sleep, Disp: , Rfl:     Review of Systems - 10 point Review of Systems is negative except for foot ulcers which is noted in HPI.      OBJECTIVE:  BP (!) 150/82   Pulse 76   Temp 98.1  F (36.7  C)   LMP 11/02/2022   General appearance: Patient is alert and fully cooperative with history & exam.  No sign of distress is noted during the visit.    Vascular: Dorsalis pedis palpableBilateral.  Dermatologic:    Negative Pressure Wound Therapy Foot Anterior;Right (Active)   Wound Type Surgical 11/28/22 0800   Cycle Continuous 11/27/22 1600   Target Pressure (mmHg) 125 11/27/22 1600   Drainage Color/Characteristics Serosanguineous 11/27/22 1600   Cannister changed? No 11/27/22 1600   Output (ml) 10 ml 11/26/22 0830       Wound Foot Other (comment) NA (Active)       Wound Toe (Comment  which one) Ulceration (Active)       VASC Wound Right plantar foot (Active)   Pre Size Length 6 12/02/22 0900   Pre Size Width 3 12/02/22 0900   Pre Size Depth 0.3 12/02/22 0900   Pre Total Sq cm 18 12/02/22 0900       VASC Wound left 3rd toe (Active)       VASC Wound  left 3rd toe (Active)   Post Size Length 0.2 12/02/22 1000   Post Size Width 0.2 12/02/22 1000   Post Size Depth 0.1 12/02/22 1000   Post Total Sq cm 0.04 12/02/22 1000       Incision/Surgical Site 11/18/22 Right Foot (Active)   Incision Assessment UTV 11/28/22 0800   Celina-Incision Assessment UTV 11/28/22 0800   Closure JOHNNY 11/27/22 1600   Incision Drainage Amount UTV 11/27/22 1100   Drainage Description Serosanguinous 11/28/22 0800   Incision Care Therapy - negative pressure 11/28/22 0800   Dressing Intervention Clean, dry, intact 11/28/22 0800   Granular tissue right foot ulceration and 3rd digit left foot. No erythema bilateral.   Neurologic: Diminished to light touch Bilateral.  Musculoskeletal: Contracted digits noted Bilateral.    Imaging:     MR Foot Right w/o Contrast    Result Date: 11/17/2022  EXAM: MR FOOT RIGHT W/O CONTRAST LOCATION: [facility] DATE/TIME: 11/17/2022 3:42 PM INDICATION: 47-year-old patient with a right foot infection. Clinical concern for osteomyelitis. COMPARISON: 10/19/2022 MRI. TECHNIQUE: Unenhanced. FINDINGS: JOINTS AND BONES: -No fracture or bone contusion. Normal articular cartilage. No effusion. TENDONS: -No tendon tear, tendinopathy, or tenosynovitis. LIGAMENTS: -Lisfranc ligament: Intact. No subluxation. MUSCLES AND SOFT TISSUES: -Moderate fatty atrophy of the mid and forefoot musculature. -Increased T2 signal in the foot musculature, consistent with denervation change. No intramuscular fluid collection. -Mild subcutaneous thickening and edema type signal in the plantar forefoot at the level of the TMT joints. No soft tissue fluid collection or mass.     IMPRESSION: 1.  Negative for osteomyelitis, septic arthritis, and abscess. 2.  Mild subcutaneous soft tissue thickening and edema type signal in the plantar forefoot at the level of the TMT joints. This could represent cellulitis in the correct clinical setting. 3.  Muscle fatty atrophy and denervation  "change, stable.    US Renal Complete Non-Vascular    Result Date: 11/22/2022  EXAM: US RENAL COMPLETE NON-VASCULAR LOCATION: [facility] DATE/TIME: 11/22/2022 8:10 PM INDICATION: Renal failure. COMPARISON: None. TECHNIQUE: Routine Bilateral Renal and Bladder Ultrasound. FINDINGS: RIGHT KIDNEY: 9 x 6 x 5 cm. Echogenic renal parenchyma. No hydronephrosis. LEFT KIDNEY: 9 x 5 x 5 cm. Echogenic renal parenchyma. No hydronephrosis. BLADDER: Partially decompressed.     IMPRESSION: 1.  Echogenic renal parenchyma consistent with medical renal disease. No hydronephrosis.    XR Foot Right G/E 3 Views    Result Date: 11/15/2022  EXAM: XR FOOT RIGHT G/E 3 VIEWS LOCATION: [facility] DATE/TIME: 11/15/2022 1:09 PM INDICATION: Suspect toe gangrene, osteomyelitis. COMPARISON: None.     IMPRESSION: Soft tissue bandage material and swelling within the toes; hyperdense material along the plantar aspect of the forefoot is likely ointment. There is no evidence of soft tissue gas or osteomyelitis by radiograph. No acute fracture. There is a metallic foreign body in the soft tissues of the posterior ankle/distal calf measuring 1.1 cm as seen on the lateral view.    POC US Guidance Needle Placement    Result Date: 11/18/2022  Ultrasound was performed as guidance to an anesthesia procedure.  Click \"PACS images\" hyperlink below to view any stored images.  For specific procedure details, view procedure note authored by anesthesia.         Picture:         "

## 2022-12-12 ENCOUNTER — APPOINTMENT (OUTPATIENT)
Dept: RADIOLOGY | Facility: HOSPITAL | Age: 47
DRG: 637 | End: 2022-12-12
Attending: EMERGENCY MEDICINE
Payer: COMMERCIAL

## 2022-12-12 ENCOUNTER — HOSPITAL ENCOUNTER (INPATIENT)
Facility: HOSPITAL | Age: 47
LOS: 2 days | Discharge: HOME | DRG: 637 | End: 2022-12-14
Attending: EMERGENCY MEDICINE | Admitting: INTERNAL MEDICINE
Payer: COMMERCIAL

## 2022-12-12 DIAGNOSIS — E10.69 TYPE 1 DIABETES MELLITUS WITH OTHER SPECIFIED COMPLICATION (H): Primary | ICD-10-CM

## 2022-12-12 DIAGNOSIS — E10.65 UNCONTROLLED TYPE 1 DIABETES MELLITUS WITH HYPERGLYCEMIA (H): ICD-10-CM

## 2022-12-12 DIAGNOSIS — E87.4 MIXED ACID BASE BALANCE DISORDER: ICD-10-CM

## 2022-12-12 DIAGNOSIS — I10 HYPERTENSION, UNSPECIFIED TYPE: ICD-10-CM

## 2022-12-12 DIAGNOSIS — I45.81 LONG Q-T SYNDROME: ICD-10-CM

## 2022-12-12 DIAGNOSIS — E10.10 DIABETIC KETOACIDOSIS WITHOUT COMA ASSOCIATED WITH TYPE 1 DIABETES MELLITUS (H): ICD-10-CM

## 2022-12-12 DIAGNOSIS — R79.89 ELEVATED TROPONIN: ICD-10-CM

## 2022-12-12 PROBLEM — E11.10 DKA (DIABETIC KETOACIDOSIS) (H): Status: ACTIVE | Noted: 2022-12-12

## 2022-12-12 LAB
ALBUMIN SERPL BCG-MCNC: 4.2 G/DL (ref 3.5–5.2)
ALBUMIN UR-MCNC: >600 MG/DL
ALP SERPL-CCNC: 115 U/L (ref 35–104)
ALT SERPL W P-5'-P-CCNC: 12 U/L (ref 10–35)
ANION GAP SERPL CALCULATED.3IONS-SCNC: 16 MMOL/L (ref 7–15)
ANION GAP SERPL CALCULATED.3IONS-SCNC: 27 MMOL/L (ref 7–15)
ANION GAP SERPL CALCULATED.3IONS-SCNC: 28 MMOL/L (ref 7–15)
APPEARANCE UR: CLEAR
AST SERPL W P-5'-P-CCNC: 26 U/L (ref 10–35)
BASE EXCESS BLDA CALC-SCNC: -1.2 MMOL/L
BASE EXCESS BLDV CALC-SCNC: -0.1 MMOL/L
BILIRUB DIRECT SERPL-MCNC: <0.2 MG/DL (ref 0–0.3)
BILIRUB SERPL-MCNC: 0.4 MG/DL
BILIRUB UR QL STRIP: NEGATIVE
BUN SERPL-MCNC: 43 MG/DL (ref 6–20)
BUN SERPL-MCNC: 43.1 MG/DL (ref 6–20)
BUN SERPL-MCNC: 45.9 MG/DL (ref 6–20)
CALCIUM SERPL-MCNC: 8.9 MG/DL (ref 8.6–10)
CALCIUM SERPL-MCNC: 9.5 MG/DL (ref 8.6–10)
CALCIUM SERPL-MCNC: 9.9 MG/DL (ref 8.6–10)
CHLORIDE SERPL-SCNC: 105 MMOL/L (ref 98–107)
CHLORIDE SERPL-SCNC: 97 MMOL/L (ref 98–107)
CHLORIDE SERPL-SCNC: 98 MMOL/L (ref 98–107)
COHGB MFR BLD: 96.4 % (ref 96–97)
COLOR UR AUTO: ABNORMAL
CREAT SERPL-MCNC: 4.49 MG/DL (ref 0.51–0.95)
CREAT SERPL-MCNC: 4.66 MG/DL (ref 0.51–0.95)
CREAT SERPL-MCNC: 4.78 MG/DL (ref 0.51–0.95)
DEPRECATED HCO3 PLAS-SCNC: 18 MMOL/L (ref 22–29)
DEPRECATED HCO3 PLAS-SCNC: 18 MMOL/L (ref 22–29)
DEPRECATED HCO3 PLAS-SCNC: 25 MMOL/L (ref 22–29)
GFR SERPL CREATININE-BSD FRML MDRD: 11 ML/MIN/1.73M2
GFR SERPL CREATININE-BSD FRML MDRD: 11 ML/MIN/1.73M2
GFR SERPL CREATININE-BSD FRML MDRD: 12 ML/MIN/1.73M2
GLUCOSE BLDC GLUCOMTR-MCNC: 105 MG/DL (ref 70–99)
GLUCOSE BLDC GLUCOMTR-MCNC: 137 MG/DL (ref 70–99)
GLUCOSE BLDC GLUCOMTR-MCNC: 156 MG/DL (ref 70–99)
GLUCOSE BLDC GLUCOMTR-MCNC: 209 MG/DL (ref 70–99)
GLUCOSE BLDC GLUCOMTR-MCNC: 241 MG/DL (ref 70–99)
GLUCOSE BLDC GLUCOMTR-MCNC: 279 MG/DL (ref 70–99)
GLUCOSE BLDC GLUCOMTR-MCNC: 285 MG/DL (ref 70–99)
GLUCOSE BLDC GLUCOMTR-MCNC: 287 MG/DL (ref 70–99)
GLUCOSE BLDC GLUCOMTR-MCNC: 75 MG/DL (ref 70–99)
GLUCOSE BLDC GLUCOMTR-MCNC: 94 MG/DL (ref 70–99)
GLUCOSE SERPL-MCNC: 212 MG/DL (ref 70–99)
GLUCOSE SERPL-MCNC: 274 MG/DL (ref 70–99)
GLUCOSE SERPL-MCNC: 93 MG/DL (ref 70–99)
GLUCOSE UR STRIP-MCNC: 300 MG/DL
HBA1C MFR BLD: 6.1 %
HCG SERPL QL: NEGATIVE
HCO3 BLD-SCNC: 23 MMOL/L (ref 23–29)
HCO3 BLDV-SCNC: 22 MMOL/L (ref 24–30)
HGB UR QL STRIP: ABNORMAL
KETONES BLD-SCNC: 0.7 MMOL/L (ref 0–0.6)
KETONES BLD-SCNC: 4.2 MMOL/L (ref 0–0.6)
KETONES UR STRIP-MCNC: 20 MG/DL
LACTATE SERPL-SCNC: 2.4 MMOL/L (ref 0.7–2)
LACTATE SERPL-SCNC: 3.2 MMOL/L (ref 0.7–2)
LEUKOCYTE ESTERASE UR QL STRIP: NEGATIVE
LIPASE SERPL-CCNC: 34 U/L (ref 13–60)
MAGNESIUM SERPL-MCNC: 2.1 MG/DL (ref 1.7–2.3)
NITRATE UR QL: NEGATIVE
OXYHGB MFR BLD: 94.8 % (ref 96–97)
OXYHGB MFR BLDV: 87.4 % (ref 70–75)
PCO2 BLD: 34 MM HG (ref 35–45)
PCO2 BLDV: 23 MM HG (ref 35–50)
PH BLD: 7.44 [PH] (ref 7.37–7.44)
PH BLDV: 7.58 [PH] (ref 7.35–7.45)
PH UR STRIP: 8 [PH] (ref 5–7)
PHOSPHATE SERPL-MCNC: 4.7 MG/DL (ref 2.5–4.5)
PO2 BLD: 77 MM HG (ref 80–90)
PO2 BLDV: 46 MM HG (ref 25–47)
POTASSIUM SERPL-SCNC: 3.4 MMOL/L (ref 3.4–5.3)
POTASSIUM SERPL-SCNC: 4.1 MMOL/L (ref 3.4–5.3)
POTASSIUM SERPL-SCNC: 4.6 MMOL/L (ref 3.4–5.3)
PROCALCITONIN SERPL IA-MCNC: 0.17 NG/ML
PROT SERPL-MCNC: 7.7 G/DL (ref 6.4–8.3)
RBC URINE: 1 /HPF
SAO2 % BLDV: 88.8 % (ref 70–75)
SODIUM SERPL-SCNC: 142 MMOL/L (ref 136–145)
SODIUM SERPL-SCNC: 144 MMOL/L (ref 136–145)
SODIUM SERPL-SCNC: 146 MMOL/L (ref 136–145)
SP GR UR STRIP: 1.01 (ref 1–1.03)
TEMPERATURE: 37 DEGREES C
TROPONIN T SERPL HS-MCNC: 57 NG/L
TROPONIN T SERPL HS-MCNC: 64 NG/L
UROBILINOGEN UR STRIP-MCNC: <2 MG/DL
WBC URINE: 0 /HPF

## 2022-12-12 PROCEDURE — 36600 WITHDRAWAL OF ARTERIAL BLOOD: CPT

## 2022-12-12 PROCEDURE — 71046 X-RAY EXAM CHEST 2 VIEWS: CPT

## 2022-12-12 PROCEDURE — 250N000009 HC RX 250: Performed by: EMERGENCY MEDICINE

## 2022-12-12 PROCEDURE — 82010 KETONE BODYS QUAN: CPT | Performed by: EMERGENCY MEDICINE

## 2022-12-12 PROCEDURE — 82010 KETONE BODYS QUAN: CPT | Performed by: INTERNAL MEDICINE

## 2022-12-12 PROCEDURE — 96374 THER/PROPH/DIAG INJ IV PUSH: CPT

## 2022-12-12 PROCEDURE — 250N000011 HC RX IP 250 OP 636: Performed by: INTERNAL MEDICINE

## 2022-12-12 PROCEDURE — 84100 ASSAY OF PHOSPHORUS: CPT | Performed by: INTERNAL MEDICINE

## 2022-12-12 PROCEDURE — 82805 BLOOD GASES W/O2 SATURATION: CPT | Performed by: EMERGENCY MEDICINE

## 2022-12-12 PROCEDURE — 93005 ELECTROCARDIOGRAM TRACING: CPT | Performed by: INTERNAL MEDICINE

## 2022-12-12 PROCEDURE — 83690 ASSAY OF LIPASE: CPT | Performed by: EMERGENCY MEDICINE

## 2022-12-12 PROCEDURE — 93005 ELECTROCARDIOGRAM TRACING: CPT | Performed by: EMERGENCY MEDICINE

## 2022-12-12 PROCEDURE — 99285 EMERGENCY DEPT VISIT HI MDM: CPT | Mod: CS,25

## 2022-12-12 PROCEDURE — 250N000013 HC RX MED GY IP 250 OP 250 PS 637: Performed by: INTERNAL MEDICINE

## 2022-12-12 PROCEDURE — 87040 BLOOD CULTURE FOR BACTERIA: CPT | Performed by: EMERGENCY MEDICINE

## 2022-12-12 PROCEDURE — 82310 ASSAY OF CALCIUM: CPT | Performed by: EMERGENCY MEDICINE

## 2022-12-12 PROCEDURE — 999N000157 HC STATISTIC RCP TIME EA 10 MIN

## 2022-12-12 PROCEDURE — 250N000012 HC RX MED GY IP 250 OP 636 PS 637: Performed by: INTERNAL MEDICINE

## 2022-12-12 PROCEDURE — 81001 URINALYSIS AUTO W/SCOPE: CPT | Performed by: EMERGENCY MEDICINE

## 2022-12-12 PROCEDURE — 73630 X-RAY EXAM OF FOOT: CPT | Mod: RT

## 2022-12-12 PROCEDURE — 96375 TX/PRO/DX INJ NEW DRUG ADDON: CPT

## 2022-12-12 PROCEDURE — 84703 CHORIONIC GONADOTROPIN ASSAY: CPT | Performed by: EMERGENCY MEDICINE

## 2022-12-12 PROCEDURE — 84145 PROCALCITONIN (PCT): CPT | Performed by: INTERNAL MEDICINE

## 2022-12-12 PROCEDURE — 258N000003 HC RX IP 258 OP 636: Performed by: EMERGENCY MEDICINE

## 2022-12-12 PROCEDURE — 258N000003 HC RX IP 258 OP 636: Performed by: INTERNAL MEDICINE

## 2022-12-12 PROCEDURE — 36415 COLL VENOUS BLD VENIPUNCTURE: CPT | Performed by: EMERGENCY MEDICINE

## 2022-12-12 PROCEDURE — C9113 INJ PANTOPRAZOLE SODIUM, VIA: HCPCS | Performed by: INTERNAL MEDICINE

## 2022-12-12 PROCEDURE — 83036 HEMOGLOBIN GLYCOSYLATED A1C: CPT | Performed by: EMERGENCY MEDICINE

## 2022-12-12 PROCEDURE — 83735 ASSAY OF MAGNESIUM: CPT | Performed by: INTERNAL MEDICINE

## 2022-12-12 PROCEDURE — 250N000011 HC RX IP 250 OP 636: Performed by: EMERGENCY MEDICINE

## 2022-12-12 PROCEDURE — 84484 ASSAY OF TROPONIN QUANT: CPT | Performed by: EMERGENCY MEDICINE

## 2022-12-12 PROCEDURE — 83605 ASSAY OF LACTIC ACID: CPT | Performed by: EMERGENCY MEDICINE

## 2022-12-12 PROCEDURE — 99223 1ST HOSP IP/OBS HIGH 75: CPT | Performed by: INTERNAL MEDICINE

## 2022-12-12 PROCEDURE — 96361 HYDRATE IV INFUSION ADD-ON: CPT

## 2022-12-12 PROCEDURE — 120N000001 HC R&B MED SURG/OB

## 2022-12-12 PROCEDURE — 36415 COLL VENOUS BLD VENIPUNCTURE: CPT | Performed by: INTERNAL MEDICINE

## 2022-12-12 PROCEDURE — 82310 ASSAY OF CALCIUM: CPT | Performed by: INTERNAL MEDICINE

## 2022-12-12 PROCEDURE — 82248 BILIRUBIN DIRECT: CPT | Performed by: EMERGENCY MEDICINE

## 2022-12-12 RX ORDER — ONDANSETRON 2 MG/ML
4 INJECTION INTRAMUSCULAR; INTRAVENOUS EVERY 30 MIN PRN
Status: DISCONTINUED | OUTPATIENT
Start: 2022-12-12 | End: 2022-12-14 | Stop reason: HOSPADM

## 2022-12-12 RX ORDER — DIPHENHYDRAMINE HYDROCHLORIDE 50 MG/ML
25 INJECTION INTRAMUSCULAR; INTRAVENOUS ONCE
Status: COMPLETED | OUTPATIENT
Start: 2022-12-12 | End: 2022-12-12

## 2022-12-12 RX ORDER — METOPROLOL TARTRATE 25 MG/1
75 TABLET, FILM COATED ORAL 2 TIMES DAILY
Status: DISCONTINUED | OUTPATIENT
Start: 2022-12-12 | End: 2022-12-14 | Stop reason: HOSPADM

## 2022-12-12 RX ORDER — SODIUM CHLORIDE AND POTASSIUM CHLORIDE 150; 450 MG/100ML; MG/100ML
INJECTION, SOLUTION INTRAVENOUS CONTINUOUS
Status: DISCONTINUED | OUTPATIENT
Start: 2022-12-12 | End: 2022-12-12

## 2022-12-12 RX ORDER — PROCHLORPERAZINE MALEATE 10 MG
10 TABLET ORAL EVERY 6 HOURS PRN
Status: DISCONTINUED | OUTPATIENT
Start: 2022-12-12 | End: 2022-12-14 | Stop reason: HOSPADM

## 2022-12-12 RX ORDER — NICOTINE POLACRILEX 4 MG
15-30 LOZENGE BUCCAL
Status: DISCONTINUED | OUTPATIENT
Start: 2022-12-12 | End: 2022-12-14 | Stop reason: HOSPADM

## 2022-12-12 RX ORDER — DEXTROSE MONOHYDRATE 25 G/50ML
25-50 INJECTION, SOLUTION INTRAVENOUS
Status: DISCONTINUED | OUTPATIENT
Start: 2022-12-12 | End: 2022-12-12

## 2022-12-12 RX ORDER — DEXTROSE MONOHYDRATE 25 G/50ML
25-50 INJECTION, SOLUTION INTRAVENOUS
Status: DISCONTINUED | OUTPATIENT
Start: 2022-12-12 | End: 2022-12-14 | Stop reason: HOSPADM

## 2022-12-12 RX ORDER — PROCHLORPERAZINE 25 MG
25 SUPPOSITORY, RECTAL RECTAL EVERY 12 HOURS PRN
Status: DISCONTINUED | OUTPATIENT
Start: 2022-12-12 | End: 2022-12-14 | Stop reason: HOSPADM

## 2022-12-12 RX ORDER — HYDRALAZINE HYDROCHLORIDE 20 MG/ML
10 INJECTION INTRAMUSCULAR; INTRAVENOUS EVERY 6 HOURS PRN
Status: DISCONTINUED | OUTPATIENT
Start: 2022-12-12 | End: 2022-12-14 | Stop reason: HOSPADM

## 2022-12-12 RX ORDER — TRAZODONE HYDROCHLORIDE 50 MG/1
50 TABLET, FILM COATED ORAL
Status: DISCONTINUED | OUTPATIENT
Start: 2022-12-12 | End: 2022-12-14 | Stop reason: HOSPADM

## 2022-12-12 RX ORDER — ACETAMINOPHEN 500 MG
1000 TABLET ORAL EVERY 6 HOURS PRN
Status: DISCONTINUED | OUTPATIENT
Start: 2022-12-12 | End: 2022-12-14 | Stop reason: HOSPADM

## 2022-12-12 RX ORDER — DULOXETIN HYDROCHLORIDE 60 MG/1
60 CAPSULE, DELAYED RELEASE ORAL EVERY EVENING
Status: DISCONTINUED | OUTPATIENT
Start: 2022-12-12 | End: 2022-12-13

## 2022-12-12 RX ORDER — DEXTROSE MONOHYDRATE, SODIUM CHLORIDE, AND POTASSIUM CHLORIDE 50; 1.49; 4.5 G/1000ML; G/1000ML; G/1000ML
INJECTION, SOLUTION INTRAVENOUS CONTINUOUS
Status: DISCONTINUED | OUTPATIENT
Start: 2022-12-12 | End: 2022-12-12

## 2022-12-12 RX ORDER — SODIUM CHLORIDE, SODIUM LACTATE, POTASSIUM CHLORIDE, CALCIUM CHLORIDE 600; 310; 30; 20 MG/100ML; MG/100ML; MG/100ML; MG/100ML
INJECTION, SOLUTION INTRAVENOUS CONTINUOUS
Status: DISCONTINUED | OUTPATIENT
Start: 2022-12-12 | End: 2022-12-12

## 2022-12-12 RX ORDER — NICOTINE POLACRILEX 4 MG
15-30 LOZENGE BUCCAL
Status: DISCONTINUED | OUTPATIENT
Start: 2022-12-12 | End: 2022-12-12

## 2022-12-12 RX ORDER — AMLODIPINE BESYLATE 5 MG/1
10 TABLET ORAL DAILY
Status: DISCONTINUED | OUTPATIENT
Start: 2022-12-12 | End: 2022-12-14 | Stop reason: HOSPADM

## 2022-12-12 RX ADMIN — INSULIN HUMAN 100 UNITS: 1 INJECTION, SOLUTION INTRAVENOUS at 14:43

## 2022-12-12 RX ADMIN — SODIUM CHLORIDE, POTASSIUM CHLORIDE, SODIUM LACTATE AND CALCIUM CHLORIDE 2000 ML: 600; 310; 30; 20 INJECTION, SOLUTION INTRAVENOUS at 11:01

## 2022-12-12 RX ADMIN — PROCHLORPERAZINE EDISYLATE 5 MG: 5 INJECTION INTRAMUSCULAR; INTRAVENOUS at 10:55

## 2022-12-12 RX ADMIN — HYDRALAZINE HYDROCHLORIDE 10 MG: 20 INJECTION INTRAMUSCULAR; INTRAVENOUS at 23:27

## 2022-12-12 RX ADMIN — PANTOPRAZOLE SODIUM 40 MG: 40 INJECTION, POWDER, FOR SOLUTION INTRAVENOUS at 17:03

## 2022-12-12 RX ADMIN — INSULIN GLARGINE 10 UNITS: 100 INJECTION, SOLUTION SUBCUTANEOUS at 21:53

## 2022-12-12 RX ADMIN — METOPROLOL TARTRATE 75 MG: 25 TABLET, FILM COATED ORAL at 19:27

## 2022-12-12 RX ADMIN — AMLODIPINE BESYLATE 10 MG: 5 TABLET ORAL at 17:02

## 2022-12-12 RX ADMIN — DIPHENHYDRAMINE HYDROCHLORIDE 25 MG: 50 INJECTION, SOLUTION INTRAMUSCULAR; INTRAVENOUS at 10:59

## 2022-12-12 RX ADMIN — ONDANSETRON 4 MG: 2 INJECTION INTRAMUSCULAR; INTRAVENOUS at 13:02

## 2022-12-12 RX ADMIN — PROCHLORPERAZINE EDISYLATE 10 MG: 5 INJECTION INTRAMUSCULAR; INTRAVENOUS at 22:40

## 2022-12-12 RX ADMIN — DULOXETINE HYDROCHLORIDE 60 MG: 60 CAPSULE, DELAYED RELEASE PELLETS ORAL at 21:53

## 2022-12-12 RX ADMIN — POTASSIUM CHLORIDE AND SODIUM CHLORIDE: 450; 150 INJECTION, SOLUTION INTRAVENOUS at 15:43

## 2022-12-12 RX ADMIN — POTASSIUM CHLORIDE, DEXTROSE MONOHYDRATE AND SODIUM CHLORIDE: 150; 5; 450 INJECTION, SOLUTION INTRAVENOUS at 18:00

## 2022-12-12 RX ADMIN — SODIUM CHLORIDE, POTASSIUM CHLORIDE, SODIUM LACTATE AND CALCIUM CHLORIDE: 600; 310; 30; 20 INJECTION, SOLUTION INTRAVENOUS at 13:42

## 2022-12-12 RX ADMIN — PROCHLORPERAZINE EDISYLATE 10 MG: 5 INJECTION INTRAMUSCULAR; INTRAVENOUS at 15:35

## 2022-12-12 ASSESSMENT — ACTIVITIES OF DAILY LIVING (ADL)
ADLS_ACUITY_SCORE: 35

## 2022-12-12 ASSESSMENT — ENCOUNTER SYMPTOMS
FEVER: 0
COUGH: 0
CHILLS: 1
NAUSEA: 1
RHINORRHEA: 1
DIAPHORESIS: 1
VOMITING: 1

## 2022-12-12 NOTE — ED TRIAGE NOTES
Patient has continuous nausea and vomiting for last 2 days. Patient has not been able to keep anything down including water. Patient is a type 1 diabetic and states her blood sugars have been fine.     Triage Assessment     Row Name 12/12/22 0914       Triage Assessment (Adult)    Airway WDL WDL       Respiratory WDL    Respiratory WDL WDL       Skin Circulation/Temperature WDL    Skin Circulation/Temperature WDL temperature    Skin Temperature cool       Cardiac WDL    Cardiac WDL X;rhythm    Pulse Rate & Regularity tachycardic       Peripheral/Neurovascular WDL    Peripheral Neurovascular WDL WDL       Cognitive/Neuro/Behavioral WDL    Cognitive/Neuro/Behavioral WDL WDL       [place] Coma Scale    Best Eye Response 4-->(E4) spontaneous    Best Motor Response 6-->(M6) obeys commands    Best Verbal Response 5-->(V5) oriented    [place] Coma Scale Score 15

## 2022-12-12 NOTE — PHARMACY-ADMISSION MEDICATION HISTORY
Pharmacy Note - Admission Medication History    Pertinent Provider Information: Patient reports her med list has not changed since last months admission.      ______________________________________________________________________    Prior To Admission (PTA) med list completed and updated in EMR.       PTA Med List   Medication Sig Last Dose     acetaminophen (TYLENOL) 500 MG tablet Take 2 tablets (1,000 mg) by mouth every 6 hours as needed for pain Past Week at -     amLODIPine (NORVASC) 10 MG tablet Take 1 tablet (10 mg) by mouth daily 12/11/2022 at am     childrens multivitamin with iron (FLINTSTONES COMPLETE) CHEW Take 1 tablet by mouth daily 12/11/2022 at am     Cholecalciferol (VITAMIN D3) 50 MCG (2000 UT) CHEW Take 50 mcg by mouth daily 12/11/2022 at am     collagenase (SANTYL) 250 UNIT/GM external ointment Apply topically daily Total square centimeters of wound(s) being treated is         30 day supply Unknown at --     Continuous Blood Gluc Sensor (DEXCOM G6 SENSOR) MISC  -- at --     DULoxetine (CYMBALTA) 60 MG capsule [DULOXETINE (CYMBALTA) 30 MG CAPSULE] Take 60 mg by mouth every evening.  12/10/2022 at sancho     ferrous sulfate (FEROSUL) 325 (65 Fe) MG tablet Take 1 tablet (325 mg) by mouth daily 12/11/2022 at am     insulin degludec (TRESIBA) 100 UNIT/ML pen Inject 10 Units Subcutaneous every morning 12/11/2022 at am     insulin lispro (HUMALOG KWIKPEN) 100 UNIT/ML (1 unit dial) KWIKPEN 1 unit per 15 gm Carbs, hold if glucose < 80 (Patient taking differently: Inject 1 unit for every 6g carbs for breakfast, 1 unit for every 4g carbs for lunch, & 1 unit for every 3g carbs for dinner plus 1 unit for every 70 blood sugar above 130. Max 50 units per day) Past Week at -     metoprolol tartrate 75 MG TABS Take 75 mg by mouth 2 times daily 12/11/2022     rosuvastatin (CRESTOR) 40 MG tablet Take 40 mg by mouth daily 12/11/2022     traZODone (DESYREL) 100 MG tablet Take 50 mg by mouth nightly as needed for sleep  prn       Information source(s): Patient, Hospital records and [facility]  Method of interview communication: in-person    Summary of Changes to PTA Med List  New: none  Discontinued: none  Changed: trazodone- pt reports taking 1/2 tab (50mg)    Patient was asked about OTC/herbal products specifically.  PTA med list reflects this.    In the past week, patient estimated taking medication this percent of the time:  50-90% due to running out and illness.    Allergies were reviewed, assessed, and updated with the patient.      Patient does not use any multi-dose medications prior to admission.    The information provided in this note is only as accurate as the sources available at the time of the update(s).    Thank you for the opportunity to participate in the care of this patient.    HEATHER GOLDSMITH RPH  12/12/2022 10:50 AM

## 2022-12-12 NOTE — ED PROVIDER NOTES
Emergency Department Encounter     Evaluation Date & Time:   2022  9:18 AM    CHIEF COMPLAINT:  Nausea and Vomiting      Triage Note:  Patient has continuous nausea and vomiting for last 2 days. Patient has not been able to keep anything down including water. Patient is a type 1 diabetic and states her blood sugars have been fine.     Triage Assessment     Row Name 22 [id number]       Triage Assessment (Adult)    Airway WDL WDL       Respiratory WDL    Respiratory WDL WDL       Skin Circulation/Temperature WDL    Skin Circulation/Temperature WDL temperature    Skin Temperature cool       Cardiac WDL    Cardiac WDL X;rhythm    Pulse Rate & Regularity tachycardic       Peripheral/Neurovascular WDL    Peripheral Neurovascular WDL WDL       Cognitive/Neuro/Behavioral WDL    Cognitive/Neuro/Behavioral WDL WDL       [place] Coma Scale    Best Eye Response 4-->(E4) spontaneous    Best Motor Response 6-->(M6) obeys commands    Best Verbal Response 5-->(V5) oriented    [place] Coma Scale Score 15              FINAL IMPRESSION:    ICD-10-CM    1. Diabetic ketoacidosis without coma associated with type 1 diabetes mellitus (H)  E10.10       2. Mixed acid base balance disorder  E87.4       3. Elevated troponin  R77.8       4. Long Q-T syndrome  I45.81           Impression and Plan     ED COURSE & MEDICAL DECISION MAKIN:27 AM I performed my initial interview and exam.  1:31 PM I spoke with Dr. Dewey, Hospitalist.  ED Course as of 22 1519   Mon Dec 12, 2022   0938 Patient was seen for recurrent nausea vomiting.  She states that she is checking her blood sugars frequently at home and that they have been normal until just today when her monitor fell out.  However, she does smell ketotic and with recurrent nausea vomiting I am highly suspicious of developing DKA.  Possible triggers she does admit to recent marijuana use.  Previously had used alcohol regularly but had noted last use was about 6 years ago on last  admission.  She does have an open chronic wound on her right foot but it does not appear currently infected though obviously cannot rule out the possibility of underlying osteomyelitis and so with DKA will get blood cultures in her case, as well as sed rate and CRP.  We will get x-ray of that foot although x-ray will not rule out osteomyelitis this is a good initial step to the evaluation.  Otherwise, she has no other infectious complaints that are localizing such as respiratory or urinary.  We will additionally look for signs of cardiac ischemia or severe electrolyte imbalance with EKG and troponin.    Patient is here with visitor which will aid in disposition planning.  She is asking for something for her nerves then as she is tachypneic do think initially giving nausea medicine it would be the best option and not giving something that is overly sedating and someone who needs to maintain respiratory rate, but could do Compazine and Benadryl combination.   1008 Bs 209 per poct glcose.  Continue with iv fluid bolus/nausea medication   1127 Patient is spilling ketones but also alkalotic with a slightly low bicarbonate.  Most likely this will represent a mixed acid-base disorder.  Her lactic acid is elevated.  We will continue with IV fluids for now and reassess once I see more of her basic metabolic panel.   1246 We will repeat lactic acid and the ABG at this point to see if her combined acid-base disorder is related to hyperventilation and has improved.  Her blood sugar is less than 250 so if there still remains significant anion gap acidosis is based on new ABG may need to start both on insulin as well as dextrose.  For now continue with the initial IV fluid bolus.  Renal function appears to be similar to last.   1318 Bs up to 279 so will give single dose insulin and discuss ongoing management with admitting md.  No beds available elsewhere.   1350 Hospitalist did call back and will accept the patient for admission  as in the ICU patient boarding here in the emergency department.  No need to involve the intensivist at this time as they will manage the DKA primarily.  He would like to do insulin drip for at least a few hours to stabilize her with the dextrose drip as her blood sugars drift lower but her anion gap remains per the protocol.  Plan is for the repeat basic here shortly and repeat troponin which she is aware are pending.   1515 Trop is not elevating.  Urine has protein likely as a result of poorly controlled diabetes, and trace blood.  No evidence of infection.  K staying above 4 which is good while on insulin drip with dextrose drip per protocol.  Abg not yet done, but co2 on bmp is stable.       At the conclusion of the encounter I discussed the results of all the tests and the disposition. The questions were answered. The patient or family acknowledged understanding and was agreeable with the care plan.      60 minutes of critical care time    MEDICATIONS GIVEN IN THE EMERGENCY DEPARTMENT:  Medications   ondansetron (ZOFRAN) injection 4 mg (4 mg Intravenous Given 12/12/22 1302)   dextrose 50 % injection 25-50 mL (has no administration in time range)   insulin regular (MYXREDLIN) 1 unit/mL ED DKA infusion ( Intravenous Rate/Dose Change 12/12/22 1698)   ondansetron (ZOFRAN) injection 4 mg (has no administration in time range)   acetaminophen (TYLENOL) tablet 1,000 mg (has no administration in time range)   amLODIPine (NORVASC) tablet 10 mg (has no administration in time range)   Metoprolol Tartrate TABS 75 mg (has no administration in time range)   traZODone (DESYREL) tablet 50 mg (has no administration in time range)   insulin regular (MYXREDLIN) 1 unit/mL infusion (has no administration in time range)   glucose gel 15-30 g (has no administration in time range)     Or   dextrose 50 % injection 25-50 mL (has no administration in time range)     Or   glucagon injection 1 mg (has no administration in time range)    0.45% sodium chloride + KCl 20 mEq/L infusion (has no administration in time range)   dextrose 5% and 0.45% NaCl + KCl 20 mEq/L infusion (has no administration in time range)   pantoprazole (PROTONIX) IV push injection 40 mg (has no administration in time range)   prochlorperazine (COMPAZINE) injection 10 mg (has no administration in time range)     Or   prochlorperazine (COMPAZINE) tablet 10 mg (has no administration in time range)     Or   prochlorperazine (COMPAZINE) suppository 25 mg (has no administration in time range)   lactated ringers BOLUS 2,000 mL (0 mLs Intravenous Stopped 12/12/22 1315)   prochlorperazine (COMPAZINE) injection 5 mg (5 mg Intravenous Given 12/12/22 1055)   diphenhydrAMINE (BENADRYL) injection 25 mg (25 mg Intravenous Given 12/12/22 1059)       NEW PRESCRIPTIONS STARTED AT TODAY'S ED VISIT:  New Prescriptions    No medications on file       HPI     HPI     Josefa Curiel is a 47 year old female with a pertinent history of LORIN, ETOH abuse, cannabis abuse, essential hypertension, DM Type I, chronic renal impairment stage 3, upper GI bleed, nephrosis, diabetic ketoacidosis, and diabetic ulcer of her right foot, who presents to this ED by walk in for evaluation of nausea and vomiting.    Per Chart Review,  11/16/22  - 11/28/22 The patient was admitted to [facility] for further evaluation of nausea and vomiing. They suspected the patient's nausea and vomiting was from dozy triggered pre-renal state on ckd  Patient improved with IV fluid.     Patient complains of continuous nausea and vomiting that began 2 days ago. Patient is unsure what may have triggered her symptoms but she notes that she used marijuana prior to her symptoms beginning. Patient notes that her blood glucose levels have been normal recently. Patient endorses rhinorrhea, diaphoresis, and chills. Patient denies cough, fever, or any other concerns at this time.    REVIEW OF SYSTEMS:  Review of Systems  "  Constitutional: Positive for chills and diaphoresis. Negative for fever.   HENT: Positive for rhinorrhea.    Respiratory: Negative for cough.    Gastrointestinal: Positive for nausea and vomiting.   All other systems reviewed and are negative.    remainder of systems are all otherwise negative.    Medical History     Past Medical History:   Diagnosis Date     LORIN (acute kidney injury) (H)      Anxiety      Cannabis abuse      Depression      Diabetes Type 1, uncontrolled 1985     DKA (diabetic ketoacidoses)      ETOH abuse      HLD (hyperlipidemia)      HTN (hypertension)      Lactic acidosis        Past Surgical History:   Procedure Laterality Date     ANKLE FRACTURE SURGERY Left      APPENDECTOMY       INCISION AND DRAINAGE FOOT, COMBINED Right 11/18/2022    Procedure: INCISION AND DRAINAGE, right foot;  Surgeon: David Patel DPM;  Location: [facility] OR     PICC SINGLE LUMEN PLACEMENT  10/23/2022            Family History   Problem Relation Age of Onset     Clotting Disorder Mother         \"thin blood\"     Arthritis Mother      Hyperlipidemia Mother      Skin Cancer Father         face/nose      Depression Father      Other - See Comments Sister         eye surgeries     No Known Problems Brother      Coronary Artery Disease Maternal Grandmother      Alcoholism Maternal Grandfather      Coronary Artery Disease Maternal Grandfather      No Known Problems Paternal Grandmother      No Known Problems Paternal Grandfather        Social History     Tobacco Use     Smoking status: Some Days     Smokeless tobacco: Never     Tobacco comments:     occasional   Substance Use Topics     Alcohol use: Yes     Alcohol/week: 35.0 standard drinks     Comment: Alcoholic Drinks/day: ~750 mL wine daily     Drug use: Yes     Types: Marijuana     Comment: Drug use: 4-5x/week       acetaminophen (TYLENOL) 500 MG tablet  amLODIPine (NORVASC) 10 MG tablet  childrens multivitamin with iron (FLINTSTONES COMPLETE) " "CHEW  Cholecalciferol (VITAMIN D3) 50 MCG (2000 UT) CHEW  collagenase (SANTYL) 250 UNIT/GM external ointment  Continuous Blood Gluc Sensor (DEXCOM G6 SENSOR) MISC  DULoxetine (CYMBALTA) 60 MG capsule  ferrous sulfate (FEROSUL) 325 (65 Fe) MG tablet  insulin degludec (TRESIBA) 100 UNIT/ML pen  insulin lispro (HUMALOG KWIKPEN) 100 UNIT/ML (1 unit dial) KWIKPEN  metoprolol tartrate 75 MG TABS  rosuvastatin (CRESTOR) 40 MG tablet  traZODone (DESYREL) 100 MG tablet        Physical Exam     First Vitals:  Patient Vitals for the past 24 hrs:   BP Temp Temp src Pulse Resp SpO2 Height Weight   12/12/22 1445 -- -- -- (!) 122 26 100 % -- --   12/12/22 1430 -- -- -- (!) 126 27 100 % -- --   12/12/22 1415 -- -- -- (!) 125 22 100 % -- --   12/12/22 1400 -- -- -- (!) 126 22 100 % -- --   12/12/22 1345 -- -- -- (!) 128 29 100 % -- --   12/12/22 1330 -- -- -- 104 30 100 % -- --   12/12/22 1315 -- -- -- (!) 129 30 100 % -- --   12/12/22 1300 (!) 170/108 -- -- (!) 129 25 98 % -- --   12/12/22 1215 (!) 177/104 -- -- 98 30 100 % -- --   12/12/22 1200 -- -- -- 115 12 -- -- --   12/12/22 1145 -- -- -- 119 25 -- -- --   12/12/22 1130 -- -- -- 119 15 -- -- --   12/12/22 1115 -- -- -- (!) 121 16 -- -- --   12/12/22 0910 (!) 191/105 97.4  F (36.3  C) Oral (!) 122 24 100 % 1.702 m (5' 7\") 61.2 kg (135 lb)     PHYSICAL EXAM:   Constitutional:   Patient sitting semi upright in ED bed. Smells ketotic.  HENT:  Normocephalic, posterior pharynx wnl, mucous membranes moist and pink.   Eyes:  PERRL, EOMI, Conjunctiva normal, No discharge, no scleral icterus.  Respiratory:  Breathing easily, tachypneic, lungs clear to auscultation.  Cardiovascular:  nl s1s2 0 murmurs, rubs, or gallops.  Peripheral pulses dp, pt, and radial are wnl. No peripheral edema   GI:  Bowel sounds normal, Soft, No tenderness, No flank tenderness, nondistended.  :No CVA tenderness.   Musculoskeletal:  Moves all extremities.  No erythematous or swollen major joints, "   Integument:  Skin slightly warm but dry other than sweating around the nape of her neck. Ulceration underneath her right 1st and 2nd toes with granulation but no foul smell or obvious pus. The skin around the wound is healthy and pink. Small blister on medical aspect of her right 3rd toe. Necrotic scab on the tip of her left great toe. Small new scab on the bottom of her left 3rd toe.   Lymphatic:  No cervical lymphadenopathy  Neurologic:  Alert & oriented x 3, Normal motor function, Normal sensory function, No focal deficits noted. Normal speech.  Psychiatric:  Affect normal, Judgment normal, Mood normal.     Results     LAB AND RADIOLOGY:  All pertinent labs reviewed and interpreted  Results for orders placed or performed during the hospital encounter of 12/12/22   Chest XR,  PA & LAT     Status: None    Narrative    EXAM: XR CHEST 2 VIEWS  LOCATION: [facility]  DATE/TIME: 12/12/2022 12:53 PM    INDICATION: tachypnea.  diabetic  COMPARISON: 02/07/2009      Impression    IMPRESSION: Normal heart size. No effusion. Right middle and lower lobe infiltrates most likely represent pneumonia.   Foot  XR, G/E 3 views, right     Status: None    Narrative    EXAM: XR FOOT RIGHT G/E 3 VIEWS  LOCATION: [facility]  DATE/TIME: 12/12/2022 12:54 PM    INDICATION: ulceration at 1st and second mtp chronic. Infection. Evaluate for osteomyelitis or free air  COMPARISON: None.      Impression    IMPRESSION: No acute fracture or dislocation. No radiographic evidence of osteomyelitis or soft tissue gas. If there is persistent concern, MRI would be more sensitive.    A subcentimeter metallic foreign body is again noted in the soft tissues of the posterior ankle/distal calf, unchanged.   Hemoglobin A1c     Status: Abnormal   Result Value Ref Range    Hemoglobin A1C 6.1 (H) <5.7 %   Basic metabolic panel     Status: Abnormal   Result Value Ref Range    Sodium 144 136 - 145 mmol/L     Potassium 4.1 3.4 - 5.3 mmol/L    Chloride 98 98 - 107 mmol/L    Carbon Dioxide (CO2) 18 (L) 22 - 29 mmol/L    Anion Gap 28 (H) 7 - 15 mmol/L    Urea Nitrogen 45.9 (H) 6.0 - 20.0 mg/dL    Creatinine 4.78 (H) 0.51 - 0.95 mg/dL    Calcium 9.9 8.6 - 10.0 mg/dL    Glucose 212 (H) 70 - 99 mg/dL    GFR Estimate 11 (L) >60 mL/min/1.73m2   Hepatic panel     Status: Abnormal   Result Value Ref Range    Protein Total 7.7 6.4 - 8.3 g/dL    Albumin 4.2 3.5 - 5.2 g/dL    Bilirubin Total 0.4 <=1.2 mg/dL    Alkaline Phosphatase 115 (H) 35 - 104 U/L    AST 26 10 - 35 U/L    ALT 12 10 - 35 U/L    Bilirubin Direct <0.20 0.00 - 0.30 mg/dL   Lipase     Status: Normal   Result Value Ref Range    Lipase 34 13 - 60 U/L   Lactic acid whole blood     Status: Abnormal   Result Value Ref Range    Lactic Acid 3.2 (H) 0.7 - 2.0 mmol/L   Troponin T, High Sensitivity     Status: Abnormal   Result Value Ref Range    Troponin T, High Sensitivity 64 (H) <=14 ng/L   Blood gas venous     Status: Abnormal   Result Value Ref Range    pH Venous 7.58 (HH) 7.35 - 7.45    pCO2 Venous 23 (L) 35 - 50 mm Hg    pO2 Venous 46 25 - 47 mm Hg    Bicarbonate Venous 22 (L) 24 - 30 mmol/L    Base Excess/Deficit (+/-) -0.1   mmol/L    Oxyhemoglobin Venous 87.4 (H) 70.0 - 75.0 %    O2 Sat, Venous 88.8 (H) 70.0 - 75.0 %   Ketone Beta-Hydroxybutyrate Quantitative     Status: Abnormal   Result Value Ref Range    Ketone (Beta-Hydroxybutyrate) Quantitative 4.2 (HH) 0.0 - 0.6 mmol/L   UA with Microscopic reflex to Culture     Status: Abnormal    Specimen: Urine, Clean Catch   Result Value Ref Range    Color Urine Light Yellow Colorless, Straw, Light Yellow, Yellow    Appearance Urine Clear Clear    Glucose Urine 300 (A) Negative mg/dL    Bilirubin Urine Negative Negative    Ketones Urine 20 (A) Negative mg/dL    Specific Gravity Urine 1.012 1.001 - 1.030    Blood Urine 0.03 mg/dL (A) Negative    pH Urine 8.0 (H) 5.0 - 7.0    Protein Albumin Urine >600 (A) Negative mg/dL     Urobilinogen Urine <2.0 <2.0 mg/dL    Nitrite Urine Negative Negative    Leukocyte Esterase Urine Negative Negative    RBC Urine 1 <=2 /HPF    WBC Urine 0 <=5 /HPF    Narrative    Urine Culture not indicated   HCG QUALitative pregnancy (blood)     Status: Normal   Result Value Ref Range    hCG Serum Qualitative Negative Negative   Glucose by meter     Status: Abnormal   Result Value Ref Range    GLUCOSE BY METER POCT 209 (H) 70 - 99 mg/dL   Lactic acid whole blood     Status: Abnormal   Result Value Ref Range    Lactic Acid 2.4 (H) 0.7 - 2.0 mmol/L   Troponin T, High Sensitivity (now)     Status: Abnormal   Result Value Ref Range    Troponin T, High Sensitivity 57 (H) <=14 ng/L   Glucose by meter     Status: Abnormal   Result Value Ref Range    GLUCOSE BY METER POCT 279 (H) 70 - 99 mg/dL   Basic metabolic panel     Status: Abnormal   Result Value Ref Range    Sodium 142 136 - 145 mmol/L    Potassium 4.6 3.4 - 5.3 mmol/L    Chloride 97 (L) 98 - 107 mmol/L    Carbon Dioxide (CO2) 18 (L) 22 - 29 mmol/L    Anion Gap 27 (H) 7 - 15 mmol/L    Urea Nitrogen 43.1 (H) 6.0 - 20.0 mg/dL    Creatinine 4.49 (H) 0.51 - 0.95 mg/dL    Calcium 9.5 8.6 - 10.0 mg/dL    Glucose 274 (H) 70 - 99 mg/dL    GFR Estimate 12 (L) >60 mL/min/1.73m2   Glucose by meter     Status: Abnormal   Result Value Ref Range    GLUCOSE BY METER POCT 287 (H) 70 - 99 mg/dL       ECG:    Performed at: 1040    Impression: nst rate of 119, r axis with rsr' pattern in v1-2.  Wandering baseline but no acute st changes noted.  Compared to 11/23/22 hr increased  And qtc lengthened otherwise no significant changes noted.  Pr 134ms, qrs 94ms, qtc 497ms, prt axes 76 92 72.        I have independently reviewed and interpreted the EKS(s) documented above           The creation of this record is based on the scribe s observations of the work being performed by Monty Santos and the provider s statements to them. This document has been checked and approved by Trina  MD Trina Denis MD  Emergency Medicine  [facility] EMERGENCY DEPARTMENT       Trina Denis MD  12/12/22 [id number]

## 2022-12-12 NOTE — ED NOTES
Pt to ed room 2 with c/o n/v, not able to keep food or water down for 2 days, pt is a type 1 diabetic. Assessment completed, monitors applied, iv started, blood drawn and sent to lab, LR bolus started and pt medicated with compazine and benadryl iv as ordered, will continue to monitor.

## 2022-12-12 NOTE — PROGRESS NOTES
Chart reviewed, unable to reach pt, diabetic and recent admit 11/16-11/28, discharged home w/wound vac and podiatry Dr Patel following foot woundcare. CM to follow for discharge needs.

## 2022-12-12 NOTE — H&P
Admission History and Physical   Josefa Curiel,  1975, MRN [id number]      DKA (diabetic ketoacidosis) (H) [E11.10]    PCP: Domingo Costello, [unfilled], [phone]   Code status:  Prior       Extended Emergency Contact Information  Primary Emergency Contact: Wilbert Peña   [facility]  Mobile Phone: [phone]  Relation: Significant other       Assessment and Plan     Assessment:  47-year-old with CKD 3, marijuana use, type 1 diabetes who presented with intractable nausea and vomiting for 2 days.  Prior to coming to the hospital patient has been taking marijuana.  Suspected this is a DKA  No source of infection has been found.  Patient denies any cough or dysuria.  UA is negative for nitrites and leukocyte esterase    DKA in type I diabetic  -- Ordered DKA protocol  -- Precipitant is unclear. Order Blood culture as per protocol  -- Order procalcitonin to rule out infection  -- May switch to Lantus and NovoLog when anion gap is closed    Lactic acid is secondary to 1  -- Anticipate improvement with DKA protocol    Acute renal failure superimposed on CKD 4  -- Baseline creatinine of 4.14  -- Avoid nephrotoxic substances  -- Anticipate improvement with IV hydration    Nausea and vomiting likely due to 1, uremia and chronic marijuana use  -- Order Compazine as needed for nausea and vomiting  --Order IV Protonix  -- Patient advised to refrain from marijuana due to cyclical vomiting syndrome     recent admission for right foot ulcer  -- Completed antibiotics as per self-report     Hypertensive urgency on presentation  -- Order as needed hydralazine when patient is n.p.o. and then restart home medication as able to take p.o.    Tobacco and marijuana use  -- Patient advised to refrain from marijuana    Past ethanol use  -- Patient claims she quit 6 years ago    Anxiety depression  -- Continue home meds    Addendum Dharmesh Dewey MD, Hospitalist,22,8:11 PM  Patient's blood sugars have been low.  GTT  is off.  Bicarbonate is 25 and anion gap at 16.  We will transition the patient to subcutaneous insulin and order a diet.    Plan:    Pt would be admitted as inpatient and will likely stay for greater than 2 midnights.    Precautions: Fall    Nutrition: N.p.o. while DKA protocol and then diabetic diet    Activity: As tolerated    IV fluids: As per protocol    Antibiotics: None    DVT prophylaxis: None    GI prophylaxis: PPI    Consult: None    Monitor: Anion gap    Labs: As above    Imaging: None      Total unit/floor time 70 minutes. Greater than 50% was spent in counseling with the patient on discussions regarding marijuana use.     Chief Complaint:  Nausea and vomiting     HPI:    Informant is patient reliable  Josefa Curiel is a 47 year old old female with history of type I diabetic on insulin, marijuana use,  CKD 3, smoker, hypertension who presented with 2 days of nausea and vomiting.  Patient did consume marijuana 2 days ago.  Patient took her insulin for the past 2 days but did not take it today.  She denies any cough, fever, urinary frequency or burning, chest pain, abdominal pain.  She denies any foot pain.  In the ER patient was found to have a lactic acid of 3.2 on presentation with a creatinine of 4.78.  Anion gap was elevated at 28 bicarb was 18 and ketones were 4.2.  She was initially given Ringer lactate's solution at 150 mill per hour.  After discussion with the ED a decision was made to start insulin drip as per DKA protocol.                       Medical History      Past Medical History:   Diagnosis Date     LORIN (acute kidney injury) (H)      Anxiety      Cannabis abuse      Depression      Diabetes Type 1, uncontrolled 1985    Onset age 10     DKA (diabetic ketoacidoses)      ETOH abuse      HLD (hyperlipidemia)      HTN (hypertension)      Lactic acidosis       Family History  Reviewed, and family history includes Alcoholism in her maternal grandfather; Arthritis in her mother; Clotting  Disorder in her mother; Coronary Artery Disease in her maternal grandfather and maternal grandmother; Depression in her father; Hyperlipidemia in her mother; No Known Problems in her brother, paternal grandfather, and paternal grandmother; Other - See Comments in her sister; Skin Cancer in her father.          Allergies  Allergies   Allergen Reactions     Cefazolin Nephrotoxicity     Colestipol GI Disturbance     Doxycycline Nausea and Vomiting     Nickel Rash     Penicillins Rash    Social History  Reviewed, and she  reports that she has been smoking. She has never used smokeless tobacco. She reports current alcohol use of about 35.0 standard drinks per week. She reports current drug use. Drug: Marijuana.    Review of Systems:  10-point ROS negative, except as noted in HPI            Prior to Admission Medications   (Not in a hospital admission)         Physical Exam:  Temp:  [97.4  F (36.3  C)] 97.4  F (36.3  C)  Pulse:  [] 125  Resp:  [12-30] 22  BP: (170-191)/(104-108) 170/108  SpO2:  [98 %-100 %] 100 %  Wt Readings from Last 5 Encounters:   12/12/22 61.2 kg (135 lb)   11/24/22 65.8 kg (145 lb 1 oz)   11/15/22 59 kg (130 lb)   11/01/22 59 kg (130 lb)   10/19/22 59 kg (130 lb)     Body mass index is 21.14 kg/m .  Alert, oriented*3  No pallor, icterus, clubbing, cyanosis  Well-nourished  No sinus tenderness  Dry mucus membranes  Neck supple, but thin   CVS: Tachycardic  Resp: B/L vesicular breath sounds, no wheezing, crackles   Abd: soft, No t/g/r  Neuro: Tremors of the outstretched hand  Vasc: no leg edema  No clubbing  Skin--no generalized skin rash     Pertinent Labs  Lab Results:  Recent Results (from the past 24 hour(s))   Glucose by meter    Collection Time: 12/12/22 10:02 AM   Result Value Ref Range    GLUCOSE BY METER POCT 209 (H) 70 - 99 mg/dL   Hemoglobin A1c    Collection Time: 12/12/22 10:52 AM   Result Value Ref Range    Hemoglobin A1C 6.1 (H) <5.7 %   Basic metabolic panel    Collection Time:  12/12/22 10:52 AM   Result Value Ref Range    Sodium 144 136 - 145 mmol/L    Potassium 4.1 3.4 - 5.3 mmol/L    Chloride 98 98 - 107 mmol/L    Carbon Dioxide (CO2) 18 (L) 22 - 29 mmol/L    Anion Gap 28 (H) 7 - 15 mmol/L    Urea Nitrogen 45.9 (H) 6.0 - 20.0 mg/dL    Creatinine 4.78 (H) 0.51 - 0.95 mg/dL    Calcium 9.9 8.6 - 10.0 mg/dL    Glucose 212 (H) 70 - 99 mg/dL    GFR Estimate 11 (L) >60 mL/min/1.73m2   Hepatic panel    Collection Time: 12/12/22 10:52 AM   Result Value Ref Range    Protein Total 7.7 6.4 - 8.3 g/dL    Albumin 4.2 3.5 - 5.2 g/dL    Bilirubin Total 0.4 <=1.2 mg/dL    Alkaline Phosphatase 115 (H) 35 - 104 U/L    AST 26 10 - 35 U/L    ALT 12 10 - 35 U/L    Bilirubin Direct <0.20 0.00 - 0.30 mg/dL   Lipase    Collection Time: 12/12/22 10:52 AM   Result Value Ref Range    Lipase 34 13 - 60 U/L   Lactic acid whole blood    Collection Time: 12/12/22 10:52 AM   Result Value Ref Range    Lactic Acid 3.2 (H) 0.7 - 2.0 mmol/L   Troponin T, High Sensitivity    Collection Time: 12/12/22 10:52 AM   Result Value Ref Range    Troponin T, High Sensitivity 64 (H) <=14 ng/L   Blood gas venous    Collection Time: 12/12/22 10:52 AM   Result Value Ref Range    pH Venous 7.58 (HH) 7.35 - 7.45    pCO2 Venous 23 (L) 35 - 50 mm Hg    pO2 Venous 46 25 - 47 mm Hg    Bicarbonate Venous 22 (L) 24 - 30 mmol/L    Base Excess/Deficit (+/-) -0.1   mmol/L    Oxyhemoglobin Venous 87.4 (H) 70.0 - 75.0 %    O2 Sat, Venous 88.8 (H) 70.0 - 75.0 %   Ketone Beta-Hydroxybutyrate Quantitative    Collection Time: 12/12/22 10:52 AM   Result Value Ref Range    Ketone (Beta-Hydroxybutyrate) Quantitative 4.2 (HH) 0.0 - 0.6 mmol/L   HCG QUALitative pregnancy (blood)    Collection Time: 12/12/22 10:52 AM   Result Value Ref Range    hCG Serum Qualitative Negative Negative   Glucose by meter    Collection Time: 12/12/22  1:10 PM   Result Value Ref Range    GLUCOSE BY METER POCT 279 (H) 70 - 99 mg/dL   UA with Microscopic reflex to Culture     Collection Time: 12/12/22  1:26 PM    Specimen: Urine, Clean Catch   Result Value Ref Range    Color Urine Light Yellow Colorless, Straw, Light Yellow, Yellow    Appearance Urine Clear Clear    Glucose Urine 300 (A) Negative mg/dL    Bilirubin Urine Negative Negative    Ketones Urine 20 (A) Negative mg/dL    Specific Gravity Urine 1.012 1.001 - 1.030    Blood Urine 0.03 mg/dL (A) Negative    pH Urine 8.0 (H) 5.0 - 7.0    Protein Albumin Urine >600 (A) Negative mg/dL    Urobilinogen Urine <2.0 <2.0 mg/dL    Nitrite Urine Negative Negative    Leukocyte Esterase Urine Negative Negative    RBC Urine 1 <=2 /HPF    WBC Urine 0 <=5 /HPF   Lactic acid whole blood    Collection Time: 12/12/22  1:39 PM   Result Value Ref Range    Lactic Acid 2.4 (H) 0.7 - 2.0 mmol/L   Troponin T, High Sensitivity (now)    Collection Time: 12/12/22  1:39 PM   Result Value Ref Range    Troponin T, High Sensitivity 57 (H) <=14 ng/L   Basic metabolic panel    Collection Time: 12/12/22  1:39 PM   Result Value Ref Range    Sodium 142 136 - 145 mmol/L    Potassium 4.6 3.4 - 5.3 mmol/L    Chloride 97 (L) 98 - 107 mmol/L    Carbon Dioxide (CO2) 18 (L) 22 - 29 mmol/L    Anion Gap 27 (H) 7 - 15 mmol/L    Urea Nitrogen 43.1 (H) 6.0 - 20.0 mg/dL    Creatinine 4.49 (H) 0.51 - 0.95 mg/dL    Calcium 9.5 8.6 - 10.0 mg/dL    Glucose 274 (H) 70 - 99 mg/dL    GFR Estimate 12 (L) >60 mL/min/1.73m2   Glucose by meter    Collection Time: 12/12/22  2:26 PM   Result Value Ref Range    GLUCOSE BY METER POCT 287 (H) 70 - 99 mg/dL     No results found for: HGBA1C  Lab Results   Component Value Date    IRON 35 (L) 11/23/2022     No results found for: CKTOTAL, CKMB, TROPONINI  No results found for: TSH, [id number]    Pertinent Radiology  Radiology Results:  Chest XR,  PA & LAT    Result Date: 12/12/2022  EXAM: XR CHEST 2 VIEWS LOCATION: [facility] DATE/TIME: 12/12/2022 12:53 PM INDICATION: tachypnea.  diabetic COMPARISON: 02/07/2009     IMPRESSION:  Normal heart size. No effusion. Right middle and lower lobe infiltrates most likely represent pneumonia.    MR Foot Right w/o Contrast    Result Date: 11/17/2022  EXAM: MR FOOT RIGHT W/O CONTRAST LOCATION: [facility] DATE/TIME: 11/17/2022 3:42 PM INDICATION: 47-year-old patient with a right foot infection. Clinical concern for osteomyelitis. COMPARISON: 10/19/2022 MRI. TECHNIQUE: Unenhanced. FINDINGS: JOINTS AND BONES: -No fracture or bone contusion. Normal articular cartilage. No effusion. TENDONS: -No tendon tear, tendinopathy, or tenosynovitis. LIGAMENTS: -Lisfranc ligament: Intact. No subluxation. MUSCLES AND SOFT TISSUES: -Moderate fatty atrophy of the mid and forefoot musculature. -Increased T2 signal in the foot musculature, consistent with denervation change. No intramuscular fluid collection. -Mild subcutaneous thickening and edema type signal in the plantar forefoot at the level of the TMT joints. No soft tissue fluid collection or mass.     IMPRESSION: 1.  Negative for osteomyelitis, septic arthritis, and abscess. 2.  Mild subcutaneous soft tissue thickening and edema type signal in the plantar forefoot at the level of the TMT joints. This could represent cellulitis in the correct clinical setting. 3.  Muscle fatty atrophy and denervation change, stable.    US Renal Complete Non-Vascular    Result Date: 11/22/2022  EXAM: US RENAL COMPLETE NON-VASCULAR LOCATION: [facility] DATE/TIME: 11/22/2022 8:10 PM INDICATION: Renal failure. COMPARISON: None. TECHNIQUE: Routine Bilateral Renal and Bladder Ultrasound. FINDINGS: RIGHT KIDNEY: 9 x 6 x 5 cm. Echogenic renal parenchyma. No hydronephrosis. LEFT KIDNEY: 9 x 5 x 5 cm. Echogenic renal parenchyma. No hydronephrosis. BLADDER: Partially decompressed.     IMPRESSION: 1.  Echogenic renal parenchyma consistent with medical renal disease. No hydronephrosis.    Foot  XR, G/E 3 views, right    Result Date:  "12/12/2022  EXAM: XR FOOT RIGHT G/E 3 VIEWS LOCATION: [facility] DATE/TIME: 12/12/2022 12:54 PM INDICATION: ulceration at 1st and second mtp chronic. Infection. Evaluate for osteomyelitis or free air COMPARISON: None.     IMPRESSION: No acute fracture or dislocation. No radiographic evidence of osteomyelitis or soft tissue gas. If there is persistent concern, MRI would be more sensitive. A subcentimeter metallic foreign body is again noted in the soft tissues of the posterior ankle/distal calf, unchanged.    XR Foot Right G/E 3 Views    Result Date: 11/15/2022  EXAM: XR FOOT RIGHT G/E 3 VIEWS LOCATION: [facility] DATE/TIME: 11/15/2022 1:09 PM INDICATION: Suspect toe gangrene, osteomyelitis. COMPARISON: None.     IMPRESSION: Soft tissue bandage material and swelling within the toes; hyperdense material along the plantar aspect of the forefoot is likely ointment. There is no evidence of soft tissue gas or osteomyelitis by radiograph. No acute fracture. There is a metallic foreign body in the soft tissues of the posterior ankle/distal calf measuring 1.1 cm as seen on the lateral view.    POC US Guidance Needle Placement    Result Date: 11/18/2022  Ultrasound was performed as guidance to an anesthesia procedure.  Click \"PACS images\" hyperlink below to view any stored images.  For specific procedure details, view procedure note authored by anesthesia.      EKG Results: not reviewed.   Echo: No results found.        "

## 2022-12-13 LAB
ANION GAP SERPL CALCULATED.3IONS-SCNC: 15 MMOL/L (ref 7–15)
BUN SERPL-MCNC: 40.8 MG/DL (ref 6–20)
CALCIUM SERPL-MCNC: 8.7 MG/DL (ref 8.6–10)
CHLORIDE SERPL-SCNC: 104 MMOL/L (ref 98–107)
CREAT SERPL-MCNC: 4.76 MG/DL (ref 0.51–0.95)
CRP SERPL-MCNC: 4.4 MG/L
DEPRECATED HCO3 PLAS-SCNC: 25 MMOL/L (ref 22–29)
ERYTHROCYTE [DISTWIDTH] IN BLOOD BY AUTOMATED COUNT: 14.5 % (ref 10–15)
GFR SERPL CREATININE-BSD FRML MDRD: 11 ML/MIN/1.73M2
GLUCOSE BLDC GLUCOMTR-MCNC: 105 MG/DL (ref 70–99)
GLUCOSE BLDC GLUCOMTR-MCNC: 197 MG/DL (ref 70–99)
GLUCOSE BLDC GLUCOMTR-MCNC: 28 MG/DL (ref 70–99)
GLUCOSE BLDC GLUCOMTR-MCNC: 297 MG/DL (ref 70–99)
GLUCOSE BLDC GLUCOMTR-MCNC: 333 MG/DL (ref 70–99)
GLUCOSE BLDC GLUCOMTR-MCNC: 344 MG/DL (ref 70–99)
GLUCOSE BLDC GLUCOMTR-MCNC: 59 MG/DL (ref 70–99)
GLUCOSE BLDC GLUCOMTR-MCNC: 71 MG/DL (ref 70–99)
GLUCOSE SERPL-MCNC: 236 MG/DL (ref 70–99)
HCT VFR BLD AUTO: 26.6 % (ref 35–47)
HGB BLD-MCNC: 8.7 G/DL (ref 11.7–15.7)
MCH RBC QN AUTO: 29 PG (ref 26.5–33)
MCHC RBC AUTO-ENTMCNC: 32.7 G/DL (ref 31.5–36.5)
MCV RBC AUTO: 89 FL (ref 78–100)
PLATELET # BLD AUTO: 374 10E3/UL (ref 150–450)
POTASSIUM SERPL-SCNC: 4.7 MMOL/L (ref 3.4–5.3)
RBC # BLD AUTO: 3 10E6/UL (ref 3.8–5.2)
SODIUM SERPL-SCNC: 144 MMOL/L (ref 136–145)
WBC # BLD AUTO: 10.1 10E3/UL (ref 4–11)

## 2022-12-13 PROCEDURE — 250N000013 HC RX MED GY IP 250 OP 250 PS 637: Performed by: INTERNAL MEDICINE

## 2022-12-13 PROCEDURE — 99223 1ST HOSP IP/OBS HIGH 75: CPT | Performed by: PODIATRIST

## 2022-12-13 PROCEDURE — 80048 BASIC METABOLIC PNL TOTAL CA: CPT | Performed by: INTERNAL MEDICINE

## 2022-12-13 PROCEDURE — 250N000011 HC RX IP 250 OP 636: Performed by: INTERNAL MEDICINE

## 2022-12-13 PROCEDURE — 99233 SBSQ HOSP IP/OBS HIGH 50: CPT | Performed by: HOSPITALIST

## 2022-12-13 PROCEDURE — 86140 C-REACTIVE PROTEIN: CPT | Performed by: HOSPITALIST

## 2022-12-13 PROCEDURE — C9113 INJ PANTOPRAZOLE SODIUM, VIA: HCPCS | Performed by: INTERNAL MEDICINE

## 2022-12-13 PROCEDURE — 120N000001 HC R&B MED SURG/OB

## 2022-12-13 PROCEDURE — 36415 COLL VENOUS BLD VENIPUNCTURE: CPT | Performed by: INTERNAL MEDICINE

## 2022-12-13 PROCEDURE — 85027 COMPLETE CBC AUTOMATED: CPT | Performed by: INTERNAL MEDICINE

## 2022-12-13 RX ADMIN — HYDRALAZINE HYDROCHLORIDE 10 MG: 20 INJECTION INTRAMUSCULAR; INTRAVENOUS at 23:33

## 2022-12-13 RX ADMIN — TRAZODONE HYDROCHLORIDE 50 MG: 50 TABLET ORAL at 21:00

## 2022-12-13 RX ADMIN — PANTOPRAZOLE SODIUM 40 MG: 40 INJECTION, POWDER, FOR SOLUTION INTRAVENOUS at 06:51

## 2022-12-13 RX ADMIN — AMLODIPINE BESYLATE 10 MG: 5 TABLET ORAL at 08:32

## 2022-12-13 RX ADMIN — HYDRALAZINE HYDROCHLORIDE 10 MG: 20 INJECTION INTRAMUSCULAR; INTRAVENOUS at 11:32

## 2022-12-13 RX ADMIN — METOPROLOL TARTRATE 75 MG: 25 TABLET, FILM COATED ORAL at 20:59

## 2022-12-13 RX ADMIN — METOPROLOL TARTRATE 75 MG: 25 TABLET, FILM COATED ORAL at 08:32

## 2022-12-13 RX ADMIN — INSULIN GLARGINE 10 UNITS: 100 INJECTION, SOLUTION SUBCUTANEOUS at 21:00

## 2022-12-13 ASSESSMENT — ACTIVITIES OF DAILY LIVING (ADL)
ADLS_ACUITY_SCORE: 18
ADLS_ACUITY_SCORE: 20
ADLS_ACUITY_SCORE: 20
DIFFICULTY_EATING/SWALLOWING: NO
ADLS_ACUITY_SCORE: 18
ADLS_ACUITY_SCORE: 20
CONCENTRATING,_REMEMBERING_OR_MAKING_DECISIONS_DIFFICULTY: NO
DRESSING/BATHING_DIFFICULTY: NO
ADLS_ACUITY_SCORE: 20
WEAR_GLASSES_OR_BLIND: NO
FALL_HISTORY_WITHIN_LAST_SIX_MONTHS: NO
ADLS_ACUITY_SCORE: 18
ADLS_ACUITY_SCORE: 20
ADLS_ACUITY_SCORE: 35
DOING_ERRANDS_INDEPENDENTLY_DIFFICULTY: NO
ADLS_ACUITY_SCORE: 35
TOILETING_ISSUES: NO
ADLS_ACUITY_SCORE: 35
CHANGE_IN_FUNCTIONAL_STATUS_SINCE_ONSET_OF_CURRENT_ILLNESS/INJURY: NO
WALKING_OR_CLIMBING_STAIRS_DIFFICULTY: NO
ADLS_ACUITY_SCORE: 18

## 2022-12-13 NOTE — PLAN OF CARE
Problem: Plan of Care - These are the overarching goals to be used throughout the patient stay.    Goal: Optimal Comfort and Wellbeing  Outcome: Progressing     Problem: Hyperglycemia  Goal: Blood Glucose Level Within Targeted Range  Outcome: Progressing    Talked to Frank Pacheco in [facility]. NPO after midnight for 0700 test. No pain rxs of any form after midnight.

## 2022-12-13 NOTE — PLAN OF CARE
Goal Outcome Evaluation:       Pt called  to have her blood sugar checked be cause she was symptomatic; sweaty. Blood sugar was 28 at 0230. Treated. Last check was 197. Alert and oriented x4. Denies pain.

## 2022-12-13 NOTE — ED NOTES
Pt with last 2 blood sugar results of 105 & 75, per algorithm, insulin gtt turned off, pt rests in bed, alert and oriented without distress. Dr hess updated on pt status by text page.

## 2022-12-13 NOTE — PROGRESS NOTES
Care Management Follow Up    Length of Stay (days): 1    Expected Discharge Date: 12/14/2022     Concerns to be Addressed:   Podiatry consult    Patient plan of care discussed at interdisciplinary rounds: Yes    Anticipated Discharge Disposition: Home     Anticipated Discharge Services:  TBD  Anticipated Discharge DME:  TBD    Education Provided on the Discharge Plan: Per Care Team    Patient/Family in Agreement with the Plan:  Yes    Referrals Placed by CM/SW:    Private pay costs discussed: Not applicable    Additional Information:  Chart reviewed.    Pt is from home alone. Recent admit 11/16-11/28, discharged home w/wound vac and podiatry Dr Patel following foot woundcare. CM to follow for discharge needs.     Podiatry consult pending recommendations. No therapy consults at this time placed.     CM will continue to follow plan of care, review recommendations, and assist with any discharge needs anticipated.         Carol Whitney RN

## 2022-12-13 NOTE — CONSULTS
DIABETES CARE    Situation:  Consulted by Provider for Diabetes Education.  47-year-old with CKD 3, marijuana use, type 1 diabetes who presented with intractable nausea and vomiting for 2 days.  Prior to coming to the hospital patient has been taking marijuana.  Suspected this is a DKA     Background:  Related Co-morbidities include: HTN, HLD, CKD  PCP: Domingo Costello MD  Social: Lives with boyfriend    Diabetes History:   Has had DM a long time    Meds for BG Management PTA:  Tresiba 10 units in am  1:15 I:C ratio at meals, Novolog to carb grams  Will add a unit or two if BG elevated    Current Inpatient Meds for BG Management:  Lantus 10 units at hs  1:15 I:C ratio at meals, Novolog to carb grams  Novolog correction scale 1/50 over 140 at meals    Labs:  Hemoglobin A1C: 6.1 % with Hgb 8.7  GFR: 11 mL/min/1.73m^2    Blood Glucose POC:    Latest Reference Range & Units 12/12/22 19:43 12/12/22 20:39 12/12/22 21:52 12/13/22 02:28 12/13/22 02:51 12/13/22 03:15 12/13/22 04:08 12/13/22 05:12   GLUCOSE BY METER POCT 70 - 99 mg/dL 75 94 137 (H) 28 (LL) 59 (L) 71 105 (H) 197 (H)   (LL): Data is critically low  (H): Data is abnormally high  (L): Data is abnormally low    Diet Order: 60 grams CHO    Weight: 61.2 kg    BMI: 21.14 kg/m^2    DM EDUCATION/COUNSELING:  Barriers to Learning and/or DM Self-Management: None  Previous DM Education: has had  Current education :  Educated/reviewed DKA: pathophysiology, hyperglycemia, treatment. She states she did not miss her basal insulin dose.  Did suggest a back up person if she was ill, unable to recommended glucose monitoring, taking fluids, insulin, calling provider if needed. Also talked about ketone testing, did in past.     Educated/reviewed hypoglycemia: sx, causes, Explained normal/goals of blood sugar control, A1C  Patient  Is now using a Dexcom G6, ready to start process of getting an insulin pump, has appt in 2 weeks.   CGMS does alert to lows and highs. Did suggest she have  "glucagon in home for boyfriend to use if she was unable to help herself with hypoglycemia. Will see what is covered and recommend provider order at discharge.    Patient has no problems using insulin pen. Also discussed site rotation, proper storage  Patient comfortable in counting carbohydrates.    Talked to her about the type 1 diabetes support group that meets monthly virtually. I will e-mail her the contact information.    Assessment:  Patient knowledgeable about her diabetes   Low BG in night likely related to kidney function and Tresiba's 42 hour duration.    Recommendations:  1. Assess BG pattern daily and adjust doses as needed. May need decrease in basal insulin.   2. Patient will be starting process of starting insulin pump therapy soon and will follow-up with endocrinologist and diabetes educator    Refer to \"Guidelines for Insulin Initiation and Care in Hospitalized Adults\"  link in Diabetes Management Order set for dosing guidelines.    Hospital goals for blood glucose levels are < 180 mg/dL for improved health outcomes.    DISCHARGE NEEDS:  RX needed at DC (preferred by patient's insurance):  Jose Elias Amaya, auto injector      Thank you,     Desire Busby RN, Certified Diabetes Care and     [address] [address]  [email or web address]   Office: [phone]  Pager: [phone]                                      "

## 2022-12-13 NOTE — CONSULTS
Consultation - Foot and Ankle/Podiatry  Josefa Curiel,  1975, MRN [id number]    Admitting Dx: DKA (diabetic ketoacidosis) (H) [E11.10]  Long Q-T syndrome [I45.81]  Elevated troponin [R77.8]  Mixed acid base balance disorder [E87.4]  Diabetic ketoacidosis without coma associated with type 1 diabetes mellitus (H) [E10.10]    PCP: Domingo Costello, [phone]   Code status:  Full Code       Extended Emergency Contact Information  Primary Emergency Contact: Wilbert Peña   [facility]  Mobile Phone: [phone]  Relation: Significant other       ASSESSMENT   Diabetic Ulceration right foot  Active Problems:    DKA (diabetic ketoacidosis) (H)       PLAN   -The right foot ulceration is stable, no signs of infection. WBC 10.1.  Afebrile.     -No surgical intervention or imaging planned at this time. I recommend continued local wound cares per [facility] nurse, consult placed. Limited walking right foot with surgical shoe.     -Medical management per hospitalist. I would like to see her at her previously scheduled appointment later this week if discharged.     Thank you for the consultation. I will sign off at this time. Please contact me with questions.     David Patel DPM  [facility]/Foot & Ankle Surgery  On-Call: [phone]  ______________________________________________________________________        Reason For Consult: Diabetic Foot Ulceration right        HPI    I have been requested by Dr. Linder to evaluate Josefa Curiel who is a 47 year old year old female for the above. The patient was admitted to [facility] for nausea/vomiting. She is known to me, currently being seen for a right foot ulcer. The patient denies concerns with her feet at this time and is feeling better since being hospitalized. PMH DM1, CKD.          Medical History  Past Medical History:   Diagnosis Date     LORIN (acute kidney injury) (H)      Anxiety      Cannabis abuse      Depression      Diabetes Type 1,  uncontrolled 1985    Onset age 10     DKA (diabetic ketoacidoses)      ETOH abuse      HLD (hyperlipidemia)      HTN (hypertension)      Lactic acidosis      Surgical History  She  has a past surgical history that includes appendectomy; Ankle Fracture Surgery (Left); PICC/Midline Placement (10/23/2022); and Incision and drainage foot, combined (Right, 11/18/2022).   Social History  Reviewed, and she  reports that she has been smoking. She has never used smokeless tobacco. She reports current alcohol use of about 35.0 standard drinks per week. She reports current drug use. Drug: Marijuana.   Allergies  Allergies   Allergen Reactions     Cefazolin Nephrotoxicity     Colestipol GI Disturbance     Doxycycline Nausea and Vomiting     Nickel Rash     Penicillins Rash    Family History  family history includes Alcoholism in her maternal grandfather; Arthritis in her mother; Clotting Disorder in her mother; Coronary Artery Disease in her maternal grandfather and maternal grandmother; Depression in her father; Hyperlipidemia in her mother; No Known Problems in her brother, paternal grandfather, and paternal grandmother; Other - See Comments in her sister; Skin Cancer in her father.  family history not pertinent to presenting problem.    Psychosocial Needs  Social History     Social History Narrative     Not on file     Additional psychosocial needs reviewed per nursing assessment.       Prior to Admission Medications   Medications Prior to Admission   Medication Sig Dispense Refill Last Dose     acetaminophen (TYLENOL) 500 MG tablet Take 2 tablets (1,000 mg) by mouth every 6 hours as needed for pain   Past Week at -     amLODIPine (NORVASC) 10 MG tablet Take 1 tablet (10 mg) by mouth daily 30 tablet 3 12/11/2022 at am     childrens multivitamin with iron (FLINTSTONES COMPLETE) CHEW Take 1 tablet by mouth daily   12/11/2022 at am     Cholecalciferol (VITAMIN D3) 50 MCG (2000 UT) CHEW Take 50 mcg by mouth daily   12/11/2022 at  am     collagenase (SANTYL) 250 UNIT/GM external ointment Apply topically daily Total square centimeters of wound(s) being treated is         30 day supply 30 g 3 Unknown at --     Continuous Blood Gluc Sensor (DEXCOM G6 SENSOR) MISC    -- at --     DULoxetine (CYMBALTA) 60 MG capsule [DULOXETINE (CYMBALTA) 30 MG CAPSULE] Take 60 mg by mouth every evening.    12/10/2022 at sancho     ferrous sulfate (FEROSUL) 325 (65 Fe) MG tablet Take 1 tablet (325 mg) by mouth daily 30 tablet 3 12/11/2022 at am     insulin degludec (TRESIBA) 100 UNIT/ML pen Inject 10 Units Subcutaneous every morning 15 mL 0 12/11/2022 at am     insulin lispro (HUMALOG KWIKPEN) 100 UNIT/ML (1 unit dial) KWIKPEN 1 unit per 15 gm Carbs, hold if glucose < 80 (Patient taking differently: Inject 1 unit for every 6g carbs for breakfast, 1 unit for every 4g carbs for lunch, & 1 unit for every 3g carbs for dinner plus 1 unit for every 70 blood sugar above 130. Max 50 units per day) 15 mL 0 Past Week at -     metoprolol tartrate 75 MG TABS Take 75 mg by mouth 2 times daily 60 tablet 3 12/11/2022     rosuvastatin (CRESTOR) 40 MG tablet Take 40 mg by mouth daily   12/11/2022     traZODone (DESYREL) 100 MG tablet Take 50 mg by mouth nightly as needed for sleep   prn              Review of Systems:  All other systems negative in detail except what is noted in HPI.  Physical Exam:  Temp:  [98  F (36.7  C)-98.9  F (37.2  C)] 98  F (36.7  C)  Pulse:  [] 89  Resp:  [12-31] 18  BP: (138-186)/() 138/75  SpO2:  [92 %-100 %] 96 %    General appearance: Patient is alert and fully cooperative with history & exam.  No sign of distress is noted during the visit.  Psychiatric: Affect is pleasant & appropriate.  Patient appears motivated to improve health.  Respiratory: Breathing is regular & unlabored while sitting.  HEENT: Hearing is intact to spoken word.  Speech is clear.  No gross evidence of visual impairment that would impact ambulation.    Vascular: Dorsalis  "pedis palpable right. There is no appreciable edema noted.  Dermatologic: Ulceration plantar right forefoot has a granular base, no erythema.   Neurologic: Diminished to light touch right foot.   Musculoskeletal: Contracted digits right foot.     /75 (BP Location: Left arm)   Pulse 89   Temp 98  F (36.7  C) (Oral)   Resp 18   Ht 1.702 m (5' 7\")   Wt 61.2 kg (135 lb)   LMP 11/02/2022   SpO2 96%   BMI 21.14 kg/m      BMI= Body mass index is 21.14 kg/m .    Pertinent Labs    [unfilled]       Recent Labs   Lab 12/13/22  0614 12/12/22  1856 12/12/22  1339    146* 142   CO2 25 25 18*   BUN 40.8* 43.0* 43.1*       Lab Results   Component Value Date    ALT 12 12/12/2022    AST 26 12/12/2022    ALKPHOS 115 (H) 12/12/2022          Lab Results   Component Value Date    CRP 4.40 12/13/2022    CRP <3.00 11/16/2022    CRP <3.00 11/15/2022      @[facility]@     Pertinent Radiology  Radiology Results: Reviewed  Chest XR,  PA & LAT    Result Date: 12/12/2022  EXAM: XR CHEST 2 VIEWS LOCATION: [facility] DATE/TIME: 12/12/2022 12:53 PM INDICATION: tachypnea.  diabetic COMPARISON: 02/07/2009     IMPRESSION: Normal heart size. No effusion. Right middle and lower lobe infiltrates most likely represent pneumonia.    MR Foot Right w/o Contrast    Result Date: 11/17/2022  EXAM: MR FOOT RIGHT W/O CONTRAST LOCATION: [facility] DATE/TIME: 11/17/2022 3:42 PM INDICATION: 47-year-old patient with a right foot infection. Clinical concern for osteomyelitis. COMPARISON: 10/19/2022 MRI. TECHNIQUE: Unenhanced. FINDINGS: JOINTS AND BONES: -No fracture or bone contusion. Normal articular cartilage. No effusion. TENDONS: -No tendon tear, tendinopathy, or tenosynovitis. LIGAMENTS: -Lisfranc ligament: Intact. No subluxation. MUSCLES AND SOFT TISSUES: -Moderate fatty atrophy of the mid and forefoot musculature. -Increased T2 signal in the foot musculature, consistent with denervation change. No " intramuscular fluid collection. -Mild subcutaneous thickening and edema type signal in the plantar forefoot at the level of the TMT joints. No soft tissue fluid collection or mass.     IMPRESSION: 1.  Negative for osteomyelitis, septic arthritis, and abscess. 2.  Mild subcutaneous soft tissue thickening and edema type signal in the plantar forefoot at the level of the TMT joints. This could represent cellulitis in the correct clinical setting. 3.  Muscle fatty atrophy and denervation change, stable.    US Renal Complete Non-Vascular    Result Date: 11/22/2022  EXAM: US RENAL COMPLETE NON-VASCULAR LOCATION: [facility] DATE/TIME: 11/22/2022 8:10 PM INDICATION: Renal failure. COMPARISON: None. TECHNIQUE: Routine Bilateral Renal and Bladder Ultrasound. FINDINGS: RIGHT KIDNEY: 9 x 6 x 5 cm. Echogenic renal parenchyma. No hydronephrosis. LEFT KIDNEY: 9 x 5 x 5 cm. Echogenic renal parenchyma. No hydronephrosis. BLADDER: Partially decompressed.     IMPRESSION: 1.  Echogenic renal parenchyma consistent with medical renal disease. No hydronephrosis.    Foot  XR, G/E 3 views, right    Result Date: 12/12/2022  EXAM: XR FOOT RIGHT G/E 3 VIEWS LOCATION: [facility] DATE/TIME: 12/12/2022 12:54 PM INDICATION: ulceration at 1st and second mtp chronic. Infection. Evaluate for osteomyelitis or free air COMPARISON: None.     IMPRESSION: No acute fracture or dislocation. No radiographic evidence of osteomyelitis or soft tissue gas. If there is persistent concern, MRI would be more sensitive. A subcentimeter metallic foreign body is again noted in the soft tissues of the posterior ankle/distal calf, unchanged.    XR Foot Right G/E 3 Views    Result Date: 11/15/2022  EXAM: XR FOOT RIGHT G/E 3 VIEWS LOCATION: [facility] DATE/TIME: 11/15/2022 1:09 PM INDICATION: Suspect toe gangrene, osteomyelitis. COMPARISON: None.     IMPRESSION: Soft tissue bandage material and  "swelling within the toes; hyperdense material along the plantar aspect of the forefoot is likely ointment. There is no evidence of soft tissue gas or osteomyelitis by radiograph. No acute fracture. There is a metallic foreign body in the soft tissues of the posterior ankle/distal calf measuring 1.1 cm as seen on the lateral view.    POC US Guidance Needle Placement    Result Date: 11/18/2022  Ultrasound was performed as guidance to an anesthesia procedure.  Click \"PACS images\" hyperlink below to view any stored images.  For specific procedure details, view procedure note authored by anesthesia.           "

## 2022-12-13 NOTE — UTILIZATION REVIEW
"Inpatient to Observation note:    Admission Status; Secondary Review Determination     Under the authority of the Utilization Management Committee, the utilization review process indicated a secondary review on the above patient.  The review outcome is based on review of the medical records, discussions with staff, and applying clinical experience noted on the date of the review.          (x) Observation Status Appropriate - This patient does not meet hospital inpatient criteria and is placed in observation status. If this patient's primary payer is Medicare and was admitted as an inpatient, Condition Code 44 should be used and patient status changed to \"observation\".     RATIONALE FOR DETERMINATION   47-year-old with CKD 3, marijuana use, type 1 diabetes, ETOH abuse, essential hypertension, upper GI bleed, nephrosis, diabetic ketoacidosis, and diabetic ulcer of her right foot, who presents to ED with intractable nausea and vomiting for 2 days. Pt admitted for quincy on ckd, and DKA. She was placed on DKA treatment protocol. Her symptoms improve and pt able to tolerates diet with her insulin gtt transitioned to subcutaneous insulin        The severity of illness, intensity of service provided, expected LOS and risk for adverse outcome make the care appropriate for further observation; however, doesn't meet criteria for hospital inpatient admission. Dr Linder notified of this determination.        The information on this document is developed by the utilization review team in order for the business office to ensure compliance.  This only denotes the appropriateness of proper admission status and does not reflect the quality of care rendered.         The definitions of Inpatient Status and Observation Status used in making the determination above are those provided in the CMS Coverage Manual, Chapter 1 and Chapter 6, section 70.4.      Sincerely,  Cristiano Schilling MD  Utilization Review  Physician Advisor  [facility] " Services.

## 2022-12-13 NOTE — PROGRESS NOTES
[facility] Hospitalist Progress Note  [facility]  Admission date: 12/12/2022    Summary:    47F with DM1, CKD4, HTN, DM foot ulcer a/w DKA which resolved with IVF and IV insulin.  Also with LORIN on CKD4-5, DM foot ulcer and recurrent N/V.    Assessment/Plan    #DM1 with DKA on admission, labile BG  -lantus, 1:15 CE, SSI    #Nausea and vomiting - recurrent over last 3 admission.  Should stop cannabis.   Reports prior evaluation for DM gastroparesis 3 years ago, though I do not see a gastric emptying study.  Given recurrent admission for this will obtain one.    #Mood disorder - stop cymbalta in advanced renal disease.  Could also be contributing to recurrent N/V.    #DM foot ulcer - x-ray without underlying osteo. Not clearly infected  Podiatry consult.      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  F/u with nephrology outpatient unless worsenining Cr and then will need inpatient.      #HTN - norvasc, lopressor    #HLD - statin    Checklist:  Code Status: Full Code    Diet: Combination Diet Moderate Consistent Carb (60 g CHO per Meal) Diet    Williamson Catheter: Not present  Central Lines: None        Auto-populated based on system request - if relevant they will be addressed above:  Clinically Significant Risk Factors         # Hypernatremia: Highest Na = 146 mmol/L in last 2 days, will monitor as appropriate     # Anion Gap Metabolic Acidosis: Highest Anion Gap = 28 mmol/L in last 2 days, will monitor and treat as appropriate                    Auto-populated from discharge tab:     Expected Discharge Date: 12/14/2022                 Overnight Events/Subjective/Notable results:    Nausea and vomiting improved.  Had a VAC for wound but was removed per patient report.  Appears to be improving.  Clear drainage without feevers    4 point ROS otherwise negative    Objective    Vital signs in last 24 hours  Temp:  [98.2  F (36.8  C)-98.9  F (37.2  C)] 98.2  F (36.8  C)  Pulse:  [] 77  Resp:   [12-31] 18  BP: (128-186)/() 173/96  SpO2:  [91 %-100 %] 100 % O2 Device: None (Room air)    Weight:   135 lbs 0 oz    Intake/Output last 3 shifts  I/O last 3 completed shifts:  In: 2543.33 [P.O.:240; I.V.:303.33; IV Piggyback:2000]  Out: 800 [Urine:800]  Body mass index is 21.14 kg/m .    Physical Exam  General:  Alert, cooperative, no distress    Neurologic:  oriented, facial symmetry preserved, fluent speech.   Psych: calm, mood and affect appropriate to situation  HEENT:  Anicteric, MMM  CV: RRR no MRG, normal S1 and S2, no edema  Lungs: CTAB.  Easyrespirations  Abd: soft, NT, normoactive BS  Skin: DM foot ulcer with exposed fat.  Not clearly infected  Central Lines and Tubes: None (no pineda, CVC, feeding tubes)      I have reviewed all labs, medications, imaging studies in the last 24 hours.  Pertinent findings&changes discussed above.    Data     Gilmar Linder MD, [facility]  Internal Medicine Hospitalist

## 2022-12-13 NOTE — UTILIZATION REVIEW
Admission Status; Secondary Review Determination   Under the authority of the Utilization Management Committee, the utilization review process indicated a secondary review on Josefa Curiel. The review outcome is based on review of the medical records, discussions with staff, and applying clinical experience noted on the date of the review.   (x) Inpatient Status Appropriate - This patient's medical care is consistent with medical management for inpatient care and reasonable inpatient medical practice.     RATIONALE FOR DETERMINATION   Second opinion:  Asked by Dr. Linder for second opinion on this 47 yr old female with Type I DM presented with DKA.  At this time DKA resolved however this is now 4th admission in less than 3 months for similar and Dr. Linder questioning gastroparesis that may be complicating picture with N/V that leads to DKA.  Patient also with large foot wound and podiatry consultation pending.  Unclear if will need MRI/debridement/wound vac.  This AM BP quite elevated and IV meds given.  Cr has also increased since arrival despite significant fluids given for DKA and up from baseline at 4.1 to now 4.75.  Medically complex and risk of decompensation.  Glucose up at 333 again now.  At risk of repeat admission if these issues not adequately addressed this stay.     At the time of admission with the information available to the attending physician more than 2 nights Hospital complex care was anticipated, based on patient risk of adverse outcome if treated as outpatient and complex care required. Inpatient admission is appropriate based on the Medicare guidelines.   The information on this document is developed by the utilization review team in order for the business office to ensure compliance. This only denotes the appropriateness of proper admission status and does not reflect the quality of care rendered.   The definitions of Inpatient Status and Observation Status used in making the  determination above are those provided in the CMS Coverage Manual, Chapter 1 and Chapter 6, section 70.4.   Sincerely,   Tasia Sparks MD  Utilization Review  Physician Advisor  [facility]

## 2022-12-13 NOTE — ED NOTES
Dr hess in ed to update plan of care for pt, he states he will place orders to change pt to insulin sc and allow her to eat. Will wait for orders.

## 2022-12-14 ENCOUNTER — APPOINTMENT (OUTPATIENT)
Dept: NUCLEAR MEDICINE | Facility: HOSPITAL | Age: 47
DRG: 637 | End: 2022-12-14
Attending: HOSPITALIST
Payer: COMMERCIAL

## 2022-12-14 VITALS
TEMPERATURE: 97.8 F | DIASTOLIC BLOOD PRESSURE: 101 MMHG | SYSTOLIC BLOOD PRESSURE: 179 MMHG | BODY MASS INDEX: 21.19 KG/M2 | WEIGHT: 135 LBS | HEIGHT: 67 IN | RESPIRATION RATE: 18 BRPM | HEART RATE: 85 BPM | OXYGEN SATURATION: 100 %

## 2022-12-14 LAB
ANION GAP SERPL CALCULATED.3IONS-SCNC: 14 MMOL/L (ref 7–15)
BUN SERPL-MCNC: 36.7 MG/DL (ref 6–20)
CALCIUM SERPL-MCNC: 8.7 MG/DL (ref 8.6–10)
CHLORIDE SERPL-SCNC: 101 MMOL/L (ref 98–107)
CREAT SERPL-MCNC: 4.7 MG/DL (ref 0.51–0.95)
DEPRECATED HCO3 PLAS-SCNC: 24 MMOL/L (ref 22–29)
GFR SERPL CREATININE-BSD FRML MDRD: 11 ML/MIN/1.73M2
GLUCOSE BLDC GLUCOMTR-MCNC: 148 MG/DL (ref 70–99)
GLUCOSE BLDC GLUCOMTR-MCNC: 222 MG/DL (ref 70–99)
GLUCOSE BLDC GLUCOMTR-MCNC: 50 MG/DL (ref 70–99)
GLUCOSE BLDC GLUCOMTR-MCNC: 68 MG/DL (ref 70–99)
GLUCOSE BLDC GLUCOMTR-MCNC: 72 MG/DL (ref 70–99)
GLUCOSE SERPL-MCNC: 78 MG/DL (ref 70–99)
HOLD SPECIMEN: NORMAL
POTASSIUM SERPL-SCNC: 3.9 MMOL/L (ref 3.4–5.3)
SODIUM SERPL-SCNC: 139 MMOL/L (ref 136–145)

## 2022-12-14 PROCEDURE — G0463 HOSPITAL OUTPT CLINIC VISIT: HCPCS

## 2022-12-14 PROCEDURE — C9113 INJ PANTOPRAZOLE SODIUM, VIA: HCPCS | Performed by: INTERNAL MEDICINE

## 2022-12-14 PROCEDURE — 250N000013 HC RX MED GY IP 250 OP 250 PS 637: Performed by: HOSPITALIST

## 2022-12-14 PROCEDURE — 99239 HOSP IP/OBS DSCHRG MGMT >30: CPT | Performed by: HOSPITALIST

## 2022-12-14 PROCEDURE — 250N000011 HC RX IP 250 OP 636: Performed by: INTERNAL MEDICINE

## 2022-12-14 PROCEDURE — 343N000001 HC RX 343: Performed by: HOSPITALIST

## 2022-12-14 PROCEDURE — 250N000013 HC RX MED GY IP 250 OP 250 PS 637: Performed by: INTERNAL MEDICINE

## 2022-12-14 PROCEDURE — A9541 TC99M SULFUR COLLOID: HCPCS | Performed by: HOSPITALIST

## 2022-12-14 PROCEDURE — 36415 COLL VENOUS BLD VENIPUNCTURE: CPT | Performed by: HOSPITALIST

## 2022-12-14 PROCEDURE — 80048 BASIC METABOLIC PNL TOTAL CA: CPT | Performed by: HOSPITALIST

## 2022-12-14 PROCEDURE — 78264 GASTRIC EMPTYING IMG STUDY: CPT

## 2022-12-14 RX ORDER — HYDRALAZINE HYDROCHLORIDE 25 MG/1
25 TABLET, FILM COATED ORAL 3 TIMES DAILY
Qty: 90 TABLET | Refills: 1 | Status: SHIPPED | OUTPATIENT
Start: 2022-12-14 | End: 2023-07-14

## 2022-12-14 RX ORDER — INSULIN LISPRO 100 [IU]/ML
INJECTION, SOLUTION INTRAVENOUS; SUBCUTANEOUS
Qty: 15 ML
Start: 2022-12-14 | End: 2023-01-02

## 2022-12-14 RX ORDER — INSULIN LISPRO 100 [IU]/ML
INJECTION, SOLUTION INTRAVENOUS; SUBCUTANEOUS
Start: 2022-12-14 | End: 2022-12-14

## 2022-12-14 RX ORDER — HYDRALAZINE HYDROCHLORIDE 10 MG/1
10 TABLET, FILM COATED ORAL 3 TIMES DAILY
Status: DISCONTINUED | OUTPATIENT
Start: 2022-12-14 | End: 2022-12-14 | Stop reason: HOSPADM

## 2022-12-14 RX ADMIN — AMLODIPINE BESYLATE 10 MG: 5 TABLET ORAL at 11:41

## 2022-12-14 RX ADMIN — HYDRALAZINE HYDROCHLORIDE 10 MG: 20 INJECTION INTRAMUSCULAR; INTRAVENOUS at 09:46

## 2022-12-14 RX ADMIN — METOPROLOL TARTRATE 75 MG: 25 TABLET, FILM COATED ORAL at 11:42

## 2022-12-14 RX ADMIN — ACETAMINOPHEN 1000 MG: 500 TABLET ORAL at 12:01

## 2022-12-14 RX ADMIN — PANTOPRAZOLE SODIUM 40 MG: 40 INJECTION, POWDER, FOR SOLUTION INTRAVENOUS at 11:49

## 2022-12-14 RX ADMIN — HYDRALAZINE HYDROCHLORIDE 10 MG: 10 TABLET, FILM COATED ORAL at 13:57

## 2022-12-14 RX ADMIN — Medication 1.1 MILLICURIE: at 06:56

## 2022-12-14 RX ADMIN — DEXTROSE 15 G: 15 GEL ORAL at 02:07

## 2022-12-14 ASSESSMENT — ACTIVITIES OF DAILY LIVING (ADL)
ADLS_ACUITY_SCORE: 20

## 2022-12-14 NOTE — PLAN OF CARE
"  Problem: Plan of Care - These are the overarching goals to be used throughout the patient stay.    Goal: Absence of Hospital-Acquired Illness or Injury  Intervention: Prevent and Manage VTE (Venous Thromboembolism) Risk  Recent Flowsheet Documentation  Taken 12/14/2022 0029 by Bindu Haji RN  VTE Prevention/Management: SCDs (sequential compression devices) off     Problem: Plan of Care - These are the overarching goals to be used throughout the patient stay.    Goal: Patient-Specific Goal (Individualized)  Description: You can add care plan individualizations to a care plan. Examples of Individualization might be:  \"Parent requests to be called daily at 9am for status\", \"I have a hard time hearing out of my right ear\", or \"Do not touch me to wake me up as it startles me\".  Outcome: Progressing   Goal Outcome Evaluation:       Pt is A/O X4, denies pain, NPO for NUC med test, independent in the room, BS were 50 at 2 am, glucogel given, went up to 68 had some orange dana then was 74 at 6am. No complains of nausea , complained of mild headache, took prn tylenol. /85 at midnight.                 "

## 2022-12-14 NOTE — DISCHARGE INSTRUCTIONS
R foot plantar surface - Every other day  Cleanse with water or saline, gently dry.  Cut a piece of [place] to fit wound bed.  Secure with roll gauze.

## 2022-12-14 NOTE — DISCHARGE SUMMARY
[facility] MEDICINE  DISCHARGE SUMMARY     Primary Care Physician: Domingo Costello  Admission Date: 12/12/2022   Discharge Provider: Gilmar Linder MD Discharge Date: 12/14/2022   Diet:   Active Diet and Nourishment Order   Procedures     Moderate Consistent Carb (60 g CHO per Meal) Diet     Diet       Code Status: Full Code   Activity: DCACTIVITY: Activity as tolerated, limited walking R foot with surgical shoe        Condition at Discharge: Stable     REASON FOR PRESENTATION(See Admission Note for Details)     Nausea and vomiting    PRINCIPAL & ACTIVE DISCHARGE DIAGNOSES     Active Problems:    DKA (diabetic ketoacidosis) (H)    See below for further    PENDING LABS     Unresulted Labs Ordered in the Past 30 Days of this Admission     Date and Time Order Name Status Description    12/12/2022  9:38 AM Blood Culture Peripheral Blood Preliminary     12/12/2022  9:38 AM Blood Culture Line, venous Preliminary             PROCEDURES ( this hospitalization only)          RECOMMENDATIONS TO OUTPATIENT PROVIDER FOR F/U VISIT     Follow-up Appointments     Follow-up and recommended labs and tests      Follow up with primary care provider, Domingo Costello, within 7 days for   hospital follow- up.  BMP  You need close follow-up with nephrology as well for advanced kidney   disease               DISPOSITION     Home    SUMMARY OF HOSPITAL COURSE:         47F with DM1, CKD4, HTN, DM foot ulcer a/w DKA which resolved with IVF and IV insulin.  Also with LORIN on CKD4-5, DM foot ulcer and recurrent N/V.     Assessment/Plan     #DM1 with DKA on admission, labile BG  -resume prior to admission insulin regimen with lantus and novolog (reports 1:15 CE)     #Nausea and vomiting   #Diabetic gastroparesis  -recurrent over last 3 admission.  Should stop cannabis and cymbalta.    -Gastric emptying with delayed gastric emptying and meeting with RD regarding strategies to limit symptoms     #Mood disorder - stop  cymbalta in advanced renal disease.  Could also be contributing to recurrent N/V.     #DM foot ulcer - x-ray without underlying osteo.  Does not appear infected and seen by podiatry.  Limited walking with surgical shoe.  F/u podiatry    #LORIN on CKD4-5 - anticipate dialysis need in near future.  Cr seemingly plataued.  Close f/u with nephrology outpatient     #HTN - running high.  norvasc, lopressor, added hydralazine.     #HLD - statin    #RML/RLL infiltrate - seen on CXR on admission.  No symptoms for PNA and no elevated inflammatory markers.  Maybe had some transient aspiration pneumonitis with emesis.  Monitor as outpatient, no need for antibiotics at this time.    Discharge Medications with Med changes:     Current Discharge Medication List      START taking these medications    Details   hydrALAZINE (APRESOLINE) 25 MG tablet Take 1 tablet (25 mg) by mouth 3 times daily Hold for SBP < 110  Qty: 90 tablet, Refills: 1    Associated Diagnoses: Hypertension, unspecified type         CONTINUE these medications which have CHANGED    Details   insulin lispro (HUMALOG KWIKPEN) 100 UNIT/ML (1 unit dial) KWIKPEN Inject 1 unit for every 15g carbs  Qty: 15 mL    Associated Diagnoses: Uncontrolled type 1 diabetes mellitus with hyperglycemia (H)         CONTINUE these medications which have NOT CHANGED    Details   acetaminophen (TYLENOL) 500 MG tablet Take 2 tablets (1,000 mg) by mouth every 6 hours as needed for pain    Associated Diagnoses: Cellulitis of right foot      amLODIPine (NORVASC) 10 MG tablet Take 1 tablet (10 mg) by mouth daily  Qty: 30 tablet, Refills: 3    Associated Diagnoses: Hypertension, unspecified type      childrens multivitamin with iron (FLINTSTONES COMPLETE) CHEW Take 1 tablet by mouth daily    Associated Diagnoses: Anemia due to chronic kidney disease, unspecified CKD stage      Cholecalciferol (VITAMIN D3) 50 MCG (2000 UT) CHEW Take 50 mcg by mouth daily      collagenase (SANTYL) 250 UNIT/GM  external ointment Apply topically daily Total square centimeters of wound(s) being treated is         30 day supply  Qty: 30 g, Refills: 3    Associated Diagnoses: Ulcer of right foot, limited to breakdown of skin (H); Ulcer of left foot, limited to breakdown of skin (H)      Continuous Blood Gluc Sensor (DEXCOM G6 SENSOR) MISC       ferrous sulfate (FEROSUL) 325 (65 Fe) MG tablet Take 1 tablet (325 mg) by mouth daily  Qty: 30 tablet, Refills: 3    Associated Diagnoses: Anemia due to chronic kidney disease, unspecified CKD stage      insulin degludec (TRESIBA) 100 UNIT/ML pen Inject 10 Units Subcutaneous every morning  Qty: 15 mL, Refills: 0    Associated Diagnoses: Type 1 diabetes mellitus with other specified complication (H)      metoprolol tartrate 75 MG TABS Take 75 mg by mouth 2 times daily  Qty: 60 tablet, Refills: 3    Associated Diagnoses: Hypertensive urgency      rosuvastatin (CRESTOR) 40 MG tablet Take 40 mg by mouth daily      traZODone (DESYREL) 100 MG tablet Take 50 mg by mouth nightly as needed for sleep         STOP taking these medications       DULoxetine (CYMBALTA) 60 MG capsule Comments:   Reason for Stopping:                     Rationale for medication changes:      As above        Consults     DIABETES EDUCATION IP CONSULT  PODIATRY IP CONSULT  WOUND OSTOMY CONTINENCE NURSE  IP CONSULT  NUTRITION SERVICES ADULT IP CONSULT    Immunizations given this encounter     Most Recent Immunizations   Administered Date(s) Administered     DT (PEDS <7y) 04/08/1994     FLU 6-35 months 10/28/2009     Flu, Unspecified 10/30/2014     HepB-Adult 05/08/2015     Influenza (IIV3) PF 01/10/2012     Influenza Vaccine >6 months (Alfuria,Fluzone) 10/23/2022     Pneumo Conj 13-V (2010&after) 09/12/2008     Pneumococcal 23 valent 09/12/2008     TD (ADULT, 7+) 08/23/2019     Tdap (Adacel,Boostrix) 09/12/2008           Anticoagulation Information            SIGNIFICANT IMAGING FINDINGS     Results for orders placed or  performed during the hospital encounter of 12/12/22   Chest XR,  PA & LAT    Impression    IMPRESSION: Normal heart size. No effusion. Right middle and lower lobe infiltrates most likely represent pneumonia.   Foot  XR, G/E 3 views, right    Impression    IMPRESSION: No acute fracture or dislocation. No radiographic evidence of osteomyelitis or soft tissue gas. If there is persistent concern, MRI would be more sensitive.    A subcentimeter metallic foreign body is again noted in the soft tissues of the posterior ankle/distal calf, unchanged.   NM Gastric Emptying    Impression    IMPRESSION:     Delayed gastric emptying.         Discharge Orders        Medication Therapy Management Referral      Reason for your hospital stay    You were admitted with diabetic gastroparesis     Follow-up and recommended labs and tests    Follow up with primary care provider, Domingo Costello, within 7 days for hospital follow- up.  BMP  You need close follow-up with nephrology as well for advanced kidney disease     Activity    Your activity upon discharge: activity as tolerated     Discharge Instructions    Please stop using cannabis which could be contributing to your recurrent nausea and vomiting     Diet    Follow this diet upon discharge: Orders Placed This Encounter      Moderate Consistent Carb (60 g CHO per Meal) Diet       Examination   Physical Exam   Temp:  [97.6  F (36.4  C)-98.1  F (36.7  C)] 97.7  F (36.5  C)  Pulse:  [] 109  Resp:  [18] 18  BP: (138-197)/(75-99) 183/93  SpO2:  [96 %-100 %] 99 %  Wt Readings from Last 1 Encounters:   12/12/22 61.2 kg (135 lb)       Feeling ok.  Tolerating PO.  Cr stable.      Please see EMR for more detailed significant labs, imaging, consultant notes etc.    IGilmar MD, personally saw the patient today and spent greater than 30 minutes discharging this patient.    Gilmar Linder MD  [facility]    CC:Domingo Costello

## 2022-12-14 NOTE — PLAN OF CARE
Problem: Plan of Care - These are the overarching goals to be used throughout the patient stay.    Goal: Absence of Hospital-Acquired Illness or Injury  Intervention: Identify and Manage Fall Risk  Recent Flowsheet Documentation  Taken 12/14/2022 1045 by Estefani Hilliard RN  Safety Promotion/Fall Prevention: bed alarm on  Goal: Optimal Comfort and Wellbeing  Outcome: Progressing  Goal: Readiness for Transition of Care  Outcome: Progressing     Problem: Plan of Care - These are the overarching goals to be used throughout the patient stay.    Goal: Optimal Comfort and Wellbeing  Outcome: Progressing    Nutrition met with pt. As long as SBP<180, pt can discharge per Dr. Linder.

## 2022-12-14 NOTE — PLAN OF CARE
Problem: Plan of Care - These are the overarching goals to be used throughout the patient stay.    Goal: Plan of Care Review  Outcome: Adequate for Care Transition  Flowsheets (Taken 12/14/2022 1626)  Plan of Care Reviewed With: patient  Overall Patient Progress: improving  Goal: Patient-Specific Goal (Individualized)  Outcome: Adequate for Care Transition  Goal: Absence of Hospital-Acquired Illness or Injury  Outcome: Adequate for Care Transition  Intervention: Prevent Skin Injury  Recent Flowsheet Documentation  Taken 12/14/2022 1600 by Tiffanie Moore RN  Body Position: position changed independently  Goal: Optimal Comfort and Wellbeing  Outcome: Adequate for Care Transition  Goal: Readiness for Transition of Care  Outcome: Adequate for Care Transition     Problem: Hyperglycemia  Goal: Blood Glucose Level Within Targeted Range  Outcome: Adequate for Care Transition     Problem: Hypertension Comorbidity  Goal: Blood Pressure in Desired Range  Outcome: Adequate for Care Transition   Goal Outcome Evaluation:      Plan of Care Reviewed With: patient    Overall Patient Progress: improvingOverall Patient Progress: improving     Denies pain. Up independently. Reports nutrition saw patient earlier today & Right foot dressing changed. /101, notified Dr. Linder. IV's (x2) removed. Went over discharge paperwork with patient who left with all belongings around 1600 to go with ride home, left via W/C with aide. Patient to  Hydralazine prescription from [place] pharmacy across the street, notified patient they close at 5 pm.

## 2022-12-14 NOTE — CONSULTS
[facility]  WOC Nurse Inpatient Assessment     Consulted for: Right plantar foot    Patient History (according to provider note(s):      Preoperative diagnosis:   1. Abscess right foot  2. Ulceration right foot  11/18 with Dr. Patel    Areas Assessed:      Areas visualized during today's visit: right foot      Wound due to: Surgical Wound  Wound base: 100 % granulation     Palpation of the wound bed: normal       Drainage: small      Description of drainage: serosanguinous      Measurements (length x width x depth, in cm) 2.5 cm x 5 cm     Tunneling N/A      Undermining N/A   Periwound skin: Intact       Color: normal and consistent with surrounding tissue       Temperature: normal    Odor: none   Pain: denies   Treatment goal: build up granulation tissue and heal   STATUS: follow up improved   Supplies ordered: ordered Silvercel    Treatment Plan:   R foot plantar surface - Every other day  Cleanse with water or saline, gently dry.  Cut a piece of Silvercel to fit wound bed.  Secure with roll gauze.    Orders: Written    RECOMMEND PRIMARY TEAM ORDER: None, at this time  Education provided: plan of care  Discussed plan of care with: Patient  WOC nurse follow-up plan: weekly  Notify WOC if wound(s) deteriorate.  Nursing to notify the Provider(s) and re-consult the WOC Nurse if new skin concern.    DATA:     Current support surface: Standard  Standard gel/foam mattress (IsoFlex, Atmos air, etc)  Containment of urine/stool: Continent of bladder and Continent of bowel  BMI: Body mass index is 21.14 kg/m .   Active diet order: Orders Placed This Encounter      Moderate Consistent Carb (60 g CHO per Meal) Diet      Diet     Output: I/O last 3 completed shifts:  In: 640 [P.O.:640]  Out: -      Labs:   Recent Labs   Lab 12/13/22  0614 12/12/22  1052   ALBUMIN  --  4.2   HGB 8.7*  --    WBC 10.1  --    A1C  --  6.1*   CRP 4.40  --      Pressure injury risk assessment:   Sensory Perception:  3-->slightly limited  Moisture: 4-->rarely moist  Activity: 3-->walks occasionally  Mobility: 3-->slightly limited  Nutrition: 3-->adequate  Friction and Shear: 3-->no apparent problem  Corey Score: 19    Rachael Bonner RN [facility]OCN

## 2022-12-14 NOTE — PLAN OF CARE
Goal Outcome Evaluation:      Problem: Hyperglycemia  Goal: Blood Glucose Level Within Targeted Range  12/13/2022 2253 by Jossie Claros RN  Outcome: Progressing  12/13/2022 2252 by Jossie Claros RN  Outcome: Progressing     Problem: Hypertension Comorbidity  Goal: Blood Pressure in Desired Range  Outcome: Progressing  Intervention: Maintain Blood Pressure Management  Recent Flowsheet Documentation  Taken 12/13/2022 1700 by Jossie Claros RN  Medication Review/Management: medications reviewed     No pain or nausea reported. Independent in room-steady. BS at dinner 297-appropriate novalog given. Bedtime -lantus given and Dr. Cisneros paged for coverage. 5 units novalog given. Scheduled metoprolol given for /93. Other VSS.  NPO after midnight and no medications after midnight to prepare for NUC med appointment.     Jossie Claros RN

## 2022-12-14 NOTE — PROGRESS NOTES
Care Management Discharge Note    Discharge Date: 12/14/2022       Discharge Disposition: Home    Discharge Services:      Discharge DME:      Discharge Transportation:      Private pay costs discussed: Not applicable    PAS Confirmation Code:    Patient/family educated on Medicare website which has current facility and service quality ratings:      Education Provided on the Discharge Plan:    Persons Notified of Discharge Plans: patient   Patient/Family in Agreement with the Plan:      Handoff Referral Completed: Yes    Additional Information:  See below         Zuri Mark RN          Met with patient. She is no longer using the wound vac per direction from Dr Aburto's team. Denies needs from care management at this time.

## 2022-12-14 NOTE — CONSULTS
Nutrition Therapy:  S/B: EXAM: NM GASTRIC EMPTYING  LOCATION: [facility]  DATE/TIME: 12/14/2022 11:25 AM     INDICATION: Abdominal pain and early satiety. Gastroparesis evaluation.   COMPARISON: None.  TECHNIQUE: 1.1 mCi of technetium-99m sulfur colloid labeled egg product. Oral ingestion of standard meal. Anterior and posterior planar imaging for four hours. Geometric mean of emptying/retention calculated.     FINDINGS: The stomach is scintigraphically normal. No evidence of gastroesophageal reflux.     PATIENT'S RETENTION: 1 hour = 95.9%, 2 hours = 45.1%, 4 hours = 20.8%  NORMAL RETENTION: 1 hour = <90%, 2 hours = <60%, 3 hours = <30%, 4 hours = <10%                                                                   IMPRESSION:    Delayed gastric emptying.    A/R:  Diet education completed for  Diabetic gastroparesis. Written materials provided. Pt verbalized understanding

## 2022-12-16 ENCOUNTER — OFFICE VISIT (OUTPATIENT)
Dept: VASCULAR SURGERY | Facility: CLINIC | Age: 47
End: 2022-12-16
Attending: PODIATRIST
Payer: COMMERCIAL

## 2022-12-16 VITALS
HEART RATE: 104 BPM | BODY MASS INDEX: 21.19 KG/M2 | HEIGHT: 67 IN | SYSTOLIC BLOOD PRESSURE: 160 MMHG | DIASTOLIC BLOOD PRESSURE: 98 MMHG | WEIGHT: 135 LBS | OXYGEN SATURATION: 99 % | RESPIRATION RATE: 16 BRPM

## 2022-12-16 DIAGNOSIS — L97.511 ULCER OF RIGHT FOOT, LIMITED TO BREAKDOWN OF SKIN (H): Primary | ICD-10-CM

## 2022-12-16 DIAGNOSIS — L97.521 ULCER OF LEFT FOOT, LIMITED TO BREAKDOWN OF SKIN (H): ICD-10-CM

## 2022-12-16 PROCEDURE — 11042 DBRDMT SUBQ TIS 1ST 20SQCM/<: CPT | Performed by: PODIATRIST

## 2022-12-16 ASSESSMENT — PAIN SCALES - GENERAL: PAINLEVEL: NO PAIN (0)

## 2022-12-16 NOTE — PATIENT INSTRUCTIONS
Important lnstructions    OK TO RETURN WOUND VAC    WEIGHT BEARING STATUS: You are to remain NON WEIGHT BEARING on your right foot. NON WEIGHT BEARING MEANS NO PRESSURE ON YOUR FOOT OR HEEL AT ANY TIME FOR ANY REASON!    2. OFFLOADING DEVICE: Must use a CRUTCHES at all times! (do not use affected foot to push wheelchair)    3. STABILIZATION DEVICE: Use a CAM BOOT . You will need to WEAR THIS ANYTIME YOU ARE UP AND OUT OF BED, IT IS OKAY TO REMOVE WHEN YOU ARE SLEEPING..       4. ELEVATE: Elevating your leg means laying with your head on a pillow and your foot ABOVE YOUR WAIST.     5. DO NOT MOVE YOUR FOOT.  There is a risk of worsening the wound or incision. To give yourself a higher chance of healing, please DO NOT swing foot back and forth and wiggle foot/toes especially when inside a stabilization device.        Dressing Change lnstructions    - Dressing Application of  Endoform    1. Endoform should be cut to the size of the wound.  It should touch the edges of the ulcer. It is okay if it overlap the edges a small amount.  Then, moisten the Endoform with saline.(If it is easier for you, you can moisten it before laying it in the wound)    2. Cover the wound with Endoform, followed by dry gauze, and secure with roll gauze if needed.     3. Endoform is naturally used by the body over time so you may not find it in the wound when you change your dressing.  If you do find some, gently cleanse the wound with saline. Do not remove the remaining Endoform, which may appear as an off-white to castle gel, just add Endoform on top.  Usually, more Endoform will need to be added every 5-7 days.     4. Change your top dressing every 1-2 days or as needed depending on drainage.    -Endoform is a collagen dressing created from ovine (sheep) fore-stomach tissue. The collagen extracellular matrix transforms into a soft conforming sheet, which is naturally incorporated into the wound over time.  Collagen dressings are used to  stimulate wound healing.   ,         It IS NOT ok to get your wound wet in the bath or shower    SEEK MEDICAL CARE IF:  You have an increase in swelling, pain, or redness around the wound.  You have an increase in the amount of pus coming from the wound.  There is a bad smell coming from the wound.  The wound appears to be worsening/enlarging  You have a fever greater than 101.5 F      It is ok to continue current wound care treatment/products for the next 2-3 days until new wound care supplies are ordered and arrive. If longer than this please contact our office at [phone].        We want to hear from you!   In the next few weeks, you should receive a call or email to complete a survey about your visit at [facility]. Please help us improve your appointment experience by letting us know how we did today. We strive to make your experience good and value any ways in which we could do better.      We value your input and suggestions.    Thank you for choosing the [facility]!

## 2022-12-16 NOTE — PROGRESS NOTES
FOOT AND ANKLE SURGERY/PODIATRY Progress Note      ASSESSMENT:   Diabetic Ulceration right foot  Diabetic Ulceration 3rd digit left foot       TREATMENT:  -The right foot ulceration is measuring smaller today. I recommend use of endoform and non-weight bearing on the right foot. CAM boot for stability.     -Small stable eschar distal right hallux and 3rd digit right foot. We will continue to monitor.     -After discussion of risk factors and consent obtained 2% Lidocaine HCL jelly was applied, under clean conditions, the right and foot ulceration(s) were debrided using currette or #15 blade scalpel.  Devitalized and nonviable tissue, along with any fibrin and slough, was removed to improve granulation tissue formation, stimulate wound healing, decrease overall bacteria load, disrupt biofilm formation and decrease edge senescence. Wound drainage was scant No. Total excisional debridement was 14.85 sq cm into the subcutaneous tissue with a depth of 0.2 cm.   Ulcers were improved afterwards and .  Measures were as noted on the flow sheet. Collagen with a gauze dresssing was applied. She will continue to apply Collagen with a gauze dresssing as directed.    -She will follow-up in 3 weeks.    David Patel DPM  [facility] [place]      HPI: Josefa Curiel was seen again today for a right foot ulceration. She has been using silvercel on the right foot ulcer.       Past Medical History:   Diagnosis Date     LORIN (acute kidney injury) (H)      Anxiety      Cannabis abuse      Depression      Diabetes Type 1, uncontrolled 1985    Onset age 10     DKA (diabetic ketoacidoses)      ETOH abuse      HLD (hyperlipidemia)      HTN (hypertension)      Lactic acidosis        Past Surgical History:   Procedure Laterality Date     ANKLE FRACTURE SURGERY Left      APPENDECTOMY       INCISION AND DRAINAGE FOOT, COMBINED Right 11/18/2022    Procedure: INCISION AND DRAINAGE, right foot;  Surgeon: David Patel  "ADILENE Busby;  Location: [facility] [place] OR     PICC SINGLE LUMEN PLACEMENT  10/23/2022            Allergies   Allergen Reactions     Cefazolin Nephrotoxicity     Colestipol GI Disturbance     Doxycycline Nausea and Vomiting     Nickel Rash     Penicillins Rash         Current Outpatient Medications:      acetaminophen (TYLENOL) 500 MG tablet, Take 2 tablets (1,000 mg) by mouth every 6 hours as needed for pain, Disp: , Rfl:      amLODIPine (NORVASC) 10 MG tablet, Take 1 tablet (10 mg) by mouth daily, Disp: 30 tablet, Rfl: 3     childrens multivitamin with iron (FLINTSTONES COMPLETE) CHEW, Take 1 tablet by mouth daily, Disp: , Rfl:      Cholecalciferol (VITAMIN D3) 50 MCG (2000 UT) CHEW, Take 50 mcg by mouth daily, Disp: , Rfl:      collagenase (SANTYL) 250 UNIT/GM external ointment, Apply topically daily Total square centimeters of wound(s) being treated is         30 day supply, Disp: 30 g, Rfl: 3     Continuous Blood Gluc Sensor (DEXCOM G6 SENSOR) MISC, , Disp: , Rfl:      ferrous sulfate (FEROSUL) 325 (65 Fe) MG tablet, Take 1 tablet (325 mg) by mouth daily, Disp: 30 tablet, Rfl: 3     hydrALAZINE (APRESOLINE) 25 MG tablet, Take 1 tablet (25 mg) by mouth 3 times daily Hold for SBP < 110, Disp: 90 tablet, Rfl: 1     insulin degludec (TRESIBA) 100 UNIT/ML pen, Inject 10 Units Subcutaneous every morning, Disp: 15 mL, Rfl: 0     insulin lispro (HUMALOG KWIKPEN) 100 UNIT/ML (1 unit dial) KWIKPEN, Inject 1 unit for every 15g carbs, Disp: 15 mL, Rfl:      metoprolol tartrate 75 MG TABS, Take 75 mg by mouth 2 times daily, Disp: 60 tablet, Rfl: 3     rosuvastatin (CRESTOR) 40 MG tablet, Take 40 mg by mouth daily, Disp: , Rfl:      traZODone (DESYREL) 100 MG tablet, Take 50 mg by mouth nightly as needed for sleep, Disp: , Rfl:     Review of Systems - 10 point Review of Systems is negative except for right foot ulcer which is noted in HPI.      OBJECTIVE:  BP (!) 160/98   Pulse 104   Resp 16   Ht 5' 7\" (1.702 m)   Wt 135 lb " (61.2 kg)   [facility] 11/02/2022   SpO2 99%   BMI 21.14 kg/m    General appearance: Patient is alert and fully cooperative with history & exam.  No sign of distress is noted during the visit.    Vascular: Dorsalis pedis non-palpableBilateral.  Dermatologic:    Negative Pressure Wound Therapy Foot Anterior;Right (Active)   Wound Type Surgical 11/28/22 0800   Cycle Continuous 11/27/22 1600   Target Pressure (mmHg) 125 11/27/22 1600   Drainage Color/Characteristics Serosanguineous 11/27/22 1600   Cannister changed? No 11/27/22 1600   Output (ml) 10 ml 11/26/22 0830       Wound Foot Other (comment) NA (Active)       Wound Toe (Comment  which one) Ulceration (Active)       Wound Foot (Active)   Wound Bed Other (Comment) 12/14/22 0029   Celina-wound Assessment Black;Maceration 12/12/22 2200   Drainage Amount None 12/14/22 0029   Wound Care/Cleansing Normal saline 12/14/22 0029   Dressing Dry gauze 12/13/22 1700   Dressing Status Clean, dry, intact 12/14/22 1600       VASC Wound Right plantar foot (Active)   Pre Size Length 5.7 12/16/22 1015   Pre Size Width 3 12/16/22 1015   Pre Size Depth 0.2 12/16/22 1015   Pre Total Sq cm 17.1 12/16/22 1015   Post Size Length 5.5 12/16/22 1015   Post Size Width 2.7 12/16/22 1015   Post Size Depth 0.2 12/16/22 1015   Post Total Sq cm 14.85 12/16/22 1015       VASC Wound left 3rd toe (Active)       VASC Wound left 3rd toe (Active)   Post Size Length 0.2 12/02/22 1000   Post Size Width 0.2 12/02/22 1000   Post Size Depth 0.1 12/02/22 1000   Post Total Sq cm 0.04 12/02/22 1000   Description scabbed 12/16/22 1015       Incision/Surgical Site 11/18/22 Right Foot (Active)   Incision Assessment UTV 11/28/22 0800   Celina-Incision Assessment UTV 11/28/22 0800   Closure JOHNNY 11/27/22 1600   Incision Drainage Amount UTV 11/27/22 1100   Drainage Description Serosanguinous 11/28/22 0800   Incision Care Therapy - negative pressure 11/28/22 0800   Dressing Intervention Clean, dry, intact 11/28/22 0800    Granular base right foot ulcer. No erythema bilateral. Small stable eschar distal right hallux and 3rd digit right foot.   Neurologic: Diminished to light touch Bilateral.  Musculoskeletal: Contracted digits noted Bilateral.    Imaging:     Chest XR,  PA & LAT    Result Date: 12/12/2022  EXAM: XR CHEST 2 VIEWS LOCATION: [facility] DATE/TIME: 12/12/2022 12:53 PM INDICATION: tachypnea.  diabetic COMPARISON: 02/07/2009     IMPRESSION: Normal heart size. No effusion. Right middle and lower lobe infiltrates most likely represent pneumonia.    MR Foot Right w/o Contrast    Result Date: 11/17/2022  EXAM: MR FOOT RIGHT W/O CONTRAST LOCATION: [facility] DATE/TIME: 11/17/2022 3:42 PM INDICATION: 47-year-old patient with a right foot infection. Clinical concern for osteomyelitis. COMPARISON: 10/19/2022 MRI. TECHNIQUE: Unenhanced. FINDINGS: JOINTS AND BONES: -No fracture or bone contusion. Normal articular cartilage. No effusion. TENDONS: -No tendon tear, tendinopathy, or tenosynovitis. LIGAMENTS: -Lisfranc ligament: Intact. No subluxation. MUSCLES AND SOFT TISSUES: -Moderate fatty atrophy of the mid and forefoot musculature. -Increased T2 signal in the foot musculature, consistent with denervation change. No intramuscular fluid collection. -Mild subcutaneous thickening and edema type signal in the plantar forefoot at the level of the TMT joints. No soft tissue fluid collection or mass.     IMPRESSION: 1.  Negative for osteomyelitis, septic arthritis, and abscess. 2.  Mild subcutaneous soft tissue thickening and edema type signal in the plantar forefoot at the level of the TMT joints. This could represent cellulitis in the correct clinical setting. 3.  Muscle fatty atrophy and denervation change, stable.    US Renal Complete Non-Vascular    Result Date: 11/22/2022  EXAM: US RENAL COMPLETE NON-VASCULAR LOCATION: [facility] DATE/TIME: 11/22/2022 8:10 PM  "INDICATION: Renal failure. COMPARISON: None. TECHNIQUE: Routine Bilateral Renal and Bladder Ultrasound. FINDINGS: RIGHT KIDNEY: 9 x 6 x 5 cm. Echogenic renal parenchyma. No hydronephrosis. LEFT KIDNEY: 9 x 5 x 5 cm. Echogenic renal parenchyma. No hydronephrosis. BLADDER: Partially decompressed.     IMPRESSION: 1.  Echogenic renal parenchyma consistent with medical renal disease. No hydronephrosis.    Foot  XR, G/E 3 views, right    Result Date: 12/12/2022  EXAM: XR FOOT RIGHT G/E 3 VIEWS LOCATION: [facility] DATE/TIME: 12/12/2022 12:54 PM INDICATION: ulceration at 1st and second mtp chronic. Infection. Evaluate for osteomyelitis or free air COMPARISON: None.     IMPRESSION: No acute fracture or dislocation. No radiographic evidence of osteomyelitis or soft tissue gas. If there is persistent concern, MRI would be more sensitive. A subcentimeter metallic foreign body is again noted in the soft tissues of the posterior ankle/distal calf, unchanged.    NM Gastric Emptying    Result Date: 12/14/2022  EXAM: NM GASTRIC EMPTYING LOCATION: [facility] DATE/TIME: 12/14/2022 11:25 AM INDICATION: Abdominal pain and early satiety. Gastroparesis evaluation. COMPARISON: None. TECHNIQUE: 1.1 mCi of technetium-99m sulfur colloid labeled egg product. Oral ingestion of standard meal. Anterior and posterior planar imaging for four hours. Geometric mean of emptying/retention calculated. FINDINGS: The stomach is scintigraphically normal. No evidence of gastroesophageal reflux. PATIENT'S RETENTION: 1 hour = 95.9%, 2 hours = 45.1%, 4 hours = 20.8% NORMAL RETENTION: 1 hour = <90%, 2 hours = <60%, 3 hours = <30%, 4 hours = <10%     IMPRESSION: Delayed gastric emptying.    POC US Guidance Needle Placement    Result Date: 11/18/2022  Ultrasound was performed as guidance to an anesthesia procedure.  Click \"PACS images\" hyperlink below to view any stored images.  For specific procedure details, " view procedure note authored by anesthesia.         Picture:

## 2022-12-17 LAB
BACTERIA BLD CULT: NO GROWTH
BACTERIA BLD CULT: NO GROWTH

## 2022-12-29 ENCOUNTER — OFFICE VISIT (OUTPATIENT)
Dept: VASCULAR SURGERY | Facility: CLINIC | Age: 47
End: 2022-12-29
Attending: PODIATRIST
Payer: COMMERCIAL

## 2022-12-29 VITALS
WEIGHT: 135 LBS | HEIGHT: 67 IN | OXYGEN SATURATION: 98 % | HEART RATE: 65 BPM | SYSTOLIC BLOOD PRESSURE: 94 MMHG | BODY MASS INDEX: 21.19 KG/M2 | RESPIRATION RATE: 16 BRPM | DIASTOLIC BLOOD PRESSURE: 62 MMHG

## 2022-12-29 DIAGNOSIS — L97.511 ULCER OF RIGHT FOOT, LIMITED TO BREAKDOWN OF SKIN (H): Primary | ICD-10-CM

## 2022-12-29 DIAGNOSIS — L97.329 ULCER OF ANKLE, LEFT, WITH UNSPECIFIED SEVERITY (H): ICD-10-CM

## 2022-12-29 PROCEDURE — 11042 DBRDMT SUBQ TIS 1ST 20SQCM/<: CPT | Performed by: PODIATRIST

## 2022-12-29 ASSESSMENT — PAIN SCALES - GENERAL: PAINLEVEL: NO PAIN (0)

## 2022-12-29 NOTE — PATIENT INSTRUCTIONS
Important lnstructions    OK TO RETURN WOUND VAC    WEIGHT BEARING STATUS: You are to remain NON WEIGHT BEARING on your right foot. NON WEIGHT BEARING MEANS NO PRESSURE ON YOUR FOOT OR HEEL AT ANY TIME FOR ANY REASON!    2. OFFLOADING DEVICE: Must use a CRUTCHES at all times! (do not use affected foot to push wheelchair)    3. STABILIZATION DEVICE: Use a CAM BOOT . You will need to WEAR THIS ANYTIME YOU ARE UP AND OUT OF BED, IT IS OKAY TO REMOVE WHEN YOU ARE SLEEPING..       4. ELEVATE: Elevating your leg means laying with your head on a pillow and your foot ABOVE YOUR WAIST.     5. DO NOT MOVE YOUR FOOT.  There is a risk of worsening the wound or incision. To give yourself a higher chance of healing, please DO NOT swing foot back and forth and wiggle foot/toes especially when inside a stabilization device.        Dressing Change lnstructions      Left ankle and left hallux:  Apply iodine to eschars, cover with gauze and change daily.     Right foot:  - Dressing Application of  Endoform    1. Endoform should be cut to the size of the wound.  It should touch the edges of the ulcer. It is okay if it overlap the edges a small amount.  Then, moisten the Endoform with saline.(If it is easier for you, you can moisten it before laying it in the wound)    2. Cover the wound with Endoform, followed by dry gauze, and secure with roll gauze if needed.     3. Endoform is naturally used by the body over time so you may not find it in the wound when you change your dressing.  If you do find some, gently cleanse the wound with saline. Do not remove the remaining Endoform, which may appear as an off-white to castle gel, just add Endoform on top.  Usually, more Endoform will need to be added every 5-7 days.     4. Change your top dressing every 1-2 days or as needed depending on drainage.    -Endoform is a collagen dressing created from ovine (sheep) fore-stomach tissue. The collagen extracellular matrix transforms into a soft  conforming sheet, which is naturally incorporated into the wound over time.  Collagen dressings are used to stimulate wound healing.   ,         It IS NOT ok to get your wound wet in the bath or shower    SEEK MEDICAL CARE IF:  You have an increase in swelling, pain, or redness around the wound.  You have an increase in the amount of pus coming from the wound.  There is a bad smell coming from the wound.  The wound appears to be worsening/enlarging  You have a fever greater than 101.5 F      It is ok to continue current wound care treatment/products for the next 2-3 days until new wound care supplies are ordered and arrive. If longer than this please contact our office at [phone].        We want to hear from you!   In the next few weeks, you should receive a call or email to complete a survey about your visit at [facility]. Please help us improve your appointment experience by letting us know how we did today. We strive to make your experience good and value any ways in which we could do better.      We value your input and suggestions.    Thank you for choosing the [facility]!

## 2022-12-30 NOTE — PROGRESS NOTES
FOOT AND ANKLE SURGERY/PODIATRY Progress Note      ASSESSMENT:   Diabetic Ulceration right foot  Ulceration left ankle  Ulceration left hallux      TREATMENT:  -The right foot ulceration is measuring smaller today, recommend continued use of endoform and non-weight bearing.     -Iodine with a gauze dressing applied to the eschars along the lateral left ankle and left hallux, continue daily.     -After discussion of risk factors and consent obtained 2% Lidocaine HCL jelly was applied, under clean conditions, the right and foot ulceration(s) were debrided using currette.  Devitalized and nonviable tissue, along with any fibrin and slough, was removed to improve granulation tissue formation, stimulate wound healing, decrease overall bacteria load, disrupt biofilm formation and decrease edge senescence. Wound drainage was scant No. Total excisional debridement was 11 sq cm into the subcutaneous tissue with a depth of 0.3 cm.   Ulcers were improved afterwards and .  Measures were as noted on the flow sheet. Collagen with a gauze dresssing was applied. She will continue to apply Collagen with a gauze dresssing as directed.    -She will follow-up in 3 weeks.    David Patel DPM  [facility] [place]      HPI: Josefa Curiel was seen again today for bilateral foot ulcers. She continues to walk on both feet but is trying to reduce ambulation.       Past Medical History:   Diagnosis Date     LORIN (acute kidney injury) (H)      Anxiety      Cannabis abuse      Depression      Diabetes Type 1, uncontrolled 1985    Onset age 10     DKA (diabetic ketoacidoses)      ETOH abuse      HLD (hyperlipidemia)      HTN (hypertension)      Lactic acidosis        Past Surgical History:   Procedure Laterality Date     ANKLE FRACTURE SURGERY Left      APPENDECTOMY       INCISION AND DRAINAGE FOOT, COMBINED Right 11/18/2022    Procedure: INCISION AND DRAINAGE, right foot;  Surgeon: David Patel DPM;   Location: [facility] [place]     PICC SINGLE LUMEN PLACEMENT  10/23/2022            Allergies   Allergen Reactions     Cefazolin Nephrotoxicity     Colestipol GI Disturbance     Doxycycline Nausea and Vomiting     Nickel Rash     Penicillins Rash         Current Outpatient Medications:      acetaminophen (TYLENOL) 500 MG tablet, Take 2 tablets (1,000 mg) by mouth every 6 hours as needed for pain, Disp: , Rfl:      amLODIPine (NORVASC) 10 MG tablet, Take 1 tablet (10 mg) by mouth daily, Disp: 30 tablet, Rfl: 3     childrens multivitamin with iron (FLINTSTONES COMPLETE) CHEW, Take 1 tablet by mouth daily, Disp: , Rfl:      Cholecalciferol (VITAMIN D3) 50 MCG (2000 UT) CHEW, Take 50 mcg by mouth daily, Disp: , Rfl:      collagenase (SANTYL) 250 UNIT/GM external ointment, Apply topically daily Total square centimeters of wound(s) being treated is         30 day supply, Disp: 30 g, Rfl: 3     Continuous Blood Gluc Sensor (DEXCOM G6 SENSOR) MISC, , Disp: , Rfl:      ferrous sulfate (FEROSUL) 325 (65 Fe) MG tablet, Take 1 tablet (325 mg) by mouth daily, Disp: 30 tablet, Rfl: 3     hydrALAZINE (APRESOLINE) 25 MG tablet, Take 1 tablet (25 mg) by mouth 3 times daily Hold for SBP < 110, Disp: 90 tablet, Rfl: 1     insulin degludec (TRESIBA) 100 UNIT/ML pen, Inject 10 Units Subcutaneous every morning, Disp: 15 mL, Rfl: 0     insulin lispro (HUMALOG KWIKPEN) 100 UNIT/ML (1 unit dial) KWIKPEN, Inject 1 unit for every 15g carbs, Disp: 15 mL, Rfl:      metoprolol tartrate 75 MG TABS, Take 75 mg by mouth 2 times daily, Disp: 60 tablet, Rfl: 3     rosuvastatin (CRESTOR) 40 MG tablet, Take 40 mg by mouth daily, Disp: , Rfl:      sertraline (ZOLOFT) 50 MG tablet, Take 50 mg by mouth daily, Disp: , Rfl:      traZODone (DESYREL) 100 MG tablet, Take 50 mg by mouth nightly as needed for sleep, Disp: , Rfl:     Review of Systems - 10 point Review of Systems is negative except for foot ulcers which is noted in HPI.      OBJECTIVE:  BP 94/62    "Pulse 65   Resp 16   Ht 5' 7\" (1.702 m)   Wt 135 lb (61.2 kg)   LMP 11/02/2022   SpO2 98%   BMI 21.14 kg/m    General appearance: Patient is alert and fully cooperative with history & exam.  No sign of distress is noted during the visit.    Vascular: Dorsalis pedis non-palpableBilateral.  Dermatologic:    Negative Pressure Wound Therapy Foot Anterior;Right (Active)       Wound Foot Other (comment) NA (Active)       Wound Toe (Comment  which one) Ulceration (Active)       Wound Foot (Active)   Wound Bed Other (Comment) 12/14/22 0029   Celina-wound Assessment Black;Maceration 12/12/22 2200   Drainage Amount None 12/14/22 0029   Wound Care/Cleansing Normal saline 12/14/22 0029   Dressing Dry gauze 12/13/22 1700   Dressing Status Clean, dry, intact 12/14/22 1600       VASC Wound Right plantar foot (Active)   Pre Size Length 5.7 12/16/22 1015   Pre Size Width 3 12/16/22 1015   Pre Size Depth 0.2 12/16/22 1015   Pre Total Sq cm 17.1 12/16/22 1015   Post Size Length 5.5 12/29/22 1554   Post Size Width 2 12/29/22 1554   Post Size Depth 0.2 12/29/22 1554   Post Total Sq cm 11 12/29/22 1554       VASC Wound left 3rd toe (Active)       VASC Wound left 3rd toe (Active)   Post Size Length 0 12/29/22 1554   Post Size Width 0 12/29/22 1554   Post Size Depth 0 12/29/22 1554   Post Total Sq cm 0 12/29/22 1554   Description scabbed 12/16/22 1015       VASC Wound left lateral ankle (Active)   Post Size Length 0 12/29/22 1554   Post Size Width 0 12/29/22 1554   Post Size Depth 0 12/29/22 1554   Post Total Sq cm 0 12/29/22 1554   Description eschar 12/29/22 1554       Incision/Surgical Site 11/18/22 Right Foot (Active)   Granular base right forefoot ulceration. Stable eschar lateral left ankle and distal left hallux. No erythema bilateral.   Neurologic: Diminished to light touch Bilateral.  Musculoskeletal: Contracted digits noted Bilateral.    Imaging:     Chest XR,  PA & LAT    Result Date: 12/12/2022  EXAM: XR CHEST 2 VIEWS " LOCATION: [facility] DATE/TIME: 12/12/2022 12:53 PM INDICATION: tachypnea.  diabetic COMPARISON: 02/07/2009     IMPRESSION: Normal heart size. No effusion. Right middle and lower lobe infiltrates most likely represent pneumonia.    Foot  XR, G/E 3 views, right    Result Date: 12/12/2022  EXAM: XR FOOT RIGHT G/E 3 VIEWS LOCATION: [facility] DATE/TIME: 12/12/2022 12:54 PM INDICATION: ulceration at 1st and second mtp chronic. Infection. Evaluate for osteomyelitis or free air COMPARISON: None.     IMPRESSION: No acute fracture or dislocation. No radiographic evidence of osteomyelitis or soft tissue gas. If there is persistent concern, MRI would be more sensitive. A subcentimeter metallic foreign body is again noted in the soft tissues of the posterior ankle/distal calf, unchanged.    NM Gastric Emptying    Result Date: 12/14/2022  EXAM: NM GASTRIC EMPTYING LOCATION: [facility] DATE/TIME: 12/14/2022 11:25 AM INDICATION: Abdominal pain and early satiety. Gastroparesis evaluation. COMPARISON: None. TECHNIQUE: 1.1 mCi of technetium-99m sulfur colloid labeled egg product. Oral ingestion of standard meal. Anterior and posterior planar imaging for four hours. Geometric mean of emptying/retention calculated. FINDINGS: The stomach is scintigraphically normal. No evidence of gastroesophageal reflux. PATIENT'S RETENTION: 1 hour = 95.9%, 2 hours = 45.1%, 4 hours = 20.8% NORMAL RETENTION: 1 hour = <90%, 2 hours = <60%, 3 hours = <30%, 4 hours = <10%     IMPRESSION: Delayed gastric emptying.         Picture: None

## 2023-01-02 ENCOUNTER — OFFICE VISIT (OUTPATIENT)
Dept: FAMILY MEDICINE | Facility: CLINIC | Age: 48
End: 2023-01-02
Payer: MEDICAID

## 2023-01-02 VITALS
TEMPERATURE: 97.6 F | WEIGHT: 139.5 LBS | DIASTOLIC BLOOD PRESSURE: 56 MMHG | HEART RATE: 64 BPM | SYSTOLIC BLOOD PRESSURE: 90 MMHG | BODY MASS INDEX: 22.42 KG/M2 | OXYGEN SATURATION: 100 % | HEIGHT: 66 IN

## 2023-01-02 DIAGNOSIS — N18.5 CKD (CHRONIC KIDNEY DISEASE) STAGE 5, GFR LESS THAN 15 ML/MIN (H): ICD-10-CM

## 2023-01-02 DIAGNOSIS — Z12.11 SCREEN FOR COLON CANCER: ICD-10-CM

## 2023-01-02 DIAGNOSIS — E10.65 UNCONTROLLED TYPE 1 DIABETES MELLITUS WITH HYPERGLYCEMIA (H): ICD-10-CM

## 2023-01-02 DIAGNOSIS — E10.69 TYPE 1 DIABETES MELLITUS WITH OTHER SPECIFIED COMPLICATION (H): ICD-10-CM

## 2023-01-02 DIAGNOSIS — E11.21: ICD-10-CM

## 2023-01-02 DIAGNOSIS — Z11.4 SCREENING FOR HIV (HUMAN IMMUNODEFICIENCY VIRUS): ICD-10-CM

## 2023-01-02 DIAGNOSIS — E10.621: ICD-10-CM

## 2023-01-02 DIAGNOSIS — L97.513: ICD-10-CM

## 2023-01-02 DIAGNOSIS — R11.0 NAUSEA: ICD-10-CM

## 2023-01-02 DIAGNOSIS — Z12.31 VISIT FOR SCREENING MAMMOGRAM: ICD-10-CM

## 2023-01-02 DIAGNOSIS — F12.10 CANNABIS ABUSE: ICD-10-CM

## 2023-01-02 DIAGNOSIS — I10 ESSENTIAL HYPERTENSION: ICD-10-CM

## 2023-01-02 DIAGNOSIS — F33.1 MODERATE EPISODE OF RECURRENT MAJOR DEPRESSIVE DISORDER (H): Primary | ICD-10-CM

## 2023-01-02 PROCEDURE — 96127 BRIEF EMOTIONAL/BEHAV ASSMT: CPT | Performed by: NURSE PRACTITIONER

## 2023-01-02 PROCEDURE — 91312 COVID-19 VACCINE BIVALENT BOOSTER 12+ (PFIZER): CPT | Performed by: NURSE PRACTITIONER

## 2023-01-02 PROCEDURE — 0124A COVID-19 VACCINE BIVALENT BOOSTER 12+ (PFIZER): CPT | Performed by: NURSE PRACTITIONER

## 2023-01-02 PROCEDURE — 99204 OFFICE O/P NEW MOD 45 MIN: CPT | Mod: 25 | Performed by: NURSE PRACTITIONER

## 2023-01-02 RX ORDER — SERTRALINE HYDROCHLORIDE 100 MG/1
100 TABLET, FILM COATED ORAL DAILY
Qty: 30 TABLET | Refills: 1 | Status: SHIPPED | OUTPATIENT
Start: 2023-01-02 | End: 2023-02-12

## 2023-01-02 RX ORDER — ONDANSETRON 4 MG/1
4 TABLET, ORALLY DISINTEGRATING ORAL EVERY 8 HOURS PRN
COMMUNITY
End: 2023-01-02

## 2023-01-02 RX ORDER — INSULIN LISPRO 100 [IU]/ML
INJECTION, SOLUTION INTRAVENOUS; SUBCUTANEOUS
Qty: 15 ML | Refills: 0
Start: 2023-01-02 | End: 2023-07-14

## 2023-01-02 RX ORDER — ONDANSETRON 4 MG/1
4 TABLET, ORALLY DISINTEGRATING ORAL EVERY 8 HOURS PRN
Status: CANCELLED | OUTPATIENT
Start: 2023-01-02

## 2023-01-02 RX ORDER — AMLODIPINE BESYLATE 5 MG/1
1 TABLET ORAL
COMMUNITY
Start: 2022-06-03 | End: 2023-01-02 | Stop reason: CLARIF

## 2023-01-02 RX ORDER — ONDANSETRON 4 MG/1
4 TABLET, ORALLY DISINTEGRATING ORAL EVERY 8 HOURS PRN
Qty: 20 TABLET | Refills: 0 | Status: SHIPPED | OUTPATIENT
Start: 2023-01-02 | End: 2023-07-14

## 2023-01-02 ASSESSMENT — ENCOUNTER SYMPTOMS
HEMATURIA: 0
DIARRHEA: 1
NAUSEA: 1
PALPITATIONS: 0
ABDOMINAL PAIN: 0
PARESTHESIAS: 0
FEVER: 0
NERVOUS/ANXIOUS: 1
ARTHRALGIAS: 0
DIZZINESS: 1
FREQUENCY: 0
CHILLS: 0
DYSURIA: 0
MYALGIAS: 1
BREAST MASS: 0
HEMATOCHEZIA: 0
SHORTNESS OF BREATH: 1
JOINT SWELLING: 0
HEADACHES: 0
WEAKNESS: 1
SORE THROAT: 0
COUGH: 0
HEARTBURN: 0
CONSTIPATION: 0
EYE PAIN: 0

## 2023-01-02 ASSESSMENT — ANXIETY QUESTIONNAIRES
GAD7 TOTAL SCORE: 16
5. BEING SO RESTLESS THAT IT IS HARD TO SIT STILL: SEVERAL DAYS
3. WORRYING TOO MUCH ABOUT DIFFERENT THINGS: MORE THAN HALF THE DAYS
GAD7 TOTAL SCORE: 16
IF YOU CHECKED OFF ANY PROBLEMS ON THIS QUESTIONNAIRE, HOW DIFFICULT HAVE THESE PROBLEMS MADE IT FOR YOU TO DO YOUR WORK, TAKE CARE OF THINGS AT HOME, OR GET ALONG WITH OTHER PEOPLE: EXTREMELY DIFFICULT
2. NOT BEING ABLE TO STOP OR CONTROL WORRYING: NEARLY EVERY DAY
7. FEELING AFRAID AS IF SOMETHING AWFUL MIGHT HAPPEN: NEARLY EVERY DAY
1. FEELING NERVOUS, ANXIOUS, OR ON EDGE: NEARLY EVERY DAY
6. BECOMING EASILY ANNOYED OR IRRITABLE: SEVERAL DAYS

## 2023-01-02 ASSESSMENT — PATIENT HEALTH QUESTIONNAIRE - PHQ9
5. POOR APPETITE OR OVEREATING: NEARLY EVERY DAY
SUM OF ALL RESPONSES TO PHQ QUESTIONS 1-9: 23
SUM OF ALL RESPONSES TO PHQ QUESTIONS 1-9: 23
10. IF YOU CHECKED OFF ANY PROBLEMS, HOW DIFFICULT HAVE THESE PROBLEMS MADE IT FOR YOU TO DO YOUR WORK, TAKE CARE OF THINGS AT HOME, OR GET ALONG WITH OTHER PEOPLE: EXTREMELY DIFFICULT

## 2023-01-02 NOTE — PROGRESS NOTES
"   SUBJECTIVE:   CC: Josefa is an 47 year old who presents to discuss her depression.  She is present with her sister. Lives with her boyfriend. Has been dealing with a lot of health related issues this past year. She was recently seen at [facility] for severe depression. She used to be on Cymbalta but that got discontinued related to her kidney function. She was prescribed Zoloft 50 and she has been taking it as prescribed. She does not really notice any difference. She denied any intent to hurt herself.   - she follows Endocrinology for diabetes. She has a sensor and plans on getting insulin pump.   - ulcer is healing, has been seeing podiatry.     Per nephrology  note:  \"Josefa Curiel is a 47 y.o. female who has a PHM significant for CKD, DM1, HTN, foot ulcer, persistent intractable n/v thought related to cyclic vomiting from marijuana use vs gastroparesis. She was hospitalized at [facility] 11/16-11/28/22 for MSSA bacteremia with diabetic foot ulcer thought to be the source. She sustained an LORIN while hospitalized. Cr on admission 11/15 was 3.98, gfr 13. Peaked at 4.36, gfr 12 on 11/17 and was 4.22, gfr 13 at d/c on 11/28. Etiology ATN from infection vs AIN from cefepime. Also with 3mo hx of cyclic vomiting, so prolonged prerenal state a contributor. She was hospitalized again 12/12-12/14 due to nausea and emesis. Renal function was worse than previous, but stable while hoslitalized with a Cr 4.7, gfr 11. She reports she had a gastric emptying study which showed slowing. She was prescribed an easily digestible diet and frequent small meal, which she has been adhering to. Reports no nausea or emesis overnight\"       Patient has been advised of split billing requirements and indicates understanding: Yes  Healthy Habits:     Getting at least 3 servings of Calcium per day:  Yes    Bi-annual eye exam:  Yes    Dental care twice a year:  NO    Sleep apnea or symptoms of sleep apnea:  Daytime " drowsiness    Diet:  Diabetic    Frequency of exercise:  1 day/week    Duration of exercise:  Less than 15 minutes    Taking medications regularly:  No    Barriers to taking medications:  Problems remembering to take them and Side effects    Medication side effects:  Muscle aches, Lightheadedness and Other    PHQ-2 Total Score: 6    Additional concerns today:  Yes      Today's PHQ-2 Score:   PHQ-2 ( 1999 [employer]) 1/2/2023   Q1: Little interest or pleasure in doing things 3   Q2: Feeling down, depressed or hopeless 3   PHQ-2 Score 6   Q1: Little interest or pleasure in doing things Nearly every day   Q2: Feeling down, depressed or hopeless Nearly every day   PHQ-2 Score 6       Have you ever done Advance Care Planning? (For example, a Health Directive, POLST, or a discussion with a medical provider or your loved ones about your wishes): Yes, patient states has an Advance Care Planning document and will bring a copy to the clinic.    Social History     Tobacco Use     Smoking status: Some Days     Smokeless tobacco: Never     Tobacco comments:     occasional   Substance Use Topics     Alcohol use: Yes     Alcohol/week: 35.0 standard drinks     Comment: Alcoholic Drinks/day: ~750 mL wine daily     If you drink alcohol do you typically have >3 drinks per day or >7 drinks per week? Yes      Alcohol Use 1/2/2023   Prescreen: >3 drinks/day or >7 drinks/week? Not Applicable   Prescreen: >3 drinks/day or >7 drinks/week? -       Reviewed orders with patient.  Reviewed health maintenance and updated orders accordingly - Yes  Labs reviewed in EPIC    Breast Cancer Screening:    FHS-7:   Breast CA Risk Assessment (FHS-7) 1/2/2023   Did any of your first-degree relatives have breast or ovarian cancer? Unknown   Did any of your relatives have bilateral breast cancer? Unknown   Did any man in your family have breast cancer? No   Did any woman in your family have breast and ovarian cancer? No   Did any woman in your family have  breast cancer before age 50 y? No   Do you have 2 or more relatives with breast and/or ovarian cancer? No   Do you have 2 or more relatives with breast and/or bowel cancer? No       Pertinent mammograms are reviewed under the imaging tab.    History of abnormal Pap smear: NO - age 30-65 PAP every 5 years with negative HPV co-testing recommended     Reviewed and updated as needed this visit by clinical staff   Tobacco  Allergies  Meds              Reviewed and updated as needed this visit by Provider                 Past Medical History:   Diagnosis Date     LORIN (acute kidney injury) (H)      Anxiety      Cannabis abuse      Depression      Diabetes Type 1, uncontrolled 1985    Onset age 10     DKA (diabetic ketoacidoses)      ETOH abuse      HLD (hyperlipidemia)      HTN (hypertension)      Lactic acidosis       Past Surgical History:   Procedure Laterality Date     ANKLE FRACTURE SURGERY Left      APPENDECTOMY       INCISION AND DRAINAGE FOOT, COMBINED Right 11/18/2022    Procedure: INCISION AND DRAINAGE, right foot;  Surgeon: David Patel DPM;  Location: [facility] [place]     PICC SINGLE LUMEN PLACEMENT  10/23/2022            Review of Systems   Constitutional: Negative for chills and fever.   HENT: Negative for congestion, ear pain, hearing loss and sore throat.    Eyes: Positive for visual disturbance. Negative for pain.   Respiratory: Positive for shortness of breath. Negative for cough.    Cardiovascular: Negative for chest pain, palpitations and peripheral edema.   Gastrointestinal: Positive for diarrhea and nausea. Negative for abdominal pain, constipation, heartburn and hematochezia.   Breasts:  Negative for tenderness, breast mass and discharge.   Genitourinary: Negative for dysuria, frequency, genital sores, hematuria, pelvic pain, urgency, vaginal bleeding and vaginal discharge.   Musculoskeletal: Positive for myalgias. Negative for arthralgias and joint swelling.   Skin: Negative for rash.  "  Neurological: Positive for dizziness and weakness. Negative for headaches and paresthesias.   Psychiatric/Behavioral: Negative for mood changes. The patient is nervous/anxious.           OBJECTIVE:   BP 90/56 (BP Location: Left arm, Patient Position: Sitting, Cuff Size: Adult Regular)   Pulse 64   Temp 97.6  F (36.4  C) (Oral)   Ht 1.676 m (5' 6\")   Wt 63.3 kg (139 lb 8 oz)   LMP 12/08/2022 (Approximate)   HC 12 cm (4.72\")   SpO2 100%   BMI 22.52 kg/m    Physical Exam  Vitals and nursing note reviewed.   Constitutional:       Appearance: Normal appearance. She is normal weight.   Pulmonary:      Effort: Pulmonary effort is normal.   Neurological:      Mental Status: She is alert.   Psychiatric:         Mood and Affect: Mood normal.         Behavior: Behavior normal.         Thought Content: Thought content normal.         Judgment: Judgment normal.      Comments: Good eye contact         ASSESSMENT/PLAN:       ICD-10-CM    1. Moderate episode of recurrent major depressive disorder (H)  F33.1 sertraline (ZOLOFT) 100 MG tablet     Adult Mental Health  Referral      2. Visit for screening mammogram  Z12.31 MA SCREENING DIGITAL BILAT - Future  (s+30)      3. Screening for HIV (human immunodeficiency virus)  Z11.4       4. Screen for colon cancer  Z12.11       5. Type 1 diabetes mellitus with other specified complication (H)  E10.69 ondansetron (ZOFRAN ODT) 4 MG ODT tab      6. Nausea  R11.0 ondansetron (ZOFRAN ODT) 4 MG ODT tab      7. Uncontrolled type 1 diabetes mellitus with hyperglycemia (H)  E10.65 insulin lispro (HUMALOG KWIKPEN) 100 UNIT/ML (1 unit dial) KWIKPEN      8. Diabetic ulcer of right foot associated with type 1 diabetes mellitus, with necrosis of muscle, unspecified part of foot (H)  E10.621     L97.513       9. Nephrosis in diabetes mellitus (H)  E11.21       10. Cannabis abuse  F12.10       11. Essential hypertension  I10       12. CKD (chronic kidney disease) stage 5, GFR less than " 15 ml/min (H)  N18.5         \- has been on 50 mg zoloft and not effective, dose increased. She will follow up with psychiatry. Safety plan discussed. No SI or HI.   - continue to follow nephrology.   - continue to stop cannabis.   - BP stable.   - continue to work with Endo.   -I offered to assess foot but patient declined. Podiatry notes reviewed.           She reports that she has been smoking. She has never used smokeless tobacco.  Nicotine/Tobacco Cessation Plan:   Information offered: Patient not interested at this time          NATALIA Mccloud [facility]  Answers for HPI/ROS submitted by the patient on 1/2/2023  If you checked off any problems, how difficult have these problems made it for you to do your work, take care of things at home, or get along with other people?: Extremely difficult  PHQ9 TOTAL SCORE: 23

## 2023-01-19 ENCOUNTER — OFFICE VISIT (OUTPATIENT)
Dept: VASCULAR SURGERY | Facility: CLINIC | Age: 48
End: 2023-01-19
Attending: PODIATRIST
Payer: MEDICAID

## 2023-01-19 VITALS
WEIGHT: 139 LBS | BODY MASS INDEX: 22.34 KG/M2 | DIASTOLIC BLOOD PRESSURE: 82 MMHG | OXYGEN SATURATION: 99 % | RESPIRATION RATE: 16 BRPM | HEIGHT: 66 IN | SYSTOLIC BLOOD PRESSURE: 138 MMHG | HEART RATE: 96 BPM

## 2023-01-19 DIAGNOSIS — L97.511 ULCER OF RIGHT FOOT, LIMITED TO BREAKDOWN OF SKIN (H): Primary | ICD-10-CM

## 2023-01-19 DIAGNOSIS — L97.329 ULCER OF ANKLE, LEFT, WITH UNSPECIFIED SEVERITY (H): ICD-10-CM

## 2023-01-19 PROCEDURE — 97597 DBRDMT OPN WND 1ST 20 CM/<: CPT | Performed by: PODIATRIST

## 2023-01-19 PROCEDURE — 11042 DBRDMT SUBQ TIS 1ST 20SQCM/<: CPT | Performed by: PODIATRIST

## 2023-01-19 RX ORDER — CALCIUM ACETATE 667 MG/1
1 TABLET ORAL
COMMUNITY
Start: 2023-01-19 | End: 2023-07-14

## 2023-01-19 ASSESSMENT — PAIN SCALES - GENERAL: PAINLEVEL: NO PAIN (0)

## 2023-01-19 NOTE — PATIENT INSTRUCTIONS
"Important lnstructions      WEIGHT BEARING STATUS: You are to remain NON WEIGHT BEARING on your right foot. Non-weight bearing means NO PRESSURE on your foot or heel at any time for any reason.    2. OFFLOADING DEVICE: Must use a A ROLLING KNEE WALKER at all times! (do not use affected foot to push wheelchair)    3. STABILIZATION DEVICE: Use a CAM BOOT . You will need to WEAR THIS ANYTIME YOU ARE UP AND OUT OF BED, IT IS OKAY TO REMOVE WHEN YOU ARE SLEEPING..       4. ELEVATE: Elevating your leg means laying with your head on a pillow and your foot ABOVE YOUR WAIST.     5. DO NOT MOVE YOUR FOOT.  There is a risk of worsening the wound or incision. To give yourself a higher chance of healing, please DO NOT swing foot back and forth and wiggle foot/toes especially when inside a stabilization device.        Dressing Change lnstructions                 EVERY OTHER DAY and as needed, Cleanse your foot/ankle wound(s) with Normal saline.    Pat Dry with non-sterile gauze    Primary Dressing: Apply Medihoney or Manuka honey into/onto the wounds    Secondary dressing: Cover with dry gauze    Secure with non-sterile roll gauze (4\" x 75\" roll) and tape (1\" roll tape) as needed; avoid adhesive directly on the skin    It IS NOT ok to get your wound wet in the bath or shower    SEEK MEDICAL CARE IF:  You have an increase in swelling, pain, or redness around the wound.  You have an increase in the amount of pus coming from the wound.  There is a bad smell coming from the wound.  The wound appears to be worsening/enlarging  You have a fever greater than 101.5 F      It is ok to continue current wound care treatment/products for the next 2-3 days until new wound care supplies are ordered and arrive. If longer than this please contact our office at [phone].        We want to hear from you!   In the next few weeks, you should receive a call or email to complete a survey about your visit at [facility]. Please help " us improve your appointment experience by letting us know how we did today. We strive to make your experience good and value any ways in which we could do better.      We value your input and suggestions.    Thank you for choosing the [facility]!

## 2023-01-19 NOTE — PROGRESS NOTES
FOOT AND ANKLE SURGERY/PODIATRY Progress Note      ASSESSMENT:   Diabetic Ulceration right foot  Ulceration left ankle      TREATMENT:  -The right foot ulceration is measuring smaller today and left lateral ankle ulceration is new after eschar fell away.     -I recommend use of medihoney and non-weight bearing.      -After discussion of risk factors and consent obtained 2% Lidocaine HCL jelly was applied, under clean conditions, the right and foot ulceration(s) were debrided using currette.  Devitalized and nonviable tissue, along with any fibrin and slough, was removed to improve granulation tissue formation, stimulate wound healing, decrease overall bacteria load, disrupt biofilm formation and decrease edge senescence. Wound drainage was scant No. Total excisional debridement was 8 sq cm into the subcutaneous tissue with a depth of 0.3 cm.   Ulcers were improved afterwards and .  Measures were as noted on the flow sheet. Collagen with a gauze dresssing was applied. She will continue to apply medihoney with a gauze dresssing as directed.    -After discussion of risk factors and consent obtained 2% Lidocaine HCL jelly was applied, under clean conditions, the left ankle ulceration(s) were debrided using currette.  Devitalized and nonviable tissue, along with any fibrin and slough, was removed to improve granulation tissue formation, stimulate wound healing, decrease overall bacteria load, disrupt biofilm formation and decrease edge senescence. Wound drainage was scant No. Total excisional debridement was 0.36 sq cm into the epidermis/dermis tissue with a depth of 0.1 cm.   Ulcers were improved afterwards and .  Measures were as noted on the flow sheet. Collagen with a gauze dresssing was applied. She will continue to apply medihoney with a gauze dresssing as directed.     -She will follow-up in 3 weeks.    ADILENE [facility] [place]      HPI: Josefa Curiel was seen again  today for right foot and left ankle ulcers. She has tried to remain limited walking on her right foot.      Past Medical History:   Diagnosis Date     LORIN (acute kidney injury) (H)      Anxiety      Cannabis abuse      Depression      Diabetes Type 1, uncontrolled 1985    Onset age 10     DKA (diabetic ketoacidoses)      ETOH abuse      HLD (hyperlipidemia)      HTN (hypertension)      Lactic acidosis        Past Surgical History:   Procedure Laterality Date     ANKLE FRACTURE SURGERY Left      APPENDECTOMY       INCISION AND DRAINAGE FOOT, COMBINED Right 11/18/2022    Procedure: INCISION AND DRAINAGE, right foot;  Surgeon: David Patel DPM;  Location: [facility] [place] [facility]     PICC SINGLE LUMEN PLACEMENT  10/23/2022            Allergies   Allergen Reactions     Cefazolin Nephrotoxicity     Colestipol GI Disturbance     Doxycycline Nausea and Vomiting     Nickel Rash     Penicillins Rash         Current Outpatient Medications:      acetaminophen (TYLENOL) 500 MG tablet, Take 2 tablets (1,000 mg) by mouth every 6 hours as needed for pain, Disp: , Rfl:      amLODIPine (NORVASC) 10 MG tablet, Take 1 tablet (10 mg) by mouth daily, Disp: 30 tablet, Rfl: 3     calcium acetate (CALPHRON) 667 MG TABS tablet, Take 1 tablet by mouth, Disp: , Rfl:      childrens multivitamin with iron (FLINTSTONES COMPLETE) CHEW, Take 1 tablet by mouth daily, Disp: , Rfl:      Cholecalciferol (VITAMIN D3) 50 MCG (2000 UT) CHEW, Take 50 mcg by mouth daily, Disp: , Rfl:      Continuous Blood Gluc Sensor (DEXCOM G6 SENSOR) MISC, , Disp: , Rfl:      ferrous sulfate (FEROSUL) 325 (65 Fe) MG tablet, Take 1 tablet (325 mg) by mouth daily, Disp: 30 tablet, Rfl: 3     hydrALAZINE (APRESOLINE) 25 MG tablet, Take 1 tablet (25 mg) by mouth 3 times daily Hold for SBP < 110, Disp: 90 tablet, Rfl: 1     insulin degludec (TRESIBA) 100 UNIT/ML pen, Inject 10 Units Subcutaneous every morning, Disp: 15 mL, Rfl: 0     insulin lispro (HUMALOG KWIKPEN) 100  "UNIT/ML (1 unit dial) HONG, Inject 1 unit for every 15g carbs with meals, Disp: 15 mL, Rfl: 0     metoprolol tartrate 75 MG TABS, Take 75 mg by mouth 2 times daily, Disp: 60 tablet, Rfl: 3     ondansetron (ZOFRAN ODT) 4 MG ODT tab, Take 1 tablet (4 mg) by mouth every 8 hours as needed for nausea, Disp: 20 tablet, Rfl: 0     rosuvastatin (CRESTOR) 40 MG tablet, Take 40 mg by mouth daily, Disp: , Rfl:      sertraline (ZOLOFT) 100 MG tablet, Take 1 tablet (100 mg) by mouth daily, Disp: 30 tablet, Rfl: 1     traZODone (DESYREL) 50 MG tablet, Take 50 mg by mouth At Bedtime, Disp: , Rfl:      collagenase (SANTYL) 250 UNIT/GM external ointment, Apply topically daily Total square centimeters of wound(s) being treated is         30 day supply (Patient not taking: Reported on 1/19/2023), Disp: 30 g, Rfl: 3    Review of Systems - 10 point Review of Systems is negative except for right foot ulcer which is noted in HPI.      OBJECTIVE:  /82   Pulse 96   Resp 16   Ht 5' 6\" (1.676 m)   Wt 139 lb (63 kg)   LMP 12/08/2022 (Approximate)   SpO2 99%   BMI 22.44 kg/m    General appearance: Patient is alert and fully cooperative with history & exam.  No sign of distress is noted during the visit.    Vascular: Dorsalis pedis palpableBilateral.  Dermatologic:    Negative Pressure Wound Therapy Foot Anterior;Right (Active)       Wound Foot Other (comment) NA (Active)       Wound Toe (Comment  which one) Ulceration (Active)       Wound Foot (Active)       VASC Wound Right plantar foot (Active)   Pre Size Length 2 01/19/23 1440   Pre Size Width 4 01/19/23 1440   Pre Size Depth 0.3 01/19/23 1440   Pre Total Sq cm 8 01/19/23 1440   Post Size Length 5.5 12/29/22 1554   Post Size Width 2 12/29/22 1554   Post Size Depth 0.2 12/29/22 1554   Post Total Sq cm 11 12/29/22 1554       VASC Wound left 3rd toe (Active)       VASC Wound left 3rd toe (Active)   Post Size Length 0 12/29/22 1554   Post Size Width 0 12/29/22 1554   Post Size " Depth 0 12/29/22 1554   Post Total Sq cm 0 12/29/22 1554   Description scabbed 12/16/22 1015       VASC Wound left lateral ankle (Active)   Post Size Length 0.6 01/19/23 1440   Post Size Width 0.6 01/19/23 1440   Post Size Depth 0.1 01/19/23 1440   Post Total Sq cm 0.36 01/19/23 1440   Description eschar 12/29/22 1554       Incision/Surgical Site 11/18/22 Right Foot (Active)   Mixed granular/fibrotic tissue ulceration plantar right foot and lateral left ankle, no erythema bilateral.   Neurologic: Diminished to light touch Bilateral.  Musculoskeletal: Contracted digits noted Bilateral.    Imaging:     No results found.       Picture:

## 2023-01-19 NOTE — LETTER
2023             [facility]             Wound Dressing Rx and Order Form             Order Status:New Order             Verbal: Flory Kunz  [employer] [facility]   Account # [id number]  Fax: [phone]  Customer Service: [phone]          Patient Info:  Name: Josefa Curiel  : 1975  Address:   [address] [place] [id number]  Phone: [phone]      Insurance Info:  PRIMARY INSURANCE: [place] / [place] / Product Type: Medicaid /   Primary Policy ID#: [id number]  SECONDARY INSURANCE:  N/A   Secondary Policy ID#: N/A    Physician Info:   Name:  David Paetl DPM   Dept Address/Phones:   [address] [address] [phone]  Dept: [phone]  Fax: [phone]      Wound info:  Encounter Diagnoses   Name Primary?     Ulcer of right foot, limited to breakdown of skin (H) Yes     Ulcer of ankle, left, with unspecified severity (H)      Negative Pressure Wound Therapy Foot Anterior;Right (Active)       Wound Foot Other (comment) NA (Active)       Wound Toe (Comment  which one) Ulceration (Active)       Wound Foot (Active)       VASC Wound Right plantar foot (Active)   Pre Size Length 2 23 1440   Pre Size Width 4 23 1440   Pre Size Depth 0.3 23 1440   Pre Total Sq cm 8 23 1440   Post Size Length 5.5 22 1554   Post Size Width 2 22 1554   Post Size Depth 0.2 22 1554   Post Total Sq cm 11 22 1554       VASC Wound left 3rd toe (Active)       VASC Wound left lateral ankle (Active)   Post Size Length 0.6 23 1440   Post Size Width 0.6 23 1440   Post Size Depth 0.1 23 1440   Post Total Sq cm 0.36 23 1440   Description eschar 22 1554       Incision/Surgical Site 22 Right Foot (Active)     Drainage: moderate  Thickness:  Full  Duration of Need: 60  Days Supply: 30  Start Date:  1/19/2023  Starter Kit: Ancillary Kit (saline, gloves, gauze)  Qualifying wound/Debridement: Yes     Dressing Type Brand Size Days Supply  15/30 Quantity  changes/week   Primary Manuka honey HD Supra lite   4''x5'' 30 Every other day   Secondary Square gauze   4''x4'' 30 Every other day    Sof form roll gauze   4''x75'' 30 Every other day   Tape Papertape  1in 30 Every other day         OK to forward to covered supplier.    Electronically Signed Physician: David Patel DPM Date: 1/19/2023

## 2023-01-30 ENCOUNTER — ANCILLARY PROCEDURE (OUTPATIENT)
Dept: MAMMOGRAPHY | Facility: HOSPITAL | Age: 48
End: 2023-01-30
Attending: NURSE PRACTITIONER
Payer: MEDICAID

## 2023-01-30 DIAGNOSIS — Z12.31 VISIT FOR SCREENING MAMMOGRAM: ICD-10-CM

## 2023-01-30 PROCEDURE — 77067 SCR MAMMO BI INCL CAD: CPT

## 2023-02-03 ENCOUNTER — TELEPHONE (OUTPATIENT)
Dept: FAMILY MEDICINE | Facility: CLINIC | Age: 48
End: 2023-02-03

## 2023-02-03 DIAGNOSIS — N18.5 CKD (CHRONIC KIDNEY DISEASE) STAGE 5, GFR LESS THAN 15 ML/MIN (H): ICD-10-CM

## 2023-02-03 DIAGNOSIS — E10.69 TYPE 1 DIABETES MELLITUS WITH OTHER SPECIFIED COMPLICATION (H): ICD-10-CM

## 2023-02-03 DIAGNOSIS — F41.9 ANXIETY: ICD-10-CM

## 2023-02-03 DIAGNOSIS — F10.20 ALCOHOL USE DISORDER, MODERATE, DEPENDENCE (H): Primary | ICD-10-CM

## 2023-02-07 ENCOUNTER — PATIENT OUTREACH (OUTPATIENT)
Dept: CARE COORDINATION | Facility: CLINIC | Age: 48
End: 2023-02-07

## 2023-02-07 NOTE — PROGRESS NOTES
Clinic Care Coordination Contact    CC TIA spoke with Crisis Stabilization SW Yari Hebert.  Provided education on role of primary care CC.  Scheduled conference call with pt and Yari 2/13 at 10:30am      UTC/Voicemail       Clinical Data: Care Coordinator Outreach  Outreach attempted x 1.  Left message on patient's voicemail with call back information and requested return call.    Per provider request, CC TIA left [facility] for [facility] Ebony Gould at [phone] - [place] in chart.       Plan: Care Coordinator will try to reach patient again in 1-2 business days.

## 2023-02-09 ENCOUNTER — ANCILLARY PROCEDURE (OUTPATIENT)
Dept: MAMMOGRAPHY | Facility: CLINIC | Age: 48
End: 2023-02-09
Attending: NURSE PRACTITIONER
Payer: COMMERCIAL

## 2023-02-09 ENCOUNTER — OFFICE VISIT (OUTPATIENT)
Dept: VASCULAR SURGERY | Facility: CLINIC | Age: 48
End: 2023-02-09
Attending: PODIATRIST
Payer: COMMERCIAL

## 2023-02-09 VITALS — HEART RATE: 68 BPM | OXYGEN SATURATION: 99 %

## 2023-02-09 DIAGNOSIS — N64.89 BREAST ASYMMETRY: ICD-10-CM

## 2023-02-09 DIAGNOSIS — R92.0 BREAST MICROCALCIFICATIONS: ICD-10-CM

## 2023-02-09 DIAGNOSIS — L97.329 ULCER OF ANKLE, LEFT, WITH UNSPECIFIED SEVERITY (H): ICD-10-CM

## 2023-02-09 DIAGNOSIS — L97.511 ULCER OF RIGHT FOOT, LIMITED TO BREAKDOWN OF SKIN (H): Primary | ICD-10-CM

## 2023-02-09 PROCEDURE — 11042 DBRDMT SUBQ TIS 1ST 20SQCM/<: CPT | Performed by: PODIATRIST

## 2023-02-09 PROCEDURE — 77062 BREAST TOMOSYNTHESIS BI: CPT

## 2023-02-09 ASSESSMENT — PAIN SCALES - GENERAL: PAINLEVEL: NO PAIN (0)

## 2023-02-09 NOTE — PATIENT INSTRUCTIONS
"Important lnstructions      WEIGHT BEARING STATUS: You are to remain NON WEIGHT BEARING on your right foot. Non-weight bearing means NO PRESSURE on your foot or heel at any time for any reason.    2. OFFLOADING DEVICE: Must use a A ROLLING KNEE WALKER at all times! (do not use affected foot to push wheelchair)    3. STABILIZATION DEVICE: Use a CAM BOOT . You will need to WEAR THIS ANYTIME YOU ARE UP AND OUT OF BED, IT IS OKAY TO REMOVE WHEN YOU ARE SLEEPING..       4. ELEVATE: Elevating your leg means laying with your head on a pillow and your foot ABOVE YOUR WAIST.     5. DO NOT MOVE YOUR FOOT.  There is a risk of worsening the wound or incision. To give yourself a higher chance of healing, please DO NOT swing foot back and forth and wiggle foot/toes especially when inside a stabilization device.        Dressing Change lnstructions                 EVERY OTHER DAY and as needed, Cleanse your foot/ankle wound(s) with Normal saline.    Pat Dry with non-sterile gauze    Primary Dressing: Apply Medihoney or Manuka honey into/onto the wounds    Secondary dressing: Cover with dry gauze    Secure with non-sterile roll gauze (4\" x 75\" roll) and tape (1\" roll tape) as needed; avoid adhesive directly on the skin    It IS NOT ok to get your wound wet in the bath or shower    SEEK MEDICAL CARE IF:  You have an increase in swelling, pain, or redness around the wound.  You have an increase in the amount of pus coming from the wound.  There is a bad smell coming from the wound.  The wound appears to be worsening/enlarging  You have a fever greater than 101.5 F      It is ok to continue current wound care treatment/products for the next 2-3 days until new wound care supplies are ordered and arrive. If longer than this please contact our office at [phone].        We want to hear from you!   In the next few weeks, you should receive a call or email to complete a survey about your visit at [facility]. Please help " us improve your appointment experience by letting us know how we did today. We strive to make your experience good and value any ways in which we could do better.      We value your input and suggestions.    Thank you for choosing the [facility]!

## 2023-02-09 NOTE — PROGRESS NOTES
FOOT AND ANKLE SURGERY/PODIATRY Progress Note      ASSESSMENT:   Diabetic Ulceration right foot  Ulceration left ankle      TREATMENT:  -I discussed with the patient that both ulcers are measuring smaller today, but there is slight increased depth along the lateral left ankle ulcer.     I recommend use of medihoney and non-weight bearing.     -After discussion of risk factors and consent obtained 2% Lidocaine HCL jelly was applied, under clean conditions, the right foot and left ankle ulceration(s) were debrided using #15 blade scalpel.  Devitalized and nonviable tissue, along with any fibrin and slough, was removed to improve granulation tissue formation, stimulate wound healing, decrease overall bacteria load, disrupt biofilm formation and decrease edge senescence. Wound drainage was scant No. Total excisional debridement was 2 sq cm into the subcutaneous tissue with a depth of 0.2 cm.   Ulcers were improved afterwards and .  Measures were as noted on the flow sheet. Medi-honey with a gauze dressing was applied. She will continue to apply Medi-honey with a gauze dressing qoday.    -She will follow-up in 3 weeks.    David Patel DPM  [facility] [place]      HPI: Josefa Curiel was seen again today for a right foot and left ankle ulcers. She has remained limited walking on both feet.      Past Medical History:   Diagnosis Date     LORIN (acute kidney injury) (H)      Anxiety      Cannabis abuse      Depression      Diabetes Type 1, uncontrolled 1985    Onset age 10     DKA (diabetic ketoacidoses)      ETOH abuse      HLD (hyperlipidemia)      HTN (hypertension)      Lactic acidosis        Past Surgical History:   Procedure Laterality Date     ANKLE FRACTURE SURGERY Left      APPENDECTOMY       INCISION AND DRAINAGE FOOT, COMBINED Right 11/18/2022    Procedure: INCISION AND DRAINAGE, right foot;  Surgeon: David Patel DPM;  Location: [facility] [place]     PICC SINGLE LUMEN PLACEMENT   10/23/2022            Allergies   Allergen Reactions     Cefazolin Nephrotoxicity     Colestipol GI Disturbance     Doxycycline Nausea and Vomiting     Nickel Rash     Penicillins Rash         Current Outpatient Medications:      acetaminophen (TYLENOL) 500 MG tablet, Take 2 tablets (1,000 mg) by mouth every 6 hours as needed for pain, Disp: , Rfl:      amLODIPine (NORVASC) 10 MG tablet, Take 1 tablet (10 mg) by mouth daily, Disp: 30 tablet, Rfl: 3     calcium acetate (CALPHRON) 667 MG TABS tablet, Take 1 tablet by mouth, Disp: , Rfl:      childrens multivitamin with iron (FLINTSTONES COMPLETE) CHEW, Take 1 tablet by mouth daily, Disp: , Rfl:      Cholecalciferol (VITAMIN D3) 50 MCG (2000 UT) CHEW, Take 50 mcg by mouth daily, Disp: , Rfl:      Continuous Blood Gluc Sensor (DEXCOM G6 SENSOR) MISC, , Disp: , Rfl:      ferrous sulfate (FEROSUL) 325 (65 Fe) MG tablet, Take 1 tablet (325 mg) by mouth daily, Disp: 30 tablet, Rfl: 3     hydrALAZINE (APRESOLINE) 25 MG tablet, Take 1 tablet (25 mg) by mouth 3 times daily Hold for SBP < 110, Disp: 90 tablet, Rfl: 1     insulin degludec (TRESIBA) 100 UNIT/ML pen, Inject 10 Units Subcutaneous every morning, Disp: 15 mL, Rfl: 0     insulin lispro (HUMALOG KWIKPEN) 100 UNIT/ML (1 unit dial) KWIKPEN, Inject 1 unit for every 15g carbs with meals, Disp: 15 mL, Rfl: 0     metoprolol tartrate 75 MG TABS, Take 75 mg by mouth 2 times daily, Disp: 60 tablet, Rfl: 3     ondansetron (ZOFRAN ODT) 4 MG ODT tab, Take 1 tablet (4 mg) by mouth every 8 hours as needed for nausea, Disp: 20 tablet, Rfl: 0     rosuvastatin (CRESTOR) 40 MG tablet, Take 40 mg by mouth daily, Disp: , Rfl:      sertraline (ZOLOFT) 100 MG tablet, Take 1 tablet (100 mg) by mouth daily, Disp: 30 tablet, Rfl: 1     traZODone (DESYREL) 50 MG tablet, Take 50 mg by mouth At Bedtime, Disp: , Rfl:      collagenase (SANTYL) 250 UNIT/GM external ointment, Apply topically daily Total square centimeters of wound(s) being treated  is         30 day supply (Patient not taking: Reported on 1/19/2023), Disp: 30 g, Rfl: 3    Review of Systems - 10 point Review of Systems is negative except for ulcers which is noted in HPI.      OBJECTIVE:  Pulse 68   SpO2 99%   General appearance: Patient is alert and fully cooperative with history & exam.  No sign of distress is noted during the visit.    Vascular: Dorsalis pedis non-palpableBilateral.  Dermatologic:    Negative Pressure Wound Therapy Foot Anterior;Right (Active)       Wound Foot Other (comment) NA (Active)       Wound Toe (Comment  which one) Ulceration (Active)       Wound Foot (Active)       VASC Wound Right plantar foot (Active)   Pre Size Length 1 02/09/23 1511   Pre Size Width 1 02/09/23 1511   Pre Size Depth 0.2 02/09/23 1511   Pre Total Sq cm 1 02/09/23 1511   Post Size Length 0.9 02/09/23 1511   Post Size Width 0.6 02/09/23 1511   Post Size Depth 0.2 02/09/23 1511   Post Total Sq cm 0.42 02/09/23 1511       VASC Wound left 3rd toe (Active)       VASC Wound left lateral ankle (Active)   Post Size Length 0.2 02/09/23 1511   Post Size Width 0.2 02/09/23 1511   Post Size Depth 0.2 02/09/23 1511   Post Total Sq cm 0.04 02/09/23 1511   Description scab 02/09/23 1511       Incision/Surgical Site 11/18/22 Right Foot (Active)   Granular/fibrotic tissue right foot and lateral left ankle ulcers. No erythema bilateral.   Neurologic: Diminished to light touch Bilateral.  Musculoskeletal: Contracted digits noted Bilateral.    Imaging:     MA SCREENING DIGITAL BILAT - Future  (s+30)    Result Date: 1/30/2023  BILATERAL FULL FIELD DIGITAL SCREENING MAMMOGRAM Performed on: 1/30/23 No comparisons were made when reading this study. Technique: This study was evaluated with the assistance of Computer-Aided Detection. Findings: The breasts are extremely dense, which lowers the sensitivity of mammography. There are possible calcifications in the right breast at the 6 o'clock position, anterior depth. There  is a possible asymmetry in the left breast on the MLO view in the retroareolar plane position, posterior depth. The remainder of the breast tissue is unremarkable.    IMPRESSION: BI-RADS CATEGORY: 0 - Incomplete - Need Additional Imaging RECOMMENDED FOLLOW-UP:  Magnification views of the right breast. Additional mammographic views and ultrasound of the left breast. The results and recommendations of this examination will be communicated to the patient and the imaging center will attempt to contact the patient within 2-3 business days to schedule follow-up imaging. Bulmaro Curtis     MA Diagnostic Bilateral w/Danita    Result Date: 2/9/2023  EXAM: MA DIAGNOSTIC BILATERAL W/ DANITA LOCATION: [facility] DATE/TIME: 2/9/2023 11:09 AM INDICATION:  Breast asymmetry, Breast microcalcifications COMPARISON: 1/30/2023 MAMMOGRAPHIC FINDINGS: Bilateral full-field digital diagnostic mammograms performed. The breasts are extremely dense, which lowers the sensitivity of mammography. There are scattered benign-appearing round calcifications in both breasts. Breast tomosynthesis was used in interpretation. The calcifications marked in the right breast in the 8:00 position in the anterior depth appear to be at least partially layer dependently on the lateral view and appears slightly smudgy on the CC magnification view suggesting that they are likely benign milk of calcium calcifications. Six-month follow-up of these to confirm that they are benign is recommended. On the tomographic images the asymmetry seen in the left breast on the MLO view resolves consistent with a summation artifact again (superimposition of normal structures).     IMPRESSION: 1.  Asymmetry in the left breast resolves. 2.  Calcifications in the right breast are probably benign. Six-month follow-up right diagnostic mammogram to confirm stability is recommended. ACR BI-RADS Category 3: Probably Benign. Results were given to the patient at  the time of exam.          Picture:

## 2023-02-13 ENCOUNTER — TELEPHONE (OUTPATIENT)
Dept: BEHAVIORAL HEALTH | Facility: CLINIC | Age: 48
End: 2023-02-13

## 2023-02-13 ENCOUNTER — PATIENT OUTREACH (OUTPATIENT)
Dept: CARE COORDINATION | Facility: CLINIC | Age: 48
End: 2023-02-13

## 2023-02-13 NOTE — PROGRESS NOTES
Clinic Care Coordination Contact    Situation: CRISTIN WEBER received call this morning  from [facility] Crisis stabilization worker that pt cancelled visit this morning    Background: CRISTIN WEBER and Crisis worker had conference call scheduled with pt today at 10:30am    Assessment: CRISTIN WEBER contact patient this afternoon and discussed mental health resource needs.     Provided pt with number for scheduling psych med management visit with [facility]- [phone].  Provided contact for [facility] as they may have sooner openings for psych med management.      Pt remains interested in establishing with MH therapy.  Discussed [facility] Clinic referral and pt agreed.     Plan/Recommendations:     CRISTIN WEBER placed Transition Clinic referral.  Will monitor outcome of referral and coordinate with [facility] worker as needed

## 2023-02-13 NOTE — TELEPHONE ENCOUNTER
Writer called pt. Pt scheduled for tc therapy on 2.23.23 at 12pm with Karey. Documentation sent via [employer], referral source notified and added in tracker.    Harpreet aCrey  Care Coordinator  2.13----- Message from MARYBETH Novoa sent at 2/13/2023  5:04 PM CST -----  Regarding: New Referral  Transition Clinic Referral   [place]         Please Check Type of Referral Requested:     X  ____THERAPY: The Transition clinic is able to schedule patients without current medical insurance; these patient will be referred to our Social Work Care Coordinator for Medical Insurance              Assistance. We are open for referral for psychotherapy.   Patient is referred from:  Other Primary Care- Care Coordination       GUARDIAN: If your patient is not their own Guardian, please provide the following:    Guardian Name:  Guardian Contact Information (Phone & Email) :  Guardian Address:     FOSTER CARE PROVIDER: If your patient lives at a Licensed Foster Care, please provide the following:    Foster Provider:  Foster Provider Contact Information (Phone & Email):  Foster Provider Address:       Referring Provider Contact Name: Sammi Peña; Phone Number: [phone]    Reason for Transition Clinic Referral:  Patient is working with [facility] Crisis Stabilization Worker Yari [facility] [phone] .  Yari contacted patient's Primary Care Clinic and asked for assistance with connecting patient to MH therapy. ASHLIE on file      What Would Be Helpful from the Transition Clinic: Establishing MH therapy and if able eventually assisting with scheduling med management visit -internal or external- per PCP request      Needs: NO    Does Patient Have Access to Technology: yes    Patient E-mail Address: [email or web address]    Current Patient Phone Number: [phone];     Clinician Gender Preference (if applicable): Unknown    Patient location preference: [place]  [place], [facility]  Social Work Care Coordinator  [facility]   Collins Arroyo [place], [facility]  [phone]

## 2023-02-15 ENCOUNTER — PATIENT OUTREACH (OUTPATIENT)
Dept: CARE COORDINATION | Facility: CLINIC | Age: 48
End: 2023-02-15

## 2023-02-15 NOTE — PROGRESS NOTES
Clinic Care Coordination Contact  Care Team Conversations    CRISTIN WEBER message from Crisis Stabilization worker Yari Gould inquiring if pt can get Diagnostic Assessment completed by Transition Clinic.    CRISTIN WEBER returned call and left message providing direct number for Transition Clinic      Transition Clinic Referral Intake Line at [phone]    Plan:  CRISTIN WEBER will continue to monitor

## 2023-02-23 ENCOUNTER — VIRTUAL VISIT (OUTPATIENT)
Dept: BEHAVIORAL HEALTH | Facility: CLINIC | Age: 48
End: 2023-02-23

## 2023-02-23 DIAGNOSIS — F33.1 MODERATE EPISODE OF RECURRENT MAJOR DEPRESSIVE DISORDER (H): Primary | ICD-10-CM

## 2023-02-23 NOTE — PROGRESS NOTES
Met with Josefa from 1200 - 1210. Discussed transition clinic referral, transition clinic services, and referrals for outpatient therapy, dbt programs, and psychiatry. Clinician gave referrals for [facility], [facility], and [facility].    Karey Mendez PsyD, LP  2/23/2023 1107

## 2023-03-01 ENCOUNTER — PATIENT OUTREACH (OUTPATIENT)
Dept: CARE COORDINATION | Facility: CLINIC | Age: 48
End: 2023-03-01

## 2023-03-01 NOTE — PROGRESS NOTES
Clinic Care Coordination Contact  [facility]/Voicemail       Clinical Data: Care Coordinator Outreach  Outreach attempted x 1.  Left message on patient's voicemail with call back information and requested return call.  CC TIA following up on referral to Transition Clinic.    Plan: Care  Coordinator will try to reach patient again in 1-2 weeks.

## 2023-03-02 ENCOUNTER — OFFICE VISIT (OUTPATIENT)
Dept: VASCULAR SURGERY | Facility: CLINIC | Age: 48
End: 2023-03-02
Attending: PODIATRIST
Payer: COMMERCIAL

## 2023-03-02 VITALS — DIASTOLIC BLOOD PRESSURE: 58 MMHG | SYSTOLIC BLOOD PRESSURE: 100 MMHG | HEART RATE: 72 BPM | OXYGEN SATURATION: 98 %

## 2023-03-02 DIAGNOSIS — L97.511 ULCER OF RIGHT FOOT, LIMITED TO BREAKDOWN OF SKIN (H): Primary | ICD-10-CM

## 2023-03-02 PROCEDURE — 11042 DBRDMT SUBQ TIS 1ST 20SQCM/<: CPT | Performed by: PODIATRIST

## 2023-03-02 ASSESSMENT — PAIN SCALES - GENERAL: PAINLEVEL: NO PAIN (0)

## 2023-03-02 NOTE — PATIENT INSTRUCTIONS
"Important lnstructions      WEIGHT BEARING STATUS: You are to remain NON WEIGHT BEARING on your right foot. Non-weight bearing means NO PRESSURE on your foot or heel at any time for any reason.    2. OFFLOADING DEVICE: Must use a A ROLLING KNEE WALKER at all times! (do not use affected foot to push wheelchair)    3. STABILIZATION DEVICE: Use a CAM BOOT . You will need to WEAR THIS ANYTIME YOU ARE UP AND OUT OF BED, IT IS OKAY TO REMOVE WHEN YOU ARE SLEEPING..       4. ELEVATE: Elevating your leg means laying with your head on a pillow and your foot ABOVE YOUR WAIST.     5. DO NOT MOVE YOUR FOOT.  There is a risk of worsening the wound or incision. To give yourself a higher chance of healing, please DO NOT swing foot back and forth and wiggle foot/toes especially when inside a stabilization device.        Dressing Change lnstructions         EVERY OTHER DAY and as needed, Cleanse your right foot wound(s) with Normal saline.    Pat Dry with non-sterile gauze    Primary Dressing: Apply Medihoney or Manuka honey into/onto the wounds    Secondary dressing: Cover with dry gauze    Secure with non-sterile roll gauze (4\" x 75\" roll) and tape (1\" roll tape) as needed; avoid adhesive directly on the skin    It IS NOT ok to get your wound wet in the bath or shower    SEEK MEDICAL CARE IF:  You have an increase in swelling, pain, or redness around the wound.  You have an increase in the amount of pus coming from the wound.  There is a bad smell coming from the wound.  The wound appears to be worsening/enlarging  You have a fever greater than 101.5 F      It is ok to continue current wound care treatment/products for the next 2-3 days until new wound care supplies are ordered and arrive. If longer than this please contact our office at [phone].        We want to hear from you!   In the next few weeks, you should receive a call or email to complete a survey about your visit at [facility]. Please help us " improve your appointment experience by letting us know how we did today. We strive to make your experience good and value any ways in which we could do better.      We value your input and suggestions.    Thank you for choosing the [facility]!

## 2023-03-02 NOTE — PROGRESS NOTES
FOOT AND ANKLE SURGERY/PODIATRY Progress Note      ASSESSMENT: Diabetic Ulceration right foot      TREATMENT:  -I discussed with the patient that the left ankle ulceration has resolved.  I am pleased with her progress.  I recommend she continue to monitor this area for any skin irritation or skin breakdown.    -Reviewed that the right foot ulceration is measuring similar in size to the previous visit which is likely due to her ambulating in gym shoes.  I recommend limited to nonweightbearing on the right foot with a cam boot ideally using a knee walker to remain nonweightbearing.  Patient states she understands and agrees to the treatment plan.    -After discussion of risk factors and consent obtained 2% Lidocaine HCL jelly was applied, under clean conditions, the right foot ulceration(s) were debrided using #15 blade scalpel.  Devitalized and nonviable tissue, along with any fibrin and slough, was removed to improve granulation tissue formation, stimulate wound healing, decrease overall bacteria load, disrupt biofilm formation and decrease edge senescence. Wound drainage was scant No. Total excisional debridement was 0.4 to sq cm into the subcutaneous tissue with a depth of 0.2 cm.   Ulcers were improved afterwards and .  Measures were as noted on the flow sheet. Medi-honey with a gauze dressing was applied. She will continue to apply Medi-honey with a gauze dressing qoday.    -She will follow-up in 3 to 4 weeks    David Patel DPM  [facility] [place]      HPI: Josefa Curiel was seen again today for right foot and left ankle ulcerations.  Patient admits that she has not been walking in her cam boot on the right foot and has been using gym shoes to ambulate.    Past Medical History:   Diagnosis Date     LORIN (acute kidney injury) (H)      Anxiety      Cannabis abuse      Depression      Diabetes Type 1, uncontrolled 1985    Onset age 10     DKA (diabetic ketoacidoses)      ETOH abuse       HLD (hyperlipidemia)      HTN (hypertension)      Lactic acidosis        Past Surgical History:   Procedure Laterality Date     ANKLE FRACTURE SURGERY Left      APPENDECTOMY       INCISION AND DRAINAGE FOOT, COMBINED Right 11/18/2022    Procedure: INCISION AND DRAINAGE, right foot;  Surgeon: David Patel DPM;  Location: [facility] [place]     PICC SINGLE LUMEN PLACEMENT  10/23/2022            Allergies   Allergen Reactions     Cefazolin Nephrotoxicity     Colestipol GI Disturbance     Doxycycline Nausea and Vomiting     Nickel Rash     Penicillins Rash         Current Outpatient Medications:      acetaminophen (TYLENOL) 500 MG tablet, Take 2 tablets (1,000 mg) by mouth every 6 hours as needed for pain, Disp: , Rfl:      amLODIPine (NORVASC) 10 MG tablet, Take 1 tablet (10 mg) by mouth daily, Disp: 30 tablet, Rfl: 3     calcium acetate (CALPHRON) 667 MG TABS tablet, Take 1 tablet by mouth, Disp: , Rfl:      childrens multivitamin with iron (FLINTSTONES COMPLETE) CHEW, Take 1 tablet by mouth daily, Disp: , Rfl:      Cholecalciferol (VITAMIN D3) 50 MCG (2000 UT) CHEW, Take 50 mcg by mouth daily, Disp: , Rfl:      collagenase (SANTYL) 250 UNIT/GM external ointment, Apply topically daily Total square centimeters of wound(s) being treated is         30 day supply, Disp: 30 g, Rfl: 3     Continuous Blood Gluc Sensor (DEXCOM G6 SENSOR) MISC, , Disp: , Rfl:      ferrous sulfate (FEROSUL) 325 (65 Fe) MG tablet, Take 1 tablet (325 mg) by mouth daily, Disp: 30 tablet, Rfl: 3     hydrALAZINE (APRESOLINE) 25 MG tablet, Take 1 tablet (25 mg) by mouth 3 times daily Hold for SBP < 110, Disp: 90 tablet, Rfl: 1     insulin degludec (TRESIBA) 100 UNIT/ML pen, Inject 10 Units Subcutaneous every morning, Disp: 15 mL, Rfl: 0     insulin lispro (HUMALOG KWIKPEN) 100 UNIT/ML (1 unit dial) KWIKPEN, Inject 1 unit for every 15g carbs with meals, Disp: 15 mL, Rfl: 0     metoprolol tartrate 75 MG TABS, Take 75 mg by mouth 2 times daily,  Disp: 60 tablet, Rfl: 3     ondansetron (ZOFRAN ODT) 4 MG ODT tab, Take 1 tablet (4 mg) by mouth every 8 hours as needed for nausea, Disp: 20 tablet, Rfl: 0     rosuvastatin (CRESTOR) 40 MG tablet, Take 40 mg by mouth daily, Disp: , Rfl:      sertraline (ZOLOFT) 100 MG tablet, Take 1 tablet (100 mg) by mouth daily, Disp: 30 tablet, Rfl: 1     traZODone (DESYREL) 50 MG tablet, Take 50 mg by mouth At Bedtime, Disp: , Rfl:     Review of Systems - 10 point Review of Systems is negative except for right foot ulcer which is noted in HPI.      OBJECTIVE:  /58   Pulse 72   LMP 12/08/2022 (Approximate)   SpO2 98%   General appearance: Patient is alert and fully cooperative with history & exam.  No sign of distress is noted during the visit.    Vascular: Dorsalis pedis palpableBilateral.  Dermatologic:    Negative Pressure Wound Therapy Foot Anterior;Right (Active)       Wound Foot Other (comment) NA (Active)       Wound Toe (Comment  which one) Ulceration (Active)       Wound Foot (Active)       VASC Wound Right plantar foot (Active)   Pre Size Length 0.6 03/02/23 1500   Pre Size Width 0.4 03/02/23 1500   Pre Size Depth 0.2 03/02/23 1500   Pre Total Sq cm 0.24 03/02/23 1500   Post Size Length 0.7 03/02/23 1500   Post Size Width 0.6 03/02/23 1500   Post Size Depth 0.2 03/02/23 1500   Post Total Sq cm 0.42 03/02/23 1500       VASC Wound left 3rd toe (Active)       VASC Wound left lateral ankle (Active)   Post Size Length 0 03/02/23 1500   Post Size Width 0 03/02/23 1500   Post Size Depth 0 03/02/23 1500   Post Total Sq cm 0 03/02/23 1500   Description scab 02/09/23 1511       Incision/Surgical Site 11/18/22 Right Foot (Active)   Mixed granular/fibrotic tissue plantar right forefoot ulceration.  No open lesions identified left ankle or foot.  No erythema noted bilateral.  Neurologic: Diminished to light touch Bilateral.  Musculoskeletal: Contracted digits noted Bilateral.    Imaging:     MA Diagnostic Bilateral  w/Danita    Result Date: 2/9/2023  EXAM: MA DIAGNOSTIC BILATERAL W/ DANITA LOCATION: [facility] DATE/TIME: 2/9/2023 11:09 AM INDICATION:  Breast asymmetry, Breast microcalcifications COMPARISON: 1/30/2023 MAMMOGRAPHIC FINDINGS: Bilateral full-field digital diagnostic mammograms performed. The breasts are extremely dense, which lowers the sensitivity of mammography. There are scattered benign-appearing round calcifications in both breasts. Breast tomosynthesis was used in interpretation. The calcifications marked in the right breast in the 8:00 position in the anterior depth appear to be at least partially layer dependently on the lateral view and appears slightly smudgy on the CC magnification view suggesting that they are likely benign milk of calcium calcifications. Six-month follow-up of these to confirm that they are benign is recommended. On the tomographic images the asymmetry seen in the left breast on the MLO view resolves consistent with a summation artifact again (superimposition of normal structures).     IMPRESSION: 1.  Asymmetry in the left breast resolves. 2.  Calcifications in the right breast are probably benign. Six-month follow-up right diagnostic mammogram to confirm stability is recommended. ACR BI-RADS Category 3: Probably Benign. Results were given to the patient at the time of exam.          Picture:

## 2023-03-15 ENCOUNTER — PATIENT OUTREACH (OUTPATIENT)
Dept: CARE COORDINATION | Facility: CLINIC | Age: 48
End: 2023-03-15

## 2023-03-15 NOTE — LETTER
[facility] CARE COORDINATION  [address] [place] [address]    March 15, 2023    Josefa Curiel  [address] [address]      Dear Josefa,    I am a clinic care coordinator who works with NATALIA Mccloud CNP with the [facility]. I recently tried to call and was unable to reach you. I was calling to see if you were able to establish with a therapist.  It looks like you were able to meet with Karey from the [facility] on 2/23/23 and she gave you referrals to [facility], [facility], and [facility].       Please feel free to contact me with any questions or concerns regarding care coordination and what we can offer.      We are focused on providing you with the highest-quality healthcare experience possible.    Sincerely,     Sammi Peña, [facility]  Social Work Care Coordinator  [facility]   [place], [place], [facility]  [phone]

## 2023-03-15 NOTE — PROGRESS NOTES
"Clinic Care Coordination Contact  [facility]/Voicemail       Clinical Data: Care Coordinator Outreach  Outreach attempted x 2.  Left message on patient's voicemail with call back information.    Per chart review pt connected with Karey from [facility] on 2/23/23:    \"Met with Josefa from 1200 - 1215. Discussed transition clinic referral, transition clinic services, and referrals for outpatient therapy, dbt programs, and psychiatry. Clinician gave referrals for [facility], [facility], and [facility].\"      Plan: Care Coordinator will send unable to contact letter with care coordinator contact information via [employer]. Care Coordinator will do no further outreaches at this time.        "

## 2023-03-29 ENCOUNTER — OFFICE VISIT (OUTPATIENT)
Dept: VASCULAR SURGERY | Facility: CLINIC | Age: 48
End: 2023-03-29
Attending: PODIATRIST
Payer: COMMERCIAL

## 2023-03-29 VITALS — RESPIRATION RATE: 16 BRPM | HEART RATE: 72 BPM | TEMPERATURE: 97.6 F

## 2023-03-29 DIAGNOSIS — L97.511 ULCER OF RIGHT FOOT, LIMITED TO BREAKDOWN OF SKIN (H): Primary | ICD-10-CM

## 2023-03-29 PROCEDURE — 99212 OFFICE O/P EST SF 10 MIN: CPT | Performed by: PODIATRIST

## 2023-03-29 PROCEDURE — G0463 HOSPITAL OUTPT CLINIC VISIT: HCPCS | Performed by: PODIATRIST

## 2023-03-29 ASSESSMENT — PAIN SCALES - GENERAL: PAINLEVEL: NO PAIN (0)

## 2023-03-29 NOTE — PROGRESS NOTES
FOOT AND ANKLE SURGERY/PODIATRY Progress Note      ASSESSMENT: Diabetic Ulceration right foot        TREATMENT:  -The right foot ulceration has resolved.     -We discussed that due to her anatomy, there is a pressure point which will continue to build callus tissue. If the callus tissue becomes too thick, skin breakdown will likely occur.     -I recommend use of a pumice stone x2-3 per week to remove callus tissue. I have asked that she return for sharp debridement of the callus prn.     -I have referred her to [facility] for diabetic shoes and inserts to offload the area of increased skin pressure. She will continue to use the CAM boot until the diabetic inserts/shoes are available.     -She is discharged from my care at this time but encouraged to return as concerns develop.     David Patel DPM  [facility]      HPI: Josefa Curiel was seen again today for right foot ulcers.  She has remained limited walking on the right foot and denies any new concerns at this time.    Past Medical History:   Diagnosis Date     LORIN (acute kidney injury) (H)      Anxiety      Cannabis abuse      Depression      Diabetes Type 1, uncontrolled 1985    Onset age 10     DKA (diabetic ketoacidoses)      ETOH abuse      HLD (hyperlipidemia)      HTN (hypertension)      Lactic acidosis        Past Surgical History:   Procedure Laterality Date     ANKLE FRACTURE SURGERY Left      APPENDECTOMY       INCISION AND DRAINAGE FOOT, COMBINED Right 11/18/2022    Procedure: INCISION AND DRAINAGE, right foot;  Surgeon: David Patel DPM;  Location: [facility] OR     PICC SINGLE LUMEN PLACEMENT  10/23/2022            Allergies   Allergen Reactions     Cefazolin Nephrotoxicity     Colestipol GI Disturbance     Doxycycline Nausea and Vomiting     Nickel Rash     Penicillins Rash         Current Outpatient Medications:      acetaminophen (TYLENOL) 500 MG tablet, Take 2 tablets (1,000 mg) by mouth every 6 hours  as needed for pain, Disp: , Rfl:      amLODIPine (NORVASC) 10 MG tablet, Take 1 tablet (10 mg) by mouth daily, Disp: 30 tablet, Rfl: 3     calcium acetate (CALPHRON) 667 MG TABS tablet, Take 1 tablet by mouth, Disp: , Rfl:      childrens multivitamin with iron (FLINTSTONES COMPLETE) CHEW, Take 1 tablet by mouth daily, Disp: , Rfl:      Cholecalciferol (VITAMIN D3) 50 MCG (2000 UT) CHEW, Take 50 mcg by mouth daily, Disp: , Rfl:      collagenase (SANTYL) 250 UNIT/GM external ointment, Apply topically daily Total square centimeters of wound(s) being treated is         30 day supply, Disp: 30 g, Rfl: 3     Continuous Blood Gluc Sensor (DEXCOM G6 SENSOR) MISC, , Disp: , Rfl:      ferrous sulfate (FEROSUL) 325 (65 Fe) MG tablet, Take 1 tablet (325 mg) by mouth daily, Disp: 30 tablet, Rfl: 3     hydrALAZINE (APRESOLINE) 25 MG tablet, Take 1 tablet (25 mg) by mouth 3 times daily Hold for SBP < 110, Disp: 90 tablet, Rfl: 1     insulin degludec (TRESIBA) 100 UNIT/ML pen, Inject 10 Units Subcutaneous every morning, Disp: 15 mL, Rfl: 0     insulin lispro (HUMALOG KWIKPEN) 100 UNIT/ML (1 unit dial) KWIKPEN, Inject 1 unit for every 15g carbs with meals, Disp: 15 mL, Rfl: 0     metoprolol tartrate 75 MG TABS, Take 75 mg by mouth 2 times daily, Disp: 60 tablet, Rfl: 3     ondansetron (ZOFRAN ODT) 4 MG ODT tab, Take 1 tablet (4 mg) by mouth every 8 hours as needed for nausea, Disp: 20 tablet, Rfl: 0     rosuvastatin (CRESTOR) 40 MG tablet, Take 40 mg by mouth daily, Disp: , Rfl:      sertraline (ZOLOFT) 100 MG tablet, Take 1 tablet (100 mg) by mouth daily, Disp: 30 tablet, Rfl: 1     traZODone (DESYREL) 50 MG tablet, Take 50 mg by mouth At Bedtime, Disp: , Rfl:     Review of Systems - 10 point Review of Systems is negative except for foot ulcers which is noted in HPI.      OBJECTIVE:  Pulse 72   Temp 97.6  F (36.4  C) (Temporal)   Resp 16   General appearance: Patient is alert and fully cooperative with history & exam.  No sign of  distress is noted during the visit.    Vascular: Dorsalis pedis non-palpableRight.  Dermatologic:    Negative Pressure Wound Therapy Foot Anterior;Right (Active)       Wound Foot Other (comment) NA (Active)       Wound Toe (Comment  which one) Ulceration (Active)       Wound Foot (Active)       VASC Wound Right plantar foot (Active)   Pre Size Length 0.6 03/02/23 1500   Pre Size Width 0.4 03/02/23 1500   Pre Size Depth 0.2 03/02/23 1500   Pre Total Sq cm 0.24 03/02/23 1500   Post Size Length 0 03/29/23 0900   Post Size Width 0 03/29/23 0900   Post Size Depth 0 03/29/23 0900   Post Total Sq cm 0 03/29/23 0900       VASC Wound left 3rd toe (Active)       VASC Wound left lateral ankle (Active)   Post Size Length 0 03/02/23 1500   Post Size Width 0 03/02/23 1500   Post Size Depth 0 03/02/23 1500   Post Total Sq cm 0 03/02/23 1500   Description scab 02/09/23 1511       Incision/Surgical Site 11/18/22 Right Foot (Active)     Neurologic: Diminished to light touch Right.  Musculoskeletal: Contracted digits noted Right.    Imaging:     No results found.       Picture:

## 2023-03-29 NOTE — PATIENT INSTRUCTIONS
Congratulations! Your wound has healed.    Until you receive your new diabetic shoes & inserts Dr. Patel would like you to remain LIMITED WEIGHT BEARING MEANS THAT IT IS ONLY OKAY FOR YOU TO APPLY LIGHT PRESSURE ON THE AFFECTED FOOT WHEN TRANSFERRING FROM YOUR ASSISTIVE DEVICE TO A CHAIR OR BED. on your right foot with the use of your CAM BOOT, to allow the newly healed skin to become stronger.    You may begin showering as normal in 2 weeks    Continue to monitor the area for breakdown & call us if your wound reopens.    Allow the steri strips to fall off on their own, do not pull these off.     We want to hear from you!   In the next few weeks, you should receive a call or email to complete a survey about your visit at [facility]. Please help us improve your appointment experience by letting us know how we did today. We strive to make your experience good and value any ways in which we could do better.      We value your input and suggestions.    Thank you for choosing the [facility]!    Your doctor recommends you use a pumice stone to get rid of your callous. You can purchase a pumice stone at your local drug store.    How to use your Pumice Stone        1. Soak your calloused skin in warm water. The most common part of the body to exfoliate with a pumice stone is the feet. Heels tend to develop a layer of hard, calloused skin that can become cracked or scaled. Soak the calloused body part in warm water for about five minutes to soften the skin.    2. Wait until your dry skin has softened. The skin will be easier to remove if it's soft and supple. Feel your skin after several minutes of soaking. If it still feels tough, wait a few more minutes (giving the water a warm-up if necessary). If it's soft, your skin is ready for the pumice stone.     3. Wet the stone. Wetting the stone will help it slide more easily across your skin, rather than catching on it. Run the stone under  warm water, or dip it in the water where you're soaking your skin, in order to thoroughly wet it.    4. Rub it gently over the calloused area. Use a circular motion to start sloughing away the dead skin with the pumice stone. If the skin is nice and soft, it should start coming right off. Keep going until you remove the dead skin and get to the fresh, supple skin underneath.   Don't press too hard. Light pressure is all that is needed; let the surface of the stone do the work.   If you're working on your feet, focus on the heels, the sides of your toes, and other areas where dry skin tends to build up.    5. Rinse and repeat. Rinse off the dead skin and take a look to see if you need to keep going. If you still see bits of dead skin, go over the area again with the pumice stone. Continue using the stone on the area until you're satisfied with the results.   Since the pumice stone will wear down slightly while you use it, you may need to turn it over to get a fresh surface you can use to exfoliate your skin.   Rinse the pumice stone often to keep its surface clean and effective.    6. Dry and moisturize your skin. When you're finished, use a towel to pat your skin dry. Coat the area with an oil or cream to prevent it from drying out too quickly. Your formerly calloused skin should now be soft, supple and gleaming.   Coconut oil, almond oil, or body lotion are all fine to use to condition your skin after pumicing.   Repeat as often as needed to keep your skin in good shape.    CARING FOR YOUR PUMICE STONE:    1. Scrub it after use. Dead skin will build up in the pores of the stone as you use it, so you'll want to clean the stone after use. Use a scrub brush to scrub the stone while holding it under running water. Add a bit of soap to help clean the stone completely. This way your stone will be clean and ready to use next time you need it.     2. Allow it to completely dry out. Set the pumice stone in a dry place so  that it doesn't stay damp in between uses. Some pumice stones come with a string attached that allows you to hang the stone to dry. If you let the stone stay wet, bacteria could grow in the pores, making it unsafe to use.     3. Boil it if necessary. Every once in a while, you'll want to give the stone a deep cleaning to make sure it isn't harboring bacteria. Bring a small pot of water to a full boil, drop in the stone, and boil it for five minutes. Use tongs to remove the stone from the water and allow it to dry completely before storing.   If you use the stone frequently, boil it every two weeks to ensure it stays clean.   If you'd like, you can add a capful of bleach to the water to be certain all the bacteria is killed.    4. Replace the stone when it wears down. Pumice is a soft stone that will eventually wear away after you've used it for awhile. When it gets too small to handle easily, or the surface becomes too smooth to be effective, purchase a new one. Pumice stones are inexpensive and can be found at any store that sells [employer] supplies.       [facility]  [facility]  [address]  [place] [address]  Phone: [phone]  Fax: [phone] [facility]  [address] [place] [address]  Home Medical Equipment, [address] Phone: [phone]  Orthotics and Prosthetics, [address]  Phone [phone] [facility]  [address]  [place] [address]  Phone: [phone]   Fax: [phone]   [facility]  [address]  [place] [address]  Phone: [phone]  Fax: [phone]    [facility]  [address]  [place] [address]  Phone: [phone]   Fax: [phone]   [facility]   [address]  [place] [address]  Phone: [phone]  Fax: [phone] [facility]  [facility]   [address]  [place] [address]  Phone: [phone] [place]  [address]  [place] [address]  Phone :[phone]             WEARING YOUR CUSTOM FOOT ORTHOTICS   Most insurance plans cover one pair of orthotics per year. You must check with your   insurance plan to see what your payment responsibility will be. Please call your   insurance company by calling the number on the back of your insurance card.   Orthotic's are non-refundable and non-returnable.   Orthotics are made of various designs. Some orthotics are covered with material that extends beyond your toes. If your orthotic is of this design, you will likely need to trim the toe end to get a proper fit. The insole from your shoe can be used as a template. Simply overlay the shoe insert on top of the custom orthotic. Align the heel end while tracing the length of the insert onto the custom orthotic. Use a large scissor to trim the toe end until you get a proper fit in the shoe.   The orthotic needs to be pushed as far back in the shoe as possible. The heel portion should not ride forward so as not to irritate your heel.   Orthotics are designed to work with socks. Excessive perspiration will shorten the life span of the orthotics. Remove the orthotic from the shoe frequently for proper drying.   The break-in period lasts for weeks. People new to orthotics will likely experience new aches and pains. The orthotic is forcing your foot into a new position. Arch, foot and leg muscle aches and fatigue are common during these weeks. Minor discomfort can be considered normal break in phenomenon. Start wearing your orthotic around your home your first day. Limited activity for one to two hours is recommended. You can increase one or two additional hours each day provided the aches and pains are subsiding. The degree of discomfort, fatigue and problems will dictate the speed of break in. You may require multiple  weeks to work up to full time use.   Do not continue wearing your orthotics if they are creating problems such as blisters or sores. Do not hesitate to call the clinic to speak with a nurse regarding orthotic   break in, fit, trimming, etc. You may also need to see the doctor if the orthotics are   simply not working out. Adjustments are sometimes made to improve orthotic   function.     Orthotics will only work in certain styles and types of shoes. Orthotics rarely work in dress shoes. Slip-ons, clogs, sandals and heels are particularly troublesome. Specially designed orthotics may be necessary for these types of shoes. Your custom orthotic was designed for activities that require appropriate walking or running shoes. Lace up athletic shoes, walking shoes or work boots should work appropriately. You may need a wider or longer shoe. Shoes with a removable  or insert work best. In general, you want to remove an insert from the shoe before placing the orthotic into the shoe. Shoes without a removable liner may not work as well.     When purchasing new shoes, bring your orthotics along to get a proper fit. Shop at stores that are familiar with orthotics.   Frequent washing of the orthotic may shorten the life span of the top cover. The top cover can be replaced but will generally last one to five years depending on use and foot perspiration.

## 2023-03-30 ENCOUNTER — TRANSFERRED RECORDS (OUTPATIENT)
Dept: HEALTH INFORMATION MANAGEMENT | Facility: CLINIC | Age: 48
End: 2023-03-30

## 2023-03-31 ENCOUNTER — HOSPITAL ENCOUNTER (EMERGENCY)
Facility: HOSPITAL | Age: 48
Discharge: HOME | End: 2023-03-31
Attending: EMERGENCY MEDICINE | Admitting: EMERGENCY MEDICINE
Payer: COMMERCIAL

## 2023-03-31 VITALS
OXYGEN SATURATION: 100 % | RESPIRATION RATE: 18 BRPM | HEART RATE: 93 BPM | TEMPERATURE: 97.5 F | SYSTOLIC BLOOD PRESSURE: 140 MMHG | DIASTOLIC BLOOD PRESSURE: 87 MMHG

## 2023-03-31 DIAGNOSIS — Z98.890 STATUS POST REPAIR OF COMPLEX WOUND: ICD-10-CM

## 2023-03-31 LAB
ANION GAP SERPL CALCULATED.3IONS-SCNC: 18 MMOL/L (ref 7–15)
BUN SERPL-MCNC: 67.5 MG/DL (ref 6–20)
CALCIUM SERPL-MCNC: 8.7 MG/DL (ref 8.6–10)
CHLORIDE SERPL-SCNC: 87 MMOL/L (ref 98–107)
CREAT SERPL-MCNC: 7.38 MG/DL (ref 0.51–0.95)
DEPRECATED HCO3 PLAS-SCNC: 24 MMOL/L (ref 22–29)
ERYTHROCYTE [DISTWIDTH] IN BLOOD BY AUTOMATED COUNT: 13.2 % (ref 10–15)
GFR SERPL CREATININE-BSD FRML MDRD: 6 ML/MIN/1.73M2
GLUCOSE SERPL-MCNC: 176 MG/DL (ref 70–99)
HCT VFR BLD AUTO: 28.8 % (ref 35–47)
HGB BLD-MCNC: 9.8 G/DL (ref 11.7–15.7)
HOLD SPECIMEN: NORMAL
INR PPP: 0.95 (ref 0.85–1.15)
MCH RBC QN AUTO: 28.1 PG (ref 26.5–33)
MCHC RBC AUTO-ENTMCNC: 34 G/DL (ref 31.5–36.5)
MCV RBC AUTO: 83 FL (ref 78–100)
PLATELET # BLD AUTO: 285 10E3/UL (ref 150–450)
POTASSIUM SERPL-SCNC: 3.7 MMOL/L (ref 3.4–5.3)
RBC # BLD AUTO: 3.49 10E6/UL (ref 3.8–5.2)
SODIUM SERPL-SCNC: 129 MMOL/L (ref 136–145)
WBC # BLD AUTO: 2.9 10E3/UL (ref 4–11)

## 2023-03-31 PROCEDURE — 80048 BASIC METABOLIC PNL TOTAL CA: CPT | Performed by: EMERGENCY MEDICINE

## 2023-03-31 PROCEDURE — 99283 EMERGENCY DEPT VISIT LOW MDM: CPT

## 2023-03-31 PROCEDURE — 12001 RPR S/N/AX/GEN/TRNK 2.5CM/<: CPT

## 2023-03-31 PROCEDURE — 250N000009 HC RX 250: Performed by: EMERGENCY MEDICINE

## 2023-03-31 PROCEDURE — 85610 PROTHROMBIN TIME: CPT | Performed by: EMERGENCY MEDICINE

## 2023-03-31 PROCEDURE — 85027 COMPLETE CBC AUTOMATED: CPT | Performed by: EMERGENCY MEDICINE

## 2023-03-31 PROCEDURE — 36415 COLL VENOUS BLD VENIPUNCTURE: CPT | Performed by: EMERGENCY MEDICINE

## 2023-03-31 PROCEDURE — 272N000004 HC RX 272: Performed by: EMERGENCY MEDICINE

## 2023-03-31 RX ORDER — TRANEXAMIC ACID 100 MG/ML
500 INJECTION, SOLUTION INTRAVENOUS ONCE
Status: COMPLETED | OUTPATIENT
Start: 2023-03-31 | End: 2023-03-31

## 2023-03-31 RX ORDER — LIDOCAINE HYDROCHLORIDE AND EPINEPHRINE 10; 10 MG/ML; UG/ML
INJECTION, SOLUTION INFILTRATION; PERINEURAL
Status: DISCONTINUED
Start: 2023-03-31 | End: 2023-03-31 | Stop reason: HOSPADM

## 2023-03-31 RX ADMIN — Medication 1 EACH: at 19:46

## 2023-03-31 RX ADMIN — TRANEXAMIC ACID 500 MG: 1 INJECTION, SOLUTION INTRAVENOUS at 18:13

## 2023-03-31 RX ADMIN — Medication 3 ML: at 18:10

## 2023-03-31 ASSESSMENT — ACTIVITIES OF DAILY LIVING (ADL): ADLS_ACUITY_SCORE: 35

## 2023-03-31 NOTE — ED TRIAGE NOTES
Patient had a dialysis catheter inserted today around 1300 and is now bleeding from insertion site. Dressing has a significant amount of blood underneath, patient applying pressure on arrival. Was told by dialysis triage nurse that a stitch may be needed.      Triage Assessment     Row Name 03/31/23 5570       Triage Assessment (Adult)    Airway WDL WDL       Respiratory WDL    Respiratory WDL WDL       Skin Circulation/Temperature WDL    Skin Circulation/Temperature WDL WDL       Cardiac WDL    Cardiac WDL WDL       Peripheral/Neurovascular WDL    Peripheral Neurovascular WDL WDL       Cognitive/Neuro/Behavioral WDL    Cognitive/Neuro/Behavioral WDL WDL

## 2023-03-31 NOTE — ED PROVIDER NOTES
EMERGENCY DEPARTMENT ENCOUNTER      NAME: Josefa Curiel  AGE: 47 year old female  YOB: 1975  MRN: [id number]  EVALUATION DATE & TIME: 3/31/2023  5:39 PM    PCP: Elsa Turner    ED PROVIDER: Seven Oropeza M.D.      Chief Complaint   Patient presents with     bleeding from dialysis port site         FINAL IMPRESSION:  Port site bleeding  Chronic renal failure  Anemia related to renal failure      ED COURSE & MEDICAL DECISION MAKING:    Pertinent Labs & Imaging studies reviewed. (See chart for details)  47 year old female presents to the Emergency Department for evaluation of bleeding from right tunneled dialysis catheter placed earlier today.  Patient had gone home and was resting.  She awakened with blood along her neck.  Also reports an achiness along the insertion site.  Otherwise feels well.  Review of records indicate patient with markedly elevated creatinine with most recent laboratory evaluation on March 20, 2023.  Records indicate patient had been seen by nephrology yesterday with determination to initiate dialysis.  Examination reveals small amount of bright red oozing from the insertion site at the base of the sternocleidomastoid on the right.  However given patient's significantly elevated creatinine will obtain blood work to assess for coagulation studies and repeat creatinine.  Platelets also being obtained.  Will address bleeding.. Patient appears non toxic with stable vitals signs. Overall exam is benign.        5:57 PM  I met with the patient for the initial interview and physical examination. Discussed plan for treatment and workup in the ED.  7:31 PM laboratory evaluations consistent with previously documented renal failure and anemia.  INR is appropriate.  No further bleeding from the site after repair.  We will continue outpatient management  At the conclusion of the encounter I discussed the results of all of the tests and the disposition. The questions were answered and  return precautions provided. The patient or family acknowledged understanding and was agreeable with the care plan.     Medical Decision Making    History:    Supplemental history from: Documented in chart, if applicable    External Record(s) reviewed: Documented in chart, if applicable.    Work Up:    Chart documentation includes differential considered and any EKGs or imaging independently interpreted by provider, where specified.    In additional to work up documented, I considered the following work up: Documented in chart, if applicable.    External consultation:    Discussion of management with another provider: Documented in chart, if applicable    Complicating factors:    Care impacted by chronic illness: Chronic Kidney Disease, Diabetes, Hyperlipidemia and Hypertension    Care affected by social determinants of health: N/A    Disposition considerations: Discharge. No recommendations on prescription strength medication(s). See documentation for any additional details.        PPE: Provider wore gloves, N95 mask, eye protection, surgical cap, and paper mask.     MEDICATIONS GIVEN IN THE EMERGENCY:  Medications   gelatin absorbable (GELFOAM) sponge 1 each (has no administration in time range)   lido-EPINEPHrine-tetracaine (LET) topical gel GEL 3 mL (3 mLs Topical $Given 3/31/23 1810)   tranexamic acid (CYKLOKAPRON) spray 500 mg (500 mg Both Nostrils $Given 3/31/23 1813)       NEW PRESCRIPTIONS STARTED AT TODAY'S ER VISIT  New Prescriptions    No medications on file          =================================================================    HPI    Patient information was obtained from: Patient    Use of Intrepreter: N/A     Josefa Curiel is a 47 year old female with a pertient medical history of LORIN, ETOH abuse, hypertension, proteinuria, DM type 1, proliferative diabetic retinopathy, 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     At 1300 today, patient had a dialysis catheter placed and left at around 1500. She went home and took a nap, and when she woke up, the catheter had blood all around it and it was bleeding all over. Otherwise in normal state of health. No further concerns at this time.       REVIEW OF SYSTEMS   Constitutional:  Denies fever, chills  Respiratory:  Denies productive cough or increased work of breathing  Cardiovascular:  Denies chest pain, palpitations  GI:  Denies abdominal pain, nausea, vomiting, or change in bowel or bladder habits   Musculoskeletal:  Denies any new muscle/joint swelling  Skin:  Denies rash. Positive for bleeding of the right dialysis catheter.   Neurologic:  Denies focal weakness  All systems negative except as marked.     PAST MEDICAL HISTORY:  Past Medical History:   Diagnosis Date     LORIN (acute kidney injury) (H)      Anxiety      Cannabis abuse      Depression      Diabetes Type 1, uncontrolled 1985    Onset age 10     DKA (diabetic ketoacidoses)      ETOH abuse      HLD (hyperlipidemia)      HTN (hypertension)      Lactic acidosis        PAST SURGICAL HISTORY:  Past Surgical History:   Procedure Laterality Date     ANKLE FRACTURE SURGERY Left      APPENDECTOMY       INCISION AND DRAINAGE FOOT, COMBINED Right 11/18/2022    Procedure: INCISION AND DRAINAGE, right foot;  Surgeon: David Patel DPM;  Location: [facility] OR     PICC SINGLE LUMEN PLACEMENT  10/23/2022              CURRENT MEDICATIONS:      Current Facility-Administered Medications:      gelatin absorbable (GELFOAM) sponge 1 each, 1 each, Topical, Once, Seven Oropeza MD    Current Outpatient Medications:      acetaminophen (TYLENOL) 500 MG tablet, Take 2 tablets (1,000 mg) by mouth every 6 hours as needed for pain, Disp: , Rfl:      amLODIPine (NORVASC) 10 MG  "tablet, Take 1 tablet (10 mg) by mouth daily, Disp: 30 tablet, Rfl: 3     calcium acetate (CALPHRON) 667 MG TABS tablet, Take 1 tablet by mouth, Disp: , Rfl:      childrens multivitamin with iron (FLINTSTONES COMPLETE) CHEW, Take 1 tablet by mouth daily, Disp: , Rfl:      Cholecalciferol (VITAMIN D3) 50 MCG (2000 UT) CHEW, Take 50 mcg by mouth daily, Disp: , Rfl:      collagenase (SANTYL) 250 UNIT/GM external ointment, Apply topically daily Total square centimeters of wound(s) being treated is         30 day supply, Disp: 30 g, Rfl: 3     Continuous Blood Gluc Sensor (DEXCOM G6 SENSOR) MISC, , Disp: , Rfl:      ferrous sulfate (FEROSUL) 325 (65 Fe) MG tablet, Take 1 tablet (325 mg) by mouth daily, Disp: 30 tablet, Rfl: 3     hydrALAZINE (APRESOLINE) 25 MG tablet, Take 1 tablet (25 mg) by mouth 3 times daily Hold for SBP < 110, Disp: 90 tablet, Rfl: 1     insulin degludec (TRESIBA) 100 UNIT/ML pen, Inject 10 Units Subcutaneous every morning, Disp: 15 mL, Rfl: 0     insulin lispro (HUMALOG KWIKPEN) 100 UNIT/ML (1 unit dial) KWIKPEN, Inject 1 unit for every 15g carbs with meals, Disp: 15 mL, Rfl: 0     metoprolol tartrate 75 MG TABS, Take 75 mg by mouth 2 times daily, Disp: 60 tablet, Rfl: 3     ondansetron (ZOFRAN ODT) 4 MG ODT tab, Take 1 tablet (4 mg) by mouth every 8 hours as needed for nausea, Disp: 20 tablet, Rfl: 0     rosuvastatin (CRESTOR) 40 MG tablet, Take 40 mg by mouth daily, Disp: , Rfl:      sertraline (ZOLOFT) 100 MG tablet, Take 1 tablet (100 mg) by mouth daily, Disp: 30 tablet, Rfl: 1     traZODone (DESYREL) 50 MG tablet, Take 50 mg by mouth At Bedtime, Disp: , Rfl:     ALLERGIES:  Allergies   Allergen Reactions     Cefazolin Nephrotoxicity     Colestipol GI Disturbance     Doxycycline Nausea and Vomiting     Nickel Rash     Penicillins Rash       FAMILY HISTORY:  Family History   Problem Relation Age of Onset     Clotting Disorder Mother         \"thin blood\"     Arthritis Mother      Hyperlipidemia " Mother      Skin Cancer Father         face/nose      Depression Father      Other - See Comments Sister         eye surgeries     No Known Problems Brother      Coronary Artery Disease Maternal Grandmother      Alcoholism Maternal Grandfather      Coronary Artery Disease Maternal Grandfather      No Known Problems Paternal Grandmother      No Known Problems Paternal Grandfather        SOCIAL HISTORY:   Social History     Socioeconomic History     Marital status: Single     Spouse name: None     Number of children: None     Years of education: None     Highest education level: None   Tobacco Use     Smoking status: Some Days     Smokeless tobacco: Never     Tobacco comments:     occasional   Substance and Sexual Activity     Alcohol use: Yes     Alcohol/week: 35.0 standard drinks     Comment: Alcoholic Drinks/day: ~750 mL wine daily     Drug use: Yes     Types: Marijuana     Comment: Drug use: 4-5x/week     Sexual activity: Yes     Partners: Male     Birth control/protection: Condom       VITALS:  Patient Vitals for the past 24 hrs:   BP Temp Temp src Pulse Resp SpO2   03/31/23 1740 (!) 140/87 97.5  F (36.4  C) Oral 93 18 100 %        PHYSICAL EXAM    Constitutional:  Awake, alert, in no apparent distress. Right internal jugular dialysis catheter with bleeding.   HENT:  Normocephalic, Atraumatic. Bilateral external ears normal. Oropharynx moist. Nose normal. Neck- Normal range of motion with no guarding, No midline cervical tenderness, Supple, No stridor.   Eyes:  PERRL, EOMI with no signs of entrapment, Conjunctiva normal, No discharge.   Respiratory:  Normal breath sounds, No respiratory distress, No wheezing.    Cardiovascular:  Normal heart rate, Normal rhythm, No appreciable rubs or gallops.   GI:  Soft, No tenderness, No distension, No palpable masses  Musculoskeletal:  Intact distal pulses, No edema. Good range of motion in all major joints. No tenderness to palpation or major deformities noted.  Integument:   Warm, Dry, No erythema, No rash.   Neurologic:  Alert & oriented, Normal motor function, Normal sensory function, No focal deficits noted.   Psychiatric:  Affect normal, Judgment normal, Mood normal.     LAB:  All pertinent labs reviewed and interpreted.  Results for orders placed or performed during the hospital encounter of 03/31/23   CBC (+ platelets, no diff)   Result Value Ref Range    WBC Count 2.9 (L) 4.0 - 11.0 10e3/uL    RBC Count 3.49 (L) 3.80 - 5.20 10e6/uL    Hemoglobin 9.8 (L) 11.7 - 15.7 g/dL    Hematocrit 28.8 (L) 35.0 - 47.0 %    MCV 83 78 - 100 fL    MCH 28.1 26.5 - 33.0 pg    MCHC 34.0 31.5 - 36.5 g/dL    RDW 13.2 10.0 - 15.0 %    Platelet Count 285 150 - 450 10e3/uL   Basic metabolic panel   Result Value Ref Range    Sodium 129 (L) 136 - 145 mmol/L    Potassium 3.7 3.4 - 5.3 mmol/L    Chloride 87 (L) 98 - 107 mmol/L    Carbon Dioxide (CO2) 24 22 - 29 mmol/L    Anion Gap 18 (H) 7 - 15 mmol/L    Urea Nitrogen 67.5 (H) 6.0 - 20.0 mg/dL    Creatinine 7.38 (H) 0.51 - 0.95 mg/dL    Calcium 8.7 8.6 - 10.0 mg/dL    Glucose 176 (H) 70 - 99 mg/dL    GFR Estimate 6 (L) >60 mL/min/1.73m2   Result Value Ref Range    INR 0.95 0.85 - 1.15       RADIOLOGY:  Reviewed all pertinent imaging. Please see official radiology report.  No orders to display       PROCEDURES:   PROCEDURE: Laceration Repair   INDICATIONS: Laceration   PROCEDURE PROVIDER: Dr Seven Oropeza   SITE:  0.5 cm insertion site at base of left sternocleidomastoid   TYPE/SIZE:   0.5 cm (total length)   FUNCTIONAL ASSESSMENT: Distal  intact   MEDICATION: LET/TXA/1% lidocaine with epinephrine   PREPARATION:  Betadine   DEBRIDEMENT:  None   CLOSURE:   5-0 Prolene x3 simple suture    Total number of sutures/staples placed: 3               I, Ena Amezcua, am serving as a scribe to document services personally performed by Seven Oropeza MD, based on my observation and the provider's statements to me. I, Seven Oropeza MD attest that Ena Amezcua is acting in a  marleni brown, has observed my performance of the services and has documented them in accordance with my direction.    Seven Oropeza M.D.  Emergency Medicine  [facility] EMERGENCY DEPARTMENT       Seven Oropeza MD  03/31/23 1937

## 2023-07-14 ENCOUNTER — HOSPITAL ENCOUNTER (INPATIENT)
Facility: HOSPITAL | Age: 48
LOS: 2 days | Discharge: HOME | DRG: 637 | End: 2023-07-16
Attending: EMERGENCY MEDICINE | Admitting: INTERNAL MEDICINE
Payer: COMMERCIAL

## 2023-07-14 ENCOUNTER — APPOINTMENT (OUTPATIENT)
Dept: CT IMAGING | Facility: HOSPITAL | Age: 48
DRG: 637 | End: 2023-07-14
Attending: PHYSICIAN ASSISTANT
Payer: COMMERCIAL

## 2023-07-14 DIAGNOSIS — R79.89 ELEVATED BLOOD KETONE BODY LEVEL: ICD-10-CM

## 2023-07-14 DIAGNOSIS — R10.9 ABDOMINAL PAIN, UNSPECIFIED ABDOMINAL LOCATION: ICD-10-CM

## 2023-07-14 DIAGNOSIS — K29.01 ACUTE GASTRITIS WITH HEMORRHAGE, UNSPECIFIED GASTRITIS TYPE: ICD-10-CM

## 2023-07-14 DIAGNOSIS — K52.9 ENTERITIS: ICD-10-CM

## 2023-07-14 DIAGNOSIS — R11.2 NAUSEA AND VOMITING: ICD-10-CM

## 2023-07-14 DIAGNOSIS — N18.5 CKD (CHRONIC KIDNEY DISEASE) STAGE 5, GFR LESS THAN 15 ML/MIN (H): Primary | ICD-10-CM

## 2023-07-14 LAB
ALBUMIN SERPL BCG-MCNC: 5.5 G/DL (ref 3.5–5.2)
ALP SERPL-CCNC: 135 U/L (ref 35–104)
ALT SERPL W P-5'-P-CCNC: 31 U/L (ref 0–50)
ANION GAP SERPL CALCULATED.3IONS-SCNC: 31 MMOL/L (ref 7–15)
ANION GAP SERPL CALCULATED.3IONS-SCNC: 33 MMOL/L (ref 7–15)
ANION GAP SERPL CALCULATED.3IONS-SCNC: 38 MMOL/L (ref 7–15)
AST SERPL W P-5'-P-CCNC: 40 U/L (ref 0–45)
B-OH-BUTYR SERPL-SCNC: 8.1 MMOL/L
BASE EXCESS BLDV CALC-SCNC: -5.5 MMOL/L
BASOPHILS # BLD AUTO: 0.1 10E3/UL (ref 0–0.2)
BASOPHILS NFR BLD AUTO: 1 %
BILIRUB SERPL-MCNC: 0.3 MG/DL
BUN SERPL-MCNC: 40.2 MG/DL (ref 6–20)
BUN SERPL-MCNC: 46.5 MG/DL (ref 6–20)
BUN SERPL-MCNC: 47.3 MG/DL (ref 6–20)
CALCIUM SERPL-MCNC: 10.1 MG/DL (ref 8.6–10)
CALCIUM SERPL-MCNC: 11.4 MG/DL (ref 8.6–10)
CALCIUM SERPL-MCNC: 9.4 MG/DL (ref 8.6–10)
CHLORIDE SERPL-SCNC: 81 MMOL/L (ref 98–107)
CHLORIDE SERPL-SCNC: 88 MMOL/L (ref 98–107)
CHLORIDE SERPL-SCNC: 89 MMOL/L (ref 98–107)
CREAT SERPL-MCNC: 6.2 MG/DL (ref 0.51–0.95)
CREAT SERPL-MCNC: 6.23 MG/DL (ref 0.51–0.95)
CREAT SERPL-MCNC: 6.26 MG/DL (ref 0.51–0.95)
DEPRECATED HCO3 PLAS-SCNC: 18 MMOL/L (ref 22–29)
DEPRECATED HCO3 PLAS-SCNC: 21 MMOL/L (ref 22–29)
DEPRECATED HCO3 PLAS-SCNC: 22 MMOL/L (ref 22–29)
EOSINOPHIL # BLD AUTO: 0 10E3/UL (ref 0–0.7)
EOSINOPHIL NFR BLD AUTO: 0 %
ERYTHROCYTE [DISTWIDTH] IN BLOOD BY AUTOMATED COUNT: 13 % (ref 10–15)
GASTROCULT GAST QL: POSITIVE
GFR SERPL CREATININE-BSD FRML MDRD: 8 ML/MIN/1.73M2
GLUCOSE BLDC GLUCOMTR-MCNC: 149 MG/DL (ref 70–99)
GLUCOSE BLDC GLUCOMTR-MCNC: 210 MG/DL (ref 70–99)
GLUCOSE BLDC GLUCOMTR-MCNC: 239 MG/DL (ref 70–99)
GLUCOSE BLDC GLUCOMTR-MCNC: 300 MG/DL (ref 70–99)
GLUCOSE BLDC GLUCOMTR-MCNC: 509 MG/DL (ref 70–99)
GLUCOSE BLDC GLUCOMTR-MCNC: 75 MG/DL (ref 70–99)
GLUCOSE SERPL-MCNC: 226 MG/DL (ref 70–99)
GLUCOSE SERPL-MCNC: 240 MG/DL (ref 70–99)
GLUCOSE SERPL-MCNC: 76 MG/DL (ref 70–99)
HBA1C MFR BLD: 7.4 %
HCG SERPL QL: NEGATIVE
HCO3 BLDV-SCNC: 20 MMOL/L (ref 24–30)
HCT VFR BLD AUTO: 41.7 % (ref 35–47)
HGB BLD-MCNC: 13.2 G/DL (ref 11.7–15.7)
HGB BLD-MCNC: 14.1 G/DL (ref 11.7–15.7)
HOLD SPECIMEN: NORMAL
IMM GRANULOCYTES # BLD: 0.1 10E3/UL
IMM GRANULOCYTES NFR BLD: 1 %
LACTATE SERPL-SCNC: 2.6 MMOL/L (ref 0.7–2)
LIPASE SERPL-CCNC: 30 U/L (ref 13–60)
LYMPHOCYTES # BLD AUTO: 0.7 10E3/UL (ref 0.8–5.3)
LYMPHOCYTES NFR BLD AUTO: 4 %
MAGNESIUM SERPL-MCNC: 2.9 MG/DL (ref 1.7–2.3)
MCH RBC QN AUTO: 31.3 PG (ref 26.5–33)
MCHC RBC AUTO-ENTMCNC: 33.8 G/DL (ref 31.5–36.5)
MCV RBC AUTO: 93 FL (ref 78–100)
MONOCYTES # BLD AUTO: 0.4 10E3/UL (ref 0–1.3)
MONOCYTES NFR BLD AUTO: 3 %
NEUTROPHILS # BLD AUTO: 16.2 10E3/UL (ref 1.6–8.3)
NEUTROPHILS NFR BLD AUTO: 91 %
NRBC # BLD AUTO: 0 10E3/UL
NRBC BLD AUTO-RTO: 0 /100
OXYHGB MFR BLDV: 60.3 % (ref 70–75)
PCO2 BLDV: 39 MM HG (ref 35–50)
PH BLDV: 7.33 [PH] (ref 7.35–7.45)
PH GAST: ABNORMAL [PH]
PHOSPHATE SERPL-MCNC: 4.7 MG/DL (ref 2.5–4.5)
PLATELET # BLD AUTO: 287 10E3/UL (ref 150–450)
PO2 BLDV: 34 MM HG (ref 25–47)
POTASSIUM SERPL-SCNC: 3.1 MMOL/L (ref 3.4–5.3)
POTASSIUM SERPL-SCNC: 3.5 MMOL/L (ref 3.4–5.3)
POTASSIUM SERPL-SCNC: 4 MMOL/L (ref 3.4–5.3)
PROT SERPL-MCNC: 8.9 G/DL (ref 6.4–8.3)
RBC # BLD AUTO: 4.5 10E6/UL (ref 3.8–5.2)
SAO2 % BLDV: 60.9 % (ref 70–75)
SODIUM SERPL-SCNC: 139 MMOL/L (ref 136–145)
SODIUM SERPL-SCNC: 140 MMOL/L (ref 136–145)
SODIUM SERPL-SCNC: 142 MMOL/L (ref 136–145)
WBC # BLD AUTO: 17.6 10E3/UL (ref 4–11)

## 2023-07-14 PROCEDURE — 250N000011 HC RX IP 250 OP 636: Mod: JZ | Performed by: PHYSICIAN ASSISTANT

## 2023-07-14 PROCEDURE — 84703 CHORIONIC GONADOTROPIN ASSAY: CPT | Performed by: PHYSICIAN ASSISTANT

## 2023-07-14 PROCEDURE — 82805 BLOOD GASES W/O2 SATURATION: CPT | Performed by: PHYSICIAN ASSISTANT

## 2023-07-14 PROCEDURE — 74176 CT ABD & PELVIS W/O CONTRAST: CPT

## 2023-07-14 PROCEDURE — 87040 BLOOD CULTURE FOR BACTERIA: CPT | Performed by: PHYSICIAN ASSISTANT

## 2023-07-14 PROCEDURE — 85025 COMPLETE CBC W/AUTO DIFF WBC: CPT | Performed by: EMERGENCY MEDICINE

## 2023-07-14 PROCEDURE — 82962 GLUCOSE BLOOD TEST: CPT

## 2023-07-14 PROCEDURE — 82271 OCCULT BLOOD OTHER SOURCES: CPT | Performed by: EMERGENCY MEDICINE

## 2023-07-14 PROCEDURE — 250N000009 HC RX 250: Performed by: EMERGENCY MEDICINE

## 2023-07-14 PROCEDURE — 36415 COLL VENOUS BLD VENIPUNCTURE: CPT | Performed by: INTERNAL MEDICINE

## 2023-07-14 PROCEDURE — 96375 TX/PRO/DX INJ NEW DRUG ADDON: CPT

## 2023-07-14 PROCEDURE — 99223 1ST HOSP IP/OBS HIGH 75: CPT | Performed by: INTERNAL MEDICINE

## 2023-07-14 PROCEDURE — 85018 HEMOGLOBIN: CPT | Performed by: INTERNAL MEDICINE

## 2023-07-14 PROCEDURE — 120N000001 HC R&B MED SURG/OB

## 2023-07-14 PROCEDURE — 83690 ASSAY OF LIPASE: CPT | Performed by: PHYSICIAN ASSISTANT

## 2023-07-14 PROCEDURE — 83735 ASSAY OF MAGNESIUM: CPT | Performed by: PHYSICIAN ASSISTANT

## 2023-07-14 PROCEDURE — 36415 COLL VENOUS BLD VENIPUNCTURE: CPT | Performed by: PHYSICIAN ASSISTANT

## 2023-07-14 PROCEDURE — 84100 ASSAY OF PHOSPHORUS: CPT | Performed by: PHYSICIAN ASSISTANT

## 2023-07-14 PROCEDURE — 85025 COMPLETE CBC W/AUTO DIFF WBC: CPT | Performed by: STUDENT IN AN ORGANIZED HEALTH CARE EDUCATION/TRAINING PROGRAM

## 2023-07-14 PROCEDURE — 96374 THER/PROPH/DIAG INJ IV PUSH: CPT

## 2023-07-14 PROCEDURE — 83605 ASSAY OF LACTIC ACID: CPT | Performed by: PHYSICIAN ASSISTANT

## 2023-07-14 PROCEDURE — 80053 COMPREHEN METABOLIC PANEL: CPT | Performed by: EMERGENCY MEDICINE

## 2023-07-14 PROCEDURE — 80048 BASIC METABOLIC PNL TOTAL CA: CPT | Performed by: INTERNAL MEDICINE

## 2023-07-14 PROCEDURE — 99291 CRITICAL CARE FIRST HOUR: CPT | Mod: 25

## 2023-07-14 PROCEDURE — 36415 COLL VENOUS BLD VENIPUNCTURE: CPT | Performed by: STUDENT IN AN ORGANIZED HEALTH CARE EDUCATION/TRAINING PROGRAM

## 2023-07-14 PROCEDURE — 250N000011 HC RX IP 250 OP 636: Mod: JZ | Performed by: EMERGENCY MEDICINE

## 2023-07-14 PROCEDURE — 83036 HEMOGLOBIN GLYCOSYLATED A1C: CPT | Performed by: INTERNAL MEDICINE

## 2023-07-14 PROCEDURE — 96376 TX/PRO/DX INJ SAME DRUG ADON: CPT

## 2023-07-14 PROCEDURE — 82010 KETONE BODYS QUAN: CPT | Performed by: PHYSICIAN ASSISTANT

## 2023-07-14 PROCEDURE — 96361 HYDRATE IV INFUSION ADD-ON: CPT

## 2023-07-14 PROCEDURE — 250N000011 HC RX IP 250 OP 636: Mod: JZ | Performed by: INTERNAL MEDICINE

## 2023-07-14 PROCEDURE — 250N000011 HC RX IP 250 OP 636: Performed by: INTERNAL MEDICINE

## 2023-07-14 PROCEDURE — 258N000003 HC RX IP 258 OP 636: Performed by: PHYSICIAN ASSISTANT

## 2023-07-14 PROCEDURE — 82010 KETONE BODYS QUAN: CPT | Performed by: INTERNAL MEDICINE

## 2023-07-14 PROCEDURE — 80053 COMPREHEN METABOLIC PANEL: CPT | Performed by: STUDENT IN AN ORGANIZED HEALTH CARE EDUCATION/TRAINING PROGRAM

## 2023-07-14 RX ORDER — LOSARTAN POTASSIUM 25 MG/1
25 TABLET ORAL DAILY
COMMUNITY

## 2023-07-14 RX ORDER — NICOTINE POLACRILEX 4 MG
15-30 LOZENGE BUCCAL
Status: DISCONTINUED | OUTPATIENT
Start: 2023-07-14 | End: 2023-07-16

## 2023-07-14 RX ORDER — MORPHINE SULFATE 4 MG/ML
4 INJECTION, SOLUTION INTRAMUSCULAR; INTRAVENOUS ONCE
Status: COMPLETED | OUTPATIENT
Start: 2023-07-14 | End: 2023-07-14

## 2023-07-14 RX ORDER — ONDANSETRON 2 MG/ML
4 INJECTION INTRAMUSCULAR; INTRAVENOUS ONCE
Status: COMPLETED | OUTPATIENT
Start: 2023-07-14 | End: 2023-07-14

## 2023-07-14 RX ORDER — ONDANSETRON 4 MG/1
4 TABLET, ORALLY DISINTEGRATING ORAL ONCE
Status: DISCONTINUED | OUTPATIENT
Start: 2023-07-14 | End: 2023-07-14

## 2023-07-14 RX ORDER — INSULIN LISPRO 100 [IU]/ML
3-4 INJECTION, SOLUTION INTRAVENOUS; SUBCUTANEOUS
COMMUNITY

## 2023-07-14 RX ORDER — SERTRALINE HYDROCHLORIDE 100 MG/1
100 TABLET, FILM COATED ORAL DAILY
COMMUNITY
End: 2024-04-11

## 2023-07-14 RX ORDER — ONDANSETRON 2 MG/ML
4 INJECTION INTRAMUSCULAR; INTRAVENOUS EVERY 6 HOURS PRN
Status: DISCONTINUED | OUTPATIENT
Start: 2023-07-14 | End: 2023-07-16 | Stop reason: HOSPADM

## 2023-07-14 RX ORDER — LIDOCAINE 40 MG/G
CREAM TOPICAL
Status: DISCONTINUED | OUTPATIENT
Start: 2023-07-14 | End: 2023-07-16 | Stop reason: HOSPADM

## 2023-07-14 RX ORDER — PROCHLORPERAZINE MALEATE 10 MG
10 TABLET ORAL EVERY 6 HOURS PRN
Status: DISCONTINUED | OUTPATIENT
Start: 2023-07-14 | End: 2023-07-16 | Stop reason: HOSPADM

## 2023-07-14 RX ORDER — PROCHLORPERAZINE 25 MG
25 SUPPOSITORY, RECTAL RECTAL EVERY 12 HOURS PRN
Status: DISCONTINUED | OUTPATIENT
Start: 2023-07-14 | End: 2023-07-16 | Stop reason: HOSPADM

## 2023-07-14 RX ORDER — NICOTINE POLACRILEX 4 MG
15-30 LOZENGE BUCCAL
Status: DISCONTINUED | OUTPATIENT
Start: 2023-07-14 | End: 2023-07-16 | Stop reason: HOSPADM

## 2023-07-14 RX ORDER — ROPINIROLE 0.25 MG/1
1 TABLET, FILM COATED ORAL AT BEDTIME
COMMUNITY
End: 2024-07-09

## 2023-07-14 RX ORDER — ONDANSETRON 4 MG/1
4 TABLET, ORALLY DISINTEGRATING ORAL EVERY 6 HOURS PRN
Status: DISCONTINUED | OUTPATIENT
Start: 2023-07-14 | End: 2023-07-16 | Stop reason: HOSPADM

## 2023-07-14 RX ORDER — CYANOCOBALAMIN (VITAMIN B-12) 500 MCG
500 LOZENGE ORAL DAILY
COMMUNITY

## 2023-07-14 RX ORDER — POTASSIUM CHLORIDE 7.45 MG/ML
10 INJECTION INTRAVENOUS
Status: DISCONTINUED | OUTPATIENT
Start: 2023-07-14 | End: 2023-07-16

## 2023-07-14 RX ORDER — DEXTROSE MONOHYDRATE 25 G/50ML
25-50 INJECTION, SOLUTION INTRAVENOUS
Status: DISCONTINUED | OUTPATIENT
Start: 2023-07-14 | End: 2023-07-16 | Stop reason: HOSPADM

## 2023-07-14 RX ORDER — DEXTROSE MONOHYDRATE 100 MG/ML
INJECTION, SOLUTION INTRAVENOUS CONTINUOUS PRN
Status: DISCONTINUED | OUTPATIENT
Start: 2023-07-14 | End: 2023-07-16

## 2023-07-14 RX ORDER — DEXTROSE MONOHYDRATE 25 G/50ML
25-50 INJECTION, SOLUTION INTRAVENOUS
Status: DISCONTINUED | OUTPATIENT
Start: 2023-07-14 | End: 2023-07-16

## 2023-07-14 RX ADMIN — ONDANSETRON 4 MG: 2 INJECTION INTRAMUSCULAR; INTRAVENOUS at 17:21

## 2023-07-14 RX ADMIN — MORPHINE SULFATE 4 MG: 4 INJECTION, SOLUTION INTRAMUSCULAR; INTRAVENOUS at 15:09

## 2023-07-14 RX ADMIN — ONDANSETRON 4 MG: 2 INJECTION INTRAMUSCULAR; INTRAVENOUS at 15:09

## 2023-07-14 RX ADMIN — FAMOTIDINE 20 MG: 10 INJECTION, SOLUTION INTRAVENOUS at 18:16

## 2023-07-14 RX ADMIN — INSULIN HUMAN 5.5 UNITS/HR: 1 INJECTION, SOLUTION INTRAVENOUS at 18:20

## 2023-07-14 RX ADMIN — SODIUM CHLORIDE 1000 ML: 9 INJECTION, SOLUTION INTRAVENOUS at 16:31

## 2023-07-14 RX ADMIN — SODIUM CHLORIDE 500 ML: 9 INJECTION, SOLUTION INTRAVENOUS at 14:05

## 2023-07-14 RX ADMIN — MORPHINE SULFATE 4 MG: 4 INJECTION, SOLUTION INTRAMUSCULAR; INTRAVENOUS at 17:20

## 2023-07-14 RX ADMIN — ONDANSETRON 4 MG: 2 INJECTION INTRAMUSCULAR; INTRAVENOUS at 14:16

## 2023-07-14 RX ADMIN — DEXTROSE AND SODIUM CHLORIDE: 5; 450 INJECTION, SOLUTION INTRAVENOUS at 23:27

## 2023-07-14 ASSESSMENT — ACTIVITIES OF DAILY LIVING (ADL)
ADLS_ACUITY_SCORE: 35

## 2023-07-14 ASSESSMENT — ENCOUNTER SYMPTOMS
DYSURIA: 0
SHORTNESS OF BREATH: 1
HEMATURIA: 0
CHEST TIGHTNESS: 0
VOMITING: 1
NAUSEA: 1
ABDOMINAL PAIN: 1
DIFFICULTY URINATING: 0
DIARRHEA: 1

## 2023-07-14 NOTE — ED TRIAGE NOTES
Patient presents to ED for evaluation of a day of N/V and abdominal pain. Dialysis patient, dialysis 3 times a week last done yesterday. Pain 9/10.     Triage Assessment     Row Name 07/14/23 4560       Triage Assessment (Adult)    Airway WDL WDL       Respiratory WDL    Respiratory WDL WDL       Skin Circulation/Temperature WDL    Skin Circulation/Temperature WDL WDL       Cardiac WDL    Cardiac WDL WDL       Peripheral/Neurovascular WDL    Peripheral Neurovascular WDL WDL       Cognitive/Neuro/Behavioral WDL    Cognitive/Neuro/Behavioral WDL WDL

## 2023-07-14 NOTE — ED PROVIDER NOTES
I, Maddy Solomon MD have reviewed the documentation, personally taken the patient's history, performed an exam and agree with the physical finds, diagnosis and management plan.    I saw this patient with Quentin Mackey PA-C.  Patient is a 48-year-old female comes in today for evaluation of nausea and vomiting and some abdominal pain for the last 3 days.  She is a dialysis patient.  She still makes urine.  She was last dialyzed yesterday.  Initially she had some diffuse abdominal tenderness but by the time I evaluated her she had no tenderness.  She was tachycardic on arrival and remains tachycardic.  She says the nausea is little bit better although she would like something more for the nausea.  The morphine seemed to help a lot with the pain.  Her initial lab work showed an increased anion gap acidosis.  Her CO2 was not as low as I would expect for this to be DKA.  Lactic acid was elevated.  She not having fevers.  CT scan showed enteritis.  Her abdominal pain improved.  With her lab abnormalities however and continued tachycardia I think she will need admission to the hospital.  Because she still makes urine and does not have issues with fluid overload I think there is some room to rehydrate her a little bit more.  We will work on getting her admitted to the hospital.  I discussed it with her and she is in agreement with the plan.    6:10 pm: I spoke with Dr. Jean with the the hospitalist service.  He is concerned about the patient's blood sugar going up and wants to put her on insulin drip and put her in the ICU.  Clinically I do not think she needs ICU right now.  We can try the insulin drip and see if we can make her labs look a little bit better before she goes to a floor bed.  I discussed the plan with the nurse.  We will get a repeat BMP at 8 PM.    Repeat BMP that was drawn after 8:00 showed continued anion gap with acidosis.  Patient was back on the insulin drip and will remain in the emergency  department until she is able to come off and go to a floor bed.    I personally saw the patient and performed a substantive portion of the visit including all aspects of the medical decision making.      Critical care time was 35 minutes for management of DKA with insulin drip, initial evaluation, repeat evaluation, close monitoring.    Maddy Solomon M.D.  Emergency Medicine  [facility] EMERGENCY DEPARTMENT  [address] [place] [id number] [phone]  Dept: [phone]     Maddy Solomon MD  07/14/23 [id number]       Maddy Solomon MD  07/14/23 5144

## 2023-07-14 NOTE — PHARMACY-ADMISSION MEDICATION HISTORY
Pharmacist Admission Medication History    Admission medication history is complete. The information provided in this note is only as accurate as the sources available at the time of the update.    Medication reconciliation/reorder completed by provider prior to medication history? No    Information Source(s): Patient and CareEverywhere/SureScripts via in-person    Pertinent Information:     Losartan: Per chart review, nephrologist stopped hydralazine and added ARB on 6/29. She has not started taking losartan yet d/t feeling her BP was controlled.     Metoprolol/Amlodipine: She stopped taking metoprolol and amlodipine. She states she was instructed to do this by nephrologist, Dr. Gerardo. Unable to confirm these med changes in chart review.      Changes made to PTA medication list:    Added: Glucagon, vitamin B12, ropinirole, losartan    Deleted: Amlodipine, calcium acetate, MVI, Santyl, ondansetron, metoprolol    Changed: Tresiba, Humalog, trazodone    Medication Affordability:  Not including over the counter (OTC) medications, was there a time in the past 3 months when you did not take your medications as prescribed because of cost?: No    Allergies reviewed with patient and updates made in EHR: yes    Medication History Completed By: Patrica Cruz [facility] 7/14/2023 6:10 PM    Prior to Admission medications    Medication Sig Last Dose Taking? Auth Provider Long Term End Date   Cholecalciferol (VITAMIN D3) 50 MCG (2000 UT) CHEW Take 50 mcg by mouth daily 7/13/2023 at AM Yes Reported, Patient     Cyanocobalamin (VITAMIN B-12) 500 MCG LOZG Take 500 mcg by mouth daily 7/13/2023 at AM Yes Unknown, Entered By History     ferrous sulfate (FEROSUL) 325 (65 Fe) MG tablet Take 1 tablet (325 mg) by mouth daily Past Week at AM Yes Ed España, DO     Glucagon 3 MG/DOSE POWD Spray 1 spray in nostril as needed in the event of unconscious hypoglycemia or hypoglycemic seizure. May repeat dose if no response after 15  minutes. More than a month at PRN Yes Unknown, Entered By History Yes    insulin degludec (TRESIBA) 100 UNIT/ML pen Inject 16 Units Subcutaneous daily 7/13/2023 at AM Yes Unknown, Entered By History     insulin lispro (HUMALOG KWIKPEN) 100 UNIT/ML (1 unit dial) KWIKPEN Inject 3-4 Units Subcutaneous 4 times daily with meals or snacks 7/14/2023 at AM Yes Unknown, Entered By History No    losartan (COZAAR) 25 MG tablet Take 25 mg by mouth daily NOT STARTED at NOT STARTED Yes Unknown, Entered By History Yes    rOPINIRole (REQUIP) 0.25 MG tablet Take 0.5 mg by mouth At Bedtime 7/13/2023 at HS Yes Unknown, Entered By History Yes    sertraline (ZOLOFT) 100 MG tablet Take 150 mg by mouth daily 7/13/2023 at AM Yes Unknown, Entered By History No    traZODone (DESYREL) 100 MG tablet Take 100 mg by mouth At Bedtime 7/13/2023 at HS Yes Provider, Historical     acetaminophen (TYLENOL) 500 MG tablet Take 2 tablets (1,000 mg) by mouth every 6 hours as needed for pain PRN at PRN  Ed España DO     Continuous Blood Gluc Sensor (DEXCOM G6 SENSOR) MISC    Reported, Patient

## 2023-07-14 NOTE — ED PROVIDER NOTES
EMERGENCY DEPARTMENT ENCOUNTER      NAME: Josefa Curiel  AGE: 48 year old female  YOB: 1975  MRN: [id number]  EVALUATION DATE & TIME: No admission date for patient encounter.    PCP: Elsa Turner    ED PROVIDER: Ruperto Harding PA-C      Chief Complaint   Patient presents with     Abdominal Pain     Nausea, Vomiting, & Diarrhea         FINAL IMPRESSION:  1. Enteritis    2. Elevated blood ketone body level    3. Nausea and vomiting    4. Abdominal pain, unspecified abdominal location          ED COURSE & MEDICAL DECISION MAKING:    Pertinent Labs & Imaging studies reviewed. (See chart for details)  1:50 PM I met the patient and performed my initial interview and exam.   235 PM Patient staffed with Dr. Solomon  4:33 PM Patient seen and reevaluated, given electrolyte abnormalities and symptoms will admit for fluids, and further trending of electrolyte abnormalities.   4:36 PM Spoke with Dr. Jean, [facility] medicine who accepts patient for admission.     48 year old female presents to the Emergency Department for evaluation of abdominal pain, nausea, vomiting, and diarrhea.    ED Course as of 07/14/23 1638   Fri Jul 14, 2023   1420 Patient is a 48-year-old female, past medical history of type 1 diabetes, CKD, on dialysis, who presents emergency department for evaluation of nausea and vomiting.  Patient reports symptoms been ongoing for the last 3 days.  Patient is a Tuesday Thursday Saturday dialysis patient, she did get a full run of dialysis and yesterday.  On examination here, she has diffuse abdominal tenderness, with no rebound or guarding.  Denies any urinary symptoms.  Mildly tachycardic.  She has no chest pain or shortness of breath.  No fever.  Reports that she has been taking all of her medications.  Was able to get a full run of dialysis yesterday.  Given her past medical history, symptoms, will obtain basic laboratory studies here including CBC, BMP, hepatic function, lipase.   Patient have CT abdomen pelvis without contrast given her last creatinine was 7.  Differential at this point includes viral gastroenteritis, most likely cholecystitis, choledocholithiasis, pancreatitis.  Type 1 diabetes, will obtain ketones here to ensure that there is no evidence of DKA.  Patient was given a small bolus of fluids see if this helps with the tachycardia.  Pending CT.   1436 Patient with notable leukocytosis here, however some of this could be secondary to vomiting.   1444 Comprehensive metabolic panel(!)  Only mildly increased alkaline phosphatase here.  AST and ALT normal.  Patient's creatinine is 6, however on dialysis.  CO2 notably 21, and has a anion gap, however this is all consistent with her vomiting.   1453 Called with critical laboratory study, patient's ketones of 8.2.   1519 Patient seen and reevaluated, updated on laboratory results.  Significant leukocytosis, as well as significant anion gap, with carbon dioxide that is not significantly low, in the context of elevated ketones, question whether there is some underlying infection versus possible DKA.  We will add on lactate, blood cultures.  Pending CT scan of the abdomen pelvis.   1530 Patient only mildly acidotic here, however with significant anion gap.  Will add on further labs for evaluation of hypomagnesia, hypophosphatemia.  Patient with notably low chloride, which can certainly be contributing to increased gap.   1613 Lactic Acid(!): 2.6  Lactate here 2.6, not significantly elevated.   1617 CT Abdomen Pelvis w/o Contrast  Patient with probable mild enteritis involving a few jejunal loops within the left quadrant.  Distended gallbladder with gallstones, however no signs of cholecystitis.  No evidence of bowel obstruction.   1633 Patient seen and reevaluated here in the emergency department.  She remains tachycardic here in the ED.  Venous blood gas does not show significant amount of acidosis, I have low suspicion that this is DKA  at this point.  Patient is a dialysis patient, and has significant electrolyte abnormalities including elevated phosphorus, magnesium, as well as elevated lactate here.  Patient with a leukocytosis which I think is likely secondary to enteritis, which is seen on the CT, as well as nausea and vomiting.  Patient's pain is improved here after morphine.  Nausea improved after a couple rounds of Zofran.   1636 Given her lab abnormalities, and history of dialysis, will admit the to the hospital for ongoing management of nausea, as well as some rehydration in the context of her significant electrolyte abnormalities including lactic acid elevation, some ketones, and very mild acidosis.  I spoke with hospital medicine, except the patient for admission.  Patient is agreeable with this plan.       Medical Decision Making    History:    Supplemental history from: Documented in chart, if applicable    External Record(s) reviewed: Documented in chart, if applicable.    Work Up:    Chart documentation includes differential considered and any EKGs or imaging independently interpreted by provider, where specified.    In additional to work up documented, I considered the following work up: Documented in chart, if applicable.    External consultation:    Discussion of management with another provider: Documented in chart, if applicable    Complicating factors:    Care impacted by chronic illness: Chronic Kidney Disease, Diabetes, Hyperlipidemia, Hypertension, Mental Health and Smoking / Nicotine Use    Care affected by social determinants of health: Alcohol Abuse and/or Recreational Drug Use    Disposition considerations: Admit.             At the conclusion of the encounter I discussed the results of all of the tests and the disposition. The questions were answered. The patient or family acknowledged understanding and was agreeable with the care plan.       0 minutes of critical care time     MEDICATIONS GIVEN IN THE  "EMERGENCY:  Medications   0.9% sodium chloride BOLUS (0 mLs Intravenous Stopped 7/14/23 1518)   ondansetron (ZOFRAN) injection 4 mg (4 mg Intravenous $Given 7/14/23 1416)   morphine (PF) injection 4 mg (4 mg Intravenous $Given 7/14/23 1509)   ondansetron (ZOFRAN) injection 4 mg (4 mg Intravenous $Given 7/14/23 1509)   0.9% sodium chloride BOLUS (1,000 mLs Intravenous $New Bag 7/14/23 1631)       NEW PRESCRIPTIONS STARTED AT TODAY'S ER VISIT  New Prescriptions    No medications on file          =================================================================    HPI    Patient information was obtained from: the patient    Use of : N/A       Josefa Curiel is a 48 year old female with a pertinent history of chronic kidney disease, stage 5, type 1 diabetes, hypertension, hyperlipidemia, and depression who presents to this ED via private vehicle for evaluation of abdominal pain, nausea, vomiting, and diarrhea.    Patient came with nausea and vomiting that has been present for one day. She is a type 1 diabetic and is on dialysis. She dialyzes Tues, Thurs, and Sat with her last being yesterday. She states that her blood sugar has been high in the 300s. She endorses shortness of breath, abdominal pain, and vomiting she describes as \"coffee ground vomit.\" She reports no abnormal urinary symptoms, chest pain, or any other symptoms at this time.       REVIEW OF SYSTEMS   Review of Systems   Respiratory: Positive for shortness of breath. Negative for chest tightness.    Gastrointestinal: Positive for abdominal pain, diarrhea, nausea and vomiting.   Genitourinary: Negative for difficulty urinating, dysuria and hematuria.   All other systems reviewed and are negative.      PAST MEDICAL HISTORY:  Past Medical History:   Diagnosis Date     LORIN (acute kidney injury) (H)      Anxiety      Cannabis abuse      Depression      Diabetes Type 1, uncontrolled 1985    Onset age 10     DKA (diabetic ketoacidoses)      ETOH " "abuse      HLD (hyperlipidemia)      HTN (hypertension)      Lactic acidosis        PAST SURGICAL HISTORY:  Past Surgical History:   Procedure Laterality Date     ANKLE FRACTURE SURGERY Left      APPENDECTOMY       INCISION AND DRAINAGE FOOT, COMBINED Right 11/18/2022    Procedure: INCISION AND DRAINAGE, right foot;  Surgeon: David Patel DPM;  Location: [facility] OR     PICC SINGLE LUMEN PLACEMENT  10/23/2022                CURRENT MEDICATIONS:    acetaminophen (TYLENOL) 500 MG tablet  amLODIPine (NORVASC) 10 MG tablet  calcium acetate (CALPHRON) 667 MG TABS tablet  childrens multivitamin with iron (FLINTSTONES COMPLETE) CHEW  Cholecalciferol (VITAMIN D3) 50 MCG (2000 UT) CHEW  collagenase (SANTYL) 250 UNIT/GM external ointment  Continuous Blood Gluc Sensor (DEXCOM G6 SENSOR) MISC  ferrous sulfate (FEROSUL) 325 (65 Fe) MG tablet  hydrALAZINE (APRESOLINE) 25 MG tablet  insulin degludec (TRESIBA) 100 UNIT/ML pen  insulin lispro (HUMALOG KWIKPEN) 100 UNIT/ML (1 unit dial) KWIKPEN  metoprolol tartrate 75 MG TABS  ondansetron (ZOFRAN ODT) 4 MG ODT tab  rosuvastatin (CRESTOR) 40 MG tablet  sertraline (ZOLOFT) 100 MG tablet  traZODone (DESYREL) 50 MG tablet         ALLERGIES:  Allergies   Allergen Reactions     Cefazolin Nephrotoxicity     Colestipol GI Disturbance     Doxycycline Nausea and Vomiting     Nickel Rash     Penicillins Rash       FAMILY HISTORY:  Family History   Problem Relation Age of Onset     Clotting Disorder Mother         \"thin blood\"     Arthritis Mother      Hyperlipidemia Mother      Skin Cancer Father         face/nose      Depression Father      Other - See Comments Sister         eye surgeries     No Known Problems Brother      Coronary Artery Disease Maternal Grandmother      Alcoholism Maternal Grandfather      Coronary Artery Disease Maternal Grandfather      No Known Problems Paternal Grandmother      No Known Problems Paternal Grandfather        SOCIAL HISTORY:   Social History " "    Socioeconomic History     Marital status: Single   Tobacco Use     Smoking status: Some Days     Smokeless tobacco: Never     Tobacco comments:     occasional   Substance and Sexual Activity     Alcohol use: Yes     Alcohol/week: 35.0 standard drinks of alcohol     Comment: Alcoholic Drinks/day: ~750 mL wine daily     Drug use: Yes     Types: Marijuana     Comment: Drug use: 4-5x/week     Sexual activity: Yes     Partners: Male     Birth control/protection: Condom       VITALS:  BP (!) 143/71   Pulse (!) 129   Temp 97  F (36.1  C) (Temporal)   Resp 16   Ht 1.702 m (5' 7\")   Wt 56.7 kg (125 lb)   SpO2 99%   BMI 19.58 kg/m      PHYSICAL EXAM    Physical Exam  Vitals and nursing note reviewed.   Constitutional:       General: She is not in acute distress.     Appearance: Normal appearance. She is normal weight. She is not toxic-appearing or diaphoretic.   HENT:      Right Ear: External ear normal.      Left Ear: External ear normal.   Eyes:      Conjunctiva/sclera: Conjunctivae normal.   Cardiovascular:      Rate and Rhythm: Regular rhythm. Tachycardia present.      Heart sounds: Normal heart sounds.   Pulmonary:      Effort: Pulmonary effort is normal. No respiratory distress.      Breath sounds: No stridor. No wheezing or rales.   Abdominal:      General: Abdomen is flat.      Palpations: Abdomen is soft. There is no shifting dullness or mass.      Tenderness: There is generalized abdominal tenderness. There is no right CVA tenderness, left CVA tenderness, guarding or rebound.      Hernia: No hernia is present.   Skin:     Coloration: Skin is not jaundiced.      Findings: No erythema or rash.   Neurological:      General: No focal deficit present.      Mental Status: She is alert. Mental status is at baseline.         LAB:  All pertinent labs reviewed and interpreted.  Labs Ordered and Resulted from Time of ED Arrival to Time of ED Departure   COMPREHENSIVE METABOLIC PANEL - Abnormal       Result Value    " Sodium 140      Potassium 3.5      Chloride 81 (*)     Carbon Dioxide (CO2) 21 (*)     Anion Gap 38 (*)     Urea Nitrogen 40.2 (*)     Creatinine 6.23 (*)     Calcium 11.4 (*)     Glucose 240 (*)     Alkaline Phosphatase 135 (*)     AST 40      ALT 31      Protein Total 8.9 (*)     Albumin 5.5 (*)     Bilirubin Total 0.3      GFR Estimate 8 (*)    CBC WITH PLATELETS AND DIFFERENTIAL - Abnormal    WBC Count 17.6 (*)     RBC Count 4.50      Hemoglobin 14.1      Hematocrit 41.7      MCV 93      MCH 31.3      MCHC 33.8      RDW 13.0      Platelet Count 287      % Neutrophils 91      % Lymphocytes 4      % Monocytes 3      % Eosinophils 0      % Basophils 1      % Immature Granulocytes 1      NRBCs per 100 WBC 0      Absolute Neutrophils 16.2 (*)     Absolute Lymphocytes 0.7 (*)     Absolute Monocytes 0.4      Absolute Eosinophils 0.0      Absolute Basophils 0.1      Absolute Immature Granulocytes 0.1      Absolute NRBCs 0.0     KETONE BETA-HYDROXYBUTYRATE QUANTITATIVE, RAPID - Abnormal    Ketone (Beta-Hydroxybutyrate) Quantitative 8.10 (*)    GLUCOSE BY METER - Abnormal    GLUCOSE BY METER POCT 239 (*)    BLOOD GAS VENOUS - Abnormal    pH Venous 7.33 (*)     pCO2 Venous 39      pO2 Venous 34      Bicarbonate Venous 20 (*)     Base Excess/Deficit (+/-) -5.5      Oxyhemoglobin Venous 60.3 (*)     O2 Sat, Venous 60.9 (*)    LACTIC ACID WHOLE BLOOD - Abnormal    Lactic Acid 2.6 (*)    PHOSPHORUS - Abnormal    Phosphorus 4.7 (*)    MAGNESIUM - Abnormal    Magnesium 2.9 (*)    LIPASE - Normal    Lipase 30     HCG QUALITATIVE PREGNANCY - Normal    hCG Serum Qualitative Negative     GLUCOSE MONITOR NURSING POCT   LACTIC ACID WHOLE BLOOD   BLOOD CULTURE   BLOOD CULTURE       RADIOLOGY:  Reviewed all pertinent imaging. Please see official radiology report.  CT Abdomen Pelvis w/o Contrast   Final Result   IMPRESSION:       1.  Probable mild enteritis involving a few jejunal loops within the left upper quadrant.       2.  No bowel  obstruction or other acute abnormality.       3.  Distended gallbladder with gallstones. No signs of acute cholecystitis.          PROCEDURES:   None.       I, Harman Cabral, am serving as a scribe to document services personally performed by Ruperto Harding PA-C, based on my observation and the provider's statements to me. I, Ruperto Harding PA-C, attest that Harman Cabral is acting in a scribe capacity, has observed my performance of the services and has documented them in accordance with my direction.    Ruperto Harding PA-C  Emergency Medicine  [facility] EMERGENCY DEPARTMENT  [facility] [facility] [id number] [phone]  Dept: [phone]       Ruperto Harding PA-C  07/14/23 7096

## 2023-07-15 PROBLEM — E10.69 TYPE 1 DIABETES MELLITUS WITH OTHER SPECIFIED COMPLICATION (H): Status: ACTIVE | Noted: 2022-10-19

## 2023-07-15 LAB
ANION GAP SERPL CALCULATED.3IONS-SCNC: 14 MMOL/L (ref 7–15)
ANION GAP SERPL CALCULATED.3IONS-SCNC: 17 MMOL/L (ref 7–15)
ANION GAP SERPL CALCULATED.3IONS-SCNC: 19 MMOL/L (ref 7–15)
ANION GAP SERPL CALCULATED.3IONS-SCNC: 22 MMOL/L (ref 7–15)
ANION GAP SERPL CALCULATED.3IONS-SCNC: 25 MMOL/L (ref 7–15)
ANION GAP SERPL CALCULATED.3IONS-SCNC: 31 MMOL/L (ref 7–15)
B-OH-BUTYR SERPL-SCNC: 0.6 MMOL/L
B-OH-BUTYR SERPL-SCNC: 1.9 MMOL/L
B-OH-BUTYR SERPL-SCNC: 2.2 MMOL/L
B-OH-BUTYR SERPL-SCNC: 3.1 MMOL/L
B-OH-BUTYR SERPL-SCNC: 3.4 MMOL/L
B-OH-BUTYR SERPL-SCNC: 5.5 MMOL/L
B-OH-BUTYR SERPL-SCNC: 6.5 MMOL/L
BUN SERPL-MCNC: 14.8 MG/DL (ref 6–20)
BUN SERPL-MCNC: 17 MG/DL (ref 6–20)
BUN SERPL-MCNC: 17.5 MG/DL (ref 6–20)
BUN SERPL-MCNC: 41.4 MG/DL (ref 6–20)
BUN SERPL-MCNC: 47.9 MG/DL (ref 6–20)
BUN SERPL-MCNC: 48.7 MG/DL (ref 6–20)
CALCIUM SERPL-MCNC: 7.9 MG/DL (ref 8.6–10)
CALCIUM SERPL-MCNC: 8.5 MG/DL (ref 8.6–10)
CALCIUM SERPL-MCNC: 9 MG/DL (ref 8.6–10)
CALCIUM SERPL-MCNC: 9.1 MG/DL (ref 8.6–10)
CALCIUM SERPL-MCNC: 9.3 MG/DL (ref 8.6–10)
CALCIUM SERPL-MCNC: 9.8 MG/DL (ref 8.6–10)
CHLORIDE SERPL-SCNC: 100 MMOL/L (ref 98–107)
CHLORIDE SERPL-SCNC: 87 MMOL/L (ref 98–107)
CHLORIDE SERPL-SCNC: 91 MMOL/L (ref 98–107)
CHLORIDE SERPL-SCNC: 91 MMOL/L (ref 98–107)
CHLORIDE SERPL-SCNC: 93 MMOL/L (ref 98–107)
CHLORIDE SERPL-SCNC: 95 MMOL/L (ref 98–107)
CREAT SERPL-MCNC: 3.19 MG/DL (ref 0.51–0.95)
CREAT SERPL-MCNC: 3.76 MG/DL (ref 0.51–0.95)
CREAT SERPL-MCNC: 3.96 MG/DL (ref 0.51–0.95)
CREAT SERPL-MCNC: 5.11 MG/DL (ref 0.51–0.95)
CREAT SERPL-MCNC: 6.32 MG/DL (ref 0.51–0.95)
CREAT SERPL-MCNC: 6.36 MG/DL (ref 0.51–0.95)
DEPRECATED HCO3 PLAS-SCNC: 18 MMOL/L (ref 22–29)
DEPRECATED HCO3 PLAS-SCNC: 20 MMOL/L (ref 22–29)
DEPRECATED HCO3 PLAS-SCNC: 24 MMOL/L (ref 22–29)
DEPRECATED HCO3 PLAS-SCNC: 24 MMOL/L (ref 22–29)
DEPRECATED HCO3 PLAS-SCNC: 25 MMOL/L (ref 22–29)
DEPRECATED HCO3 PLAS-SCNC: 26 MMOL/L (ref 22–29)
ERYTHROCYTE [DISTWIDTH] IN BLOOD BY AUTOMATED COUNT: 13.3 % (ref 10–15)
GFR SERPL CREATININE-BSD FRML MDRD: 10 ML/MIN/1.73M2
GFR SERPL CREATININE-BSD FRML MDRD: 13 ML/MIN/1.73M2
GFR SERPL CREATININE-BSD FRML MDRD: 14 ML/MIN/1.73M2
GFR SERPL CREATININE-BSD FRML MDRD: 17 ML/MIN/1.73M2
GFR SERPL CREATININE-BSD FRML MDRD: 8 ML/MIN/1.73M2
GFR SERPL CREATININE-BSD FRML MDRD: 8 ML/MIN/1.73M2
GLUCOSE BLDC GLUCOMTR-MCNC: 100 MG/DL (ref 70–99)
GLUCOSE BLDC GLUCOMTR-MCNC: 102 MG/DL (ref 70–99)
GLUCOSE BLDC GLUCOMTR-MCNC: 107 MG/DL (ref 70–99)
GLUCOSE BLDC GLUCOMTR-MCNC: 107 MG/DL (ref 70–99)
GLUCOSE BLDC GLUCOMTR-MCNC: 109 MG/DL (ref 70–99)
GLUCOSE BLDC GLUCOMTR-MCNC: 110 MG/DL (ref 70–99)
GLUCOSE BLDC GLUCOMTR-MCNC: 112 MG/DL (ref 70–99)
GLUCOSE BLDC GLUCOMTR-MCNC: 114 MG/DL (ref 70–99)
GLUCOSE BLDC GLUCOMTR-MCNC: 120 MG/DL (ref 70–99)
GLUCOSE BLDC GLUCOMTR-MCNC: 154 MG/DL (ref 70–99)
GLUCOSE BLDC GLUCOMTR-MCNC: 155 MG/DL (ref 70–99)
GLUCOSE BLDC GLUCOMTR-MCNC: 171 MG/DL (ref 70–99)
GLUCOSE BLDC GLUCOMTR-MCNC: 173 MG/DL (ref 70–99)
GLUCOSE BLDC GLUCOMTR-MCNC: 184 MG/DL (ref 70–99)
GLUCOSE BLDC GLUCOMTR-MCNC: 265 MG/DL (ref 70–99)
GLUCOSE BLDC GLUCOMTR-MCNC: 307 MG/DL (ref 70–99)
GLUCOSE BLDC GLUCOMTR-MCNC: 36 MG/DL (ref 70–99)
GLUCOSE BLDC GLUCOMTR-MCNC: 41 MG/DL (ref 70–99)
GLUCOSE BLDC GLUCOMTR-MCNC: 98 MG/DL (ref 70–99)
GLUCOSE SERPL-MCNC: 116 MG/DL (ref 70–99)
GLUCOSE SERPL-MCNC: 149 MG/DL (ref 70–99)
GLUCOSE SERPL-MCNC: 191 MG/DL (ref 70–99)
GLUCOSE SERPL-MCNC: 289 MG/DL (ref 70–99)
GLUCOSE SERPL-MCNC: 77 MG/DL (ref 70–99)
GLUCOSE SERPL-MCNC: 82 MG/DL (ref 70–99)
HCT VFR BLD AUTO: 37.5 % (ref 35–47)
HGB BLD-MCNC: 11.2 G/DL (ref 11.7–15.7)
HGB BLD-MCNC: 12.9 G/DL (ref 11.7–15.7)
LACTATE SERPL-SCNC: 0.6 MMOL/L (ref 0.7–2)
MAGNESIUM SERPL-MCNC: 1.9 MG/DL (ref 1.7–2.3)
MCH RBC QN AUTO: 31.9 PG (ref 26.5–33)
MCHC RBC AUTO-ENTMCNC: 34.4 G/DL (ref 31.5–36.5)
MCV RBC AUTO: 93 FL (ref 78–100)
PHOSPHATE SERPL-MCNC: 3.2 MG/DL (ref 2.5–4.5)
PHOSPHATE SERPL-MCNC: 3.2 MG/DL (ref 2.5–4.5)
PLATELET # BLD AUTO: 255 10E3/UL (ref 150–450)
POTASSIUM SERPL-SCNC: 3.1 MMOL/L (ref 3.4–5.3)
POTASSIUM SERPL-SCNC: 3.4 MMOL/L (ref 3.4–5.3)
POTASSIUM SERPL-SCNC: 3.6 MMOL/L (ref 3.4–5.3)
POTASSIUM SERPL-SCNC: 3.7 MMOL/L (ref 3.4–5.3)
POTASSIUM SERPL-SCNC: 3.7 MMOL/L (ref 3.4–5.3)
POTASSIUM SERPL-SCNC: 4.5 MMOL/L (ref 3.4–5.3)
RBC # BLD AUTO: 4.05 10E6/UL (ref 3.8–5.2)
SODIUM SERPL-SCNC: 133 MMOL/L (ref 136–145)
SODIUM SERPL-SCNC: 133 MMOL/L (ref 136–145)
SODIUM SERPL-SCNC: 136 MMOL/L (ref 136–145)
SODIUM SERPL-SCNC: 138 MMOL/L (ref 136–145)
SODIUM SERPL-SCNC: 140 MMOL/L (ref 136–145)
SODIUM SERPL-SCNC: 142 MMOL/L (ref 136–145)
WBC # BLD AUTO: 17.3 10E3/UL (ref 4–11)

## 2023-07-15 PROCEDURE — 36415 COLL VENOUS BLD VENIPUNCTURE: CPT | Performed by: INTERNAL MEDICINE

## 2023-07-15 PROCEDURE — 5A1D70Z PERFORMANCE OF URINARY FILTRATION, INTERMITTENT, LESS THAN 6 HOURS PER DAY: ICD-10-PCS | Performed by: INTERNAL MEDICINE

## 2023-07-15 PROCEDURE — 80048 BASIC METABOLIC PNL TOTAL CA: CPT | Performed by: INTERNAL MEDICINE

## 2023-07-15 PROCEDURE — 99233 SBSQ HOSP IP/OBS HIGH 50: CPT | Performed by: INTERNAL MEDICINE

## 2023-07-15 PROCEDURE — 82010 KETONE BODYS QUAN: CPT | Performed by: INTERNAL MEDICINE

## 2023-07-15 PROCEDURE — 80321 ALCOHOLS BIOMARKERS 1OR 2: CPT | Performed by: INTERNAL MEDICINE

## 2023-07-15 PROCEDURE — 85018 HEMOGLOBIN: CPT | Performed by: INTERNAL MEDICINE

## 2023-07-15 PROCEDURE — 83605 ASSAY OF LACTIC ACID: CPT | Performed by: PHYSICIAN ASSISTANT

## 2023-07-15 PROCEDURE — 83735 ASSAY OF MAGNESIUM: CPT | Performed by: INTERNAL MEDICINE

## 2023-07-15 PROCEDURE — 85027 COMPLETE CBC AUTOMATED: CPT | Performed by: INTERNAL MEDICINE

## 2023-07-15 PROCEDURE — 82962 GLUCOSE BLOOD TEST: CPT

## 2023-07-15 PROCEDURE — 84100 ASSAY OF PHOSPHORUS: CPT | Performed by: INTERNAL MEDICINE

## 2023-07-15 PROCEDURE — 250N000013 HC RX MED GY IP 250 OP 250 PS 637: Performed by: INTERNAL MEDICINE

## 2023-07-15 PROCEDURE — 90937 HEMODIALYSIS REPEATED EVAL: CPT

## 2023-07-15 PROCEDURE — 120N000001 HC R&B MED SURG/OB

## 2023-07-15 PROCEDURE — 250N000011 HC RX IP 250 OP 636: Mod: JZ | Performed by: INTERNAL MEDICINE

## 2023-07-15 RX ORDER — HYDRALAZINE HYDROCHLORIDE 20 MG/ML
10 INJECTION INTRAMUSCULAR; INTRAVENOUS EVERY 4 HOURS PRN
Status: DISCONTINUED | OUTPATIENT
Start: 2023-07-15 | End: 2023-07-16 | Stop reason: HOSPADM

## 2023-07-15 RX ORDER — SEVELAMER CARBONATE 800 MG/1
800 TABLET, FILM COATED ORAL
Status: DISCONTINUED | OUTPATIENT
Start: 2023-07-15 | End: 2023-07-16 | Stop reason: HOSPADM

## 2023-07-15 RX ORDER — LOSARTAN POTASSIUM 25 MG/1
25 TABLET ORAL DAILY
Status: DISCONTINUED | OUTPATIENT
Start: 2023-07-15 | End: 2023-07-16 | Stop reason: HOSPADM

## 2023-07-15 RX ORDER — SODIUM CHLORIDE AND POTASSIUM CHLORIDE 150; 900 MG/100ML; MG/100ML
INJECTION, SOLUTION INTRAVENOUS CONTINUOUS
Status: DISCONTINUED | OUTPATIENT
Start: 2023-07-15 | End: 2023-07-15

## 2023-07-15 RX ORDER — PANTOPRAZOLE SODIUM 40 MG/1
40 TABLET, DELAYED RELEASE ORAL
Status: DISCONTINUED | OUTPATIENT
Start: 2023-07-16 | End: 2023-07-16 | Stop reason: HOSPADM

## 2023-07-15 RX ADMIN — ONDANSETRON 4 MG: 2 INJECTION INTRAMUSCULAR; INTRAVENOUS at 03:03

## 2023-07-15 RX ADMIN — PROCHLORPERAZINE EDISYLATE 10 MG: 5 INJECTION INTRAMUSCULAR; INTRAVENOUS at 03:44

## 2023-07-15 RX ADMIN — SEVELAMER CARBONATE 800 MG: 800 TABLET, FILM COATED ORAL at 18:34

## 2023-07-15 ASSESSMENT — ACTIVITIES OF DAILY LIVING (ADL)
ADLS_ACUITY_SCORE: 35

## 2023-07-15 NOTE — CONSULTS
RENAL CONSULTATION:     Date of Consultation:  7/15/2023    Requesting Physician: Dr Jean  Reason for Consult:  EDRD    Assessment/ Recommendations:  ESRD due to DM-1: HD qTTSa at [facility] with Dr. Gerardo. Outpt dialysis orders: Dialyzer: 160NRe Optiflux Na: 137 mEq/L Bicarb: 33 mEq/L   Dialysate: 2.0 K, 2.5 Ca, 1.0 Mg, 100 Dextrose ()  Dialysate/Machine Temp (actual): 36.5 C BFR (prescribed): 400 Prescribed time: 03:00 EDW: 55 kg   Access Type: Active (In Use):CVCatheter-Tunneled/Neck   -Continue HD qTTSa + PRN as inpt    DM-1 with DKA: Ketones elevated but acidosis mild at present. Insulin management per Hosp medicine.    Hypertension with ESRD: BP stable. Chronically on losartan. EDW 55 Kg which seems appropriate. UF 1-2 Kg today with HD and monitor.    Secondary hyperparathyroidism/ hyperphosphatemia: Phos 6.3 on 7/11 (goal 3-5.5 mg/dl). Sevelamer 800 mg po TID as outpt (not on PTA med list though - resume)    Gastroenteritis/choledocholithiasis without cholecystitis. Pain resolved and no further N/V or diarrhea. GI consult pending.     This document is created with the help of Dragon dictation system. All grammatical errors are unintentional.     Remi Hassan MD  [facility]  [phone] (off)       History of present illness: Josefa is a 48-year-old woman with a history of type 1 diabetes mellitus with diabetic nephropathy and end-stage renal disease.  She had previous hospitalizations for DKA as well as history of hypertension, depression and cannabis use.  She currently dialyzes at the [facility] dialysis clinic with Dr. Gerardo and her last treatment was 2 days ago.  She woke up prior to the treatment feeling unsettled in her stomach and within 2 to 3 hours of dialysis she noted increasing nausea and lower abdominal discomfort.  She subsequent went home and had diarrhea with nausea and vomiting as well as ongoing lower abdominal pain.  Blood sugars have been  very labile at home but tend to run quite high over the past several days.  She came the emergency department with worsening of these complaints and with control of blood sugar and dose of morphine her pain is improved and nausea/vomiting largely resolved.  We have been asked to provide a scheduled dialysis.  She did have a CT of her abdomen which showed nonspecific thickening in her small intestine that is being evaluated by GI and WBC 17K.    Past Medical History:   Diagnosis Date     LORIN (acute kidney injury) (H)      Anxiety      Cannabis abuse      Depression      Diabetes Type 1, uncontrolled 1985    Onset age 10     DKA (diabetic ketoacidoses)      ETOH abuse      HLD (hyperlipidemia)      HTN (hypertension)      Lactic acidosis          Current Facility-Administered Medications:      dextrose 10% infusion, , Intravenous, Continuous PRN, Maddy Solomon MD     dextrose 5% and 0.45% NaCl infusion, , Intravenous, Continuous, Dee Devine MD, Last Rate: 50 mL/hr at 07/15/23 0613, Rate Verify at 07/15/23 0613     glucose gel 15-30 g, 15-30 g, Oral, Q15 Min PRN **OR** dextrose 50 % injection 25-50 mL, 25-50 mL, Intravenous, Q15 Min PRN **OR** glucagon injection 1 mg, 1 mg, Subcutaneous, Q15 Min PRN, Haven Jean MD     glucose gel 15-30 g, 15-30 g, Oral, Q15 Min PRN **OR** dextrose 50 % injection 25-50 mL, 25-50 mL, Intravenous, Q15 Min PRN **OR** glucagon injection 1 mg, 1 mg, Subcutaneous, Q15 Min PRN, Maddy Solomon MD     famotidine (PEPCID) injection 20 mg, 20 mg, Intravenous, Q48H, Haven Jean MD, 20 mg at 07/14/23 1816     [Held by provider] insulin aspart (NovoLOG) injection (RAPID ACTING), 1-7 Units, Subcutaneous, TID AC, Haven Jean MD     [Held by provider] insulin aspart (NovoLOG) injection (RAPID ACTING), 1-5 Units, Subcutaneous, At Bedtime, Haven Jean MD     [Held by provider] insulin glargine (LANTUS PEN) injection 5 Units, 5 Units, Subcutaneous, At Bedtime, Sun,  MD Haven     insulin regular (MYXREDLIN) 1 unit/mL infusion, 0-24 Units/hr, Intravenous, Continuous, Maddy Solomon MD, Last Rate: 0.5 mL/hr at 07/15/23 0614, 0.5 Units/hr at 07/15/23 0614     lidocaine (LMX4) cream, , Topical, Q1H PRN, Haven Jean MD     lidocaine 1 % 0.1-1 mL, 0.1-1 mL, Other, Q1H PRN, Haven Jean MD     melatonin tablet 1 mg, 1 mg, Oral, At Bedtime PRN, Haven Jean MD     ondansetron (ZOFRAN ODT) ODT tab 4 mg, 4 mg, Oral, Q6H PRN **OR** ondansetron (ZOFRAN) injection 4 mg, 4 mg, Intravenous, Q6H PRN, Haven Jean MD, 4 mg at 07/15/23 0303     potassium chloride 10 mEq in 100 mL sterile water infusion, 10 mEq, Intravenous, Q1H PRN, Dee Devine MD     prochlorperazine (COMPAZINE) injection 10 mg, 10 mg, Intravenous, Q6H PRN, 10 mg at 07/15/23 0344 **OR** prochlorperazine (COMPAZINE) tablet 10 mg, 10 mg, Oral, Q6H PRN **OR** prochlorperazine (COMPAZINE) suppository 25 mg, 25 mg, Rectal, Q12H PRN, Haven Jean MD     sodium chloride (PF) 0.9% PF flush 3 mL, 3 mL, Intracatheter, Q8H, Haven Jean MD, 3 mL at 07/14/23 1816     sodium chloride (PF) 0.9% PF flush 3 mL, 3 mL, Intracatheter, q1 min prn, Haven Jean MD    Current Outpatient Medications:      Cholecalciferol (VITAMIN D3) 50 MCG (2000 UT) CHEW, Take 50 mcg by mouth daily, Disp: , Rfl:      Cyanocobalamin (VITAMIN B-12) 500 MCG LOZG, Take 500 mcg by mouth daily, Disp: , Rfl:      ferrous sulfate (FEROSUL) 325 (65 Fe) MG tablet, Take 1 tablet (325 mg) by mouth daily, Disp: 30 tablet, Rfl: 3     Glucagon 3 MG/DOSE POWD, Spray 1 spray in nostril as needed in the event of unconscious hypoglycemia or hypoglycemic seizure. May repeat dose if no response after 15 minutes., Disp: , Rfl:      insulin degludec (TRESIBA) 100 UNIT/ML pen, Inject 16 Units Subcutaneous daily, Disp: , Rfl:      insulin lispro (HUMALOG KWIKPEN) 100 UNIT/ML (1 unit dial) KWIKPEN, Inject 3-4 Units Subcutaneous 4 times daily with meals  "or snacks, Disp: , Rfl:      losartan (COZAAR) 25 MG tablet, Take 25 mg by mouth daily, Disp: , Rfl:      rOPINIRole (REQUIP) 0.25 MG tablet, Take 0.5 mg by mouth At Bedtime, Disp: , Rfl:      sertraline (ZOLOFT) 100 MG tablet, Take 150 mg by mouth daily, Disp: , Rfl:      traZODone (DESYREL) 100 MG tablet, Take 100 mg by mouth At Bedtime, Disp: , Rfl:      acetaminophen (TYLENOL) 500 MG tablet, Take 2 tablets (1,000 mg) by mouth every 6 hours as needed for pain, Disp: , Rfl:      Continuous Blood Gluc Sensor (DEXCOM G6 SENSOR) MISC, , Disp: , Rfl:     Allergies   Allergen Reactions     Cefazolin Nephrotoxicity     Colestipol GI Disturbance     Doxycycline Nausea and Vomiting     Nickel Rash     Penicillins Rash       Social History     Socioeconomic History     Marital status: Single   Tobacco Use     Smoking status: Some Days     Smokeless tobacco: Never     Tobacco comments:     occasional   Substance and Sexual Activity     Alcohol use: Yes     Alcohol/week: 35.0 standard drinks of alcohol     Comment: Alcoholic Drinks/day: ~750 mL wine daily     Drug use: Yes     Types: Marijuana     Comment: Drug use: 4-5x/week     Sexual activity: Yes     Partners: Male     Birth control/protection: Condom       Family History   Problem Relation Age of Onset     Clotting Disorder Mother         \"thin blood\"     Arthritis Mother      Hyperlipidemia Mother      Skin Cancer Father         face/nose      Depression Father      Other - See Comments Sister         eye surgeries     No Known Problems Brother      Coronary Artery Disease Maternal Grandmother      Alcoholism Maternal Grandfather      Coronary Artery Disease Maternal Grandfather      No Known Problems Paternal Grandmother      No Known Problems Paternal Grandfather        Review of Systems: No dizziness, dyspnea, CP. N/V and abd pain resolved. No bleeding reprted. No issues with HD of late.  The remainder of 10 point review of systems is negative except as noted in HPI " "above.     /53   Pulse 97   Temp 97  F (36.1  C) (Temporal)   Resp 17   Ht 1.702 m (5' 7\")   Wt 56.7 kg (125 lb)   SpO2 97%   BMI 19.58 kg/m      Intake/Output Summary (Last 24 hours) at 7/15/2023 0737  Last data filed at 7/15/2023 0342  Gross per 24 hour   Intake 212.5 ml   Output --   Net 212.5 ml     Physical Exam:   GENERAL: calm and comfortable, alert  EYES: No scleral icterus, conjunctiva clear  ENT: Hearing normal, Oral mucosa moist  RESP: Clear to auscultation bilaterally with no respiratory distress, normal effort.  CV: RRR, no murmurs. no leg edema.    GI: Active BS, Soft, NT/ND, no masses or HSM  Musculoskeletal: Normal muscle bulk/ tone; No gross joint abnormalities  SKIN: No rash, warm/ dry  PSYCH:  Appropriate mood and affect  Lymph: No cervical/ inguinal adenopathy  SHAUN AVF with thrill - maturing  Right PCAD site clean - in use  LABS:  Recent Labs   Lab 07/15/23  0543 07/15/23  0331 07/15/23  0130 07/14/23  2323 07/14/23  2023 07/14/23  1404    140 142 142 139 140   POTASSIUM 4.5 3.6 3.1* 4.0 3.1* 3.5   CHLORIDE 87* 91* 100 89* 88* 81*   CO2 18* 24 20* 22 18* 21*   BUN 48.7* 47.9* 41.4* 47.3* 46.5* 40.2*   CR 6.32* 6.36* 5.11* 6.20* 6.26* 6.23*   GFRESTIMATED 8* 8* 10* 8* 8* 8*   ZACH 9.1 9.8 7.9* 10.1* 9.4 11.4*   MAG  --   --   --   --   --  2.9*   ALBUMIN  --   --   --   --   --  5.5*       Recent Labs   Lab 07/15/23  0331 07/14/23  2004 07/14/23  1404   WBC 17.3*  --  17.6*   HGB 12.9 13.2 14.1   HCT 37.5  --  41.7   MCV 93  --  93     --  287     EXAM: CT ABDOMEN PELVIS W/O CONTRAST  LOCATION: [facility]  DATE: 7/14/2023     INDICATION: Diffuse abdominal pain. Vomiting.  COMPARISON: CT abdomen pelvis 07/27/2018.  TECHNIQUE: CT scan of the abdomen and pelvis was performed without IV contrast. Multiplanar reformats were obtained. Dose reduction techniques were used.  CONTRAST: None.     FINDINGS:      LOWER CHEST: Normal.     HEPATOBILIARY: " Gallstones within a distended gallbladder. No gallbladder wall thickening or pericholecystic inflammation. No biliary ductal dilation.     PANCREAS: Normal.     SPLEEN: Normal.     ADRENAL GLANDS: Normal.     KIDNEYS/BLADDER: Normal.     BOWEL: Mild wall thickening of a few jejunal loops within the left upper quadrant, probably reflecting a mild enteritis. No pneumatosis. No bowel obstruction. Appendix is not visualized. No evidence of colonic inflammation.     LYMPH NODES: No enlarged lymph nodes.     VASCULATURE: Extensive atherosclerotic calcifications. No abdominal aortic aneurysm.     PELVIC ORGANS: Normal. Trace free pelvic fluid.     MUSCULOSKELETAL: No acute bony abnormality.                                                                      IMPRESSION:      1.  Probable mild enteritis involving a few jejunal loops within the left upper quadrant.      2.  No bowel obstruction or other acute abnormality.      3.  Distended gallbladder with gallstones. No signs of acute cholecystitis.      All lab data was reviewed at 7:37 AM

## 2023-07-15 NOTE — ED NOTES
Purnima OLSON and updated about new lab values--ion gap is now 19 and ketones are 3.4--insulin gtt currently at  0.5 units per hour and pizivehli=779--GK said to continue the insulin gtt for now due to the elevated ketones--Patient updated on this plan of care--all questions and concerns addressed.  Pt has been napping most of the afternoon since dialysis. She states that she does not have much of an appetite but is not feeling nauseated at this time.  Tim ay pain.

## 2023-07-15 NOTE — PROGRESS NOTES
[facility]    Medicine Progress Note - Hospitalist Service  Date of Admission:  7/14/2023  Assessment & Plan   Josefa Curiel is a 48 year old female admitted on 7/14/2023. She has esrd on HD, DM 1. Presenting with n/v/abd pain. Labs showing keton acidosis, AG metabolic acidosis, although ph and co2 unremarkable. Abd ct showing nonspecific enteritis.    Abd pain, n/v/ diarrhea, resolved now  EGD showed nonspecific enteritis.  GI symptoms could be due to DKA.  S/p 3 L IVF bolus overnight, then maintenance IVF.  Positive gastric occult blood.  Seen by GI, recommended outpatient EGD and PPI in the interim.  GI and sign off/15.    Insulin-dependent diabetes mellitus type 1.  DKA with serum ketones 8.1, AG 38, pH 7.23, CO2 21 BG more than 500.  On insulin drip since admission.  Overnight insulin drip was stopped, holding in increased AG.  Continue insulin drip with D5.  After today's hemodialysis yesterday and bicarbonate normalized, anion gap mildly elevated to 19, but appears to have chronically elevated anion gap in hemodialysis dependent patient.  Serum ketones improving, down to 3.4.?  Hemodialysis clearance of ketones/chronicity.  Discussed with on-call nephrology, recommend using serum ketones as a marker to stop insulin drip.  Most recent serum ketones 3.4, we will continue insulin drip/D5.    ESRD on HD qTTSa at [facility] with Dr. Gerardo.  Patient was dialyzed today via Mickey catheter.  Left arm fistula placed 2 weeks ago, maturing.  Nephrology consulted, recommendations reviewed.    HTN, on losartan.     Diet: Renal Diet (dialysis)    DVT Prophylaxis: Pneumatic Compression Devices  Williamson Catheter: Not present  Lines: PRESENT      CVC Double Lumen Right Subclavian Tunneled-Site Assessment: WDL      Cardiac Monitoring: ACTIVE order. Indication: Tachyarrhythmias, acute (48 hours)  Code Status: Full Code      Clinically Significant Risk Factors        # Hypokalemia: Lowest K = 3.1  mmol/L in last 2 days, will replace as needed   # Hypocalcemia: Lowest Ca = 7.9 mg/dL in last 2 days, will monitor and replace as appropriate  # Hypercalcemia: Highest Ca = 11.4 mg/dL in last 2 days, will monitor as appropriate   # Anion Gap Metabolic Acidosis: Highest Anion Gap = 38 mmol/L in last 2 days, will monitor and treat as appropriate      # Hypertension: Noted on problem list       # DMII: A1C = 7.4 % (Ref range: <5.7 %) within past 6 months            Disposition Plan     Expected Discharge Date: 07/15/2023                  Angelita Welsh MD  Hospitalist Service  [facility]  Securely message with [employer] (more info)  Text page via [employer] Paging/Directory   ______________________________________________________________________    Interval History   She is new to me.  Chart reviewed patient was seen and examined during hemodialysis.  Had GI symptoms resolved.  No bowel movement today.  This morning labs showed CO2 low at 18, AG 31, serum ketones 6.5.  Per nephrology recommendations, labs checked 4 hours after dialysis completion, still showing mildly elevated anion gap and serum ketones 3.4.  Patient on insulin drip currently 5 units/h.    Physical Exam   Vital Signs: Temp: 97.3  F (36.3  C) Temp src: Temporal BP: 127/68 Pulse: 98   Resp: 16 SpO2: 98 % O2 Device: None (Room air)    Weight: 125 lbs 0 oz  General: Alert and oriented x 3. Not in obvious distress.  HEENT: NC, AT. Neck- supple, No JVP elevation, lymphadenopathy or thyromegaly. Trachea-central.  Chest: Clear to auscultation bilaterally.  Heart: S1S2 regular. No M/R/G.  Abdomen: Soft. NT, ND. No organomegaly. Bowel sounds- active.  Back: No spine tenderness. No CVA tenderness.  Extremities: No leg swelling. Peripheral pulses 2+ bilaterally.  Left arm recently placed fistula.  Mickey catheter utilized for hemodialysis.  Neuro: Cranial nerves 1-12 grossly normal. No focal neurological deficit    Medical Decision Making        50 MINUTES SPENT BY ME on the date of service doing chart review, history, exam, documentation & further activities per the note.      Data     I have personally reviewed the following data over the past 24 hrs:    17.3 (H)  \   11.2 (L)   / 255     138 95 (L) 14.8 /  98   3.7 24 3.19 (H) \       TSH: N/A T4: N/A A1C: N/A       Procal: N/A CRP: N/A Lactic Acid: 0.6 (L)         Imaging results reviewed over the past 24 hrs:   No results found for this or any previous visit (from the past 24 hour(s)).

## 2023-07-15 NOTE — ED NOTES
Patient taken to dialysis on her cart o have her run.  Dialysis Rns are not to manage the insulin gtt.  Writer will be checking her sugars and managing the gtt. Her sugar at 0830=265--insulin gtt now at 3 units.  Will continue to check her sugars Q hour while on the run. Patient is alert and oriented, slight nausea--no emesis.

## 2023-07-15 NOTE — H&P
[facility]    History and Physical - Hospitalist Service       Date of Admission:  7/14/2023    Assessment & Plan      Josefa Curiel is a 48 year old female admitted on 7/14/2023. She has esrd on HD, DM 1. Presenting with n/v/abd pain. Labs showing keton acidosis, AG metabolic acidosis, although ph and co2 unremarkable. Abd ct showing nonspecific enteritis.    Abd pain, n/v  -ct showing non specific enteritis  -or due to DKA  -improving with ivf, supportive care  -gastric occult blood+  -ppi and consult GI    Ketone acidosis, AG  DKA?  -ph 7.33, co2 21  -ketone 8.1, AG 38  -start on Insulin gtt    DM 1  -uncontrolled with glucose>500, elevated ketone  -start on Insulin gtt    ESRD on HD  -nephrology consultation       Diet: Combination Diet Clear Liquid; Renal Diet (dialysis); Moderate Consistent Carb (60 g CHO per Meal) Diet    DVT Prophylaxis: Pneumatic Compression Devices  Williamson Catheter: Not present  Lines: None     Cardiac Monitoring: ACTIVE order. Indication: Tachyarrhythmias, acute (48 hours)  Code Status: Full Code      Clinically Significant Risk Factors Present on Admission           # Hypercalcemia: Highest Ca = 11.4 mg/dL in last 2 days, will monitor as appropriate   # Anion Gap Metabolic Acidosis: Highest Anion Gap = 38 mmol/L in last 2 days, will monitor and treat as appropriate      # Hypertension: Noted on problem list     # DMII: A1C = 7.4 % (Ref range: <5.7 %) within past 6 months            Disposition Plan      Expected Discharge Date: 07/15/2023                  Haven Jean MD  Hospitalist Service  [facility]  Securely message with [employer] (more info)  Text page via [facility] Paging/Directory     ______________________________________________________________________    Chief Complaint   abd pain n/v    History is obtained from the patient and er    History of Present Illness   Josefa Curiel is a 48 year old female with a pertinent history  "of chronic kidney disease, stage 5 on HD, type 1 diabetes, hypertension, hyperlipidemia, and depression who presents to this ED via private vehicle for evaluation of abdominal pain, nausea, vomiting, and diarrhea.     Patient came with nausea and vomiting that has been present for one day. She is a type 1 diabetic and is on dialysis. She dialyzes Tues, Thurs, and Sat with her last being yesterday. She states that her blood sugar has been high in the 300s. She endorses shortness of breath, abdominal pain, and vomiting she describes as \"coffee ground vomit.\" She reports no abnormal urinary symptoms, chest pain, or any other symptoms at this time.       Past Medical History    Past Medical History:   Diagnosis Date     LORIN (acute kidney injury) (H)      Anxiety      Cannabis abuse      Depression      Diabetes Type 1, uncontrolled 1985    Onset age 10     DKA (diabetic ketoacidoses)      ETOH abuse      HLD (hyperlipidemia)      HTN (hypertension)      Lactic acidosis        Past Surgical History   Past Surgical History:   Procedure Laterality Date     ANKLE FRACTURE SURGERY Left      APPENDECTOMY       INCISION AND DRAINAGE FOOT, COMBINED Right 11/18/2022    Procedure: INCISION AND DRAINAGE, right foot;  Surgeon: David Patel DPM;  Location: [facility] OR     PICC SINGLE LUMEN PLACEMENT  10/23/2022            Prior to Admission Medications   Prior to Admission Medications   Prescriptions Last Dose Informant Patient Reported? Taking?   Cholecalciferol (VITAMIN D3) 50 MCG (2000 UT) CHEW 7/13/2023 at AM Self Yes Yes   Sig: Take 50 mcg by mouth daily   Continuous Blood Gluc Sensor (DEXCOM G6 SENSOR) MISC  Self Yes No   Cyanocobalamin (VITAMIN B-12) 500 MCG LOZG 7/13/2023 at AM Self Yes Yes   Sig: Take 500 mcg by mouth daily   Glucagon 3 MG/DOSE POWD More than a month at PRN  Yes Yes   Sig: Spray 1 spray in nostril as needed in the event of unconscious hypoglycemia or hypoglycemic seizure. May repeat dose if no " response after 15 minutes.   acetaminophen (TYLENOL) 500 MG tablet PRN at PRN Self No No   Sig: Take 2 tablets (1,000 mg) by mouth every 6 hours as needed for pain   ferrous sulfate (FEROSUL) 325 (65 Fe) MG tablet Past Week at AM Self No Yes   Sig: Take 1 tablet (325 mg) by mouth daily   insulin degludec (TRESIBA) 100 UNIT/ML pen 7/13/2023 at AM Self Yes Yes   Sig: Inject 16 Units Subcutaneous daily   insulin lispro (HUMALOG KWIKPEN) 100 UNIT/ML (1 unit dial) KWIKPEN 7/14/2023 at AM Self Yes Yes   Sig: Inject 3-4 Units Subcutaneous 4 times daily with meals or snacks   losartan (COZAAR) 25 MG tablet NOT STARTED at NOT STARTED Self Yes Yes   Sig: Take 25 mg by mouth daily   rOPINIRole (REQUIP) 0.25 MG tablet 7/13/2023 at HS Self Yes Yes   Sig: Take 0.5 mg by mouth At Bedtime   sertraline (ZOLOFT) 100 MG tablet 7/13/2023 at AM Self Yes Yes   Sig: Take 150 mg by mouth daily   traZODone (DESYREL) 100 MG tablet 7/13/2023 at HS Self Yes Yes   Sig: Take 100 mg by mouth At Bedtime      Facility-Administered Medications: None        Allergies   Allergies   Allergen Reactions     Cefazolin Nephrotoxicity     Colestipol GI Disturbance     Doxycycline Nausea and Vomiting     Nickel Rash     Penicillins Rash        Physical Exam   Vital Signs: Temp: 97  F (36.1  C) Temp src: Temporal BP: (!) 145/59 Pulse: (!) 123   Resp: 16 SpO2: 100 % O2 Device: None (Room air)    Weight: 125 lbs 0 oz    General.  Awake alert oriented in distress  HEENT.  Pupils equal round react to light, anicteric, EOM intact.  Neck supple no JVD.  CVS regular rhythm no murmur gallops.  Lungs.  Clear to auscultation bilateral no wheezing or rales.  Abdomen.  Soft mild general tender bowel sounds present.  Extremities.  No edema no calf tenderness.  Neurological.  Awake and alert. No focal deficit.  Skin no rash. No pallor.  Psych. Normal mood.     Medical Decision Making       79 MINUTES SPENT BY ME on the date of service doing chart review, history, exam,  documentation & further activities per the note.      Data     I have personally reviewed the following data over the past 24 hrs:    17.6 (H)  \   14.1   / 287     140 81 (L) 40.2 (H) /  509 (HH)   3.5 21 (L) 6.23 (H) \       ALT: 31 AST: 40 AP: 135 (H) TBILI: 0.3   ALB: 5.5 (H) TOT PROTEIN: 8.9 (H) LIPASE: 30       TSH: N/A T4: N/A A1C: 7.4 (H)       Procal: N/A CRP: N/A Lactic Acid: 2.6 (H)         Imaging results reviewed over the past 24 hrs:   Recent Results (from the past 24 hour(s))   CT Abdomen Pelvis w/o Contrast    Narrative    EXAM: CT ABDOMEN PELVIS W/O CONTRAST  LOCATION: [facility]  DATE: 7/14/2023    INDICATION: Diffuse abdominal pain. Vomiting.  COMPARISON: CT abdomen pelvis 07/27/2018.  TECHNIQUE: CT scan of the abdomen and pelvis was performed without IV contrast. Multiplanar reformats were obtained. Dose reduction techniques were used.  CONTRAST: None.    FINDINGS:     LOWER CHEST: Normal.    HEPATOBILIARY: Gallstones within a distended gallbladder. No gallbladder wall thickening or pericholecystic inflammation. No biliary ductal dilation.    PANCREAS: Normal.    SPLEEN: Normal.    ADRENAL GLANDS: Normal.    KIDNEYS/BLADDER: Normal.    BOWEL: Mild wall thickening of a few jejunal loops within the left upper quadrant, probably reflecting a mild enteritis. No pneumatosis. No bowel obstruction. Appendix is not visualized. No evidence of colonic inflammation.    LYMPH NODES: No enlarged lymph nodes.    VASCULATURE: Extensive atherosclerotic calcifications. No abdominal aortic aneurysm.    PELVIC ORGANS: Normal. Trace free pelvic fluid.    MUSCULOSKELETAL: No acute bony abnormality.      Impression    IMPRESSION:     1.  Probable mild enteritis involving a few jejunal loops within the left upper quadrant.     2.  No bowel obstruction or other acute abnormality.     3.  Distended gallbladder with gallstones. No signs of acute cholecystitis.

## 2023-07-15 NOTE — PROCEDURES
Josefa M Moisés dialyzed for 3 hours on a Revaclear 300 dialyzer with a net fluid removal of 2L. A BFR  of ml/min was obtained via a  Pt on K2 bath. Complications:lines reversed. VSS post run. Verbal report given to DONNA Murcia RN . Pt education provided concerning dialysis procedure and all questions answered. Consent on file. See flowsheet in epic for further details. Water alarm in place  During treatment. Hepatitis B Antigen negative. Date drawn 4/13/23. Hepatitis B Antibody Immune. Date drawn: 4/13/23.

## 2023-07-15 NOTE — ED NOTES
Patient has finished dialysis--tolerated run.  Pt brought back to her room in the ED.  Ate a few bites for lunch but is not too hungry.  Did tolerate without nausea or emesis. Pt sleeping off and on.  Continued insulin gtt with Q1 hour checks--(see MAR)--paged hospitalist that pt is back from dialysis as she had requested.  Awaiting further orders from MD.

## 2023-07-15 NOTE — CONSULTS
"[facility] Consultation     Josefa Curiel  [address] [facility] [address]  48 year old female     Admission Date/Time: 7/14/2023  Primary Care Provider: Elsa Turner  Referring / Attending Physician:  Miranda     We were asked to see the patient in consultation by Dr. Jean for evaluation of \"abdominal pain, n/v, gastric occult blood +.\"       CC: N/V, abdominal pain     HPI:  Josefa Curiel is a 48 year old female with PMH ESRD on HD, type 1 diabetes, HTN, HLD, depression who presented to ER 7/14 for abdominal pain, N/V/D. She reports onset of diarrhea on 7/12 and 7/13 without cause and then on 7/13 into 7/14 she had vomiting, which initially was just liquid, then looked like old blood. Most recent emesis has been non-bloody and she has not had diarrhea in last two days, rather small stools last one yesterday. She denies melena/hematochezia. She had associated abdominal pain but this is much better today. She gets very occasional heartburn and doesn't take anything for it. She stopped drinking alcohol for some time and did restart recently but only on occasion without heavy use and last drank a few days ago.      She had an EGD at [facility] in April 2018 for nausea/vomiting, which was endoscopically unremarkable with negative gastric and duodenal biopsies.       ROS: A comprehensive ten point review of systems was negative aside from those in mentioned in the HPI.      PAST MEDICAL HISTORY:  Patient Active Problem List    Diagnosis Date Noted     Enteritis 07/14/2023     Priority: Medium     Nausea and vomiting 07/14/2023     Priority: Medium     Abdominal pain, unspecified abdominal location 07/14/2023     Priority: Medium     Elevated blood ketone body level 07/14/2023     Priority: Medium     Status post repair of complex wound 03/31/2023     Priority: Medium     CKD (chronic kidney disease) stage 5, GFR less than 15 ml/min (H) 01/02/2023     Priority: Medium     Ulcer of ankle, left, " with unspecified severity (H) 12/29/2022     Priority: Medium     DKA (diabetic ketoacidosis) (H) 12/12/2022     Priority: Medium     Diabetic ulcer of right foot associated with type 1 diabetes mellitus, with necrosis of muscle, unspecified part of foot (H) 11/17/2022     Priority: Medium     Hypertension, unspecified type 11/17/2022     Priority: Medium     Nausea and vomiting, unspecified vomiting type 11/17/2022     Priority: Medium     Ulcer of left foot, limited to breakdown of skin (H) 11/01/2022     Priority: Medium     Cellulitis of right foot 10/19/2022     Priority: Medium     Ulcer of right foot, limited to breakdown of skin (H) 10/19/2022     Priority: Medium     Type 1 diabetes mellitus with other specified complication (H) 10/19/2022     Priority: Medium     Sepsis with encephalopathy without septic shock, due to unspecified organism (H) 10/19/2022     Priority: Medium     Chronic renal impairment, stage 3 (moderate), unspecified whether stage 3a or 3b CKD (H) 10/19/2022     Priority: Medium     Alcohol use disorder, moderate, dependence (H) 09/29/2022     Priority: Medium     Hypertensive urgency 09/29/2022     Priority: Medium     Leukocytosis 09/29/2022     Priority: Medium     Diabetic ketoacidosis with coma associated with type 1 diabetes mellitus (H) 10/01/2020     Priority: Medium     Hyperlipidemia 08/23/2019     Priority: Medium     Trigger middle finger 08/23/2019     Priority: Medium     Hx cortisone injections for treatment         Upper GI bleeding 03/23/2018     Priority: Medium     DM type 1, Hgb A1C 10.0 in 2020      Priority: Medium     Moderate episode of recurrent major depressive disorder (H) 09/27/2017     Priority: Medium     Overview:   Was in [facility] in 08/2007         Anxiety 09/27/2017     Priority: Medium     Chronic renal impairment, stage 3 (moderate) (H) 09/13/2017     Priority: Medium     Overview:   Secondary to diabetes.         Nausea with vomiting 11/20/2015  "    Priority: Medium     Intermittent issue         LORIN (acute kidney injury) (H) 11/15/2015     Priority: Medium     ETOH abuse 11/15/2015     Priority: Medium     Cannabis abuse 11/15/2015     Priority: Medium     Essential hypertension 11/15/2015     Priority: Medium     Proteinuria 11/15/2015     Priority: Medium     Nephrosis in diabetes mellitus (H) 10/31/2014     Priority: Medium     05/2019: Random urine microalbumin 2557=>4919 mg/g creat.  Urine Albumin   3788 mg/L. C3 80 (low), Sed rate 24. Serum and urine immunofixation no   monoclonal gammopathy. PAULINE 1:320         Vitamin D deficiency 02/08/2011     Priority: Medium     Proliferative diabetic retinopathy (H) 01/30/2007     Priority: Medium     SOCIAL HISTORY:  Social History     Tobacco Use     Smoking status: Some Days     Smokeless tobacco: Never     Tobacco comments:     occasional   Substance Use Topics     Alcohol use: Yes     Alcohol/week: 35.0 standard drinks of alcohol     Comment: Alcoholic Drinks/day: ~750 mL wine daily     Drug use: Yes     Types: Marijuana     Comment: Drug use: 4-5x/week   Occasional alcohol and marijuana     FAMILY HISTORY:  Family History   Problem Relation Age of Onset     Clotting Disorder Mother         \"thin blood\"     Arthritis Mother      Hyperlipidemia Mother      Skin Cancer Father         face/nose      Depression Father      Other - See Comments Sister         eye surgeries     No Known Problems Brother      Coronary Artery Disease Maternal Grandmother      Alcoholism Maternal Grandfather      Coronary Artery Disease Maternal Grandfather      No Known Problems Paternal Grandmother      No Known Problems Paternal Grandfather    No pertinent family history.     ALLERGIES:   Allergies   Allergen Reactions     Cefazolin Nephrotoxicity     Colestipol GI Disturbance     Doxycycline Nausea and Vomiting     Nickel Rash     Penicillins Rash     MEDICATIONS:   Current Facility-Administered Medications   Medication     " "0.9% sodium chloride BOLUS     0.9% sodium chloride BOLUS     0.9% sodium chloride BOLUS     dextrose 10% infusion     dextrose 5% and 0.45% NaCl infusion     glucose gel 15-30 g    Or     dextrose 50 % injection 25-50 mL    Or     glucagon injection 1 mg     glucose gel 15-30 g    Or     dextrose 50 % injection 25-50 mL    Or     glucagon injection 1 mg     famotidine (PEPCID) injection 20 mg     [Held by provider] insulin aspart (NovoLOG) injection (RAPID ACTING)     [Held by provider] insulin aspart (NovoLOG) injection (RAPID ACTING)     [Held by provider] insulin glargine (LANTUS PEN) injection 5 Units     insulin regular (MYXREDLIN) 1 unit/mL infusion     lidocaine (LMX4) cream     lidocaine 1 % 0.1-1 mL     melatonin tablet 1 mg     No heparin via hemodialysis machine     ondansetron (ZOFRAN ODT) ODT tab 4 mg    Or     ondansetron (ZOFRAN) injection 4 mg     potassium chloride 10 mEq in 100 mL sterile water infusion     prochlorperazine (COMPAZINE) injection 10 mg    Or     prochlorperazine (COMPAZINE) tablet 10 mg    Or     prochlorperazine (COMPAZINE) suppository 25 mg     sevelamer carbonate (RENVELA) tablet 800 mg     sodium chloride (PF) 0.9% PF flush 3 mL     sodium chloride (PF) 0.9% PF flush 3 mL     Current Outpatient Medications   Medication     Cholecalciferol (VITAMIN D3) 50 MCG (2000 UT) CHEW     Cyanocobalamin (VITAMIN B-12) 500 MCG LOZG     ferrous sulfate (FEROSUL) 325 (65 Fe) MG tablet     Glucagon 3 MG/DOSE POWD     insulin degludec (TRESIBA) 100 UNIT/ML pen     insulin lispro (HUMALOG KWIKPEN) 100 UNIT/ML (1 unit dial) KWIKPEN     losartan (COZAAR) 25 MG tablet     rOPINIRole (REQUIP) 0.25 MG tablet     sertraline (ZOLOFT) 100 MG tablet     traZODone (DESYREL) 100 MG tablet     acetaminophen (TYLENOL) 500 MG tablet     Continuous Blood Gluc Sensor (DEXCOM G6 SENSOR) MISC     PHYSICAL EXAM:   /77   Pulse 98   Temp 97  F (36.1  C) (Temporal)   Resp 25   Ht 1.702 m (5' 7\")   Wt 56.7 " kg (125 lb)   SpO2 99%   BMI 19.58 kg/m     GEN: NAD, female appears stated age sitting up in bed, getting dialysis  HEENT: No icterus, no lymphadenopathy  HRT: RRR  LUNGS: CTA  ABD: +BS, soft, nontender  SKIN: No rash, jaundice  MSKL: no LE edema, strength 5/5 all 4 extrems  NEURO: Alert and oriented, appropriate mood and affect     ADDITIONAL DATA:   I reviewed the patient's new clinical lab test results.   Recent Labs   Lab Test 07/15/23  0331 07/14/23 2004 07/14/23  1404 03/31/23  1843 03/23/18  0935 03/23/18  0930   WBC 17.3*  --  17.6* 2.9*   < >  --    HGB 12.9 13.2 14.1 9.8*   < >  --    MCV 93  --  93 83   < >  --      --  287 285   < >  --    INR  --   --   --  0.95  --  0.87*    < > = values in this interval not displayed.     Recent Labs   Lab Test 07/15/23  0543 07/15/23  0331 07/15/23  0130   POTASSIUM 4.5 3.6 3.1*   CHLORIDE 87* 91* 100   CO2 18* 24 20*   BUN 48.7* 47.9* 41.4*   ANIONGAP 31* 25* 22*     Recent Labs   Lab Test 07/14/23  1404 12/12/22  1326 12/12/22  1052 11/24/22  0528 11/23/22  1740 11/23/22  0538 11/19/22  0811 11/18/22  0532 11/08/22  1444 10/19/22  2042 10/19/22  0923   ALBUMIN 5.5*  --  4.2 3.1*  --    < > 2.7*   < >  --    < > 3.4*   BILITOTAL 0.3  --  0.4  --   --   --  <0.2   < >  --    < > 0.5   ALT 31  --  12  --   --   --  <5*   < >  --    < > 21   AST 40  --  26  --   --   --  16   < >  --    < > 45*   PROTEIN  --  >600*  --   --  600*  --   --   --  >=300*   < >  --    LIPASE 30  --  34  --   --   --   --   --   --   --  24    < > = values in this interval not displayed.     Occult blood gastric 7/14/23: positive     Imaging results:  CT abdomen/pelvis w/o 7/14/23:  FINDINGS:   LOWER CHEST: Normal.  HEPATOBILIARY: Gallstones within a distended gallbladder. No gallbladder wall thickening or pericholecystic inflammation. No biliary ductal dilation.  PANCREAS: Normal.  SPLEEN: Normal.  ADRENAL GLANDS: Normal.  KIDNEYS/BLADDER: Normal.  BOWEL: Mild wall thickening of  a few jejunal loops within the left upper quadrant, probably reflecting a mild enteritis. No pneumatosis. No bowel obstruction. Appendix is not visualized. No evidence of colonic inflammation.  LYMPH NODES: No enlarged lymph nodes.  VASCULATURE: Extensive atherosclerotic calcifications. No abdominal aortic aneurysm.  PELVIC ORGANS: Normal. Trace free pelvic fluid.  MUSCULOSKELETAL: No acute bony abnormality.                                                                   IMPRESSION:   1.  Probable mild enteritis involving a few jejunal loops within the left upper quadrant.   2.  No bowel obstruction or other acute abnormality.   3.  Distended gallbladder with gallstones. No signs of acute cholecystitis.      ASSESSMENT:    N/V/D  Abdominal pain  Abnormal CT scan  This is a 49 y/o female with PMH ESRD on HD, type 1 diabetes, HTN, HLD, depression admitted 7/14 for abdominal pain, nausea/vomiting and ketone acidosis/possible DKA. CT scan shows mild enteritis in the jejunum within the LUQ so viral gastroenteritis possible cause for her symptoms, which have essentially resolved. WBC elevated at 17.3 but no indication for abx. She could have had Marcella Bey tear vs PUD/gastritis as cause for dark blood in emesis but recent emesis is non-bloody. Pain improved, no diarrhea x 2 days. Hemoglobin is stable and in good range. Considered EGD today given + gastric occult blood but given resolution of symptoms I don't think this is necessary as inpatient and she would prefer to avoid procedure and go home, if possible.     PLAN:  - Renal diet  - Discharge on pantoprazole 40 mg once daily  - Outpatient EGD ([facility] will call pt to arrange)  - If patient tolerates diet okay to discharge any time per GI        Sofia Daley PA-C  [facility]  7/15/2023 8:01 AM  [phone] (office)    This case was discussed with Dr. Heard who agrees to the above assessment and plan.    40 minutes of total time was spent  today providing patient care, including patient evaluation, reviewing documentation/test result, and .     Addendum:    I have met and examined the patient and agree with the history and findings of Mgilberto Daley, SHUBHAM above.  This is a 48-year-old female who we are asked to see for enteritis and coffee-ground emesis.  Leukocytosis is noted on assessment.  She is feeling significantly better today with no abdominal pain.  She reports that her emesis episodes were initially bilious and turned black.  She has not had any melena or diarrhea.  She denies sick contacts.  Exam: Abdomen soft, nontender and nondistended    Impression:  1.  Suspect self-limited infectious syndrome  2.  Coffee-ground emesis: Likely due to either Marcella-Bey tear or reflux esophagitis    Recommendations:  1.  We will arrange outpatient EGD  2.  PPI therapy in the interim  3.  Okay to discharge from GI perspective later today if patient continues to feel well and if no contraindications otherwise.  4.  Patient made aware that she should return if symptoms recur after discharge  5.  GI will sign off at this time.  Please call with questions or concerns.    Total of 25 minutes was spent reviewing the chart, interacting and evaluating with the patient, and generating note.    Selvin Heard MD  ________________________________________________________________________

## 2023-07-16 VITALS
DIASTOLIC BLOOD PRESSURE: 67 MMHG | WEIGHT: 125 LBS | BODY MASS INDEX: 19.62 KG/M2 | TEMPERATURE: 98 F | HEIGHT: 67 IN | OXYGEN SATURATION: 99 % | SYSTOLIC BLOOD PRESSURE: 132 MMHG | HEART RATE: 111 BPM | RESPIRATION RATE: 20 BRPM

## 2023-07-16 LAB
AMPHETAMINES UR QL SCN: ABNORMAL
ANION GAP SERPL CALCULATED.3IONS-SCNC: 14 MMOL/L (ref 7–15)
ANION GAP SERPL CALCULATED.3IONS-SCNC: 15 MMOL/L (ref 7–15)
B-OH-BUTYR SERPL-SCNC: 1.1 MMOL/L
BARBITURATES UR QL SCN: ABNORMAL
BASOPHILS # BLD AUTO: 0.1 10E3/UL (ref 0–0.2)
BASOPHILS NFR BLD AUTO: 1 %
BENZODIAZ UR QL SCN: ABNORMAL
BUN SERPL-MCNC: 17.4 MG/DL (ref 6–20)
BUN SERPL-MCNC: 18.7 MG/DL (ref 6–20)
BZE UR QL SCN: ABNORMAL
CALCIUM SERPL-MCNC: 8.7 MG/DL (ref 8.6–10)
CALCIUM SERPL-MCNC: 8.9 MG/DL (ref 8.6–10)
CANNABINOIDS UR QL SCN: ABNORMAL
CHLORIDE SERPL-SCNC: 94 MMOL/L (ref 98–107)
CHLORIDE SERPL-SCNC: 96 MMOL/L (ref 98–107)
CREAT SERPL-MCNC: 3.97 MG/DL (ref 0.51–0.95)
CREAT SERPL-MCNC: 4.4 MG/DL (ref 0.51–0.95)
DEPRECATED HCO3 PLAS-SCNC: 25 MMOL/L (ref 22–29)
DEPRECATED HCO3 PLAS-SCNC: 25 MMOL/L (ref 22–29)
EOSINOPHIL # BLD AUTO: 0.2 10E3/UL (ref 0–0.7)
EOSINOPHIL NFR BLD AUTO: 2 %
ERYTHROCYTE [DISTWIDTH] IN BLOOD BY AUTOMATED COUNT: 13.2 % (ref 10–15)
GFR SERPL CREATININE-BSD FRML MDRD: 12 ML/MIN/1.73M2
GFR SERPL CREATININE-BSD FRML MDRD: 13 ML/MIN/1.73M2
GLUCOSE BLDC GLUCOMTR-MCNC: 100 MG/DL (ref 70–99)
GLUCOSE BLDC GLUCOMTR-MCNC: 106 MG/DL (ref 70–99)
GLUCOSE BLDC GLUCOMTR-MCNC: 110 MG/DL (ref 70–99)
GLUCOSE BLDC GLUCOMTR-MCNC: 41 MG/DL (ref 70–99)
GLUCOSE BLDC GLUCOMTR-MCNC: 42 MG/DL (ref 70–99)
GLUCOSE BLDC GLUCOMTR-MCNC: 58 MG/DL (ref 70–99)
GLUCOSE BLDC GLUCOMTR-MCNC: 83 MG/DL (ref 70–99)
GLUCOSE BLDC GLUCOMTR-MCNC: 93 MG/DL (ref 70–99)
GLUCOSE BLDC GLUCOMTR-MCNC: 94 MG/DL (ref 70–99)
GLUCOSE BLDC GLUCOMTR-MCNC: 95 MG/DL (ref 70–99)
GLUCOSE BLDC GLUCOMTR-MCNC: 97 MG/DL (ref 70–99)
GLUCOSE BLDC GLUCOMTR-MCNC: 98 MG/DL (ref 70–99)
GLUCOSE SERPL-MCNC: 103 MG/DL (ref 70–99)
GLUCOSE SERPL-MCNC: 73 MG/DL (ref 70–99)
HCT VFR BLD AUTO: 37.3 % (ref 35–47)
HGB BLD-MCNC: 12.7 G/DL (ref 11.7–15.7)
HOLD SPECIMEN: NORMAL
IMM GRANULOCYTES # BLD: 0 10E3/UL
IMM GRANULOCYTES NFR BLD: 0 %
LYMPHOCYTES # BLD AUTO: 1.8 10E3/UL (ref 0.8–5.3)
LYMPHOCYTES NFR BLD AUTO: 17 %
MAGNESIUM SERPL-MCNC: 1.8 MG/DL (ref 1.7–2.3)
MCH RBC QN AUTO: 31.6 PG (ref 26.5–33)
MCHC RBC AUTO-ENTMCNC: 34 G/DL (ref 31.5–36.5)
MCV RBC AUTO: 93 FL (ref 78–100)
MONOCYTES # BLD AUTO: 0.7 10E3/UL (ref 0–1.3)
MONOCYTES NFR BLD AUTO: 7 %
NEUTROPHILS # BLD AUTO: 7.9 10E3/UL (ref 1.6–8.3)
NEUTROPHILS NFR BLD AUTO: 73 %
NRBC # BLD AUTO: 0 10E3/UL
NRBC BLD AUTO-RTO: 0 /100
OPIATES UR QL SCN: ABNORMAL
PCP QUAL URINE (ROCHE): ABNORMAL
PLATELET # BLD AUTO: 209 10E3/UL (ref 150–450)
POTASSIUM SERPL-SCNC: 3.1 MMOL/L (ref 3.4–5.3)
POTASSIUM SERPL-SCNC: 3.8 MMOL/L (ref 3.4–5.3)
RBC # BLD AUTO: 4.02 10E6/UL (ref 3.8–5.2)
SODIUM SERPL-SCNC: 133 MMOL/L (ref 136–145)
SODIUM SERPL-SCNC: 136 MMOL/L (ref 136–145)
WBC # BLD AUTO: 10.7 10E3/UL (ref 4–11)

## 2023-07-16 PROCEDURE — 250N000013 HC RX MED GY IP 250 OP 250 PS 637: Performed by: INTERNAL MEDICINE

## 2023-07-16 PROCEDURE — 80307 DRUG TEST PRSMV CHEM ANLYZR: CPT | Performed by: INTERNAL MEDICINE

## 2023-07-16 PROCEDURE — 82962 GLUCOSE BLOOD TEST: CPT

## 2023-07-16 PROCEDURE — 250N000011 HC RX IP 250 OP 636: Mod: JZ | Performed by: HOSPITALIST

## 2023-07-16 PROCEDURE — 82010 KETONE BODYS QUAN: CPT | Performed by: INTERNAL MEDICINE

## 2023-07-16 PROCEDURE — 250N000013 HC RX MED GY IP 250 OP 250 PS 637: Performed by: HOSPITALIST

## 2023-07-16 PROCEDURE — 82310 ASSAY OF CALCIUM: CPT | Performed by: HOSPITALIST

## 2023-07-16 PROCEDURE — 36415 COLL VENOUS BLD VENIPUNCTURE: CPT | Performed by: INTERNAL MEDICINE

## 2023-07-16 PROCEDURE — 80048 BASIC METABOLIC PNL TOTAL CA: CPT | Performed by: INTERNAL MEDICINE

## 2023-07-16 PROCEDURE — 99239 HOSP IP/OBS DSCHRG MGMT >30: CPT | Performed by: INTERNAL MEDICINE

## 2023-07-16 PROCEDURE — 85025 COMPLETE CBC W/AUTO DIFF WBC: CPT | Performed by: HOSPITALIST

## 2023-07-16 PROCEDURE — 250N000011 HC RX IP 250 OP 636: Mod: JZ | Performed by: INTERNAL MEDICINE

## 2023-07-16 PROCEDURE — 36415 COLL VENOUS BLD VENIPUNCTURE: CPT | Performed by: HOSPITALIST

## 2023-07-16 PROCEDURE — 83735 ASSAY OF MAGNESIUM: CPT | Performed by: INTERNAL MEDICINE

## 2023-07-16 RX ORDER — TRAZODONE HYDROCHLORIDE 50 MG/1
100 TABLET, FILM COATED ORAL AT BEDTIME
Status: DISCONTINUED | OUTPATIENT
Start: 2023-07-16 | End: 2023-07-16 | Stop reason: HOSPADM

## 2023-07-16 RX ORDER — PANTOPRAZOLE SODIUM 40 MG/1
40 TABLET, DELAYED RELEASE ORAL
Qty: 30 TABLET | Refills: 0 | Status: SHIPPED | OUTPATIENT
Start: 2023-07-17 | End: 2023-08-16

## 2023-07-16 RX ORDER — ROPINIROLE 0.25 MG/1
0.5 TABLET, FILM COATED ORAL AT BEDTIME
Status: DISCONTINUED | OUTPATIENT
Start: 2023-07-16 | End: 2023-07-16 | Stop reason: HOSPADM

## 2023-07-16 RX ORDER — SEVELAMER CARBONATE 800 MG/1
800 TABLET, FILM COATED ORAL
Qty: 90 TABLET | Refills: 0 | Status: SHIPPED | OUTPATIENT
Start: 2023-07-16 | End: 2023-08-15

## 2023-07-16 RX ORDER — ACETAMINOPHEN 500 MG
1000 TABLET ORAL EVERY 6 HOURS PRN
Status: DISCONTINUED | OUTPATIENT
Start: 2023-07-16 | End: 2023-07-16 | Stop reason: HOSPADM

## 2023-07-16 RX ORDER — POTASSIUM CHLORIDE 7.45 MG/ML
10 INJECTION INTRAVENOUS ONCE
Status: COMPLETED | OUTPATIENT
Start: 2023-07-16 | End: 2023-07-16

## 2023-07-16 RX ADMIN — PANTOPRAZOLE SODIUM 40 MG: 40 TABLET, DELAYED RELEASE ORAL at 08:01

## 2023-07-16 RX ADMIN — INSULIN DEGLUDEC INJECTION 16 UNITS: 100 INJECTION, SOLUTION SUBCUTANEOUS at 03:38

## 2023-07-16 RX ADMIN — POTASSIUM CHLORIDE 10 MEQ: 7.46 INJECTION, SOLUTION INTRAVENOUS at 03:11

## 2023-07-16 RX ADMIN — DEXTROSE 15 G: 15 GEL ORAL at 11:07

## 2023-07-16 RX ADMIN — LOSARTAN POTASSIUM 25 MG: 25 TABLET, FILM COATED ORAL at 08:01

## 2023-07-16 RX ADMIN — HYDRALAZINE HYDROCHLORIDE 10 MG: 20 INJECTION INTRAMUSCULAR; INTRAVENOUS at 03:11

## 2023-07-16 RX ADMIN — SEVELAMER CARBONATE 800 MG: 800 TABLET, FILM COATED ORAL at 08:01

## 2023-07-16 ASSESSMENT — ACTIVITIES OF DAILY LIVING (ADL)
ADLS_ACUITY_SCORE: 35

## 2023-07-16 NOTE — PROGRESS NOTES
Pt. Vitals were adjusted from every 30min to every 2hrs.      BGL still every hr on the hr    Long Acting Insulin (Tresiba) was started as ordered around 0330.     Potassium given due to K+ of 3.1    Insulin drip, D5 & 0.45%NS, and 0.9%NS were stopped per order at 0500.    Follow up lab draw scheduled for 0600

## 2023-07-16 NOTE — DISCHARGE SUMMARY
[facility]  Hospitalist Discharge Summary      Date of Admission:  7/14/2023  Date of Discharge:  7/16/2023  Discharging Provider: Angelita Welsh MD  Discharge Service: Hospitalist Service  Discharge Diagnoses   Diabetic ketoacidosis  Gastroenteritis with positive gastric occult blood  Marijuana use disorder  Hemodialysis dependent end-stage renal disease  Essential hypertension  Secondary hyperparathyroidism  Hyperphosphatemia    Clinically Significant Risk Factors     # DMII: A1C = 7.4 % (Ref range: <5.7 %) within past 6 months       Follow-ups Needed After Discharge   Follow-up Appointments    Follow-up and recommended labs and tests      Follow up with primary care provider, Elsa Turner, within 7 days to   evaluate medication change and for hospital follow- up.  The following   labs/tests are recommended: bmp, mg.  Follow up with nephrology, as previously scheduled.  ESRD on HD qTTSa at [facility] with Dr. Gerardo.  Follow up with [facility] for EGD-their office will call you to schedule   appointment.      Unresulted Labs Ordered in the Past 30 Days of this Admission       Date and Time Order Name Status Description    7/15/2023  6:12 PM Phosphatidylethanol (PEth), Whole Blood In process     7/14/2023  3:19 PM Blood Culture Peripheral Blood Preliminary     7/14/2023  3:19 PM Blood Culture Peripheral Blood Preliminary         These results will be followed up by me    Discharge Disposition   Discharged to home  Condition at discharge: Stable    Hospital Course   Josefa Curiel is a 48 year old female admitted on 7/14/2023. She has esrd on HD, DM 1. Presenting with n/v/abd pain. Labs showing ketones, high AG metabolic acidosis,Abd ct showing nonspecific enteritis.    Gastroenteritis, with positive gastric occult blood.    No melena, hematochezia, hematemesis, coffee-ground emesis.  CT showed nonspecific enteritis.  GI recommended outpatient EGD.  GI symptoms could be due to DKA,  in addition to cyclic vomiting syndrome.  Patient using cannabis.  S/p 3 L IVF bolus overnight on admission.  GI symptoms rapidly resolved.    Insulin-dependent diabetes mellitus type 1.  DKA with serum ketones 8.1, AG 38, pH 7.23, CO2 21 BG more than 500.  S/p insulin drip, till anion gap closed, ketones normalized.  Resume PTA regimen of Trulicity/NovoLog.    ESRD on HD qTTSa at [facility] with Dr. Gerardo.  Patient was dialyzed today via Mickey catheter.  Left arm fistula placed 2 weeks ago, maturing.  Nephrology consulted during this hospitalization.    HTN, on losartan.  History of prescription prior to hospitalization, but did not start taking it yet.  Still taking losartan during hospitalization, his BP was elevated 140-160s.    Consultations This Hospital Stay   NEPHROLOGY IP CONSULT  PHARMACY IP CONSULT  GASTROENTEROLOGY IP CONSULT  NEPHROLOGY IP CONSULT    Code Status   Full Code    Time Spent on this Encounter   I, Angelita Welsh MD, personally saw the patient today and spent greater than 30 minutes discharging this patient.     Angelita Welsh MD  [facility] EMERGENCY DEPARTMENT  [address] [place] [id number]  Phone: [phone]  ______________________________________________________________________    Physical Exam   Vital Signs: Temp: 98  F (36.7  C) Temp src: Oral BP: 132/67 Pulse: 111   Resp: 20 SpO2: 99 % O2 Device: None (Room air)    Weight: 125 lbs 0 oz  General: Alert and oriented x 3. Not in obvious distress.  HEENT: NC, AT. Neck- supple, No JVP elevation, lymphadenopathy or thyromegaly. Trachea-central.  Chest: Clear to auscultation bilaterally.  Heart: S1S2 regular. No M/R/G.  Abdomen: Soft. NT, ND. No organomegaly. Bowel sounds- active.  Back: No spine tenderness. No CVA tenderness.  Extremities: No leg swelling. Peripheral pulses 2+ bilaterally.  Left arm recently placed fistula.  Mickey catheter utilized for hemodialysis.  Neuro: Cranial nerves  1-12 grossly normal. No focal neurological deficit       Primary Care Physician   Elsa Turner    Discharge Orders      Reason for your hospital stay    DNA, gastroenteritis     Follow-up and recommended labs and tests     Follow up with primary care provider, Elsa Turner, within 7 days to evaluate medication change and for hospital follow- up.  The following labs/tests are recommended: bmp, mg.  Follow up with nephrology, as previously scheduled.  ESRD on HD qTTSa at [facility] with Dr. Gerardo.  Follow up with [facility] for EGD-their office will call you to schedule appointment.     Activity    Your activity upon discharge: activity as tolerated     Diet    Follow this diet upon discharge: Orders Placed This Encounter      Snacks/Supplements Adult: Other; Special K protein bar; Between Meals      Combination Diet Renal Diet (dialysis); Moderate Consistent Carb (60 g CHO per Meal) Diet  Refrain from using marijuana.     Significant Results and Procedures   Results for orders placed or performed during the hospital encounter of 07/14/23   CT Abdomen Pelvis w/o Contrast    Narrative    EXAM: CT ABDOMEN PELVIS W/O CONTRAST  LOCATION: [facility]  DATE: 7/14/2023  INDICATION: Diffuse abdominal pain. Vomiting.  COMPARISON: CT abdomen pelvis 07/27/2018.  TECHNIQUE: CT scan of the abdomen and pelvis was performed without IV contrast. Multiplanar reformats were obtained. Dose reduction techniques were used.  CONTRAST: None.  FINDINGS:   LOWER CHEST: Normal.  HEPATOBILIARY: Gallstones within a distended gallbladder. No gallbladder wall thickening or pericholecystic inflammation. No biliary ductal dilation.  PANCREAS: Normal.  SPLEEN: Normal.  ADRENAL GLANDS: Normal.  KIDNEYS/BLADDER: Normal.  BOWEL: Mild wall thickening of a few jejunal loops within the left upper quadrant, probably reflecting a mild enteritis. No pneumatosis. No bowel obstruction. Appendix is not visualized. No evidence of  colonic inflammation.  LYMPH NODES: No enlarged lymph nodes.  VASCULATURE: Extensive atherosclerotic calcifications. No abdominal aortic aneurysm.  PELVIC ORGANS: Normal. Trace free pelvic fluid.  MUSCULOSKELETAL: No acute bony abnormality.    Impression    IMPRESSION:   1.  Probable mild enteritis involving a few jejunal loops within the left upper quadrant.   2.  No bowel obstruction or other acute abnormality.   3.  Distended gallbladder with gallstones. No signs of acute cholecystitis.     Discharge Medications   Current Discharge Medication List        START taking these medications    Details   pantoprazole (PROTONIX) 40 MG EC tablet Take 1 tablet (40 mg) by mouth every morning (before breakfast) for 30 days  Qty: 30 tablet, Refills: 0    Associated Diagnoses: Acute gastritis with hemorrhage, unspecified gastritis type      sevelamer carbonate (RENVELA) 800 MG tablet Take 1 tablet (800 mg) by mouth 3 times daily (with meals) for 30 days  Qty: 90 tablet, Refills: 0    Associated Diagnoses: CKD (chronic kidney disease) stage 5, GFR less than 15 ml/min (H)           CONTINUE these medications which have NOT CHANGED    Details   Cholecalciferol (VITAMIN D3) 50 MCG (2000 UT) CHEW Take 50 mcg by mouth daily      Cyanocobalamin (VITAMIN B-12) 500 MCG LOZG Take 500 mcg by mouth daily      ferrous sulfate (FEROSUL) 325 (65 Fe) MG tablet Take 1 tablet (325 mg) by mouth daily  Qty: 30 tablet, Refills: 3    Associated Diagnoses: Anemia due to chronic kidney disease, unspecified CKD stage      Glucagon 3 MG/DOSE POWD Spray 1 spray in nostril as needed in the event of unconscious hypoglycemia or hypoglycemic seizure. May repeat dose if no response after 15 minutes.      insulin degludec (TRESIBA) 100 UNIT/ML pen Inject 16 Units Subcutaneous daily      insulin lispro (HUMALOG KWIKPEN) 100 UNIT/ML (1 unit dial) KWIKPEN Inject 3-4 Units Subcutaneous 4 times daily with meals or snacks      losartan (COZAAR) 25 MG tablet Take 25  mg by mouth daily      rOPINIRole (REQUIP) 0.25 MG tablet Take 0.5 mg by mouth At Bedtime      sertraline (ZOLOFT) 100 MG tablet Take 150 mg by mouth daily      traZODone (DESYREL) 100 MG tablet Take 100 mg by mouth At Bedtime      acetaminophen (TYLENOL) 500 MG tablet Take 2 tablets (1,000 mg) by mouth every 6 hours as needed for pain    Associated Diagnoses: Cellulitis of right foot      Continuous Blood Gluc Sensor (DEXCOM G6 SENSOR) MISC            Allergies   Allergies   Allergen Reactions     Cefazolin Nephrotoxicity     Colestipol GI Disturbance     Doxycycline Nausea and Vomiting     Nickel Rash     Penicillins Rash

## 2023-07-16 NOTE — PROGRESS NOTES
"  '    RENAL (KS) progress note  CC: F/U ESRD  S: Since last visit, patient feels well. No further abd pain or N/V. Anticipating discharge later today.     A/P:   ESRD due to DM-1: HD [facility]Sa [facility] with Dr. Gerardo. Outpt dialysis orders: Dialyzer: 160NRe Optiflux Na: 137 mEq/L Bicarb: 33 mEq/L   Dialysate: 2.0 K, 2.5 Ca, 1.0 Mg, 100 Dextrose ()  Dialysate/Machine Temp (actual): 36.5 C BFR (prescribed): 400 Prescribed time: 03:00 EDW: 55 kg   Access Type: Active (In Use):CVCatheter-Tunneled/Neck              -Continue HD qTTSa + PRN as inpt    - no added HD needed today. Next 7/18 - hopefully at outpt unit    DM-1 with DKA: Ketones elevated but acidosis mild at present. Insulin management per Hosp medicine.     Hypertension with ESRD: BP stable. Chronically on losartan. EDW 55 Kg which seems appropriate. UF 1-2 Kg today with HD and monitor.     Secondary hyperparathyroidism/ hyperphosphatemia: Phos 6.3 on 7/11 (goal 3-5.5 mg/dl). Sevelamer 800 mg po TID as outpt (not on PTA med list though - resume)     Gastroenteritis/choledocholithiasis without cholecystitis. Pain resolved and no further N/V or diarrhea. GI consult pending.      OK to discharge from renal standpoint.       Remi Hassan MD  [facility]  [phone] (off)       No interval changes to past medical history, social history or family history to report.    /67   Pulse 111   Temp 98  F (36.7  C) (Oral)   Resp 20   Ht 1.702 m (5' 7\")   Wt 56.7 kg (125 lb)   SpO2 99%   BMI 19.58 kg/m      I/O last 3 completed shifts:  In: -   Out: 2000 [Other:2000]    Physical Exam:   GENERAL: calm and comfortable, alert  EYES: pupils equal, sclerae not icteric.  ENT: Hearing normal, oral mucosa moist.  RESP: Clear to auscultation bilaterally with no respiratory distress, normal effort.  CV: RRR, no murmurs. no leg edema.    GI: Active BS, Soft, NT/ND, no masses or HSM  : No CVA tenderness   SKIN: No rash, warm/ " dry  NEURO: Moves all extremities, no tremor. Sensation grossly intact to LT  PSYCH: Appropriate mood and affect  SHAUN AVF patent, RIJ PCAD site clean    Recent Labs   Lab 07/16/23  0606 07/16/23  0146 07/15/23  2209 07/15/23  2008 07/15/23  1509 07/15/23  0543 07/15/23  0331 07/14/23 2023 07/14/23  1404   * 136 133* 133* 138 136 140   < > 140   POTASSIUM 3.8 3.1* 3.4 3.7 3.7 4.5 3.6   < > 3.5   CHLORIDE 94* 96* 93* 91* 95* 87* 91*   < > 81*   CO2 25 25 26 25 24 18* 24   < > 21*   BUN 18.7 17.4 17.5 17.0 14.8 48.7* 47.9*   < > 40.2*   CR 4.40* 3.97* 3.96* 3.76* 3.19* 6.32* 6.36*   < > 6.23*   GFRESTIMATED 12* 13* 13* 14* 17* 8* 8*   < > 8*   ZACH 8.9 8.7 8.5* 9.3 9.0 9.1 9.8   < > 11.4*   MAG 1.8  --   --   --  1.9  --   --   --  2.9*   ALBUMIN  --   --   --   --   --   --   --   --  5.5*    < > = values in this interval not displayed.       Recent Labs   Lab 07/16/23  0823 07/15/23  1509 07/15/23  0331 07/14/23  2004 07/14/23  1404   WBC 10.7  --  17.3*  --  17.6*   HGB 12.7 11.2* 12.9 13.2 14.1   HCT 37.3  --  37.5  --  41.7   MCV 93  --  93  --  93     --  255  --  287             Current Facility-Administered Medications:      acetaminophen (TYLENOL) tablet 1,000 mg, 1,000 mg, Oral, Q6H PRN, Angelita Welsh MD     glucose gel 15-30 g, 15-30 g, Oral, Q15 Min PRN, 15 g at 07/16/23 1107 **OR** dextrose 50 % injection 25-50 mL, 25-50 mL, Intravenous, Q15 Min PRN **OR** glucagon injection 1 mg, 1 mg, Subcutaneous, Q15 Min PRN, Haven Jean MD     hydrALAZINE (APRESOLINE) injection 10 mg, 10 mg, Intravenous, Q4H PRN, Angelita Welsh MD, 10 mg at 07/16/23 0311     insulin aspart (NovoLOG) injection (RAPID ACTING), , Subcutaneous, TID Blaise BENTLEY Nadiya O, MD     insulin aspart (NovoLOG) injection (RAPID ACTING), 1-7 Units, Subcutaneous, TID AC, Haven Jean MD     insulin aspart (NovoLOG) injection (RAPID ACTING), 1-5 Units, Subcutaneous, At Bedtime, Haven Jean MD     insulin degludec  (TRESIBA) 100 UNIT/ML injection 16 Units, 16 Units, Subcutaneous, Daily, David Hein MD, 16 Units at 07/16/23 0338     lidocaine (LMX4) cream, , Topical, Q1H PRN, Haven Jean MD     lidocaine 1 % 0.1-1 mL, 0.1-1 mL, Other, Q1H PRN, Haven Jean MD     losartan (COZAAR) tablet 25 mg, 25 mg, Oral, Daily, Angelita Welsh MD, 25 mg at 07/16/23 0801     melatonin tablet 1 mg, 1 mg, Oral, At Bedtime PRN, Haven Jean MD     ondansetron (ZOFRAN ODT) ODT tab 4 mg, 4 mg, Oral, Q6H PRN **OR** ondansetron (ZOFRAN) injection 4 mg, 4 mg, Intravenous, Q6H PRN, Haven Jean MD, 4 mg at 07/15/23 0303     pantoprazole (PROTONIX) EC tablet 40 mg, 40 mg, Oral, QAM AC, Haven Jean MD, 40 mg at 07/16/23 0801     prochlorperazine (COMPAZINE) injection 10 mg, 10 mg, Intravenous, Q6H PRN, 10 mg at 07/15/23 0344 **OR** prochlorperazine (COMPAZINE) tablet 10 mg, 10 mg, Oral, Q6H PRN **OR** prochlorperazine (COMPAZINE) suppository 25 mg, 25 mg, Rectal, Q12H PRN, Haven Jean MD     rOPINIRole (REQUIP) tablet 0.5 mg, 0.5 mg, Oral, At Bedtime, Angelita Welsh MD     sertraline (ZOLOFT) tablet 150 mg, 150 mg, Oral, Daily, Angelita Welsh MD     sevelamer carbonate (RENVELA) tablet 800 mg, 800 mg, Oral, TID w/meals, PoorRemi MD, 800 mg at 07/16/23 0801     sodium chloride (PF) 0.9% PF flush 3 mL, 3 mL, Intracatheter, Q8H, Haven Jean MD, 3 mL at 07/14/23 1816     sodium chloride (PF) 0.9% PF flush 3 mL, 3 mL, Intracatheter, q1 min prn, Haven Jaen MD     traZODone (DESYREL) tablet 100 mg, 100 mg, Oral, At Bedtime, Angelita Welsh MD    Current Outpatient Medications:      Cholecalciferol (VITAMIN D3) 50 MCG (2000 UT) CHEW, Take 50 mcg by mouth daily, Disp: , Rfl:      Cyanocobalamin (VITAMIN B-12) 500 MCG LOZG, Take 500 mcg by mouth daily, Disp: , Rfl:      ferrous sulfate (FEROSUL) 325 (65 Fe) MG tablet, Take 1 tablet (325 mg) by mouth daily, Disp: 30 tablet, Rfl: 3     Glucagon 3 MG/DOSE  POWD, Spray 1 spray in nostril as needed in the event of unconscious hypoglycemia or hypoglycemic seizure. May repeat dose if no response after 15 minutes., Disp: , Rfl:      insulin degludec (TRESIBA) 100 UNIT/ML pen, Inject 16 Units Subcutaneous daily, Disp: , Rfl:      insulin lispro (HUMALOG KWIKPEN) 100 UNIT/ML (1 unit dial) KWIKPEN, Inject 3-4 Units Subcutaneous 4 times daily with meals or snacks, Disp: , Rfl:      losartan (COZAAR) 25 MG tablet, Take 25 mg by mouth daily, Disp: , Rfl:      [START ON 7/17/2023] pantoprazole (PROTONIX) 40 MG EC tablet, Take 1 tablet (40 mg) by mouth every morning (before breakfast) for 30 days, Disp: 30 tablet, Rfl: 0     rOPINIRole (REQUIP) 0.25 MG tablet, Take 0.5 mg by mouth At Bedtime, Disp: , Rfl:      sertraline (ZOLOFT) 100 MG tablet, Take 150 mg by mouth daily, Disp: , Rfl:      sevelamer carbonate (RENVELA) 800 MG tablet, Take 1 tablet (800 mg) by mouth 3 times daily (with meals) for 30 days, Disp: 90 tablet, Rfl: 0     traZODone (DESYREL) 100 MG tablet, Take 100 mg by mouth At Bedtime, Disp: , Rfl:      acetaminophen (TYLENOL) 500 MG tablet, Take 2 tablets (1,000 mg) by mouth every 6 hours as needed for pain, Disp: , Rfl:      Continuous Blood Gluc Sensor (DEXCOM G6 SENSOR) MISC, , Disp: , Rfl:       Labs personally reviewed today during this evaluation at 12:14 PM

## 2023-07-16 NOTE — PROGRESS NOTES
Pt. POCT BGL noted to be 58, juice was given to pt.     Blood glucose from [facility] was 73 shortly before POCT was taken.  MD arnett

## 2023-07-16 NOTE — PLAN OF CARE
Goal Outcome Evaluation:      Plan of Care Reviewed With: patient    Overall Patient Progress: improvingOverall Patient Progress: improving    Outcome Evaluation: DAK resolved per MD, patient denies abdominal pain, reviewed discharge instructions and follow up needed.

## 2023-07-16 NOTE — PROGRESS NOTES
Patient lives with her S-O-, Wilbert. She is independent with ADLs. She sees a talk therapist for depression related issues and has an [employer] worker twice per week. She expects to  Discharge home. Wilbert will transport.

## 2023-07-18 ENCOUNTER — PATIENT OUTREACH (OUTPATIENT)
Dept: CARE COORDINATION | Facility: CLINIC | Age: 48
End: 2023-07-18

## 2023-07-18 NOTE — PROGRESS NOTES
Connected Care Resource Center Contact  [facility]/Voicemail     Clinical Data: Transitional Care Management Outreach     Outreach attempted x 2.  Left message on patient's voicemail, providing [facility]'s 24/7 scheduling and nurse triage phone number [phone] ([phone]) for questions/concerns and/or to schedule an appt with an [facility] provider, if they do not have a PCP.      Plan:  [facility] will do no further outreaches at this time.         TIESHA Echeverria  Connected Care Resource Secretary, [facility]    *Connected Care Resource Team does NOT follow patient ongoing. Referrals are identified based on internal discharge reports and the outreach is to ensure patient has an understanding of their discharge instructions.

## 2023-07-19 LAB
BACTERIA BLD CULT: NO GROWTH
BACTERIA BLD CULT: NO GROWTH
LABORATORY REPORT: NORMAL
PLPETH BLD-MCNC: 11 NG/ML
POPETH BLD-MCNC: 16 NG/ML

## 2023-07-22 ENCOUNTER — HEALTH MAINTENANCE LETTER (OUTPATIENT)
Age: 48
End: 2023-07-22

## 2023-07-25 ENCOUNTER — TRANSFERRED RECORDS (OUTPATIENT)
Dept: HEALTH INFORMATION MANAGEMENT | Facility: CLINIC | Age: 48
End: 2023-07-25

## 2023-07-25 ENCOUNTER — REFERRAL (OUTPATIENT)
Dept: TRANSPLANT | Facility: CLINIC | Age: 48
End: 2023-07-25

## 2023-07-25 ENCOUNTER — TELEPHONE (OUTPATIENT)
Dept: TRANSPLANT | Facility: CLINIC | Age: 48
End: 2023-07-25

## 2023-07-25 DIAGNOSIS — F10.10 ETOH ABUSE: ICD-10-CM

## 2023-07-25 DIAGNOSIS — I10 HYPERTENSION, UNSPECIFIED TYPE: ICD-10-CM

## 2023-07-25 DIAGNOSIS — E10.9 TYPE 1 DIABETES MELLITUS (H): ICD-10-CM

## 2023-07-25 DIAGNOSIS — Z01.818 ENCOUNTER FOR PRE-TRANSPLANT EVALUATION FOR KIDNEY AND PANCREAS TRANSPLANT: ICD-10-CM

## 2023-07-25 DIAGNOSIS — F12.10 CANNABIS ABUSE: ICD-10-CM

## 2023-07-25 DIAGNOSIS — Z76.82 ORGAN TRANSPLANT CANDIDATE: ICD-10-CM

## 2023-07-25 DIAGNOSIS — E11.3599 PROLIFERATIVE DIABETIC RETINOPATHY (H): Primary | ICD-10-CM

## 2023-07-25 DIAGNOSIS — Z01.818 PRE-TRANSPLANT EVALUATION FOR KIDNEY TRANSPLANT: ICD-10-CM

## 2023-07-25 DIAGNOSIS — E11.21: ICD-10-CM

## 2023-07-25 NOTE — LETTER
Josefa Curiel  [address] [address]                October 27, 2023      Leda Rivero,     It was a pleasure to speak with you over the phone today in preparation of your pre-kidney pancreas transplant evaluation. I am sending this letter to review the pre-transplant information we covered.     Please see your schedule in your My Chart for your pre-kidney pancreas transplant evaluation on 12/27/2023 starting at 7:30 AM. [facility]  [address] [place] [id number]    For parking options, please park in the open lot across the street from the front door of our Clinic.  Otherwise, enter the [facility] / arrival plaza from [facility] and attendants can assist you based on your needs.  parking is available for those with limited mobility M-F from 7:00 am to 5:00 pm.     Please bring your designated care person(s) to your appointment day to help listen to the patient education and ask questions that are important to you. You can eat/drink normally on this day. Please do not fast, as the appointment day will most likely go until 3 PM. There is a coffee shop on street level for you to purchase food and you can also bring food from home. Please be aware there are no microwaves or refrigerators for patient use at the clinic. Also, take all your prescribed medications as ordered on this day. There are no medication lab tests ordered during your appointments.    Upon completion of your appointments, I will compile the outcomes and have your results reviewed at the Transplant Team Selection Committee on Wednesday, 01/03/2024, of the following week. This is a medical review meeting only, you will not be asked to attend. I will call you within 10 business days after this meeting to inform you of the outcomes and to assist in planning for the completion of your evaluation. I will also send  you an evaluation summary letter after our phone call.                 You will receive an email from an  in our Transplant Office which contains a Receipt of Information consent and patient educational materials. Please read and electronically sign the Receipt of Information consent as well as read the educational materials prior to your evaluation appointments on 12/27/2023.     Please view your pre-transplant patient education videos at this link:   Pre-Kidney Transplant (I) (English): [email or web address]  Please complete viewing of these videos prior to your evaluation appointments.  This will give you a good knowledge base to then to ask questions with the providers.     Additional transplant resources are as follows:   www.unos.org. UNOS, or United Network of Organ Sharing, is the national organization in our country that maintains all the organ wait lists and is responsible for the rules and regulations for organ allocation. I recommend looking at the Transplant Living section as this area has content created for patients.   www.srtr.org SRTR, or the Scientific Registry for Transplant Recipients is a national data base that all Transplant Centers report their success and failure rate for all organ types twice per year. The results are public knowledge and do provide a good perspective of organ transplant.     If the transplant providers tell you at your appointments that you should start to have live donors register with our Program to initiate their evaluations, please provide this registration website: [email or web address]   The donor will receive a detailed email response back with information and next steps specific to their situation. It is important that the donor responds to the email in order to move forward with their evaluation. Donors can also call our Office and ask to speak with a live donor  coordinator in the event of questions at [phone].     Sincerely,   Joanna Starr, RN, BSN  Pre Kidney Pancreas Transplant Coordinator   [facility]  Solid Organ Transplant Care   [address] [place] [address] [address]  [place] [address]  [email or web address]   Office Number: [phone] Direct Number: [phone]   Fax Number: [phone]  Employed by [facility] [employer]     CC's: Dr. Jonathan Gerardo, Elsa Turner NP, [facility]

## 2023-07-25 NOTE — LETTER
Josefa Curiel  [address]  [facility] [place] [facility] [address]                October 12, 2023        .                                                                                    MEDICAL RECORDS REQUEST    [facility] Kid[facility], Kidney Pancreas Transplant Program Records Request                      Facility: [facility]    Thank you for [facility] your [facility], in order to process the referral we will need the following information;    Demographics  2728 form   Immunizations records  Providers Progress notes, (last 3 note)      Please call our office at [phone] if you have any questions or concerns.                Please fax all paper records to [phone] within 3-5 business days.      Thank you,   The SOT Referral Intake Team     [facility]  Solid Organ Transplant Office  [address]  [place] [address]

## 2023-07-25 NOTE — LETTER
Josefa Curiel  [address] [address]        10/12/23        Dear Josefa,    Thank you for your interest in the Transplant Center at [facility]. We look forward to being a part of your care team and assisting you through the transplant process.    As we discussed, your transplant coordinator is Joanna Starr (Pancreas, Kidney).  You may call your coordinator at any time with questions or concerns.  Your first scheduled call will be on 10/27/23 at between 8:00am and 12:00pm.  If this needs to change, call [phone].    Please complete the following.    Fill out and return the enclosed forms  Authorization for Electronic Communication  Authorization to Discuss Protected Health Information  Authorization for Release of Protected Health Information  Authorization for Care Everywhere Release of Information    Sign up for:  [employer], access to your electronic medical record (see enclosed pamphlet)  [email or web address], a transplant education website    You can use these tools to learn more about your transplant, communicate with your care team, and track your medical details      Sincerely,      Solid Organ Transplant  [facility]    cc: Care Team

## 2023-07-25 NOTE — LETTER
Josefa Curiel  [address] [address]              August 10, 2023      Dear  Josefa,      You have recently expressed interest in our Solid Organ Transplant Program.    We have made several attempts to get in touch with you but have been unable to reach you.  We are unable to leave a voicemail because your mailbox is full.    If you are still interested and/or have any questions, please contact us at  [phone] or [phone] and ask to speak with the .      Monday - Friday, between the hours of 8:00am and 5:00pm central time.    We look forward to hearing from you.      Regards,     Solid Organ Transplant Intake Team  [facility]  [address]  [address]  [place] [address]

## 2023-07-27 RX ORDER — DEXTROSE MONOHYDRATE 25 G/50ML
25-50 INJECTION, SOLUTION INTRAVENOUS
Status: CANCELLED | OUTPATIENT
Start: 2023-07-27

## 2023-08-17 ENCOUNTER — HOSPITAL ENCOUNTER (EMERGENCY)
Facility: HOSPITAL | Age: 48
Discharge: HOME | End: 2023-08-17
Attending: EMERGENCY MEDICINE | Admitting: EMERGENCY MEDICINE
Payer: COMMERCIAL

## 2023-08-17 VITALS
SYSTOLIC BLOOD PRESSURE: 185 MMHG | TEMPERATURE: 98.1 F | WEIGHT: 124.34 LBS | HEART RATE: 97 BPM | BODY MASS INDEX: 19.47 KG/M2 | DIASTOLIC BLOOD PRESSURE: 96 MMHG | OXYGEN SATURATION: 100 % | RESPIRATION RATE: 21 BRPM

## 2023-08-17 DIAGNOSIS — F12.90 CANNABINOID HYPEREMESIS SYNDROME: ICD-10-CM

## 2023-08-17 DIAGNOSIS — E86.0 DEHYDRATION: ICD-10-CM

## 2023-08-17 DIAGNOSIS — R11.2 CANNABINOID HYPEREMESIS SYNDROME: ICD-10-CM

## 2023-08-17 DIAGNOSIS — N39.0 URINARY TRACT INFECTION IN FEMALE: ICD-10-CM

## 2023-08-17 LAB
ALBUMIN SERPL BCG-MCNC: 5 G/DL (ref 3.5–5.2)
ALBUMIN UR-MCNC: >600 MG/DL
ALP SERPL-CCNC: 93 U/L (ref 35–104)
ALT SERPL W P-5'-P-CCNC: 14 U/L (ref 0–50)
ANION GAP SERPL CALCULATED.3IONS-SCNC: 20 MMOL/L (ref 7–15)
APPEARANCE UR: ABNORMAL
AST SERPL W P-5'-P-CCNC: 24 U/L (ref 0–45)
B-OH-BUTYR SERPL-SCNC: 1.9 MMOL/L
BACTERIA #/AREA URNS HPF: ABNORMAL /HPF
BASE EXCESS BLDV CALC-SCNC: 9.9 MMOL/L
BASOPHILS # BLD AUTO: 0.1 10E3/UL (ref 0–0.2)
BASOPHILS NFR BLD AUTO: 1 %
BILIRUB SERPL-MCNC: 0.3 MG/DL
BILIRUB UR QL STRIP: NEGATIVE
BUN SERPL-MCNC: 31.4 MG/DL (ref 6–20)
CALCIUM SERPL-MCNC: 10.1 MG/DL (ref 8.6–10)
CHLORIDE SERPL-SCNC: 89 MMOL/L (ref 98–107)
COLOR UR AUTO: YELLOW
CREAT SERPL-MCNC: 6.52 MG/DL (ref 0.51–0.95)
DEPRECATED HCO3 PLAS-SCNC: 29 MMOL/L (ref 22–29)
EOSINOPHIL # BLD AUTO: 0 10E3/UL (ref 0–0.7)
EOSINOPHIL NFR BLD AUTO: 0 %
ERYTHROCYTE [DISTWIDTH] IN BLOOD BY AUTOMATED COUNT: 13.8 % (ref 10–15)
GFR SERPL CREATININE-BSD FRML MDRD: 7 ML/MIN/1.73M2
GLUCOSE SERPL-MCNC: 58 MG/DL (ref 70–99)
GLUCOSE UR STRIP-MCNC: NEGATIVE MG/DL
HCG SERPL QL: NEGATIVE
HCO3 BLDV-SCNC: 34 MMOL/L (ref 24–30)
HCT VFR BLD AUTO: 33.6 % (ref 35–47)
HGB BLD-MCNC: 11.7 G/DL (ref 11.7–15.7)
HGB UR QL STRIP: ABNORMAL
HOLD SPECIMEN: NORMAL
IMM GRANULOCYTES # BLD: 0 10E3/UL
IMM GRANULOCYTES NFR BLD: 0 %
KETONES UR STRIP-MCNC: NEGATIVE MG/DL
LEUKOCYTE ESTERASE UR QL STRIP: ABNORMAL
LIPASE SERPL-CCNC: 47 U/L (ref 13–60)
LYMPHOCYTES # BLD AUTO: 0.6 10E3/UL (ref 0.8–5.3)
LYMPHOCYTES NFR BLD AUTO: 7 %
MAGNESIUM SERPL-MCNC: 2.5 MG/DL (ref 1.7–2.3)
MCH RBC QN AUTO: 32 PG (ref 26.5–33)
MCHC RBC AUTO-ENTMCNC: 34.8 G/DL (ref 31.5–36.5)
MCV RBC AUTO: 92 FL (ref 78–100)
MONOCYTES # BLD AUTO: 0.3 10E3/UL (ref 0–1.3)
MONOCYTES NFR BLD AUTO: 3 %
NEUTROPHILS # BLD AUTO: 8.2 10E3/UL (ref 1.6–8.3)
NEUTROPHILS NFR BLD AUTO: 89 %
NITRATE UR QL: NEGATIVE
NRBC # BLD AUTO: 0 10E3/UL
NRBC BLD AUTO-RTO: 0 /100
OXYHGB MFR BLDV: 64.7 % (ref 70–75)
PCO2 BLDV: 48 MM HG (ref 35–50)
PH BLDV: 7.46 [PH] (ref 7.35–7.45)
PH UR STRIP: 7 [PH] (ref 5–7)
PLATELET # BLD AUTO: 215 10E3/UL (ref 150–450)
PO2 BLDV: 33 MM HG (ref 25–47)
POTASSIUM SERPL-SCNC: 3.2 MMOL/L (ref 3.4–5.3)
PROT SERPL-MCNC: 7.8 G/DL (ref 6.4–8.3)
RBC # BLD AUTO: 3.66 10E6/UL (ref 3.8–5.2)
RBC URINE: 0 /HPF
SAO2 % BLDV: 65.7 % (ref 70–75)
SODIUM SERPL-SCNC: 138 MMOL/L (ref 136–145)
SP GR UR STRIP: 1.01 (ref 1–1.03)
SQUAMOUS EPITHELIAL: 5 /HPF
UROBILINOGEN UR STRIP-MCNC: <2 MG/DL
WBC # BLD AUTO: 9.2 10E3/UL (ref 4–11)
WBC CLUMPS #/AREA URNS HPF: PRESENT /HPF
WBC URINE: >182 /HPF

## 2023-08-17 PROCEDURE — 87186 SC STD MICRODIL/AGAR DIL: CPT | Performed by: EMERGENCY MEDICINE

## 2023-08-17 PROCEDURE — 84703 CHORIONIC GONADOTROPIN ASSAY: CPT | Performed by: EMERGENCY MEDICINE

## 2023-08-17 PROCEDURE — 83735 ASSAY OF MAGNESIUM: CPT | Performed by: EMERGENCY MEDICINE

## 2023-08-17 PROCEDURE — 250N000011 HC RX IP 250 OP 636: Mod: JZ | Performed by: EMERGENCY MEDICINE

## 2023-08-17 PROCEDURE — 82010 KETONE BODYS QUAN: CPT | Performed by: EMERGENCY MEDICINE

## 2023-08-17 PROCEDURE — 99284 EMERGENCY DEPT VISIT MOD MDM: CPT | Mod: 25

## 2023-08-17 PROCEDURE — 81001 URINALYSIS AUTO W/SCOPE: CPT | Performed by: EMERGENCY MEDICINE

## 2023-08-17 PROCEDURE — 258N000003 HC RX IP 258 OP 636: Performed by: EMERGENCY MEDICINE

## 2023-08-17 PROCEDURE — 80053 COMPREHEN METABOLIC PANEL: CPT | Performed by: EMERGENCY MEDICINE

## 2023-08-17 PROCEDURE — 85025 COMPLETE CBC W/AUTO DIFF WBC: CPT | Performed by: EMERGENCY MEDICINE

## 2023-08-17 PROCEDURE — 82805 BLOOD GASES W/O2 SATURATION: CPT | Performed by: EMERGENCY MEDICINE

## 2023-08-17 PROCEDURE — 96374 THER/PROPH/DIAG INJ IV PUSH: CPT

## 2023-08-17 PROCEDURE — 999N000248 HC STATISTIC IV INSERT WITH US BY RN

## 2023-08-17 PROCEDURE — 96361 HYDRATE IV INFUSION ADD-ON: CPT

## 2023-08-17 PROCEDURE — 36415 COLL VENOUS BLD VENIPUNCTURE: CPT | Performed by: EMERGENCY MEDICINE

## 2023-08-17 PROCEDURE — 93005 ELECTROCARDIOGRAM TRACING: CPT | Performed by: EMERGENCY MEDICINE

## 2023-08-17 PROCEDURE — 250N000013 HC RX MED GY IP 250 OP 250 PS 637: Performed by: EMERGENCY MEDICINE

## 2023-08-17 PROCEDURE — 83690 ASSAY OF LIPASE: CPT | Performed by: EMERGENCY MEDICINE

## 2023-08-17 RX ORDER — POTASSIUM CHLORIDE 1500 MG/1
40 TABLET, EXTENDED RELEASE ORAL ONCE
Status: DISCONTINUED | OUTPATIENT
Start: 2023-08-17 | End: 2023-08-17

## 2023-08-17 RX ORDER — CEFDINIR 300 MG/1
CAPSULE ORAL
Qty: 4 CAPSULE | Refills: 0 | Status: SHIPPED | OUTPATIENT
Start: 2023-08-18 | End: 2023-12-27

## 2023-08-17 RX ORDER — CEFDINIR 300 MG/1
300 CAPSULE ORAL ONCE
Status: COMPLETED | OUTPATIENT
Start: 2023-08-17 | End: 2023-08-17

## 2023-08-17 RX ORDER — CEFPODOXIME PROXETIL 200 MG/1
200 TABLET, FILM COATED ORAL DAILY
Qty: 7 TABLET | Refills: 0 | Status: SHIPPED | OUTPATIENT
Start: 2023-08-17 | End: 2023-08-17

## 2023-08-17 RX ORDER — DROPERIDOL 2.5 MG/ML
2.5 INJECTION, SOLUTION INTRAMUSCULAR; INTRAVENOUS ONCE
Status: COMPLETED | OUTPATIENT
Start: 2023-08-17 | End: 2023-08-17

## 2023-08-17 RX ADMIN — SODIUM CHLORIDE, POTASSIUM CHLORIDE, SODIUM LACTATE AND CALCIUM CHLORIDE 1000 ML: 600; 310; 30; 20 INJECTION, SOLUTION INTRAVENOUS at 12:59

## 2023-08-17 RX ADMIN — CEFDINIR 300 MG: 300 CAPSULE ORAL at 16:04

## 2023-08-17 RX ADMIN — SODIUM CHLORIDE, POTASSIUM CHLORIDE, SODIUM LACTATE AND CALCIUM CHLORIDE 1000 ML: 600; 310; 30; 20 INJECTION, SOLUTION INTRAVENOUS at 15:25

## 2023-08-17 RX ADMIN — DROPERIDOL 2.5 MG: 2.5 INJECTION, SOLUTION INTRAMUSCULAR; INTRAVENOUS at 13:00

## 2023-08-17 ASSESSMENT — ACTIVITIES OF DAILY LIVING (ADL)
ADLS_ACUITY_SCORE: 35
ADLS_ACUITY_SCORE: 35

## 2023-08-17 NOTE — ED PROVIDER NOTES
EMERGENCY DEPARTMENT ENCOUNTER      NAME: Josefa Curiel  AGE: 48 year old female  YOB: 1975  MRN: [id number]  EVALUATION DATE & TIME: 8/17/2023 11:04 AM    PCP: Elsa Turner    ED PROVIDER: Zuri Hills M.D.      Chief Complaint   Patient presents with    Abdominal Pain         FINAL IMPRESSION:  1. Cannabinoid hyperemesis syndrome    2. Dehydration    3. Urinary tract infection in female          ED COURSE & MEDICAL DECISION MAKING:    ED Course as of 08/21/23 1036   Thu Aug 17, 2023   1148 I met with the patient, obtained history, performed an initial exam, and discussed options and plan for diagnostics and treatment here in the ED.     1213 Nurse states difficulty line to obtain and no peripheral access at this time. Paged PIC       Pertinent Labs & Imaging studies reviewed. (See chart for details)    CRITICAL CARE:  N.A    Medical Decision Making  Cannabinoid hyperemesis syndrome suspected.  Differential also includes cyclic vomiting syndrome, gastroparesis, gastritis, viral illness, UTI.  Daily marijuana smoking, stop smoking 4 days ago. History of similar presentation in the past.  Potassium slightly low at 3.2, glucose is low but asymptomatic, tolerating PO. BUN creatinine elevated consistent with end-stage renal disease. Potassium will not be supplemented due to history of end-stage renal disease and do not want to cause hyperkalemia with missed HD today.  Suspect ketoacidosis due to dehydration, elevated beta hydroxy butyrate anion gap,no DKA today. Urinalysis consistent with UTI, clean catch reported and only 5 squamous cells present, 500 LE, WBC clumps in urine, few bacteria.negative pregnancy test.  IV fluids given, around 1 1/2 L, IV access lost. Patient tolerated PO so did not pursue 2nd IV line given tough stick.  Abdomen benign, lipase negative, liver enzymes and bilirubin normal. No concern for intra-abdominal surgical pathology.  Discharge home, discussed cessation of  cannabis, discussed UTI medications, 1st dose given in the ER.  Return precautions reviewed.      History:  Supplemental history from: Documented in chart, if applicable  External Record(s) reviewed: Documented in chart, if applicable.    Work Up:  Chart documentation includes differential considered and any EKGs or imaging independently interpreted by provider, where specified.  In additional to work up documented, I considered the following work up: Documented in chart, if applicable.    External consultation:  Discussion of management with another provider: Documented in chart, if applicable    Complicating factors:  Care impacted by chronic illness: Chronic Kidney Disease, Diabetes, Hyperlipidemia, and Hypertension  Care affected by social determinants of health: N/A    Disposition considerations: Discharge. I prescribed additional prescription strength medication(s) as charted. I considered admission, but discharged patient after significant clinical improvement.        At the conclusion of the encounter I discussed the results of all of the tests and the disposition. The questions were answered. The patient or family acknowledged understanding and was agreeable with the care plan.     MEDICATIONS GIVEN IN THE EMERGENCY:  Medications   droPERidol (INAPSINE) injection 2.5 mg (2.5 mg Intravenous $Given 8/17/23 1300)   lactated ringers BOLUS 1,000 mL (0 mLs Intravenous Stopped 8/17/23 1450)   lactated ringers BOLUS 1,000 mL (0 mLs Intravenous Stopped 8/17/23 1541)   cefdinir (OMNICEF) capsule 300 mg (300 mg Oral $Given 8/17/23 1604)       NEW PRESCRIPTIONS STARTED AT TODAY'S ER VISIT  Discharge Medication List as of 8/17/2023  4:07 PM        START taking these medications    Details   cefdinir (OMNICEF) 300 MG capsule Take 1 tablet by mouth on Saturday, Monday, Tuesday and Thursday, take after dialysis if it is a dialysis day., Disp-4 capsule, R-0, E-Prescribe                 =================================================================    [facility]    Patient information was obtained from: Patient     Use of : N/A     Josefa Curiel is a 48 year old female with PMHx of DMI (uncontrolled), DKA, CKD, HLD, HTN, anxiety, cannabis abuse, alcohol abuse, who presents to the ED today via wheelchair in for evaluation of abdominal pain.     The patient has been vomiting since his morning with associated abdominal pain. The pain is located to lower abdomen and she describe the pain as a dull sensation. She was unable to keep any liquids down his morning. She last tolerated food and liquids yesterday night. She admits to smoking pot daily and last use was about 4 days ago. The patient's last dialysis was Tuesday (~2 days ago) and cancelled her dialysis today due to excessive vomiting. She denies missing any dialysis. Last insulin was yesterday night. The patient is currently going through therapy. She denies any other medical concerns at the moment.     REVIEW OF SYSTEMS   All other systems reviewed and are negative except as noted above in HPI.    PAST MEDICAL HISTORY:  Past Medical History:   Diagnosis Date    LORIN (acute kidney injury) (H)     Anxiety     Cannabis abuse     Depression     Diabetes Type 1, uncontrolled 1985    Onset age 10    DKA (diabetic ketoacidoses)     ETOH abuse     HLD (hyperlipidemia)     HTN (hypertension)     Lactic acidosis        PAST SURGICAL HISTORY:  Past Surgical History:   Procedure Laterality Date    ANKLE FRACTURE SURGERY Left     APPENDECTOMY      INCISION AND DRAINAGE FOOT, COMBINED Right 11/18/2022    Procedure: INCISION AND DRAINAGE, right foot;  Surgeon: David Patel DPM;  Location: [facility]    PICC SINGLE LUMEN PLACEMENT  10/23/2022            CURRENT MEDICATIONS:    cefdinir (OMNICEF) 300 MG capsule  acetaminophen (TYLENOL) 500 MG tablet  Cholecalciferol (VITAMIN D3) 50 MCG (2000 UT) CHEW  Continuous Blood Gluc Sensor  "(DEXCOM G6 SENSOR) MISC  Cyanocobalamin (VITAMIN B-12) 500 MCG LOZG  ferrous sulfate (FEROSUL) 325 (65 Fe) MG tablet  Glucagon 3 MG/DOSE POWD  insulin degludec (TRESIBA) 100 UNIT/ML pen  insulin lispro (HUMALOG KWIKPEN) 100 UNIT/ML (1 unit dial) KWIKPEN  losartan (COZAAR) 25 MG tablet  rOPINIRole (REQUIP) 0.25 MG tablet  sertraline (ZOLOFT) 100 MG tablet  traZODone (DESYREL) 100 MG tablet        ALLERGIES:  Allergies   Allergen Reactions    Cefazolin Nephrotoxicity    Colestipol GI Disturbance    Doxycycline Nausea and Vomiting    Nickel Rash    Penicillins Rash       FAMILY HISTORY:  Family History   Problem Relation Age of Onset    Clotting Disorder Mother         \"thin blood\"    Arthritis Mother     Hyperlipidemia Mother     Skin Cancer Father         face/nose     Depression Father     Other - See Comments Sister         eye surgeries    No Known Problems Brother     Coronary Artery Disease Maternal Grandmother     Alcoholism Maternal Grandfather     Coronary Artery Disease Maternal Grandfather     No Known Problems Paternal Grandmother     No Known Problems Paternal Grandfather        SOCIAL HISTORY:   Social History     Socioeconomic History    Marital status: Single   Tobacco Use    Smoking status: Some Days    Smokeless tobacco: Never    Tobacco comments:     occasional   Substance and Sexual Activity    Alcohol use: Yes     Alcohol/week: 35.0 standard drinks of alcohol     Comment: Alcoholic Drinks/day: ~750 mL wine daily    Drug use: Yes     Types: Marijuana     Comment: Drug use: 4-5x/week    Sexual activity: Yes     Partners: Male     Birth control/protection: Condom       VITALS:  No data found.      PHYSICAL EXAM    Physical Exam  Vitals and nursing note reviewed.   Constitutional:       General: She is in acute distress (moderate).      Appearance: She is ill-appearing (chronic). She is not toxic-appearing.   HENT:      Mouth/Throat:      Mouth: Mucous membranes are dry.   Eyes:      General: No " scleral icterus.        Right eye: No discharge.         Left eye: No discharge.   Cardiovascular:      Rate and Rhythm: Regular rhythm. Tachycardia present.   Pulmonary:      Effort: Pulmonary effort is normal. No respiratory distress.      Breath sounds: Normal breath sounds.   Chest:      Comments: central venous to right anterior chest wall. Dressing slightly soiled. No bleeding or erythema to skin.  Abdominal:      General: There is no distension.      Palpations: Abdomen is soft.      Tenderness: There is no abdominal tenderness.   Musculoskeletal:         General: No swelling or deformity.      Cervical back: Neck supple. No rigidity.   Skin:     General: Skin is warm and dry.      Capillary Refill: Capillary refill takes less than 2 seconds.      Findings: No bruising or erythema.   Neurological:      General: No focal deficit present.      Mental Status: She is alert and oriented to person, place, and time. Mental status is at baseline.      Comments: No slurred speech, following commands spontaneously. No facial droop.   Psychiatric:         Mood and Affect: Mood normal.         Behavior: Behavior normal.           LAB:  All pertinent labs reviewed and interpreted.  Results for orders placed or performed during the hospital encounter of 08/17/23   Comprehensive metabolic panel   Result Value Ref Range    Sodium 138 136 - 145 mmol/L    Potassium 3.2 (L) 3.4 - 5.3 mmol/L    Chloride 89 (L) 98 - 107 mmol/L    Carbon Dioxide (CO2) 29 22 - 29 mmol/L    Anion Gap 20 (H) 7 - 15 mmol/L    Urea Nitrogen 31.4 (H) 6.0 - 20.0 mg/dL    Creatinine 6.52 (H) 0.51 - 0.95 mg/dL    Calcium 10.1 (H) 8.6 - 10.0 mg/dL    Glucose 58 (L) 70 - 99 mg/dL    Alkaline Phosphatase 93 35 - 104 U/L    AST 24 0 - 45 U/L    ALT 14 0 - 50 U/L    Protein Total 7.8 6.4 - 8.3 g/dL    Albumin 5.0 3.5 - 5.2 g/dL    Bilirubin Total 0.3 <=1.2 mg/dL    GFR Estimate 7 (L) >60 mL/min/1.73m2   Result Value Ref Range    Lipase 47 13 - 60 U/L   HCG  QUALitative pregnancy (blood)   Result Value Ref Range    hCG Serum Qualitative Negative Negative   Blood gas venous   Result Value Ref Range    pH Venous 7.46 (H) 7.35 - 7.45    pCO2 Venous 48 35 - 50 mm Hg    pO2 Venous 33 25 - 47 mm Hg    Bicarbonate Venous 34 (H) 24 - 30 mmol/L    Base Excess/Deficit (+/-) 9.9   mmol/L    Oxyhemoglobin Venous 64.7 (L) 70.0 - 75.0 %    O2 Sat, Venous 65.7 (L) 70.0 - 75.0 %   UA with Microscopic reflex to Culture    Specimen: Urine, Clean Catch   Result Value Ref Range    Color Urine Yellow Colorless, Straw, Light Yellow, Yellow    Appearance Urine Cloudy (A) Clear    Glucose Urine Negative Negative mg/dL    Bilirubin Urine Negative Negative    Ketones Urine Negative Negative mg/dL    Specific Gravity Urine 1.013 1.001 - 1.030    Blood Urine 0.1 mg/dL (A) Negative    pH Urine 7.0 5.0 - 7.0    Protein Albumin Urine >600 (A) Negative mg/dL    Urobilinogen Urine <2.0 <2.0 mg/dL    Nitrite Urine Negative Negative    Leukocyte Esterase Urine 500 Karine/uL (A) Negative    Bacteria Urine Few (A) None Seen /HPF    WBC Clumps Urine Present (A) None Seen /HPF    RBC Urine 0 <=2 /HPF    WBC Urine >182 (H) <=5 /HPF    Squamous Epithelials Urine 5 (H) <=1 /HPF   Result Value Ref Range    Magnesium 2.5 (H) 1.7 - 2.3 mg/dL   Ketone Beta-Hydroxybutyrate Quantitative   Result Value Ref Range    Ketone (Beta-Hydroxybutyrate) Quantitative 1.90 (HH) <=0.30 mmol/L   CBC with platelets and differential   Result Value Ref Range    WBC Count 9.2 4.0 - 11.0 10e3/uL    RBC Count 3.66 (L) 3.80 - 5.20 10e6/uL    Hemoglobin 11.7 11.7 - 15.7 g/dL    Hematocrit 33.6 (L) 35.0 - 47.0 %    MCV 92 78 - 100 fL    MCH 32.0 26.5 - 33.0 pg    MCHC 34.8 31.5 - 36.5 g/dL    RDW 13.8 10.0 - 15.0 %    Platelet Count 215 150 - 450 10e3/uL    % Neutrophils 89 %    % Lymphocytes 7 %    % Monocytes 3 %    % Eosinophils 0 %    % Basophils 1 %    % Immature Granulocytes 0 %    NRBCs per 100 WBC 0 <1 /100    Absolute Neutrophils 8.2  1.6 - 8.3 10e3/uL    Absolute Lymphocytes 0.6 (L) 0.8 - 5.3 10e3/uL    Absolute Monocytes 0.3 0.0 - 1.3 10e3/uL    Absolute Eosinophils 0.0 0.0 - 0.7 10e3/uL    Absolute Basophils 0.1 0.0 - 0.2 10e3/uL    Absolute Immature Granulocytes 0.0 <=0.4 10e3/uL    Absolute NRBCs 0.0 10e3/uL   Extra Blue Top Tube   Result Value Ref Range    Hold Specimen JI    ECG 12-Lead with MUSE - [facility]   Result Value Ref Range    Systolic Blood Pressure  mmHg    Diastolic Blood Pressure  mmHg    Ventricular Rate 102 BPM    Atrial Rate 102 BPM    SC Interval 142 ms    QRS Duration 100 ms     ms    QTc 497 ms    P Axis 69 degrees    R AXIS 64 degrees    T Axis 70 degrees    Interpretation ECG       Sinus tachycardia  Possible Left atrial enlargement  Anterior infarct , age undetermined  Abnormal ECG  When compared with ECG of 12-DEC-2022 15:45,  Anterior infarct is now Present  Confirmed by SEE ED PROVIDER NOTE FOR, ECG INTERPRETATION ([id number]),  ROBIN BATEMAN ([id number]) on 8/21/2023 10:09:12 AM     Urine Culture    Specimen: Urine, Clean Catch   Result Value Ref Range    Culture >100,000 CFU/mL Escherichia coli (A)        Susceptibility    Escherichia coli - ABHIJIT     Ampicillin  Susceptible ug/mL     Ampicillin/ Sulbactam  Susceptible ug/mL     Piperacillin/Tazobactam  Susceptible ug/mL     Cefazolin*  Susceptible ug/mL      * Cefazolin ABHIJIT breakpoints are for the treatment of uncomplicated urinary tract infections. For the treatment of systemic infections, please contact the laboratory for additional testing.     Cefoxitin  Susceptible ug/mL     Ceftazidime  Susceptible ug/mL     Ceftriaxone  Susceptible ug/mL     Cefepime  Susceptible ug/mL     Gentamicin  Susceptible ug/mL     Tobramycin  Susceptible ug/mL     Ciprofloxacin  Susceptible ug/mL     Levofloxacin  Susceptible ug/mL     Nitrofurantoin  Susceptible ug/mL     Trimethoprim/Sulfamethoxazole  Susceptible ug/mL       RADIOLOGY:  Reviewed all pertinent imaging.  Please see official radiology report.  No orders to display       I, Sho Jones, am serving as a scribe to document services personally performed by Dr. Zuri Hills based on my observation and the provider's statements to me. I, Zuri Hills MD attest that Sho Jones is acting in a scribe capacity, has observed my performance of the services and has documented them in accordance with my direction.    Zuri Hills MD  [facility] EMERGENCY DEPARTMENT  [address] [place] [id number] [phone]       Zuri Hills MD  08/21/23 4629

## 2023-08-17 NOTE — ED TRIAGE NOTES
She started to have abdominal pain yesterday. Her last dialysis was Tuesday so she cancelled her run for today.

## 2023-08-17 NOTE — DISCHARGE INSTRUCTIONS
I recommend stopping smoking marijuana completely.   Try to go to dialysis tomorrow, ask your doctor for recommendations.  Take antibiotic as written for urinary tract infection.  See GI doctor outpatient.

## 2023-08-18 LAB — BACTERIA UR CULT: ABNORMAL

## 2023-08-21 LAB
ATRIAL RATE - MUSE: 102 BPM
DIASTOLIC BLOOD PRESSURE - MUSE: NORMAL MMHG
INTERPRETATION ECG - MUSE: NORMAL
P AXIS - MUSE: 69 DEGREES
PR INTERVAL - MUSE: 142 MS
QRS DURATION - MUSE: 100 MS
QT - MUSE: 382 MS
QTC - MUSE: 497 MS
R AXIS - MUSE: 64 DEGREES
SYSTOLIC BLOOD PRESSURE - MUSE: NORMAL MMHG
T AXIS - MUSE: 70 DEGREES
VENTRICULAR RATE- MUSE: 102 BPM

## 2023-09-05 ENCOUNTER — TELEPHONE (OUTPATIENT)
Dept: TRANSPLANT | Facility: CLINIC | Age: 48
End: 2023-09-05

## 2023-09-05 VITALS — BODY MASS INDEX: 19.15 KG/M2 | HEIGHT: 67 IN | WEIGHT: 122 LBS

## 2023-09-05 DIAGNOSIS — E10.69 TYPE 1 DIABETES MELLITUS WITH OTHER SPECIFIED COMPLICATION (H): ICD-10-CM

## 2023-09-05 DIAGNOSIS — N18.5 CKD (CHRONIC KIDNEY DISEASE) STAGE 5, GFR LESS THAN 15 ML/MIN (H): Primary | ICD-10-CM

## 2023-09-05 NOTE — TELEPHONE ENCOUNTER
Patient Call: General  Route to LPN    Reason for call: Josefa is returning intakes call from 8/10/23 and is available today for call back and tomorrow     Call back needed? Yes    Return Call Needed  Same as documented in contacts section  When to return call?: Same day: Route High Priority

## 2023-09-05 NOTE — LETTER
September 5, 2023    Josefa Curiel  [address] [facility] [address]    Dear Josefa,    Thank you for your interest in the Transplant Center at [facility]. We look forward to being a part of your care team and assisting you through the transplant process.    As we discussed, your transplant coordinator is Joanna Starr (Pancreas, Kidney).  You may call your coordinator at any time with questions or concerns.  Your first scheduled call will be on 9/12 between 8:00 -10:00.  If this needs to change, call [phone].    Please complete the following.    Fill out and return the enclosed forms  Authorization for Electronic Communication  Authorization to Discuss Protected Health Information  Authorization for Release of Protected Health Information    Sign up for:  [employer], access to your electronic medical record (see enclosed pamphlet)  [employer], a transplant education website      You can use these tools to learn more about your transplant, communicate with your care team, and track your medical details      Sincerely,      Solid Organ Transplant  [facility]    cc: Referring Physician Dialysis Unit PCP

## 2023-09-05 NOTE — TELEPHONE ENCOUNTER
PCP: Elsa FISHER CNP   Referring Organization: [facility]  Referring Provider: Dr Gerardo  Referring Diagnosis: CKD stage V on dialysis    GFR/Date: 7    Is patient under the age of 65? yes  Is patient diabetic? yes  Is patient on insulin? yes  Was patient offered a pancreas transplant referral? yes    Is patient in a group home/assisted living? no  Does patient have a guardian? no    Referral intake process completed.  Patient is aware that after financial approval is received, medical records will be requested.   Patient confirmed for a callback from transplant coordinator on 9/12/23 between 8:00-10:00. (within 2 weeks)  Tentative evaluation date 10/25 slot D (within 4 weeks) if appointment is virtual, does patient have capabilities of setting this up? yes    Confirmed coordinator will discuss evaluation process in more detail at the time of their call.   Patient is aware of the need to arrange age appropriate cancer screening, vaccinations, and dental care.  Reminded patient to complete questionnaire, complete medical records release, and review packet prior to evaluation visit .  Assessed patient for special needs (ie-wheelchair, assistance, guardian, and ):  no   Patient instructed to call [phone] with questions.     Patient gave verbal consent during intake call to obtain medical records and documents outside of [facility]:  yes

## 2023-10-12 VITALS — BODY MASS INDEX: 19.62 KG/M2 | WEIGHT: 125 LBS | HEIGHT: 67 IN

## 2023-10-12 NOTE — TELEPHONE ENCOUNTER
PCP: Elsa Turner CNP  Referring Organization: [facility]  Referring Provider: Jonathan Gerardo MD  Referring Diagnosis: ESRD    GFR/Date: 7 (8/17/23)    Is patient under the age of 65? yes  Is patient diabetic? yes  Is patient on insulin? yes  Was patient offered a pancreas transplant referral? yes    Is patient in a group home/assisted living? no  Does patient have a guardian? no    Referral intake process completed.  Patient is aware that after financial approval is received, medical records will be requested.   Patient confirmed for a callback from transplant coordinator on 10/27/23. (within 2 weeks)  Tentative evaluation date 12/27/23 slot C (within 4 weeks) if appointment is virtual, does patient have capabilities of setting this up? N/A    Confirmed coordinator will discuss evaluation process in more detail at the time of their call.   Patient is aware of the need to arrange age appropriate cancer screening, vaccinations, and dental care.  Reminded patient to complete questionnaire, complete medical records release, and review packet prior to evaluation visit .  Assessed patient for special needs (ie-wheelchair, assistance, guardian, and ):  None needed.  Patient instructed to call [phone] with questions.     Patient gave verbal consent during intake call to obtain medical records and documents outside of [facility]:  yes

## 2023-10-27 NOTE — TELEPHONE ENCOUNTER
Reviewed medical records for purpose of pre kidney pancreas transplant evaluation. Pt has ESRD on dialysis. Pt diagnosed with Diabetes type 1 at age 11 years old in 1988.     Contacted patient and introduced myself as their Transplant Coordinator, also introduced the role of the Transplant Coordinator in the transplant process.  Explained the purpose of this call including reviewing next steps and answering questions.  Pt confirmed she will attend her pre KP eval on PKE 12/27/2023.   Confirmed Referring Provider, Dialysis Center, and Primary Care Physician. Notified patient of the importance of continued communication with referring providers and primary care physicians.    Reviewed components of transplant evaluation process including necessary appointments, tests, and procedures.    Answered questions for patient regarding evaluation, provided my name and contact information and requested they call with any additional questions.    Determined that patient would like additional information regarding transplant by:     Drop Down choices: Mail, Email, MyChart, Phone Call   Encourage MyChart - pt already has MyChart.   Pt confirmed she will attend STD PKE on 12/27/2023. Instructed pt to bring 1-2 people with them to eval and to eat and drink and normally on eval day. Instructed pt to view pre transplant education on link I will send her. Instructed on the use of www.unos.org and www.srtr.org.  Pt expressed good understanding of all and was in good agreement with the plan.   Smart set orders into [facility] today for pre kidney pancreas evaluation on PKE 12/27/2023 slot C.  Pre transplant patient education letter sent via My Chart.

## 2023-11-22 LAB
CHOLESTEROL (EXTERNAL): 185 MG/DL (ref 100–199)
HBA1C MFR BLD: 7 %
HDLC SERPL-MCNC: 78 MG/DL
LDL CHOLESTEROL CALCULATED (EXTERNAL): 80 MG/DL (ref 0–99)
TRIGLYCERIDES (EXTERNAL): 160 MG/DL (ref 0–149)

## 2023-11-29 DIAGNOSIS — Z99.2 ESRD (END STAGE RENAL DISEASE) ON DIALYSIS (H): Primary | ICD-10-CM

## 2023-11-29 DIAGNOSIS — N18.6 ESRD (END STAGE RENAL DISEASE) ON DIALYSIS (H): Primary | ICD-10-CM

## 2023-11-29 NOTE — PROGRESS NOTES
Interventional Radiology - CHART REVIEW History and Physical  Outpatient - [facility]  11/30/23    Procedure Requested: tunneled HD catheter placement  Requested by: Dr. Wolfe    HPI: Josefa Curiel is a 48 year old female with PMHx ESRD 2/2 DM I, on HD via AVF. Current reports of pain at AVF and difficulty with cannulation. Nephrology requesting tunneled HD catheter placement prior to fistulogram (scheduled 12/7 at [facility]). Potential patient may need surgical intervention to fistula, therefore tunneled HD catheter requested first.      Presents today for tunneled hemodialysis catheter placement.       NPO: To be reviewed by preprocedure RN to ensure appropriate 8hr NPO status  ANTICOAGULANTS: none  ANTIBIOTICS: Vanco 1g ordered for procedure (cefazolin allergic)    Review of Systems: A comprehensive 10-point review of systems to be performed by IR Radiologist.    PMH:  Past Medical History:   Diagnosis Date     LORIN (acute kidney injury) (H24)      Anxiety      Cannabis abuse      Depression      Diabetes Type 1, uncontrolled 1985    Onset age 10     DKA (diabetic ketoacidoses)      ETOH abuse      HLD (hyperlipidemia)      HTN (hypertension)      Lactic acidosis        PSH:  Past Surgical History:   Procedure Laterality Date     ANKLE FRACTURE SURGERY Left     2007 [facility]     APPENDECTOMY       EYE SURGERY      retinal surgery Dr. Nagi Tang     INCISION AND DRAINAGE FOOT, COMBINED Right 11/18/2022    Procedure: INCISION AND DRAINAGE, right foot;  Surgeon: David Patel DPM;  Location: [facility] OR     PICC SINGLE LUMEN PLACEMENT  10/23/2022            ALLERGIES:  Allergies   Allergen Reactions     Cefazolin Nephrotoxicity     Colestipol GI Disturbance     Doxycycline Nausea and Vomiting     Nickel Rash     Penicillins Rash       MEDICATIONS:  Current Outpatient Medications   Medication     acetaminophen (TYLENOL) 500 MG tablet     cefdinir  (OMNICEF) 300 MG capsule     Cholecalciferol (VITAMIN D3) 50 MCG (2000 UT) CHEW     Continuous Blood Gluc Sensor (DEXCOM G6 SENSOR) MISC     Cyanocobalamin (VITAMIN B-12) 500 MCG LOZG     ferrous sulfate (FEROSUL) 325 (65 Fe) MG tablet     Glucagon 3 MG/DOSE POWD     insulin degludec (TRESIBA) 100 UNIT/ML pen     insulin lispro (HUMALOG KWIKPEN) 100 UNIT/ML (1 unit dial) KWIKPEN     losartan (COZAAR) 25 MG tablet     rOPINIRole (REQUIP) 0.25 MG tablet     sertraline (ZOLOFT) 100 MG tablet     traZODone (DESYREL) 100 MG tablet     No current facility-administered medications for this encounter.         LABS:  INR   Date Value Ref Range Status   03/31/2023 0.95 0.85 - 1.15 Final      Hemoglobin   Date Value Ref Range Status   08/17/2023 11.7 11.7 - 15.7 g/dL Final     Platelet Count   Date Value Ref Range Status   08/17/2023 215 150 - 450 10e3/uL Final     Creatinine   Date Value Ref Range Status   08/17/2023 6.52 (H) 0.51 - 0.95 mg/dL Final     Potassium   Date Value Ref Range Status   08/17/2023 3.2 (L) 3.4 - 5.3 mmol/L Final   11/03/2022 4.2 3.4 - 5.3 mmol/L Final       Exam and Presedation Assessment:  To be completed by performing IR Radiologist.      ASSESSMENT:  47yo female with ESRD on HD via AVF. Presenting for tunneled HD catheter placement given continued issues with AVF and potential surgical intervention.     -Vanco 1g ordered for procedure  - scheduled for fistulogram 12/7 at [facility]    PLAN:    Tunneled hemodialysis catheter placement with sedation.    Procedural education to be reviewed with patient/family by performing IR Radiologist in detail including, but not limited to risks, benefits and alternatives. Consent to be obtained by performing IR Radiologist.    Note compiled by:  Shelley Wills NP  Interventional Radiology

## 2023-11-30 ENCOUNTER — HOSPITAL ENCOUNTER (OUTPATIENT)
Dept: INTERVENTIONAL RADIOLOGY/VASCULAR | Facility: CLINIC | Age: 48
Discharge: HOME | End: 2023-11-30
Attending: INTERNAL MEDICINE | Admitting: RADIOLOGY
Payer: MEDICARE

## 2023-11-30 VITALS
SYSTOLIC BLOOD PRESSURE: 138 MMHG | DIASTOLIC BLOOD PRESSURE: 74 MMHG | RESPIRATION RATE: 15 BRPM | TEMPERATURE: 98.2 F | HEART RATE: 100 BPM | OXYGEN SATURATION: 100 %

## 2023-11-30 DIAGNOSIS — N18.6 ESRD (END STAGE RENAL DISEASE) ON DIALYSIS (H): ICD-10-CM

## 2023-11-30 DIAGNOSIS — Z99.2 ESRD (END STAGE RENAL DISEASE) ON DIALYSIS (H): ICD-10-CM

## 2023-11-30 PROCEDURE — 258N000003 HC RX IP 258 OP 636: Performed by: NURSE PRACTITIONER

## 2023-11-30 PROCEDURE — C1769 GUIDE WIRE: HCPCS

## 2023-11-30 PROCEDURE — 250N000011 HC RX IP 250 OP 636: Mod: JZ | Performed by: RADIOLOGY

## 2023-11-30 PROCEDURE — 272N000500 HC NEEDLE CR2

## 2023-11-30 PROCEDURE — 250N000011 HC RX IP 250 OP 636: Performed by: NURSE PRACTITIONER

## 2023-11-30 PROCEDURE — 36558 INSERT TUNNELED CV CATH: CPT

## 2023-11-30 PROCEDURE — 250N000009 HC RX 250: Performed by: RADIOLOGY

## 2023-11-30 PROCEDURE — 99152 MOD SED SAME PHYS/QHP 5/>YRS: CPT

## 2023-11-30 PROCEDURE — C1750 CATH, HEMODIALYSIS,LONG-TERM: HCPCS

## 2023-11-30 RX ORDER — FENTANYL CITRATE 50 UG/ML
25-50 INJECTION, SOLUTION INTRAMUSCULAR; INTRAVENOUS EVERY 5 MIN PRN
Status: DISCONTINUED | OUTPATIENT
Start: 2023-11-30 | End: 2023-12-01 | Stop reason: HOSPADM

## 2023-11-30 RX ORDER — ONDANSETRON 2 MG/ML
4 INJECTION INTRAMUSCULAR; INTRAVENOUS EVERY 6 HOURS PRN
Status: DISCONTINUED | OUTPATIENT
Start: 2023-11-30 | End: 2023-12-01 | Stop reason: HOSPADM

## 2023-11-30 RX ORDER — HEPARIN SODIUM (PORCINE) LOCK FLUSH IV SOLN 100 UNIT/ML 100 UNIT/ML
10 SOLUTION INTRAVENOUS ONCE
Status: DISCONTINUED | OUTPATIENT
Start: 2023-11-30 | End: 2023-12-01 | Stop reason: HOSPADM

## 2023-11-30 RX ORDER — SODIUM CHLORIDE 9 MG/ML
INJECTION, SOLUTION INTRAVENOUS CONTINUOUS
Status: DISCONTINUED | OUTPATIENT
Start: 2023-11-30 | End: 2023-12-01 | Stop reason: HOSPADM

## 2023-11-30 RX ORDER — NALOXONE HYDROCHLORIDE 0.4 MG/ML
0.2 INJECTION, SOLUTION INTRAMUSCULAR; INTRAVENOUS; SUBCUTANEOUS
Status: DISCONTINUED | OUTPATIENT
Start: 2023-11-30 | End: 2023-12-01 | Stop reason: HOSPADM

## 2023-11-30 RX ORDER — HEPARIN SODIUM 1000 [USP'U]/ML
4 INJECTION, SOLUTION INTRAVENOUS; SUBCUTANEOUS ONCE
Status: COMPLETED | OUTPATIENT
Start: 2023-11-30 | End: 2023-11-30

## 2023-11-30 RX ORDER — LIDOCAINE 40 MG/G
CREAM TOPICAL
Status: DISCONTINUED | OUTPATIENT
Start: 2023-11-30 | End: 2023-12-01 | Stop reason: HOSPADM

## 2023-11-30 RX ORDER — NALOXONE HYDROCHLORIDE 0.4 MG/ML
0.4 INJECTION, SOLUTION INTRAMUSCULAR; INTRAVENOUS; SUBCUTANEOUS
Status: DISCONTINUED | OUTPATIENT
Start: 2023-11-30 | End: 2023-12-01 | Stop reason: HOSPADM

## 2023-11-30 RX ORDER — ONDANSETRON 2 MG/ML
4 INJECTION INTRAMUSCULAR; INTRAVENOUS
Status: DISCONTINUED | OUTPATIENT
Start: 2023-11-30 | End: 2023-12-01 | Stop reason: HOSPADM

## 2023-11-30 RX ORDER — VANCOMYCIN HYDROCHLORIDE 1 G/200ML
1000 INJECTION, SOLUTION INTRAVENOUS
Status: COMPLETED | OUTPATIENT
Start: 2023-11-30 | End: 2023-11-30

## 2023-11-30 RX ORDER — FLUMAZENIL 0.1 MG/ML
0.2 INJECTION, SOLUTION INTRAVENOUS
Status: DISCONTINUED | OUTPATIENT
Start: 2023-11-30 | End: 2023-12-01 | Stop reason: HOSPADM

## 2023-11-30 RX ADMIN — SODIUM CHLORIDE: 9 INJECTION, SOLUTION INTRAVENOUS at 14:06

## 2023-11-30 RX ADMIN — MIDAZOLAM HYDROCHLORIDE 1 MG: 1 INJECTION, SOLUTION INTRAMUSCULAR; INTRAVENOUS at 15:11

## 2023-11-30 RX ADMIN — LIDOCAINE HYDROCHLORIDE 30 ML: 10 INJECTION, SOLUTION INFILTRATION; PERINEURAL at 15:12

## 2023-11-30 RX ADMIN — HEPARIN SODIUM 4000 UNITS: 1000 INJECTION INTRAVENOUS; SUBCUTANEOUS at 15:26

## 2023-11-30 RX ADMIN — FENTANYL CITRATE 50 MCG: 50 INJECTION, SOLUTION INTRAMUSCULAR; INTRAVENOUS at 15:11

## 2023-11-30 RX ADMIN — VANCOMYCIN HYDROCHLORIDE 1000 MG: 1 INJECTION, SOLUTION INTRAVENOUS at 14:43

## 2023-11-30 RX ADMIN — ONDANSETRON 4 MG: 2 INJECTION INTRAMUSCULAR; INTRAVENOUS at 14:53

## 2023-11-30 NOTE — IR NOTE
Interventional Radiology Pre-Procedure Sedation Assessment   Time of Assessment: 2:49 PM    Expected Level: Moderate Sedation    Indication: Sedation is required for the following type of Procedure: Venous Access    Sedation and procedural consent: Risks, benefits and alternatives were discussed with Patient    PO Intake: Appropriately NPO for procedure    ASA Class: Class 3 - SEVERE SYSTEMIC DISEASE, DEFINITE FUNCTIONAL LIMITATIONS.    Mallampati: Grade 3:  Soft palate visible, posterior pharyngeal wall not visible    Lungs: Lungs Clear with good breath sounds bilaterally    Heart: Normal heart sounds and rate    History and physical reviewed and no updates needed. I have reviewed the lab findings, diagnostic data, medications, and the plan for sedation. I have determined this patient to be an appropriate candidate for the planned sedation and procedure and have reassessed the patient IMMEDIATELY PRIOR to sedation and procedure.    Pradeep Barcenas MD

## 2023-11-30 NOTE — IR NOTE
RADIOLOGY POST PROCEDURE NOTE w/ SEDATION  Patient name: Josefa Curiel  MRN: [id number]  : 1975    Pre-procedure diagnosis: Renal failure with poorly functional LUE fistula  Post-procedure diagnosis: Same    Procedure Date/Time: 2023  3:28 PM  Procedure: Right Int Jug tunneled 19 cm Duraflow HD catheter placement with tip in the proximal RA.  Heparinized.  Ready for use.  No complications.  Estimated blood loss: 5 ml  Specimen(s) collected with description: none    I determined this patient to be an appropriate candidate for the planned sedation and procedure and reassessed the patient IMMEDIATELY PRIOR to sedation and procedure.     The patient tolerated the procedure well with no immediate complications.  Significant findings:Please see above.    See imaging dictation for procedural details.    Provider name: Pradeep Barcenas MD  Assistant(s):None

## 2023-11-30 NOTE — DISCHARGE INSTRUCTIONS
Tunneled Central Catheter Discharge Instructions:  You had a tunneled central access device placed. Part of the device is outside of your body and part of the device runs under the skin into a vein. Your nurses and doctors will use the tunneled catheter for your future treatments.    Care Instructions:   - If you received sedation for your procedure, do not drive or operate heavy machinery for the rest of the day.  - Keep dialysis catheter site dry. Do not get wet.  - Never immerse your catheter in water; no swimming, hot tubs, or tub baths.  - If you experience significant bleeding at site, apply pressure with hands above the clavicle bone, sit upright.     If the catheter (tubing) breaks or leaks:  - Place the blue emergency clamp between the break and your insertion site on your skin  - If you don't have a clamp, fold or pinch off the tubing above the hole or break in the catheter (closest to your skin)    Seek medical assistance for any of the following:   - Uncontrolled bleeding.  - You have a fever (greater than 101 F (38.3C)).  - Purulent (yellow/green/foul smelling) drainage from catheter insertion site.  - Increasing redness at catheter site.  - Increasing pain at catheter site.  - Increasing swelling at catheter site.  - If you have chest pain, shortness of breath, or feel faint:  -Make sure end caps are securely tightened  -Look for a hole or leaking in the catheter  -Put the blue emergency clamp on the catheter (tubing) above the hole or break in the catheter as close to the skin as you can  -Remain calm and call 911. Explain your symptoms and say that you have a central line tunnel catheter in place  -Lie on your left side

## 2023-11-30 NOTE — PROGRESS NOTES
Patient Name: Josefa Curiel  Medical Record Number: [id number]  Today's Date: 11/30/2023    Procedure: Tunneled HD catheter placement  Proceduralist: Dr. Barcenas    Procedure Start: 1510  Procedure end: 1525  Sedation medications administered: 1 mg midazolam and 50 mcg fentanyl   Sedation time: 15 minutes    Other Notes: Pt arrived to IR room 1 from Pre/post bay 2. Consent reviewed. Pt denies any questions or concerns regarding procedure. Pt positioned supine and monitored per protocol. Pt tolerated procedure without any noted complications. VSS on RA. Pt transferred back to Pre/post bay 2.

## 2023-12-07 ENCOUNTER — TRANSFERRED RECORDS (OUTPATIENT)
Dept: HEALTH INFORMATION MANAGEMENT | Facility: CLINIC | Age: 48
End: 2023-12-07
Payer: MEDICARE

## 2023-12-07 LAB
INR (EXTERNAL): 0.9
POTASSIUM (EXTERNAL): 5.6 MMOL/L (ref 3.5–5.1)

## 2023-12-15 NOTE — TELEPHONE ENCOUNTER
Patient scheduled for PKE Wed 12/28, but Transplant Neph [facility] unavailable for clinic. Called patient to see if she could have a virtual Tx Neph eval appt on Tues 12/19 and patient confirmed that would work just fine.  Order placed for scheduling and informed patient she will receive instructions for appt via [employer]. Informed patient all other visits in person on 12/28 will still be scheduled.

## 2023-12-18 NOTE — PROGRESS NOTES
Josefa is a 48 year old who is being evaluated via a billable video visit.      How would you like to obtain your AVS? MyChart  If the video visit is dropped, the invitation should be resent by: Send to e-mail at: [email or web address]  Will anyone else be joining your video visit? No        Video-Visit Details    Type of service:  Video Visit   Video Start Time:  0835  Video End Time: 0909    Originating Location (pt. Location): Home    Distant Location (provider location):  Off-site  Platform used for Video Visit: [facility] NEPHROLOGY RECIPIENT EVALUATION NOTE    Assessment and Plan:  # Kidney/Pancreas Transplant Evaluation: Patient is a good candidate overall. Benefits of a living donor transplant were discussed.    # ESKD from diabetes mellitus type 1: Although doing okay on hemodialysis since April 2023, she would likely benefit from a kidney transplant.    # DM Type 1: A1c 7.0% using 25-30 units of insulin daily.  Given evidence of end organ damage, she would likely benefit from pancreas transplant.    # Cardiac Risk: Will need cardiac risk assessment,  given 20+ years of diabetes she will need a coronary angiogram.    # PAD Screening: per surgery     # Smoker: on/off since age 24, mostly social. Currently smoking 1/2 pack per week.  Encouraged cessation.  Will need PFTs.    # Marijuna use: appreciate social work input.     # ETOH abuse: quit on her own over a year ago (10/6/2022).  Appreciate social work input.    # Depression/anxiety: history of SI. Going to therapy in twice weekly. Appreciate social work input.    # Gastroparesis: stable symptoms.  Delay on 12/2022 GES.     # Health Maintenance: Mammogram: Not up to date, PAP: Not up to date, and Dental: Not up to date    - Discussed the risks and benefits of a transplant, including the risk of surgery and immunosuppression medications.  Patient's overall evaluation will be discussed in the Transplant Program's regular meeting with a final  recommendation on the patients suitability for transplant to be made at that time.    Pending completion of the full evaluation, patient presently appears to be enough of an acceptable kidney transplant recipient candidate to have any potential kidney donors start the evaluation process.  Patient was seen in conjunction with     Evaluation:  Josefa Curiel was seen in consultation at the request of Dr. Ward evaluation as a potential kidney/pancreas transplant recipient.    Reason for Visit:  Josefa Curiel is a 48 year old female with ESKD from diabetes mellitus type 1, who presents for kidney/pancreas transplant evaluation.    History of Present Illness:  Josefa Curiel is a 48-year-old female with history of CKD secondary to presumed type 1 diabetes. Progressive CKD since 2012. Had LORIN in 11/2022 in the setting of rt foot ulcer/MSSA bacteremia, ultimately started HD in April 2023           Kidney Disease Hx:               Kidney Disease Dx: Diabetes mellitus type 1       Biopsy Proven: No         On Dialysis: Yes, Dialysis Type: [facility] HD which is  going OK, has needed a few fistuolgrams, still has chest catheter,  BPs controlled, urine decreasing.        Primary Nephrologist: Dr. Gerardo        H/o Kidney Stones: No       H/o Recurrent/Frequent UTI: No         Diabetic Hx: Type 1        Diagnosis Date: 8       Medication History: using 20 units of Tresiba and  3-4 units of  Novolog per meal        Diabetic Control: Controlled (HbA1c <7%)   Last HbA1c: 7.0%       Hypoglycemic Unawareness: No, using dexcom and blood glucose typically ~        End-Organ Damage due to DM: Retinopathy, Nephropathy, and Peripheral neuropathy          Cardiac/Vascular Disease Risk Factors:        Cardiac Risk Factors: Diabetes, Hypertension, CKD, and Smoking       Known CAD: No       Known PAD/Caludication Symptoms: No       Known Heart Failure: No       Arrhythmia: No       Pulmonary Hypertension: No        "Valvular Disease: No       Other: None         Viral Serology Status       CMV IgG Antibody: Unknown       EBV IgG Antibody: Unknown         Volume Status/Weight:        Volume status: Not assessed       Weight:  Acceptable BMI       BMI: There is no height or weight on file to calculate BMI.         Functional Capacity/Frailty:        Mostly walking for activity, can do stairs without chest pains or shortness of breath.     Fatigue/Decreased Energy: [] No [x] Yes \"Energy not 100%\"   Chest Pain or SOB with Exertion: [x] No [] Yes    Significant Weight Change: [x] No [] Yes    Nausea, Vomiting or Diarrhea: [] No [x] Yes Watery diarrhea,    Fever, Sweats or Chills:  [x] No [] Yes    Leg Swelling [x] No [] Yes        History of Cancer: None        Allergy Testing Questions:   Medication that caused a reaction Penicillin (Amoxicillin, Amoxicillin with clavulanic acid, Dicloxacillin), penicllin - rash    Antibiotics used that didn't give an allergic reaction?  None    COVID Vaccination Up To Date: Yes    Potential Living Kidney Donors: No    Review of Systems:  A comprehensive review of systems was obtained and negative, except as noted in the HPI or PMH.    Past Medical History:   Medical record was reviewed and PMH was discussed with patient and noted below.  Past Medical History:   Diagnosis Date    LORIN (acute kidney injury) (H24)     Anxiety     Cannabis abuse     Depression     Diabetes Type 1, uncontrolled 1985    Onset age 10    DKA (diabetic ketoacidoses)     ETOH abuse     HLD (hyperlipidemia)     HTN (hypertension)     Lactic acidosis        Past Social History:   Past Surgical History:   Procedure Laterality Date    ANKLE FRACTURE SURGERY Left     2007 [facility]    APPENDECTOMY      EYE SURGERY      retinal surgery Dr. Nagi Tang    INCISION AND DRAINAGE FOOT, COMBINED Right 11/18/2022    Procedure: INCISION AND DRAINAGE, right foot;  Surgeon: David Patel DPM;  [place] [facility] [place] [place]   IR CVC TUNNEL PLACEMENT > 5 YRS OF AGE  11/30/2023    PICC SINGLE LUMEN PLACEMENT  10/23/2022          Personal history of bleeding or anesthesia problems: No    Family History:  Family History   Problem Relation Age of Onset    Clotting Disorder Mother         \"thin blood\"    Arthritis Mother     Hyperlipidemia Mother     Skin Cancer Father         face/nose     Depression Father     Other - See Comments Sister         eye surgeries    No Known Problems Brother     Coronary Artery Disease Maternal Grandmother     Alcoholism Maternal Grandfather     Coronary Artery Disease Maternal Grandfather     No Known Problems Paternal Grandmother     No Known Problems Paternal Grandfather        Personal History:   Social History     Socioeconomic History    Marital status: Single     Spouse name: Not on file    Number of children: Not on file    Years of education: Not on file    Highest education level: Not on file   Occupational History    Not on file   Tobacco Use    Smoking status: Some Days     Types: Cigarettes    Smokeless tobacco: Never   Substance and Sexual Activity    Alcohol use: Not Currently     Alcohol/week: 35.0 standard drinks of alcohol     Types: 35 Standard drinks or equivalent per week     Comment: Alcoholic Drinks/day: ~750 mL wine daily, sober since 10/26/23    Drug use: Yes     Types: Marijuana     Comment: Drug use: 3-4 x week, with nausea    Sexual activity: Yes     Partners: Male     Birth control/protection: Condom   Other Topics Concern    Parent/sibling w/ CABG, MI or angioplasty before 65F 55M? Not Asked   Social History Narrative    Not on file     Social Determinants of Health     Financial Resource Strain: Not on file   Food Insecurity: Not on file   Transportation Needs: Not on file   Physical Activity: Not on file   Stress: Not on file   Social Connections: Not on file   Interpersonal Safety: Not on file   Housing Stability: Not on file       Allergies:  Allergies   Allergen Reactions    " Cefazolin Nephrotoxicity    Colestipol GI Disturbance    Doxycycline Nausea and Vomiting    Nickel Rash    Penicillins Rash       Medications:  Current Outpatient Medications   Medication Sig    acetaminophen (TYLENOL) 500 MG tablet Take 2 tablets (1,000 mg) by mouth every 6 hours as needed for pain    cefdinir (OMNICEF) 300 MG capsule Take 1 tablet by mouth on Saturday, Monday, Tuesday and Thursday, take after dialysis if it is a dialysis day.    Cholecalciferol (VITAMIN D3) 50 MCG (2000 UT) CHEW Take 50 mcg by mouth daily    Continuous Blood Gluc Sensor (DEXCOM G6 SENSOR) MISC     Cyanocobalamin (VITAMIN B-12) 500 MCG LOZG Take 500 mcg by mouth daily    ferrous sulfate (FEROSUL) 325 (65 Fe) MG tablet Take 1 tablet (325 mg) by mouth daily    Glucagon 3 MG/DOSE POWD Spray 1 spray in nostril as needed in the event of unconscious hypoglycemia or hypoglycemic seizure. May repeat dose if no response after 15 minutes.    insulin degludec (TRESIBA) 100 UNIT/ML pen Inject 16 Units Subcutaneous daily    insulin lispro (HUMALOG KWIKPEN) 100 UNIT/ML (1 unit dial) KWIKPEN Inject 3-4 Units Subcutaneous 4 times daily with meals or snacks    losartan (COZAAR) 25 MG tablet Take 25 mg by mouth daily    rOPINIRole (REQUIP) 0.25 MG tablet Take 0.5 mg by mouth At Bedtime    sertraline (ZOLOFT) 100 MG tablet Take 150 mg by mouth daily    traZODone (DESYREL) 100 MG tablet Take 100 mg by mouth At Bedtime     No current facility-administered medications for this visit.       Vitals:  There were no vitals taken for this visit.    Exam:  GENERAL APPEARANCE: alert and no distress  HENT: mouth without ulcers or lesions  RESP: lungs clear to auscultation - no rales, rhonchi or wheezes  CV: regular rhythm, normal rate, no rub, no murmur  EDEMA: no LE edema bilaterally  ABDOMEN: soft, nondistended, nontender, bowel sounds normal  MS: extremities normal - no gross deformities noted, no evidence of inflammation in joints, no muscle  tenderness  SKIN: no rash    Results:   No results found for this or any previous visit (from the past 336 hour(s)).

## 2023-12-19 ENCOUNTER — VIRTUAL VISIT (OUTPATIENT)
Dept: TRANSPLANT | Facility: CLINIC | Age: 48
End: 2023-12-19
Attending: INTERNAL MEDICINE
Payer: MEDICARE

## 2023-12-19 DIAGNOSIS — Z01.818 PRE-TRANSPLANT EVALUATION FOR KIDNEY TRANSPLANT: ICD-10-CM

## 2023-12-19 DIAGNOSIS — E10.21 TYPE 1 DIABETES MELLITUS WITH NEPHROPATHY (H): ICD-10-CM

## 2023-12-19 DIAGNOSIS — F19.20 OTHER PSYCHOACTIVE SUBSTANCE DEPENDENCE, UNCOMPLICATED (H): ICD-10-CM

## 2023-12-19 DIAGNOSIS — F10.11 ALCOHOL ABUSE, IN REMISSION: ICD-10-CM

## 2023-12-19 DIAGNOSIS — N18.6 ESRD (END STAGE RENAL DISEASE) ON DIALYSIS (H): ICD-10-CM

## 2023-12-19 DIAGNOSIS — Z99.2 ESRD (END STAGE RENAL DISEASE) ON DIALYSIS (H): ICD-10-CM

## 2023-12-19 DIAGNOSIS — K31.84 GASTROPARESIS: Primary | ICD-10-CM

## 2023-12-19 PROBLEM — F10.20 ALCOHOL DEPENDENCE (H): Status: ACTIVE | Noted: 2023-12-19

## 2023-12-19 PROBLEM — I16.0 HYPERTENSIVE URGENCY: Status: RESOLVED | Noted: 2022-09-29 | Resolved: 2023-12-19

## 2023-12-19 PROBLEM — R65.20 SEPSIS WITH ENCEPHALOPATHY WITHOUT SEPTIC SHOCK, DUE TO UNSPECIFIED ORGANISM (H): Status: RESOLVED | Noted: 2022-10-19 | Resolved: 2023-12-19

## 2023-12-19 PROBLEM — R79.89 ELEVATED BLOOD KETONE BODY LEVEL: Status: RESOLVED | Noted: 2023-07-14 | Resolved: 2023-12-19

## 2023-12-19 PROBLEM — L97.521 ULCER OF LEFT FOOT, LIMITED TO BREAKDOWN OF SKIN (H): Status: RESOLVED | Noted: 2022-11-01 | Resolved: 2023-12-19

## 2023-12-19 PROBLEM — R10.9 ABDOMINAL PAIN, UNSPECIFIED ABDOMINAL LOCATION: Status: RESOLVED | Noted: 2023-07-14 | Resolved: 2023-12-19

## 2023-12-19 PROBLEM — Z98.890: Status: RESOLVED | Noted: 2023-03-31 | Resolved: 2023-12-19

## 2023-12-19 PROBLEM — E11.10 DKA (DIABETIC KETOACIDOSIS) (H): Status: RESOLVED | Noted: 2022-12-12 | Resolved: 2023-12-19

## 2023-12-19 PROBLEM — E10.11 DIABETIC KETOACIDOSIS WITH COMA ASSOCIATED WITH TYPE 1 DIABETES MELLITUS (H): Status: RESOLVED | Noted: 2020-10-01 | Resolved: 2023-12-19

## 2023-12-19 PROBLEM — L03.115 CELLULITIS OF RIGHT FOOT: Status: RESOLVED | Noted: 2022-10-19 | Resolved: 2023-12-19

## 2023-12-19 PROBLEM — G93.41 SEPSIS WITH ENCEPHALOPATHY WITHOUT SEPTIC SHOCK, DUE TO UNSPECIFIED ORGANISM (H): Status: RESOLVED | Noted: 2022-10-19 | Resolved: 2023-12-19

## 2023-12-19 PROBLEM — A41.9 SEPSIS WITH ENCEPHALOPATHY WITHOUT SEPTIC SHOCK, DUE TO UNSPECIFIED ORGANISM (H): Status: RESOLVED | Noted: 2022-10-19 | Resolved: 2023-12-19

## 2023-12-19 PROCEDURE — 99215 OFFICE O/P EST HI 40 MIN: CPT | Mod: VID | Performed by: NURSE PRACTITIONER

## 2023-12-19 NOTE — LETTER
12/19/2023         RE: Josefa Curiel  [address] [facility] [address]        Dear Colleague,    Thank you for referring your patient, Josefa Curiel, to the [facility]. Please see a copy of my visit note below.    Josefa is a 48 year old who is being evaluated via a billable video visit.      How would you like to obtain your AVS? MyChart  If the video visit is dropped, the invitation should be resent by: Send to e-mail at: [email or web address]  Will anyone else be joining your video visit? No        Video-Visit Details    Type of service:  Video Visit   Video Start Time:  0835  Video End Time: 0909    Originating Location (pt. Location): Home    Distant Location (provider location):  Off-site  Platform used for Video Visit: [facility]     TRANSPLANT NEPHROLOGY RECIPIENT EVALUATION NOTE    Assessment and Plan:  # Kidney/Pancreas Transplant Evaluation: Patient is a good candidate overall. Benefits of a living donor transplant were discussed.    # ESKD from diabetes mellitus type 1: Although doing okay on hemodialysis since April 2023, she would likely benefit from a kidney transplant.    # DM Type 1: A1c 7.0% using 25-30 units of insulin daily.  Given evidence of end organ damage, she would likely benefit from pancreas transplant.    # Cardiac Risk: Will need cardiac risk assessment,  given 20+ years of diabetes she will need a coronary angiogram.    # PAD Screening: per surgery     # Smoker: on/off since age 24, mostly social. Currently smoking 1/2 pack per week.  Encouraged cessation.  Will need PFTs.    # Marijuna use: appreciate social work input.     # ETOH abuse: quit on her own over a year ago (10/6/2022).  Appreciate social work input.    # Depression/anxiety: history of SI. Going to therapy in twice weekly. Appreciate social work input.    # Gastroparesis: stable symptoms.  Delay on 12/2022 GES.     # Health Maintenance: Mammogram: Not up to date, PAP: Not up  to date, and Dental: Not up to date    - Discussed the risks and benefits of a transplant, including the risk of surgery and immunosuppression medications.  Patient's overall evaluation will be discussed in the Transplant Program's regular meeting with a final recommendation on the patients suitability for transplant to be made at that time.    Pending completion of the full evaluation, patient presently appears to be enough of an acceptable kidney transplant recipient candidate to have any potential kidney donors start the evaluation process.  Patient was seen in conjunction with     Evaluation:  Josefa Curiel was seen in consultation at the request of Dr. Ward evaluation as a potential kidney/pancreas transplant recipient.    Reason for Visit:  Josefa Curiel is a 48 year old female with ESKD from diabetes mellitus type 1, who presents for kidney/pancreas transplant evaluation.    History of Present Illness:  Josefa Curiel is a 48-year-old female with history of CKD secondary to presumed type 1 diabetes. Progressive CKD since 2012. Had LORIN in 11/2022 in the setting of rt foot ulcer/MSSA bacteremia, ultimately started HD in April 2023           Kidney Disease Hx:               Kidney Disease Dx: Diabetes mellitus type 1       Biopsy Proven: No         On Dialysis: Yes, Dialysis Type: [facility] HD which is  going OK, has needed a few fistuolgrams, still has chest catheter,  BPs controlled, urine decreasing.        Primary Nephrologist: Dr. Gerardo        H/o Kidney Stones: No       H/o Recurrent/Frequent UTI: No         Diabetic Hx: Type 1        Diagnosis Date: 8       Medication History: using 20 units of Tresiba and  3-4 units of  Novolog per meal        Diabetic Control: Controlled (HbA1c <7%)   Last HbA1c: 7.0%       Hypoglycemic Unawareness: No, using dexcom and blood glucose typically ~        End-Organ Damage due to DM: Retinopathy, Nephropathy, and Peripheral neuropathy           "Cardiac/Vascular Disease Risk Factors:        Cardiac Risk Factors: Diabetes, Hypertension, CKD, and Smoking       Known CAD: No       Known PAD/Caludication Symptoms: No       Known Heart Failure: No       Arrhythmia: No       Pulmonary Hypertension: No       Valvular Disease: No       Other: None         Viral Serology Status       CMV IgG Antibody: Unknown       EBV IgG Antibody: Unknown         Volume Status/Weight:        Volume status: Not assessed       Weight:  Acceptable BMI       BMI: There is no height or weight on file to calculate BMI.         Functional Capacity/Frailty:        Mostly walking for activity, can do stairs without chest pains or shortness of breath.     Fatigue/Decreased Energy: [] No [x] Yes \"Energy not 100%\"   Chest Pain or SOB with Exertion: [x] No [] Yes    Significant Weight Change: [x] No [] Yes    Nausea, Vomiting or Diarrhea: [] No [x] Yes Watery diarrhea,    Fever, Sweats or Chills:  [x] No [] Yes    Leg Swelling [x] No [] Yes        History of Cancer: None        Allergy Testing Questions:   Medication that caused a reaction Penicillin (Amoxicillin, Amoxicillin with clavulanic acid, Dicloxacillin), penicllin - rash    Antibiotics used that didn't give an allergic reaction?  None    COVID Vaccination Up To Date: Yes    Potential Living Kidney Donors: No    Review of Systems:  A comprehensive review of systems was obtained and negative, except as noted in the HPI or PMH.    Past Medical History:   Medical record was reviewed and PMH was discussed with patient and noted below.  Past Medical History:   Diagnosis Date     LORIN (acute kidney injury) (H24)      Anxiety      Cannabis abuse      Depression      Diabetes Type 1, uncontrolled 1985    Onset age 10     DKA (diabetic ketoacidoses)      ETOH abuse      HLD (hyperlipidemia)      HTN (hypertension)      Lactic acidosis        Past Social History:   Past Surgical History:   Procedure Laterality Date     ANKLE FRACTURE SURGERY Left  " "   2007 [facility], [place]     APPENDECTOMY       EYE SURGERY      retinal surgery Dr. Nagi Tang     INCISION AND DRAINAGE FOOT, COMBINED Right 11/18/2022    Procedure: INCISION AND DRAINAGE, right foot;  Surgeon: David Patel DPM;  Location: [facility] Main OR     IR CVC TUNNEL PLACEMENT > 5 YRS OF AGE  11/30/2023     PICC SINGLE LUMEN PLACEMENT  10/23/2022          Personal history of bleeding or anesthesia problems: No    Family History:  Family History   Problem Relation Age of Onset     Clotting Disorder Mother         \"thin blood\"     Arthritis Mother      Hyperlipidemia Mother      Skin Cancer Father         face/nose      Depression Father      Other - See Comments Sister         eye surgeries     No Known Problems Brother      Coronary Artery Disease Maternal Grandmother      Alcoholism Maternal Grandfather      Coronary Artery Disease Maternal Grandfather      No Known Problems Paternal Grandmother      No Known Problems Paternal Grandfather        Personal History:   Social History     Socioeconomic History     Marital status: Single     Spouse name: Not on file     Number of children: Not on file     Years of education: Not on file     Highest education level: Not on file   Occupational History     Not on file   Tobacco Use     Smoking status: Some Days     Types: Cigarettes     Smokeless tobacco: Never   Substance and Sexual Activity     Alcohol use: Not Currently     Alcohol/week: 35.0 standard drinks of alcohol     Types: 35 Standard drinks or equivalent per week     Comment: Alcoholic Drinks/day: ~750 mL wine daily, sober since 10/26/23     Drug use: Yes     Types: Marijuana     Comment: Drug use: 3-4 x week, with nausea     Sexual activity: Yes     Partners: Male     Birth control/protection: Condom   Other Topics Concern     Parent/sibling w/ CABG, MI or angioplasty before 65F 55M? Not Asked   Social History Narrative     Not on file     Social Determinants of Health     Financial " Resource Strain: Not on file   Food Insecurity: Not on file   Transportation Needs: Not on file   Physical Activity: Not on file   Stress: Not on file   Social Connections: Not on file   Interpersonal Safety: Not on file   Housing Stability: Not on file       Allergies:  Allergies   Allergen Reactions     Cefazolin Nephrotoxicity     Colestipol GI Disturbance     Doxycycline Nausea and Vomiting     Nickel Rash     Penicillins Rash       Medications:  Current Outpatient Medications   Medication Sig     acetaminophen (TYLENOL) 500 MG tablet Take 2 tablets (1,000 mg) by mouth every 6 hours as needed for pain     cefdinir (OMNICEF) 300 MG capsule Take 1 tablet by mouth on Saturday, Monday, Tuesday and Thursday, take after dialysis if it is a dialysis day.     Cholecalciferol (VITAMIN D3) 50 MCG (2000 UT) CHEW Take 50 mcg by mouth daily     Continuous Blood Gluc Sensor (DEXCOM G6 SENSOR) MISC      Cyanocobalamin (VITAMIN B-12) 500 MCG LOZG Take 500 mcg by mouth daily     ferrous sulfate (FEROSUL) 325 (65 Fe) MG tablet Take 1 tablet (325 mg) by mouth daily     Glucagon 3 MG/DOSE POWD Spray 1 spray in nostril as needed in the event of unconscious hypoglycemia or hypoglycemic seizure. May repeat dose if no response after 15 minutes.     insulin degludec (TRESIBA) 100 UNIT/ML pen Inject 16 Units Subcutaneous daily     insulin lispro (HUMALOG KWIKPEN) 100 UNIT/ML (1 unit dial) KWIKPEN Inject 3-4 Units Subcutaneous 4 times daily with meals or snacks     losartan (COZAAR) 25 MG tablet Take 25 mg by mouth daily     rOPINIRole (REQUIP) 0.25 MG tablet Take 0.5 mg by mouth At Bedtime     sertraline (ZOLOFT) 100 MG tablet Take 150 mg by mouth daily     traZODone (DESYREL) 100 MG tablet Take 100 mg by mouth At Bedtime     No current facility-administered medications for this visit.       Vitals:  There were no vitals taken for this visit.    Exam:  GENERAL APPEARANCE: alert and no distress  HENT: mouth without ulcers or  lesions  RESP: lungs clear to auscultation - no rales, rhonchi or wheezes  CV: regular rhythm, normal rate, no rub, no murmur  EDEMA: no LE edema bilaterally  ABDOMEN: soft, nondistended, nontender, bowel sounds normal  MS: extremities normal - no gross deformities noted, no evidence of inflammation in joints, no muscle tenderness  SKIN: no rash    Results:   No results found for this or any previous visit (from the past 336 hour(s)).          Again, thank you for allowing me to participate in the care of your patient.        Sincerely,        NATALIA Yoder CNP

## 2023-12-27 ENCOUNTER — HOSPITAL ENCOUNTER (INPATIENT)
Facility: HOSPITAL | Age: 48
LOS: 7 days | Discharge: HOME | DRG: 270 | End: 2024-01-03
Attending: STUDENT IN AN ORGANIZED HEALTH CARE EDUCATION/TRAINING PROGRAM | Admitting: INTERNAL MEDICINE
Payer: MEDICARE

## 2023-12-27 ENCOUNTER — APPOINTMENT (OUTPATIENT)
Dept: RADIOLOGY | Facility: HOSPITAL | Age: 48
DRG: 270 | End: 2023-12-27
Attending: INTERNAL MEDICINE
Payer: MEDICARE

## 2023-12-27 DIAGNOSIS — I87.1 SUPERIOR VENA CAVA SYNDROME: Primary | ICD-10-CM

## 2023-12-27 DIAGNOSIS — E10.10 DIABETIC KETOACIDOSIS WITHOUT COMA ASSOCIATED WITH TYPE 1 DIABETES MELLITUS (H): ICD-10-CM

## 2023-12-27 LAB
ALBUMIN SERPL BCG-MCNC: 5 G/DL (ref 3.5–5.2)
ALBUMIN UR-MCNC: 300 MG/DL
ALP SERPL-CCNC: 112 U/L (ref 40–150)
ALT SERPL W P-5'-P-CCNC: 13 U/L (ref 0–50)
AMORPH CRY #/AREA URNS HPF: ABNORMAL /HPF
AMPHETAMINES UR QL SCN: ABNORMAL
ANION GAP SERPL CALCULATED.3IONS-SCNC: 16 MMOL/L (ref 7–15)
ANION GAP SERPL CALCULATED.3IONS-SCNC: 19 MMOL/L (ref 7–15)
ANION GAP SERPL CALCULATED.3IONS-SCNC: 22 MMOL/L (ref 7–15)
ANION GAP SERPL CALCULATED.3IONS-SCNC: 22 MMOL/L (ref 7–15)
ANION GAP SERPL CALCULATED.3IONS-SCNC: 23 MMOL/L (ref 7–15)
ANION GAP SERPL CALCULATED.3IONS-SCNC: 30 MMOL/L (ref 7–15)
APPEARANCE UR: CLEAR
AST SERPL W P-5'-P-CCNC: 21 U/L (ref 0–45)
B-OH-BUTYR SERPL-SCNC: 1.7 MMOL/L
B-OH-BUTYR SERPL-SCNC: 2.7 MMOL/L
B-OH-BUTYR SERPL-SCNC: 3.6 MMOL/L
B-OH-BUTYR SERPL-SCNC: 4.3 MMOL/L
B-OH-BUTYR SERPL-SCNC: 7.52 MMOL/L
BARBITURATES UR QL SCN: ABNORMAL
BASE EXCESS BLDA CALC-SCNC: -16.4 MMOL/L
BASOPHILS # BLD AUTO: 0.1 10E3/UL (ref 0–0.2)
BASOPHILS NFR BLD AUTO: 1 %
BENZODIAZ UR QL SCN: ABNORMAL
BILIRUB SERPL-MCNC: 0.5 MG/DL
BILIRUB UR QL STRIP: NEGATIVE
BUN SERPL-MCNC: 18.3 MG/DL (ref 6–20)
BUN SERPL-MCNC: 27.7 MG/DL (ref 6–20)
BUN SERPL-MCNC: 31.8 MG/DL (ref 6–20)
BUN SERPL-MCNC: 32 MG/DL (ref 6–20)
BUN SERPL-MCNC: 32.2 MG/DL (ref 6–20)
BUN SERPL-MCNC: 32.7 MG/DL (ref 6–20)
BUN SERPL-MCNC: 32.9 MG/DL (ref 6–20)
BUN SERPL-MCNC: 33.4 MG/DL (ref 6–20)
BZE UR QL SCN: ABNORMAL
CALCIUM SERPL-MCNC: 8.7 MG/DL (ref 8.6–10)
CALCIUM SERPL-MCNC: 8.8 MG/DL (ref 8.6–10)
CALCIUM SERPL-MCNC: 8.9 MG/DL (ref 8.6–10)
CALCIUM SERPL-MCNC: 9.2 MG/DL (ref 8.6–10)
CALCIUM SERPL-MCNC: 9.2 MG/DL (ref 8.6–10)
CALCIUM SERPL-MCNC: 9.3 MG/DL (ref 8.6–10)
CALCIUM SERPL-MCNC: 9.6 MG/DL (ref 8.6–10)
CALCIUM SERPL-MCNC: 9.9 MG/DL (ref 8.6–10)
CANNABINOIDS UR QL SCN: ABNORMAL
CHLORIDE SERPL-SCNC: 86 MMOL/L (ref 98–107)
CHLORIDE SERPL-SCNC: 90 MMOL/L (ref 98–107)
CHLORIDE SERPL-SCNC: 92 MMOL/L (ref 98–107)
CHLORIDE SERPL-SCNC: 93 MMOL/L (ref 98–107)
CHLORIDE SERPL-SCNC: 94 MMOL/L (ref 98–107)
CHLORIDE SERPL-SCNC: 94 MMOL/L (ref 98–107)
CHLORIDE SERPL-SCNC: 96 MMOL/L (ref 98–107)
CHLORIDE SERPL-SCNC: 96 MMOL/L (ref 98–107)
COHGB MFR BLD: 97.6 % (ref 96–97)
COLOR UR AUTO: ABNORMAL
CREAT SERPL-MCNC: 2.78 MG/DL (ref 0.51–0.95)
CREAT SERPL-MCNC: 4.42 MG/DL (ref 0.51–0.95)
CREAT SERPL-MCNC: 4.53 MG/DL (ref 0.51–0.95)
CREAT SERPL-MCNC: 4.6 MG/DL (ref 0.51–0.95)
CREAT SERPL-MCNC: 4.68 MG/DL (ref 0.51–0.95)
CREAT SERPL-MCNC: 4.72 MG/DL (ref 0.51–0.95)
CREAT SERPL-MCNC: 4.86 MG/DL (ref 0.51–0.95)
CREAT SERPL-MCNC: 5.06 MG/DL (ref 0.51–0.95)
DEPRECATED HCO3 PLAS-SCNC: 16 MMOL/L (ref 22–29)
DEPRECATED HCO3 PLAS-SCNC: 19 MMOL/L (ref 22–29)
DEPRECATED HCO3 PLAS-SCNC: 20 MMOL/L (ref 22–29)
DEPRECATED HCO3 PLAS-SCNC: 20 MMOL/L (ref 22–29)
DEPRECATED HCO3 PLAS-SCNC: 22 MMOL/L (ref 22–29)
DEPRECATED HCO3 PLAS-SCNC: 22 MMOL/L (ref 22–29)
DEPRECATED HCO3 PLAS-SCNC: 23 MMOL/L (ref 22–29)
DEPRECATED HCO3 PLAS-SCNC: 23 MMOL/L (ref 22–29)
EGFRCR SERPLBLD CKD-EPI 2021: 10 ML/MIN/1.73M2
EGFRCR SERPLBLD CKD-EPI 2021: 11 ML/MIN/1.73M2
EGFRCR SERPLBLD CKD-EPI 2021: 12 ML/MIN/1.73M2
EGFRCR SERPLBLD CKD-EPI 2021: 21 ML/MIN/1.73M2
EOSINOPHIL # BLD AUTO: 0.1 10E3/UL (ref 0–0.7)
EOSINOPHIL NFR BLD AUTO: 1 %
ERYTHROCYTE [DISTWIDTH] IN BLOOD BY AUTOMATED COUNT: 13 % (ref 10–15)
ETHANOL SERPL-MCNC: <0.01 G/DL
FENTANYL UR QL: ABNORMAL
FLUAV RNA SPEC QL NAA+PROBE: NEGATIVE
FLUBV RNA RESP QL NAA+PROBE: NEGATIVE
GLUCOSE BLDC GLUCOMTR-MCNC: 103 MG/DL (ref 70–99)
GLUCOSE BLDC GLUCOMTR-MCNC: 105 MG/DL (ref 70–99)
GLUCOSE BLDC GLUCOMTR-MCNC: 117 MG/DL (ref 70–99)
GLUCOSE BLDC GLUCOMTR-MCNC: 118 MG/DL (ref 70–99)
GLUCOSE BLDC GLUCOMTR-MCNC: 122 MG/DL (ref 70–99)
GLUCOSE BLDC GLUCOMTR-MCNC: 124 MG/DL (ref 70–99)
GLUCOSE BLDC GLUCOMTR-MCNC: 133 MG/DL (ref 70–99)
GLUCOSE BLDC GLUCOMTR-MCNC: 137 MG/DL (ref 70–99)
GLUCOSE BLDC GLUCOMTR-MCNC: 141 MG/DL (ref 70–99)
GLUCOSE BLDC GLUCOMTR-MCNC: 141 MG/DL (ref 70–99)
GLUCOSE BLDC GLUCOMTR-MCNC: 151 MG/DL (ref 70–99)
GLUCOSE BLDC GLUCOMTR-MCNC: 154 MG/DL (ref 70–99)
GLUCOSE BLDC GLUCOMTR-MCNC: 188 MG/DL (ref 70–99)
GLUCOSE BLDC GLUCOMTR-MCNC: 229 MG/DL (ref 70–99)
GLUCOSE BLDC GLUCOMTR-MCNC: 303 MG/DL (ref 70–99)
GLUCOSE BLDC GLUCOMTR-MCNC: 346 MG/DL (ref 70–99)
GLUCOSE BLDC GLUCOMTR-MCNC: 382 MG/DL (ref 70–99)
GLUCOSE BLDC GLUCOMTR-MCNC: 415 MG/DL (ref 70–99)
GLUCOSE BLDC GLUCOMTR-MCNC: 418 MG/DL (ref 70–99)
GLUCOSE BLDC GLUCOMTR-MCNC: 464 MG/DL (ref 70–99)
GLUCOSE BLDC GLUCOMTR-MCNC: 87 MG/DL (ref 70–99)
GLUCOSE BLDC GLUCOMTR-MCNC: 94 MG/DL (ref 70–99)
GLUCOSE SERPL-MCNC: 117 MG/DL (ref 70–99)
GLUCOSE SERPL-MCNC: 124 MG/DL (ref 70–99)
GLUCOSE SERPL-MCNC: 136 MG/DL (ref 70–99)
GLUCOSE SERPL-MCNC: 149 MG/DL (ref 70–99)
GLUCOSE SERPL-MCNC: 150 MG/DL (ref 70–99)
GLUCOSE SERPL-MCNC: 195 MG/DL (ref 70–99)
GLUCOSE SERPL-MCNC: 354 MG/DL (ref 70–99)
GLUCOSE SERPL-MCNC: 410 MG/DL (ref 70–99)
GLUCOSE UR STRIP-MCNC: >1000 MG/DL
HBA1C MFR BLD: 7 %
HCG SERPL QL: NEGATIVE
HCO3 BLD-SCNC: 11 MMOL/L (ref 23–29)
HCT VFR BLD AUTO: 34.7 % (ref 35–47)
HGB BLD-MCNC: 11.3 G/DL (ref 11.7–15.7)
HGB UR QL STRIP: ABNORMAL
IMM GRANULOCYTES # BLD: 0 10E3/UL
IMM GRANULOCYTES NFR BLD: 0 %
KETONES UR STRIP-MCNC: 60 MG/DL
LEUKOCYTE ESTERASE UR QL STRIP: NEGATIVE
LIPASE SERPL-CCNC: 55 U/L (ref 13–60)
LYMPHOCYTES # BLD AUTO: 0.9 10E3/UL (ref 0.8–5.3)
LYMPHOCYTES NFR BLD AUTO: 8 %
MAGNESIUM SERPL-MCNC: 2.2 MG/DL (ref 1.7–2.3)
MAGNESIUM SERPL-MCNC: 2.4 MG/DL (ref 1.7–2.3)
MCH RBC QN AUTO: 31.1 PG (ref 26.5–33)
MCHC RBC AUTO-ENTMCNC: 32.6 G/DL (ref 31.5–36.5)
MCV RBC AUTO: 96 FL (ref 78–100)
MONOCYTES # BLD AUTO: 0.4 10E3/UL (ref 0–1.3)
MONOCYTES NFR BLD AUTO: 4 %
MUCOUS THREADS #/AREA URNS LPF: PRESENT /LPF
NEUTROPHILS # BLD AUTO: 10.1 10E3/UL (ref 1.6–8.3)
NEUTROPHILS NFR BLD AUTO: 86 %
NITRATE UR QL: NEGATIVE
NRBC # BLD AUTO: 0 10E3/UL
NRBC BLD AUTO-RTO: 0 /100
OPIATES UR QL SCN: ABNORMAL
OSMOLALITY SERPL: 315 MMOL/KG (ref 275–295)
OXYHGB MFR BLD: 96.3 % (ref 96–97)
PCO2 BLD: 27 MM HG (ref 35–45)
PCP QUAL URINE (ROCHE): ABNORMAL
PH BLD: 7.23 [PH] (ref 7.37–7.44)
PH UR STRIP: 6 [PH] (ref 5–7)
PHOSPHATE SERPL-MCNC: 3.9 MG/DL (ref 2.5–4.5)
PHOSPHATE SERPL-MCNC: 4.2 MG/DL (ref 2.5–4.5)
PHOSPHATE SERPL-MCNC: 4.4 MG/DL (ref 2.5–4.5)
PHOSPHATE SERPL-MCNC: 4.6 MG/DL (ref 2.5–4.5)
PHOSPHATE SERPL-MCNC: 4.7 MG/DL (ref 2.5–4.5)
PLATELET # BLD AUTO: 204 10E3/UL (ref 150–450)
PO2 BLD: 104 MM HG (ref 80–90)
POTASSIUM SERPL-SCNC: 3.5 MMOL/L (ref 3.4–5.3)
POTASSIUM SERPL-SCNC: 3.9 MMOL/L (ref 3.4–5.3)
POTASSIUM SERPL-SCNC: 4.2 MMOL/L (ref 3.4–5.3)
POTASSIUM SERPL-SCNC: 4.3 MMOL/L (ref 3.4–5.3)
POTASSIUM SERPL-SCNC: 4.4 MMOL/L (ref 3.4–5.3)
POTASSIUM SERPL-SCNC: 4.9 MMOL/L (ref 3.4–5.3)
PROT SERPL-MCNC: 8 G/DL (ref 6.4–8.3)
RBC # BLD AUTO: 3.63 10E6/UL (ref 3.8–5.2)
RBC URINE: 1 /HPF
RSV RNA SPEC NAA+PROBE: NEGATIVE
SARS-COV-2 RNA RESP QL NAA+PROBE: NEGATIVE
SODIUM SERPL-SCNC: 132 MMOL/L (ref 135–145)
SODIUM SERPL-SCNC: 133 MMOL/L (ref 135–145)
SODIUM SERPL-SCNC: 135 MMOL/L (ref 135–145)
SODIUM SERPL-SCNC: 136 MMOL/L (ref 135–145)
SODIUM SERPL-SCNC: 138 MMOL/L (ref 135–145)
SP GR UR STRIP: 1.01 (ref 1–1.03)
SQUAMOUS EPITHELIAL: 3 /HPF
TEMPERATURE: 37 DEGREES C
UROBILINOGEN UR STRIP-MCNC: <2 MG/DL
WBC # BLD AUTO: 11.6 10E3/UL (ref 4–11)
WBC URINE: 4 /HPF

## 2023-12-27 PROCEDURE — 999N000157 HC STATISTIC RCP TIME EA 10 MIN

## 2023-12-27 PROCEDURE — 81001 URINALYSIS AUTO W/SCOPE: CPT | Performed by: INTERNAL MEDICINE

## 2023-12-27 PROCEDURE — 96365 THER/PROPH/DIAG IV INF INIT: CPT

## 2023-12-27 PROCEDURE — 258N000003 HC RX IP 258 OP 636: Performed by: STUDENT IN AN ORGANIZED HEALTH CARE EDUCATION/TRAINING PROGRAM

## 2023-12-27 PROCEDURE — 83930 ASSAY OF BLOOD OSMOLALITY: CPT | Performed by: INTERNAL MEDICINE

## 2023-12-27 PROCEDURE — 250N000011 HC RX IP 250 OP 636: Mod: JZ | Performed by: INTERNAL MEDICINE

## 2023-12-27 PROCEDURE — 96376 TX/PRO/DX INJ SAME DRUG ADON: CPT

## 2023-12-27 PROCEDURE — 36415 COLL VENOUS BLD VENIPUNCTURE: CPT | Performed by: INTERNAL MEDICINE

## 2023-12-27 PROCEDURE — 85025 COMPLETE CBC W/AUTO DIFF WBC: CPT | Performed by: STUDENT IN AN ORGANIZED HEALTH CARE EDUCATION/TRAINING PROGRAM

## 2023-12-27 PROCEDURE — 82962 GLUCOSE BLOOD TEST: CPT

## 2023-12-27 PROCEDURE — 36600 WITHDRAWAL OF ARTERIAL BLOOD: CPT

## 2023-12-27 PROCEDURE — 83735 ASSAY OF MAGNESIUM: CPT | Performed by: FAMILY MEDICINE

## 2023-12-27 PROCEDURE — 200N000001 HC R&B ICU

## 2023-12-27 PROCEDURE — 82040 ASSAY OF SERUM ALBUMIN: CPT | Performed by: STUDENT IN AN ORGANIZED HEALTH CARE EDUCATION/TRAINING PROGRAM

## 2023-12-27 PROCEDURE — 250N000011 HC RX IP 250 OP 636: Performed by: STUDENT IN AN ORGANIZED HEALTH CARE EDUCATION/TRAINING PROGRAM

## 2023-12-27 PROCEDURE — 90937 HEMODIALYSIS REPEATED EVAL: CPT

## 2023-12-27 PROCEDURE — 71046 X-RAY EXAM CHEST 2 VIEWS: CPT

## 2023-12-27 PROCEDURE — C9113 INJ PANTOPRAZOLE SODIUM, VIA: HCPCS | Performed by: INTERNAL MEDICINE

## 2023-12-27 PROCEDURE — 80048 BASIC METABOLIC PNL TOTAL CA: CPT | Performed by: HOSPITALIST

## 2023-12-27 PROCEDURE — 96361 HYDRATE IV INFUSION ADD-ON: CPT

## 2023-12-27 PROCEDURE — 82077 ASSAY SPEC XCP UR&BREATH IA: CPT | Performed by: STUDENT IN AN ORGANIZED HEALTH CARE EDUCATION/TRAINING PROGRAM

## 2023-12-27 PROCEDURE — 258N000003 HC RX IP 258 OP 636: Performed by: INTERNAL MEDICINE

## 2023-12-27 PROCEDURE — 87149 DNA/RNA DIRECT PROBE: CPT | Performed by: STUDENT IN AN ORGANIZED HEALTH CARE EDUCATION/TRAINING PROGRAM

## 2023-12-27 PROCEDURE — 99223 1ST HOSP IP/OBS HIGH 75: CPT | Mod: AI | Performed by: INTERNAL MEDICINE

## 2023-12-27 PROCEDURE — 83690 ASSAY OF LIPASE: CPT | Performed by: STUDENT IN AN ORGANIZED HEALTH CARE EDUCATION/TRAINING PROGRAM

## 2023-12-27 PROCEDURE — 250N000013 HC RX MED GY IP 250 OP 250 PS 637: Performed by: INTERNAL MEDICINE

## 2023-12-27 PROCEDURE — 99291 CRITICAL CARE FIRST HOUR: CPT | Mod: 25

## 2023-12-27 PROCEDURE — 96375 TX/PRO/DX INJ NEW DRUG ADDON: CPT

## 2023-12-27 PROCEDURE — 250N000009 HC RX 250: Performed by: STUDENT IN AN ORGANIZED HEALTH CARE EDUCATION/TRAINING PROGRAM

## 2023-12-27 PROCEDURE — 96374 THER/PROPH/DIAG INJ IV PUSH: CPT

## 2023-12-27 PROCEDURE — 80307 DRUG TEST PRSMV CHEM ANLYZR: CPT | Performed by: INTERNAL MEDICINE

## 2023-12-27 PROCEDURE — 84100 ASSAY OF PHOSPHORUS: CPT | Performed by: INTERNAL MEDICINE

## 2023-12-27 PROCEDURE — 82010 KETONE BODYS QUAN: CPT | Performed by: INTERNAL MEDICINE

## 2023-12-27 PROCEDURE — 82805 BLOOD GASES W/O2 SATURATION: CPT | Performed by: INTERNAL MEDICINE

## 2023-12-27 PROCEDURE — 93005 ELECTROCARDIOGRAM TRACING: CPT | Performed by: STUDENT IN AN ORGANIZED HEALTH CARE EDUCATION/TRAINING PROGRAM

## 2023-12-27 PROCEDURE — 82010 KETONE BODYS QUAN: CPT | Performed by: STUDENT IN AN ORGANIZED HEALTH CARE EDUCATION/TRAINING PROGRAM

## 2023-12-27 PROCEDURE — 36415 COLL VENOUS BLD VENIPUNCTURE: CPT | Performed by: STUDENT IN AN ORGANIZED HEALTH CARE EDUCATION/TRAINING PROGRAM

## 2023-12-27 PROCEDURE — 87637 SARSCOV2&INF A&B&RSV AMP PRB: CPT | Performed by: STUDENT IN AN ORGANIZED HEALTH CARE EDUCATION/TRAINING PROGRAM

## 2023-12-27 PROCEDURE — 84703 CHORIONIC GONADOTROPIN ASSAY: CPT | Performed by: STUDENT IN AN ORGANIZED HEALTH CARE EDUCATION/TRAINING PROGRAM

## 2023-12-27 PROCEDURE — 83036 HEMOGLOBIN GLYCOSYLATED A1C: CPT | Performed by: STUDENT IN AN ORGANIZED HEALTH CARE EDUCATION/TRAINING PROGRAM

## 2023-12-27 PROCEDURE — 87186 SC STD MICRODIL/AGAR DIL: CPT | Performed by: STUDENT IN AN ORGANIZED HEALTH CARE EDUCATION/TRAINING PROGRAM

## 2023-12-27 PROCEDURE — 83735 ASSAY OF MAGNESIUM: CPT | Performed by: STUDENT IN AN ORGANIZED HEALTH CARE EDUCATION/TRAINING PROGRAM

## 2023-12-27 PROCEDURE — 80048 BASIC METABOLIC PNL TOTAL CA: CPT | Performed by: INTERNAL MEDICINE

## 2023-12-27 RX ORDER — SEVELAMER CARBONATE 800 MG/1
800 TABLET, FILM COATED ORAL 2 TIMES DAILY WITH MEALS
Status: DISCONTINUED | OUTPATIENT
Start: 2023-12-27 | End: 2024-01-03 | Stop reason: HOSPADM

## 2023-12-27 RX ORDER — ACETAMINOPHEN 325 MG/1
650 TABLET ORAL EVERY 6 HOURS PRN
Status: DISCONTINUED | OUTPATIENT
Start: 2023-12-27 | End: 2024-01-03 | Stop reason: HOSPADM

## 2023-12-27 RX ORDER — SODIUM CHLORIDE AND POTASSIUM CHLORIDE 150; 900 MG/100ML; MG/100ML
INJECTION, SOLUTION INTRAVENOUS CONTINUOUS
Status: DISCONTINUED | OUTPATIENT
Start: 2023-12-27 | End: 2023-12-28

## 2023-12-27 RX ORDER — DEXTROSE MONOHYDRATE 25 G/50ML
25-50 INJECTION, SOLUTION INTRAVENOUS
Status: DISCONTINUED | OUTPATIENT
Start: 2023-12-27 | End: 2023-12-27

## 2023-12-27 RX ORDER — HEPARIN SODIUM 5000 [USP'U]/.5ML
5000 INJECTION, SOLUTION INTRAVENOUS; SUBCUTANEOUS EVERY 8 HOURS
Status: DISCONTINUED | OUTPATIENT
Start: 2023-12-27 | End: 2023-12-27

## 2023-12-27 RX ORDER — NICOTINE POLACRILEX 4 MG
15-30 LOZENGE BUCCAL
Status: DISCONTINUED | OUTPATIENT
Start: 2023-12-27 | End: 2024-01-03 | Stop reason: HOSPADM

## 2023-12-27 RX ORDER — DEXTROSE MONOHYDRATE, SODIUM CHLORIDE, AND POTASSIUM CHLORIDE 50; 1.49; 4.5 G/1000ML; G/1000ML; G/1000ML
INJECTION, SOLUTION INTRAVENOUS CONTINUOUS
Status: DISCONTINUED | OUTPATIENT
Start: 2023-12-27 | End: 2023-12-28

## 2023-12-27 RX ORDER — MORPHINE SULFATE 4 MG/ML
4 INJECTION, SOLUTION INTRAMUSCULAR; INTRAVENOUS ONCE
Status: COMPLETED | OUTPATIENT
Start: 2023-12-27 | End: 2023-12-27

## 2023-12-27 RX ORDER — ONDANSETRON 2 MG/ML
4 INJECTION INTRAMUSCULAR; INTRAVENOUS EVERY 6 HOURS PRN
Status: DISCONTINUED | OUTPATIENT
Start: 2023-12-27 | End: 2024-01-03 | Stop reason: HOSPADM

## 2023-12-27 RX ORDER — SERTRALINE HYDROCHLORIDE 100 MG/1
100 TABLET, FILM COATED ORAL DAILY
Status: DISCONTINUED | OUTPATIENT
Start: 2023-12-27 | End: 2023-12-27

## 2023-12-27 RX ORDER — HEPARIN SODIUM 5000 [USP'U]/.5ML
5000 INJECTION, SOLUTION INTRAVENOUS; SUBCUTANEOUS EVERY 12 HOURS
Status: DISCONTINUED | OUTPATIENT
Start: 2023-12-27 | End: 2023-12-28

## 2023-12-27 RX ORDER — LIDOCAINE/PRILOCAINE 2.5 %-2.5%
CREAM (GRAM) TOPICAL
COMMUNITY
Start: 2023-11-17

## 2023-12-27 RX ORDER — LOSARTAN POTASSIUM 25 MG/1
25 TABLET ORAL DAILY
Status: DISCONTINUED | OUTPATIENT
Start: 2023-12-27 | End: 2024-01-03 | Stop reason: HOSPADM

## 2023-12-27 RX ORDER — METOCLOPRAMIDE HYDROCHLORIDE 5 MG/ML
5 INJECTION INTRAMUSCULAR; INTRAVENOUS EVERY 6 HOURS PRN
Status: DISCONTINUED | OUTPATIENT
Start: 2023-12-27 | End: 2023-12-29

## 2023-12-27 RX ORDER — TRAZODONE HYDROCHLORIDE 50 MG/1
100 TABLET, FILM COATED ORAL AT BEDTIME
Status: DISCONTINUED | OUTPATIENT
Start: 2023-12-27 | End: 2024-01-03 | Stop reason: HOSPADM

## 2023-12-27 RX ORDER — HYDRALAZINE HYDROCHLORIDE 20 MG/ML
10 INJECTION INTRAMUSCULAR; INTRAVENOUS EVERY 6 HOURS PRN
Status: DISCONTINUED | OUTPATIENT
Start: 2023-12-27 | End: 2024-01-03 | Stop reason: HOSPADM

## 2023-12-27 RX ORDER — ONDANSETRON 2 MG/ML
4 INJECTION INTRAMUSCULAR; INTRAVENOUS ONCE
Status: COMPLETED | OUTPATIENT
Start: 2023-12-27 | End: 2023-12-27

## 2023-12-27 RX ORDER — AMOXICILLIN 250 MG
1 CAPSULE ORAL 2 TIMES DAILY PRN
Status: DISCONTINUED | OUTPATIENT
Start: 2023-12-27 | End: 2023-12-31

## 2023-12-27 RX ORDER — SEVELAMER CARBONATE 800 MG/1
800 TABLET, FILM COATED ORAL 2 TIMES DAILY WITH MEALS
COMMUNITY

## 2023-12-27 RX ORDER — DEXTROSE MONOHYDRATE 25 G/50ML
25-50 INJECTION, SOLUTION INTRAVENOUS
Status: DISCONTINUED | OUTPATIENT
Start: 2023-12-27 | End: 2024-01-03 | Stop reason: HOSPADM

## 2023-12-27 RX ADMIN — LOSARTAN POTASSIUM 25 MG: 25 TABLET, FILM COATED ORAL at 10:52

## 2023-12-27 RX ADMIN — ACETAMINOPHEN 650 MG: 325 TABLET ORAL at 18:43

## 2023-12-27 RX ADMIN — ONDANSETRON 4 MG: 2 INJECTION INTRAMUSCULAR; INTRAVENOUS at 01:38

## 2023-12-27 RX ADMIN — HYDRALAZINE HYDROCHLORIDE 10 MG: 20 INJECTION INTRAMUSCULAR; INTRAVENOUS at 19:05

## 2023-12-27 RX ADMIN — PANTOPRAZOLE SODIUM 40 MG: 40 INJECTION, POWDER, FOR SOLUTION INTRAVENOUS at 10:06

## 2023-12-27 RX ADMIN — SODIUM CHLORIDE 500 ML: 9 INJECTION, SOLUTION INTRAVENOUS at 01:37

## 2023-12-27 RX ADMIN — METOCLOPRAMIDE 5 MG: 5 INJECTION, SOLUTION INTRAMUSCULAR; INTRAVENOUS at 19:59

## 2023-12-27 RX ADMIN — POTASSIUM CHLORIDE, DEXTROSE MONOHYDRATE AND SODIUM CHLORIDE: 150; 5; 450 INJECTION, SOLUTION INTRAVENOUS at 08:05

## 2023-12-27 RX ADMIN — SODIUM CHLORIDE, POTASSIUM CHLORIDE, SODIUM LACTATE AND CALCIUM CHLORIDE 1000 ML: 600; 310; 30; 20 INJECTION, SOLUTION INTRAVENOUS at 03:51

## 2023-12-27 RX ADMIN — HEPARIN SODIUM 5000 UNITS: 5000 INJECTION, SOLUTION INTRAVENOUS; SUBCUTANEOUS at 10:07

## 2023-12-27 RX ADMIN — MORPHINE SULFATE 4 MG: 4 INJECTION, SOLUTION INTRAMUSCULAR; INTRAVENOUS at 01:38

## 2023-12-27 RX ADMIN — ONDANSETRON 4 MG: 2 INJECTION INTRAMUSCULAR; INTRAVENOUS at 03:58

## 2023-12-27 RX ADMIN — HEPARIN SODIUM 5000 UNITS: 5000 INJECTION, SOLUTION INTRAVENOUS; SUBCUTANEOUS at 22:15

## 2023-12-27 RX ADMIN — ONDANSETRON 4 MG: 2 INJECTION INTRAMUSCULAR; INTRAVENOUS at 19:05

## 2023-12-27 RX ADMIN — INSULIN HUMAN 5.5 UNITS/HR: 1 INJECTION, SOLUTION INTRAVENOUS at 04:20

## 2023-12-27 RX ADMIN — SERTRALINE HYDROCHLORIDE 150 MG: 100 TABLET ORAL at 10:52

## 2023-12-27 RX ADMIN — POTASSIUM CHLORIDE, DEXTROSE MONOHYDRATE AND SODIUM CHLORIDE: 150; 5; 450 INJECTION, SOLUTION INTRAVENOUS at 20:12

## 2023-12-27 ASSESSMENT — ACTIVITIES OF DAILY LIVING (ADL)
CONCENTRATING,_REMEMBERING_OR_MAKING_DECISIONS_DIFFICULTY: NO
ADLS_ACUITY_SCORE: 35
WALKING_OR_CLIMBING_STAIRS_DIFFICULTY: NO
ADLS_ACUITY_SCORE: 18
TOILETING_ISSUES: NO
CHANGE_IN_FUNCTIONAL_STATUS_SINCE_ONSET_OF_CURRENT_ILLNESS/INJURY: NO
ADLS_ACUITY_SCORE: 33
DOING_ERRANDS_INDEPENDENTLY_DIFFICULTY: NO
ADLS_ACUITY_SCORE: 35
ADLS_ACUITY_SCORE: 18
ADLS_ACUITY_SCORE: 18
ADLS_ACUITY_SCORE: 35
ADLS_ACUITY_SCORE: 35
FALL_HISTORY_WITHIN_LAST_SIX_MONTHS: NO
DRESSING/BATHING_DIFFICULTY: NO
HEARING_DIFFICULTY_OR_DEAF: NO
ADLS_ACUITY_SCORE: 18
ADLS_ACUITY_SCORE: 18
DIFFICULTY_COMMUNICATING: NO
ADLS_ACUITY_SCORE: 18
WEAR_GLASSES_OR_BLIND: NO
DIFFICULTY_EATING/SWALLOWING: NO
ADLS_ACUITY_SCORE: 18

## 2023-12-27 NOTE — MEDICATION SCRIBE - ADMISSION MEDICATION HISTORY
Medication Scribe Admission Medication History    Admission medication history is complete. The information provided in this note is only as accurate as the sources available at the time of the update.    Information Source(s): Patient via in-person    Pertinent Information: Patient have Dialysis on Monday, Wednesday and Friday    Changes made to PTA medication list:  Added: Sevelamir 800 mg tablet, Lidocaine/Prilocaine cream (per fill history and patient confirmed)  Deleted: Cefdinir 300 mg capsule, Glucagon 3mg/dose (per patient)  Changed: None    Medication Affordability:  Not including over the counter (OTC) medications, was there a time in the past 3 months when you did not take your medications as prescribed because of cost?: No    Allergies reviewed with patient and updates made in EHR: yes    Medication History Completed By: Nicolas Loredo 12/27/2023 4:44 AM    PTA Med List   Medication Sig Last Dose    acetaminophen (TYLENOL) 500 MG tablet Take 2 tablets (1,000 mg) by mouth every 6 hours as needed for pain Unknown at prn    Cholecalciferol (VITAMIN D3) 50 MCG (2000 UT) CHEW Take 50 mcg by mouth daily Take one tablet on Monday, Wednesday and Friday 12/25/2023 at am    Cyanocobalamin (VITAMIN B-12) 500 MCG LOZG Take 500 mcg by mouth daily Past Month at unknown    ferrous sulfate (FEROSUL) 325 (65 Fe) MG tablet Take 1 tablet (325 mg) by mouth daily More than a month at unknown    insulin degludec (TRESIBA) 100 UNIT/ML pen Inject 16 Units Subcutaneous daily 12/26/2023 at am    insulin lispro (HUMALOG KWIKPEN) 100 UNIT/ML (1 unit dial) KWIKPEN Inject 3-4 Units Subcutaneous 4 times daily with meals or snacks 12/26/2023 at pm    lidocaine-prilocaine (EMLA) 2.5-2.5 % external cream Apply topically three times a week Monday, Wednesday, Friday 12/25/2023 at am    losartan (COZAAR) 25 MG tablet Take 25 mg by mouth daily 12/26/2023 at am    rOPINIRole (REQUIP) 0.25 MG tablet Take 1 mg by mouth at bedtime  12/26/2023 at hs    sertraline (ZOLOFT) 100 MG tablet Take 100 mg by mouth daily Take 1 tablet( 100 mg) along with 50 mg for the total dose 12/26/2023 at am    sertraline (ZOLOFT) 50 MG tablet Take 50 mg by mouth daily Take 1 tablet (50 mg) along with 100 mg for the total dose of 150 mg 12/26/2023 at am    sevelamer carbonate (RENVELA) 800 MG tablet Take 800 mg by mouth 2 times daily (with meals) 12/26/2023 at pm    traZODone (DESYREL) 100 MG tablet Take 100 mg by mouth At Bedtime Past Week at hs

## 2023-12-27 NOTE — H&P
[facility]    History and Physical - Hospitalist Service       Date of Admission:  12/27/2023    Assessment & Plan      Josefa Curiel is a 48 year old female with past medical history of type I DM, ESRD on dialysis, HTN who presented to ED for evaluation of nausea and vomiting, found to have DKA and admitted for further management.     DKA in a patient with history of type I DM; ?  Triggering factor  COVID-19 and influenza test negative.    --Noticed mild redness at dialysis catheter site but no discharge or significant tenderness appreciated.  Get CXR, UA, blood culture to rule out infection  --A1c 7.0.  Patient reports compliance with insulin regimen  --DKA order set placed  --Patient received 1.5 L IV fluid bolus in ED.  Cautious with fluid resuscitation given ESRD on dialysis    Nausea and vomiting; likely due to above  History of gastroparesis;  -- Abdominal exam benign.  Symptomatic treatment as ordered    ESRD on dialysis;  -- Nephrology consulted for dialysis management.    --Of note, patient has fistula on left upper extremity and also dialysis catheter on right front chest.    Uncontrolled hypertension;  -- PTA losartan.  Added as needed hydralazine.          Diet: NPO for Medical/Clinical Reasons Except for: Meds    DVT Prophylaxis: Heparin SQ  Williamson Catheter: Not present  Lines: PRESENT             Cardiac Monitoring: ACTIVE order. Indication: DKA  Code Status:      Clinically Significant Risk Factors Present on Admission             # Anion Gap Metabolic Acidosis: Highest Anion Gap = 30 mmol/L in last 2 days, will monitor and treat as appropriate      # Hypertension: Noted on problem list                 Disposition Plan      Expected Discharge Date: 12/29/2023                  Alex Silvestre MD  Hospitalist Service  [facility]  Securely message with [employer] (more info)  Text page via [employer] Paging/Directory      ______________________________________________________________________    Chief Complaint   Nausea and vomiting    History is obtained from the patient, from ED provider and previous medical records      History of Present Illness   Josefa Curiel is a 48 year old female with past medical history of type I DM, ESRD on dialysis, HTN who presented to ED for evaluation of nausea and vomiting, found to have DKA and admitted for further management.     For last couple of days patient was experiencing some abdominal discomfort associated with nausea and few episodes of a small bilious vomiting.  Patient also reported feeling facial swelling and neck swelling.  However, denied feeling short of breath or chest pain or cough or congestion.  Denied feeling feverish.  Patient is still makes urine but denied urinary symptoms.  Patient has dialysis catheter on right front chest, noticed some redness around the site but no drainage, no significant tenderness.    Past Medical History    Past Medical History:   Diagnosis Date    LORIN (acute kidney injury) (H24)     Anxiety     Cannabis abuse     Depression     Diabetes Type 1, uncontrolled 1985    Onset age 10    DKA (diabetic ketoacidoses)     ESRD (end stage renal disease) on dialysis (H) 04/11/2023    start date per 2728 form    ETOH abuse     Gastroparesis     HLD (hyperlipidemia)     HTN (hypertension)     Lactic acidosis     Vitamin D deficiency        Past Surgical History   Past Surgical History:   Procedure Laterality Date    ANKLE FRACTURE SURGERY Left     2007 [facility], [place]    APPENDECTOMY      EYE SURGERY      retinal surgery Dr. Nagi Tang    INCISION AND DRAINAGE FOOT, COMBINED Right 11/18/2022    Procedure: INCISION AND DRAINAGE, right foot;  Surgeon: David Patel DPM;  Location: [facility] Main OR    IR CVC TUNNEL PLACEMENT > 5 YRS OF AGE  11/30/2023    PICC SINGLE LUMEN PLACEMENT  10/23/2022            Prior to Admission Medications   Prior to  Admission Medications   Prescriptions Last Dose Informant Patient Reported? Taking?   Cholecalciferol (VITAMIN D3) 50 MCG (2000 UT) CHEW 12/25/2023 at am Self Yes Yes   Sig: Take 50 mcg by mouth daily Take one tablet on Monday, Wednesday and Friday   Continuous Blood Gluc Sensor (DEXCOM G6 SENSOR) MISC  Self Yes No   Cyanocobalamin (VITAMIN B-12) 500 MCG LOZG Past Month at unknown Self Yes Yes   Sig: Take 500 mcg by mouth daily   acetaminophen (TYLENOL) 500 MG tablet Unknown at prn Self No Yes   Sig: Take 2 tablets (1,000 mg) by mouth every 6 hours as needed for pain   ferrous sulfate (FEROSUL) 325 (65 Fe) MG tablet More than a month at unknown Self No Yes   Sig: Take 1 tablet (325 mg) by mouth daily   insulin degludec (TRESIBA) 100 UNIT/ML pen 12/26/2023 at am Self Yes Yes   Sig: Inject 16 Units Subcutaneous daily   insulin lispro (HUMALOG KWIKPEN) 100 UNIT/ML (1 unit dial) KWIKPEN 12/26/2023 at pm Self Yes Yes   Sig: Inject 3-4 Units Subcutaneous 4 times daily with meals or snacks   lidocaine-prilocaine (EMLA) 2.5-2.5 % external cream 12/25/2023 at am Self Yes Yes   Sig: Apply topically three times a week Monday, Wednesday, Friday   losartan (COZAAR) 25 MG tablet 12/26/2023 at am Self Yes Yes   Sig: Take 25 mg by mouth daily   rOPINIRole (REQUIP) 0.25 MG tablet 12/26/2023 at hs Self Yes Yes   Sig: Take 1 mg by mouth at bedtime   sertraline (ZOLOFT) 100 MG tablet 12/26/2023 at am Self Yes Yes   Sig: Take 100 mg by mouth daily Take 1 tablet( 100 mg) along with 50 mg for the total dose   sertraline (ZOLOFT) 50 MG tablet 12/26/2023 at am Self Yes Yes   Sig: Take 50 mg by mouth daily Take 1 tablet (50 mg) along with 100 mg for the total dose of 150 mg   sevelamer carbonate (RENVELA) 800 MG tablet 12/26/2023 at pm Self Yes Yes   Sig: Take 800 mg by mouth 2 times daily (with meals)   traZODone (DESYREL) 100 MG tablet Past Week at hs Self Yes Yes   Sig: Take 100 mg by mouth At Bedtime      Facility-Administered  Medications: None        Review of Systems    The 10 point Review of Systems is negative other than noted in the HPI or here.      Physical Exam   Vital Signs: Temp: 98  F (36.7  C) Temp src: Temporal BP: (!) 177/78 Pulse: 108   Resp: 24 SpO2: 99 % O2 Device: None (Room air)    Weight: 0 lbs 0 oz      General: Not in obvious distress.  HEENT: Normocephalic, supple neck  Oropharynx; dry mucous membrane, no significant congestion appreciated  Chest: Clear to auscultation bilateral anteriorly, no wheezing  Heart: S1S2 normal, tachycardia  Abdomen: Soft.  No tenderness appreciated. Bowel sounds- active.  Extremities: No legs swelling  Neuro: alert and awake, moves all extremities  Skin; patient has dialysis catheter on right front chest, mild redness at catheter site but no underlying swelling, discharge or significant tenderness        Medical Decision Making             Data     I have personally reviewed the following data over the past 24 hrs:    11.6 (H)  \   11.3 (L)   / 204     132 (L) 86 (L) 27.7 (H) /  464 (H)   4.3 16 (L) 4.42 (H) \     ALT: 13 AST: 21 AP: 112 TBILI: 0.5   ALB: 5.0 TOT PROTEIN: 8.0 LIPASE: 55     TSH: N/A T4: N/A A1C: 7.0 (H)       Imaging results reviewed over the past 24 hrs:   No results found for this or any previous visit (from the past 24 hour(s)).

## 2023-12-27 NOTE — ED TRIAGE NOTES
"Patient arrives from home with significant other.   C/o ongoing n/v and abdominal pain for past several days. Also states her ribs are \"swollen on the inside\".     Renal failure on dialysis. Last full run today.     Dialysis port on chest and patient is concerned it is infected.           "

## 2023-12-27 NOTE — CONSULTS
NEPHROLOGY CONSULTATION    Josefa Curiel  [address]  [facility] [address]  [phone] (home)   48 year old female  [id number]  PMD: Elsa Turner    CC: I was asked to see Josefa Curiel by Alex Rubio to evaluate her ESKD.    ASSESSMENT AND RECOMMENDATIONS:  1. ESKD: on HD MWF at [facility]. EDW=56.5kg. Access: LFA AVF. Her outpt HD unit was notified of her admit and orders reviewed.    -Continue HD MWF and prn during hospitalization.    2. N/V: improved. treat DKA and other management per primary    3. Hypertension: Her BP is above goal. Continue current BP meds. UF with HD.    4. DM1: DKA on admit. Management per primary.    5. Anemia: due to ESKD. Hemoglobin is above goal(9-11). No need for SHARLENE now. Trend.     6. BMM: holding sevelamer given n/v. Trend. Phos.    7. Facial edema: if not improved with HD later today consider imaging for central venous stenosis. NO new meds recently. ?allergic reaction. No dyspnea, stridor, wheezing.    Orville Alexander MD  [facility] Office: [phone]    HPI: Josefa Curiel  is a 48 year old woman who I am asked to see for management of ESKD. She was admitted with n/v and found to be in DKA. She has been getting regular dialysis, no missed runs recently. No cp, sob, leg edema. Does report some facial swelling.    ROS: Other than above, a comprehensive ROS was negative.        PMH:  Patient Active Problem List   Diagnosis    LORIN (acute kidney injury) (H24)    Cannabis abuse    Renovascular hypertension    Proteinuria    Upper GI bleeding    Proliferative diabetic retinopathy (H)    Moderate episode of recurrent major depressive disorder (H)    Anxiety    Hyperlipidemia    Vitamin D deficiency    Trigger middle finger    Alcohol abuse, in remission    Ulcer of right foot, limited to breakdown of skin (H)    Type 1 diabetes mellitus with nephropathy (H)    Diabetic ulcer of right foot associated with type 1  diabetes mellitus, with necrosis of muscle, unspecified part of foot (H)    Ulcer of ankle, left, with unspecified severity (H)    Nausea and vomiting    ESRD (end stage renal disease) on dialysis (H)    Gastroparesis    Alcohol dependence (H)    Diabetic ketoacidosis without coma associated with type 1 diabetes mellitus (H)         Current Facility-Administered Medications:     0.9% sodium chloride + KCl 20 mEq/L infusion, , Intravenous, Continuous, Alex Silvestre MD    acetaminophen (TYLENOL) tablet 650 mg, 650 mg, Oral, Q6H PRN, Alex Silvestre MD    dextrose 5% and 0.45% NaCl + KCl 20 mEq/L infusion, , Intravenous, Continuous, Alex Silvestre MD, Last Rate: 100 mL/hr at 12/27/23 0805, New Bag at 12/27/23 0805    glucose gel 15-30 g, 15-30 g, Oral, Q15 Min PRN **OR** dextrose 50 % injection 25-50 mL, 25-50 mL, Intravenous, Q15 Min PRN **OR** glucagon injection 1 mg, 1 mg, Subcutaneous, Q15 Min PRN, Alex Silvestre MD    heparin ANTICOAGULANT injection 5,000 Units, 5,000 Units, Subcutaneous, Q12H, Alex Silvestre MD, 5,000 Units at 12/27/23 1007    hydrALAZINE (APRESOLINE) injection 10 mg, 10 mg, Intravenous, Q6H PRN, Alex Silvestre MD    insulin regular (MYXREDLIN) 1 unit/mL infusion, , Intravenous, Continuous, Alex Silvestre MD, Last Rate: 0.5 mL/hr at 12/27/23 1003, 0.5 Units/hr at 12/27/23 1003    losartan (COZAAR) tablet 25 mg, 25 mg, Oral, Daily, Alex Silvestre MD    metoclopramide (REGLAN) injection 5 mg, 5 mg, Intravenous, Q6H PRN, Alex Silvestre MD    ondansetron (ZOFRAN) injection 4 mg, 4 mg, Intravenous, Q6H PRN, Alex Silvestre MD    pantoprazole (PROTONIX) IV push injection 40 mg, 40 mg, Intravenous, Q24H, Alex Silvestre MD, 40 mg at 12/27/23 1006    senna-docusate (SENOKOT-S/PERICOLACE) 8.6-50 MG per tablet 1 tablet, 1 tablet, Oral, BID PRN, Alex Silvestre MD    sertraline (ZOLOFT) tablet 150 mg, 150 mg, Oral, Daily, Alex Silvestre MD    [Held by provider] sevelamer carbonate (RENVELA) tablet 800 mg, 800 mg, Oral, BID w/meals, Alex Silvestre MD    traZODone  "(DESYREL) tablet 100 mg, 100 mg, Oral, At Bedtime, Alex Silvestre MD      Allergies:   Allergies   Allergen Reactions    Cefazolin Nephrotoxicity    Colestipol GI Disturbance    Doxycycline Nausea and Vomiting    Nickel Rash    Penicillins Rash       Social History:   Social History     Socioeconomic History    Marital status: Single     Spouse name: Not on file    Number of children: Not on file    Years of education: Not on file    Highest education level: Not on file   Occupational History    Not on file   Tobacco Use    Smoking status: Some Days     Types: Cigarettes    Smokeless tobacco: Never   Substance and Sexual Activity    Alcohol use: Not Currently     Alcohol/week: 35.0 standard drinks of alcohol     Types: 35 Standard drinks or equivalent per week     Comment: Alcoholic Drinks/day: ~750 mL wine daily, sober since 10/26/23    Drug use: Yes     Types: Marijuana     Comment: Drug use: 3-4 x week, with nausea    Sexual activity: Yes     Partners: Male     Birth control/protection: Condom   Other Topics Concern    Parent/sibling w/ CABG, MI or angioplasty before 65F 55M? Not Asked   Social History Narrative    Not on file     Social Determinants of Health     Financial Resource Strain: Not on file   Food Insecurity: Not on file   Transportation Needs: Not on file   Physical Activity: Not on file   Stress: Not on file   Social Connections: Not on file   Interpersonal Safety: Not on file   Housing Stability: Not on file         Family History:   Family History   Problem Relation Age of Onset    Clotting Disorder Mother         \"thin blood\"    Arthritis Mother     Hyperlipidemia Mother     Skin Cancer Father         face/nose     Depression Father     Other - See Comments Sister         eye surgeries    No Known Problems Brother     Coronary Artery Disease Maternal Grandmother     Alcoholism Maternal Grandfather     Coronary Artery Disease Maternal Grandfather     No Known Problems Paternal Grandmother     No Known " "Problems Paternal Grandfather     Cancer No family hx of     Kidney Disease No family hx of     Diabetes No family hx of           Physical Exam:  Vital signs:  Temp: 98  F (36.7  C) Temp src: Temporal BP: 137/65 Pulse: 111   Resp: 19 SpO2: 97 % O2 Device: None (Room air)        Estimated body mass index is 19.58 kg/m  as calculated from the following:    Height as of 10/12/23: 1.702 m (5' 7\").    Weight as of 10/12/23: 56.7 kg (125 lb).       GENERAL: NAD  HEENT: NCAT, pupils equal, sclerae not icteric, MMM, some facial edema  NECK: Supple.Trachea midline.   LYMPHATIC: No cervical lymphadenopathy  LUNGS: no respiratory distress,  HEART: no leg edema.   ABDOMEN: Soft, NT,   PSYCH: Alert, normal affect  NEURO:  sensation to light touch intact  MUSC/SKEL:  no joint effusions evident  ACCESS: AVF with thrill    Potassium (mmol/L)   Date Value   12/27/2023 3.9   12/27/2023 4.3   08/17/2023 3.2 (L)   11/03/2022 4.2   10/03/2020 3.8   10/02/2020 3.3 (L)     Chloride (mmol/L)   Date Value   12/27/2023 96 (L)   11/03/2022 103     Urea Nitrogen (mg/dL)   Date Value   12/27/2023 33.4 (H)   11/03/2022 37 (H)     Albumin (g/dL)   Date Value   12/27/2023 5.0   10/02/2020 3.0 (L)     Hemoglobin (g/dL)   Date Value   12/27/2023 11.3 (L)   08/17/2023 11.7   07/16/2023 12.7     Iron (ug/dL)   Date Value   11/23/2022 35 (L)     Iron Sat Index (%)   Date Value   11/23/2022 22   .    Above labs reviewed by me       Orville Alexander MD  "

## 2023-12-27 NOTE — ED PROVIDER NOTES
EMERGENCY DEPARTMENT ENCOUNTER      NAME: Josefa Curiel  AGE: 48 year old female  YOB: 1975  MRN: [id number]  EVALUATION DATE & TIME: 12/27/2023 12:41 AM    PCP: Elsa Turner    ED PROVIDER: Frank Pool MD      Chief Complaint   Patient presents with    Nausea & Vomiting         FINAL IMPRESSION:  1. Diabetic ketoacidosis without coma associated with type 1 diabetes mellitus (H)          ED COURSE & MEDICAL DECISION MAKING:    Pertinent Labs & Imaging studies reviewed. (See chart for details)  48 year old female presents to the Emergency Department for evaluation of nausea/vomiting    ED Course as of 12/27/23 0417   Wed Dec 27, 2023   0246 Patient is a 48-year-old female who presents to the emergency department with nausea, vomiting, abdominal pain, rapid breathing that resembles  small breathing in the setting of type 1 diabetes, history of hyperemesis from cannabis, and is also a dialysis patient with her last full run being earlier today.  Differential diagnosis includes DKA, hyperemesis, pancreatitis, intoxication.   0247 Pregnancy ruled out.  Patient has a mild leukocytosis with no significant anemia.  No pancreatitis.  No alcohol intoxication.  Glucose is 410 with an anion gap of 30 and a bicarb of 16, beta hydroxybutyrate pending.  500 cc bolus infusing.  Zofran and morphine for symptoms.   0252 EKG shows normal sinus rhythm at a rate of 97, there is a prolonged QT which she had previously in August, no ST segment changes indicative of emergent ischemia.   0340 Beta-hydroxybutyrate is 7.52.  Insulin drip started, liter bolus added.  Attempting to be gentle and fluid resuscitation given her kidney failure.  Will also expand differential to include for etiologies of the DKA, by adding a urinalysis and chest x-ray to evaluate for infection, as well as blood cultures since she is at risk for infection with her indwelling PermCath for dialysis       Medical Decision  Making    History:  Supplemental history from: Documented in chart, if applicable  External Record(s) reviewed: Documented in chart, if applicable.    Work Up:  Chart documentation includes differential considered and any EKGs or imaging independently interpreted by provider, where specified.  In additional to work up documented, I considered the following work up: Documented in chart, if applicable.    External consultation:  Discussion of management with another provider: Documented in chart, if applicable    Complicating factors:  Care impacted by chronic illness: Chronic Kidney Disease, Diabetes, Hyperlipidemia, Hypertension, Mental Health, and Smoking / Nicotine Use  Care affected by social determinants of health: Alcohol Abuse and/or Recreational Drug Use    Disposition considerations: Admit.        At the conclusion of the encounter I discussed the results of all of the tests and the disposition. The questions were answered. The patient or family acknowledged understanding and was agreeable with the care plan.     30 minutes of critical care time     MEDICATIONS GIVEN IN THE EMERGENCY:  Medications   dextrose 50 % injection 25-50 mL (has no administration in time range)   insulin regular (MYXREDLIN) 1 unit/mL infusion (has no administration in time range)   sodium chloride 0.9% BOLUS 500 mL (0 mLs Intravenous Stopped 12/27/23 0211)   ondansetron (ZOFRAN) injection 4 mg (4 mg Intravenous $Given 12/27/23 0138)   morphine (PF) injection 4 mg (4 mg Intravenous $Given 12/27/23 0138)   lactated ringers BOLUS 1,000 mL (1,000 mLs Intravenous $New Bag 12/27/23 0351)   ondansetron (ZOFRAN) injection 4 mg (4 mg Intravenous $Given 12/27/23 0358)       NEW PRESCRIPTIONS STARTED AT TODAY'S ER VISIT  New Prescriptions    No medications on file          =================================================================    HPI    Patient information was obtained from: patient     Use of : N/A         Josefa CARRANZA  "Moisés is a 48 year old female with a pertinent history of ESRD, nephrosis in type 1 diabetes, gastroparesis, DKA, hypertension, hyperlipidemia, alcohol abuse, and cannabis abuse who presents to this ED by walk in for evaluation of nausea and vomiting.     For the past 3- days, the patient has been experiencing generalized abdominal pain with associated nausea, vomiting, and diarrhea. There has been some blood in her emesis. She also notes having neck and facial swelling that started 2 days ago with feeling dehydration and \"weird breathing.\" She is a MWF dialysis patient. She last completed a full run on 12/26 (yesterday, Tuesday) due to the holiday on Monday. She has a fistula and a chest port as her fistula is intermittently difficult to access. Patient expressed concern for infection of the port. There is some redness around the site. No drainage or fever. The patient has a history of type 1 diabetes, gastroparesis, and depression. No recent medication changes or missed doses. She is a former alcohol user. Uses marijuana but notes she as not used in the past 2 days. No other complaints at this time.      PAST MEDICAL HISTORY:  Past Medical History:   Diagnosis Date    LORIN (acute kidney injury) (H24)     Anxiety     Cannabis abuse     Depression     Diabetes Type 1, uncontrolled 1985    Onset age 10    DKA (diabetic ketoacidoses)     ESRD (end stage renal disease) on dialysis (H) 04/11/2023    start date per 2728 form    ETOH abuse     Gastroparesis     HLD (hyperlipidemia)     HTN (hypertension)     Lactic acidosis     Vitamin D deficiency        PAST SURGICAL HISTORY:  Past Surgical History:   Procedure Laterality Date    ANKLE FRACTURE SURGERY Left     2007 [facility]    APPENDECTOMY      EYE SURGERY      retinal surgery Dr. Nagi Tang    INCISION AND DRAINAGE FOOT, COMBINED Right 11/18/2022    Procedure: INCISION AND DRAINAGE, right foot;  Surgeon: David Patel DPM;  Location: [facility]" "Main OR    IR CVC TUNNEL PLACEMENT > 5 YRS OF AGE  11/30/2023    PICC SINGLE LUMEN PLACEMENT  10/23/2022                CURRENT MEDICATIONS:    acetaminophen (TYLENOL) 500 MG tablet  cefdinir (OMNICEF) 300 MG capsule  Cholecalciferol (VITAMIN D3) 50 MCG (2000 UT) CHEW  Continuous Blood Gluc Sensor (DEXCOM G6 SENSOR) MISC  Cyanocobalamin (VITAMIN B-12) 500 MCG LOZG  ferrous sulfate (FEROSUL) 325 (65 Fe) MG tablet  Glucagon 3 MG/DOSE POWD  insulin degludec (TRESIBA) 100 UNIT/ML pen  insulin lispro (HUMALOG KWIKPEN) 100 UNIT/ML (1 unit dial) KWIKPEN  losartan (COZAAR) 25 MG tablet  rOPINIRole (REQUIP) 0.25 MG tablet  sertraline (ZOLOFT) 100 MG tablet  traZODone (DESYREL) 100 MG tablet        ALLERGIES:  Allergies   Allergen Reactions    Cefazolin Nephrotoxicity    Colestipol GI Disturbance    Doxycycline Nausea and Vomiting    Nickel Rash    Penicillins Rash       FAMILY HISTORY:  Family History   Problem Relation Age of Onset    Clotting Disorder Mother         \"thin blood\"    Arthritis Mother     Hyperlipidemia Mother     Skin Cancer Father         face/nose     Depression Father     Other - See Comments Sister         eye surgeries    No Known Problems Brother     Coronary Artery Disease Maternal Grandmother     Alcoholism Maternal Grandfather     Coronary Artery Disease Maternal Grandfather     No Known Problems Paternal Grandmother     No Known Problems Paternal Grandfather     Cancer No family hx of     Kidney Disease No family hx of     Diabetes No family hx of        SOCIAL HISTORY:   Social History     Socioeconomic History    Marital status: Single   Tobacco Use    Smoking status: Some Days     Types: Cigarettes    Smokeless tobacco: Never   Substance and Sexual Activity    Alcohol use: Not Currently     Alcohol/week: 35.0 standard drinks of alcohol     Types: 35 Standard drinks or equivalent per week     Comment: Alcoholic Drinks/day: ~750 mL wine daily, sober since 10/26/23    Drug use: Yes     Types: " Marijuana     Comment: Drug use: 3-4 x week, with nausea    Sexual activity: Yes     Partners: Male     Birth control/protection: Condom       VITALS:  BP (!) 177/78   Pulse 108   Temp 98  F (36.7  C) (Temporal)   Resp 24   LMP 12/13/2023   SpO2 99%     PHYSICAL EXAM    Physical Exam  Vitals and nursing note reviewed.   Constitutional:       General: She is not in acute distress.     Appearance: Normal appearance. She is obese. She is not ill-appearing or diaphoretic.   HENT:      Head: Normocephalic and atraumatic.      Nose: Nose normal.      Mouth/Throat:      Mouth: Mucous membranes are dry.      Pharynx: Oropharynx is clear.   Eyes:      Extraocular Movements: Extraocular movements intact.      Conjunctiva/sclera: Conjunctivae normal.      Pupils: Pupils are equal, round, and reactive to light.   Cardiovascular:      Rate and Rhythm: Regular rhythm. Tachycardia present.      Pulses: Normal pulses.      Heart sounds: Normal heart sounds. No murmur heard.  Pulmonary:      Effort: Pulmonary effort is normal. No respiratory distress.      Breath sounds: Normal breath sounds.   Abdominal:      General: Abdomen is flat. There is no distension.      Palpations: Abdomen is soft. There is no mass.      Tenderness: There is abdominal tenderness (epigastric). There is no right CVA tenderness, left CVA tenderness, guarding or rebound.      Hernia: No hernia is present.   Musculoskeletal:         General: Normal range of motion.      Cervical back: Normal range of motion.      Right lower leg: No edema.      Left lower leg: No edema.   Skin:     General: Skin is warm and dry.      Capillary Refill: Capillary refill takes less than 2 seconds.      Coloration: Skin is not jaundiced or pale.   Neurological:      General: No focal deficit present.      Mental Status: She is alert and oriented to person, place, and time. Mental status is at baseline.   Psychiatric:         Mood and Affect: Mood normal.         Behavior:  Behavior normal.         Thought Content: Thought content normal.         Judgment: Judgment normal.            LAB:  All pertinent labs reviewed and interpreted.  Results for orders placed or performed during the hospital encounter of 12/27/23   Comprehensive metabolic panel   Result Value Ref Range    Sodium 132 (L) 135 - 145 mmol/L    Potassium 4.3 3.4 - 5.3 mmol/L    Carbon Dioxide (CO2) 16 (L) 22 - 29 mmol/L    Anion Gap 30 (H) 7 - 15 mmol/L    Urea Nitrogen 27.7 (H) 6.0 - 20.0 mg/dL    Creatinine 4.42 (H) 0.51 - 0.95 mg/dL    GFR Estimate 12 (L) >60 mL/min/1.73m2    Calcium 9.9 8.6 - 10.0 mg/dL    Chloride 86 (L) 98 - 107 mmol/L    Glucose 410 (H) 70 - 99 mg/dL    Alkaline Phosphatase 112 40 - 150 U/L    AST 21 0 - 45 U/L    ALT 13 0 - 50 U/L    Protein Total 8.0 6.4 - 8.3 g/dL    Albumin 5.0 3.5 - 5.2 g/dL    Bilirubin Total 0.5 <=1.2 mg/dL   Result Value Ref Range    Lipase 55 13 - 60 U/L   Result Value Ref Range    Magnesium 2.4 (H) 1.7 - 2.3 mg/dL   Ethyl Alcohol Level   Result Value Ref Range    Alcohol ethyl <0.01 <=0.01 g/dL   HCG qualitative Blood   Result Value Ref Range    hCG Serum Qualitative Negative Negative   CBC with platelets and differential   Result Value Ref Range    WBC Count 11.6 (H) 4.0 - 11.0 10e3/uL    RBC Count 3.63 (L) 3.80 - 5.20 10e6/uL    Hemoglobin 11.3 (L) 11.7 - 15.7 g/dL    Hematocrit 34.7 (L) 35.0 - 47.0 %    MCV 96 78 - 100 fL    MCH 31.1 26.5 - 33.0 pg    MCHC 32.6 31.5 - 36.5 g/dL    RDW 13.0 10.0 - 15.0 %    Platelet Count 204 150 - 450 10e3/uL    % Neutrophils 86 %    % Lymphocytes 8 %    % Monocytes 4 %    % Eosinophils 1 %    % Basophils 1 %    % Immature Granulocytes 0 %    NRBCs per 100 WBC 0 <1 /100    Absolute Neutrophils 10.1 (H) 1.6 - 8.3 10e3/uL    Absolute Lymphocytes 0.9 0.8 - 5.3 10e3/uL    Absolute Monocytes 0.4 0.0 - 1.3 10e3/uL    Absolute Eosinophils 0.1 0.0 - 0.7 10e3/uL    Absolute Basophils 0.1 0.0 - 0.2 10e3/uL    Absolute Immature Granulocytes 0.0  <=0.4 10e3/uL    Absolute NRBCs 0.0 10e3/uL   Ketone Beta-Hydroxybutyrate Quantitative   Result Value Ref Range    Ketone (Beta-Hydroxybutyrate) Quantitative 7.52 (HH) <=0.30 mmol/L   Result Value Ref Range    Hemoglobin A1C 7.0 (H) <5.7 %       RADIOLOGY:  Reviewed all pertinent imaging. Please see official radiology report.  Chest XR,  PA & LAT    (Results Pending)       PROCEDURES:   None      [facility] Documentation:   CMS Diagnoses:               I, Key Gilliam, am serving as a scribe to document services personally performed by Key Gilliam based on my observation and the provider's statements to me. I, Frank Pool MD, attest that Key Gilliam is acting in a scribe capacity, has observed my performance of the services and has documented them in accordance with my direction.    Frank Pool MD  [facility] EMERGENCY DEPARTMENT  [address] [place] [id number] [phone]       Frank Pool MD  12/27/23 0418

## 2023-12-27 NOTE — ED NOTES
Lab had difficulty drawing the 0600 labs. They had attempted with two phlebotomists. They came around again at around 0800 and this nurse said that they would draw from the open 18G PIV. The labs were drawn and sent down. The labs from 0600 and 0800 were the same labs and since the 0600 was sent down at 0800, the 0800 labs are still active.

## 2023-12-27 NOTE — INTERIM SUMMARY
SUBJECTIVE:   Patient seen this morning. She reports no recent fever, chills, chest pain, cough, dysuria but states her face and neck got swollen 2-3 days ago. She has some dyspnea with the event but didn't go to the ED to get evaluated. She used warm compress with some improvement. No new medication or food. She spent the holidays at home and ate home cooked meals. Due to holidays her HD schedule was off. She does reports decreased po intake during the time her face was swollen.     Physical exam notable for facial edema, Mallampati III, Left AVF      Assessment & Plan    Josefa Curiel is a 48 year old female with past medical history of type I DM, ESRD on dialysis, HTN who presented to ED for evaluation of nausea and vomiting, found to have DKA and admitted for further management.      #DKA in a patient with history of type I DM;   -No source of infection evident but had facial edema with no inciting event. Patient on ARB.   -A1c 7.0.  Patient reports compliance with insulin regimen  -Continue DKA protocol   -Cautious with fluid resuscitation given ESRD on dialysis     #Nausea and vomiting; likely due to DKA  #History of gastroparesis;  -Symptomatic treatment as ordered     #ESRD on dialysis;  -Nephrology consulted for dialysis management.    -Of note, patient has fistula on left upper extremity and also dialysis catheter on right front chest.     Uncontrolled hypertension;  -- PTA losartan.  Added as needed hydralazine.

## 2023-12-28 ENCOUNTER — APPOINTMENT (OUTPATIENT)
Dept: CT IMAGING | Facility: HOSPITAL | Age: 48
DRG: 270 | End: 2023-12-28
Attending: INTERNAL MEDICINE
Payer: MEDICARE

## 2023-12-28 ENCOUNTER — APPOINTMENT (OUTPATIENT)
Dept: CARDIOLOGY | Facility: HOSPITAL | Age: 48
DRG: 270 | End: 2023-12-28
Attending: INTERNAL MEDICINE
Payer: MEDICARE

## 2023-12-28 ENCOUNTER — APPOINTMENT (OUTPATIENT)
Dept: MRI IMAGING | Facility: HOSPITAL | Age: 48
DRG: 270 | End: 2023-12-28
Attending: PSYCHIATRY & NEUROLOGY
Payer: MEDICARE

## 2023-12-28 LAB
ALBUMIN SERPL BCG-MCNC: 4.3 G/DL (ref 3.5–5.2)
ANION GAP SERPL CALCULATED.3IONS-SCNC: 14 MMOL/L (ref 7–15)
ANION GAP SERPL CALCULATED.3IONS-SCNC: 15 MMOL/L (ref 7–15)
ANION GAP SERPL CALCULATED.3IONS-SCNC: 15 MMOL/L (ref 7–15)
ANION GAP SERPL CALCULATED.3IONS-SCNC: 17 MMOL/L (ref 7–15)
ANION GAP SERPL CALCULATED.3IONS-SCNC: 18 MMOL/L (ref 7–15)
ANION GAP SERPL CALCULATED.3IONS-SCNC: 25 MMOL/L (ref 7–15)
B-OH-BUTYR SERPL-SCNC: 3 MMOL/L
B-OH-BUTYR SERPL-SCNC: 3.3 MMOL/L
B-OH-BUTYR SERPL-SCNC: 5.8 MMOL/L
BUN SERPL-MCNC: 10.6 MG/DL (ref 6–20)
BUN SERPL-MCNC: 11.2 MG/DL (ref 6–20)
BUN SERPL-MCNC: 11.2 MG/DL (ref 6–20)
BUN SERPL-MCNC: 14.2 MG/DL (ref 6–20)
BUN SERPL-MCNC: 16.1 MG/DL (ref 6–20)
BUN SERPL-MCNC: 9.9 MG/DL (ref 6–20)
CALCIUM SERPL-MCNC: 8.5 MG/DL (ref 8.6–10)
CALCIUM SERPL-MCNC: 8.5 MG/DL (ref 8.6–10)
CALCIUM SERPL-MCNC: 8.6 MG/DL (ref 8.6–10)
CALCIUM SERPL-MCNC: 8.7 MG/DL (ref 8.6–10)
CALCIUM SERPL-MCNC: 9.1 MG/DL (ref 8.6–10)
CALCIUM SERPL-MCNC: 9.2 MG/DL (ref 8.6–10)
CHLORIDE SERPL-SCNC: 92 MMOL/L (ref 98–107)
CHLORIDE SERPL-SCNC: 95 MMOL/L (ref 98–107)
CHLORIDE SERPL-SCNC: 97 MMOL/L (ref 98–107)
CHLORIDE SERPL-SCNC: 98 MMOL/L (ref 98–107)
CREAT SERPL-MCNC: 1.97 MG/DL (ref 0.51–0.95)
CREAT SERPL-MCNC: 2.37 MG/DL (ref 0.51–0.95)
CREAT SERPL-MCNC: 2.6 MG/DL (ref 0.51–0.95)
CREAT SERPL-MCNC: 2.6 MG/DL (ref 0.51–0.95)
CREAT SERPL-MCNC: 3.37 MG/DL (ref 0.51–0.95)
CREAT SERPL-MCNC: 3.67 MG/DL (ref 0.51–0.95)
DEPRECATED HCO3 PLAS-SCNC: 14 MMOL/L (ref 22–29)
DEPRECATED HCO3 PLAS-SCNC: 22 MMOL/L (ref 22–29)
DEPRECATED HCO3 PLAS-SCNC: 24 MMOL/L (ref 22–29)
DEPRECATED HCO3 PLAS-SCNC: 26 MMOL/L (ref 22–29)
EGFRCR SERPLBLD CKD-EPI 2021: 15 ML/MIN/1.73M2
EGFRCR SERPLBLD CKD-EPI 2021: 16 ML/MIN/1.73M2
EGFRCR SERPLBLD CKD-EPI 2021: 22 ML/MIN/1.73M2
EGFRCR SERPLBLD CKD-EPI 2021: 22 ML/MIN/1.73M2
EGFRCR SERPLBLD CKD-EPI 2021: 25 ML/MIN/1.73M2
EGFRCR SERPLBLD CKD-EPI 2021: 31 ML/MIN/1.73M2
ENTEROCOCCUS FAECALIS: NOT DETECTED
ENTEROCOCCUS FAECIUM: NOT DETECTED
ERYTHROCYTE [DISTWIDTH] IN BLOOD BY AUTOMATED COUNT: 13.1 % (ref 10–15)
ERYTHROCYTE [DISTWIDTH] IN BLOOD BY AUTOMATED COUNT: 13.2 % (ref 10–15)
GLUCOSE BLDC GLUCOMTR-MCNC: 101 MG/DL (ref 70–99)
GLUCOSE BLDC GLUCOMTR-MCNC: 107 MG/DL (ref 70–99)
GLUCOSE BLDC GLUCOMTR-MCNC: 108 MG/DL (ref 70–99)
GLUCOSE BLDC GLUCOMTR-MCNC: 113 MG/DL (ref 70–99)
GLUCOSE BLDC GLUCOMTR-MCNC: 124 MG/DL (ref 70–99)
GLUCOSE BLDC GLUCOMTR-MCNC: 125 MG/DL (ref 70–99)
GLUCOSE BLDC GLUCOMTR-MCNC: 128 MG/DL (ref 70–99)
GLUCOSE BLDC GLUCOMTR-MCNC: 132 MG/DL (ref 70–99)
GLUCOSE BLDC GLUCOMTR-MCNC: 134 MG/DL (ref 70–99)
GLUCOSE BLDC GLUCOMTR-MCNC: 137 MG/DL (ref 70–99)
GLUCOSE BLDC GLUCOMTR-MCNC: 158 MG/DL (ref 70–99)
GLUCOSE BLDC GLUCOMTR-MCNC: 175 MG/DL (ref 70–99)
GLUCOSE BLDC GLUCOMTR-MCNC: 198 MG/DL (ref 70–99)
GLUCOSE BLDC GLUCOMTR-MCNC: 241 MG/DL (ref 70–99)
GLUCOSE BLDC GLUCOMTR-MCNC: 257 MG/DL (ref 70–99)
GLUCOSE BLDC GLUCOMTR-MCNC: 274 MG/DL (ref 70–99)
GLUCOSE BLDC GLUCOMTR-MCNC: 294 MG/DL (ref 70–99)
GLUCOSE BLDC GLUCOMTR-MCNC: 305 MG/DL (ref 70–99)
GLUCOSE BLDC GLUCOMTR-MCNC: 310 MG/DL (ref 70–99)
GLUCOSE BLDC GLUCOMTR-MCNC: 356 MG/DL (ref 70–99)
GLUCOSE BLDC GLUCOMTR-MCNC: 55 MG/DL (ref 70–99)
GLUCOSE BLDC GLUCOMTR-MCNC: 65 MG/DL (ref 70–99)
GLUCOSE BLDC GLUCOMTR-MCNC: 74 MG/DL (ref 70–99)
GLUCOSE BLDC GLUCOMTR-MCNC: 83 MG/DL (ref 70–99)
GLUCOSE BLDC GLUCOMTR-MCNC: 88 MG/DL (ref 70–99)
GLUCOSE BLDC GLUCOMTR-MCNC: 98 MG/DL (ref 70–99)
GLUCOSE SERPL-MCNC: 103 MG/DL (ref 70–99)
GLUCOSE SERPL-MCNC: 133 MG/DL (ref 70–99)
GLUCOSE SERPL-MCNC: 204 MG/DL (ref 70–99)
GLUCOSE SERPL-MCNC: 305 MG/DL (ref 70–99)
GLUCOSE SERPL-MCNC: 98 MG/DL (ref 70–99)
GLUCOSE SERPL-MCNC: 98 MG/DL (ref 70–99)
HCT VFR BLD AUTO: 29.5 % (ref 35–47)
HCT VFR BLD AUTO: 31.1 % (ref 35–47)
HGB BLD-MCNC: 9.8 G/DL (ref 11.7–15.7)
HGB BLD-MCNC: 9.9 G/DL (ref 11.7–15.7)
LISTERIA SPECIES (DETECTED/NOT DETECTED): NOT DETECTED
LVEF ECHO: NORMAL
MCH RBC QN AUTO: 30.9 PG (ref 26.5–33)
MCH RBC QN AUTO: 31.1 PG (ref 26.5–33)
MCHC RBC AUTO-ENTMCNC: 31.8 G/DL (ref 31.5–36.5)
MCHC RBC AUTO-ENTMCNC: 33.2 G/DL (ref 31.5–36.5)
MCV RBC AUTO: 94 FL (ref 78–100)
MCV RBC AUTO: 97 FL (ref 78–100)
PHOSPHATE SERPL-MCNC: 2.9 MG/DL (ref 2.5–4.5)
PLATELET # BLD AUTO: 132 10E3/UL (ref 150–450)
PLATELET # BLD AUTO: 174 10E3/UL (ref 150–450)
POTASSIUM SERPL-SCNC: 3.9 MMOL/L (ref 3.4–5.3)
POTASSIUM SERPL-SCNC: 3.9 MMOL/L (ref 3.4–5.3)
POTASSIUM SERPL-SCNC: 4 MMOL/L (ref 3.4–5.3)
POTASSIUM SERPL-SCNC: 4 MMOL/L (ref 3.4–5.3)
POTASSIUM SERPL-SCNC: 4.4 MMOL/L (ref 3.4–5.3)
POTASSIUM SERPL-SCNC: 4.9 MMOL/L (ref 3.4–5.3)
RBC # BLD AUTO: 3.15 10E6/UL (ref 3.8–5.2)
RBC # BLD AUTO: 3.2 10E6/UL (ref 3.8–5.2)
SODIUM SERPL-SCNC: 131 MMOL/L (ref 135–145)
SODIUM SERPL-SCNC: 136 MMOL/L (ref 135–145)
SODIUM SERPL-SCNC: 137 MMOL/L (ref 135–145)
SODIUM SERPL-SCNC: 138 MMOL/L (ref 135–145)
STAPHYLOCOCCUS AUREUS: NOT DETECTED
STAPHYLOCOCCUS EPIDERMIDIS: NOT DETECTED
STAPHYLOCOCCUS LUGDUNENSIS: NOT DETECTED
STAPHYLOCOCCUS SPECIES: DETECTED
STREPTOCOCCUS AGALACTIAE: NOT DETECTED
STREPTOCOCCUS ANGINOSUS GROUP: NOT DETECTED
STREPTOCOCCUS PNEUMONIAE: NOT DETECTED
STREPTOCOCCUS PYOGENES: NOT DETECTED
STREPTOCOCCUS SPECIES: NOT DETECTED
WBC # BLD AUTO: 11.8 10E3/UL (ref 4–11)
WBC # BLD AUTO: 8.6 10E3/UL (ref 4–11)

## 2023-12-28 PROCEDURE — 85027 COMPLETE CBC AUTOMATED: CPT | Performed by: RADIOLOGY

## 2023-12-28 PROCEDURE — 02PYX3Z REMOVAL OF INFUSION DEVICE FROM GREAT VESSEL, EXTERNAL APPROACH: ICD-10-PCS | Performed by: RADIOLOGY

## 2023-12-28 PROCEDURE — 80048 BASIC METABOLIC PNL TOTAL CA: CPT | Performed by: HOSPITALIST

## 2023-12-28 PROCEDURE — 200N000001 HC R&B ICU

## 2023-12-28 PROCEDURE — 70551 MRI BRAIN STEM W/O DYE: CPT | Mod: MG

## 2023-12-28 PROCEDURE — 93306 TTE W/DOPPLER COMPLETE: CPT

## 2023-12-28 PROCEDURE — 250N000013 HC RX MED GY IP 250 OP 250 PS 637: Performed by: INTERNAL MEDICINE

## 2023-12-28 PROCEDURE — 87040 BLOOD CULTURE FOR BACTERIA: CPT | Performed by: INTERNAL MEDICINE

## 2023-12-28 PROCEDURE — 99233 SBSQ HOSP IP/OBS HIGH 50: CPT | Performed by: INTERNAL MEDICINE

## 2023-12-28 PROCEDURE — 99223 1ST HOSP IP/OBS HIGH 75: CPT

## 2023-12-28 PROCEDURE — 82010 KETONE BODYS QUAN: CPT | Performed by: INTERNAL MEDICINE

## 2023-12-28 PROCEDURE — 02CV3ZZ EXTIRPATION OF MATTER FROM SUPERIOR VENA CAVA, PERCUTANEOUS APPROACH: ICD-10-PCS | Performed by: RADIOLOGY

## 2023-12-28 PROCEDURE — 250N000011 HC RX IP 250 OP 636: Performed by: INTERNAL MEDICINE

## 2023-12-28 PROCEDURE — 258N000003 HC RX IP 258 OP 636: Performed by: INTERNAL MEDICINE

## 2023-12-28 PROCEDURE — 36589 REMOVAL TUNNELED CV CATH: CPT

## 2023-12-28 PROCEDURE — 84295 ASSAY OF SERUM SODIUM: CPT | Performed by: INTERNAL MEDICINE

## 2023-12-28 PROCEDURE — 99223 1ST HOSP IP/OBS HIGH 75: CPT | Performed by: PSYCHIATRY & NEUROLOGY

## 2023-12-28 PROCEDURE — 71275 CT ANGIOGRAPHY CHEST: CPT | Mod: MA

## 2023-12-28 PROCEDURE — 250N000011 HC RX IP 250 OP 636: Performed by: STUDENT IN AN ORGANIZED HEALTH CARE EDUCATION/TRAINING PROGRAM

## 2023-12-28 PROCEDURE — 250N000009 HC RX 250: Performed by: INTERNAL MEDICINE

## 2023-12-28 PROCEDURE — 250N000011 HC RX IP 250 OP 636: Mod: JZ | Performed by: INTERNAL MEDICINE

## 2023-12-28 PROCEDURE — 250N000011 HC RX IP 250 OP 636

## 2023-12-28 PROCEDURE — 258N000001 HC RX 258: Performed by: INTERNAL MEDICINE

## 2023-12-28 PROCEDURE — 84295 ASSAY OF SERUM SODIUM: CPT | Performed by: HOSPITALIST

## 2023-12-28 PROCEDURE — 70450 CT HEAD/BRAIN W/O DYE: CPT | Mod: MA

## 2023-12-28 PROCEDURE — 250N000011 HC RX IP 250 OP 636: Performed by: RADIOLOGY

## 2023-12-28 PROCEDURE — 0JPVXXZ REMOVAL OF TUNNELED VASCULAR ACCESS DEVICE FROM UPPER EXTREMITY SUBCUTANEOUS TISSUE AND FASCIA, EXTERNAL APPROACH: ICD-10-PCS | Performed by: RADIOLOGY

## 2023-12-28 PROCEDURE — 36415 COLL VENOUS BLD VENIPUNCTURE: CPT | Performed by: INTERNAL MEDICINE

## 2023-12-28 PROCEDURE — 93306 TTE W/DOPPLER COMPLETE: CPT | Mod: 26 | Performed by: INTERNAL MEDICINE

## 2023-12-28 PROCEDURE — 85027 COMPLETE CBC AUTOMATED: CPT | Performed by: HOSPITALIST

## 2023-12-28 PROCEDURE — 99221 1ST HOSP IP/OBS SF/LOW 40: CPT | Performed by: INTERNAL MEDICINE

## 2023-12-28 PROCEDURE — 36415 COLL VENOUS BLD VENIPUNCTURE: CPT | Performed by: RADIOLOGY

## 2023-12-28 PROCEDURE — 87040 BLOOD CULTURE FOR BACTERIA: CPT

## 2023-12-28 PROCEDURE — C9113 INJ PANTOPRAZOLE SODIUM, VIA: HCPCS | Performed by: INTERNAL MEDICINE

## 2023-12-28 RX ORDER — IOPAMIDOL 755 MG/ML
75 INJECTION, SOLUTION INTRAVASCULAR ONCE
Status: COMPLETED | OUTPATIENT
Start: 2023-12-28 | End: 2023-12-28

## 2023-12-28 RX ORDER — NALOXONE HYDROCHLORIDE 0.4 MG/ML
0.4 INJECTION, SOLUTION INTRAMUSCULAR; INTRAVENOUS; SUBCUTANEOUS
Status: DISCONTINUED | OUTPATIENT
Start: 2023-12-28 | End: 2024-01-03 | Stop reason: HOSPADM

## 2023-12-28 RX ORDER — DEXAMETHASONE SODIUM PHOSPHATE 4 MG/ML
4 INJECTION, SOLUTION INTRA-ARTICULAR; INTRALESIONAL; INTRAMUSCULAR; INTRAVENOUS; SOFT TISSUE EVERY 6 HOURS
Status: DISCONTINUED | OUTPATIENT
Start: 2023-12-28 | End: 2023-12-28

## 2023-12-28 RX ORDER — CEFAZOLIN SODIUM 1 G/50ML
1250 SOLUTION INTRAVENOUS ONCE
Status: COMPLETED | OUTPATIENT
Start: 2023-12-28 | End: 2023-12-28

## 2023-12-28 RX ORDER — SODIUM CHLORIDE 9 MG/ML
INJECTION, SOLUTION INTRAVENOUS CONTINUOUS
Status: DISCONTINUED | OUTPATIENT
Start: 2023-12-28 | End: 2023-12-29

## 2023-12-28 RX ORDER — DEXAMETHASONE SODIUM PHOSPHATE 4 MG/ML
4 INJECTION, SOLUTION INTRA-ARTICULAR; INTRALESIONAL; INTRAMUSCULAR; INTRAVENOUS; SOFT TISSUE ONCE
Status: COMPLETED | OUTPATIENT
Start: 2023-12-28 | End: 2023-12-28

## 2023-12-28 RX ORDER — HYDROMORPHONE HYDROCHLORIDE 1 MG/ML
INJECTION, SOLUTION INTRAMUSCULAR; INTRAVENOUS; SUBCUTANEOUS
Status: COMPLETED
Start: 2023-12-28 | End: 2023-12-28

## 2023-12-28 RX ORDER — HYDROMORPHONE HCL IN WATER/PF 6 MG/30 ML
0.2 PATIENT CONTROLLED ANALGESIA SYRINGE INTRAVENOUS EVERY 4 HOURS PRN
Status: DISCONTINUED | OUTPATIENT
Start: 2023-12-28 | End: 2024-01-01

## 2023-12-28 RX ORDER — NALOXONE HYDROCHLORIDE 0.4 MG/ML
0.2 INJECTION, SOLUTION INTRAMUSCULAR; INTRAVENOUS; SUBCUTANEOUS
Status: DISCONTINUED | OUTPATIENT
Start: 2023-12-28 | End: 2024-01-03 | Stop reason: HOSPADM

## 2023-12-28 RX ORDER — ROPINIROLE 1 MG/1
1 TABLET, FILM COATED ORAL AT BEDTIME
Status: DISCONTINUED | OUTPATIENT
Start: 2023-12-28 | End: 2024-01-03 | Stop reason: HOSPADM

## 2023-12-28 RX ORDER — VANCOMYCIN HYDROCHLORIDE 1 G/200ML
1000 INJECTION, SOLUTION INTRAVENOUS EVERY 12 HOURS
Status: DISCONTINUED | OUTPATIENT
Start: 2023-12-28 | End: 2023-12-28 | Stop reason: DRUGHIGH

## 2023-12-28 RX ORDER — HEPARIN SODIUM 10000 [USP'U]/100ML
0-5000 INJECTION, SOLUTION INTRAVENOUS CONTINUOUS
Status: DISCONTINUED | OUTPATIENT
Start: 2023-12-28 | End: 2023-12-29

## 2023-12-28 RX ORDER — CEFAZOLIN SODIUM 1 G/50ML
750 SOLUTION INTRAVENOUS EVERY 24 HOURS
Status: DISCONTINUED | OUTPATIENT
Start: 2023-12-29 | End: 2023-12-28 | Stop reason: ALTCHOICE

## 2023-12-28 RX ADMIN — INSULIN HUMAN 0.5 UNITS/HR: 1 INJECTION, SOLUTION INTRAVENOUS at 16:12

## 2023-12-28 RX ADMIN — POTASSIUM CHLORIDE, DEXTROSE MONOHYDRATE AND SODIUM CHLORIDE: 150; 5; 450 INJECTION, SOLUTION INTRAVENOUS at 07:05

## 2023-12-28 RX ADMIN — HYDROMORPHONE HYDROCHLORIDE 0.2 MG: 0.2 INJECTION, SOLUTION INTRAMUSCULAR; INTRAVENOUS; SUBCUTANEOUS at 15:15

## 2023-12-28 RX ADMIN — DEXTROSE MONOHYDRATE 25 ML: 25 INJECTION, SOLUTION INTRAVENOUS at 15:21

## 2023-12-28 RX ADMIN — ONDANSETRON 4 MG: 2 INJECTION INTRAMUSCULAR; INTRAVENOUS at 16:15

## 2023-12-28 RX ADMIN — TRAZODONE HYDROCHLORIDE 100 MG: 50 TABLET ORAL at 00:44

## 2023-12-28 RX ADMIN — IOPAMIDOL 75 ML: 755 INJECTION, SOLUTION INTRAVENOUS at 15:58

## 2023-12-28 RX ADMIN — DEXTROSE MONOHYDRATE 25 ML: 25 INJECTION, SOLUTION INTRAVENOUS at 22:52

## 2023-12-28 RX ADMIN — ROPINIROLE HYDROCHLORIDE 1 MG: 1 TABLET, FILM COATED ORAL at 02:07

## 2023-12-28 RX ADMIN — TRAZODONE HYDROCHLORIDE 100 MG: 50 TABLET ORAL at 21:09

## 2023-12-28 RX ADMIN — DEXAMETHASONE SODIUM PHOSPHATE 4 MG: 4 INJECTION, SOLUTION INTRA-ARTICULAR; INTRALESIONAL; INTRAMUSCULAR; INTRAVENOUS; SOFT TISSUE at 15:34

## 2023-12-28 RX ADMIN — HEPARIN SODIUM 5000 UNITS: 5000 INJECTION, SOLUTION INTRAVENOUS; SUBCUTANEOUS at 12:31

## 2023-12-28 RX ADMIN — SODIUM CHLORIDE: 9 INJECTION, SOLUTION INTRAVENOUS at 11:18

## 2023-12-28 RX ADMIN — PROMETHAZINE HYDROCHLORIDE 6.25 MG: 25 INJECTION INTRAMUSCULAR; INTRAVENOUS at 19:15

## 2023-12-28 RX ADMIN — HYDROMORPHONE HYDROCHLORIDE 0.2 MG: 1 INJECTION, SOLUTION INTRAMUSCULAR; INTRAVENOUS; SUBCUTANEOUS at 08:56

## 2023-12-28 RX ADMIN — ROPINIROLE HYDROCHLORIDE 1 MG: 1 TABLET, FILM COATED ORAL at 21:09

## 2023-12-28 RX ADMIN — HEPARIN SODIUM 700 UNITS/HR: 10000 INJECTION, SOLUTION INTRAVENOUS at 19:43

## 2023-12-28 RX ADMIN — PANTOPRAZOLE SODIUM 40 MG: 40 INJECTION, POWDER, FOR SOLUTION INTRAVENOUS at 04:30

## 2023-12-28 RX ADMIN — ACETAMINOPHEN 650 MG: 325 TABLET ORAL at 08:15

## 2023-12-28 RX ADMIN — METOCLOPRAMIDE 5 MG: 5 INJECTION, SOLUTION INTRAMUSCULAR; INTRAVENOUS at 08:14

## 2023-12-28 RX ADMIN — VANCOMYCIN HYDROCHLORIDE 1250 MG: 5 INJECTION, POWDER, LYOPHILIZED, FOR SOLUTION INTRAVENOUS at 02:18

## 2023-12-28 RX ADMIN — METOCLOPRAMIDE 5 MG: 5 INJECTION, SOLUTION INTRAMUSCULAR; INTRAVENOUS at 14:59

## 2023-12-28 ASSESSMENT — ACTIVITIES OF DAILY LIVING (ADL)
ADLS_ACUITY_SCORE: 19
ADLS_ACUITY_SCORE: 18
DEPENDENT_IADLS:: TRANSPORTATION;SHOPPING
ADLS_ACUITY_SCORE: 18
ADLS_ACUITY_SCORE: 19
ADLS_ACUITY_SCORE: 18
ADLS_ACUITY_SCORE: 19
ADLS_ACUITY_SCORE: 18
ADLS_ACUITY_SCORE: 19

## 2023-12-28 NOTE — CONSULTS
NEUROLOGY INPATIENT CONSULTATION NOTE       [facility]  [address] [place] [address]  Tel: [phone]  Fax: [phone]  [email or web address]     Josefa Curiel,  1975, MRN [id number]  PCP: Elsa Turner  Date: 2023     ASSESSMENT & PLAN     Diagnosis code: Headache    Intractable headache  48 years old female with DM, ESRD on dialysis, HTN admitted with nausea vomiting and DKA.  She developed mostly positional headache for the last 2 days and likely is related to DKA.  Her blood pressure was significantly elevated couple of days ago and raises the possibility of [place] ES.  CT of the head shows atrophy and small vessel ischemic disease  Check MRI brain  Symptomatic treatment for headache with Tylenol  Will follow    Thank you again for this referral, please feel free to contact me if you have any questions.    Michael Christina MD  [facility]  (Formerly, [facility]     CHIEF COMPLAINT Diabetic ketoacidosis without coma associated with type 1 diabetes mellitus (H)     HISTORY OF PRESENT ILLNESS     We have been requested by Dr. Rodriguez to evaluate Josefa Curiel who is a 48 year old  female for headache    Patient is a 48 years old female with DM, ESRD on dialysis, HTN admitted with nausea vomiting and DKA.  She was noted to have fairly elevated blood pressure and started on hydralazine.  Patient complained of headache and neurology was consulted.  According to patient couple days ago she started experiencing headaches that were mostly positional.  She was noticed these headaches mostly sitting up.  These headaches are not associated with any visual symptoms but she does feel nauseated.  There is no history of any focal weakness numbness, neck stiffness or any fever or chills.  Normally she is not headache kind of a person and denies any past history of migraine headaches.  She had a CT  of the head that showed brain atrophy and chronic small vessel ischemic changes but no acute abnormality earlier during her hospitalization her blood pressure was running somewhat high but recent readings are in normal range     PROBLEM LIST      Patient Active Problem List   Diagnosis Code    LORIN (acute kidney injury) (H24) N17.9    Cannabis abuse F12.10    Renovascular hypertension I15.0    Proteinuria R80.9    Upper GI bleeding K92.2    Proliferative diabetic retinopathy (H) E11.3599    Moderate episode of recurrent major depressive disorder (H) F33.1    Anxiety F41.9    Hyperlipidemia E78.5    Vitamin D deficiency E55.9    Trigger middle finger M65.339    Alcohol abuse, in remission F10.11    Ulcer of right foot, limited to breakdown of skin (H) L97.511    Type 1 diabetes mellitus with nephropathy (H) E10.21    Diabetic ulcer of right foot associated with type 1 diabetes mellitus, with necrosis of muscle, unspecified part of foot (H) E10.621, L97.513    Ulcer of ankle, left, with unspecified severity (H) L97.329    Nausea and vomiting R11.2    ESRD (end stage renal disease) on dialysis (H) N18.6, Z99.2    Gastroparesis K31.84    Alcohol dependence (H) F10.20    Diabetic ketoacidosis without coma associated with type 1 diabetes mellitus (H) E10.10      Clinically Significant Risk Factors             # Anion Gap Metabolic Acidosis: Highest Anion Gap = 30 mmol/L in last 2 days, will monitor and treat as appropriate        # Hypertension: Noted on problem list                          PAST MEDICAL & SURGICAL HISTORY     Past Medical History: Patient  has a past medical history of LORIN (acute kidney injury) (H24), Anxiety, Cannabis abuse, Depression, Diabetes Type 1, uncontrolled (1985), DKA (diabetic ketoacidoses), ESRD (end stage renal disease) on dialysis (H) (04/11/2023), ETOH abuse, Gastroparesis, HLD (hyperlipidemia), HTN (hypertension), Lactic acidosis, and Vitamin D deficiency.    Past Surgical History: She   has a past surgical history that includes appendectomy; Ankle Fracture Surgery (Left); PICC/Midline Placement (10/23/2022); Incision and drainage foot, combined (Right, 11/18/2022); Eye surgery; and IR CVC Tunnel Placement > 5 Yrs of Age (11/30/2023).     SOCIAL HISTORY     Reviewed, and she  reports that she has been smoking cigarettes. She has never used smokeless tobacco. She reports that she does not currently use alcohol after a past usage of about 35.0 standard drinks of alcohol per week. She reports current drug use. Drug: Marijuana.     FAMILY HISTORY     Reviewed, and family history includes Alcoholism in her maternal grandfather; Arthritis in her mother; Clotting Disorder in her mother; Coronary Artery Disease in her maternal grandfather and maternal grandmother; Depression in her father; Hyperlipidemia in her mother; No Known Problems in her brother, paternal grandfather, and paternal grandmother; Other - See Comments in her sister; Skin Cancer in her father.     ALLERGIES     Allergies   Allergen Reactions    Cefazolin Nephrotoxicity    Colestipol GI Disturbance    Doxycycline Nausea and Vomiting    Nickel Rash    Penicillins Rash        REVIEW OF SYSTEMS     Pertinent items are noted in HPI.     HOME & HOSPITAL MEDICATIONS     Prior to Admission Medications  Medications Prior to Admission   Medication Sig Dispense Refill Last Dose    acetaminophen (TYLENOL) 500 MG tablet Take 2 tablets (1,000 mg) by mouth every 6 hours as needed for pain   Unknown at prn    Cholecalciferol (VITAMIN D3) 50 MCG (2000 UT) CHEW Take 50 mcg by mouth daily Take one tablet on Monday, Wednesday and Friday 12/25/2023 at am    Cyanocobalamin (VITAMIN B-12) 500 MCG LOZG Take 500 mcg by mouth daily   Past Month at unknown    ferrous sulfate (FEROSUL) 325 (65 Fe) MG tablet Take 1 tablet (325 mg) by mouth daily 30 tablet 3 More than a month at unknown    insulin degludec (TRESIBA) 100 UNIT/ML pen Inject 16 Units Subcutaneous daily    12/26/2023 at am    insulin lispro (HUMALOG KWIKPEN) 100 UNIT/ML (1 unit dial) KWIKPEN Inject 3-4 Units Subcutaneous 4 times daily with meals or snacks   12/26/2023 at pm    lidocaine-prilocaine (EMLA) 2.5-2.5 % external cream Apply topically three times a week Monday, Wednesday, Friday 12/25/2023 at am    losartan (COZAAR) 25 MG tablet Take 25 mg by mouth daily   12/26/2023 at am    rOPINIRole (REQUIP) 0.25 MG tablet Take 1 mg by mouth at bedtime   12/26/2023 at hs    sertraline (ZOLOFT) 100 MG tablet Take 100 mg by mouth daily Take 1 tablet( 100 mg) along with 50 mg for the total dose   12/26/2023 at am    sertraline (ZOLOFT) 50 MG tablet Take 50 mg by mouth daily Take 1 tablet (50 mg) along with 100 mg for the total dose of 150 mg   12/26/2023 at am    sevelamer carbonate (RENVELA) 800 MG tablet Take 800 mg by mouth 2 times daily (with meals)   12/26/2023 at pm    traZODone (DESYREL) 100 MG tablet Take 100 mg by mouth At Bedtime   Past Week at hs    Continuous Blood Gluc Sensor (DEXCOM G6 SENSOR) [facility] Medications   heparin ANTICOAGULANT  5,000 Units Subcutaneous Q12H    losartan  25 mg Oral Daily    - MEDICATION INSTRUCTIONS -   Does not apply Once    pantoprazole  40 mg Intravenous Q24H    rOPINIRole  1 mg Oral At Bedtime    sertraline  150 mg Oral Daily    [Held by provider] sevelamer carbonate  800 mg Oral BID w/meals    sodium chloride 0.9%  250 mL Intravenous Once in dialysis/CRRT    sodium chloride 0.9%  300 mL Hemodialysis Machine Once    traZODone  100 mg Oral At Bedtime    [START ON 12/29/2023] vancomycin  750 mg Intravenous Q24H        PHYSICAL EXAM     Vital signs  Temp:  [97.7  F (36.5  C)-98  F (36.7  C)] 97.7  F (36.5  C)  Pulse:  [] 127  Resp:  [12-34] 14  BP: ()/() 143/71  SpO2:  [97 %-100 %] 100 %    General Physical Exam: Patient is alert and oriented x 3. Vital signs were reviewed and are documented in EMR. Neck was supple, no carotid bruit, thyromegaly,  JVD or lymphadenopathy noted.  Neurological Exam:  Young female who is alert and oriented vital signs are reviewed and documented in electronic medical record.  Neurologically speech was normal.  Cranial nerves II through XII are intact funduscopic exam normal.  Strength 5/5 in the upper inner drift.  Reflexes 1+ in biceps triceps brachioradialis.  Absent at knees and ankle.  Sensation decreased to light touch pinprick below knees bilaterally.  Minimal dysmetria on finger-nose testing.  Gait testing deferred.     DIAGNOSTIC STUDIES     Pertinent Radiology   Radiology Results: Reviewed impression and images     CT  1.  No CT evidence for acute intracranial process.  2.  Brain atrophy and presumed chronic microvascular ischemic changes as above.    MRI  Pending    Pertinent Labs   Lab Results: Personally Reviewed   Recent Results (from the past 24 hour(s))   Ketone Beta-Hydroxybutyrate Quantitative    Collection Time: 12/27/23 10:17 AM   Result Value Ref Range    Ketone (Beta-Hydroxybutyrate) Quantitative 4.30 (HH) <=0.30 mmol/L   Basic metabolic panel    Collection Time: 12/27/23 10:17 AM   Result Value Ref Range    Sodium 136 135 - 145 mmol/L    Potassium 4.3 3.4 - 5.3 mmol/L    Chloride 94 (L) 98 - 107 mmol/L    Carbon Dioxide (CO2) 20 (L) 22 - 29 mmol/L    Anion Gap 22 (H) 7 - 15 mmol/L    Urea Nitrogen 32.9 (H) 6.0 - 20.0 mg/dL    Creatinine 4.68 (H) 0.51 - 0.95 mg/dL    GFR Estimate 11 (L) >60 mL/min/1.73m2    Calcium 9.2 8.6 - 10.0 mg/dL    Glucose 117 (H) 70 - 99 mg/dL   Phosphorus    Collection Time: 12/27/23 10:17 AM   Result Value Ref Range    Phosphorus 4.6 (H) 2.5 - 4.5 mg/dL   Magnesium    Collection Time: 12/27/23 10:17 AM   Result Value Ref Range    Magnesium 2.2 1.7 - 2.3 mg/dL   Glucose by meter    Collection Time: 12/27/23 10:57 AM   Result Value Ref Range    GLUCOSE BY METER POCT 94 70 - 99 mg/dL   Glucose by meter    Collection Time: 12/27/23 12:06 PM   Result Value Ref Range    GLUCOSE BY METER  POCT 103 (H) 70 - 99 mg/dL   Ketone Beta-Hydroxybutyrate Quantitative    Collection Time: 12/27/23 12:14 PM   Result Value Ref Range    Ketone (Beta-Hydroxybutyrate) Quantitative 3.60 (HH) <=0.30 mmol/L   Phosphorus    Collection Time: 12/27/23 12:14 PM   Result Value Ref Range    Phosphorus 4.2 2.5 - 4.5 mg/dL   Basic metabolic panel    Collection Time: 12/27/23 12:14 PM   Result Value Ref Range    Sodium 133 (L) 135 - 145 mmol/L    Potassium 4.3 3.4 - 5.3 mmol/L    Chloride 92 (L) 98 - 107 mmol/L    Carbon Dioxide (CO2) 22 22 - 29 mmol/L    Anion Gap 19 (H) 7 - 15 mmol/L    Urea Nitrogen 32.2 (H) 6.0 - 20.0 mg/dL    Creatinine 4.60 (H) 0.51 - 0.95 mg/dL    GFR Estimate 11 (L) >60 mL/min/1.73m2    Calcium 9.3 8.6 - 10.0 mg/dL    Glucose 149 (H) 70 - 99 mg/dL   Glucose by meter    Collection Time: 12/27/23  1:12 PM   Result Value Ref Range    GLUCOSE BY METER POCT 122 (H) 70 - 99 mg/dL   Glucose by meter    Collection Time: 12/27/23  2:08 PM   Result Value Ref Range    GLUCOSE BY METER POCT 137 (H) 70 - 99 mg/dL   Glucose by meter    Collection Time: 12/27/23  3:03 PM   Result Value Ref Range    GLUCOSE BY METER POCT 154 (H) 70 - 99 mg/dL   Glucose by meter    Collection Time: 12/27/23  3:59 PM   Result Value Ref Range    GLUCOSE BY METER POCT 141 (H) 70 - 99 mg/dL   Phosphorus    Collection Time: 12/27/23  4:48 PM   Result Value Ref Range    Phosphorus 4.4 2.5 - 4.5 mg/dL   Basic metabolic panel    Collection Time: 12/27/23  4:48 PM   Result Value Ref Range    Sodium 132 (L) 135 - 145 mmol/L    Potassium 4.9 3.4 - 5.3 mmol/L    Chloride 94 (L) 98 - 107 mmol/L    Carbon Dioxide (CO2) 22 22 - 29 mmol/L    Anion Gap 16 (H) 7 - 15 mmol/L    Urea Nitrogen 32.0 (H) 6.0 - 20.0 mg/dL    Creatinine 4.72 (H) 0.51 - 0.95 mg/dL    GFR Estimate 11 (L) >60 mL/min/1.73m2    Calcium 8.8 8.6 - 10.0 mg/dL    Glucose 195 (H) 70 - 99 mg/dL   Ketone Beta-Hydroxybutyrate Quantitative    Collection Time: 12/27/23  4:48 PM   Result Value  Ref Range    Ketone (Beta-Hydroxybutyrate) Quantitative 1.70 (HH) <=0.30 mmol/L   Glucose by meter    Collection Time: 12/27/23  5:11 PM   Result Value Ref Range    GLUCOSE BY METER POCT 141 (H) 70 - 99 mg/dL   Glucose by meter    Collection Time: 12/27/23  6:03 PM   Result Value Ref Range    GLUCOSE BY METER POCT 124 (H) 70 - 99 mg/dL   Basic metabolic panel    Collection Time: 12/27/23  6:08 PM   Result Value Ref Range    Sodium 132 (L) 135 - 145 mmol/L    Potassium 4.4 3.4 - 5.3 mmol/L    Chloride 93 (L) 98 - 107 mmol/L    Carbon Dioxide (CO2) 23 22 - 29 mmol/L    Anion Gap 16 (H) 7 - 15 mmol/L    Urea Nitrogen 31.8 (H) 6.0 - 20.0 mg/dL    Creatinine 4.86 (H) 0.51 - 0.95 mg/dL    GFR Estimate 11 (L) >60 mL/min/1.73m2    Calcium 8.9 8.6 - 10.0 mg/dL    Glucose 136 (H) 70 - 99 mg/dL   UA with Microscopic reflex to Culture    Collection Time: 12/27/23  6:52 PM    Specimen: Urine, Clean Catch   Result Value Ref Range    Color Urine Light Yellow Colorless, Straw, Light Yellow, Yellow    Appearance Urine Clear Clear    Glucose Urine >1000 (A) Negative mg/dL    Bilirubin Urine Negative Negative    Ketones Urine 60 (A) Negative mg/dL    Specific Gravity Urine 1.013 1.001 - 1.030    Blood Urine 0.03 mg/dL (A) Negative    pH Urine 6.0 5.0 - 7.0    Protein Albumin Urine 300 (A) Negative mg/dL    Urobilinogen Urine <2.0 <2.0 mg/dL    Nitrite Urine Negative Negative    Leukocyte Esterase Urine Negative Negative    Mucus Urine Present (A) None Seen /LPF    Amorphous Crystals Urine Few (A) None Seen /HPF    RBC Urine 1 <=2 /HPF    WBC Urine 4 <=5 /HPF    Squamous Epithelials Urine 3 (H) <=1 /HPF   Urine Drug Screen Panel    Collection Time: 12/27/23  6:52 PM   Result Value Ref Range    Amphetamines Urine Screen Negative Screen Negative    Barbituates Urine Screen Negative Screen Negative    Benzodiazepine Urine Screen Negative Screen Negative    Cannabinoids Urine Screen Positive (A) Screen Negative    Cocaine Urine Screen  Negative Screen Negative    Fentanyl Qual Urine Screen Negative Screen Negative    Opiates Urine Screen Positive (A) Screen Negative    PCP Urine Screen Negative Screen Negative   Glucose by meter    Collection Time: 12/27/23  7:09 PM   Result Value Ref Range    GLUCOSE BY METER POCT 188 (H) 70 - 99 mg/dL   Glucose by meter    Collection Time: 12/27/23  8:06 PM   Result Value Ref Range    GLUCOSE BY METER POCT 382 (H) 70 - 99 mg/dL   Basic metabolic panel    Collection Time: 12/27/23  8:11 PM   Result Value Ref Range    Sodium 132 (L) 135 - 145 mmol/L    Potassium 4.2 3.4 - 5.3 mmol/L    Chloride 90 (L) 98 - 107 mmol/L    Carbon Dioxide (CO2) 19 (L) 22 - 29 mmol/L    Anion Gap 23 (H) 7 - 15 mmol/L    Urea Nitrogen 32.7 (H) 6.0 - 20.0 mg/dL    Creatinine 5.06 (H) 0.51 - 0.95 mg/dL    GFR Estimate 10 (L) >60 mL/min/1.73m2    Calcium 9.2 8.6 - 10.0 mg/dL    Glucose 354 (H) 70 - 99 mg/dL   Glucose by meter    Collection Time: 12/27/23  8:31 PM   Result Value Ref Range    GLUCOSE BY METER POCT 415 (H) 70 - 99 mg/dL   Glucose by meter    Collection Time: 12/27/23  9:05 PM   Result Value Ref Range    GLUCOSE BY METER POCT 346 (H) 70 - 99 mg/dL   Glucose by meter    Collection Time: 12/27/23 10:07 PM   Result Value Ref Range    GLUCOSE BY METER POCT 133 (H) 70 - 99 mg/dL   Basic metabolic panel    Collection Time: 12/27/23 10:11 PM   Result Value Ref Range    Sodium 135 135 - 145 mmol/L    Potassium 3.5 3.4 - 5.3 mmol/L    Chloride 96 (L) 98 - 107 mmol/L    Carbon Dioxide (CO2) 23 22 - 29 mmol/L    Anion Gap 16 (H) 7 - 15 mmol/L    Urea Nitrogen 18.3 6.0 - 20.0 mg/dL    Creatinine 2.78 (H) 0.51 - 0.95 mg/dL    GFR Estimate 21 (L) >60 mL/min/1.73m2    Calcium 8.7 8.6 - 10.0 mg/dL    Glucose 150 (H) 70 - 99 mg/dL   Glucose by meter    Collection Time: 12/27/23 11:09 PM   Result Value Ref Range    GLUCOSE BY METER POCT 87 70 - 99 mg/dL   Glucose by meter    Collection Time: 12/28/23 12:26 AM   Result Value Ref Range    GLUCOSE  BY METER POCT 132 (H) 70 - 99 mg/dL   Basic metabolic panel    Collection Time: 12/28/23 12:27 AM   Result Value Ref Range    Sodium 136 135 - 145 mmol/L    Potassium 3.9 3.4 - 5.3 mmol/L    Chloride 95 (L) 98 - 107 mmol/L    Carbon Dioxide (CO2) 24 22 - 29 mmol/L    Anion Gap 17 (H) 7 - 15 mmol/L    Urea Nitrogen 9.9 6.0 - 20.0 mg/dL    Creatinine 1.97 (H) 0.51 - 0.95 mg/dL    GFR Estimate 31 (L) >60 mL/min/1.73m2    Calcium 8.7 8.6 - 10.0 mg/dL    Glucose 133 (H) 70 - 99 mg/dL   Glucose by meter    Collection Time: 12/28/23 12:58 AM   Result Value Ref Range    GLUCOSE BY METER POCT 137 (H) 70 - 99 mg/dL   Basic metabolic panel    Collection Time: 12/28/23  2:10 AM   Result Value Ref Range    Sodium 137 135 - 145 mmol/L    Potassium 3.9 3.4 - 5.3 mmol/L    Chloride 97 (L) 98 - 107 mmol/L    Carbon Dioxide (CO2) 26 22 - 29 mmol/L    Anion Gap 14 7 - 15 mmol/L    Urea Nitrogen 10.6 6.0 - 20.0 mg/dL    Creatinine 2.37 (H) 0.51 - 0.95 mg/dL    GFR Estimate 25 (L) >60 mL/min/1.73m2    Calcium 8.6 8.6 - 10.0 mg/dL    Glucose 103 (H) 70 - 99 mg/dL   Glucose by meter    Collection Time: 12/28/23  2:10 AM   Result Value Ref Range    GLUCOSE BY METER POCT 101 (H) 70 - 99 mg/dL   Glucose by meter    Collection Time: 12/28/23  3:11 AM   Result Value Ref Range    GLUCOSE BY METER POCT 74 70 - 99 mg/dL   Glucose by meter    Collection Time: 12/28/23  4:09 AM   Result Value Ref Range    GLUCOSE BY METER POCT 83 70 - 99 mg/dL   Basic metabolic panel    Collection Time: 12/28/23  4:29 AM   Result Value Ref Range    Sodium 136 135 - 145 mmol/L    Potassium 4.0 3.4 - 5.3 mmol/L    Chloride 97 (L) 98 - 107 mmol/L    Carbon Dioxide (CO2) 24 22 - 29 mmol/L    Anion Gap 15 7 - 15 mmol/L    Urea Nitrogen 11.2 6.0 - 20.0 mg/dL    Creatinine 2.60 (H) 0.51 - 0.95 mg/dL    GFR Estimate 22 (L) >60 mL/min/1.73m2    Calcium 8.5 (L) 8.6 - 10.0 mg/dL    Glucose 98 70 - 99 mg/dL   Renal panel    Collection Time: 12/28/23  4:29 AM   Result Value Ref  Range    Sodium 136 135 - 145 mmol/L    Potassium 4.0 3.4 - 5.3 mmol/L    Chloride 97 (L) 98 - 107 mmol/L    Carbon Dioxide (CO2) 24 22 - 29 mmol/L    Anion Gap 15 7 - 15 mmol/L    Glucose 98 70 - 99 mg/dL    Urea Nitrogen 11.2 6.0 - 20.0 mg/dL    Creatinine 2.60 (H) 0.51 - 0.95 mg/dL    GFR Estimate 22 (L) >60 mL/min/1.73m2    Calcium 8.5 (L) 8.6 - 10.0 mg/dL    Albumin 4.3 3.5 - 5.2 g/dL    Phosphorus 2.9 2.5 - 4.5 mg/dL   CBC with platelets    Collection Time: 12/28/23  4:29 AM   Result Value Ref Range    WBC Count 8.6 4.0 - 11.0 10e3/uL    RBC Count 3.15 (L) 3.80 - 5.20 10e6/uL    Hemoglobin 9.8 (L) 11.7 - 15.7 g/dL    Hematocrit 29.5 (L) 35.0 - 47.0 %    MCV 94 78 - 100 fL    MCH 31.1 26.5 - 33.0 pg    MCHC 33.2 31.5 - 36.5 g/dL    RDW 13.1 10.0 - 15.0 %    Platelet Count 174 150 - 450 10e3/uL   Ketone Beta-Hydroxybutyrate Quantitative    Collection Time: 12/28/23  4:29 AM   Result Value Ref Range    Ketone (Beta-Hydroxybutyrate) Quantitative 3.00 (HH) <=0.30 mmol/L   Glucose by meter    Collection Time: 12/28/23  5:02 AM   Result Value Ref Range    GLUCOSE BY METER POCT 107 (H) 70 - 99 mg/dL   Glucose by meter    Collection Time: 12/28/23  6:08 AM   Result Value Ref Range    GLUCOSE BY METER POCT 124 (H) 70 - 99 mg/dL   Glucose by meter    Collection Time: 12/28/23  6:58 AM   Result Value Ref Range    GLUCOSE BY METER POCT 88 70 - 99 mg/dL   Glucose by meter    Collection Time: 12/28/23  7:54 AM   Result Value Ref Range    GLUCOSE BY METER POCT 108 (H) 70 - 99 mg/dL   Echocardiogram Complete    Collection Time: 12/28/23  8:57 AM   Result Value Ref Range    LVEF  >70%    Glucose by meter    Collection Time: 12/28/23  9:01 AM   Result Value Ref Range    GLUCOSE BY METER POCT 274 (H) 70 - 99 mg/dL       Total time spent for face to face visit, reviewing labs/imaging studies, counseling and coordination of care was: 1 Hour 30 Minutes More than 50% of this time was spent on counseling and coordination of  care.    This note was dictated using voice recognition software.  Any grammatical or context distortions are unintentional and inherent to the software.

## 2023-12-28 NOTE — PHARMACY-VANCOMYCIN DOSING SERVICE
Pharmacy Vancomycin Initial Note  Date of Service 2023  Patient's  1975  48 year old, female    Indication: Bacteremia    Current estimated CrCl = Estimated Creatinine Clearance: 24.1 mL/min (A) (based on SCr of 2.6 mg/dL (H)).    Creatinine for last 3 days  2023:  1:40 AM Creatinine 4.42 mg/dL;  8:03 AM Creatinine 4.53 mg/dL; 10:17 AM Creatinine 4.68 mg/dL; 12:14 PM Creatinine 4.60 mg/dL;  4:48 PM Creatinine 4.72 mg/dL;  6:08 PM Creatinine 4.86 mg/dL;  8:11 PM Creatinine 5.06 mg/dL; 10:11 PM Creatinine 2.78 mg/dL  2023: 12:27 AM Creatinine 1.97 mg/dL;  2:10 AM Creatinine 2.37 mg/dL;  4:29 AM Creatinine 2.60 mg/dL;  4:29 AM Creatinine 2.60 mg/dL    Recent Vancomycin Level(s) for last 3 days  No results found for requested labs within last 3 days.      Vancomycin IV Administrations (past 72 hours)                     vancomycin (VANCOCIN) 1,250 mg in sodium chloride 0.9 % 250 mL intermittent infusion (mg) 1,250 mg New Bag 23 0218                    Nephrotoxins and other renal medications (From now, onward)      Start     Dose/Rate Route Frequency Ordered Stop    23 0000  vancomycin (VANCOCIN) 750 mg in sodium chloride 0.9 % 250 mL intermittent infusion         750 mg  over 60 Minutes Intravenous EVERY 24 HOURS 23 0122              Contrast Orders - past 72 hours (72h ago, onward)      None            [employer] Prediction of Planned Initial Vancomycin Regimen    Loading dose: N/A  Regimen: 750 mg IV every 24 hours.  Start time: 14:18 on 2023  Exposure target: AUC24 (range)400-600 mg/L.hr   AUC24,ss: 571 mg/L.hr  Probability of AUC24 > 400: 86 %  Ctrough,ss: 19.3 mg/L  Probability of Ctrough,ss > 20: 46 %  Probability of nephrotoxicity (Lodise JAY ): 16 %          Plan:  Start vancomycin  1250 mg IV once for loading dose then 750mg q24h.   Vancomycin monitoring method: AUC  Vancomycin therapeutic monitoring goal: 400-600 mg*h/L  Pharmacy will check  vancomycin levels as appropriate in 1-3 Days.    Serum creatinine levels will be ordered daily for the first week of therapy and at least twice weekly for subsequent weeks.      Dick MunozD

## 2023-12-28 NOTE — PROCEDURES
Procedures    Successful tunneled HD cath removal for SVC syndrome.      Putting in order for heparin drip (low intensity) to start in one hour.    Would treat for minimum of 24 hours IV drip then transition to lovenox or other for longer term anticoagulation.  Could leave on heparin until out of ICU.    Anticipate femoral access will be needed for HD.      Once swelling resolves, can do catheter venogram to assess SVC.  Optimistic that heparin and possibly angioplasty will allow for new internal jugular access for HD.    Patrice Ewing MD  Interventional Radiology  Clinic: [phone]  Pager: [phone]

## 2023-12-28 NOTE — PROGRESS NOTES
Called about positive blood cultures from blood drawn 12/27/23 at 4 AM.  Blood cultures positive for positive cocci in clusters.  Vancomycin ordered.  Echocardiogram ordered as well.  A.m. team to follow-up for further cares.

## 2023-12-28 NOTE — CONSULTS
Consultation - [facility]  Josefa Curiel,  1975, MRN [id number]    [facility]  Diabetic ketoacidosis without coma associated with type 1 diabetes mellitus (H) [E10.10]    PCP: Elsa Turner, [phone]   Code status:  Full Code       Extended Emergency Contact Information  Primary Emergency Contact: Mikki Epperson  Mobile Phone: [phone]  Relation: Sister       Assessment and Plan   Principal Problem:    Diabetic ketoacidosis without coma associated with type 1 diabetes mellitus (H)  Active Problems:    ESRD (end stage renal disease) on dialysis (H)    Impression:  GPC clusters bacteremia in one of two sets of blood cultures obtained .  Unclear significance at this time.  Has been started on vancomycin    She has a RIJ dialysis cathteter--placed four weeks ago.     DKA    ESKD on HD    Facial edema and headache, uncertain etiology--consideration for SVC syndrome per discussion with Dr. Alexander.    Recommendation:  Continue vancomycin for now.  Daily surveillance blood cultures pending further microbiology reports.    Will also need culture from permacath with next dialysis.    Oriana with Dr. Orville Alexander from nephrology    Thank you for consulting [facility]    Ricardo Koch MD  [phone]     Chief Complaint Diabetic ketoacidosis without coma associated with type 1 diabetes mellitus (H)       HPI   We have been requested by Belle Rodriguez MD  to evaluate Josefa Curiel for bacteremia who is a 48 year old year old female.    Is a 48-year-old woman with past medical history significant for type 1 diabetes, end-stage kidney disease on hemodialysis.    She presented to the emergency room yesterday because of nausea vomiting abdominal pain and generalized edema, especially in her face, neck and head.  This was associated with some headaches.    She did not have any fevers, chills,  sweats.    Evaluation in the ED revealed that she had diabetic ketoacidosis.  In addition to this workup, blood cultures x 2 were obtained.  1 set is no growth, the other has gram-positive cocci in clusters, genotypic analysis is pending.    Patient has a PermCath in the right IJ site placed about a month ago.  She has a fistula in the left upper extremity that apparently is not working well.    She lives with her boyfriend and pet cat..  Her boyfriend is healthy.     Medical History  Patient Active Problem List   Diagnosis    LORIN (acute kidney injury) (H24)    Cannabis abuse    Renovascular hypertension    Proteinuria    Upper GI bleeding    Proliferative diabetic retinopathy (H)    Moderate episode of recurrent major depressive disorder (H)    Anxiety    Hyperlipidemia    Vitamin D deficiency    Trigger middle finger    Alcohol abuse, in remission    Ulcer of right foot, limited to breakdown of skin (H)    Type 1 diabetes mellitus with nephropathy (H)    Diabetic ulcer of right foot associated with type 1 diabetes mellitus, with necrosis of muscle, unspecified part of foot (H)    Ulcer of ankle, left, with unspecified severity (H)    Nausea and vomiting    ESRD (end stage renal disease) on dialysis (H)    Gastroparesis    Alcohol dependence (H)    Diabetic ketoacidosis without coma associated with type 1 diabetes mellitus (H)     Past Medical History:   Diagnosis Date    LORIN (acute kidney injury) (H24)     Anxiety     Cannabis abuse     Depression     Diabetes Type 1, uncontrolled 1985    Onset age 10    DKA (diabetic ketoacidoses)     ESRD (end stage renal disease) on dialysis (H) 04/11/2023    start date per 2728 form    ETOH abuse     Gastroparesis     HLD (hyperlipidemia)     HTN (hypertension)     Lactic acidosis     Vitamin D deficiency     Surgical History  She  has a past surgical history that includes appendectomy; Ankle Fracture Surgery (Left); PICC/Midline Placement (10/23/2022); Incision and drainage  foot, combined (Right, 11/18/2022); Eye surgery; and IR CVC Tunnel Placement > 5 Yrs of Age (11/30/2023).   Social History  Reviewed, and she  reports that she has been smoking cigarettes. She has never used smokeless tobacco. She reports that she does not currently use alcohol after a past usage of about 35.0 standard drinks of alcohol per week. She reports current drug use. Drug: Marijuana.   Allergies  Allergies   Allergen Reactions    Cefazolin Nephrotoxicity    Colestipol GI Disturbance    Doxycycline Nausea and Vomiting    Nickel Rash    Penicillins Rash    Family History  Noncontributory to current problem except as mentioned above    Psychosocial Needs  Social History     Social History Narrative    Not on file     Additional psychosocial needs reviewed per nursing assessment.     Prior to Admission Medications   Medications Prior to Admission   Medication Sig Dispense Refill Last Dose    acetaminophen (TYLENOL) 500 MG tablet Take 2 tablets (1,000 mg) by mouth every 6 hours as needed for pain   Unknown at prn    Cholecalciferol (VITAMIN D3) 50 MCG (2000 UT) CHEW Take 50 mcg by mouth daily Take one tablet on Monday, Wednesday and Friday 12/25/2023 at am    Cyanocobalamin (VITAMIN B-12) 500 MCG LOZG Take 500 mcg by mouth daily   Past Month at unknown    ferrous sulfate (FEROSUL) 325 (65 Fe) MG tablet Take 1 tablet (325 mg) by mouth daily 30 tablet 3 More than a month at unknown    insulin degludec (TRESIBA) 100 UNIT/ML pen Inject 16 Units Subcutaneous daily   12/26/2023 at am    insulin lispro (HUMALOG KWIKPEN) 100 UNIT/ML (1 unit dial) KWIKPEN Inject 3-4 Units Subcutaneous 4 times daily with meals or snacks   12/26/2023 at pm    lidocaine-prilocaine (EMLA) 2.5-2.5 % external cream Apply topically three times a week Monday, Wednesday, Friday 12/25/2023 at am    losartan (COZAAR) 25 MG tablet Take 25 mg by mouth daily   12/26/2023 at am    rOPINIRole (REQUIP) 0.25 MG tablet Take 1 mg by mouth at bedtime    "12/26/2023 at hs    sertraline (ZOLOFT) 100 MG tablet Take 100 mg by mouth daily Take 1 tablet( 100 mg) along with 50 mg for the total dose   12/26/2023 at am    sertraline (ZOLOFT) 50 MG tablet Take 50 mg by mouth daily Take 1 tablet (50 mg) along with 100 mg for the total dose of 150 mg   12/26/2023 at am    sevelamer carbonate (RENVELA) 800 MG tablet Take 800 mg by mouth 2 times daily (with meals)   12/26/2023 at pm    traZODone (DESYREL) 100 MG tablet Take 100 mg by mouth At Bedtime   Past Week at hs    Continuous Blood Gluc Sensor [facility]DEXCOM G6 SENSOR) [facility]              Review of Systems:  Pertinent items are noted in HPI., otherwise all others negative. Physical Exam:  Temp:  [97.7  F (36.5  C)-98  F (36.7  C)] 97.7  F (36.5  C)  Pulse:  [] 127  Resp:  [12-34] 14  BP: ()/() 143/71  SpO2:  [97 %-100 %] 100 %    BP (!) 143/71   Pulse (!) 127   Temp 97.7  F (36.5  C) (Oral)   Resp 14   Ht 1.702 m (5' 7\")   Wt 56.4 kg (124 lb 4.8 oz)   LMP 12/13/2023   SpO2 100%   BMI 19.47 kg/m    General appearance: alert, appears stated age, and cooperative  Head: Normocephalic, without obvious abnormality, atraumatic--may be some subtle facial edema although I do not have a baseline for comparison  Eyes: Rectal muscles intact, no icterus  Neck: no adenopathy and supple, symmetrical, trachea midline  Lungs: clear to auscultation bilaterally  Heart: Regular rate and rhythm no murmur  Chest: She has a double-lumen dialysis catheter in the right IJ  Abdomen: soft, non-tender; bowel sounds normal; no masses,  no organomegaly  Extremities: Warm and dry  Skin: Skin color, texture, turgor normal. No rashes or lesions  Neurologic: Grossly normal       Pertinent Labs  Lab Results: personally reviewed.   WBC Count   Date/Time Value Ref Range Status   12/28/2023 04:29 AM 8.6 4.0 - 11.0 10e3/uL Final   12/27/2023 01:40 AM 11.6 (H) 4.0 - 11.0 10e3/uL Final   08/17/2023 12:48 PM 9.2 4.0 - 11.0 10e3/uL Final "     Hemoglobin   Date/Time Value Ref Range Status   12/28/2023 04:29 AM 9.8 (L) 11.7 - 15.7 g/dL Final   12/27/2023 01:40 AM 11.3 (L) 11.7 - 15.7 g/dL Final   08/17/2023 12:48 PM 11.7 11.7 - 15.7 g/dL Final     Hematocrit   Date/Time Value Ref Range Status   12/28/2023 04:29 AM 29.5 (L) 35.0 - 47.0 % Final   12/27/2023 01:40 AM 34.7 (L) 35.0 - 47.0 % Final   08/17/2023 12:48 PM 33.6 (L) 35.0 - 47.0 % Final     Platelet Count   Date/Time Value Ref Range Status   12/28/2023 04:29  150 - 450 10e3/uL Final   12/27/2023 01:40  150 - 450 10e3/uL Final   08/17/2023 12:48  150 - 450 10e3/uL Final        Sodium   Date/Time Value Ref Range Status   12/28/2023 04:29  135 - 145 mmol/L Final     Comment:     Reference intervals for this test were updated on 09/26/2023 to more accurately reflect our healthy population. There may be differences in the flagging of prior results with similar values performed with this method. Interpretation of those prior results can be made in the context of the updated reference intervals.  Reference intervals for this test were updated on 09/26/2023 to more accurately reflect our healthy population. There may be differences in the flagging of prior results with similar values performed with this method. Interpretation of those prior results can be made in the context of the updated reference intervals.    12/28/2023 04:29  135 - 145 mmol/L Final     Comment:     Reference intervals for this test were updated on 09/26/2023 to more accurately reflect our healthy population. There may be differences in the flagging of prior results with similar values performed with this method. Interpretation of those prior results can be made in the context of the updated reference intervals.    12/28/2023 02:10  135 - 145 mmol/L Final     Comment:     Reference intervals for this test were updated on 09/26/2023 to more accurately reflect our healthy population. There may be  differences in the flagging of prior results with similar values performed with this method. Interpretation of those prior results can be made in the context of the updated reference intervals.      Carbon Dioxide (CO2)   Date/Time Value Ref Range Status   12/28/2023 04:29 AM 24 22 - 29 mmol/L Final   12/28/2023 04:29 AM 24 22 - 29 mmol/L Final   12/28/2023 02:10 AM 26 22 - 29 mmol/L Final   11/03/2022 01:35 PM 25 20 - 32 mmol/L Final   10/03/2020 07:50 AM 26 22 - 31 mmol/L Final   10/02/2020 11:53 AM 24 22 - 31 mmol/L Final     Urea Nitrogen   Date/Time Value Ref Range Status   12/28/2023 04:29 AM 11.2 6.0 - 20.0 mg/dL Final   12/28/2023 04:29 AM 11.2 6.0 - 20.0 mg/dL Final   12/28/2023 02:10 AM 10.6 6.0 - 20.0 mg/dL Final   11/03/2022 01:35 PM 37 (H) 7 - 30 mg/dL Final   10/03/2020 07:50 AM 12 8 - 22 mg/dL Final   10/02/2020 11:53 AM 19 8 - 22 mg/dL Final     Creatinine   Date Value Ref Range Status   12/28/2023 2.60 (H) 0.51 - 0.95 mg/dL Final   12/28/2023 2.60 (H) 0.51 - 0.95 mg/dL Final     Liver Function Studies -   Recent Labs   Lab Test 12/28/23  0429 12/27/23  0140   PROTTOTAL  --  8.0   ALBUMIN 4.3 5.0   BILITOTAL  --  0.5   ALKPHOS  --  112   AST  --  21   ALT  --  13       7-Day Micro Results       Collected Updated Procedure Result Status      12/27/2023 0423 12/28/2023 0610 Blood Culture Peripheral Blood [[id number]]   (Abnormal)   Peripheral Blood    Preliminary result Component Value   Culture Positive on the 1st day of incubation  [P]     Gram positive cocci in clusters  [P]     2 of 2 bottles               12/27/2023 0423 12/28/2023 0932 Blood Culture Peripheral Blood [[id number]]   Peripheral Blood    Preliminary result Component Value   Culture No growth after 1 day  [P]                12/27/2023 0423 12/28/2023 0236 Verigene GP Panel [[id number]]    (Abnormal)   Peripheral Blood    Final result Component Value   Staphylococcus species Detected   Positive for coagulase-negative  Staphylococci, other than Staphylococcus epidermidis and Staphylococcus lugdunensis, by [employer]ene multiplex nucleic acid test. Coagulase-negative Staphylococci are the most common venipuncture or collection associated skin contaminants grown in blood cultures. Final identification and antimicrobial susceptibility testing will be verified by standard methods.   Staphylococcus aureus Not Detected   Staphylococcus epidermidis Not Detected   Staphylococcus lugdunensis Not Detected   Enterococcus faecalis Not Detected   Enterococcus faecium Not Detected   Streptococcus species Not Detected   Streptococcus agalactiae Not Detected   Streptococcus anginosus group Not Detected   Streptococcus pneumoniae Not Detected   Streptococcus pyogenes Not Detected   Listeria species Not Detected            12/27/2023 0355 12/27/2023 0444 Symptomatic Influenza A/B, RSV, & SARS-CoV2 PCR (COVID-19) Nasopharyngeal [[id number]]    Swab from Nasopharyngeal    Final result Component Value   Influenza A PCR Negative   Influenza B PCR Negative   RSV PCR Negative   SARS CoV2 PCR Negative   NEGATIVE: SARS-CoV-2 (COVID-19) RNA not detected, presumed negative.                       Pertinent Radiology  Radiology Results: cxr personally reviewed    CT Head w/o Contrast    Result Date: 12/28/2023  EXAM: CT HEAD W/O CONTRAST LOCATION: [facility] DATE: 12/28/2023 INDICATION: headache in ESRD DKA pt COMPARISON: None. TECHNIQUE: Routine CT Head without IV contrast. Multiplanar reformats. Dose reduction techniques were used. FINDINGS: INTRACRANIAL CONTENTS: No intracranial hemorrhage, extraaxial collection, or mass effect.  No CT evidence of acute infarct. Mild to moderate presumed chronic small vessel ischemic changes. Mild to moderate generalized volume loss. No hydrocephalus. VISUALIZED ORBITS/SINUSES/MASTOIDS: No intraorbital abnormality. No paranasal sinus mucosal disease. No middle ear or mastoid effusion. BONES/SOFT  TISSUES: No acute abnormality.     IMPRESSION: 1.  No CT evidence for acute intracranial process. 2.  Brain atrophy and presumed chronic microvascular ischemic changes as above.    Echocardiogram Complete    Result Date: 2023  [id number]^GAMALIEL^DANILO^[place] [address]  Name: DONITA FREITAS MRN: [id number] : 1975 Study Date: 2023 08:11 AM Age: 48 yrs Gender: Female Patient Location: [facility] Reason For Study: Other, Please Specify in Comments Ordering Physician: DANILO ALSTON Performed By: KUNAL  BSA: 1.7 m2 Height: 67 in Weight: 124 lb HR: 114 BP: 135/65 mmHg ______________________________________________________________________________ Procedure Complete Portable Echo Adult. Adequate quality two-dimensional was performed and interpreted. Poor quality color and spectral Doppler were performed and interpreted. ______________________________________________________________________________ Interpretation Summary  The left ventricle is normal in size. No regional wall motion abnormalities noted. Normal right ventricle size and systolic function. There is moderate mitral annular calcification. Thickened mitral valve posterior leaflet Decreased mobility of posterior MV leaflet. There is moderate mitral stenosis. 9 mm Mean gradient at HR of 119. Mild to moderate valvular aortic stenosis. Mild increased flow velocity across PV likely related to hyperdynamic state. IVC diameter and respiratory changes fall into an intermediate range suggesting an RA pressure of 8 mmHg. The rhythm was sinus tachycardia. Posterior leaflet has calcified nodule that is more prominent than MRAAH from 10/21/22. No clear vegetation but cannot exclude vegetation. ______________________________________________________________________________ Left Ventricle The left ventricle is normal in size. There is normal left ventricular wall thickness. Diastolic Doppler  findings (E/E' ratio and/or other parameters) suggest left ventricular filling pressures are increased. The visual ejection fraction is >70%. No regional wall motion abnormalities noted.  Right Ventricle Normal right ventricle size and systolic function. TAPSE is normal, which is consistent with normal right ventricular systolic function.  Atria The left atrium is mild to moderately dilated. Right atrial size is normal.  Mitral Valve There is moderate mitral annular calcification. Thickened mitral valve posterior leaflet. Decreased mobility of posterior MV leaflet. There is moderate mitral stenosis. 9 mm Mean gradient at HR of 119.  Tricuspid Valve The tricuspid valve is not well visualized. There is mild (1+) tricuspid regurgitation.  Aortic Valve There is mild trileaflet aortic sclerosis. No aortic regurgitation is present. Mild to moderate valvular aortic stenosis.  Pulmonic Valve The pulmonic valve is not well visualized. Mild increased flow velocity across PV likely related to hyperdynamic state.  Vessels The aorta root is normal. Normal size ascending aorta. IVC diameter and respiratory changes fall into an intermediate range suggesting an RA pressure of 8 mmHg.  Pericardium There is no pericardial effusion.  Rhythm The rhythm was sinus tachycardia. ______________________________________________________________________________ MMode/2D Measurements & Calculations  IVSd: 0.96 cm LVIDd: 4.1 cm LVIDs: 2.8 cm LVPWd: 0.82 cm FS: 33.1 % LV mass(C)d: 113.0 grams LV mass(C)dI: 68.5 grams/m2 Ao root diam: 2.6 cm asc Aorta Diam: 3.3 cm LVOT diam: 2.0 cm LVOT area: 3.0 cm2 Ao root diam index Ht(cm/m): 1.5 Ao root diam index BSA (cm/m2): 1.6 Asc Ao diam index BSA (cm/m2): 2.0 Asc Ao diam index Ht(cm/m): 1.9 LA Volume (BP): 58.5 ml  LA Volume Index (BP): 35.5 ml/m2 LA Volume Indexed (AL/bp): 37.9 ml/m2 RWT: 0.40 TAPSE: 2.4 cm  Doppler Measurements & Calculations MV E max jm: 139.0 cm/sec MV A max jm: 184.0 cm/sec MV E/A:  0.76  MV max P.6 mmHg MV mean P.0 mmHg MV V2 VTI: 28.6 cm MVA(VTI): 3.0 cm2 MV dec slope: 626.0 cm/sec2 MV dec time: 0.22 sec Ao V2 max: 201.0 cm/sec Ao max P.0 mmHg Ao V2 mean: 126.0 cm/sec Ao mean P.0 mmHg Ao V2 VTI: 24.3 cm ERIC(I,D): 3.5 cm2 ERIC(V,D): 2.9 cm2 LV V1 max PG: 15.5 mmHg LV V1 max: 197.0 cm/sec LV V1 VTI: 28.5 cm SV(LVOT): 85.1 ml SI(LVOT): 51.6 ml/m2 PA V2 max: 172.0 cm/sec PA max P.8 mmHg PA acc time: 0.18 sec AV Christiano Ratio (DI): 0.98 ERIC Index (cm2/m2): 2.1 E/E' av.2 Lateral E/e': 14.0 Medial E/e': 18.5 RV S Christiano: 20.7 cm/sec  ______________________________________________________________________________ Report approved by: Eli Mayers 2023 09:07 AM       Chest XR,  PA & LAT    Result Date: 2023  EXAM: XR CHEST 2 VIEWS LOCATION: [facility] DATE: 2023 INDICATION: eval for pna COMPARISON: 2022     IMPRESSION: Right IJ central venous catheter tip in the right atrium. Lungs clear. No pleural effusion or pneumothorax. Normal heart size and pulmonary vascularity.    IR AV FISTULA/GRAFT ANGIO    Result Date: 2023  For Patients: As a result of the  Cures Act, medical imaging exams and procedure reports are released immediately into your electronic medical record. You may view this report before your referring provider. If you have questions, please contact your health care provider. [facility] LOCATION: [facility] DATE: 2023 1. DIALYSIS FISTULOGRAM. 2. MODERATE SEDATION 3. ULTRASOUND GUIDED VASCULAR ACCESS INDICATION: 48-year-old female with upper arm fistula presents for fistulogram and possible intervention due to pain during cannulation. FLUOROSCOPIC TIME: 0.3 mins. DOSE: Air Kerma:13 mGy. CONTRAST: 30 mL Omnipaque 300. SEDATION: Prior to the procedure, the patient was alert and oriented. Versed 2 mg and fentanyl 100 mcg were administered intravenously with continuous vital signs  monitoring by the radiology nurse under my direct supervision. Patient was alert and oriented post procedure. Total face to face moderate sedation time: 15 minutes. PROCEDURE: After obtaining informed written oral consent, the patient's arm was prepped and draped in a sterile fashion. Prior to the procedure, patency of the fistula was assessed with ultrasound and sonographic images recorded. Local anesthesia was infiltrated in the soft tissues of the left upper arm. Using a 21 gauge needle and real-time ultrasound guidance,, the fistula was punctured and a guidewire advanced. 4 Belarusian sheath was placed. Fistulogram was obtained from the arterial anastomosis to the superior vena cava. Sheath was removed and hemostasis was obtained manually. FINDINGS: FISTULOGRAM: Patent left brachial artery to transposed basilic vein fistula. Arterial anastomosis widely patent. No evidence of central venous stenosis. Right IJ  dialysis catheter is noted.    1.  Patent left brachial artery to transposed basilic vein fistula. No significant stenosis identified.    IR CVC Tunnel Placement > 5 Yrs of Age    Result Date: 11/30/2023  [facility] LOCATION: [facility] DATE: 11/30/2023 PROCEDURE:TUNNELED DIALYSIS CATHETER PLACEMENT INTERVENTIONAL RADIOLOGIST: Pradeep Goldsmith MD. INDICATION: Patient with left upper arm fistula, placed at outside institution, not functioning well. Request made for consistent hemodialysis access.. CONSENT: The risks, benefits and alternatives of tunneled dialysis catheter placement were discussed with the patient  in detail. All questions were answered. Informed consent was given to proceed with the procedure. MODERATE SEDATION: Versed 1 mg IV; Fentanyl 50 mcg IV.  Under physician supervision, Versed and fentanyl were administered for moderate sedation. Pulse oximetry, heart rate and blood pressure were continuously monitored by an independent trained observer. The physician spent  15 minutes of face-to-face sedation time with the patient. CONTRAST: None ANTIBIOTICS: Ancef 2 grams IV. ADDITIONAL MEDICATIONS: None. FLUOROSCOPIC TIME: 0.3 minutes. RADIATION DOSE: Air Kerma: 2 mGy. COMPLICATIONS: No immediate complications. STERILE BARRIER TECHNIQUE: Maximum sterile barrier technique was used. Cutaneous antisepsis was performed at the operative site with application of 2% chlorhexidine and large sterile drape. Prior to the procedure, the  and assistant performed hand hygiene and wore hat, mask, sterile gown, and sterile gloves during the entire procedure. PROCEDURE:   Using real-time ultrasound guidance, the right internal jugular vein was punctured. A subcutaneous tunnel was created requiring a second incision. Using this access, a 15.5 Hebrew, 19 cm tip to cuff Duraflow dialysis catheter was advanced until the tip was in the proximal right atrium.  The catheter was tested and found to flush and aspirate appropriately.  The catheter was heparinized and secured to the skin. FINDINGS: Ultrasound shows an anechoic and compressible jugular vein. A permanent image was stored.  At the completion of the study, the new catheter tip lies in the proximal right atrium.     IMPRESSION:  1.  Tunneled dialysis catheter placement, as discussed above. 2.  The catheter position was verified fluoroscopically and this is ready for use.

## 2023-12-28 NOTE — CONSULTS
IR consult    Right facial swelling with SVC stenosis and right permcath with associated clot.  Reasonable to remove catheter and then place on heparin to treat any residual mural thrombus.  There is risk of losing ability to place future neck dialysis access, but with heparin hope that the the SVC continues to stay patent.  Would not recommend stent placement at this time.    Patrice Ewing MD  Interventional Radiology  Clinic: [phone]  Pager: [phone]

## 2023-12-28 NOTE — PROGRESS NOTES
NEPHROLOGY PROGRESS NOTE    Date of service: 12/28/23     CC: ESKD      ASSESSMENT/RECOMMENDATIONS:  1. ESKD: on HD MWF at [facility]. EDW=56.5kg. Access: LFA AVF. Her outpt HD unit was notified of her admit and orders reviewed.               -Continue HD MWF and prn during hospitalization.     2. N/V: improved. treat DKA and other management per primary     3. Hypertension: Her BP is acceptable. Continue current BP meds. UF with HD.     4. DM1: DKA on admit. Management per primary.     5. Anemia: due to ESKD. Hemoglobin is at goal(9-11). No need for SHARLENE now. Trend.      6. BMM: holding sevelamer given n/v. Trend. Phos.     7. Facial edema: no new meds recently. ?allergic reaction.? Central stenosis. No dyspnea, stridor, wheezing. Consider CTA to evaluate for SVC syndrome. Per Dr. Rodriguez.    8. Bacteremia: GPC in clusters from 12/27 BC. Renally dose abx per primary. Seen by ID. Discussed with Dr. Koch.ordered BC from HD cath.       Orville Alexander MD  [facility]   Office: [phone]      S: She last had HD yesterday. BC now growing GPC in clusters.      Current Facility-Administered Medications   Medication    0.9% sodium chloride + KCl 20 mEq/L infusion    acetaminophen (TYLENOL) tablet 650 mg    dextrose 5% and 0.45% NaCl + KCl 20 mEq/L infusion    glucose gel 15-30 g    Or    dextrose 50 % injection 25-50 mL    Or    glucagon injection 1 mg    heparin ANTICOAGULANT injection 5,000 Units    hydrALAZINE (APRESOLINE) injection 10 mg    HYDROmorphone (DILAUDID) injection 0.2 mg    insulin regular (MYXREDLIN) 1 unit/mL infusion    losartan (COZAAR) tablet 25 mg    metoclopramide (REGLAN) injection 5 mg    naloxone (NARCAN) injection 0.2 mg    Or    naloxone (NARCAN) injection 0.4 mg    Or    naloxone (NARCAN) injection 0.2 mg    Or    naloxone (NARCAN) injection 0.4 mg    No heparin via hemodialysis machine    ondansetron (ZOFRAN) injection 4 mg    pantoprazole (PROTONIX)  "IV push injection 40 mg    rOPINIRole (REQUIP) tablet 1 mg    senna-docusate (SENOKOT-S/PERICOLACE) 8.6-50 MG per tablet 1 tablet    sertraline (ZOLOFT) tablet 150 mg    [Held by provider] sevelamer carbonate (RENVELA) tablet 800 mg    sodium chloride 0.9% BOLUS 100-150 mL    sodium chloride 0.9% BOLUS 250 mL    sodium chloride 0.9% BOLUS 300 mL    traZODone (DESYREL) tablet 100 mg    [START ON 12/29/2023] vancomycin (VANCOCIN) 750 mg in sodium chloride 0.9 % 250 mL intermittent infusion        No interval change in SH, FH.    Physical Exam  BP (!) 143/71   Pulse (!) 127   Temp 97.7  F (36.5  C) (Oral)   Resp 14   Ht 1.702 m (5' 7\")   Wt 56.4 kg (124 lb 4.8 oz)   LMP 12/13/2023   SpO2 100%   BMI 19.47 kg/m    GENERAL: NAD  HEENT: NCAT, sclerae not icteric, MMM, some facial edema  NECK: Trachea midline.   LUNGS: no respiratory distress,  HEART: no leg edema.   ABDOMEN: Soft, NT  PSYCH: Alert, normal affect  NEURO:  ANGELES  ACCESS: AVF with thrill        Lab Data:  Recent Labs   Lab Test 12/28/23 0429 12/28/23 0210 12/28/23  0027   POTASSIUM 4.0  4.0 3.9 3.9   CHLORIDE 97*  97* 97* 95*   ALBUMIN 4.3  --   --      Recent Labs   Lab Test 12/28/23 0429 12/28/23  0210 12/28/23  0027 12/27/23  2211   BUN 11.2  11.2 10.6 9.9 18.3     Recent Labs   Lab Test 12/28/23  0429 12/27/23  0140 08/17/23  1248 11/24/22  0528 11/23/22  0538   WBC 8.6 11.6* 9.2   < >  --    HGB 9.8* 11.3* 11.7   < >  --    MCV 94 96 92   < >  --     204 215   < >  --    IRON  --   --   --   --  35*   IRONSAT  --   --   --   --  22    < > = values in this interval not displayed.     No lab results found.    Invalid input(s): \"PTHINTACT\", \"[id number]\", \"[id number]\", \"PROTCREATUR\"    I reviewed the above labs  "

## 2023-12-28 NOTE — PHARMACY-VANCOMYCIN DOSING SERVICE
Pharmacy Vancomycin Initial Note  Date of Service 2023  Patient's  1975  48 year old, female    Indication: Bacteremia    Current estimated CrCl = Estimated Creatinine Clearance: 24.1 mL/min (A) (based on SCr of 2.6 mg/dL (H)).    Creatinine for last 3 days  2023:  1:40 AM Creatinine 4.42 mg/dL;  8:03 AM Creatinine 4.53 mg/dL; 10:17 AM Creatinine 4.68 mg/dL; 12:14 PM Creatinine 4.60 mg/dL;  4:48 PM Creatinine 4.72 mg/dL;  6:08 PM Creatinine 4.86 mg/dL;  8:11 PM Creatinine 5.06 mg/dL; 10:11 PM Creatinine 2.78 mg/dL  2023: 12:27 AM Creatinine 1.97 mg/dL;  2:10 AM Creatinine 2.37 mg/dL;  4:29 AM Creatinine 2.60 mg/dL;  4:29 AM Creatinine 2.60 mg/dL    Recent Vancomycin Level(s) for last 3 days  No results found for requested labs within last 3 days.      Vancomycin IV Administrations (past 72 hours)                     vancomycin (VANCOCIN) 1,250 mg in sodium chloride 0.9 % 250 mL intermittent infusion (mg) 1,250 mg New Bag 23 0218                    Nephrotoxins and other renal medications (From now, onward)      Start     Dose/Rate Route Frequency Ordered Stop    23 1224  vancomycin place love - receiving intermittent dosing         1 each Intravenous SEE ADMIN INSTRUCTIONS 23 1225              Contrast Orders - past 72 hours (72h ago, onward)      None              Plan:  Vancomycin 1250 mg IV Once - given early this AM. Followed by intermittent dosing.  Vancomycin monitoring method: Renal Replacement Therapy  Vancomycin therapeutic monitoring goal: 15-20 mg/L  Pharmacy will check vancomycin levels as appropriate in 1-3 Days.    Serum creatinine levels will be ordered daily for the first week of therapy and at least twice weekly for subsequent weeks.      Radha Bergman, [facility]

## 2023-12-28 NOTE — CONSULTS
Care Management Initial Consult    General Information  Assessment completed with: Josefa Bolanos  Type of CM/SW Visit: Initial Assessment    Primary Care Provider verified and updated as needed: Yes   Readmission within the last 30 days: no previous admission in last 30 days         Advance Care Planning: Advance Care Planning Reviewed: other (see comments) (No ACP documents)          Communication Assessment  Patient's communication style: spoken language (English or Bilingual)    Hearing Difficulty or Deaf: no   Wear Glasses or Blind: no    Cognitive  Cognitive/Neuro/Behavioral: WDL                      Living Environment:   People in home: friend(s)  Roomate  Current living Arrangements: house (Bottom level of a home, renting)      Able to return to prior arrangements: yes       Family/Social Support:  Care provided by: self  Provides care for: no one  Marital Status: Single  Sibling(s), Other (specify) (Roomate)          Description of Support System: Supportive         Current Resources:   Patient receiving home care services: No     Community Resources: None  Equipment currently used at home: none  Supplies currently used at home: None    Employment/Financial:  Employment Status: disabled (Working on appealing disability)        Financial Concerns:             Does the patient's insurance plan have a 3 day qualifying hospital stay waiver?  No    Lifestyle & Psychosocial Needs:  Social Determinants of Health     Food Insecurity: Not on file   Depression: At risk (1/2/2023)    PHQ-2     PHQ-2 Score: 6   Housing Stability: Not on file   Tobacco Use: High Risk (12/27/2023)    Patient History     Smoking Tobacco Use: Some Days     Smokeless Tobacco Use: Never     Passive Exposure: Not on file   Financial Resource Strain: Not on file   Alcohol Use: Not on file   Transportation Needs: Not on file   Physical Activity: Not on file   Interpersonal Safety: Not on file   Stress: Not on file   Social Connections: Not on  file       Functional Status:  Prior to admission patient needed assistance:   Dependent ADLs:: Independent  Dependent IADLs:: Transportation, Shopping       Mental Health Status:          Chemical Dependency Status:                Values/Beliefs:  Spiritual, Cultural Beliefs, Baptism Practices, Values that affect care:                 Additional Information:  Assessed. RNCM introduced self and CM role to pt. Pt rents the bottom of a home from a couple, and has a roommate named Wilbert. Pt Ind with ADLS. Pt mostly Ind with IADLS, does not have a car at the moment, but pt has MA, and states having services through insurance for transport. Pt was on disability, pt is saying she no longer qualified, pt is currently in the appeal process, and is still receiving social security. Pt has medicare listed primary, and [employer] [place] listed secondary. 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 Pt states prior being able to access insulin, and things needed for diabetes management. Pt states going to dialysis MWF at [facility]. Transport TBD depending on day/time.        CM will continue to follow plan of care, review recommendations, and assist with any discharge needs anticipated.       Carol Whitney RN

## 2023-12-28 NOTE — PROGRESS NOTES
[facility] - ICU    RN Progress Note:            Pertinent Assessments:      Please refer to flowsheet rows for full assessment     Patient is alert, oriented and able to make needs known. VSS.            Key Events - This Shift:       Dialysis run completed overnights. Patient tolerated well.    Blood cultures positive for staph. Hospitalist notified. New orders placed.    On DKA protocol with insulin gtt. Titrated according to q1h BG checks. Monitoring electrolytes. Plan of care ongoing.             Barriers to Discharge / Downgrade:     Insulin gtt and controlling blood sugars

## 2023-12-28 NOTE — PROGRESS NOTES
Patient anxious, short of breath, reported lower extremity numbness, and nauseous at beginning of shift. Patient had emesis x1, given PRN Reglan. BG check was 382, significantly higher than previous check. Upon assessment, PIV infusing Insulin was found infiltrated.    Insulin gtt switched to other working PIV and IVF stopped until new PIV placed. Insulin gtt titrated according to adult DKA algorithm. Q1h BG checks continuing. Previously mentioned symptoms resolved, once hyperglycemia resolved.    VSS. Patient tachycardic in 120s. Patient is getting dialysis currently. Plan of care ongoing.

## 2023-12-28 NOTE — PROGRESS NOTES
"Potassium   Date Value Ref Range Status   12/27/2023 3.5 3.4 - 5.3 mmol/L Final   11/03/2022 4.2 3.4 - 5.3 mmol/L Final     Hemoglobin   Date Value Ref Range Status   12/27/2023 11.3 (L) 11.7 - 15.7 g/dL Final     Creatinine   Date Value Ref Range Status   12/27/2023 2.78 (H) 0.51 - 0.95 mg/dL Final     Urea Nitrogen   Date Value Ref Range Status   12/27/2023 18.3 6.0 - 20.0 mg/dL Final   11/03/2022 37 (H) 7 - 30 mg/dL Final     Sodium   Date Value Ref Range Status   12/27/2023 135 135 - 145 mmol/L Final     Comment:     Reference intervals for this test were updated on 09/26/2023 to more accurately reflect our healthy population. There may be differences in the flagging of prior results with similar values performed with this method. Interpretation of those prior results can be made in the context of the updated reference intervals.      INR   Date Value Ref Range Status   03/31/2023 0.95 0.85 - 1.15 Final       DIALYSIS PROCEDURE NOTE  Hepatitis status of previous patient on machine log was checked and verified ok to use with this patients hepatitis status.  Patient dialyzed for 3 hrs. on a K3 bath with a net fluid removal of  2L.  A BFR of 450 ml/min was obtained via a right tunneled dialysis catheter.        The treatment plan was discussed with Dr. Alexander during the treatment.      Total heparin received during the treatment: 0 units.     Line flushed, clamped and capped with Normal Saline 10 mL per lumen    Meds  given: none   Complications:      Patient was anxious upon initial assessment. Very worried about the pain from the  needles during cannulation. HR increased into the upper 120s at this point. Patient stated she was not sure if she could handle dialysis because she did not want the pain from the needles and states that lidocaine and the \"freeze stuff\" often does not work. She stated that there is a lot of pain and difficulty at her clinic when they cannulate her. Writer suggests that it may be best for " the patient to use one needle (venous) and one catheter lumen (arterial) for future treatments. Possibly decrease needle size to 17G to allow the patient to adjust , build up a tolerated, and be able to handle having her access cannulated in the future. Patient agreed to have full dialysis treatment if writer uses the catheter. Writer agreed with the patient to not cannulate tonight and use patients right tunneled catheter.        Person educated: completed. Knowledge base substantial. Barriers to learning: none. Educated on procedure and access management via verbal mode.      ICEBOAT? Timeout performed pre-treatment  I: Patient was identified using 2 identifiers  C:  Consent Signed Yes  E: Equipment preventative maintenance is current and dialysis delivery system OK to use  B: Hepatitis B Surface Antigen: negative; Draw Date: 10/19/2023      Hepatitis B Surface Antibody: immune; Draw Date: 10/19/2023  O: Dialysis orders present and complete prior to treatment  A: Vascular access verified and assessed prior to treatment  T: Treatment was performed at a clinically appropriate time  ?: Patient was allowed to ask questions and address concerns prior to treatment  See Adult Hemodialysis flowsheet in [employer] for further details and post assessment.  Machine water alarm in place and functioning. Transducer pods intact and checked every 15min.     Chlorine/Chloramine water system checked every 4 hours.  Outpatient Dialysis at [facility]    Patient repositioned every 2 hours during the treatment.  Post treatment report given to EJ Topete RN regarding 2 L of fluid removed, last BP of 93/52, and patient pain rating of 0/10.

## 2023-12-28 NOTE — PROGRESS NOTES
SPIRITUAL HEALTH SERVICES Note     Saw pt Josefa Curiel and administered [employer] [facility] [employer] for [facility] healing of the [employer].    Fr. Jose Bey

## 2023-12-28 NOTE — CONSULTS
"Pulmonary and critical care consult  Date of Service: 12/28/2023    Reason for Consultation: swollen face    Assessment:     Facial swelling initially concerning for ACE-I   End stage renal disease on hemodialysis  DKA    Plan:     Decadron 4 mg IV given immediately after discussing with hospitalist given concern for Ace-I induced angioedema. However, upon evaluation of pt, there was no tongue, tonsillar, or hypopharyngeal edema or asymmetry.  Was no suspicion for abscess either.      CT scan of the neck and chest was done.  Surprisingly, there was a large clot noted at the distal end of the hemodialysis catheter access.  Formal report:    \"1.  Catheter associated thrombus about the right jugular approach dialysis catheter results in severe luminal stenosis at the brachiocephalic vein confluence/upper SVC.  2.  No acute pulmonary embolism.\"    Discussed with hospitalist. Interventional radiologist to come in and remove hemodialysis catheter access. This may be followed with a/c for some time as a line-related DVT.    Management of DKA per hospitalist.     TTS greater than 40 min, more than 50% on counseling and coordination of care.  Pulmonary and critical care will sign off however do not hesitate to call us if you need anything.      Olamide Scott MD  Pulmonary and Critical Care Medicine    History:     Patient is a 48-year-old female with end-stage renal dialysis on hemodialysis, diabetes mellitus who was admitted with nausea, vomiting, and facial swelling.  He was found to have DKA.  Patient was started on therapy.  Hospitalist was consulted as patient has history of swelling from lisinopril and therefore critical care was consulted.  Upon examination, patient had no lip swelling or tongue swelling.  Her airway was protected.  Speech was intact.  Patient was on room air.  Patient denies any shortness of breath, cough.    PMHx/PSHx:  Past Medical History:   Diagnosis Date    LORIN (acute kidney injury) (H24)     " Anxiety     Cannabis abuse     Depression     Diabetes Type 1, uncontrolled 1985    Onset age 10    DKA (diabetic ketoacidoses)     ESRD (end stage renal disease) on dialysis (H) 04/11/2023    start date per 2728 form    ETOH abuse     Gastroparesis     HLD (hyperlipidemia)     HTN (hypertension)     Lactic acidosis     Vitamin D deficiency      Past Surgical History:   Procedure Laterality Date    ANKLE FRACTURE SURGERY Left     2007 [facility] [place]    APPENDECTOMY      EYE SURGERY      retinal surgery Dr. Nagi Tang    INCISION AND DRAINAGE FOOT, COMBINED Right 11/18/2022    Procedure: INCISION AND DRAINAGE, right foot;  Surgeon: David Patel DPM;  Location: [facility] Main OR    IR CVC TUNNEL PLACEMENT > 5 YRS OF AGE  11/30/2023    PICC SINGLE LUMEN PLACEMENT  10/23/2022          Social History     Socioeconomic History    Marital status: Single     Spouse name: Not on file    Number of children: Not on file    Years of education: Not on file    Highest education level: Not on file   Occupational History    Not on file   Tobacco Use    Smoking status: Some Days     Types: Cigarettes    Smokeless tobacco: Never   Substance and Sexual Activity    Alcohol use: Not Currently     Alcohol/week: 35.0 standard drinks of alcohol     Types: 35 Standard drinks or equivalent per week     Comment: Alcoholic Drinks/day: ~750 mL wine daily, sober since 10/26/23    Drug use: Yes     Types: Marijuana     Comment: Drug use: 3-4 x week, with nausea    Sexual activity: Yes     Partners: Male     Birth control/protection: Condom   Other Topics Concern    Parent/sibling w/ CABG, MI or angioplasty before 65F 55M? Not Asked   Social History Narrative    Not on file     Social Determinants of Health     Financial Resource Strain: Not on file   Food Insecurity: Not on file   Transportation Needs: Not on file   Physical Activity: Not on file   Stress: Not on file   Social Connections: Not on file   Interpersonal Safety: Not on  "file   Housing Stability: Not on file       Review of Systems - 10 point review of system negative except for what is mentioned in the HPI.    Exam/Data:   BP (!) 166/81   Pulse 114   Temp 97.7  F (36.5  C) (Oral)   Resp 15   Ht 1.702 m (5' 7\")   Wt 56.4 kg (124 lb 4.8 oz)   LMP 12/13/2023   SpO2 92%   BMI 19.47 kg/m      GEN: comfortable, NAD  HEENT: face swollen with engorged veins. No lip, tongue or tonsillar, or hypopharyngeal swelling. Speaks in full sentences.   CVS: S1S2, RRR  Lung: good air entry bilaterally  Abd: Soft, NT, ND,  + BS.   Ext: no c/c/e  Neuro: nonfocal  Musculoskeletal: FROM all extremities  Psych: appropriate    DATA    Labs: Reviewed in EMR and outside records where pertinent.       IMAGING: Personally reviewed images. Formal radiology interpretation noted below.    CTA Chest with Contrast    Result Date: 12/28/2023  EXAM: CTA CHEST WITH CONTRAST LOCATION: [facility] DATE: 12/28/2023 INDICATION: 48 y o ESRD,DKA, facial edema, need to rule out SVC syndrome COMPARISON: Frontal and lateral chest radiographs 12/27/2023 TECHNIQUE: Helical acquisition through the chest was performed during the arterial phase of contrast enhancement. 2D and 3D reconstructions performed by the CT technologist. Dose reduction techniques were used. CONTRAST: 75ml isovue 370 CT ANGIOGRAM CHEST: Right jugular approach dialysis catheter terminates in the right atrium low-attenuation is present around the catheter in the right brachiocephalic vein (series 4, image 37) consistent with catheter associated thrombus, which extends to the SVC (series 4, image 49). Concentrated contrast is present within the low right internal jugular vein and multiple small collateral veins in the anterior/posterior chest wall and in the middle mediastinum. Normal caliber thoracic aorta with conventional arch anatomy. Main pulmonary artery is normal in size. No pulmonary artery filling defects. LUNGS AND " PLEURA: Symmetric normal lung inflation. No interstitial or alveolar opacities. Normal airways. No pleural effusion. MEDIASTINUM: Cardiac chambers are normal in size. No pericardial effusion. No enlarged mediastinal or hilar lymph nodes. CORONARY ARTERY CALCIFICATION: Moderate. UPPER ABDOMEN: Coronal upper belly MUSCULOSKELETAL: Thoracic vertebra are maintained in height and shape. Minimal degenerative osteophytes at T8-T9. Normal bone mineralization. No fractures or bone lesions.     IMPRESSION: 1.  Catheter associated thrombus about the right jugular approach dialysis catheter results in severe luminal stenosis at the brachiocephalic vein confluence/upper SVC. 2.  No acute pulmonary embolism.     CT Head w/o Contrast    Result Date: 2023  EXAM: CT HEAD W/O CONTRAST LOCATION: [facility] DATE: 2023 INDICATION: headache in ESRD DKA pt COMPARISON: None. TECHNIQUE: Routine CT Head without IV contrast. Multiplanar reformats. Dose reduction techniques were used. FINDINGS: INTRACRANIAL CONTENTS: No intracranial hemorrhage, extraaxial collection, or mass effect.  No CT evidence of acute infarct. Mild to moderate presumed chronic small vessel ischemic changes. Mild to moderate generalized volume loss. No hydrocephalus. VISUALIZED ORBITS/SINUSES/MASTOIDS: No intraorbital abnormality. No paranasal sinus mucosal disease. No middle ear or mastoid effusion. BONES/SOFT TISSUES: No acute abnormality.     IMPRESSION: 1.  No CT evidence for acute intracranial process. 2.  Brain atrophy and presumed chronic microvascular ischemic changes as above.    Echocardiogram Complete    Result Date: 2023  [id number]^GAMALIEL^[place] [address]  Name: DONITA FREITAS MRN: [id number] : 1975 Study Date: 2023 08:11 AM Age: 48 yrs Gender: Female Patient Location: [facility] Reason For Study: Other, Please Specify in  Comments Ordering Physician: DANILO ALSTON Performed By: TERE  BSA: 1.7 m2 Height: 67 in Weight: 124 lb HR: 114 BP: 135/65 mmHg ______________________________________________________________________________ Procedure Complete Portable Echo Adult. Adequate quality two-dimensional was performed and interpreted. Poor quality color and spectral Doppler were performed and interpreted. ______________________________________________________________________________ Interpretation Summary  The left ventricle is normal in size. No regional wall motion abnormalities noted. Normal right ventricle size and systolic function. There is moderate mitral annular calcification. Thickened mitral valve posterior leaflet Decreased mobility of posterior MV leaflet. There is moderate mitral stenosis. 9 mm Mean gradient at HR of 119. Mild to moderate valvular aortic stenosis. Mild increased flow velocity across PV likely related to hyperdynamic state. IVC diameter and respiratory changes fall into an intermediate range suggesting an RA pressure of 8 mmHg. The rhythm was sinus tachycardia. Posterior leaflet has calcified nodule that is more prominent than MARAH from 10/21/22. No clear vegetation but cannot exclude vegetation. ______________________________________________________________________________ Left Ventricle The left ventricle is normal in size. There is normal left ventricular wall thickness. Diastolic Doppler findings (E/E' ratio and/or other parameters) suggest left ventricular filling pressures are increased. The visual ejection fraction is >70%. No regional wall motion abnormalities noted.  Right Ventricle Normal right ventricle size and systolic function. TAPSE is normal, which is consistent with normal right ventricular systolic function.  Atria The left atrium is mild to moderately dilated. Right atrial size is normal.  Mitral Valve There is moderate mitral annular calcification. Thickened mitral valve posterior leaflet.  Decreased mobility of posterior MV leaflet. There is moderate mitral stenosis. 9 mm Mean gradient at HR of 119.  Tricuspid Valve The tricuspid valve is not well visualized. There is mild (1+) tricuspid regurgitation.  Aortic Valve There is mild trileaflet aortic sclerosis. No aortic regurgitation is present. Mild to moderate valvular aortic stenosis.  Pulmonic Valve The pulmonic valve is not well visualized. Mild increased flow velocity across PV likely related to hyperdynamic state.  Vessels The aorta root is normal. Normal size ascending aorta. IVC diameter and respiratory changes fall into an intermediate range suggesting an RA pressure of 8 mmHg.  Pericardium There is no pericardial effusion.  Rhythm The rhythm was sinus tachycardia. ______________________________________________________________________________ MMode/2D Measurements & Calculations  IVSd: 0.96 cm LVIDd: 4.1 cm LVIDs: 2.8 cm LVPWd: 0.82 cm FS: 33.1 % LV mass(C)d: 113.0 grams LV mass(C)dI: 68.5 grams/m2 Ao root diam: 2.6 cm asc Aorta Diam: 3.3 cm LVOT diam: 2.0 cm LVOT area: 3.0 cm2 Ao root diam index Ht(cm/m): 1.5 Ao root diam index BSA (cm/m2): 1.6 Asc Ao diam index BSA (cm/m2): 2.0 Asc Ao diam index Ht(cm/m): 1.9 LA Volume (BP): 58.5 ml  LA Volume Index (BP): 35.5 ml/m2 LA Volume Indexed (AL/bp): 37.9 ml/m2 RWT: 0.40 TAPSE: 2.4 cm  Doppler Measurements & Calculations MV E max christiano: 139.0 cm/sec MV A max christiano: 184.0 cm/sec MV E/A: 0.76  MV max P.6 mmHg MV mean P.0 mmHg MV V2 VTI: 28.6 cm MVA(VTI): 3.0 cm2 MV dec slope: 626.0 cm/sec2 MV dec time: 0.22 sec Ao V2 max: 201.0 cm/sec Ao max P.0 mmHg Ao V2 mean: 126.0 cm/sec Ao mean P.0 mmHg Ao V2 VTI: 24.3 cm ERIC(I,D): 3.5 cm2 ERIC(V,D): 2.9 cm2 LV V1 max PG: 15.5 mmHg LV V1 max: 197.0 cm/sec LV V1 VTI: 28.5 cm SV(LVOT): 85.1 ml SI(LVOT): 51.6 ml/m2 PA V2 max: 172.0 cm/sec PA max P.8 mmHg PA acc time: 0.18 sec AV Christiano Ratio (DI): 0.98 ERIC Index (cm2/m2): 2.1 E/E' av.2 Lateral  E/e': 14.0 Medial E/e': 18.5 RV S Christiano: 20.7 cm/sec  ______________________________________________________________________________ Report approved by: Eli Mayers 12/28/2023 09:07 AM       Chest XR,  PA & LAT    Result Date: 12/27/2023  EXAM: XR CHEST 2 VIEWS LOCATION: [facility] DATE: 12/27/2023 INDICATION: eval for pna COMPARISON: 12/12/2022     IMPRESSION: Right IJ central venous catheter tip in the right atrium. Lungs clear. No pleural effusion or pneumothorax. Normal heart size and pulmonary vascularity.    IR AV FISTULA/GRAFT ANGIO    Result Date: 12/7/2023  For Patients: As a result of the 21st Century Cures Act, medical imaging exams and procedure reports are released immediately into your electronic medical record. You may view this report before your referring provider. If you have questions, please contact your health care provider. [place] [facility] LOCATION: [facility]ICAL [facility]AG[facility] DATE: 12/7/2023 1. DIALYSIS FISTULOGRAM. 2. MODERATE SEDATION 3. ULTRASOUND GUIDED VASCULAR ACCESS INDICATION: 48-year-old female with upper arm fistula presents for fistulogram and possible intervention due to pain during cannulation. FLUOROSCOPIC TIME: 0.3 mins. DOSE: Air Kerma:13 mGy. CONTRAST: 30 mL Omnipaque 300. SEDATION: Prior to the procedure, the patient was alert and oriented. Versed 2 mg and fentanyl 100 mcg were administered intravenously with continuous vital signs monitoring by the radiology nurse under my direct supervision. Patient was alert and oriented post procedure. Total face to face moderate sedation time: 15 minutes. PROCEDURE: After obtaining informed written oral consent, the patient's arm was prepped and draped in a sterile fashion. Prior to the procedure, patency of the fistula was assessed with ultrasound and sonographic images recorded. Local anesthesia was infiltrated in the soft tissues of the left upper arm. Using a 21 gauge needle and real-time ultrasound  guidance,, the fistula was punctured and a guidewire advanced. 4 Citizen of [place] sheath was placed. Fistulogram was obtained from the arterial anastomosis to the superior vena cava. Sheath was removed and hemostasis was obtained manually. FINDINGS: FISTULOGRAM: Patent left brachial artery to transposed basilic vein fistula. Arterial anastomosis widely patent. No evidence of central venous stenosis. Right IJ  dialysis catheter is noted.    1.  Patent left brachial artery to transposed basilic vein fistula. No significant stenosis identified.    IR CVC Tunnel Placement > 5 Yrs of Age    Result Date: 11/30/2023  [facility] LOCATION: [facility] DATE: 11/30/2023 PROCEDURE:TUNNELED DIALYSIS CATHETER PLACEMENT INTERVENTIONAL RADIOLOGIST: Pradeep Goldsmith MD. INDICATION: Patient with left upper arm fistula, placed at outside institution, not functioning well. Request made for consistent hemodialysis access.. CONSENT: The risks, benefits and alternatives of tunneled dialysis catheter placement were discussed with the patient  in detail. All questions were answered. Informed consent was given to proceed with the procedure. MODERATE SEDATION: Versed 1 mg IV; Fentanyl 50 mcg IV.  Under physician supervision, Versed and fentanyl were administered for moderate sedation. Pulse oximetry, heart rate and blood pressure were continuously monitored by an independent trained observer. The physician spent 15 minutes of face-to-face sedation time with the patient. CONTRAST: None ANTIBIOTICS: Ancef 2 grams IV. ADDITIONAL MEDICATIONS: None. FLUOROSCOPIC TIME: 0.3 minutes. RADIATION DOSE: Air Kerma: 2 mGy. COMPLICATIONS: No immediate complications. STERILE BARRIER TECHNIQUE: Maximum sterile barrier technique was used. Cutaneous antisepsis was performed at the operative site with application of 2% chlorhexidine and large sterile drape. Prior to the procedure, the  and assistant performed hand hygiene and wore  "hat, mask, sterile gown, and sterile gloves during the entire procedure. PROCEDURE:   Using real-time ultrasound guidance, the right internal jugular vein was punctured. A subcutaneous tunnel was created requiring a second incision. Using this access, a 15.5 Danish, 19 cm tip to cuff Duraflow dialysis catheter was advanced until the tip was in the proximal right atrium.  The catheter was tested and found to flush and aspirate appropriately.  The catheter was heparinized and secured to the skin. FINDINGS: Ultrasound shows an anechoic and compressible jugular vein. A permanent image was stored.  At the completion of the study, the new catheter tip lies in the proximal right atrium.     IMPRESSION:  1.  Tunneled dialysis catheter placement, as discussed above. 2.  The catheter position was verified fluoroscopically and this is ready for use.     Family History   Problem Relation Age of Onset    Clotting Disorder Mother         \"thin blood\"    Arthritis Mother     Hyperlipidemia Mother     Skin Cancer Father         face/nose     Depression Father     Other - See Comments Sister         eye surgeries    No Known Problems Brother     Coronary Artery Disease Maternal Grandmother     Alcoholism Maternal Grandfather     Coronary Artery Disease Maternal Grandfather     No Known Problems Paternal Grandmother     No Known Problems Paternal Grandfather     Cancer No family hx of     Kidney Disease No family hx of     Diabetes No family hx of      Allergies   Allergen Reactions    Cefazolin Nephrotoxicity    Colestipol GI Disturbance    Doxycycline Nausea and Vomiting    Nickel Rash    Penicillins Rash       Medications:     No current outpatient medications on file.       Much or all of the text in this note was generated through the use of the Dragon Dictate voice-to-text software. Errors in spelling or words which seem out of context are unintentional. Sound alike errors, in particular, may have escaped editing.   "

## 2023-12-28 NOTE — PROGRESS NOTES
[facility]    Medicine Progress Note - Hospitalist Service    Date of Admission:  12/27/2023    Assessment & Plan     Josefa Curiel is a 48 year old female with past medical history of type I DM, ESRD on dialysis, HTN who presented to ED for evaluation of nausea and vomiting, found to have DKA and admitted for further management. She does note some facial swelling, and now has 1 bottle positive blood cx     1.DKA in a patient with history of type I DM; -Not sure what was her trigger here   COVID-19 and influenza test negative.    --Noticed mild redness at dialysis catheter site but no discharge or significant tenderness appreciated.    --A1c 7.0.  Patient reports compliance with insulin regimen  --DKA order set placed--still on gtt, still has ketones and some hyperglycemia  -continue gtt, ivf  --icu status still with insulin gtt    2.Facial swelling-in right side more left, she feels improved today after HD last night, but still has some  -check CT chest with contrast to check for SVC syndrome  -watch close in icu    3.Headache  -could be from DKA, or what is triggering swelling in face  -head ct neg today  -neurology did see and want mri  -told RN to remove continuous glucose monitor for mri     4.Nausea and vomiting; likely due to above  History of gastroparesis;  -- Abdominal exam benign.  Symptomatic treatment as ordered  -iv ppi     5.ESRD on dialysis;started it in 4/23  -- Nephrology consulted for dialysis management.    --Of note, patient has fistula on left upper extremity and also dialysis catheter on right front chest.     6.Uncontrolled hypertension;  -- PTA losartan--was held today per RN this afternoon.  Added as needed hydralazine.  --defer to renal    7.One bottle positive GPC  -repeat blood cx today  -at risk with HD  -vanco was started  -ID did see      Addendum-- at 1530--Was called by RN to see , felt facial swelling back  She denied tongue swelling  Exam, facial  swelling, no tongue swelling  No wheezing, lungs CTA  Per RN did not take losartan today--agree with holding  Still waiting for CT  -I personally talked to intensivist ,  and he recommended iv dex now and will see her       Addendum--1630  Update CT chest shows clot on line causing SVC  ICU team did see  ICU team recs that I call IR to help manage and remove line and hold off on anticoagulation  I am calling IR now            Diet: NPO for Medical/Clinical Reasons Except for: Meds  Room Service    DVT Prophylaxis: Heparin SQ  Williamson Catheter: Not present  Lines: PRESENT      CVC Double Lumen Right Subclavian Tunneled-Site Assessment: WDL      Cardiac Monitoring: ACTIVE order. Indication: DKA  Code Status: Full Code      Clinically Significant Risk Factors             # Anion Gap Metabolic Acidosis: Highest Anion Gap = 30 mmol/L in last 2 days, will monitor and treat as appropriate      # Hypertension: Noted on problem list                   Disposition Plan     Expected Discharge Date: 12/29/2023      Destination: home              Belle Rodriguez MD  Hospitalist Service  [facility]  Securely message with [employer] (more info)  Text page via [employer] Paging/Directory   ______________________________________________________________________    Interval History   She notes face feels better  Had swelling right face>left, thinks it is better, still some there  Per RN after I did see, some HA  HD since 4/23    Physical Exam   Vital Signs: Temp: 97.7  F (36.5  C) Temp src: Oral BP: 118/57 Pulse: 104   Resp: 13 SpO2: 98 % O2 Device: None (Room air)    Weight: 124 lbs 4.8 oz    Constitutional: awake, alert, cooperative, and no apparent distress  Head and face--right cheek side looks a little swollen  Respiratory: No increased work of breathing, good air exchange, clear to auscultation bilaterally, no crackles or wheezing  Cardiovascular: Normal apical impulse, regular rate and rhythm, normal S1  and S2, no S3 or S4, and no murmur noted  GI: normal bowel sounds, soft, non-distended, and non-tender  Skin: no bruising or bleeding  Musculoskeletal: no lower extremity pitting edema present  Neurologic: Mental Status Exam:  Level of Alertness:   awake  Neuropsychiatric: General: normal, calm, and normal eye contact    Medical Decision Making       55 MINUTES SPENT BY ME on the date of service doing chart review, history, exam, documentation & further activities per the note.      Data     I have personally reviewed the following data over the past 24 hrs:    8.6  \   9.8 (L)   / 174     138 98 14.2 /  128 (H)   4.4 22 3.37 (H) \     ALT: N/A AST: N/A AP: N/A TBILI: N/A   ALB: 4.3 TOT PROTEIN: N/A LIPASE: N/A       Imaging results reviewed over the past 24 hrs:   Recent Results (from the past 24 hour(s))   Echocardiogram Complete   Result Value    LVEF  >70%    Narrative    [id number]  [id number]  [id number]  [id number]^GAMALIEL^DANILO^[place] [address]     Name: DONITA FREITAS  MRN: [id number]  : 1975  Study Date: 2023 08:11 AM  Age: 48 yrs  Gender: Female  Patient Location: [facility]  Reason For Study: Other, Please Specify in Comments  Ordering Physician: DANILO ALSTON  Performed By: KUNAL     BSA: 1.7 m2  Height: 67 in  Weight: 124 lb  HR: 114  BP: 135/65 mmHg  ______________________________________________________________________________  Procedure  Complete Portable Echo Adult. Adequate quality two-dimensional was performed  and interpreted. Poor quality color and spectral Doppler were performed and  interpreted.  ______________________________________________________________________________  Interpretation Summary     The left ventricle is normal in size.  No regional wall motion abnormalities noted.  Normal right ventricle size and systolic function.  There is moderate mitral annular calcification.  Thickened mitral valve posterior leaflet  Decreased  mobility of posterior MV leaflet.  There is moderate mitral stenosis.  9 mm Mean gradient at HR of 119.  Mild to moderate valvular aortic stenosis.  Mild increased flow velocity across PV likely related to hyperdynamic state.  IVC diameter and respiratory changes fall into an intermediate range  suggesting an RA pressure of 8 mmHg.  The rhythm was sinus tachycardia.  Posterior leaflet has calcified nodule that is more prominent than MARAH from  10/21/22. No clear vegetation but cannot exclude vegetation.  ______________________________________________________________________________  Left Ventricle  The left ventricle is normal in size. There is normal left ventricular wall  thickness. Diastolic Doppler findings (E/E' ratio and/or other parameters)  suggest left ventricular filling pressures are increased. The visual ejection  fraction is >70%. No regional wall motion abnormalities noted.     Right Ventricle  Normal right ventricle size and systolic function. TAPSE is normal, which is  consistent with normal right ventricular systolic function.     Atria  The left atrium is mild to moderately dilated. Right atrial size is normal.     Mitral Valve  There is moderate mitral annular calcification. Thickened mitral valve  posterior leaflet. Decreased mobility of posterior MV leaflet. There is  moderate mitral stenosis. 9 mm Mean gradient at HR of 119.     Tricuspid Valve  The tricuspid valve is not well visualized. There is mild (1+) tricuspid  regurgitation.     Aortic Valve  There is mild trileaflet aortic sclerosis. No aortic regurgitation is present.  Mild to moderate valvular aortic stenosis.     Pulmonic Valve  The pulmonic valve is not well visualized. Mild increased flow velocity across  PV likely related to hyperdynamic state.     Vessels  The aorta root is normal. Normal size ascending aorta. IVC diameter and  respiratory changes fall into an intermediate range suggesting an RA pressure  of 8 mmHg.      Pericardium  There is no pericardial effusion.     Rhythm  The rhythm was sinus tachycardia.  ______________________________________________________________________________  MMode/2D Measurements & Calculations     IVSd: 0.96 cm  LVIDd: 4.1 cm  LVIDs: 2.8 cm  LVPWd: 0.82 cm  FS: 33.1 %  LV mass(C)d: 113.0 grams  LV mass(C)dI: 68.5 grams/m2  Ao root diam: 2.6 cm  asc Aorta Diam: 3.3 cm  LVOT diam: 2.0 cm  LVOT area: 3.0 cm2  Ao root diam index Ht(cm/m): 1.5  Ao root diam index BSA (cm/m2): 1.6  Asc Ao diam index BSA (cm/m2): 2.0  Asc Ao diam index Ht(cm/m): 1.9  LA Volume (BP): 58.5 ml     LA Volume Index (BP): 35.5 ml/m2  LA Volume Indexed (AL/bp): 37.9 ml/m2  RWT: 0.40  TAPSE: 2.4 cm     Doppler Measurements & Calculations  MV E max christiano: 139.0 cm/sec  MV A max christiano: 184.0 cm/sec  MV E/A: 0.76     MV max P.6 mmHg  MV mean P.0 mmHg  MV V2 VTI: 28.6 cm  MVA(VTI): 3.0 cm2  MV dec slope: 626.0 cm/sec2  MV dec time: 0.22 sec  Ao V2 max: 201.0 cm/sec  Ao max P.0 mmHg  Ao V2 mean: 126.0 cm/sec  Ao mean P.0 mmHg  Ao V2 VTI: 24.3 cm  ERIC(I,D): 3.5 cm2  ERIC(V,D): 2.9 cm2  LV V1 max PG: 15.5 mmHg  LV V1 max: 197.0 cm/sec  LV V1 VTI: 28.5 cm  SV(LVOT): 85.1 ml  SI(LVOT): 51.6 ml/m2  PA V2 max: 172.0 cm/sec  PA max P.8 mmHg  PA acc time: 0.18 sec  AV Christiano Ratio (DI): 0.98  ERIC Index (cm2/m2): 2.1  E/E' av.2  Lateral E/e': 14.0  Medial E/e': 18.5  RV S Christiano: 20.7 cm/sec     ______________________________________________________________________________  Report approved by: Eli Mayers 2023 09:07 AM         CT Head w/o Contrast    Narrative    EXAM: CT HEAD W/O CONTRAST  LOCATION: [facility]  DATE: 2023    INDICATION: headache in ESRD DKA pt  COMPARISON: None.  TECHNIQUE: Routine CT Head without IV contrast. Multiplanar reformats. Dose reduction techniques were used.    FINDINGS:  INTRACRANIAL CONTENTS: No intracranial hemorrhage, extraaxial collection, or  mass effect.  No CT evidence of acute infarct. Mild to moderate presumed chronic small vessel ischemic changes. Mild to moderate generalized volume loss. No hydrocephalus.     VISUALIZED ORBITS/SINUSES/MASTOIDS: No intraorbital abnormality. No paranasal sinus mucosal disease. No middle ear or mastoid effusion.    BONES/SOFT TISSUES: No acute abnormality.      Impression    IMPRESSION:  1.  No CT evidence for acute intracranial process.  2.  Brain atrophy and presumed chronic microvascular ischemic changes as above.

## 2023-12-29 ENCOUNTER — APPOINTMENT (OUTPATIENT)
Dept: INTERVENTIONAL RADIOLOGY/VASCULAR | Facility: HOSPITAL | Age: 48
DRG: 270 | End: 2023-12-29
Attending: NURSE PRACTITIONER
Payer: MEDICARE

## 2023-12-29 ENCOUNTER — APPOINTMENT (OUTPATIENT)
Dept: INTERVENTIONAL RADIOLOGY/VASCULAR | Facility: HOSPITAL | Age: 48
DRG: 270 | End: 2023-12-29
Attending: RADIOLOGY
Payer: MEDICARE

## 2023-12-29 PROBLEM — I87.1 SUPERIOR VENA CAVA SYNDROME: Status: ACTIVE | Noted: 2023-12-29

## 2023-12-29 LAB
ACT BLD: 147 SECONDS (ref 74–150)
ALBUMIN SERPL BCG-MCNC: 4 G/DL (ref 3.5–5.2)
ANION GAP SERPL CALCULATED.3IONS-SCNC: 14 MMOL/L (ref 7–15)
ANION GAP SERPL CALCULATED.3IONS-SCNC: 17 MMOL/L (ref 7–15)
B-OH-BUTYR SERPL-SCNC: 0.9 MMOL/L
B-OH-BUTYR SERPL-SCNC: 2.4 MMOL/L
B-OH-BUTYR SERPL-SCNC: 2.8 MMOL/L
B-OH-BUTYR SERPL-SCNC: 3.2 MMOL/L
BUN SERPL-MCNC: 19.8 MG/DL (ref 6–20)
BUN SERPL-MCNC: 21 MG/DL (ref 6–20)
CALCIUM SERPL-MCNC: 8.6 MG/DL (ref 8.6–10)
CALCIUM SERPL-MCNC: 8.8 MG/DL (ref 8.6–10)
CHLORIDE SERPL-SCNC: 95 MMOL/L (ref 98–107)
CHLORIDE SERPL-SCNC: 97 MMOL/L (ref 98–107)
CREAT SERPL-MCNC: 4.8 MG/DL (ref 0.51–0.95)
CREAT SERPL-MCNC: 5.16 MG/DL (ref 0.51–0.95)
DEPRECATED HCO3 PLAS-SCNC: 20 MMOL/L (ref 22–29)
DEPRECATED HCO3 PLAS-SCNC: 22 MMOL/L (ref 22–29)
EGFRCR SERPLBLD CKD-EPI 2021: 10 ML/MIN/1.73M2
EGFRCR SERPLBLD CKD-EPI 2021: 11 ML/MIN/1.73M2
ERYTHROCYTE [DISTWIDTH] IN BLOOD BY AUTOMATED COUNT: 13.1 % (ref 10–15)
ERYTHROCYTE [DISTWIDTH] IN BLOOD BY AUTOMATED COUNT: 13.2 % (ref 10–15)
GLUCOSE BLDC GLUCOMTR-MCNC: 101 MG/DL (ref 70–99)
GLUCOSE BLDC GLUCOMTR-MCNC: 106 MG/DL (ref 70–99)
GLUCOSE BLDC GLUCOMTR-MCNC: 121 MG/DL (ref 70–99)
GLUCOSE BLDC GLUCOMTR-MCNC: 125 MG/DL (ref 70–99)
GLUCOSE BLDC GLUCOMTR-MCNC: 132 MG/DL (ref 70–99)
GLUCOSE BLDC GLUCOMTR-MCNC: 134 MG/DL (ref 70–99)
GLUCOSE BLDC GLUCOMTR-MCNC: 137 MG/DL (ref 70–99)
GLUCOSE BLDC GLUCOMTR-MCNC: 146 MG/DL (ref 70–99)
GLUCOSE BLDC GLUCOMTR-MCNC: 149 MG/DL (ref 70–99)
GLUCOSE BLDC GLUCOMTR-MCNC: 170 MG/DL (ref 70–99)
GLUCOSE BLDC GLUCOMTR-MCNC: 171 MG/DL (ref 70–99)
GLUCOSE BLDC GLUCOMTR-MCNC: 175 MG/DL (ref 70–99)
GLUCOSE BLDC GLUCOMTR-MCNC: 176 MG/DL (ref 70–99)
GLUCOSE BLDC GLUCOMTR-MCNC: 183 MG/DL (ref 70–99)
GLUCOSE BLDC GLUCOMTR-MCNC: 183 MG/DL (ref 70–99)
GLUCOSE BLDC GLUCOMTR-MCNC: 37 MG/DL (ref 70–99)
GLUCOSE BLDC GLUCOMTR-MCNC: 50 MG/DL (ref 70–99)
GLUCOSE BLDC GLUCOMTR-MCNC: 68 MG/DL (ref 70–99)
GLUCOSE BLDC GLUCOMTR-MCNC: 75 MG/DL (ref 70–99)
GLUCOSE BLDC GLUCOMTR-MCNC: 79 MG/DL (ref 70–99)
GLUCOSE BLDC GLUCOMTR-MCNC: 83 MG/DL (ref 70–99)
GLUCOSE BLDC GLUCOMTR-MCNC: 87 MG/DL (ref 70–99)
GLUCOSE BLDC GLUCOMTR-MCNC: 91 MG/DL (ref 70–99)
GLUCOSE BLDC GLUCOMTR-MCNC: 94 MG/DL (ref 70–99)
GLUCOSE SERPL-MCNC: 118 MG/DL (ref 70–99)
GLUCOSE SERPL-MCNC: 127 MG/DL (ref 70–99)
HCG SERPL QL: NEGATIVE
HCT VFR BLD AUTO: 27.9 % (ref 35–47)
HCT VFR BLD AUTO: 29.7 % (ref 35–47)
HGB BLD-MCNC: 8.9 G/DL (ref 11.7–15.7)
HGB BLD-MCNC: 9.4 G/DL (ref 11.7–15.7)
HOLD SPECIMEN: NORMAL
MAGNESIUM SERPL-MCNC: 1.9 MG/DL (ref 1.7–2.3)
MCH RBC QN AUTO: 30.5 PG (ref 26.5–33)
MCH RBC QN AUTO: 30.6 PG (ref 26.5–33)
MCHC RBC AUTO-ENTMCNC: 31.6 G/DL (ref 31.5–36.5)
MCHC RBC AUTO-ENTMCNC: 31.9 G/DL (ref 31.5–36.5)
MCV RBC AUTO: 96 FL (ref 78–100)
MCV RBC AUTO: 97 FL (ref 78–100)
PHOSPHATE SERPL-MCNC: 5 MG/DL (ref 2.5–4.5)
PLATELET # BLD AUTO: 156 10E3/UL (ref 150–450)
PLATELET # BLD AUTO: 178 10E3/UL (ref 150–450)
POTASSIUM SERPL-SCNC: 4 MMOL/L (ref 3.4–5.3)
POTASSIUM SERPL-SCNC: 4.3 MMOL/L (ref 3.4–5.3)
RBC # BLD AUTO: 2.92 10E6/UL (ref 3.8–5.2)
RBC # BLD AUTO: 3.07 10E6/UL (ref 3.8–5.2)
SODIUM SERPL-SCNC: 131 MMOL/L (ref 135–145)
SODIUM SERPL-SCNC: 134 MMOL/L (ref 135–145)
UFH PPP CHRO-ACNC: 0.33 IU/ML
UFH PPP CHRO-ACNC: 0.37 IU/ML
UFH PPP CHRO-ACNC: <0.1 IU/ML
VANCOMYCIN SERPL-MCNC: 20.9 UG/ML
WBC # BLD AUTO: 11.2 10E3/UL (ref 4–11)
WBC # BLD AUTO: 9.2 10E3/UL (ref 4–11)

## 2023-12-29 PROCEDURE — 84703 CHORIONIC GONADOTROPIN ASSAY: CPT | Performed by: NURSE PRACTITIONER

## 2023-12-29 PROCEDURE — 99232 SBSQ HOSP IP/OBS MODERATE 35: CPT | Performed by: INTERNAL MEDICINE

## 2023-12-29 PROCEDURE — C1769 GUIDE WIRE: HCPCS

## 2023-12-29 PROCEDURE — C9113 INJ PANTOPRAZOLE SODIUM, VIA: HCPCS | Performed by: INTERNAL MEDICINE

## 2023-12-29 PROCEDURE — 82010 KETONE BODYS QUAN: CPT | Performed by: INTERNAL MEDICINE

## 2023-12-29 PROCEDURE — 999N000248 HC STATISTIC IV INSERT WITH US BY RN

## 2023-12-29 PROCEDURE — 80202 ASSAY OF VANCOMYCIN: CPT | Performed by: INTERNAL MEDICINE

## 2023-12-29 PROCEDURE — 250N000011 HC RX IP 250 OP 636: Performed by: RADIOLOGY

## 2023-12-29 PROCEDURE — 250N000011 HC RX IP 250 OP 636: Performed by: NURSE PRACTITIONER

## 2023-12-29 PROCEDURE — 36005 INJECTION EXT VENOGRAPHY: CPT

## 2023-12-29 PROCEDURE — 75820 VEIN X-RAY ARM/LEG: CPT

## 2023-12-29 PROCEDURE — 85347 COAGULATION TIME ACTIVATED: CPT

## 2023-12-29 PROCEDURE — 99232 SBSQ HOSP IP/OBS MODERATE 35: CPT | Performed by: PSYCHIATRY & NEUROLOGY

## 2023-12-29 PROCEDURE — 85520 HEPARIN ASSAY: CPT | Performed by: INTERNAL MEDICINE

## 2023-12-29 PROCEDURE — 258N000003 HC RX IP 258 OP 636: Performed by: INTERNAL MEDICINE

## 2023-12-29 PROCEDURE — C1725 CATH, TRANSLUMIN NON-LASER: HCPCS

## 2023-12-29 PROCEDURE — 250N000013 HC RX MED GY IP 250 OP 250 PS 637: Performed by: INTERNAL MEDICINE

## 2023-12-29 PROCEDURE — 272N000117 HC CATH CR2

## 2023-12-29 PROCEDURE — 200N000001 HC R&B ICU

## 2023-12-29 PROCEDURE — 99233 SBSQ HOSP IP/OBS HIGH 50: CPT | Performed by: INTERNAL MEDICINE

## 2023-12-29 PROCEDURE — 272N000302 HC DEVICE INFLATION CR5

## 2023-12-29 PROCEDURE — 258N000001 HC RX 258: Performed by: INTERNAL MEDICINE

## 2023-12-29 PROCEDURE — 36902 INTRO CATH DIALYSIS CIRCUIT: CPT

## 2023-12-29 PROCEDURE — 83735 ASSAY OF MAGNESIUM: CPT | Performed by: INTERNAL MEDICINE

## 2023-12-29 PROCEDURE — 85027 COMPLETE CBC AUTOMATED: CPT | Performed by: INTERNAL MEDICINE

## 2023-12-29 PROCEDURE — 36415 COLL VENOUS BLD VENIPUNCTURE: CPT | Performed by: INTERNAL MEDICINE

## 2023-12-29 PROCEDURE — 272N000566 HC SHEATH CR3

## 2023-12-29 PROCEDURE — 272N000500 HC NEEDLE CR2

## 2023-12-29 PROCEDURE — 36589 REMOVAL TUNNELED CV CATH: CPT

## 2023-12-29 PROCEDURE — 80048 BASIC METABOLIC PNL TOTAL CA: CPT | Performed by: INTERNAL MEDICINE

## 2023-12-29 PROCEDURE — 255N000002 HC RX 255 OP 636: Performed by: RADIOLOGY

## 2023-12-29 PROCEDURE — 87040 BLOOD CULTURE FOR BACTERIA: CPT

## 2023-12-29 PROCEDURE — 99152 MOD SED SAME PHYS/QHP 5/>YRS: CPT

## 2023-12-29 PROCEDURE — 250N000011 HC RX IP 250 OP 636: Performed by: INTERNAL MEDICINE

## 2023-12-29 PROCEDURE — 80069 RENAL FUNCTION PANEL: CPT | Performed by: HOSPITALIST

## 2023-12-29 PROCEDURE — 99153 MOD SED SAME PHYS/QHP EA: CPT

## 2023-12-29 RX ORDER — FLUMAZENIL 0.1 MG/ML
0.2 INJECTION, SOLUTION INTRAVENOUS
Status: DISCONTINUED | OUTPATIENT
Start: 2023-12-29 | End: 2023-12-30

## 2023-12-29 RX ORDER — NALOXONE HYDROCHLORIDE 0.4 MG/ML
0.2 INJECTION, SOLUTION INTRAMUSCULAR; INTRAVENOUS; SUBCUTANEOUS
Status: DISCONTINUED | OUTPATIENT
Start: 2023-12-29 | End: 2023-12-29

## 2023-12-29 RX ORDER — NALOXONE HYDROCHLORIDE 0.4 MG/ML
0.4 INJECTION, SOLUTION INTRAMUSCULAR; INTRAVENOUS; SUBCUTANEOUS
Status: DISCONTINUED | OUTPATIENT
Start: 2023-12-29 | End: 2023-12-29

## 2023-12-29 RX ORDER — IODIXANOL 320 MG/ML
100 INJECTION, SOLUTION INTRAVASCULAR ONCE
Status: COMPLETED | OUTPATIENT
Start: 2023-12-29 | End: 2023-12-29

## 2023-12-29 RX ORDER — ONDANSETRON 2 MG/ML
4 INJECTION INTRAMUSCULAR; INTRAVENOUS
Status: DISCONTINUED | OUTPATIENT
Start: 2023-12-29 | End: 2023-12-30

## 2023-12-29 RX ORDER — VANCOMYCIN HYDROCHLORIDE 500 MG/10ML
500 INJECTION, POWDER, LYOPHILIZED, FOR SOLUTION INTRAVENOUS ONCE
Status: DISCONTINUED | OUTPATIENT
Start: 2023-12-29 | End: 2023-12-29

## 2023-12-29 RX ORDER — IODIXANOL 320 MG/ML
100 INJECTION, SOLUTION INTRAVASCULAR ONCE
Status: CANCELLED | OUTPATIENT
Start: 2023-12-29 | End: 2023-12-29

## 2023-12-29 RX ORDER — FENTANYL CITRATE 50 UG/ML
25-50 INJECTION, SOLUTION INTRAMUSCULAR; INTRAVENOUS EVERY 5 MIN PRN
Status: DISCONTINUED | OUTPATIENT
Start: 2023-12-29 | End: 2023-12-30

## 2023-12-29 RX ORDER — HEPARIN SODIUM 10000 [USP'U]/100ML
0-5000 INJECTION, SOLUTION INTRAVENOUS CONTINUOUS
Status: DISPENSED | OUTPATIENT
Start: 2023-12-29 | End: 2024-01-02

## 2023-12-29 RX ADMIN — PANTOPRAZOLE SODIUM 40 MG: 40 INJECTION, POWDER, FOR SOLUTION INTRAVENOUS at 06:00

## 2023-12-29 RX ADMIN — FENTANYL CITRATE 25 MCG: 50 INJECTION, SOLUTION INTRAMUSCULAR; INTRAVENOUS at 12:37

## 2023-12-29 RX ADMIN — TRAZODONE HYDROCHLORIDE 100 MG: 50 TABLET ORAL at 20:41

## 2023-12-29 RX ADMIN — FENTANYL CITRATE 50 MCG: 50 INJECTION, SOLUTION INTRAMUSCULAR; INTRAVENOUS at 11:53

## 2023-12-29 RX ADMIN — FENTANYL CITRATE 50 MCG: 50 INJECTION, SOLUTION INTRAMUSCULAR; INTRAVENOUS at 12:30

## 2023-12-29 RX ADMIN — SERTRALINE HYDROCHLORIDE 150 MG: 100 TABLET ORAL at 08:45

## 2023-12-29 RX ADMIN — IODIXANOL 50 ML: 320 INJECTION, SOLUTION INTRAVASCULAR at 13:16

## 2023-12-29 RX ADMIN — DEXTROSE AND SODIUM CHLORIDE: 5; 900 INJECTION, SOLUTION INTRAVENOUS at 14:26

## 2023-12-29 RX ADMIN — DEXTROSE MONOHYDRATE 50 ML: 25 INJECTION, SOLUTION INTRAVENOUS at 14:14

## 2023-12-29 RX ADMIN — HEPARIN SODIUM 1000 UNITS/HR: 10000 INJECTION, SOLUTION INTRAVENOUS at 17:56

## 2023-12-29 RX ADMIN — DEXTROSE MONOHYDRATE 25 ML: 25 INJECTION, SOLUTION INTRAVENOUS at 03:03

## 2023-12-29 RX ADMIN — ACETAMINOPHEN 650 MG: 325 TABLET ORAL at 20:42

## 2023-12-29 RX ADMIN — FENTANYL CITRATE 25 MCG: 50 INJECTION, SOLUTION INTRAMUSCULAR; INTRAVENOUS at 13:03

## 2023-12-29 RX ADMIN — DEXTROSE MONOHYDRATE 25 ML: 25 INJECTION, SOLUTION INTRAVENOUS at 06:12

## 2023-12-29 RX ADMIN — MIDAZOLAM HYDROCHLORIDE 1 MG: 1 INJECTION, SOLUTION INTRAMUSCULAR; INTRAVENOUS at 11:44

## 2023-12-29 RX ADMIN — MIDAZOLAM HYDROCHLORIDE 1 MG: 1 INJECTION, SOLUTION INTRAMUSCULAR; INTRAVENOUS at 12:39

## 2023-12-29 RX ADMIN — ROPINIROLE HYDROCHLORIDE 1 MG: 1 TABLET, FILM COATED ORAL at 20:42

## 2023-12-29 RX ADMIN — IODIXANOL 20 ML: 320 INJECTION, SOLUTION INTRAVASCULAR at 13:08

## 2023-12-29 RX ADMIN — MIDAZOLAM HYDROCHLORIDE 1 MG: 1 INJECTION, SOLUTION INTRAMUSCULAR; INTRAVENOUS at 12:13

## 2023-12-29 ASSESSMENT — ACTIVITIES OF DAILY LIVING (ADL)
ADLS_ACUITY_SCORE: 19
ADLS_ACUITY_SCORE: 20
ADLS_ACUITY_SCORE: 19
ADLS_ACUITY_SCORE: 20
ADLS_ACUITY_SCORE: 19
ADLS_ACUITY_SCORE: 20

## 2023-12-29 NOTE — PHARMACY-VANCOMYCIN DOSING SERVICE
Pharmacy Vancomycin Note  Date of Service 2023  Patient's  1975   48 year old, female    Indication: Bacteremia  Day of Therapy: 2  Current vancomycin regimen:  intermittent  Current vancomycin monitoring method: Renal Replacement Therapy  Current vancomycin therapeutic monitoring goal: 15-20 mg/L    Current estimated CrCl = Estimated Creatinine Clearance: 13 mL/min (A) (based on SCr of 4.8 mg/dL (H)).    Creatinine for last 3 days  2023:  1:40 AM Creatinine 4.42 mg/dL;  8:03 AM Creatinine 4.53 mg/dL; 10:17 AM Creatinine 4.68 mg/dL; 12:14 PM Creatinine 4.60 mg/dL;  4:48 PM Creatinine 4.72 mg/dL;  6:08 PM Creatinine 4.86 mg/dL;  8:11 PM Creatinine 5.06 mg/dL; 10:11 PM Creatinine 2.78 mg/dL  2023: 12:27 AM Creatinine 1.97 mg/dL;  2:10 AM Creatinine 2.37 mg/dL;  4:29 AM Creatinine 2.60 mg/dL;  4:29 AM Creatinine 2.60 mg/dL; 12:39 PM Creatinine 3.37 mg/dL;  6:28 PM Creatinine 3.67 mg/dL  2023:  7:45 AM Creatinine 4.80 mg/dL    Recent Vancomycin Levels (past 3 days)  2023:  7:45 AM Vancomycin 20.9 ug/mL    Vancomycin IV Administrations (past 72 hours)                     vancomycin (VANCOCIN) 1,250 mg in sodium chloride 0.9 % 250 mL intermittent infusion (mg) 1,250 mg New Bag 23 0218                    Nephrotoxins and other renal medications (From now, onward)      Start     Dose/Rate Route Frequency Ordered Stop    23 2000  vancomycin (VANCOCIN) 500 mg vial to attach to  mL bag         500 mg  over 1 Hours Intravenous ONCE 23 1009      23 1224  vancomycin place love - receiving intermittent dosing         1 each Intravenous SEE ADMIN INSTRUCTIONS 23 1225                 Contrast Orders - past 72 hours (72h ago, onward)      Start     Dose/Rate Route Frequency Stop    23 1600  iopamidol (ISOVUE-370) solution 75 mL         75 mL Intravenous ONCE 23 2418            Interpretation of levels and current regimen:  Vancomycin level  is reflective of therapeutic level    Has serum creatinine changed greater than 50% in last 72 hours: n/a    Urine output:  diminished urine output    Renal Function: ESRD on Dialysis    Plan:  Re-dose vancomycin 500 mg IV Once after next HD run. Would normally run today, but HD catheter removed d/t clot.  Vancomycin monitoring method: Renal Replacement Therapy  Vancomycin therapeutic monitoring goal: 15-20 mg/L  Pharmacy will check vancomycin levels as appropriate in 1-3 Days.  Serum creatinine levels will be ordered daily for the first week of therapy and at least twice weekly for subsequent weeks.    Radha Bergman, [facility]

## 2023-12-29 NOTE — PROGRESS NOTES
[facility]    Medicine Progress Note - Hospitalist Service    Date of Admission:  12/27/2023    Assessment & Plan   Josefa Curiel is a 48 year old female with past medical history of type I DM, ESRD on dialysis, HTN who presented to ED for evaluation of nausea and vomiting, found to have DKA and admitted for further management. She does note some facial swelling, and now has 1 bottle positive blood cx     1.DKA in a patient with history of type I DM;    COVID-19 and influenza test negative.    -now it is possible clot on cath may have contributed here, vs gastroenteritis   -A1c 7.0.  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  Symptomatic treatment as ordered  -iv ppi  -improved today      5.ESRD on dialysis;started it in 4/23  - Nephrology consulted for dialysis management.    -Of note, patient has fistula on left upper extremity and also dialysis catheter on right front chest.--she has this due to complications with using fistula  -12/28 had to remove HD cath from chest--due to SVC syndrome  -12/29-IR did  Left arm  fistulogram, right arm venogram    1. Irregular mild-mod stenosis mid fistula treated with 7 mm PTA.  2. Irregular adherent thrombus SVC.  SVC is however patent.  3. RUE: Occlusive stenosis right innominate vein with non-occlusive clot proximal to stenosis  --plan now per IR, keep on hep gtt high intensity over weekend, ok renal try to use fistula--if any issue, would then need fem line HD cath for HD  --Next Tuesday IR will repeat RUE venogram with potential for intervention, possible lytics   --will need to be for sure NPO next Monday night at [place]     6.Uncontrolled hypertension;  -- PTA losartan--held due to facial swelling.  Added as needed hydralazine.  --defer to renal    7.One bottle positive GPC  -repeat blood cx 12/28  -at risk with HD  -vanco was started  -ID did see              Diet: Room Service  NPO per Anesthesia Guidelines for Procedure/Surgery Except for: Meds  NPO per Anesthesia Guidelines for Procedure/Surgery Except for: Meds    DVT Prophylaxis: hep gtt  [place]on Catheter: Not present  Lines: None     Cardiac Monitoring: ACTIVE order. Indication: DKA  Code Status: Full Code      Clinically Significant Risk Factors             # Anion Gap Metabolic Acidosis: Highest Anion Gap = 25 mmol/L in last 2 days, will monitor and treat as appropriate      # Hypertension: Noted on problem list                          Belle Rodriguez MD  Hospitalist Service  [facility]  Securely message with [email or web address] (more info)  Text page via [employer] Paging/Directory   ______________________________________________________________________    Interval History   She feels better  Face feels much less swollen and looks better  No pain  No more n/v      Physical Exam   Vital Signs: Temp: 98.9  F (37.2  C) Temp src: Oral BP: 139/64 Pulse: 107   Resp: 14 SpO2: 100 % O2 Device: None (Room air)    Weight: 124 lbs 4.8 oz    Constitutional: awake, alert, cooperative, and no apparent distress  Eyes: sclera  clear  Face-no swelling today , voice normal   Respiratory: No increased work of breathing, good air exchange, clear to auscultation bilaterally, no crackles or wheezing  Cardiovascular: Normal apical impulse, regular rate and rhythm, normal S1 and S2, no S3 or S4, and no murmur noted  GI: normal bowel sounds, soft, non-distended, and non-tender  Skin: no bruising or bleeding  Musculoskeletal: no lower extremity pitting edema present  Neurologic: Mental Status Exam:  Level of Alertness:   awake  Neuropsychiatric: General: normal, calm, and normal eye contact    Medical Decision Making       55 MINUTES SPENT BY ME on the date of service doing chart review, history, exam, documentation & further activities per the note.      Data     I have personally reviewed the following data over the past 24 hrs:    11.2 (H)  \   8.9 (L)   / 156     134 (L) 97 (L) 19.8 /  183 (H)   4.3 20 (L) 4.80 (H) \     ALT: N/A AST: N/A AP: N/A TBILI: N/A   ALB: 4.0 TOT PROTEIN: N/A LIPASE: N/A       Imaging results reviewed over the past 24 hrs:   Recent Results (from the past 24 hour(s))   CTA Chest with Contrast    Narrative    EXAM: CTA CHEST WITH CONTRAST  LOCATION: [facility]  DATE: 12/28/2023    INDICATION: 48 y o ESRD,DKA, facial edema, need to rule out SVC syndrome  COMPARISON: Frontal and lateral chest radiographs 12/27/2023  TECHNIQUE: Helical acquisition through the chest was performed during the arterial phase of contrast enhancement. 2D and 3D reconstructions performed by the CT technologist. Dose reduction techniques were used.  CONTRAST: 75ml isovue 370    CT ANGIOGRAM CHEST: Right jugular approach dialysis catheter terminates in the right atrium low-attenuation is present around the catheter in the right brachiocephalic vein (series 4, image 37) consistent with catheter associated thrombus, which extends   to the SVC (series 4, image 49). Concentrated contrast is present within the low right internal  jugular vein and multiple small collateral veins in the anterior/posterior chest wall and in the middle mediastinum.    Normal caliber thoracic aorta with conventional arch anatomy. Main pulmonary artery is normal in size. No pulmonary artery filling defects.    LUNGS AND PLEURA: Symmetric normal lung inflation. No interstitial or alveolar opacities. Normal airways. No pleural effusion.    MEDIASTINUM: Cardiac chambers are normal in size. No pericardial effusion. No enlarged mediastinal or hilar lymph nodes.    CORONARY ARTERY CALCIFICATION: Moderate.    UPPER ABDOMEN: Coronal upper belly    MUSCULOSKELETAL: Thoracic vertebra are maintained in height and shape. Minimal degenerative osteophytes at T8-T9. Normal bone mineralization. No fractures or bone lesions.      Impression    IMPRESSION:    1.  Catheter associated thrombus about the right jugular approach dialysis catheter results in severe luminal stenosis at the brachiocephalic vein confluence/upper SVC.  2.  No acute pulmonary embolism.     MR Brain w/o Contrast    Narrative    EXAM: MR BRAIN W/O CONTRAST  LOCATION: [facility]  DATE: 12/28/2023    INDICATION: Rule out PRES. Headache. ESRD DKA.  COMPARISON: CT head 12/20/2023  TECHNIQUE: Routine multiplanar multisequence head MRI without intravenous contrast.    FINDINGS:  INTRACRANIAL CONTENTS: No acute or subacute infarct. No mass, acute hemorrhage, or extra-axial fluid collections. Patchy nonspecific T2/FLAIR hyperintensities within the cerebral white matter and franki most consistent with mild to moderate chronic   microvascular ischemic change. Mild generalized cerebral atrophy. No hydrocephalus. Normal position of the cerebellar tonsils.     Corpus callosum genu tissue loss and gliosis nonspecific in etiology.    Punctate foci susceptibility on SWI seen within right franki, left posterior frontal lobe white matter. Findings likely reflect chronic microhemorrhages. Expanded  differential includes hypertensive microangiopathy, cavernous malformations, chronic trauma.   Mineralization possible.     SELLA: No abnormality accounting for technique.    OSSEOUS STRUCTURES/SOFT TISSUES: Normal marrow signal. The major intracranial vascular flow voids are maintained. Multiple small and prominent lymph nodes within the neck.    ORBITS: No abnormality accounting for technique.     SINUSES/MASTOIDS: Mild mucosal thickening scattered about the paranasal sinuses. Scattered fluid/membrane thickening in the mastoid air cells bilaterally.       Impression    IMPRESSION:  1.  No posterior reversible encephalopathy syndrome.  2.  No acute infarct.  3.  No acute intracranial process identified.  4.  Chronic intracranial changes described above.   IR Upper Extremity Venogram Right    Narrative    [facility]  LOCATION: [facility]  DATE: 12/29/2023    PROCEDURE:   1.  RIGHT UPPER EXTREMITY VENOGRAM  2.  ULTRASOUND GUIDANCE FOR VASCULAR ACCESS    INTERVENTIONAL RADIOLOGIST: Dale Lopes MD    INDICATION: Right arm and head and neck swelling. SVC syndrome. End-stage renal disease on hemodialysis via tunneled right IJ vein dialysis catheter. Dialysis catheter was recently removed with improved symptoms. Bacteremia.    Air Kerma: 51 mGy  FLUOROSCOPIC TIME: 0.6 minutes   CONTRAST: 20 mL Visipaque 320    COMPLICATIONS: No immediate complications.    PROCEDURE:   Right upper arm was prepped and draped in usual sterile fashion followed by local anesthesia with 1% Xylocaine. Ultrasound revealed a patent basilic vein. An ultrasound image was obtained and placed in the patient's permanent medical record. Using   real-time ultrasound for needle placement, a right basilic vein puncture was performed. 5 Tamazight sheath was placed. Right upper extremity venography was performed. Access was removed with hemostasis obtained with manual compression.    FINDINGS:   Patent right axillary and  subclavian veins. Reflux into patent right internal jugular vein. Increased filling of the native venous collaterals about the base of the right neck. Complete occlusion of the right innominate vein with nonocclusive thrombus at   this level.      Impression    IMPRESSION:    Complete occlusion right innominate vein mid segment with nonocclusive thrombus proximally. There is likely occlusive stenosis at the mid right innominate vein. Intervention not performed at this setting given bacteremia and clinical improvement.   Tentative plan is to continue with full dose anticoagulation with interval venogram as early as next week to reassess clot burden. Pending blood cultures, imaging findings and clinical status at this time, may consider proactive intervention.     IR Dialysis Fistulogram Left    Narrative    [facility]  LOCATION: [facility]  DATE: 12/29/2023    PROCEDURE:   1. AV DIALYSIS FISTULOGRAM  2. PTA OF PERIPHERAL SEGMENT  3. CONSCIOUS SEDATION    ATTENDING: Dale Lopes MD.    INDICATION: Difficulty accessing fistula in the left upper arm. SVC syndrome status post removal of tunneled right IJ vein dialysis catheter.     SEDATION: Versed 3 mg. Fentanyl 150 mcg. The procedure was performed with administration intravenous conscious sedation with appropriate preoperative, intraoperative, and postoperative evaluation. During the time-out, immediately prior to administration   of medications, the patient was reassessed for adequacy to receive conscious sedation.  80 minutes of supervised face to face conscious sedation time was provided by a radiology nurse under my direct supervision.    Air Kerma: 47 mGy  FLUOROSCOPIC TIME: 3.7 minutes     CONTRAST: 50 mL Visipaque 320     COMPLICATIONS:  No immediate complications.    STERILE BARRIER TECHNIQUE: Maximum sterile barrier technique was used. Cutaneous antisepsis was performed at the operative site with application of 2%  chlorhexidine and large sterile drape. Prior to the procedure, the  and assistant performed   hand hygiene and wore hat, mask, sterile gown, and sterile gloves during the entire procedure.    PROCEDURE:   The left upper arm was prepped and draped in the usual sterile fashion followed by local anesthesia with 1% Xylocaine. Access to the fistula was achieved using a micropuncture system. Diagnostic fistulography was performed from the arterial inflow to the   right atrium. Following the diagnostic study, angioplasty was performed using a 7 mm balloon at mid - distal humeral segment of the fistula. Completion fistulography was performed. The sheath was removed and compression applied to the puncture site   until hemostasis was achieved. There was a palpable thrill in the fistula at the completion of the procedure.    FINDINGS:  AV DIALYSIS FISTULOGRAM: Patent left upper arm straight AV fistula. There is mild - moderate irregular stenosis in the midportion of the fistula at the mid - distal humeral segment. AV anastomosis is widely patent. Central venous outflow is patent.   However, there is bulky irregular thrombus throughout the SVC.    PTA VENOUS: Mild - moderate irregular stenosis mid - distal humeral segment of the left arm fistula, treated with 7 mm PTA.      Impression    IMPRESSION:    1.  Mild - moderate irregular stenosis mid - distal humeral segment of left upper arm AV fistula treated with 7 mm PTA.  2.  Bulky irregular nonocclusive thrombus throughout the SVC. No intervention at this setting given bacteremia and clinical improvement in SVC syndrome symptoms. Recommend full dose anticoagulation for the time being.

## 2023-12-29 NOTE — PROGRESS NOTES
NEPHROLOGY PROGRESS NOTE    Date of service: 12/28/23     CC: ESKD      ASSESSMENT/RECOMMENDATIONS:  1. ESKD: on HD MWF at [facility]. EDW=56.5kg. Access: LFA AVF. Her outpt HD unit was notified of her admit and orders reviewed.               -Continue HD MWF and prn during hospitalization.     2. N/V: improved. treat DKA and other management per primary     3. Hypertension: Her BP is above goal. Continue current BP meds. UF with HD MWF.     4. DM1: DKA on admit. Management per primary.     5. Anemia: due to ESKD. Hemoglobin is below goal(9-11). Acute drop with illness, procedure. No need for SHARLENE now. Trend.      6. BMM: holding sevelamer given n/v. Trend. Phos.     7. Facial edema/SVC syndrome: large clot on tip of RIJ HD catheter found 12/28 and this was removed by IR on 12/28. Plan for follow up venogram.     8. Bacteremia: GPC in clusters from 12/27 BC. Renally dose abx per primary. Seen by ID. Discussed with Dr. Koch. HD cath now removed. Trend.       Orville Alexander MD  [facility]   Office: [phone]      S: She had her tunneled HD cath removed yesterday as large clot on tip of cath causing SVC syndrome. She reports resolution of her headaches and less facial swelling. No cp, sob, edema.     Current Facility-Administered Medications   Medication    acetaminophen (TYLENOL) tablet 650 mg    glucose gel 15-30 g    Or    dextrose 50 % injection 25-50 mL    Or    glucagon injection 1 mg    heparin infusion 25,000 units in D5W 250 mL ANTICOAGULANT    hydrALAZINE (APRESOLINE) injection 10 mg    HYDROmorphone (DILAUDID) injection 0.2 mg    insulin regular (MYXREDLIN) 1 unit/mL infusion    [Held by provider] losartan (COZAAR) tablet 25 mg    naloxone (NARCAN) injection 0.2 mg    Or    naloxone (NARCAN) injection 0.4 mg    Or    naloxone (NARCAN) injection 0.2 mg    Or    naloxone (NARCAN) injection 0.4 mg    No heparin via hemodialysis machine    ondansetron (ZOFRAN)  "injection 4 mg    promethazine (PHENERGAN) 6.25 mg in sodium chloride 0.9 % 55 mL intermittent infusion    rOPINIRole (REQUIP) tablet 1 mg    senna-docusate (SENOKOT-S/PERICOLACE) 8.6-50 MG per tablet 1 tablet    sertraline (ZOLOFT) tablet 150 mg    [Held by provider] sevelamer carbonate (RENVELA) tablet 800 mg    [Held by provider] sodium chloride 0.9 % infusion    sodium chloride 0.9% BOLUS 100-150 mL    sodium chloride 0.9% BOLUS 250 mL    sodium chloride 0.9% BOLUS 300 mL    traZODone (DESYREL) tablet 100 mg    vancomycin (VANCOCIN) 500 mg vial to attach to  mL bag    vancomycin place love - receiving intermittent dosing        No interval change in SH, FH.    Physical Exam  BP (!) 163/70   Pulse 93   Temp 98.9  F (37.2  C)   Resp 13   Ht 1.702 m (5' 7\")   Wt 56.4 kg (124 lb 4.8 oz)   LMP 12/13/2023   SpO2 99%   BMI 19.47 kg/m    GENERAL: Calm in bed  HEENT: NCAT, sclerae not icteric, MMM, some facial edema  NECK: Trachea midline.   LUNGS: no respiratory distress,  HEART: no leg edema.   ABDOMEN: Soft, NT  PSYCH: Alert, normal affect  NEURO:  ANGELES  ACCESS: SHAUN AVF with thrill        Lab Data:  Recent Labs   Lab Test 12/29/23  0745 12/28/23 1828 12/28/23  1239   POTASSIUM 4.3 4.9 4.4   CHLORIDE 97* 92* 98   ALBUMIN 4.0  --   --      Recent Labs   Lab Test 12/29/23  0745 12/28/23 1828 12/28/23  1239 12/28/23  0429   BUN 19.8 16.1 14.2 11.2  11.2     Recent Labs   Lab Test 12/29/23  0745 12/28/23 1828 12/28/23  0429 11/24/22  0528 11/23/22  0538   WBC 11.2* 11.8* 8.6   < >  --    HGB 8.9* 9.9* 9.8*   < >  --    MCV 96 97 94   < >  --     132* 174   < >  --    IRON  --   --   --   --  35*   IRONSAT  --   --   --   --  22    < > = values in this interval not displayed.     No lab results found.    Invalid input(s): \"PTHINTACT\", \"[id number]\", \"[id number]\", \"PROTCREATUR\"    I reviewed the above labs  "

## 2023-12-29 NOTE — PROGRESS NOTES
Patient in IR today(see IR notes)  Addressed with Dr. Alexander and will reschedule her run to Saturday.    Lashay Sinclair RN

## 2023-12-29 NOTE — PROCEDURES
INTERVENTIONAL RADIOLOGY    Procedure:  Left arm fistulogram, right arm venogram    Meds:  Versed 3 mg IV   Fentanyl 150 mcg IV    MD:  Marianne    Complications:  None    Contrast:  70 cc    Findings:  1. Irregular mild-mod stenosis mid fistula treated with 7 mm PTA.  2. Irregular adherent thrombus SVC.  SVC is however patent.  3. RUE: Occlusive stenosis right innominate vein with non-occlusive clot proximal to stenosis.    Plan:  Full dose anticoagulation. Use left arm fistula for dialysis if possible. If pt needs non-tunneled access over weekend, recommend femoral line. Tentative plan for repeat RUE venogram with possible intervention on Tuesday. Mindful that clot may be seeded given bacteremia and more time on IV abx the better.

## 2023-12-29 NOTE — PLAN OF CARE
Problem: Adult Inpatient Plan of Care  Goal: Plan of Care Review  Description: The Plan of Care Review/Shift note should be completed every shift.  The Outcome Evaluation is a brief statement about your assessment that the patient is improving, declining, or no change.  This information will be displayed automatically on your shift  note.  Outcome: Progressing     Problem: Infection  Goal: Absence of Infection Signs and Symptoms  Outcome: Progressing   Goal Outcome Evaluation:     Alert and oriented. Vitals stable. Denied pain. Heparin drip running at 1000 units /hr. Insulin drip with hourly BG check. BG 50 patient denied any symptoms D50 given per protocol.

## 2023-12-29 NOTE — PRE-PROCEDURE
GENERAL PRE-PROCEDURE:   Procedure:  LUE fistulogram and RUE venogram  Date/Time:  12/29/2023 11:25 AM    Written consent obtained?: Yes    Risks and benefits: Risks, benefits and alternatives were discussed    Consent given by:  Patient  Patient states understanding of procedure being performed: Yes    Patient's understanding of procedure matches consent: Yes    Procedure consent matches procedure scheduled: Yes    Expected level of sedation:  Moderate  Appropriately NPO:  Yes  ASA Class:  3  Mallampati  :  Grade 2- soft palate, base of uvula, tonsillar pillars, and portion of posterior pharyngeal wall visible  Lungs:  Lungs clear with good breath sounds bilaterally  Heart:  Normal heart sounds and rate  History & Physical reviewed:  History and physical reviewed and no updates needed  Statement of review:  I have reviewed the lab findings, diagnostic data, medications, and the plan for sedation

## 2023-12-29 NOTE — PROGRESS NOTES
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  She developed mostly positional headache for the last 2 days and likely is related to DKA.  Her blood pressure was significantly elevated couple of days ago and raises the possibility of FL ES.  CT of the head shows atrophy and small vessel ischemic disease  MRI brain shows no changes to suggest posterior reversible encephalopathy syndrome, no acute infarct.  Chronic intracranial changes noted.  Echocardiogram shows calcified nodule and posterior leaflet, vegetation cannot be ruled out.  Moderate annular calcification with moderate mitral stenosis and mild to moderate aortic stenosis.  Patient developed facial swelling and CT scan of neck and chest showed a large clot at the end of the hemodialysis catheter.  Dialysis catheter removed currently on heparin  Nothing more to add from neurology standpoint, will sign off    Neurology Discharge Planning :   TBDIVYA Christina MD  [facility] NEUROLOGY, [place]  (Formerly, [facility]     PROBLEM LIST      Patient Active Problem List   Diagnosis Code    LORIN (acute kidney injury) (H24) N17.9    Cannabis abuse F12.10    Renovascular hypertension I15.0    Proteinuria R80.9    Upper GI bleeding K92.2    Proliferative diabetic retinopathy (H) E11.3599    Moderate episode of recurrent major depressive disorder (H) F33.1    Anxiety F41.9    Hyperlipidemia E78.5    Vitamin D deficiency E55.9    Trigger middle finger M65.339    Alcohol abuse, in remission F10.11    Ulcer of right foot, limited to breakdown  of skin (H) L97.511    Type 1 diabetes mellitus with nephropathy (H) E10.21    Diabetic ulcer of right foot associated with type 1 diabetes mellitus, with necrosis of muscle, unspecified part of foot (H) E10.621, L97.513    Ulcer of ankle, left, with unspecified severity (H) L97.329    Nausea and vomiting R11.2    ESRD (end stage renal disease) on dialysis (H) N18.6, Z99.2    Gastroparesis K31.84    Alcohol dependence (H) F10.20    Diabetic ketoacidosis without coma associated with type 1 diabetes mellitus (H) E10.10       Past Medical History:   Patient  has a past medical history of LORIN (acute kidney injury) (H24), Anxiety, Cannabis abuse, Depression, Diabetes Type 1, uncontrolled (1985), DKA (diabetic ketoacidoses), ESRD (end stage renal disease) on dialysis (H) (04/11/2023), ETOH abuse, Gastroparesis, HLD (hyperlipidemia), HTN (hypertension), Lactic acidosis, and Vitamin D deficiency.     SUBJECTIVE     Patient headaches are better after tunnel catheter was removed and started on heparin.  Headache likely was due to superior vena cava syndrome     OBJECTIVE     Vital signs in last 24 hours  Temp:  [98.1  F (36.7  C)-98.5  F (36.9  C)] 98.5  F (36.9  C)  Pulse:  [] 101  Resp:  [0-36] 17  BP: (109-183)/(55-90) 145/67  SpO2:  [92 %-100 %] 98 %    Review of Systems   Pertinent items are noted in HPI.    General Physical Exam: Patient is alert and oriented x 3. Vital signs were reviewed and are documented in EMR. Neck was supple, no carotid bruit, thyromegaly, JVD or lymphadenopathy noted.  Neurological Exam:  Young female who is alert and oriented vital signs are reviewed and documented in electronic medical record.  Neurologically speech was normal.  Cranial nerves II through XII are intact funduscopic exam normal.  Strength 5/5 in the upper inner drift.  Reflexes 1+ in biceps triceps brachioradialis.  Absent at knees and ankle.  Sensation decreased to light touch pinprick below knees bilaterally.  Minimal  dysmetria on finger-nose testing.  Gait testing deferred.     DIAGNOSTIC STUDIES     Pertinent Radiology   Following imaging studies were reviewed:    CT  1.  No CT evidence for acute intracranial process.  2.  Brain atrophy and presumed chronic microvascular ischemic changes as above.    CTA CHEST  1.  Catheter associated thrombus about the right jugular approach dialysis catheter results in severe luminal stenosis at the brachiocephalic vein confluence/upper SVC.  2.  No acute pulmonary embolism.  MRI  1.  No posterior reversible encephalopathy syndrome.  2.  No acute infarct.  3.  No acute intracranial process identified.  4.  Chronic intracranial changes described above.    Echocardiogram  Echo result w/o MOPS: Interpretation Summary The left ventricle is normal in size.No regional wall motion abnormalities noted.Normal right ventricle size and systolic function.There is moderate mitral annular calcification.Thickened mitral valve posterior leafletDecreased mobility of posterior MV leaflet.There is moderate mitral stenosis.9 mm Mean gradient at HR of 119.Mild to moderate valvular aortic stenosis.Mild increased flow velocity across PV likely related to hyperdynamic state.IVC diameter and respiratory changes fall into an intermediate rangesuggesting an RA pressure of 8 mmHg.The rhythm was sinus tachycardia.Posterior leaflet has calcified nodule that is more prominent than MARAH from10/21/22. No clear vegetation but cannot exclude vegetation.    Pertinent Labs   Lab Results: Personally Reviewed  Recent Results (from the past 24 hour(s))   Glucose by meter    Collection Time: 12/28/23  6:58 AM   Result Value Ref Range    GLUCOSE BY METER POCT 88 70 - 99 mg/dL   Glucose by meter    Collection Time: 12/28/23  7:54 AM   Result Value Ref Range    GLUCOSE BY METER POCT 108 (H) 70 - 99 mg/dL   Echocardiogram Complete    Collection Time: 12/28/23  8:57 AM   Result Value Ref Range    LVEF  >70%    Glucose by meter     Collection Time: 12/28/23  9:01 AM   Result Value Ref Range    GLUCOSE BY METER POCT 274 (H) 70 - 99 mg/dL   Glucose by meter    Collection Time: 12/28/23 10:11 AM   Result Value Ref Range    GLUCOSE BY METER POCT 356 (H) 70 - 99 mg/dL   Glucose by meter    Collection Time: 12/28/23 11:14 AM   Result Value Ref Range    GLUCOSE BY METER POCT 305 (H) 70 - 99 mg/dL   Glucose by meter    Collection Time: 12/28/23 11:58 AM   Result Value Ref Range    GLUCOSE BY METER POCT 241 (H) 70 - 99 mg/dL   Blood Culture Peripheral Blood    Collection Time: 12/28/23 12:29 PM    Specimen: Peripheral Blood   Result Value Ref Range    Culture No growth after 12 hours    Blood Culture Peripheral Blood    Collection Time: 12/28/23 12:39 PM    Specimen: Peripheral Blood   Result Value Ref Range    Culture No growth after 12 hours    Ketone Beta-Hydroxybutyrate Quantitative    Collection Time: 12/28/23 12:39 PM   Result Value Ref Range    Ketone (Beta-Hydroxybutyrate) Quantitative 3.30 (HH) <=0.30 mmol/L   Basic metabolic panel    Collection Time: 12/28/23 12:39 PM   Result Value Ref Range    Sodium 138 135 - 145 mmol/L    Potassium 4.4 3.4 - 5.3 mmol/L    Chloride 98 98 - 107 mmol/L    Carbon Dioxide (CO2) 22 22 - 29 mmol/L    Anion Gap 18 (H) 7 - 15 mmol/L    Urea Nitrogen 14.2 6.0 - 20.0 mg/dL    Creatinine 3.37 (H) 0.51 - 0.95 mg/dL    GFR Estimate 16 (L) >60 mL/min/1.73m2    Calcium 9.1 8.6 - 10.0 mg/dL    Glucose 204 (H) 70 - 99 mg/dL   Glucose by meter    Collection Time: 12/28/23  1:08 PM   Result Value Ref Range    GLUCOSE BY METER POCT 158 (H) 70 - 99 mg/dL   Glucose by meter    Collection Time: 12/28/23  2:00 PM   Result Value Ref Range    GLUCOSE BY METER POCT 128 (H) 70 - 99 mg/dL   Glucose by meter    Collection Time: 12/28/23  3:10 PM   Result Value Ref Range    GLUCOSE BY METER POCT 65 (L) 70 - 99 mg/dL   Glucose by meter    Collection Time: 12/28/23  3:36 PM   Result Value Ref Range    GLUCOSE BY METER POCT 125 (H) 70 - 99  mg/dL   Glucose by meter    Collection Time: 12/28/23  4:10 PM   Result Value Ref Range    GLUCOSE BY METER POCT 134 (H) 70 - 99 mg/dL   Glucose by meter    Collection Time: 12/28/23  5:18 PM   Result Value Ref Range    GLUCOSE BY METER POCT 198 (H) 70 - 99 mg/dL   Glucose by meter    Collection Time: 12/28/23  6:13 PM   Result Value Ref Range    GLUCOSE BY METER POCT 294 (H) 70 - 99 mg/dL   Ketone Beta-Hydroxybutyrate Quantitative    Collection Time: 12/28/23  6:28 PM   Result Value Ref Range    Ketone (Beta-Hydroxybutyrate) Quantitative 5.80 (HH) <=0.30 mmol/L   Basic metabolic panel    Collection Time: 12/28/23  6:28 PM   Result Value Ref Range    Sodium 131 (L) 135 - 145 mmol/L    Potassium 4.9 3.4 - 5.3 mmol/L    Chloride 92 (L) 98 - 107 mmol/L    Carbon Dioxide (CO2) 14 (L) 22 - 29 mmol/L    Anion Gap 25 (H) 7 - 15 mmol/L    Urea Nitrogen 16.1 6.0 - 20.0 mg/dL    Creatinine 3.67 (H) 0.51 - 0.95 mg/dL    GFR Estimate 15 (L) >60 mL/min/1.73m2    Calcium 9.2 8.6 - 10.0 mg/dL    Glucose 305 (H) 70 - 99 mg/dL   CBC with platelets    Collection Time: 12/28/23  6:28 PM   Result Value Ref Range    WBC Count 11.8 (H) 4.0 - 11.0 10e3/uL    RBC Count 3.20 (L) 3.80 - 5.20 10e6/uL    Hemoglobin 9.9 (L) 11.7 - 15.7 g/dL    Hematocrit 31.1 (L) 35.0 - 47.0 %    MCV 97 78 - 100 fL    MCH 30.9 26.5 - 33.0 pg    MCHC 31.8 31.5 - 36.5 g/dL    RDW 13.2 10.0 - 15.0 %    Platelet Count 132 (L) 150 - 450 10e3/uL   Glucose by meter    Collection Time: 12/28/23  7:13 PM   Result Value Ref Range    GLUCOSE BY METER POCT 310 (H) 70 - 99 mg/dL   Glucose by meter    Collection Time: 12/28/23  8:14 PM   Result Value Ref Range    GLUCOSE BY METER POCT 257 (H) 70 - 99 mg/dL   Glucose by meter    Collection Time: 12/28/23  9:01 PM   Result Value Ref Range    GLUCOSE BY METER POCT 175 (H) 70 - 99 mg/dL   Glucose by meter    Collection Time: 12/28/23 10:09 PM   Result Value Ref Range    GLUCOSE BY METER POCT 98 70 - 99 mg/dL   Glucose by meter     Collection Time: 12/28/23 10:48 PM   Result Value Ref Range    GLUCOSE BY METER POCT 55 (L) 70 - 99 mg/dL   Glucose by meter    Collection Time: 12/28/23 11:37 PM   Result Value Ref Range    GLUCOSE BY METER POCT 113 (H) 70 - 99 mg/dL   Ketone Beta-Hydroxybutyrate Quantitative    Collection Time: 12/28/23 11:58 PM   Result Value Ref Range    Ketone (Beta-Hydroxybutyrate) Quantitative 3.20 (HH) <=0.30 mmol/L   Glucose by meter    Collection Time: 12/29/23  1:03 AM   Result Value Ref Range    GLUCOSE BY METER POCT 87 70 - 99 mg/dL   Heparin Unfractionated Anti Xa Level    Collection Time: 12/29/23  1:24 AM   Result Value Ref Range    Anti Xa Unfractionated Heparin <0.10 For Reference Range, See Comment IU/mL   Glucose by meter    Collection Time: 12/29/23  2:00 AM   Result Value Ref Range    GLUCOSE BY METER POCT 79 70 - 99 mg/dL   Glucose by meter    Collection Time: 12/29/23  3:00 AM   Result Value Ref Range    GLUCOSE BY METER POCT 50 (LL) 70 - 99 mg/dL   Glucose by meter    Collection Time: 12/29/23  3:26 AM   Result Value Ref Range    GLUCOSE BY METER POCT 91 70 - 99 mg/dL   Glucose by meter    Collection Time: 12/29/23  4:04 AM   Result Value Ref Range    GLUCOSE BY METER POCT 83 70 - 99 mg/dL   Glucose by meter    Collection Time: 12/29/23  5:03 AM   Result Value Ref Range    GLUCOSE BY METER POCT 75 70 - 99 mg/dL   Glucose by meter    Collection Time: 12/29/23  6:08 AM   Result Value Ref Range    GLUCOSE BY METER POCT 68 (L) 70 - 99 mg/dL   Glucose by meter    Collection Time: 12/29/23  6:31 AM   Result Value Ref Range    GLUCOSE BY METER POCT 146 (H) 70 - 99 mg/dL         HOSPITAL MEDICATIONS      [Held by provider] losartan  25 mg Oral Daily    - MEDICATION INSTRUCTIONS -   Does not apply Once    rOPINIRole  1 mg Oral At Bedtime    sertraline  150 mg Oral Daily    [Held by provider] sevelamer carbonate  800 mg Oral BID w/meals    sodium chloride 0.9%  250 mL Intravenous Once in dialysis/CRRT    sodium  chloride 0.9%  300 mL Hemodialysis Machine Once    traZODone  100 mg Oral At Bedtime    vancomycin place love - receiving intermittent dosing  1 each Intravenous See Admin Instructions        Total time spent for face to face visit, reviewing labs/imaging studies, counseling and coordination of care was: 45 Minutes More than 50% of this time was spent on counseling and coordination of care.      This note was dictated using voice recognition software.  Any grammatical or context distortions are unintentional and inherent to the software.

## 2023-12-29 NOTE — PROGRESS NOTES
Interventional Radiology - Pre-Procedure Note:  Inpatient - [facility]  12/29/2023     Procedure Requested: LUE fistulogram/RUE venogram  Requested by: Dr. Alexander/Dr. Lopes    Brief HPI: Josefa Curile is a 48 year old female HTN, DM1 (DKA on admit), ESRD on HD MWF via LUE AVF. Found to have bacteremia with positive blood cultures on 12/27/23 (Staphylococcus hominis); ID following. Patient developed acute facial edema/SVC syndrome like symptoms; found to have a large clot on the tip of her RIJ HD catheter on 12/28; s/p removal by IR. Last fistulogram performed at [place] on 12/7/2023; demonstrated a patent left brachial artery to transposed basilic vein fistula. Patient continues to only be able to use arterial access of her LUE fistula for dialysis and has required a right internal jugular tunneled HD catheter for venous return; as the outpatient dialysis unit nurses have been unable to access the venous portion of the patient's fistula. After discussion with nephrology; repeat fistulogram requested and RUE venogram recommended.        IMAGING:  EXAM: CTA CHEST WITH CONTRAST  LOCATION: [facility]  DATE: 12/28/2023     INDICATION: 48 y o ESRD,DKA, facial edema, need to rule out SVC syndrome  COMPARISON: Frontal and lateral chest radiographs 12/27/2023  TECHNIQUE: Helical acquisition through the chest was performed during the arterial phase of contrast enhancement. 2D and 3D reconstructions performed by the CT technologist. Dose reduction techniques were used.  CONTRAST: 75ml isovue 370     CT ANGIOGRAM CHEST: Right jugular approach dialysis catheter terminates in the right atrium low-attenuation is present around the catheter in the right brachiocephalic vein (series 4, image 37) consistent with catheter associated thrombus, which extends   to the SVC (series 4, image 49). Concentrated contrast is present within the low right internal jugular vein and  multiple small collateral veins in the anterior/posterior chest wall and in the middle mediastinum.     Normal caliber thoracic aorta with conventional arch anatomy. Main pulmonary artery is normal in size. No pulmonary artery filling defects.     LUNGS AND PLEURA: Symmetric normal lung inflation. No interstitial or alveolar opacities. Normal airways. No pleural effusion.     MEDIASTINUM: Cardiac chambers are normal in size. No pericardial effusion. No enlarged mediastinal or hilar lymph nodes.     CORONARY ARTERY CALCIFICATION: Moderate.     UPPER ABDOMEN: Coronal upper belly     MUSCULOSKELETAL: Thoracic vertebra are maintained in height and shape. Minimal degenerative osteophytes at T8-T9. Normal bone mineralization. No fractures or bone lesions.                                                                      IMPRESSION:     1.  Catheter associated thrombus about the right jugular approach dialysis catheter results in severe luminal stenosis at the brachiocephalic vein confluence/upper SVC.  2.  No acute pulmonary embolism.    [place] [facility]  LOCATION: [facility] MEDICAL IMAGING  DATE: 12/7/2023    1. DIALYSIS FISTULOGRAM.  2. MODERATE SEDATION  3. ULTRASOUND GUIDED VASCULAR ACCESS    INDICATION: 48-year-old female with upper arm fistula presents for fistulogram and possible intervention due to pain during cannulation.  FLUOROSCOPIC TIME: 0.3 mins.  DOSE: Air Kerma:13 mGy.  CONTRAST: 30 mL Omnipaque 300.  SEDATION: Prior to the procedure, the patient was alert and oriented. Versed 2 mg and fentanyl 100 mcg were administered intravenously with continuous vital signs monitoring by the radiology nurse under my direct supervision. Patient was alert and oriented post procedure. Total face to face moderate sedation time: 15 minutes.    PROCEDURE: After obtaining informed written oral consent, the patient's arm was prepped and draped in a sterile fashion. Prior to the procedure, patency of the fistula was assessed with  ultrasound and sonographic images recorded. Local anesthesia was infiltrated in the soft tissues of the left upper arm. Using a 21 gauge needle and real-time ultrasound guidance,, the fistula was punctured and a guidewire advanced. 4 [place] sheath was placed. Fistulogram was obtained from the arterial anastomosis to the superior vena cava.  Sheath was removed and hemostasis was obtained manually.    FINDINGS:  FISTULOGRAM: Patent left brachial artery to transposed basilic vein fistula. Arterial anastomosis widely patent. No evidence of central venous stenosis. Right IJ  dialysis catheter is noted.    Impression    1.  Patent left brachial artery to transposed basilic vein fistula. No significant stenosis identified.  Narrative    For Patients: As a result of the 21st Century Cures Act, medical imaging exams and procedure reports are released immediately into your electronic medical record. You may view this report before your referring provider. If you have questions, please contact your health care provider.    [place] [facility]  LOCATION: [facility]  DATE: 11/30/2023     PROCEDURE:TUNNELED DIALYSIS CATHETER PLACEMENT     INTERVENTIONAL RADIOLOGIST: Prdaeep Goldsmith MD.     INDICATION: Patient with left upper arm fistula, placed at outside institution, not functioning well. Request made for consistent hemodialysis access..     CONSENT: The risks, benefits and alternatives of tunneled dialysis catheter placement were discussed with the patient  in detail. All questions were answered. Informed consent was given to proceed with the procedure.     MODERATE SEDATION: Versed 1 mg IV; Fentanyl 50 mcg IV.  Under physician supervision, Versed and fentanyl were administered for moderate sedation. Pulse oximetry, heart rate and blood pressure were continuously monitored by an independent trained   observer. The physician spent 15 minutes of face-to-face sedation time with the patient.     CONTRAST:  None  ANTIBIOTICS: Ancef 2 grams IV.  ADDITIONAL MEDICATIONS: None.     FLUOROSCOPIC TIME: 0.3 minutes.  RADIATION DOSE: Air Kerma: 2 mGy.     COMPLICATIONS: No immediate complications.     STERILE BARRIER TECHNIQUE: Maximum sterile barrier technique was used. Cutaneous antisepsis was performed at the operative site with application of 2% chlorhexidine and large sterile drape. Prior to the procedure, the  and assistant performed   hand hygiene and wore hat, mask, sterile gown, and sterile gloves during the entire procedure.     PROCEDURE:     Using real-time ultrasound guidance, the right internal jugular vein was punctured. A subcutaneous tunnel was created requiring a second incision. Using this access, a 15.5 [place], 19 cm tip to cuff Duraflow dialysis catheter was advanced until the tip   was in the proximal right atrium.  The catheter was tested and found to flush and aspirate appropriately.  The catheter was heparinized and secured to the skin.     FINDINGS:  Ultrasound shows an anechoic and compressible jugular vein. A permanent image was stored.       At the completion of the study, the new catheter tip lies in the proximal right atrium.                                                                      IMPRESSION:    1.  Tunneled dialysis catheter placement, as discussed above.  2.  The catheter position was verified fluoroscopically and this is ready for use.    NPO: midnight  ANTICOAGULANTS: heparin drip; no hold required  ANTIBIOTICS: vancomycin IV Q12H    ALLERGIES  Allergies   Allergen Reactions    Cefazolin Nephrotoxicity    Colestipol GI Disturbance    Doxycycline Nausea and Vomiting    Nickel Rash    Penicillins Rash         LABS:  INR   Date Value Ref Range Status   03/31/2023 0.95 0.85 - 1.15 Final      Hemoglobin   Date Value Ref Range Status   12/29/2023 8.9 (L) 11.7 - 15.7 g/dL Final     Platelet Count   Date Value Ref Range Status   12/29/2023 156 150 - 450 10e3/uL Final  "    Creatinine   Date Value Ref Range Status   12/29/2023 4.80 (H) 0.51 - 0.95 mg/dL Final     Potassium   Date Value Ref Range Status   12/29/2023 4.3 3.4 - 5.3 mmol/L Final   11/03/2022 4.2 3.4 - 5.3 mmol/L Final         EXAM:  BP (!) 152/70   Pulse 91   Temp 98.9  F (37.2  C)   Resp 15   Ht 1.702 m (5' 7\")   Wt 56.4 kg (124 lb 4.8 oz)   LMP 12/13/2023   SpO2 98%   BMI 19.47 kg/m    General:  Stable.  In no acute distress.    Neuro:  A&O x 3. Moves all extremities equally.  Resp:  Lungs clear anterior to auscultation bilaterally.  Cardio:  S1S2 and reg, without murmur, clicks or rubs  Vascular:  LUE fistula with bruit and thrill; whistling noted distally; site marked.   Skin:  Without excoriations, ecchymosis, erythema, lesions or open sores on LUE.      Pre-Sedation Assessment:  Mallampati Airway Classification:  II - Faucial pillars and soft palate may be seen, but uvula is masked by the base of the tongue  Previous reaction to anesthesia/sedation:  No  Sedation plan based on assessment: Moderate (conscious) sedation  ASA Classification: Class 3 - SEVERE SYSTEMIC DISEASE, DEFINITE FUNCTIONAL LIMITATIONS.   Code Status: Full Code intra procedure, per discussion with patient.  Other: patient reports that she has a high tolerance to lidocaine and requires high doses to achieve desired effect.     ASSESSMENT/PLAN:   Case and imaging discussed with IR MD Dr. Lopes and nephrologist Dr. Alexander, along with patient's bedside RN.   Left upper extremity fistulogram and right upper extremity venogram with sedation and possible angioplasty, stent, and/or lysis.     Procedural education reviewed with patient in detail including, but not limited to risks, benefits and alternatives with understanding verbalized by patient.    Total time spent on the date of the encounter: 50 minutes.      NATALIA Montiel CNP  Interventional Radiology      Addendum: during fistulogram patient noted to have more extensive clot " burden than initially seen on CTA. Patient's father phoned for possible need for lytics. Patient noted to only be on low intensity heparin. Patient bacteremic. Discussed with Dr. Rodriguez; changed to high intensity heparin drip to continue over the weekend/holiday. NPO at midnight Tuesday. Plan for RUE venogram with possible lytics on Tuesday in IR; pending clot burden/resolution.     Pre-Thombolytics Assessment:  Patient's father Francis Curiel ([phone]) reports history of renal failure; on HD.  Patient's father denies history of stroke, known AV malformations, aneurysms, or aortic dissection, cancer, bleeding disorders, hemoptysis, hematuria, hematochezia/GI bleed, recent trauma/falls, liver disease, endocarditis, pericarditis, peritonitis, pregnancy, prolonged/traumatic CPR, severe uncontrolled acute or chronic severe hypertension, and recent surgery, biopsies or joint injections.

## 2023-12-29 NOTE — PROGRESS NOTES
Care Management Follow Up    Length of Stay (days): 2    Expected Discharge Date:       Concerns to be Addressed:   discharge planning     Patient plan of care discussed at interdisciplinary rounds: Yes    Anticipated Discharge Disposition:       Anticipated Discharge Services:    Education Provided on the Discharge Plan:  Per Care Team   Patient/Family in Agreement with the Plan:  Yes    Referrals Placed by CM/SW:    Private pay costs discussed: Not applicable    Additional Information:  Chart reviewed.    Cm updates:  ID following, final plan pending.   Neph following.   Neuro signed off.  No therapy consults placed at this time.       Social Hx:  Pt rents the bottom of a home from a couple, and has a roommate named Wilbert. Pt Ind with ADLS. Pt mostly Ind with IADLS, does not have a car at the moment, but pt has MA, and states having services through insurance for transport. Pt was on disability, pt is saying she no longer qualified, pt is currently in the appeal process, and is still receiving social security. Pt has medicare listed primary, and [employer] [place] listed secondary. 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 Pt states prior being able to access insulin, and things needed for diabetes management. Pt states going to dialysis MWF at [facility]. Transport TBD depending on day/time.           CM will continue to follow plan of care, review recommendations, and assist with any discharge needs anticipated.     Carol Whitney RN

## 2023-12-29 NOTE — PLAN OF CARE
Problem: Infection  Goal: Absence of Infection Signs and Symptoms  Outcome: Progressing     Problem: Glycemic Control Impaired  Goal: Blood Glucose Level Within Targeted Range  Outcome: Progressing  [facility] - ICU    RN Progress Note:            Pertinent Assessments:      Please refer to flowsheet rows for full assessment     Took over patient at 0300, pleasant, alert and oriented. On DKA protocol and heparin low intensity protocol. Stable VS, sinus rhythm occasionally sinus tachycardic, Denies pain, nausea or any discomfort, insulin gtt off since 0100. Blood sugars were below 99, and were trending down, 0600 was 68 mg/dl, D50 IV given per hypoglycemia protocol, 15 min recheck was 146. Keeping insulin gtt off unless blood sugar trends up in next check.          Key Events - This Shift:   Please see above notes.        Barriers to Discharge / Downgrade:     Still on DKA protocol.

## 2023-12-29 NOTE — PROGRESS NOTES
"[facility] Progress Note    SUBJECTIVE:  chest line out.  Pt stable.  Fistula situation as in Fadia and Marianne's notes.  PTA done, ac full dose etc.        REVIEW OF SYSTEMS:  Negative unless as listed above.  Social history, Family history, Medications: reviewed.    OBJECTIVE:  /64   Pulse 107   Temp 98.9  F (37.2  C)   Resp 14   Ht 1.702 m (5' 7\")   Wt 56.4 kg (124 lb 4.8 oz)   LMP 12/13/2023   SpO2 100%   BMI 19.47 kg/m                PHYSICAL EXAM:  Alert, awake  Vitals tabulated above, reviewed  Neck supple without lymphadenopathy  Sclera normal color, not injected  CARDIOVASCULAR regular rate and rhythm, no murmur  Lungs CLEAR TO AUSCULTATION   Abdomen soft, NT/ND, absent HEPATOSPLENOMEGALY  Skin normal  Joints normal  Neurologic exam non focal  Cath site R chest non infected    Antibiotics:V    Pertinent labs:    Recent Labs   Lab Test 12/29/23  0745 12/28/23  1828 12/28/23  0429   WBC 11.2* 11.8* 8.6   HGB 8.9* 9.9* 9.8*   HCT 27.9* 31.1* 29.5*   MCV 96 97 94    132* 174       Lab Results   Component Value Date    RBC 2.92 12/29/2023     Lab Results   Component Value Date    HGB 8.9 12/29/2023     Lab Results   Component Value Date    HCT 27.9 12/29/2023     No components found for: \"MCT\"  Lab Results   Component Value Date    MCV 96 12/29/2023     Lab Results   Component Value Date    MCH 30.5 12/29/2023     Lab Results   Component Value Date    MCHC 31.9 12/29/2023     Lab Results   Component Value Date    RDW 13.2 12/29/2023     Lab Results   Component Value Date     12/29/2023       Last Comprehensive Metabolic Panel:  Sodium   Date Value Ref Range Status   12/29/2023 134 (L) 135 - 145 mmol/L Final     Comment:     Reference intervals for this test were updated on 09/26/2023 to more accurately reflect our healthy population. There may be differences in the flagging of prior results with similar values performed with this method. Interpretation of those " prior results can be made in the context of the updated reference intervals.      Potassium   Date Value Ref Range Status   12/29/2023 4.3 3.4 - 5.3 mmol/L Final   11/03/2022 4.2 3.4 - 5.3 mmol/L Final     Chloride   Date Value Ref Range Status   12/29/2023 97 (L) 98 - 107 mmol/L Final   11/03/2022 103 94 - 109 mmol/L Final     Carbon Dioxide (CO2)   Date Value Ref Range Status   12/29/2023 20 (L) 22 - 29 mmol/L Final   11/03/2022 25 20 - 32 mmol/L Final     Anion Gap   Date Value Ref Range Status   12/29/2023 17 (H) 7 - 15 mmol/L Final   11/03/2022 9 3 - 14 mmol/L Final     Glucose   Date Value Ref Range Status   11/03/2022 79 70 - 99 mg/dL Final     GLUCOSE BY METER POCT   Date Value Ref Range Status   12/29/2023 183 (H) 70 - 99 mg/dL Final     Urea Nitrogen   Date Value Ref Range Status   12/29/2023 19.8 6.0 - 20.0 mg/dL Final   11/03/2022 37 (H) 7 - 30 mg/dL Final     Creatinine   Date Value Ref Range Status   12/29/2023 4.80 (H) 0.51 - 0.95 mg/dL Final     GFR Estimate   Date Value Ref Range Status   12/29/2023 11 (L) >60 mL/min/1.73m2 Final   10/03/2020 32 (L) >60 mL/min/1.73m2 Final     Calcium   Date Value Ref Range Status   12/29/2023 8.6 8.6 - 10.0 mg/dL Final       Liver Function Studies -   Recent Labs   Lab Test 12/29/23  0745 12/28/23  0429 12/27/23  0140   PROTTOTAL  --   --  8.0   ALBUMIN 4.0   < > 5.0   BILITOTAL  --   --  0.5   ALKPHOS  --   --  112   AST  --   --  21   ALT  --   --  13    < > = values in this interval not displayed.       Erythrocyte Sedimentation Rate   Date Value Ref Range Status   11/16/2022 58 (H) 0 - 20 mm/hr Final       CRP Inflammation   Date Value Ref Range Status   11/03/2022 3.9 0.0 - 8.0 mg/L Final               MICROBIOLOGY DATA:  Staph hominis single BC      IMAGING/RADIOLOGY:          ASSESSMENT:  ESRD  Fistulous issues as per renal and IR  Contam BC, but given in ICU and all these interventions I'd do vanco all weekend into Monday.   RECOMMENDATION:  Vanco  We can  check back in Monday or Tuesday, call me if needed to see again before then  DEYSI Cosme MD  Office [phone] option 2 to desk staff

## 2023-12-29 NOTE — PROGRESS NOTES
Patient denies pain. VSS. Tachycardic but asymptomatic.    Intermittently nauseous. PRN phenergan given. Relief of nausea.    Dialysis catheter removal site is clean, dry, and intact.    Heparin gtt started. On insulin gtt with q1h BG checks, titrating accordingly.    Patient educated on plan of care, answered all questions and concerns, verbalized understanding. Plan of care ongoing.

## 2023-12-30 LAB
ANION GAP SERPL CALCULATED.3IONS-SCNC: 13 MMOL/L (ref 7–15)
ANION GAP SERPL CALCULATED.3IONS-SCNC: 15 MMOL/L (ref 7–15)
B-OH-BUTYR SERPL-SCNC: 1 MMOL/L
B-OH-BUTYR SERPL-SCNC: 1 MMOL/L
BUN SERPL-MCNC: 20.9 MG/DL (ref 6–20)
BUN SERPL-MCNC: 21.2 MG/DL (ref 6–20)
CALCIUM SERPL-MCNC: 8.2 MG/DL (ref 8.6–10)
CALCIUM SERPL-MCNC: 8.4 MG/DL (ref 8.6–10)
CHLORIDE SERPL-SCNC: 95 MMOL/L (ref 98–107)
CHLORIDE SERPL-SCNC: 96 MMOL/L (ref 98–107)
CREAT SERPL-MCNC: 5.44 MG/DL (ref 0.51–0.95)
CREAT SERPL-MCNC: 5.63 MG/DL (ref 0.51–0.95)
DEPRECATED HCO3 PLAS-SCNC: 21 MMOL/L (ref 22–29)
DEPRECATED HCO3 PLAS-SCNC: 22 MMOL/L (ref 22–29)
EGFRCR SERPLBLD CKD-EPI 2021: 9 ML/MIN/1.73M2
EGFRCR SERPLBLD CKD-EPI 2021: 9 ML/MIN/1.73M2
ERYTHROCYTE [DISTWIDTH] IN BLOOD BY AUTOMATED COUNT: 13.1 % (ref 10–15)
GLUCOSE BLDC GLUCOMTR-MCNC: 101 MG/DL (ref 70–99)
GLUCOSE BLDC GLUCOMTR-MCNC: 108 MG/DL (ref 70–99)
GLUCOSE BLDC GLUCOMTR-MCNC: 122 MG/DL (ref 70–99)
GLUCOSE BLDC GLUCOMTR-MCNC: 122 MG/DL (ref 70–99)
GLUCOSE BLDC GLUCOMTR-MCNC: 123 MG/DL (ref 70–99)
GLUCOSE BLDC GLUCOMTR-MCNC: 127 MG/DL (ref 70–99)
GLUCOSE BLDC GLUCOMTR-MCNC: 128 MG/DL (ref 70–99)
GLUCOSE BLDC GLUCOMTR-MCNC: 148 MG/DL (ref 70–99)
GLUCOSE BLDC GLUCOMTR-MCNC: 156 MG/DL (ref 70–99)
GLUCOSE BLDC GLUCOMTR-MCNC: 156 MG/DL (ref 70–99)
GLUCOSE BLDC GLUCOMTR-MCNC: 35 MG/DL (ref 70–99)
GLUCOSE BLDC GLUCOMTR-MCNC: 60 MG/DL (ref 70–99)
GLUCOSE BLDC GLUCOMTR-MCNC: 63 MG/DL (ref 70–99)
GLUCOSE BLDC GLUCOMTR-MCNC: 66 MG/DL (ref 70–99)
GLUCOSE BLDC GLUCOMTR-MCNC: 76 MG/DL (ref 70–99)
GLUCOSE BLDC GLUCOMTR-MCNC: 85 MG/DL (ref 70–99)
GLUCOSE SERPL-MCNC: 105 MG/DL (ref 70–99)
GLUCOSE SERPL-MCNC: 170 MG/DL (ref 70–99)
HCT VFR BLD AUTO: 28.1 % (ref 35–47)
HGB BLD-MCNC: 9 G/DL (ref 11.7–15.7)
MAGNESIUM SERPL-MCNC: 1.8 MG/DL (ref 1.7–2.3)
MCH RBC QN AUTO: 30.5 PG (ref 26.5–33)
MCHC RBC AUTO-ENTMCNC: 32 G/DL (ref 31.5–36.5)
MCV RBC AUTO: 95 FL (ref 78–100)
PLATELET # BLD AUTO: 150 10E3/UL (ref 150–450)
POTASSIUM SERPL-SCNC: 3.7 MMOL/L (ref 3.4–5.3)
POTASSIUM SERPL-SCNC: 3.9 MMOL/L (ref 3.4–5.3)
RBC # BLD AUTO: 2.95 10E6/UL (ref 3.8–5.2)
SODIUM SERPL-SCNC: 130 MMOL/L (ref 135–145)
SODIUM SERPL-SCNC: 132 MMOL/L (ref 135–145)
UFH PPP CHRO-ACNC: 0.29 IU/ML
UFH PPP CHRO-ACNC: 0.41 IU/ML
UFH PPP CHRO-ACNC: 0.53 IU/ML
VANCOMYCIN SERPL-MCNC: 17.6 UG/ML
WBC # BLD AUTO: 6.1 10E3/UL (ref 4–11)

## 2023-12-30 PROCEDURE — 250N000013 HC RX MED GY IP 250 OP 250 PS 637: Performed by: INTERNAL MEDICINE

## 2023-12-30 PROCEDURE — 200N000001 HC R&B ICU

## 2023-12-30 PROCEDURE — 80048 BASIC METABOLIC PNL TOTAL CA: CPT | Performed by: INTERNAL MEDICINE

## 2023-12-30 PROCEDURE — 85520 HEPARIN ASSAY: CPT | Performed by: INTERNAL MEDICINE

## 2023-12-30 PROCEDURE — 36415 COLL VENOUS BLD VENIPUNCTURE: CPT | Performed by: INTERNAL MEDICINE

## 2023-12-30 PROCEDURE — 80202 ASSAY OF VANCOMYCIN: CPT | Performed by: INTERNAL MEDICINE

## 2023-12-30 PROCEDURE — 83735 ASSAY OF MAGNESIUM: CPT | Performed by: INTERNAL MEDICINE

## 2023-12-30 PROCEDURE — 250N000009 HC RX 250: Performed by: INTERNAL MEDICINE

## 2023-12-30 PROCEDURE — 250N000011 HC RX IP 250 OP 636: Performed by: INTERNAL MEDICINE

## 2023-12-30 PROCEDURE — 85027 COMPLETE CBC AUTOMATED: CPT | Performed by: INTERNAL MEDICINE

## 2023-12-30 PROCEDURE — 82010 KETONE BODYS QUAN: CPT | Performed by: INTERNAL MEDICINE

## 2023-12-30 PROCEDURE — 5A1D70Z PERFORMANCE OF URINARY FILTRATION, INTERMITTENT, LESS THAN 6 HOURS PER DAY: ICD-10-PCS | Performed by: INTERNAL MEDICINE

## 2023-12-30 PROCEDURE — 90935 HEMODIALYSIS ONE EVALUATION: CPT

## 2023-12-30 PROCEDURE — 99233 SBSQ HOSP IP/OBS HIGH 50: CPT | Performed by: INTERNAL MEDICINE

## 2023-12-30 PROCEDURE — 36415 COLL VENOUS BLD VENIPUNCTURE: CPT

## 2023-12-30 PROCEDURE — 250N000012 HC RX MED GY IP 250 OP 636 PS 637: Performed by: INTERNAL MEDICINE

## 2023-12-30 PROCEDURE — 87040 BLOOD CULTURE FOR BACTERIA: CPT

## 2023-12-30 PROCEDURE — 258N000001 HC RX 258: Performed by: INTERNAL MEDICINE

## 2023-12-30 RX ORDER — CEFAZOLIN SODIUM 1 G/50ML
750 SOLUTION INTRAVENOUS ONCE
Status: COMPLETED | OUTPATIENT
Start: 2023-12-30 | End: 2023-12-31

## 2023-12-30 RX ORDER — LIDOCAINE HYDROCHLORIDE 10 MG/ML
1 INJECTION, SOLUTION EPIDURAL; INFILTRATION; INTRACAUDAL; PERINEURAL DAILY PRN
Status: COMPLETED | OUTPATIENT
Start: 2023-12-30 | End: 2023-12-30

## 2023-12-30 RX ORDER — NICOTINE POLACRILEX 4 MG
15-30 LOZENGE BUCCAL
Status: DISCONTINUED | OUTPATIENT
Start: 2023-12-30 | End: 2023-12-30

## 2023-12-30 RX ORDER — DEXTROSE MONOHYDRATE 25 G/50ML
25-50 INJECTION, SOLUTION INTRAVENOUS
Status: DISCONTINUED | OUTPATIENT
Start: 2023-12-30 | End: 2023-12-30

## 2023-12-30 RX ADMIN — ACETAMINOPHEN 650 MG: 325 TABLET ORAL at 10:33

## 2023-12-30 RX ADMIN — INSULIN GLARGINE 16 UNITS: 100 INJECTION, SOLUTION SUBCUTANEOUS at 11:31

## 2023-12-30 RX ADMIN — HEPARIN SODIUM 1000 UNITS/HR: 10000 INJECTION, SOLUTION INTRAVENOUS at 20:49

## 2023-12-30 RX ADMIN — SERTRALINE HYDROCHLORIDE 150 MG: 100 TABLET ORAL at 09:22

## 2023-12-30 RX ADMIN — TRAZODONE HYDROCHLORIDE 100 MG: 50 TABLET ORAL at 22:05

## 2023-12-30 RX ADMIN — DEXTROSE MONOHYDRATE 25 ML: 25 INJECTION, SOLUTION INTRAVENOUS at 20:46

## 2023-12-30 RX ADMIN — ROPINIROLE HYDROCHLORIDE 1 MG: 1 TABLET, FILM COATED ORAL at 22:05

## 2023-12-30 RX ADMIN — ACETAMINOPHEN 650 MG: 325 TABLET ORAL at 04:38

## 2023-12-30 RX ADMIN — LIDOCAINE HYDROCHLORIDE 1 ML: 10 INJECTION, SOLUTION EPIDURAL; INFILTRATION; INTRACAUDAL; PERINEURAL at 18:07

## 2023-12-30 ASSESSMENT — ACTIVITIES OF DAILY LIVING (ADL)
ADLS_ACUITY_SCORE: 20

## 2023-12-30 NOTE — PROGRESS NOTES
[facility]    Medicine Progress Note - Hospitalist Service    Date of Admission:  12/27/2023    Assessment & Plan   Josefa Curiel is a 48 year old female with past medical history of type I DM, ESRD on dialysis, HTN who presented to ED for evaluation of nausea and vomiting, found to have DKA and admitted for further management. She does note some facial swelling, and now has 1 bottle positive blood cx     1.DKA in a patient with history of type I DM;    COVID-19 and influenza test negative.    -now it is possible clot on cath may have contributed here, vs gastroenteritis   -A1c 7.0.  Patient reports compliance with insulin regimen  -DC insulin drip since ketones are less than 2.  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  Symptomatic treatment as ordered  -iv ppi  -i ordered diet     5.ESRD on dialysis;started it in 4/23  - Nephrology consulted for dialysis management.    -Of note, patient has fistula on left upper extremity and also dialysis catheter on right front chest.--she has this due to complications with using fistula  -12/28 had to remove HD cath from chest--due to SVC syndrome  -12/29-IR did  Left arm fistulogram, right arm venogram    1. Irregular mild-mod stenosis mid fistula treated with 7 mm PTA.  2. Irregular adherent thrombus SVC.  SVC is however patent.  3. RUE: Occlusive stenosis right  innominate vein with non-occlusive clot proximal to stenosis  --plan now per IR, keep on hep gtt high intensity over weekend, ok renal try to use fistula--if any issue, would then need fem line HD cath for HD  --Next Tuesday IR will repeat RUE venogram with potential for intervention, possible lytics   --will need to be for sure NPO next Monday night at [facility]     6.Uncontrolled hypertension;  -- PTA losartan--held due to facial swelling.  Added as needed hydralazine.  --defer to renal    7.One bottle positive GPC  -repeat blood cx 12/28  -at risk with HD  -vanco was started by ID.  Continue until Monday but DC later      Addendum Dharmesh Dewey MD, Hospitalist,12/30/23,3:06 PM  Patient had hypoglycemia of 35.  Will hold mealtime insulin.          Diet: Room Service  NPO per Anesthesia Guidelines for Procedure/Surgery Except for: Meds  Combination Diet Moderate Consistent Carb (60 g CHO per Meal) Diet    DVT Prophylaxis: On IV heparin  Williamson Catheter: Not present  Lines: None     Cardiac Monitoring: ACTIVE order. Indication: DKA  Code Status: Full Code      Clinically Significant Risk Factors          # Hypocalcemia: Lowest Ca = 8.2 mg/dL in last 2 days, will monitor and replace as appropriate    # Anion Gap Metabolic Acidosis: Highest Anion Gap = 25 mmol/L in last 2 days, will monitor and treat as appropriate      # Hypertension: Noted on problem list                   Disposition Plan      Expected Discharge Date: 01/03/2024      Destination: home  Discharge Comments: here on hep gtt til Tuesday, then IR more porcedures            Dharmesh Dewey MD  Hospitalist Service  [facility]  Securely message with [employer] (more info)  Text page via [employer] Paging/Directory   ______________________________________________________________________    Interval History   Patient new to me.  Chart reviewed.  Swelling of the face is better.  Ketones are less than 2.  Switch over to subcutaneous insulin.  DC insulin  drip.  Transfer out of ICU.  Discussed with nephrologist.    Physical Exam   Vital Signs: Temp: 98.5  F (36.9  C) Temp src: Oral BP: 103/51 Pulse: 104   Resp: (!) 5 SpO2: 98 % O2 Device: None (Room air)    Weight: 124 lbs 4.8 oz    Facial swelling--improved  Alert oriented x 3      Medical Decision Making       55 MINUTES SPENT BY ME on the date of service doing chart review, history, exam, documentation & further activities per the note.  MANAGEMENT DISCUSSED with the following over the past 24 hours: Patient and nephrologist   NOTE(S)/MEDICAL RECORDS REVIEWED over the past 24 hours: Previous hospitalist and nephrology notes       Data     I have personally reviewed the following data over the past 24 hrs:    6.1  \   9.0 (L)   / 150     132 (L) 96 (L) 20.9 (H) /  85   3.9 21 (L) 5.63 (H) \       Imaging results reviewed over the past 24 hrs:   Recent Results (from the past 24 hour(s))   IR Upper Extremity Venogram Right    [facility] RADIOLOGY  LOCATION: [facility]  DATE: 12/29/2023    PROCEDURE:   1.  RIGHT UPPER EXTREMITY VENOGRAM  2.  ULTRASOUND GUIDANCE FOR VASCULAR ACCESS    INTERVENTIONAL RADIOLOGIST: Dale Lopes MD    INDICATION: Right arm and head and neck swelling. SVC syndrome. End-stage renal disease on hemodialysis via tunneled right IJ vein dialysis catheter. Dialysis catheter was recently removed with improved symptoms. Bacteremia.    Air Kerma: 51 mGy  FLUOROSCOPIC TIME: 0.6 minutes   CONTRAST: 20 mL Visipaque 320    COMPLICATIONS: No immediate complications.    PROCEDURE:   Right upper arm was prepped and draped in usual sterile fashion followed by local anesthesia with 1% Xylocaine. Ultrasound revealed a patent basilic vein. An ultrasound image was obtained and placed in the patient's permanent medical record. Using   real-time ultrasound for needle placement, a right basilic vein puncture was performed. 5 Uzbek sheath was placed. Right upper extremity  venography was performed. Access was removed with hemostasis obtained with manual compression.    FINDINGS:   Patent right axillary and subclavian veins. Reflux into patent right internal jugular vein. Increased filling of the native venous collaterals about the base of the right neck. Complete occlusion of the right innominate vein with nonocclusive thrombus at   this level.      Impression    IMPRESSION:    Complete occlusion right innominate vein mid segment with nonocclusive thrombus proximally. There is likely occlusive stenosis at the mid right innominate vein. Intervention not performed at this setting given bacteremia and clinical improvement.   Tentative plan is to continue with full dose anticoagulation with interval venogram as early as next week to reassess clot burden. Pending blood cultures, imaging findings and clinical status at this time, may consider proactive intervention.     IR Dialysis Fistulogram Left    [facility] RADIOLOGY  LOCATION: [facility]  DATE: 12/29/2023    PROCEDURE:   1. AV DIALYSIS FISTULOGRAM  2. PTA OF PERIPHERAL SEGMENT  3. CONSCIOUS SEDATION    ATTENDING: Dale Lopes MD.    INDICATION: Difficulty accessing fistula in the left upper arm. SVC syndrome status post removal of tunneled right IJ vein dialysis catheter.     SEDATION: Versed 3 mg. Fentanyl 150 mcg. The procedure was performed with administration intravenous conscious sedation with appropriate preoperative, intraoperative, and postoperative evaluation. During the time-out, immediately prior to administration   of medications, the patient was reassessed for adequacy to receive conscious sedation.  80 minutes of supervised face to face conscious sedation time was provided by a radiology nurse under my direct supervision.    Air Kerma: 47 mGy  FLUOROSCOPIC TIME: 3.7 minutes     CONTRAST: 50 mL Visipaque 320     COMPLICATIONS:  No immediate complications.    STERILE BARRIER  TECHNIQUE: Maximum sterile barrier technique was used. Cutaneous antisepsis was performed at the operative site with application of 2% chlorhexidine and large sterile drape. Prior to the procedure, the  and assistant performed   hand hygiene and wore hat, mask, sterile gown, and sterile gloves during the entire procedure.    PROCEDURE:   The left upper arm was prepped and draped in the usual sterile fashion followed by local anesthesia with 1% Xylocaine. Access to the fistula was achieved using a micropuncture system. Diagnostic fistulography was performed from the arterial inflow to the   right atrium. Following the diagnostic study, angioplasty was performed using a 7 mm balloon at mid - distal humeral segment of the fistula. Completion fistulography was performed. The sheath was removed and compression applied to the puncture site   until hemostasis was achieved. There was a palpable thrill in the fistula at the completion of the procedure.    FINDINGS:  AV DIALYSIS FISTULOGRAM: Patent left upper arm straight AV fistula. There is mild - moderate irregular stenosis in the midportion of the fistula at the mid - distal humeral segment. AV anastomosis is widely patent. Central venous outflow is patent.   However, there is bulky irregular thrombus throughout the SVC.    PTA VENOUS: Mild - moderate irregular stenosis mid - distal humeral segment of the left arm fistula, treated with 7 mm PTA.      Impression    IMPRESSION:    1.  Mild - moderate irregular stenosis mid - distal humeral segment of left upper arm AV fistula treated with 7 mm PTA.  2.  Bulky irregular nonocclusive thrombus throughout the SVC. No intervention at this setting given bacteremia and clinical improvement in SVC syndrome symptoms. Recommend full dose anticoagulation for the time being.

## 2023-12-30 NOTE — PLAN OF CARE
[facility] - ICU    RN Progress Note:            Pertinent Assessments:      Please refer to flowsheet rows for full assessment     Pt Aox4. Up to chair with assist of one. Continues to be on D5w NS at 50cc/hour as ordered. Insulin drip titrated per DKA protocol. Anion Gap closed but her Ketone is still remain above normal range (0.9). Tylenol given for HA as ordered. On heparin drip as ordered, see MAR. Will continue to monitor pt's labs and update MD as needed.            Key Events - This Shift:       See above      JONN SAT (Sedation Awakening Trial): For use ONLY if intubated    Not applicable            Barriers to Discharge / Downgrade:     Ketone result is above normal rage.         Point of Contact Update YES-OR-NO: Yes        Problem: Adult Inpatient Plan of Care  Goal: Absence of Hospital-Acquired Illness or Injury  Intervention: Identify and Manage Fall Risk  Recent Flowsheet Documentation  Taken 12/29/2023 2000 by Carlos Casillas RN  Safety Promotion/Fall Prevention: room near nurse's station  Taken 12/29/2023 1600 by Carlos Casillas RN  Safety Promotion/Fall Prevention: room near nurse's station  Intervention: Prevent Skin Injury  Recent Flowsheet Documentation  Taken 12/29/2023 2000 by Carlos Casillas RN  Body Position: position changed independently  Skin Protection: pulse oximeter probe site changed  Device Skin Pressure Protection:   absorbent pad utilized/changed   positioning supports utilized  Taken 12/29/2023 1600 by Carlos Casillas RN  Body Position: position changed independently  Skin Protection: pulse oximeter probe site changed  Device Skin Pressure Protection:   absorbent pad utilized/changed   positioning supports utilized  Intervention: Prevent and Manage VTE (Venous Thromboembolism) Risk  Recent Flowsheet Documentation  Taken 12/29/2023 2000 by Carlos Casillas RN  VTE Prevention/Management: (heparin drip) other (see comments)  Taken  12/29/2023 1600 by Carlos Casillas RN  VTE Prevention/Management: (heparin drip) other (see comments)  Intervention: Prevent Infection  Recent Flowsheet Documentation  Taken 12/29/2023 2000 by Carlos Csaillas RN  Infection Prevention:   hand hygiene promoted   equipment surfaces disinfected   single patient room provided   visitors restricted/screened  Taken 12/29/2023 1600 by Carlos Casillas RN  Infection Prevention:   hand hygiene promoted   equipment surfaces disinfected   single patient room provided   visitors restricted/screened  Goal: Optimal Comfort and Wellbeing  Intervention: Provide Person-Centered Care  Recent Flowsheet Documentation  Taken 12/29/2023 2000 by Carlos Casillas RN  Trust Relationship/Rapport:   care explained   choices provided   emotional support provided   questions answered   questions encouraged   thoughts/feelings acknowledged  Taken 12/29/2023 1600 by Carlos Casillas RN  Trust Relationship/Rapport:   care explained   choices provided   emotional support provided   questions answered   questions encouraged   thoughts/feelings acknowledged   Goal Outcome Evaluation:       Bedside handoff report given to receiving RN.

## 2023-12-30 NOTE — PLAN OF CARE
[facility] - ICU    RN Progress Note:            Pertinent Assessments:      Please refer to flowsheet rows for full assessment     VSS, pt Aox4, uses call light to make needs known, standby assist. Pt requested PRN tylenol around 0400 d/t a headache, good relief with tylenol. Sys remained <180, no PRNs given           Key Events - This Shift:       Uneventful              Barriers to Discharge / Downgrade:     Insulin drip, ketone levels.

## 2023-12-30 NOTE — PROGRESS NOTES
NEPHROLOGY PROGRESS NOTE    Date of service: 12/28/23     CC: ESKD      ASSESSMENT/RECOMMENDATIONS:  1. ESKD: on HD MWF at [facility]. EDW=56.5kg. Access: LFA AVF. Her outpt HD unit was notified of her admit and orders reviewed.    -HD today as missed yesterday due to procedure.              -Continue HD MWF and prn during hospitalization.     2. N/V: improved. treat DKA and other management per primary     3. Hypertension: Her BP is above goal. Continue current BP meds. UF with HD.     4. DM1: DKA on admit. Management per primary.     5. Anemia: due to ESKD. Hemoglobin is below goal(9-11). Acute drop with illness, procedure. No need for SHARLENE now. Trend.      6. BMM: holding sevelamer given n/v. Trend. Phos.     7. Facial edema/SVC syndrome: large clot on tip of RIJ HD catheter found 12/28 and this was removed by IR on 12/28. Venogram 12/29 showed mild-mod stensosi of AVF-s/p angioplasty and irregular adherent SVC thormbus, but patient SVC.    8. Bacteremia: GPC in clusters from 12/27 BC. Renally dose abx per primary. Seen by ID. Discussed with Dr. Koch. HD cath now removed. Trend.       Orville Alexander MD  [facility]   Office: [phone]      S: She had her tunneled HD cath removed 2d ago as large clot on tip of cath causing SVC syndrome. Venogram yesterday showed mild-mod stensosi of AVF-s/p angioplasty and irregular adherent SVC thormbus, but patient SVC. She reports resolution of her headaches and less facial swelling. No cp, sob, edema.     Current Facility-Administered Medications   Medication    acetaminophen (TYLENOL) tablet 650 mg    dextrose 5% and 0.9% NaCl infusion    glucose gel 15-30 g    Or    dextrose 50 % injection 25-50 mL    Or    glucagon injection 1 mg    fentaNYL (PF) (SUBLIMAZE) injection 25-50 mcg    flumazenil (ROMAZICON) injection 0.2 mg    heparin infusion 25,000 units in D5W 250 mL ANTICOAGULANT    hydrALAZINE (APRESOLINE) injection 10 mg     "HYDROmorphone (DILAUDID) injection 0.2 mg    insulin regular (MYXREDLIN) 1 unit/mL infusion    lidocaine 1 % 1-30 mL    [Held by provider] losartan (COZAAR) tablet 25 mg    midazolam (VERSED) injection 0.5-2 mg    naloxone (NARCAN) injection 0.2 mg    Or    naloxone (NARCAN) injection 0.4 mg    Or    naloxone (NARCAN) injection 0.2 mg    Or    naloxone (NARCAN) injection 0.4 mg    No heparin via hemodialysis machine    ondansetron (ZOFRAN) injection 4 mg    ondansetron (ZOFRAN) injection 4 mg    promethazine (PHENERGAN) 6.25 mg in sodium chloride 0.9 % 55 mL intermittent infusion    rOPINIRole (REQUIP) tablet 1 mg    senna-docusate (SENOKOT-S/PERICOLACE) 8.6-50 MG per tablet 1 tablet    sertraline (ZOLOFT) tablet 150 mg    [Held by provider] sevelamer carbonate (RENVELA) tablet 800 mg    sodium chloride 0.9% BOLUS 100-150 mL    sodium chloride 0.9% BOLUS 250 mL    sodium chloride 0.9% BOLUS 300 mL    traZODone (DESYREL) tablet 100 mg    vancomycin place love - receiving intermittent dosing        No interval change in SH, FH.    Physical Exam  BP (!) 144/65 (BP Location: Left leg)   Pulse 92   Temp 98.4  F (36.9  C) (Oral)   Resp 20   Ht 1.702 m (5' 7\")   Wt 56.4 kg (124 lb 4.8 oz)   LMP 12/13/2023   SpO2 100%   BMI 19.47 kg/m    GENERAL: Calm in bed  HEENT: NCAT, sclerae not icteric, MMM, some facial edema  NECK: Trachea midline.   LUNGS: no respiratory distress,  HEART: no leg edema.   ABDOMEN: Soft, NT  PSYCH: Alert, normal affect  NEURO:  ANGELES  ACCESS: SHAUN AVF with thrill        Lab Data:  Recent Labs   Lab Test 12/30/23  0602 12/29/23  2330 12/29/23  1904 12/29/23  0745   POTASSIUM 3.9 3.7 4.0 4.3   CHLORIDE 96* 95* 95* 97*   ALBUMIN  --   --   --  4.0     Recent Labs   Lab Test 12/30/23  0602 12/29/23  2330 12/29/23  1904 12/29/23  0745   BUN 20.9* 21.2* 21.0* 19.8     Recent Labs   Lab Test 12/30/23  0602 12/29/23  1637 12/29/23  0745 11/24/22  0528 11/23/22  0538   WBC 6.1 9.2 11.2*   < >  --    HGB " "9.0* 9.4* 8.9*   < >  --    MCV 95 97 96   < >  --     178 156   < >  --    IRON  --   --   --   --  35*   IRONSAT  --   --   --   --  22    < > = values in this interval not displayed.     No lab results found.    Invalid input(s): \"PTHINTACT\", \"[id number]\", \"[id number]\", \"PROTCREATUR\"    I reviewed the above labs  "

## 2023-12-30 NOTE — PHARMACY-VANCOMYCIN DOSING SERVICE
Pharmacy Vancomycin Note  Date of Service 2023  Patient's  1975   48 year old, female    Indication: Bacteremia  Day of Therapy: 3  Current vancomycin regimen:  intermittent dosing  Current vancomycin monitoring method: Renal Replacement Therapy  Current vancomycin therapeutic monitoring goal: 15-20 mg/L    Current estimated CrCl = Estimated Creatinine Clearance: 11.1 mL/min (A) (based on SCr of 5.63 mg/dL (H)).    Creatinine for last 3 days  2023:  6:08 PM Creatinine 4.86 mg/dL;  8:11 PM Creatinine 5.06 mg/dL; 10:11 PM Creatinine 2.78 mg/dL  2023: 12:27 AM Creatinine 1.97 mg/dL;  2:10 AM Creatinine 2.37 mg/dL;  4:29 AM Creatinine 2.60 mg/dL;  4:29 AM Creatinine 2.60 mg/dL; 12:39 PM Creatinine 3.37 mg/dL;  6:28 PM Creatinine 3.67 mg/dL  2023:  7:45 AM Creatinine 4.80 mg/dL;  7:04 PM Creatinine 5.16 mg/dL; 11:30 PM Creatinine 5.44 mg/dL  2023:  6:02 AM Creatinine 5.63 mg/dL    Recent Vancomycin Levels (past 3 days)  2023:  7:45 AM Vancomycin 20.9 ug/mL  2023:  6:02 AM Vancomycin 17.6 ug/mL    Vancomycin IV Administrations (past 72 hours)                     vancomycin (VANCOCIN) 1,250 mg in sodium chloride 0.9 % 250 mL intermittent infusion (mg) 1,250 mg New Bag 23 0218                    Nephrotoxins and other renal medications (From now, onward)      Start     Dose/Rate Route Frequency Ordered Stop    23 2000  vancomycin (VANCOCIN) 750 mg in sodium chloride 0.9 % 250 mL intermittent infusion         750 mg  over 60 Minutes Intravenous ONCE 23 1736      23 1224  vancomycin place love - receiving intermittent dosing         1 each Intravenous SEE ADMIN INSTRUCTIONS 23 1225                 Contrast Orders - past 72 hours (72h ago, onward)      Start     Dose/Rate Route Frequency Stop    23 1330  iodixanol (VISIPAQUE 320) injection 100 mL         100 mL Intravenous ONCE 23 1316    23 1130  iodixanol (VISIPAQUE  320) injection 100 mL         100 mL Intravenous ONCE 12/29/23 1308    12/28/23 1600  iopamidol (ISOVUE-370) solution 75 mL         75 mL Intravenous ONCE 12/28/23 1558    Pending  iodixanol (VISIPAQUE 320) injection 100 mL         100 mL Intravenous ONCE 12/29/23 1330            Interpretation of levels and current regimen:  Vancomycin level is reflective of therapeutic level    Has serum creatinine changed greater than 50% in last 72 hours: N/A = ESRD      Renal Function: ESRD on Dialysis      Plan:  Vancomycin 750 mg IV x 1 post-HD  Vancomycin monitoring method: Renal Replacement Therapy  Vancomycin therapeutic monitoring goal: 15-20 mg/L  Pharmacy will check vancomycin levels as appropriate.  Serum creatinine levels will not be ordered  as patient is ESRD on HD .    Carlos Ellison RPH  `

## 2023-12-31 LAB
BACTERIA BLD CULT: ABNORMAL
CREAT SERPL-MCNC: 3.59 MG/DL (ref 0.51–0.95)
EGFRCR SERPLBLD CKD-EPI 2021: 15 ML/MIN/1.73M2
GLUCOSE BLDC GLUCOMTR-MCNC: 103 MG/DL (ref 70–99)
GLUCOSE BLDC GLUCOMTR-MCNC: 104 MG/DL (ref 70–99)
GLUCOSE BLDC GLUCOMTR-MCNC: 148 MG/DL (ref 70–99)
GLUCOSE BLDC GLUCOMTR-MCNC: 202 MG/DL (ref 70–99)
GLUCOSE BLDC GLUCOMTR-MCNC: 32 MG/DL (ref 70–99)
GLUCOSE BLDC GLUCOMTR-MCNC: 57 MG/DL (ref 70–99)
GLUCOSE BLDC GLUCOMTR-MCNC: 70 MG/DL (ref 70–99)
GLUCOSE BLDC GLUCOMTR-MCNC: 78 MG/DL (ref 70–99)
GLUCOSE BLDC GLUCOMTR-MCNC: 82 MG/DL (ref 70–99)
GLUCOSE BLDC GLUCOMTR-MCNC: 92 MG/DL (ref 70–99)
HOLD SPECIMEN: NORMAL
UFH PPP CHRO-ACNC: 0.42 IU/ML
UFH PPP CHRO-ACNC: 0.51 IU/ML

## 2023-12-31 PROCEDURE — 250N000013 HC RX MED GY IP 250 OP 250 PS 637: Performed by: INTERNAL MEDICINE

## 2023-12-31 PROCEDURE — 99232 SBSQ HOSP IP/OBS MODERATE 35: CPT | Performed by: INTERNAL MEDICINE

## 2023-12-31 PROCEDURE — 200N000001 HC R&B ICU

## 2023-12-31 PROCEDURE — 250N000011 HC RX IP 250 OP 636: Performed by: INTERNAL MEDICINE

## 2023-12-31 PROCEDURE — 258N000003 HC RX IP 258 OP 636: Performed by: INTERNAL MEDICINE

## 2023-12-31 PROCEDURE — 250N000012 HC RX MED GY IP 250 OP 636 PS 637: Performed by: INTERNAL MEDICINE

## 2023-12-31 PROCEDURE — 36415 COLL VENOUS BLD VENIPUNCTURE: CPT | Performed by: INTERNAL MEDICINE

## 2023-12-31 PROCEDURE — 82565 ASSAY OF CREATININE: CPT | Performed by: INTERNAL MEDICINE

## 2023-12-31 PROCEDURE — 85520 HEPARIN ASSAY: CPT | Performed by: INTERNAL MEDICINE

## 2023-12-31 RX ORDER — AMOXICILLIN 250 MG
1 CAPSULE ORAL 2 TIMES DAILY
Status: DISCONTINUED | OUTPATIENT
Start: 2023-12-31 | End: 2024-01-03 | Stop reason: HOSPADM

## 2023-12-31 RX ADMIN — ONDANSETRON 4 MG: 2 INJECTION INTRAMUSCULAR; INTRAVENOUS at 09:40

## 2023-12-31 RX ADMIN — HEPARIN SODIUM 1150 UNITS/HR: 10000 INJECTION, SOLUTION INTRAVENOUS at 16:15

## 2023-12-31 RX ADMIN — ACETAMINOPHEN 650 MG: 325 TABLET ORAL at 21:14

## 2023-12-31 RX ADMIN — ACETAMINOPHEN 650 MG: 325 TABLET ORAL at 09:20

## 2023-12-31 RX ADMIN — INSULIN GLARGINE 16 UNITS: 100 INJECTION, SOLUTION SUBCUTANEOUS at 09:13

## 2023-12-31 RX ADMIN — SERTRALINE HYDROCHLORIDE 150 MG: 100 TABLET ORAL at 09:13

## 2023-12-31 RX ADMIN — HYDRALAZINE HYDROCHLORIDE 10 MG: 20 INJECTION INTRAMUSCULAR; INTRAVENOUS at 09:13

## 2023-12-31 RX ADMIN — INSULIN ASPART 1 UNITS: 100 INJECTION, SOLUTION INTRAVENOUS; SUBCUTANEOUS at 13:24

## 2023-12-31 RX ADMIN — TRAZODONE HYDROCHLORIDE 100 MG: 50 TABLET ORAL at 21:15

## 2023-12-31 RX ADMIN — LOSARTAN POTASSIUM 25 MG: 25 TABLET, FILM COATED ORAL at 12:09

## 2023-12-31 RX ADMIN — VANCOMYCIN HYDROCHLORIDE 750 MG: 5 INJECTION, POWDER, LYOPHILIZED, FOR SOLUTION INTRAVENOUS at 00:02

## 2023-12-31 RX ADMIN — ROPINIROLE HYDROCHLORIDE 1 MG: 1 TABLET, FILM COATED ORAL at 21:14

## 2023-12-31 RX ADMIN — DOCUSATE SODIUM 50 MG AND SENNOSIDES 8.6 MG 1 TABLET: 8.6; 5 TABLET, FILM COATED ORAL at 12:09

## 2023-12-31 ASSESSMENT — ACTIVITIES OF DAILY LIVING (ADL)
ADLS_ACUITY_SCORE: 20

## 2023-12-31 NOTE — PROGRESS NOTES
[facility]    Medicine Progress Note - Hospitalist Service    Date of Admission:  12/27/2023    Assessment & Plan   Josefa Curiel is a 48 year old female with past medical history of type I DM, ESRD on dialysis, HTN who presented to ED for evaluation of nausea and vomiting, found to have DKA and admitted for further management. She does note some facial swelling, and now has 1 bottle positive blood cx.  Developed hypoglycemia on 12/30.  Discontinued mealtime insulin.     1.DKA in a patient with history of type I DM;    COVID-19 and influenza test negative.    -now it is possible clot on cath may have contributed here, vs gastroenteritis   -A1c 7.0.  Patient reports compliance with insulin regimen  -DC insulin drip since ketones are less than 2.  Switch to Lantus and NovoLog with meals  Developed hypoglycemia in 12/30 and metformin discontinued mealtime insulin.  Reduce Lantus to 14 units.    2.Facial swelling-Dx with SVC syndrome from clot on Hd cath  -CT chest showed this on 12/28-->sent her to IR removal of cath  -hep gtt started after removal   Continue heparin GGT  -will stay on hep gtt thru weekend, needs more procedures on Tuesday, after that will need transition to po anticoagulation for likely 3 months  -facial swelling t improving greatly    3.Headache  -could be from DKA, or svc syndrome, better today  -CT and mri brain done  -neurology did see    4.Nausea and vomiting; likely due to above  History of gastroparesis;  - Abdominal exam benign.  Symptomatic treatment as ordered  -iv ppi  -i ordered diet     5.ESRD on dialysis;started it in 4/23  - Nephrology consulted for dialysis management.    -Of note, patient has fistula on left upper extremity and also dialysis catheter on right front chest.--she has this due to complications with using fistula  -12/28 had to remove HD cath from chest--due to SVC syndrome  -12/29-IR did  Left arm fistulogram, right arm venogram    1.  Irregular mild-mod stenosis mid fistula treated with 7 mm PTA.  2. Irregular adherent thrombus SVC.  SVC is however patent.  3. RUE: Occlusive stenosis right innominate vein with non-occlusive clot proximal to stenosis  --plan now per IR, keep on hep gtt high intensity over weekend, ok renal try to use fistula--if any issue, would then need fem line HD cath for HD  --Next Tuesday IR will repeat RUE venogram with potential for intervention, possible lytics   --will need to be for sure NPO next Monday night at [place]     6.Uncontrolled hypertension;  -- Initially held PTA losartandue to facial swelling.  Added as needed hydralazine.  -- However given improvement in facial swelling, restart losartan on 12/31    7.One bottle positive GPC  -repeat blood cx 12/28  -at risk with HD  -vanco was started by ID.  Continue until Tuesday as per ID    Constipation  --Order stool softener scheduled on 12/31        Diet: Room Service  NPO per Anesthesia Guidelines for Procedure/Surgery Except for: Meds  Moderate Consistent Carb (60 g CHO per Meal) Diet    DVT Prophylaxis: On IV heparin  Williamson Catheter: Not present  Lines: None     Cardiac Monitoring: ACTIVE order. Indication: DKA  Code Status: Full Code      Clinically Significant Risk Factors                  # Hypertension: Noted on problem list                   Disposition Plan     Expected Discharge Date: 01/03/2024      Destination: home  Discharge Comments: here on hep gtt til Tuesday, then IR more porcedures            Dharmesh Dewey MD  Hospitalist Service  [facility]  Securely message with [employer] (more info)  Text page via [employer] Paging/Directory   ______________________________________________________________________    Interval History   Chart reviewed.  Complains of constipation.  Has poor appetite.  No hypoglycemia overnight.  Facial swelling continues to improve.  Discussed with ID and will continue vancomycin until Tuesday.      Physical Exam    Vital Signs: Temp: 98.4  F (36.9  C) Temp src: Oral BP: 123/56 Pulse: 108   Resp: 15 SpO2: 100 % O2 Device: None (Room air)    Weight: 128 lbs 6.4 oz    Facial swelling--improved  Alert oriented x 3      Medical Decision Making       35 MINUTES SPENT BY ME on the date of service doing chart review, history, exam, documentation & further activities per the note.  MANAGEMENT DISCUSSED with the following over the past 24 hours: Patient and nephrologist   NOTE(S)/MEDICAL RECORDS REVIEWED over the past 24 hours: Previous hospitalist and nephrology notes       Data     I have personally reviewed the following data over the past 24 hrs:    N/A  \   N/A   / N/A     N/A N/A N/A /  148 (H)   N/A N/A 3.59 (H) \       Imaging results reviewed over the past 24 hrs:   No results found for this or any previous visit (from the past 24 hour(s)).

## 2023-12-31 NOTE — PLAN OF CARE
Pt denies significant pain, headache managed well with prn tylenol. Denies nausea, shortness of breath. Off insulin drip, now on sliding scale, eating well. One episode of hypoglycemia, MD notified. Continues on heparin drip. Hemodialysis in progress. Pleasant and cooperative with care team.

## 2023-12-31 NOTE — PROCEDURES
Hemodialysis for 3 hours on 3K bath    Access: Left upper arm fistula, cannulating without difficulty. Used 16 gauge needles, due to pt stating there have been many problems with cannulating. Pt stated she has a low pain tolerance for needle placement. Lidocaine used. Good pressures for most of tx. Last 45 min venous pressure increased.    Total fluid removed 2200 ml for net of 1800 ml.Goal initially set at 2400 ml. Last 30 min HR and BP increased. Gave 100 ml NS bolus and brought HR and BP down.   Name band;

## 2023-12-31 NOTE — PLAN OF CARE
[facility] - ICU    RN Progress Note:            Pertinent Assessments:      Please refer to flowsheet rows for full assessment     Vital signs stable, denies pain, no fever. Continues with Heparin drip.           Key Events - This Shift:       Uneventful                       Barriers to Discharge / Downgrade:     None         Point of Contact Update No  If No, reason: Uneventful  Name:  Phone Number:  Summary of Conversation:

## 2023-12-31 NOTE — PROGRESS NOTES
NEPHROLOGY PROGRESS NOTE    Date of service: 12/28/23     CC: ESKD      ASSESSMENT/RECOMMENDATIONS:  1. ESKD: on HD MWF at [facility]. EDW=56.5kg. Access: LFA AVF. Her outpt HD unit was notified of her admit and orders reviewed.               -Continue HD MWF and prn during hospitalization.     2. N/V: improved. treat DKA and other management per primary     3. Hypertension: Her BP is above goal. Continue current BP meds. UF with HD.     4. DM1: DKA on admit. Management per primary.     5. Anemia: due to ESKD. Hemoglobin is below goal(9-11). Acute drop with illness, procedure. No need for SHARLENE now. Trend.      6. BMM: holding sevelamer given n/v. Trend. Phos.     7. Facial edema/SVC syndrome: large clot on tip of RIJ HD catheter found 12/28 and this was removed by IR on 12/28. Venogram 12/29 showed mild-mod stensosi of AVF-s/p angioplasty and irregular adherent SVC thormbus, but patient SVC. On heparin per IR, primary.     8. Bacteremia: GPC in clusters from 12/27 BC. Renally dose abx per primary. Seen by ID. Discussed with Dr. Koch. HD cath now removed. Trend.       Orville Alexander MD  [facility]   Office: [phone]      S: She last had HD yesterday-UF 2.2L. No acute events overnight. Head swellinig better after HD cath removed. No cp, sob, edema today.    Current Facility-Administered Medications   Medication    acetaminophen (TYLENOL) tablet 650 mg    glucose gel 15-30 g    Or    dextrose 50 % injection 25-50 mL    Or    glucagon injection 1 mg    heparin infusion 25,000 units in D5W 250 mL ANTICOAGULANT    hydrALAZINE (APRESOLINE) injection 10 mg    HYDROmorphone (DILAUDID) injection 0.2 mg    [Held by provider] insulin aspart (NovoLOG) injection (RAPID ACTING)    insulin aspart (NovoLOG) injection (RAPID ACTING)    insulin aspart (NovoLOG) injection (RAPID ACTING)    insulin glargine (LANTUS PEN) injection 16 Units    [Held by provider] losartan (COZAAR) tablet 25 mg  "   naloxone (NARCAN) injection 0.2 mg    Or    naloxone (NARCAN) injection 0.4 mg    Or    naloxone (NARCAN) injection 0.2 mg    Or    naloxone (NARCAN) injection 0.4 mg    No heparin via hemodialysis machine    ondansetron (ZOFRAN) injection 4 mg    promethazine (PHENERGAN) 6.25 mg in sodium chloride 0.9 % 55 mL intermittent infusion    rOPINIRole (REQUIP) tablet 1 mg    senna-docusate (SENOKOT-S/PERICOLACE) 8.6-50 MG per tablet 1 tablet    sertraline (ZOLOFT) tablet 150 mg    [Held by provider] sevelamer carbonate (RENVELA) tablet 800 mg    sodium chloride 0.9% BOLUS 100-150 mL    sodium chloride 0.9% BOLUS 250 mL    sodium chloride 0.9% BOLUS 300 mL    traZODone (DESYREL) tablet 100 mg    vancomycin place love - receiving intermittent dosing        No interval change in SH, FH.    Physical Exam  /62   Pulse 101   Temp 98.3  F (36.8  C) (Oral)   Resp 15   Ht 1.702 m (5' 7\")   Wt 58.2 kg (128 lb 6.4 oz)   LMP 12/13/2023   SpO2 100%   BMI 20.11 kg/m    GENERAL: NAD in bed  HEENT: NCAT, sclerae not icteric, MMM, some facial edema  NECK: Trachea midline.   LUNGS: no respiratory distress,  HEART: no leg edema.   ABDOMEN: Soft, NT  PSYCH: Alert, normal affect  NEURO:  ANGELES  ACCESS: SHAUN AVF with thrill        Lab Data:  Recent Labs   Lab Test 12/30/23  0602 12/29/23  2330 12/29/23  1904 12/29/23  0745   POTASSIUM 3.9 3.7 4.0 4.3   CHLORIDE 96* 95* 95* 97*   ALBUMIN  --   --   --  4.0     Recent Labs   Lab Test 12/30/23  0602 12/29/23  2330 12/29/23  1904 12/29/23  0745   BUN 20.9* 21.2* 21.0* 19.8     Recent Labs   Lab Test 12/30/23  0602 12/29/23  1637 12/29/23  0745 11/24/22  0528 11/23/22  0538   WBC 6.1 9.2 11.2*   < >  --    HGB 9.0* 9.4* 8.9*   < >  --    MCV 95 97 96   < >  --     178 156   < >  --    IRON  --   --   --   --  35*   IRONSAT  --   --   --   --  22    < > = values in this interval not displayed.     No lab results found.    Invalid input(s): \"PTHINTACT\", \"[id number]\", " "\"[id number]\", \"PROTCREATUR\"    I reviewed the above labs  "

## 2024-01-01 LAB
BACTERIA BLD CULT: NO GROWTH
CREAT SERPL-MCNC: 4.97 MG/DL (ref 0.51–0.95)
EGFRCR SERPLBLD CKD-EPI 2021: 10 ML/MIN/1.73M2
ERYTHROCYTE [DISTWIDTH] IN BLOOD BY AUTOMATED COUNT: 12.9 % (ref 10–15)
GLUCOSE BLDC GLUCOMTR-MCNC: 105 MG/DL (ref 70–99)
GLUCOSE BLDC GLUCOMTR-MCNC: 118 MG/DL (ref 70–99)
GLUCOSE BLDC GLUCOMTR-MCNC: 126 MG/DL (ref 70–99)
GLUCOSE BLDC GLUCOMTR-MCNC: 141 MG/DL (ref 70–99)
GLUCOSE BLDC GLUCOMTR-MCNC: 269 MG/DL (ref 70–99)
GLUCOSE BLDC GLUCOMTR-MCNC: 52 MG/DL (ref 70–99)
GLUCOSE BLDC GLUCOMTR-MCNC: 79 MG/DL (ref 70–99)
GLUCOSE BLDC GLUCOMTR-MCNC: 94 MG/DL (ref 70–99)
HCT VFR BLD AUTO: 27.9 % (ref 35–47)
HGB BLD-MCNC: 9.3 G/DL (ref 11.7–15.7)
MCH RBC QN AUTO: 30.8 PG (ref 26.5–33)
MCHC RBC AUTO-ENTMCNC: 33.3 G/DL (ref 31.5–36.5)
MCV RBC AUTO: 92 FL (ref 78–100)
PLATELET # BLD AUTO: 183 10E3/UL (ref 150–450)
RBC # BLD AUTO: 3.02 10E6/UL (ref 3.8–5.2)
UFH PPP CHRO-ACNC: 0.53 IU/ML
WBC # BLD AUTO: 9.5 10E3/UL (ref 4–11)

## 2024-01-01 PROCEDURE — 250N000011 HC RX IP 250 OP 636: Performed by: HOSPITALIST

## 2024-01-01 PROCEDURE — 250N000013 HC RX MED GY IP 250 OP 250 PS 637: Performed by: INTERNAL MEDICINE

## 2024-01-01 PROCEDURE — 99232 SBSQ HOSP IP/OBS MODERATE 35: CPT | Performed by: INTERNAL MEDICINE

## 2024-01-01 PROCEDURE — 36415 COLL VENOUS BLD VENIPUNCTURE: CPT | Performed by: INTERNAL MEDICINE

## 2024-01-01 PROCEDURE — 85014 HEMATOCRIT: CPT | Performed by: INTERNAL MEDICINE

## 2024-01-01 PROCEDURE — 200N000001 HC R&B ICU

## 2024-01-01 PROCEDURE — 258N000001 HC RX 258: Performed by: INTERNAL MEDICINE

## 2024-01-01 PROCEDURE — 250N000011 HC RX IP 250 OP 636: Mod: JZ | Performed by: INTERNAL MEDICINE

## 2024-01-01 PROCEDURE — 250N000011 HC RX IP 250 OP 636: Performed by: INTERNAL MEDICINE

## 2024-01-01 PROCEDURE — 85520 HEPARIN ASSAY: CPT | Performed by: INTERNAL MEDICINE

## 2024-01-01 PROCEDURE — 258N000003 HC RX IP 258 OP 636: Performed by: INTERNAL MEDICINE

## 2024-01-01 PROCEDURE — 90935 HEMODIALYSIS ONE EVALUATION: CPT

## 2024-01-01 PROCEDURE — 82565 ASSAY OF CREATININE: CPT | Performed by: INTERNAL MEDICINE

## 2024-01-01 RX ORDER — HYDROMORPHONE HYDROCHLORIDE 2 MG/1
2 TABLET ORAL EVERY 4 HOURS PRN
Status: DISCONTINUED | OUTPATIENT
Start: 2024-01-01 | End: 2024-01-03 | Stop reason: HOSPADM

## 2024-01-01 RX ORDER — VANCOMYCIN HYDROCHLORIDE 500 MG/10ML
500 INJECTION, POWDER, LYOPHILIZED, FOR SOLUTION INTRAVENOUS ONCE
Status: COMPLETED | OUTPATIENT
Start: 2024-01-01 | End: 2024-01-01

## 2024-01-01 RX ORDER — FAMOTIDINE 20 MG/1
20 TABLET, FILM COATED ORAL EVERY OTHER DAY
Status: DISCONTINUED | OUTPATIENT
Start: 2024-01-01 | End: 2024-01-03 | Stop reason: HOSPADM

## 2024-01-01 RX ORDER — CALCIUM CARBONATE 500 MG/1
500 TABLET, CHEWABLE ORAL 3 TIMES DAILY PRN
Status: DISCONTINUED | OUTPATIENT
Start: 2024-01-01 | End: 2024-01-03 | Stop reason: HOSPADM

## 2024-01-01 RX ADMIN — INSULIN ASPART 1 UNITS: 100 INJECTION, SOLUTION INTRAVENOUS; SUBCUTANEOUS at 15:44

## 2024-01-01 RX ADMIN — HEPARIN SODIUM 1150 UNITS/HR: 10000 INJECTION, SOLUTION INTRAVENOUS at 11:35

## 2024-01-01 RX ADMIN — FAMOTIDINE 20 MG: 20 TABLET ORAL at 11:29

## 2024-01-01 RX ADMIN — TRAZODONE HYDROCHLORIDE 100 MG: 50 TABLET ORAL at 21:52

## 2024-01-01 RX ADMIN — ONDANSETRON 4 MG: 2 INJECTION INTRAMUSCULAR; INTRAVENOUS at 02:29

## 2024-01-01 RX ADMIN — PROMETHAZINE HYDROCHLORIDE 6.25 MG: 25 INJECTION INTRAMUSCULAR; INTRAVENOUS at 08:25

## 2024-01-01 RX ADMIN — DEXTROSE MONOHYDRATE 25 ML: 25 INJECTION, SOLUTION INTRAVENOUS at 01:58

## 2024-01-01 RX ADMIN — DOCUSATE SODIUM 50 MG AND SENNOSIDES 8.6 MG 1 TABLET: 8.6; 5 TABLET, FILM COATED ORAL at 10:37

## 2024-01-01 RX ADMIN — ROPINIROLE HYDROCHLORIDE 1 MG: 1 TABLET, FILM COATED ORAL at 21:52

## 2024-01-01 RX ADMIN — SERTRALINE HYDROCHLORIDE 150 MG: 100 TABLET ORAL at 10:37

## 2024-01-01 RX ADMIN — LOSARTAN POTASSIUM 25 MG: 25 TABLET, FILM COATED ORAL at 10:37

## 2024-01-01 RX ADMIN — VANCOMYCIN HYDROCHLORIDE 500 MG: 500 INJECTION, POWDER, LYOPHILIZED, FOR SOLUTION INTRAVENOUS at 17:04

## 2024-01-01 RX ADMIN — PROCHLORPERAZINE EDISYLATE 5 MG: 5 INJECTION INTRAMUSCULAR; INTRAVENOUS at 06:16

## 2024-01-01 RX ADMIN — HYDROMORPHONE HYDROCHLORIDE 0.2 MG: 0.2 INJECTION, SOLUTION INTRAMUSCULAR; INTRAVENOUS; SUBCUTANEOUS at 07:48

## 2024-01-01 ASSESSMENT — ACTIVITIES OF DAILY LIVING (ADL)
ADLS_ACUITY_SCORE: 20

## 2024-01-01 NOTE — PROGRESS NOTES
[facility]    Medicine Progress Note - Hospitalist Service    Date of Admission:  12/27/2023    Assessment & Plan   Josefa Curiel is a 48 year old female with past medical history of type I DM, ESRD on dialysis, HTN who presented to ED for evaluation of nausea and vomiting, found to have DKA and admitted for further management. She does note some facial swelling, and now has 1 bottle positive blood cx.  Developed hypoglycemia on 12/30.  Discontinued mealtime insulin.     1.DKA in a patient with history of type I DM;    COVID-19 and influenza test negative.    -now it is possible clot on cath may have contributed here, vs gastroenteritis   -A1c 7.0.  Patient reports compliance with insulin regimen  -DC insulin drip since ketones are less than 2.  Switch to Lantus and NovoLog with meals  Developed hypoglycemia in 12/30 and 1/1 and discontinued mealtime insulin.  Reduce Lantus to 14 units.  Continue to monitor hypoglycemia    2.Facial swelling-Dx with SVC syndrome from clot on Hd cath  -CT chest showed this on 12/28-->sent her to IR removal of cath  -hep gtt started after removal   Continue heparin GTT  -will stay on hep gtt thru weekend, needs more procedures on Tuesday, after that will need transition to po anticoagulation for likely 3 months  -facial swelling t improving greatly    3.Headache  -could be from DKA, or svc syndrome, better today  -CT and mri brain done  -neurology did see    4.Nausea and vomiting; likely due to above  History of gastroparesis;  - Abdominal exam benign.  Symptomatic treatment as ordered  -iordered p.o. Pepcid and Tums  Continue diabetic diet  Ordered p.o. Dilaudid for pain     5.ESRD on dialysis;started it in 4/23  - Nephrology consulted for dialysis management.    -Of note, patient has fistula on left upper extremity and also dialysis catheter on right front chest.--she has this due to complications with using fistula  -12/28 had to remove HD cath from  chest--due to SVC syndrome  -12/29-IR did  Left arm fistulogram, right arm venogram    1. Irregular mild-mod stenosis mid fistula treated with 7 mm PTA.  2. Irregular adherent thrombus SVC.  SVC is however patent.  3. RUE: Occlusive stenosis right innominate vein with non-occlusive clot proximal to stenosis  --plan now per IR, keep on hep gtt high intensity over weekend, ok renal try to use fistula--if any issue, would then need fem line HD cath for HD  --Next Tuesday IR will repeat RUE venogram with potential for intervention, possible lytics   --will need to be for sure NPO next Monday night [place]     6.Uncontrolled hypertension;  -- Initially held PTA losartandue to facial swelling.  Added as needed hydralazine.  -- However given improvement in facial swelling, restart losartan on 12/31    7.One bottle positive GPC  -repeat blood cx 12/28  -at risk with HD  -vanco was started by ID.  DC vancomycin after venogram    Constipation  --Resolved had bowel movement on 12/31  -- Continue stool softener scheduled        Diet: Room Service  NPO per Anesthesia Guidelines for Procedure/Surgery Except for: Meds  Moderate Consistent Carb (60 g CHO per Meal) Diet    DVT Prophylaxis: On IV heparin  Williamson Catheter: Not present  Lines: None     Cardiac Monitoring: ACTIVE order. Indication: DKA  Code Status: Full Code      Clinically Significant Risk Factors                  # Hypertension: Noted on problem list                   Disposition Plan      Expected Discharge Date: 01/03/2024      Destination: home  Discharge Comments: Anticipate home with resumption of community dialysis and OP f/u            Dharmesh Dewey MD  Hospitalist Service  [facility]  Securely message with [employer] (more info)  Text page via [employer] Paging/Directory   ______________________________________________________________________    Interval History   Patient had hypoglycemia this morning at 52. Had episode of heartburn, nausea  followed by lower abdominal pain and cramps.  Ordered a Pepcid.  Currently on IV heparin      Physical Exam   Vital Signs: Temp: 98  F (36.7  C) Temp src: Temporal BP: (!) 152/81 Pulse: 108   Resp: 14 SpO2: 100 % O2 Device: None (Room air)    Weight: 128 lbs 11.2 oz    Facial swelling--improved  Alert oriented x 3  Lungs are clear to auscultation    Medical Decision Making       35 MINUTES SPENT BY ME on the date of service doing chart review, history, exam, documentation & further activities per the note.  MANAGEMENT DISCUSSED with the following over the past 24 hours: Patient and nephrologist   NOTE(S)/MEDICAL RECORDS REVIEWED over the past 24 hours: Previous hospitalist and nephrology notes       Data     I have personally reviewed the following data over the past 24 hrs:    9.5  \   9.3 (L)   / 183     N/A N/A N/A /  118 (H)   N/A N/A 4.97 (H) \       Imaging results reviewed over the past 24 hrs:   No results found for this or any previous visit (from the past 24 hour(s)).

## 2024-01-01 NOTE — PROGRESS NOTES
NEPHROLOGY PROGRESS NOTE    Date of service: 12/28/23     CC: ESKD      ASSESSMENT/RECOMMENDATIONS:  1. ESKD: on HD MWF at [facility]. EDW=56.5kg. Access: LFA AVF. Her outpt HD unit was notified of her admit and orders reviewed.               -Continue HD MWF and prn during hospitalization.     2. N/V: improved. treat DKA and other management per primary     3. Hypertension: Her BP is above goal. Continue current BP meds. UF with HD.control pain and nausea.      4. DM1: DKA on admit. Management per primary.     5. Anemia: due to ESKD. Hemoglobin is at goal(9-11). Acute drop with illness, procedure. No need for SHARLENE now. Trend.      6. BMM: holding sevelamer given n/v. Trend. Phos.     7. Facial edema/SVC syndrome: large clot on tip of RIJ HD catheter found 12/28 and this was removed by IR on 12/28. Venogram 12/29 showed mild-mod stensosi of AVF-s/p angioplasty and irregular adherent SVC thormbus, but patient SVC. On heparin per IR, primary.     8. Bacteremia: GPC in clusters from 12/27 BC. Renally dose abx per primary. Seen by ID. Discussed with Dr. Koch. HD cath now removed. Trend.       Orville Alexander MD  [facility]   Office: [phone]      S: She last had HD 2d ago-UF 2.2L. There were no acute events overnight. Head swellinig better after HD cath removed. She reports that she had some nausea this am. No cp, sob, edema today. Awaiting HD today per her usual MWF schedule.     Current Facility-Administered Medications   Medication    acetaminophen (TYLENOL) tablet 650 mg    glucose gel 15-30 g    Or    dextrose 50 % injection 25-50 mL    Or    glucagon injection 1 mg    heparin infusion 25,000 units in D5W 250 mL ANTICOAGULANT    hydrALAZINE (APRESOLINE) injection 10 mg    HYDROmorphone (DILAUDID) injection 0.2 mg    [Held by provider] insulin aspart (NovoLOG) injection (RAPID ACTING)    insulin aspart (NovoLOG) injection (RAPID ACTING)    insulin aspart (NovoLOG) injection  "(RAPID ACTING)    insulin glargine (LANTUS PEN) injection 14 Units    losartan (COZAAR) tablet 25 mg    naloxone (NARCAN) injection 0.2 mg    Or    naloxone (NARCAN) injection 0.4 mg    Or    naloxone (NARCAN) injection 0.2 mg    Or    naloxone (NARCAN) injection 0.4 mg    No heparin via hemodialysis machine    ondansetron (ZOFRAN) injection 4 mg    prochlorperazine (COMPAZINE) injection 5 mg    promethazine (PHENERGAN) 6.25 mg in sodium chloride 0.9 % 55 mL intermittent infusion    rOPINIRole (REQUIP) tablet 1 mg    senna-docusate (SENOKOT-S/PERICOLACE) 8.6-50 MG per tablet 1 tablet    sertraline (ZOLOFT) tablet 150 mg    [Held by provider] sevelamer carbonate (RENVELA) tablet 800 mg    sodium chloride 0.9% BOLUS 100-150 mL    sodium chloride 0.9% BOLUS 250 mL    traZODone (DESYREL) tablet 100 mg    vancomycin place love - receiving intermittent dosing        No interval change in SH, FH.    Physical Exam  BP (!) 151/74   Pulse 115   Temp 98.5  F (36.9  C) (Oral)   Resp 16   Ht 1.702 m (5' 7\")   Wt 58.4 kg (128 lb 11.2 oz)   LMP 12/13/2023   SpO2 98%   BMI 20.16 kg/m    GENERAL: Calm in bed  HEENT: NCAT, sclerae not icteric, MMM, some facial edema  NECK: Trachea midline.   LUNGS: CTA, no respiratory distress,  HEART: RR, tachy rate, no rub, no leg edema.   ABDOMEN: Soft, NT  PSYCH: Alert, normal affect  NEURO:  ANGELES  ACCESS: SHAUN AVF with thrill        Lab Data:  Recent Labs   Lab Test 12/30/23  0602 12/29/23  2330 12/29/23  1904 12/29/23  0745   POTASSIUM 3.9 3.7 4.0 4.3   CHLORIDE 96* 95* 95* 97*   ALBUMIN  --   --   --  4.0     Recent Labs   Lab Test 12/30/23  0602 12/29/23  2330 12/29/23  1904 12/29/23  0745   BUN 20.9* 21.2* 21.0* 19.8     Recent Labs   Lab Test 01/01/24  0531 12/30/23  0602 12/29/23  1637 11/24/22  0528 11/23/22  0538   WBC 9.5 6.1 9.2   < >  --    HGB 9.3* 9.0* 9.4*   < >  --    MCV 92 95 97   < >  --     150 178   < >  --    IRON  --   --   --   --  35*   IRONSAT  --   --   " "--   --  22    < > = values in this interval not displayed.     No lab results found.    Invalid input(s): \"PTHINTACT\", \"[id number]\", \"[id number]\", \"PROTCREATUR\"    I reviewed the above labs  "

## 2024-01-01 NOTE — PHARMACY-VANCOMYCIN DOSING SERVICE
Pharmacy Vancomycin Note  Date of Service 2024  Patient's  1975   48 year old, female    Indication: Bacteremia  Day of Therapy: 5  Current vancomycin regimen:  intermittent  Current vancomycin monitoring method: Renal Replacement Therapy  Current vancomycin therapeutic monitoring goal: 15-20 mg/L    Current estimated CrCl = Estimated Creatinine Clearance: 13 mL/min (A) (based on SCr of 4.97 mg/dL (H)).    Creatinine for last 3 days  2023:  7:04 PM Creatinine 5.16 mg/dL; 11:30 PM Creatinine 5.44 mg/dL  2023:  6:02 AM Creatinine 5.63 mg/dL  2023:  6:05 AM Creatinine 3.59 mg/dL  2024:  5:31 AM Creatinine 4.97 mg/dL    Recent Vancomycin Levels (past 3 days)  2023:  6:02 AM Vancomycin 17.6 ug/mL    Vancomycin IV Administrations (past 72 hours)                     vancomycin (VANCOCIN) 750 mg in sodium chloride 0.9 % 250 mL intermittent infusion (mg) 750 mg New Bag 23 0002                    Nephrotoxins and other renal medications (From now, onward)      Start     Dose/Rate Route Frequency Ordered Stop    23 1224  vancomycin place love - receiving intermittent dosing         1 each Intravenous SEE ADMIN INSTRUCTIONS 23 1225                 Contrast Orders - past 72 hours (72h ago, onward)      Start     Dose/Rate Route Frequency Stop    23 1330  iodixanol (VISIPAQUE 320) injection 100 mL         100 mL Intravenous ONCE 23 1316    23 1130  iodixanol (VISIPAQUE 320) injection 100 mL         100 mL Intravenous ONCE 23 1308    Pending  iodixanol (VISIPAQUE 320) injection 100 mL         100 mL Intravenous ONCE 23 1330            Plan:  No pre-HD vanco level was obtained this morning as hope was to narrow antibiotics prior to dose this evening. However, notes suggest that ID may not see patient until tomorrow, therefore will plan to give a dose post-HD today unless antibiotic changes. Will give a small dose of 500 mg.  Vancomycin  monitoring method: Renal Replacement Therapy  Vancomycin therapeutic monitoring goal: 15-20 mg/L    Elsa Ortiz [facility]

## 2024-01-01 NOTE — PROGRESS NOTES
Care Management Follow Up    Length of Stay (days): 5    Expected Discharge Date: 01/03/2024     Concerns to be Addressed:  discharge planning, care progression     Patient plan of care discussed at interdisciplinary rounds: No    Anticipated Discharge Disposition:  Home     Anticipated Discharge Services:  Resumption of community dialysis; OP follow up  Anticipated Discharge DME:  Per Treatment Team    Patient/family educated on Medicare website which has current facility and service quality ratings:    Education Provided on the Discharge Plan:  Yes  Patient/Family in Agreement with the Plan:  Yes    Referrals Placed by CM/SW:  None  Private pay costs discussed: Not applicable    Additional Information:  Chart reviewed.  Pt admitted for DKA.  Will have dialysis today.  Has one positive blood culture and facial swelling.  ID has followed and recommend Vanco to stop tomorrow are venogram and ID has signed off.  Nephrology is following. Remains on Heparin gtt.     Pt attends community dialysis appointments at [facility] on MWF.     Social History:  Pt rents the bottom of a home from a couple, and has a roommate named Wilbert. Pt independent with ADLS and mostly independent with IADLS. Pt does not have a car at the moment, but pt has MA, and states having services through insurance for transport. Pt was on disability, pt is saying she no longer qualified, and is currently in the appeal process, and is still receiving social security. Pt has medicare listed primary, and [employer] [place] listed secondary. 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 Pt states prior being able to access Insulin, and things needed for diabetes management.     Belle Melissa RN

## 2024-01-01 NOTE — PROGRESS NOTES
"[facility] Progress Note    SUBJECTIVE:  she is wrapped in blankets and doing good.  She wakes up and has no complaints and does not report any cellulitis or fever.       REVIEW OF SYSTEMS:  Negative unless as listed above.  Social history, Family history, Medications: reviewed.    OBJECTIVE:  BP (!) 180/87 (BP Location: Right arm)   Pulse 116   Temp 98.2  F (36.8  C) (Oral)   Resp 20   Ht 1.702 m (5' 7\")   Wt 58.4 kg (128 lb 11.2 oz)   LMP 12/13/2023   SpO2 99%   BMI 20.16 kg/m                PHYSICAL EXAM:  Alert, awake  Vitals tabulated above, reviewed  Neck supple without lymphadenopathy  Sclera normal color, not injected  CARDIOVASCULAR regular rate and rhythm, no murmur  Lungs CLEAR TO AUSCULTATION   Abdomen soft, NT/ND, absent HEPATOSPLENOMEGALY  Skin normal  Joints normal  Neurologic exam non focal  Cath site R chest non infected    Antibiotics:V    Pertinent labs:    Recent Labs   Lab Test 01/01/24  0531 12/30/23  0602 12/29/23  1637   WBC 9.5 6.1 9.2   HGB 9.3* 9.0* 9.4*   HCT 27.9* 28.1* 29.7*   MCV 92 95 97    150 178       Lab Results   Component Value Date    RBC 2.92 12/29/2023     Lab Results   Component Value Date    HGB 8.9 12/29/2023     Lab Results   Component Value Date    HCT 27.9 12/29/2023     No components found for: \"MCT\"  Lab Results   Component Value Date    MCV 96 12/29/2023     Lab Results   Component Value Date    MCH 30.5 12/29/2023     Lab Results   Component Value Date    MCHC 31.9 12/29/2023     Lab Results   Component Value Date    RDW 13.2 12/29/2023     Lab Results   Component Value Date     12/29/2023       Last Comprehensive Metabolic Panel:  Sodium   Date Value Ref Range Status   12/30/2023 132 (L) 135 - 145 mmol/L Final     Comment:     Reference intervals for this test were updated on 09/26/2023 to more accurately reflect our healthy population. There may be differences in the flagging of prior results with similar values performed " with this method. Interpretation of those prior results can be made in the context of the updated reference intervals.      Potassium   Date Value Ref Range Status   12/30/2023 3.9 3.4 - 5.3 mmol/L Final   11/03/2022 4.2 3.4 - 5.3 mmol/L Final     Chloride   Date Value Ref Range Status   12/30/2023 96 (L) 98 - 107 mmol/L Final   11/03/2022 103 94 - 109 mmol/L Final     Carbon Dioxide (CO2)   Date Value Ref Range Status   12/30/2023 21 (L) 22 - 29 mmol/L Final   11/03/2022 25 20 - 32 mmol/L Final     Anion Gap   Date Value Ref Range Status   12/30/2023 15 7 - 15 mmol/L Final   11/03/2022 9 3 - 14 mmol/L Final     Glucose   Date Value Ref Range Status   11/03/2022 79 70 - 99 mg/dL Final     GLUCOSE BY METER POCT   Date Value Ref Range Status   01/01/2024 126 (H) 70 - 99 mg/dL Final     Urea Nitrogen   Date Value Ref Range Status   12/30/2023 20.9 (H) 6.0 - 20.0 mg/dL Final   11/03/2022 37 (H) 7 - 30 mg/dL Final     Creatinine   Date Value Ref Range Status   01/01/2024 4.97 (H) 0.51 - 0.95 mg/dL Final     GFR Estimate   Date Value Ref Range Status   01/01/2024 10 (L) >60 mL/min/1.73m2 Final   10/03/2020 32 (L) >60 mL/min/1.73m2 Final     Calcium   Date Value Ref Range Status   12/30/2023 8.4 (L) 8.6 - 10.0 mg/dL Final       Liver Function Studies -   Recent Labs   Lab Test 12/29/23  0745 12/28/23  0429 12/27/23  0140   PROTTOTAL  --   --  8.0   ALBUMIN 4.0   < > 5.0   BILITOTAL  --   --  0.5   ALKPHOS  --   --  112   AST  --   --  21   ALT  --   --  13    < > = values in this interval not displayed.       Erythrocyte Sedimentation Rate   Date Value Ref Range Status   11/16/2022 58 (H) 0 - 20 mm/hr Final       CRP Inflammation   Date Value Ref Range Status   11/03/2022 3.9 0.0 - 8.0 mg/L Final               MICROBIOLOGY DATA:  Staph hominis single BC      IMAGING/RADIOLOGY:          ASSESSMENT:  ESRD  Fistulous issues as per renal and IR  Contam BC, but given in ICU and all these interventions I'd do vanco all weekend  into Monday.   RECOMMENDATION:  Vanco  Stop this medication after venogram tomorrow   Will sign off thanks    DEYSI Cosme MD  Office [phone] option 2 to desk staff

## 2024-01-01 NOTE — PROGRESS NOTES
Potassium   Date Value Ref Range Status   12/30/2023 3.9 3.4 - 5.3 mmol/L Final   11/03/2022 4.2 3.4 - 5.3 mmol/L Final     Hemoglobin   Date Value Ref Range Status   01/01/2024 9.3 (L) 11.7 - 15.7 g/dL Final     Creatinine   Date Value Ref Range Status   01/01/2024 4.97 (H) 0.51 - 0.95 mg/dL Final     Urea Nitrogen   Date Value Ref Range Status   12/30/2023 20.9 (H) 6.0 - 20.0 mg/dL Final   11/03/2022 37 (H) 7 - 30 mg/dL Final     Sodium   Date Value Ref Range Status   12/30/2023 132 (L) 135 - 145 mmol/L Final     Comment:     Reference intervals for this test were updated on 09/26/2023 to more accurately reflect our healthy population. There may be differences in the flagging of prior results with similar values performed with this method. Interpretation of those prior results can be made in the context of the updated reference intervals.      INR   Date Value Ref Range Status   03/31/2023 0.95 0.85 - 1.15 Final       DIALYSIS PROCEDURE NOTE  Hepatitis status of previous patient on machine log was checked and verified ok to use with this patients hepatitis status.  Patient dialyzed for 3 hrs. on a K3 bath with a net fluid removal of  1.5L.  A BFR of 350 ml/min was obtained via a AVF using 16 gauge needles.      The treatment plan was discussed with Dr. Alexander during the treatment.      Needle cannulation sites held x 5 min.       Meds  given: none   Complications: tolerated tx well      Person educated: patient. Knowledge base good. Barriers to learning: none. Educated on procedure via verbal mode.      ICEBOAT? Timeout performed pre-treatment  I: Patient was identified using 2 identifiers  C:  Consent Signed Yes  E: Equipment preventative maintenance is current and dialysis delivery system OK to use  B: Hepatitis B Surface Antigen: negative; Draw Date: 10/19/23      Hepatitis B Surface Antibody: immune; Draw Date: 10/19/23  O: Dialysis orders present and complete prior to treatment  A: Vascular access verified and  assessed prior to treatment  T: Treatment was performed at a clinically appropriate time  ?: Patient was allowed to ask questions and address concerns prior to treatment  See Adult Hemodialysis flowsheet in EPIC for further details and post assessment.  Machine water alarm in place and functioning. Transducer pods intact and checked every 15min.   Pt returned via bed  .  Chlorine/Chloramine water system checked every 4 hours.  Outpatient Dialysis at [facility]    Patient repositioned every 2 hours during the treatment.  Post treatment report given to Renetta MILES RN regarding 1.5L of fluid removed

## 2024-01-01 NOTE — PLAN OF CARE
[facility] - ICU    RN Progress Note:    Pt is alert, oriented x 4, and able to make needs known. Vital signs stable. Pt continues heparin infusion, per Nurse managed High-intensity Protocol. Anti-Xa therapeutic x 2. Anti-Xa recheck scheduled for am of 1/1/24. Blood glucose remains labile (202 - 78 mg/dL, over NOC). Pt oliguric. Plan for dialysis, Monday 1/1. Bellow the knee sequential compression devices in place, bilaterally. Will continue to monitor. Nithin He RN          Pertinent Assessments:      Please refer to flowsheet rows for full assessment     Vital signs.            Key Events - This Shift:       No overnight events.                  Barriers to Discharge / Downgrade:     None.

## 2024-01-02 ENCOUNTER — APPOINTMENT (OUTPATIENT)
Dept: INTERVENTIONAL RADIOLOGY/VASCULAR | Facility: HOSPITAL | Age: 49
DRG: 270 | End: 2024-01-02
Attending: INTERNAL MEDICINE
Payer: MEDICARE

## 2024-01-02 LAB
BACTERIA BLD CULT: NO GROWTH
BACTERIA BLD CULT: NO GROWTH
CREAT SERPL-MCNC: 3.44 MG/DL (ref 0.51–0.95)
EGFRCR SERPLBLD CKD-EPI 2021: 16 ML/MIN/1.73M2
GLUCOSE BLDC GLUCOMTR-MCNC: 105 MG/DL (ref 70–99)
GLUCOSE BLDC GLUCOMTR-MCNC: 113 MG/DL (ref 70–99)
GLUCOSE BLDC GLUCOMTR-MCNC: 391 MG/DL (ref 70–99)
GLUCOSE BLDC GLUCOMTR-MCNC: 60 MG/DL (ref 70–99)
GLUCOSE BLDC GLUCOMTR-MCNC: 63 MG/DL (ref 70–99)
GLUCOSE BLDC GLUCOMTR-MCNC: 70 MG/DL (ref 70–99)
HOLD SPECIMEN: NORMAL
PLATELET # BLD AUTO: 187 10E3/UL (ref 150–450)
UFH PPP CHRO-ACNC: 0.41 IU/ML
UFH PPP CHRO-ACNC: 0.54 IU/ML

## 2024-01-02 PROCEDURE — 250N000013 HC RX MED GY IP 250 OP 250 PS 637: Performed by: INTERNAL MEDICINE

## 2024-01-02 PROCEDURE — C1725 CATH, TRANSLUMIN NON-LASER: HCPCS

## 2024-01-02 PROCEDURE — 99153 MOD SED SAME PHYS/QHP EA: CPT

## 2024-01-02 PROCEDURE — 272N000117 HC CATH CR2

## 2024-01-02 PROCEDURE — 272N000566 HC SHEATH CR3

## 2024-01-02 PROCEDURE — 272N000196 HC ACCESSORY CR5

## 2024-01-02 PROCEDURE — 37248 TRLUML BALO ANGIOP 1ST VEIN: CPT

## 2024-01-02 PROCEDURE — 250N000011 HC RX IP 250 OP 636: Performed by: INTERNAL MEDICINE

## 2024-01-02 PROCEDURE — C1769 GUIDE WIRE: HCPCS

## 2024-01-02 PROCEDURE — 36415 COLL VENOUS BLD VENIPUNCTURE: CPT | Performed by: INTERNAL MEDICINE

## 2024-01-02 PROCEDURE — 82565 ASSAY OF CREATININE: CPT | Performed by: INTERNAL MEDICINE

## 2024-01-02 PROCEDURE — 37187 VENOUS MECH THROMBECTOMY: CPT

## 2024-01-02 PROCEDURE — 250N000011 HC RX IP 250 OP 636: Performed by: PHYSICIAN ASSISTANT

## 2024-01-02 PROCEDURE — 99232 SBSQ HOSP IP/OBS MODERATE 35: CPT | Performed by: STUDENT IN AN ORGANIZED HEALTH CARE EDUCATION/TRAINING PROGRAM

## 2024-01-02 PROCEDURE — C1757 CATH, THROMBECTOMY/EMBOLECT: HCPCS

## 2024-01-02 PROCEDURE — 200N000001 HC R&B ICU

## 2024-01-02 PROCEDURE — 85049 AUTOMATED PLATELET COUNT: CPT | Performed by: INTERNAL MEDICINE

## 2024-01-02 PROCEDURE — 250N000013 HC RX MED GY IP 250 OP 250 PS 637: Performed by: STUDENT IN AN ORGANIZED HEALTH CARE EDUCATION/TRAINING PROGRAM

## 2024-01-02 PROCEDURE — 250N000009 HC RX 250: Performed by: PHYSICIAN ASSISTANT

## 2024-01-02 PROCEDURE — 85520 HEPARIN ASSAY: CPT | Performed by: STUDENT IN AN ORGANIZED HEALTH CARE EDUCATION/TRAINING PROGRAM

## 2024-01-02 PROCEDURE — 272N000500 HC NEEDLE CR2

## 2024-01-02 PROCEDURE — 272N000302 HC DEVICE INFLATION CR5

## 2024-01-02 PROCEDURE — 85520 HEPARIN ASSAY: CPT | Performed by: HOSPITALIST

## 2024-01-02 PROCEDURE — 36415 COLL VENOUS BLD VENIPUNCTURE: CPT | Performed by: STUDENT IN AN ORGANIZED HEALTH CARE EDUCATION/TRAINING PROGRAM

## 2024-01-02 PROCEDURE — 36010 PLACE CATHETER IN VEIN: CPT

## 2024-01-02 PROCEDURE — 255N000002 HC RX 255 OP 636: Performed by: RADIOLOGY

## 2024-01-02 PROCEDURE — 75825 VEIN X-RAY TRUNK: CPT

## 2024-01-02 RX ORDER — NALOXONE HYDROCHLORIDE 0.4 MG/ML
0.4 INJECTION, SOLUTION INTRAMUSCULAR; INTRAVENOUS; SUBCUTANEOUS
Status: DISCONTINUED | OUTPATIENT
Start: 2024-01-02 | End: 2024-01-03 | Stop reason: HOSPADM

## 2024-01-02 RX ORDER — NICOTINE POLACRILEX 4 MG
15-30 LOZENGE BUCCAL
Status: DISCONTINUED | OUTPATIENT
Start: 2024-01-02 | End: 2024-01-02

## 2024-01-02 RX ORDER — DEXTROSE MONOHYDRATE 25 G/50ML
25-50 INJECTION, SOLUTION INTRAVENOUS
Status: DISCONTINUED | OUTPATIENT
Start: 2024-01-02 | End: 2024-01-02

## 2024-01-02 RX ORDER — ONDANSETRON 2 MG/ML
4 INJECTION INTRAMUSCULAR; INTRAVENOUS
Status: DISCONTINUED | OUTPATIENT
Start: 2024-01-02 | End: 2024-01-02

## 2024-01-02 RX ORDER — FENTANYL CITRATE 50 UG/ML
25-50 INJECTION, SOLUTION INTRAMUSCULAR; INTRAVENOUS EVERY 5 MIN PRN
Status: DISCONTINUED | OUTPATIENT
Start: 2024-01-02 | End: 2024-01-02

## 2024-01-02 RX ORDER — FLUMAZENIL 0.1 MG/ML
0.2 INJECTION, SOLUTION INTRAVENOUS
Status: DISCONTINUED | OUTPATIENT
Start: 2024-01-02 | End: 2024-01-03 | Stop reason: HOSPADM

## 2024-01-02 RX ORDER — NALOXONE HYDROCHLORIDE 0.4 MG/ML
0.2 INJECTION, SOLUTION INTRAMUSCULAR; INTRAVENOUS; SUBCUTANEOUS
Status: DISCONTINUED | OUTPATIENT
Start: 2024-01-02 | End: 2024-01-03 | Stop reason: HOSPADM

## 2024-01-02 RX ADMIN — ROPINIROLE HYDROCHLORIDE 1 MG: 1 TABLET, FILM COATED ORAL at 21:11

## 2024-01-02 RX ADMIN — MIDAZOLAM HYDROCHLORIDE 1 MG: 1 INJECTION, SOLUTION INTRAMUSCULAR; INTRAVENOUS at 10:34

## 2024-01-02 RX ADMIN — LIDOCAINE HYDROCHLORIDE 15 ML: 10 INJECTION, SOLUTION INFILTRATION; PERINEURAL at 11:02

## 2024-01-02 RX ADMIN — TRAZODONE HYDROCHLORIDE 100 MG: 50 TABLET ORAL at 21:11

## 2024-01-02 RX ADMIN — FENTANYL CITRATE 50 MCG: 50 INJECTION, SOLUTION INTRAMUSCULAR; INTRAVENOUS at 10:38

## 2024-01-02 RX ADMIN — ACETAMINOPHEN 650 MG: 325 TABLET ORAL at 09:40

## 2024-01-02 RX ADMIN — IOHEXOL 40 ML: 240 INJECTION, SOLUTION INTRATHECAL; INTRAVASCULAR; INTRAVENOUS; ORAL at 11:18

## 2024-01-02 RX ADMIN — MIDAZOLAM HYDROCHLORIDE 0.5 MG: 1 INJECTION, SOLUTION INTRAMUSCULAR; INTRAVENOUS at 10:41

## 2024-01-02 RX ADMIN — FENTANYL CITRATE 25 MCG: 50 INJECTION, SOLUTION INTRAMUSCULAR; INTRAVENOUS at 10:50

## 2024-01-02 RX ADMIN — MIDAZOLAM HYDROCHLORIDE 0.5 MG: 1 INJECTION, SOLUTION INTRAMUSCULAR; INTRAVENOUS at 11:00

## 2024-01-02 RX ADMIN — SERTRALINE HYDROCHLORIDE 150 MG: 100 TABLET ORAL at 08:22

## 2024-01-02 RX ADMIN — LOSARTAN POTASSIUM 25 MG: 25 TABLET, FILM COATED ORAL at 08:22

## 2024-01-02 RX ADMIN — FENTANYL CITRATE 25 MCG: 50 INJECTION, SOLUTION INTRAMUSCULAR; INTRAVENOUS at 11:07

## 2024-01-02 RX ADMIN — HEPARIN SODIUM 1150 UNITS/HR: 10000 INJECTION, SOLUTION INTRAVENOUS at 09:30

## 2024-01-02 RX ADMIN — APIXABAN 10 MG: 5 TABLET, FILM COATED ORAL at 21:12

## 2024-01-02 ASSESSMENT — ACTIVITIES OF DAILY LIVING (ADL)
ADLS_ACUITY_SCORE: 20

## 2024-01-02 NOTE — PLAN OF CARE
[facility] - ICU    RN Progress Note:            Pertinent Assessments:      Please refer to flowsheet rows for full assessment     VSS, Blood glucose labile. Had very high BG this am.Texted provider. Changes made to insulin. Next BG 63, 60 then 105 after juice given twice. At dinner, BG 70. Pt is concerned about BG management. Usually does carb coverage. Does not want BG issues to delay her discharge home.            Key Events - This Shift:       Had a thrombectomy with balloon angioplasty of SVC and innominate vein that was successful.               Barriers to Discharge / Downgrade:     None. Pt is med tele.          Point of Contact Update YES-OR-NO: Yes  If No, reason: Patient is able to update family.  Name:  Phone Number:  Summary of Conversation:        Leana Tomlinson RN on 1/2/2024 at 4:46 PM

## 2024-01-02 NOTE — PROGRESS NOTES
[facility]    Medicine Progress Note - Hospitalist Service    Date of Admission:  12/27/2023    Assessment & Plan   Josefa Curiel is a 48 year old female with past medical history of type I DM, ESRD on dialysis, HTN who presented to ED for evaluation of nausea and vomiting, found to have DKA and admitted for further management. She does note some facial swelling, and now has 1 bottle positive blood cx.  Developed hypoglycemia on 12/30.  Discontinued mealtime insulin.     DKA in a patient with history of type I DM;    -- COVID-19 and influenza test negative.    -- It possible clot on cath may have contributed here, vs gastroenteritis   -- A1c 7.0.  Patient reports compliance with insulin regimen  -- S/p insulin drip since ketones are less than 2.  Switch to Lantus and NovoLog with meals  -- Developed hypoglycemia in 12/30 and 1/1 and discontinued mealtime insulin.    -- 01/02:  Increase Lantus to 16 units.  Sliding scale. Continue to monitor hypoglycemia    Facial swelling-Dx with SVC syndrome from clot on Hd cath  -- CT chest showed this on 12/28-->sent her to IR removal of cath  -- facial swelling t improving greatly  -- 01/02 S/p Mechanical thrombectomy and PTA by IR. Discussed with IR who recommended transition to treatment dose Eliquis and to continue for at least 3 months.  -- heparin gtt to be trasitioned to Eliquis tonight- discussed with pharmacy.    Headache- improved  -- could be from DKA, or svc syndrome  -- CT and mri brain done  -- neurology signed off    Nausea and vomiting; likely due to above. Resolved.  -- History of gastroparesis;  -- Abdominal exam benign.  Symptomatic treatment as ordered  -- iordered p.o. Pepcid and Tums  -- Continue diabetic diet  -- Ordered p.o. Dilaudid for pain     ESRD on dialysis;started it in 4/23  -- Nephrology consulted for dialysis management.    -- Of note, patient has fistula on left upper extremity and also dialysis catheter on  right front chest.--she has this due to complications with using fistula  -- 12/28 had to remove HD cath from chest--due to SVC syndrome  -- 12/29-IR did  Left arm fistulogram, right arm venogram    1. Irregular mild-mod stenosis mid fistula treated with 7 mm PTA.  2. Irregular adherent thrombus SVC.  SVC is however patent.  3. RUE: Occlusive stenosis right innominate vein with non-occlusive clot proximal to stenosis     Essential hypertension  -- Initially held PTA losartandue to facial swelling.  Added as needed hydralazine.  -- However given improvement in facial swelling, restart losartan on 12/31    One bottle positive GPC  -- repeat blood cx 12/28  -- at risk with HD  -- vanco was started by ID.  Discontinued vancomycin after venogram per ID.    Constipation  -- Resolved had bowel movement on 12/31  -- Continue stool softener scheduled    Anemia  -- 2/2 ESRD and acute drop with procedure, illness  -- Stable around 9          Diet: Room Service  Combination Diet Regular Diet; Renal Diet (dialysis); Moderate Consistent Carb (60 g CHO per Meal) Diet    DVT Prophylaxis: heparin gtt  Williamson Catheter: Not present  Lines: None     Cardiac Monitoring: ACTIVE order. Indication: DKA  Code Status: Full Code      Clinically Significant Risk Factors                  # Hypertension: Noted on problem list                   Disposition Plan     Expected Discharge Date: 01/03/2024      Destination: home  Discharge Comments: Anticipate home with resumption of community dialysis and OP f/u            Mere Conner MD  Hospitalist Service  [facility]  Securely message with [employer] (more info)  Text page via [employer] Paging/Directory   ______________________________________________________________________    Interval History   Patient is new to me today.  Patient is seen and examined at the bedside.  Blood sugar was very high in am.  No fever, chills. No bleeding.    Physical Exam   Vital Signs: Temp: 98   F (36.7  C) Temp src: Oral BP: 134/68 Pulse: 98   Resp: 16 SpO2: 97 % O2 Device: None (Room air)    Weight: 124 lbs 0 oz    GEN: Alert and oriented. Not in acute distress.  HEENT: Atraumatic, mucous membrane- moist and pink.  Chest: Bilateral air entry.  CVS: S1S2 regular.   Abdomen: Soft. Non-tender, non-distended. No organomegaly. No guarding or rigidity. Bowel sounds active.   Extremities: No pedal edema.  CNS: No involuntary movements.  Skin: no cyanosis or clubbing.     Medical Decision Making             Data

## 2024-01-02 NOTE — PROCEDURES
Interventional Radiology Post-Procedure Note   ?   Brief Procedure Note:   Patient name: Josefa Curiel  Pt MRN:[id number]   Date of procedure: 1/2/2024     Procedure(s): Mechanical thrombectomy of SVC thrombus and PTA  Sedation method: Moderate sedation was employed. The patient was monitored by an interventional radiology nurse at all times throughout the procedure under my direct guidance.  Pre Procedure Diagnosis: SVC sydnrome  Post Procedure Diagnosis: Same  Indications: SVC syndrome, bacteremia   ?   Attending: Dale Alicea M.D.  Specimen(s) removed: None   Additional studies ordered: None  Drains: None   Estimated Blood Loss: Minimal  Complications: None  Vascular closure method: Manual pressure   Findings/Notes/Comments: Venography demonstrates continued occlusion of SVC with slight improvement with heparin from prior venogram. Successful mechanical thrombectomy with complete removal of SVC thrombus followed by 12mm PTA for underlying stenosis. SVC now widely patent.   ?   Please see dictation in PACS or under the Imaging tab in [facility] for detailed procedure note.     Dale Alicea M.D.   Vascular and Interventional Radiology   Pager: [phone]   After Hours / Scheduling: [phone]     1/2/2024  11:32 AM

## 2024-01-02 NOTE — PROGRESS NOTES
Interventional Radiology - Pre-Procedure Note:  [facility]  01/02/2024     Procedure Requested: RUE venogram  Requested by: Dr. Alexander/Dr. Lopes    Brief HPI: Josefa Curiel is a 48 year old female HTN, DM1 (DKA on admit), ESRD on HD MWF via LUE AVF. Found to have bacteremia with positive blood cultures on 12/27/23 (Staphylococcus hominis); ID following. Patient developed acute facial edema/SVC syndrome like symptoms; found to have a large clot on the tip of her RIJ HD catheter on 12/28; s/p removal by IR 12/29/23.     S/p LUE fistulogram with PTA of distal distal humeral segment of left upper arm AV fistula. Bulky irregular nonocclusive thrombus throughout the SVC noted, however, no intervention completed given bacteremia. Heparin gtt started.    Patient reporting much improvement in facial swelling and SVC syndrome symptoms. However, reporting some continued minimal facial swelling.     Plan to proceed with RUE venogram for further evaluation and possible intervention now that blood cultures have been negative since 12/28/23.       IMAGING:  IR FISTULOGRAM 12/29/23:  FINDINGS:  AV DIALYSIS FISTULOGRAM: Patent left upper arm straight AV fistula. There is mild - moderate irregular stenosis in the midportion of the fistula at the mid - distal humeral segment. AV anastomosis is widely patent. Central venous outflow is patent.   However, there is bulky irregular thrombus throughout the SVC.     PTA VENOUS: Mild - moderate irregular stenosis mid - distal humeral segment of the left arm fistula, treated with 7 mm PTA.                                                                      IMPRESSION:    1.  Mild - moderate irregular stenosis mid - distal humeral segment of left upper arm AV fistula treated with 7 mm PTA.  2.  Bulky irregular nonocclusive thrombus throughout the SVC. No intervention at this setting given bacteremia and clinical improvement in SVC syndrome  symptoms. Recommend full dose anticoagulation for the time being      EXAM: CTA CHEST WITH CONTRAST  LOCATION: [facility]  DATE: 12/28/2023     INDICATION: 48 y o ESRD,DKA, facial edema, need to rule out SVC syndrome  COMPARISON: Frontal and lateral chest radiographs 12/27/2023  TECHNIQUE: Helical acquisition through the chest was performed during the arterial phase of contrast enhancement. 2D and 3D reconstructions performed by the CT technologist. Dose reduction techniques were used.  CONTRAST: 75ml isovue 370     CT ANGIOGRAM CHEST: Right jugular approach dialysis catheter terminates in the right atrium low-attenuation is present around the catheter in the right brachiocephalic vein (series 4, image 37) consistent with catheter associated thrombus, which extends   to the SVC (series 4, image 49). Concentrated contrast is present within the low right internal jugular vein and multiple small collateral veins in the anterior/posterior chest wall and in the middle mediastinum.     Normal caliber thoracic aorta with conventional arch anatomy. Main pulmonary artery is normal in size. No pulmonary artery filling defects.     LUNGS AND PLEURA: Symmetric normal lung inflation. No interstitial or alveolar opacities. Normal airways. No pleural effusion.     MEDIASTINUM: Cardiac chambers are normal in size. No pericardial effusion. No enlarged mediastinal or hilar lymph nodes.     CORONARY ARTERY CALCIFICATION: Moderate.     UPPER ABDOMEN: Coronal upper belly     MUSCULOSKELETAL: Thoracic vertebra are maintained in height and shape. Minimal degenerative osteophytes at T8-T9. Normal bone mineralization. No fractures or bone lesions.                                                                      IMPRESSION:     1.  Catheter associated thrombus about the right jugular approach dialysis catheter results in severe luminal stenosis at the brachiocephalic vein confluence/upper SVC.  2.  No acute  pulmonary embolism.    [facility] [facility]  LOCATION: [facility]  DATE: 12/7/2023    1. DIALYSIS FISTULOGRAM.  2. MODERATE SEDATION  3. ULTRASOUND GUIDED VASCULAR ACCESS    INDICATION: 48-year-old female with upper arm fistula presents for fistulogram and possible intervention due to pain during cannulation.  FLUOROSCOPIC TIME: 0.3 mins.  DOSE: Air Kerma:13 mGy.  CONTRAST: 30 mL Omnipaque 300.  SEDATION: Prior to the procedure, the patient was alert and oriented. Versed 2 mg and fentanyl 100 mcg were administered intravenously with continuous vital signs monitoring by the radiology nurse under my direct supervision. Patient was alert and oriented post procedure. Total face to face moderate sedation time: 15 minutes.    PROCEDURE: After obtaining informed written oral consent, the patient's arm was prepped and draped in a sterile fashion. Prior to the procedure, patency of the fistula was assessed with ultrasound and sonographic images recorded. Local anesthesia was infiltrated in the soft tissues of the left upper arm. Using a 21 gauge needle and real-time ultrasound guidance,, the fistula was punctured and a guidewire advanced. 4 Malawian sheath was placed. Fistulogram was obtained from the arterial anastomosis to the superior vena cava.  Sheath was removed and hemostasis was obtained manually.    FINDINGS:  FISTULOGRAM: Patent left brachial artery to transposed basilic vein fistula. Arterial anastomosis widely patent. No evidence of central venous stenosis. Right IJ  dialysis catheter is noted.    Impression    1.  Patent left brachial artery to transposed basilic vein fistula. No significant stenosis identified.  Narrative    For Patients: As a result of the 21st Century Cures Act, medical imaging exams and procedure reports are released immediately into your electronic medical record. You may view this report before your referring provider. If you have questions, please contact your health care  provider.    [place]  LOCATION: [facility]  DATE: 11/30/2023  PROCEDURE:TUNNELED DIALYSIS CATHETER PLACEMENT     INTERVENTIONAL RADIOLOGIST: Pradeep Goldsmith MD.     INDICATION: Patient with left upper arm fistula, placed at outside institution, not functioning well. Request made for consistent hemodialysis access..     CONSENT: The risks, benefits and alternatives of tunneled dialysis catheter placement were discussed with the patient  in detail. All questions were answered. Informed consent was given to proceed with the procedure.     MODERATE SEDATION: Versed 1 mg IV; Fentanyl 50 mcg IV.  Under physician supervision, Versed and fentanyl were administered for moderate sedation. Pulse oximetry, heart rate and blood pressure were continuously monitored by an independent trained   observer. The physician spent 15 minutes of face-to-face sedation time with the patient.     CONTRAST: None  ANTIBIOTICS: Ancef 2 grams IV.  ADDITIONAL MEDICATIONS: None.     FLUOROSCOPIC TIME: 0.3 minutes.  RADIATION DOSE: Air Kerma: 2 mGy.     COMPLICATIONS: No immediate complications.     STERILE BARRIER TECHNIQUE: Maximum sterile barrier technique was used. Cutaneous antisepsis was performed at the operative site with application of 2% chlorhexidine and large sterile drape. Prior to the procedure, the  and assistant performed   hand hygiene and wore hat, mask, sterile gown, and sterile gloves during the entire procedure.     PROCEDURE:     Using real-time ultrasound guidance, the right internal jugular vein was punctured. A subcutaneous tunnel was created requiring a second incision. Using this access, a 15.5 Lao, 19 cm tip to cuff Duraflow dialysis catheter was advanced until the tip   was in the proximal right atrium.  The catheter was tested and found to flush and aspirate appropriately.  The catheter was heparinized and secured to the skin.     FINDINGS:  Ultrasound shows an anechoic and  "compressible jugular vein. A permanent image was stored.       At the completion of the study, the new catheter tip lies in the proximal right atrium.                                                                      IMPRESSION:    1.  Tunneled dialysis catheter placement, as discussed above.  2.  The catheter position was verified fluoroscopically and this is ready for use.    NPO: midnight  ANTICOAGULANTS: heparin drip; no hold required  ANTIBIOTICS: vancomycin IV Q12H    ALLERGIES  Allergies   Allergen Reactions    Cefazolin Nephrotoxicity    Colestipol GI Disturbance    Doxycycline Nausea and Vomiting    Nickel Rash    Penicillins Rash         LABS:  INR   Date Value Ref Range Status   03/31/2023 0.95 0.85 - 1.15 Final      Hemoglobin   Date Value Ref Range Status   01/01/2024 9.3 (L) 11.7 - 15.7 g/dL Final     Platelet Count   Date Value Ref Range Status   01/02/2024 187 150 - 450 10e3/uL Final     Creatinine   Date Value Ref Range Status   01/02/2024 3.44 (H) 0.51 - 0.95 mg/dL Final     Potassium   Date Value Ref Range Status   12/30/2023 3.9 3.4 - 5.3 mmol/L Final   11/03/2022 4.2 3.4 - 5.3 mmol/L Final         EXAM:  BP (!) 156/73 (BP Location: Right arm)   Pulse 103   Temp 98  F (36.7  C) (Oral)   Resp 22   Ht 1.702 m (5' 7\")   Wt 56.2 kg (124 lb)   LMP 12/13/2023   SpO2 98%   BMI 19.42 kg/m    General:  Stable.  In no acute distress.    Neuro:  A&O x 3. Moves all extremities equally.  HEENT: Sclerae not icteric, some minimal facial edema   Resp:  Lungs clear anterior to auscultation bilaterally.  Cardio:  S1S2 and reg, without murmur, clicks or rubs  Vascular:  LUE fistula with bruit and thrill.      Pre-Sedation Assessment:  Mallampati Airway Classification:  II - Faucial pillars and soft palate may be seen, but uvula is masked by the base of the tongue  Previous reaction to anesthesia/sedation:  No  Sedation plan based on assessment: Moderate (conscious) sedation  ASA Classification: Class 3 - " SEVERE SYSTEMIC DISEASE, DEFINITE FUNCTIONAL LIMITATIONS.   Code Status: Full Code intra procedure, per discussion with patient.  Other: patient reports that she has a high tolerance to lidocaine and requires high doses to achieve desired effect.     Pre-Thombolytics Assessment:  Reports history of renal failure; on HD.  Denies history of stroke, known AV malformations, aneurysms, or aortic dissection, cancer, bleeding disorders, hemoptysis, hematuria, hematochezia/GI bleed, recent trauma/falls, liver disease, endocarditis, pericarditis, peritonitis, pregnancy, prolonged/traumatic CPR, severe uncontrolled acute or chronic severe hypertension, and recent surgery, biopsies or joint injections.       ASSESSMENT/PLAN:   Bulky irregular nonocclusive thrombus throughout the SVC, facial swelling (improving)    RUE venogram with possible intervention and with sedation.    Procedural education reviewed with patient in detail including, but not limited to risks, benefits and alternatives with understanding verbalized by patient.    Total time spent on the date of the encounter: 50 minutes.      Linda Taylor PA-C  Interventional Radiology

## 2024-01-02 NOTE — PLAN OF CARE
[facility] - ICU    RN Progress Note:            Pertinent Assessments:      Please refer to flowsheet rows for full assessment     Patient denies pain or nausea. Swelling to face improved. Patient NPO for pending IR procedure this AM. Heparin drip infusing per protocol. VSS. Will continue to monitor.           Key Events - This Shift:          Barriers to Discharge / Downgrade:     Telemetry status currently

## 2024-01-02 NOTE — IR NOTE
Patient Name: Josefa Curiel  Medical Record Number: [id number]  Today's Date: 1/2/2024    Procedure: Venogram  Proceduralist: Deanne    Procedure Start: 1042  Procedure end: 1109  Sedation medications administered: 2 mg midazolam and 100 mcg fentanyl   Sedation time: 27 minutes    Report given to: Leana RN  Verbal bedside report given site checked

## 2024-01-02 NOTE — PRE-PROCEDURE
GENERAL PRE-PROCEDURE:   Procedure:  RUE venogram  Date/Time:  1/2/2024 9:57 AM    Written consent obtained?: Yes    Risks and benefits: Risks, benefits and alternatives were discussed    Consent given by:  Patient  Patient states understanding of procedure being performed: Yes    Patient's understanding of procedure matches consent: Yes    Procedure consent matches procedure scheduled: Yes    Expected level of sedation:  Moderate  Appropriately NPO:  Yes  ASA Class:  3  Mallampati  :  Grade 1- soft palate, uvula, tonsillar pillars, and posterior pharyngeal wall visible  Lungs:  Lungs clear with good breath sounds bilaterally  Heart:  Normal heart sounds and rate  History & Physical reviewed:  History and physical reviewed and no updates needed  Statement of review:  I have reviewed the lab findings, diagnostic data, medications, and the plan for sedation

## 2024-01-02 NOTE — PROGRESS NOTES
Care Management Follow Up    Length of Stay (days): 6    Expected Discharge Date: 01/03/2024     Concerns to be Addressed:  DKA       Patient plan of care discussed at interdisciplinary rounds: Yes    Anticipated Discharge Disposition:  home     Anticipated Discharge Services:  none    Anticipated Discharge DME:  none      Additional Information:  Patient with PMH of type I DM, ESRD on dialysis, HTN who presented to ED for evaluation of nausea and vomiting, found to have DKA and admitted for further management. She does note some facial swelling, and now has 1 bottle positive blood cx.  Developed hypoglycemia on 12/30.         Social History:  Pt rents the bottom of a home from a couple, and has a roommate named Wilbert. Pt independent with ADLS and mostly independent with IADLS. Pt does not have a car at the moment, but pt has MA, and states having services through insurance for transport. Pt was on disability, pt is saying she no longer qualified, and is currently in the appeal process, and is still receiving social security. Pt has medicare listed primary, and [employer] [place] listed secondary. 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 On HD MWF at [facility].          1/2/24:  Anticipating patient will return home at discharge.        Rose Diaz RN

## 2024-01-03 VITALS
RESPIRATION RATE: 8 BRPM | DIASTOLIC BLOOD PRESSURE: 83 MMHG | BODY MASS INDEX: 19.65 KG/M2 | OXYGEN SATURATION: 100 % | SYSTOLIC BLOOD PRESSURE: 154 MMHG | TEMPERATURE: 97.7 F | HEART RATE: 101 BPM | HEIGHT: 67 IN | WEIGHT: 125.2 LBS

## 2024-01-03 LAB
BACTERIA BLD CULT: NO GROWTH
BASOPHILS # BLD AUTO: 0.1 10E3/UL (ref 0–0.2)
BASOPHILS NFR BLD AUTO: 1 %
EOSINOPHIL # BLD AUTO: 0.5 10E3/UL (ref 0–0.7)
EOSINOPHIL NFR BLD AUTO: 8 %
ERYTHROCYTE [DISTWIDTH] IN BLOOD BY AUTOMATED COUNT: 12.9 % (ref 10–15)
GLUCOSE BLDC GLUCOMTR-MCNC: 136 MG/DL (ref 70–99)
GLUCOSE BLDC GLUCOMTR-MCNC: 84 MG/DL (ref 70–99)
HCT VFR BLD AUTO: 27.2 % (ref 35–47)
HGB BLD-MCNC: 9.2 G/DL (ref 11.7–15.7)
IMM GRANULOCYTES # BLD: 0 10E3/UL
IMM GRANULOCYTES NFR BLD: 1 %
LYMPHOCYTES # BLD AUTO: 1.5 10E3/UL (ref 0.8–5.3)
LYMPHOCYTES NFR BLD AUTO: 25 %
MCH RBC QN AUTO: 31.6 PG (ref 26.5–33)
MCHC RBC AUTO-ENTMCNC: 33.8 G/DL (ref 31.5–36.5)
MCV RBC AUTO: 94 FL (ref 78–100)
MONOCYTES # BLD AUTO: 0.6 10E3/UL (ref 0–1.3)
MONOCYTES NFR BLD AUTO: 10 %
NEUTROPHILS # BLD AUTO: 3.4 10E3/UL (ref 1.6–8.3)
NEUTROPHILS NFR BLD AUTO: 55 %
NRBC # BLD AUTO: 0 10E3/UL
NRBC BLD AUTO-RTO: 0 /100
PLATELET # BLD AUTO: 217 10E3/UL (ref 150–450)
POTASSIUM SERPL-SCNC: 3.7 MMOL/L (ref 3.4–5.3)
RBC # BLD AUTO: 2.91 10E6/UL (ref 3.8–5.2)
WBC # BLD AUTO: 6.1 10E3/UL (ref 4–11)

## 2024-01-03 PROCEDURE — 250N000013 HC RX MED GY IP 250 OP 250 PS 637: Performed by: INTERNAL MEDICINE

## 2024-01-03 PROCEDURE — 250N000013 HC RX MED GY IP 250 OP 250 PS 637: Performed by: STUDENT IN AN ORGANIZED HEALTH CARE EDUCATION/TRAINING PROGRAM

## 2024-01-03 PROCEDURE — 36415 COLL VENOUS BLD VENIPUNCTURE: CPT | Performed by: STUDENT IN AN ORGANIZED HEALTH CARE EDUCATION/TRAINING PROGRAM

## 2024-01-03 PROCEDURE — 90935 HEMODIALYSIS ONE EVALUATION: CPT

## 2024-01-03 PROCEDURE — 85025 COMPLETE CBC W/AUTO DIFF WBC: CPT | Performed by: STUDENT IN AN ORGANIZED HEALTH CARE EDUCATION/TRAINING PROGRAM

## 2024-01-03 PROCEDURE — 99239 HOSP IP/OBS DSCHRG MGMT >30: CPT | Performed by: STUDENT IN AN ORGANIZED HEALTH CARE EDUCATION/TRAINING PROGRAM

## 2024-01-03 PROCEDURE — 84132 ASSAY OF SERUM POTASSIUM: CPT | Performed by: INTERNAL MEDICINE

## 2024-01-03 RX ADMIN — APIXABAN 10 MG: 5 TABLET, FILM COATED ORAL at 07:44

## 2024-01-03 RX ADMIN — LOSARTAN POTASSIUM 25 MG: 25 TABLET, FILM COATED ORAL at 07:45

## 2024-01-03 RX ADMIN — FAMOTIDINE 20 MG: 20 TABLET ORAL at 07:45

## 2024-01-03 RX ADMIN — SERTRALINE HYDROCHLORIDE 150 MG: 100 TABLET ORAL at 07:48

## 2024-01-03 ASSESSMENT — ACTIVITIES OF DAILY LIVING (ADL)
ADLS_ACUITY_SCORE: 20
ADLS_ACUITY_SCORE: 20
ADLS_ACUITY_SCORE: 18
ADLS_ACUITY_SCORE: 20
ADLS_ACUITY_SCORE: 20
ADLS_ACUITY_SCORE: 18
ADLS_ACUITY_SCORE: 20

## 2024-01-03 NOTE — PLAN OF CARE
[facility] - ICU    RN Progress Note:            Pertinent Assessments:      Please refer to flowsheet rows for full assessment     Thrombectomy site dressing dry and intact. CMS WNL to right arm. Patient denies pain or nausea this shift. VSS. Patient expresses frustration over high/low blood sugars and insulin management. Allowed patient to express feelings and discussed situation with pt. Eliquis started and heparin drip turned off per orders. Will continue to monitor.           Key Events - This Shift:          Barriers to Discharge / Downgrade:     Med/Tele currently, possible discharge home today

## 2024-01-03 NOTE — PROGRESS NOTES
"CLINICAL NUTRITION SERVICES - ASSESSMENT NOTE     Nutrition Prescription    RECOMMENDATIONS FOR MDs/PROVIDERS TO ORDER:  Multivitamin with minerals d/t inadequate po intake    Malnutrition Status:    Need NFPE    Recommendations already ordered by Registered Dietitian (RD):  None at this time    Future/Additional Recommendations:  Monitor intake, weight, labs     REASON FOR ASSESSMENT  Josefa Curiel is a/an 48 year old female assessed by the dietitian for LOS    HPI: 48 year old female with past medical history of type I DM, ESRD on dialysis, HTN who presented for evaluation of nausea and vomiting, found to have DKA and admitted for further management.      NUTRITION HISTORY  Briefly met with pt, dressed and ready to discharge soon. Mentioned limited food/meals ordered and pt states she is \"funny about food\" not only here but at home. Lunch tray arrives, pt has no nutrition questions or concerns.    CURRENT NUTRITION ORDERS  Diet: Moderate Consistent Carbohydrate and Dialysis  Intake/Tolerance: % of meals per flowsheets, only ordering 1-2 meals per day, limited amount of food    LABS  Labs reviewed    MEDICATIONS  Medications reviewed    ANTHROPOMETRICS  Height: 170.2 cm (5' 7\")  Most Recent Weight: 56.8 kg (125 lb 3.2 oz)    IBW: 61.4 kg  BMI: Normal BMI  Weight History:   Wt Readings from Last 20 Encounters:   01/03/24 56.8 kg (125 lb 3.2 oz)   09/05/23 55.3 kg (122 lb)   08/17/23 56.4 kg (124 lb 5.4 oz)   10/12/23 56.7 kg (125 lb)   07/14/23 56.7 kg (125 lb)   01/19/23 63 kg (139 lb)   01/02/23 63.3 kg (139 lb 8 oz)   12/29/22 61.2 kg (135 lb)   12/16/22 61.2 kg (135 lb)   12/12/22 61.2 kg (135 lb)   11/24/22 65.8 kg (145 lb 1 oz)   11/15/22 59 kg (130 lb)   11/01/22 59 kg (130 lb)   10/19/22 59 kg (130 lb)   09/28/22 59 kg (130 lb)   08/23/19 69.2 kg (152 lb 8 oz)   Weight loss of 14# (10%) x 1 year     Dosing Weight: 56.8 kg    ASSESSED NUTRITION NEEDS  Estimated Energy Needs: 8529-6083 kcals/day (25 " "- 30 kcals/kg)  Justification: Maintenance  Estimated Protein Needs: 68+ grams protein/day (1.2+ grams of pro/kg)  Justification: Dialysis  Estimated Fluid Needs: 8860-6521 mL/day (1 mL/kcal)   Justification: Maintenance or Per provider pending fluid status    PHYSICAL FINDINGS  See malnutrition section below.    MALNUTRITION:  % Weight Loss:  Weight loss does not meet criteria for malnutrition (10% x 1 year)  % Intake:  </= 50% for >/= 5 days (severe malnutrition)  Subcutaneous Fat Loss:  not done, discharging  Muscle Loss:  not done, discharging  Fluid Retention:  trace-face    Malnutrition Diagnosis: Unable to determine due to need NFPE    NUTRITION DIAGNOSIS  Inadequate oral intake related to pt being \"funny about food\"  as evidenced by pt ordering limited food/meals.      INTERVENTIONS  Implementation  Nutrition Education: Will be provided if education needs arise   No interventions at this time     Goals  Patient to consume % of nutritionally adequate meals three times per day, or the equivalent with supplements/snacks.     Monitoring/Evaluation  Progress toward goals will be monitored and evaluated per protocol.    "

## 2024-01-03 NOTE — PROGRESS NOTES
Care Management Discharge Note    Discharge Date: 01/03/2024       Discharge Disposition: Home, Dialysis Services    Discharge Services: None    Discharge DME: None    Discharge Transportation: family or friend will provide    Private pay costs discussed: Not applicable    Education Provided on the Discharge Plan: Yes AVS per bedside nurse     Persons Notified of Discharge Plans: patient per team    Patient/Family in Agreement with the Plan: yes    Handoff Referral Completed: Yes    Additional Information:    Pt discharging to home via family transport, resume OP HD at [facility], OP followup.    [facility] - Notified Yvette of pt's discharge.  Faxed in discharge summary per Yvette's request, to fax #[phone].  No further needs from , per Yvette.    CC referral sent per protocol.     Ting Zafar RN

## 2024-01-03 NOTE — PROGRESS NOTES
Reviewed discharge paperwork, questions answered.   Patient being discharged to home, transportation via .

## 2024-01-03 NOTE — DISCHARGE SUMMARY
[facility]  Hospitalist Discharge Summary      Date of Admission:  12/27/2023  Date of Discharge:  1/3/2024  Discharging Provider: Mere Conner MD  Discharge Service: Hospitalist Service    Discharge Diagnoses   DKA  SVC syndrome    Clinically Significant Risk Factors          Follow-ups Needed After Discharge   Follow-up Appointments     Follow-up and recommended labs and tests       Follow up with primary care provider (PCP), Elsa Turner, within 3-5    days for hospital follow- up and regarding new diagnosis.  The following   labs/tests are recommended: CBC in 3-5 days.  Follow with your   endocrinologist and nephrologist as scheduled.            Unresulted Labs Ordered in the Past 30 Days of this Admission       Date and Time Order Name Status Description    12/30/2023 12:00 AM Blood Culture Hand, Left Preliminary     12/29/2023 12:01 AM Blood Culture Peripheral Blood Preliminary         These results will be followed up by PCP    Discharge Disposition   Discharged to home  Condition at discharge: Stable    Hospital Course   Josefa Curiel is a 48 year old female with past medical history of type I DM, ESRD on dialysis, HTN who presented to ED for evaluation of nausea and vomiting, found to have DKA and admitted for further management. She does note some facial swelling, and now has 1 bottle positive blood cx.  Developed hypoglycemia on 12/30.  Discontinued mealtime insulin.     DKA in a patient with history of type I DM;    -- COVID-19 and influenza test negative.    -- It possible clot on cath may have contributed here, vs gastroenteritis   -- A1c 7.0.  Patient reports compliance with insulin regimen  -- S/p insulin drip since ketones are less than 2.  Switch to Lantus and NovoLog with meals  -- Developed hypoglycemia in 12/30 and 1/1 and discontinued mealtime insulin.    -- 01/02:  Increase Lantus to 16 units.  Sliding scale. Continue to monitor hypoglycemia  --  01/03: Follow with endocrinology and PCP  Facial swelling-Dx with SVC syndrome from clot on Hd cath  -- CT chest showed this on 12/28-->sent her to IR removal of cath  -- facial swelling t improving greatly  -- 01/02 S/p Mechanical thrombectomy and PTA by IR. Discussed with IR who recommended transition to treatment dose Eliquis and to continue for at least 3 months.  -- heparin gtt to be trasitioned to Eliquis tonight- discussed with pharmacy.  -- 01/03: tolerating Eliquis.   Headache- resolved  -- could be from DKA, or svc syndrome  -- CT and mri brain done  -- neurology signed off  Nausea and vomiting; likely due to above. Resolved.  -- History of gastroparesis;  -- Abdominal exam benign.  Symptomatic treatment as ordered  -- iordered p.o. Pepcid and Tums  -- Continue diabetic diet  ESRD on dialysis;started it in 4/23  -- Nephrology consulted for dialysis management.    -- Of note, patient has fistula on left upper extremity and also dialysis catheter on right front chest.--she has this due to complications with using fistula  -- 12/28 had to remove HD cath from chest--due to SVC syndrome  -- 12/29-IR did  Left arm fistulogram, right arm venogram    1. Irregular mild-mod stenosis mid fistula treated with 7 mm PTA.  2. Irregular adherent thrombus SVC.  SVC is however patent.  3. RUE: Occlusive stenosis right innominate vein with non-occlusive clot proximal to stenosis  Essential hypertension  -- Initially held PTA losartandue to facial swelling.  Added as needed hydralazine.  -- However given improvement in facial swelling, restart losartan on 12/31  Hyponatremia, mild  -- dilutional. Likely 2/2 ESRD  -- HD  One bottle positive GPC  -- repeat blood cx 12/28  -- at risk with HD  -- vanco was started by ID.  Discontinued vancomycin after venogram per ID.  Constipation  -- Resolved had bowel movement on 12/31  -- Continue stool softener scheduled  Anemia  -- 2/2 ESRD and acute drop with procedure, illness  -- Stable  around 9    Patient is clinically and hemodynamically stable for discharge to home. Medication reconciliation was done. Medications sent to patient's preferred pharmacy. Follow up appointments and recommendations as shown below. Patient verbalized understanding and agreed to plan of care. All questions answered.    Consultations This Hospital Stay   NEPHROLOGY IP CONSULT  PHARMACY TO DOSE VANCO  INFECTIOUS DISEASES IP CONSULT  NEUROLOGY IP CONSULT  CARE MANAGEMENT / SOCIAL WORK IP CONSULT  INTENSIVIST IP CONSULT  INTERVENTIONAL RADIOLOGY ADULT/PEDS IP CONSULT  PHARMACY IP CONSULT  PHARMACY IP CONSULT  PHARMACY TEST CLAIM IP CONSULT    Code Status   Full Code    Time Spent on this Encounter   I, Mere Conner MD, personally saw the patient today and spent greater than 30 minutes discharging this patient.       Mere Conner MD  [facility] ICU [address] [address]  Phone: [phone]  Fax: [phone]  ______________________________________________________________________    Physical Exam   Vital Signs: Temp: 97.3  F (36.3  C) Temp src: Temporal BP: (!) 157/88 Pulse: 104   Resp: (!) 8 SpO2: 100 % O2 Device: None (Room air)    Weight: 125 lbs 3.2 oz  GEN: Alert and oriented. Not in acute distress.  HEENT: Atraumatic, mucous membrane- moist and pink.  Chest: Bilateral air entry.  CVS: S1S2 regular.   Abdomen: Soft. Non-tender, non-distended. No organomegaly. No guarding or rigidity. Bowel sounds active.   Extremities: No pedal edema.  CNS: No involuntary movements.  Skin: no cyanosis or clubbing.        Primary Care Physician   Elsa Turner    Discharge Orders      Reason for your hospital stay    DKA  SVC syndrome  SVC thrombus     Follow-up and recommended labs and tests     Follow up with primary care provider (PCP), Elsa Turner, within 3-5  days for hospital follow- up and regarding new diagnosis.  The following labs/tests are recommended:  CBC in 3-5 days.  Follow with your endocrinologist and nephrologist as scheduled.     Activity    Your activity upon discharge: activity as tolerated     Monitor and record    blood glucose 4 times a day, before meals and at bedtime. Keep logs and take log to your PCP and/or endocrinologist.     Diet    Follow this diet upon discharge: Orders Placed This Encounter      Room Service      Combination Diet Regular Diet; Renal Diet (dialysis); Moderate Consistent Carb (60 g CHO per Meal) Diet       Significant Results and Procedures       Discharge Medications   Current Discharge Medication List        START taking these medications    Details   apixaban ANTICOAGULANT (ELIQUIS) 5 MG tablet Take 2 tablets (10 mg) by mouth 2 times daily for 6 days, THEN 1 tablet (5 mg) 2 times daily for 90 days.  Qty: 204 tablet, Refills: 0    Associated Diagnoses: Superior vena cava syndrome           CONTINUE these medications which have NOT CHANGED    Details   acetaminophen (TYLENOL) 500 MG tablet Take 2 tablets (1,000 mg) by mouth every 6 hours as needed for pain    Associated Diagnoses: Cellulitis of right foot      Cholecalciferol (VITAMIN D3) 50 MCG (2000 UT) CHEW Take 50 mcg by mouth daily Take one tablet on Monday, Wednesday and Friday      Cyanocobalamin (VITAMIN B-12) 500 MCG LOZG Take 500 mcg by mouth daily      ferrous sulfate (FEROSUL) 325 (65 Fe) MG tablet Take 1 tablet (325 mg) by mouth daily  Qty: 30 tablet, Refills: 3    Associated Diagnoses: Anemia due to chronic kidney disease, unspecified CKD stage      insulin degludec (TRESIBA) 100 UNIT/ML pen Inject 16 Units Subcutaneous daily      insulin lispro (HUMALOG KWIKPEN) 100 UNIT/ML (1 unit dial) KWIKPEN Inject 3-4 Units Subcutaneous 4 times daily with meals or snacks      lidocaine-prilocaine (EMLA) 2.5-2.5 % external cream Apply topically three times a week Monday, Wednesday, Friday      losartan (COZAAR) 25 MG tablet Take 25 mg by mouth daily      rOPINIRole (REQUIP)  0.25 MG tablet Take 1 mg by mouth at bedtime      !! sertraline (ZOLOFT) 100 MG tablet Take 100 mg by mouth daily Take 1 tablet( 100 mg) along with 50 mg for the total dose      !! sertraline (ZOLOFT) 50 MG tablet Take 50 mg by mouth daily Take 1 tablet (50 mg) along with 100 mg for the total dose of 150 mg      sevelamer carbonate (RENVELA) 800 MG tablet Take 800 mg by mouth 2 times daily (with meals)      traZODone (DESYREL) 100 MG tablet Take 100 mg by mouth At Bedtime      Continuous Blood Gluc Sensor (DEXCOM G6 SENSOR) MISC        !! - Potential duplicate medications found. Please discuss with provider.        Allergies   Allergies   Allergen Reactions    Cefazolin Nephrotoxicity    Colestipol GI Disturbance    Doxycycline Nausea and Vomiting    Nickel Rash    Penicillins Rash

## 2024-01-03 NOTE — PROGRESS NOTES
Potassium   Date Value Ref Range Status   01/03/2024 3.7 3.4 - 5.3 mmol/L Final   11/03/2022 4.2 3.4 - 5.3 mmol/L Final     Hemoglobin   Date Value Ref Range Status   01/03/2024 9.2 (L) 11.7 - 15.7 g/dL Final     Creatinine   Date Value Ref Range Status   01/02/2024 3.44 (H) 0.51 - 0.95 mg/dL Final     Urea Nitrogen   Date Value Ref Range Status   12/30/2023 20.9 (H) 6.0 - 20.0 mg/dL Final   11/03/2022 37 (H) 7 - 30 mg/dL Final     Sodium   Date Value Ref Range Status   12/30/2023 132 (L) 135 - 145 mmol/L Final     Comment:     Reference intervals for this test were updated on 09/26/2023 to more accurately reflect our healthy population. There may be differences in the flagging of prior results with similar values performed with this method. Interpretation of those prior results can be made in the context of the updated reference intervals.      INR   Date Value Ref Range Status   03/31/2023 0.95 0.85 - 1.15 Final       DIALYSIS PROCEDURE NOTE  Hepatitis status of previous patient on machine log was checked and verified ok to use with this patients hepatitis status.  Patient dialyzed for 3 hrs. on a K3 bath with a net fluid removal of  2L.  A BFR of 350 ml/min was obtained via a left arm AVfistula using 16 gauge needles per pt request.  The treatment plan was discussed with Dr. LLANOS/DAMIEN during the treatment.    Total heparin received during the treatment: 0 units.   Needle cannulation sites held x 5 min.     Meds  given: none   Complications: none      Person educated: pt. Knowledge base substantial. Barriers to learning: none. Educated on procedure via verbal mode. Patient verbalized understanding.   ICEBOAT? Timeout performed pre-treatment  I: Patient was identified using 2 identifiers  C:  Consent Signed Yes  E: Equipment preventative maintenance is current and dialysis delivery system OK to use  B: Hepatitis B Surface Antigen: Non reactive; Draw Date: 10/19/2023      Hepatitis B Surface Antibody: Immune; Draw Date:  10/19/2023  O: Dialysis orders present and complete prior to treatment  A: Vascular access verified and assessed prior to treatment  T: Treatment was performed at a clinically appropriate time  ?: Patient was allowed to ask questions and address concerns prior to treatment  See Adult Hemodialysis flowsheet in EPIC for further details and post assessment.  Machine water alarm in place and functioning. Transducer pods intact and checked every 15min.   Pt assisted with repositioning throughout dialysis treatment.  Pt returned via bed.  Chlorine/Chloramine water system checked every 4 hours.      Post treatment report given to Adore RODGERS RN regarding 2 L of fluid removed.      [facility]las [facility] D[facility] RN

## 2024-01-04 ENCOUNTER — PATIENT OUTREACH (OUTPATIENT)
Dept: CARE COORDINATION | Facility: CLINIC | Age: 49
End: 2024-01-04
Payer: MEDICARE

## 2024-01-04 DIAGNOSIS — Z09 HOSPITAL DISCHARGE FOLLOW-UP: ICD-10-CM

## 2024-01-04 LAB — BACTERIA BLD CULT: NO GROWTH

## 2024-01-04 NOTE — PROGRESS NOTES
Clinic Care Coordination Contact  [facility]/Voicemail    Clinical Data: Care Coordinator Outreach    Outreach Documentation Number of Outreach Attempt   1/4/2024   1:26 PM 1       Left message on patient's voicemail with call back information and requested return call.    Plan: Care Coordinator will try to reach patient again in 1-2 business days.    TCM Episode created  [facility] x 1

## 2024-01-04 NOTE — LETTER
[facility] CARE COORDINATION  [address] [place] [address]    January 9, 2024    Josefa Curiel  [address] [address]      Dear Josefa,    I am a clinic care coordinator who works with NATALIA Mccloud CNP with the [facility]. I recently tried to call and was unable to reach you. Below is a description of clinic care coordination and how I can further assist you.       The clinic care coordination team is made up of a registered nurse, , financial resource worker and community health worker who understand the health care system. The goal of clinic care coordination is to help you manage your health and improve access to the health care system. Our team works alongside your provider to assist you in determining your health and social needs. We can help you obtain health care and community resources, providing you with necessary information and education. We can work with you through any barriers and develop a care plan that helps coordinate and strengthen the communication between you and your care team.  Our services are voluntary and are offered without charge to you personally.    Please feel free to contact me with any questions or concerns regarding care coordination and what we can offer.      We are focused on providing you with the highest-quality healthcare experience possible.    Sincerely,     Kristi Ring RN  Clinic Care Coordination  [phone]

## 2024-01-05 ENCOUNTER — HOSPITAL ENCOUNTER (EMERGENCY)
Facility: HOSPITAL | Age: 49
Discharge: HOME | End: 2024-01-05
Attending: EMERGENCY MEDICINE | Admitting: EMERGENCY MEDICINE
Payer: MEDICARE

## 2024-01-05 VITALS
DIASTOLIC BLOOD PRESSURE: 76 MMHG | HEIGHT: 67 IN | SYSTOLIC BLOOD PRESSURE: 148 MMHG | OXYGEN SATURATION: 100 % | RESPIRATION RATE: 16 BRPM | TEMPERATURE: 98 F | WEIGHT: 124 LBS | HEART RATE: 89 BPM | BODY MASS INDEX: 19.46 KG/M2

## 2024-01-05 DIAGNOSIS — H53.8 BLURRY VISION, BILATERAL: ICD-10-CM

## 2024-01-05 DIAGNOSIS — Z99.2 ANEMIA DUE TO CHRONIC KIDNEY DISEASE, ON CHRONIC DIALYSIS (H): ICD-10-CM

## 2024-01-05 DIAGNOSIS — N18.6 ANEMIA DUE TO CHRONIC KIDNEY DISEASE, ON CHRONIC DIALYSIS (H): ICD-10-CM

## 2024-01-05 DIAGNOSIS — D63.1 ANEMIA DUE TO CHRONIC KIDNEY DISEASE, ON CHRONIC DIALYSIS (H): ICD-10-CM

## 2024-01-05 LAB
ANION GAP SERPL CALCULATED.3IONS-SCNC: 11 MMOL/L (ref 7–15)
APTT PPP: 30 SECONDS (ref 22–38)
BUN SERPL-MCNC: 9.5 MG/DL (ref 6–20)
CALCIUM SERPL-MCNC: 9.2 MG/DL (ref 8.6–10)
CHLORIDE SERPL-SCNC: 96 MMOL/L (ref 98–107)
CREAT SERPL-MCNC: 3.22 MG/DL (ref 0.51–0.95)
CRP SERPL-MCNC: 4 MG/L
DEPRECATED HCO3 PLAS-SCNC: 26 MMOL/L (ref 22–29)
EGFRCR SERPLBLD CKD-EPI 2021: 17 ML/MIN/1.73M2
ERYTHROCYTE [DISTWIDTH] IN BLOOD BY AUTOMATED COUNT: 12.9 % (ref 10–15)
ERYTHROCYTE [SEDIMENTATION RATE] IN BLOOD BY WESTERGREN METHOD: 12 MM/HR (ref 0–20)
GLUCOSE SERPL-MCNC: 264 MG/DL (ref 70–99)
HCT VFR BLD AUTO: 26.8 % (ref 35–47)
HGB BLD-MCNC: 8.7 G/DL (ref 11.7–15.7)
INR PPP: 1.01 (ref 0.85–1.15)
MCH RBC QN AUTO: 30.3 PG (ref 26.5–33)
MCHC RBC AUTO-ENTMCNC: 32.5 G/DL (ref 31.5–36.5)
MCV RBC AUTO: 93 FL (ref 78–100)
PLATELET # BLD AUTO: 309 10E3/UL (ref 150–450)
POTASSIUM SERPL-SCNC: 4.2 MMOL/L (ref 3.4–5.3)
RBC # BLD AUTO: 2.87 10E6/UL (ref 3.8–5.2)
SODIUM SERPL-SCNC: 133 MMOL/L (ref 135–145)
WBC # BLD AUTO: 6 10E3/UL (ref 4–11)

## 2024-01-05 PROCEDURE — 80048 BASIC METABOLIC PNL TOTAL CA: CPT | Performed by: EMERGENCY MEDICINE

## 2024-01-05 PROCEDURE — 99283 EMERGENCY DEPT VISIT LOW MDM: CPT

## 2024-01-05 PROCEDURE — 85610 PROTHROMBIN TIME: CPT | Performed by: EMERGENCY MEDICINE

## 2024-01-05 PROCEDURE — 85652 RBC SED RATE AUTOMATED: CPT | Performed by: EMERGENCY MEDICINE

## 2024-01-05 PROCEDURE — 85027 COMPLETE CBC AUTOMATED: CPT | Performed by: EMERGENCY MEDICINE

## 2024-01-05 PROCEDURE — 85730 THROMBOPLASTIN TIME PARTIAL: CPT | Performed by: EMERGENCY MEDICINE

## 2024-01-05 PROCEDURE — 86140 C-REACTIVE PROTEIN: CPT | Performed by: EMERGENCY MEDICINE

## 2024-01-05 PROCEDURE — 36415 COLL VENOUS BLD VENIPUNCTURE: CPT | Performed by: EMERGENCY MEDICINE

## 2024-01-05 ASSESSMENT — VISUAL ACUITY
OS: 20/50
OD: 20/40
OU: NORMAL
OU: 20/25

## 2024-01-05 ASSESSMENT — ACTIVITIES OF DAILY LIVING (ADL): ADLS_ACUITY_SCORE: 33

## 2024-01-05 NOTE — ED TRIAGE NOTES
Patient presents here for evaluation of blurring of vision that has occurred since 1/1/2024. She was discharged from this hospital and treated for DKA and blood clot in her neck. She began Elequis while inpatient. She feels that both eyes are affective, possibly the left more than the right.

## 2024-01-05 NOTE — ED PROVIDER NOTES
Emergency Department Encounter     Evaluation Date & Time:   No admission date for patient encounter.    CHIEF COMPLAINT:  Eye Problem      Triage Note:Patient presents here for evaluation of blurring of vision that has occurred since 2024. She was discharged from this hospital and treated for DKA and blood clot in her neck. She began Elequis while inpatient. She feels that both eyes are affective, possibly the left more than the right.       Impression and Plan       FINAL IMPRESSION:    ICD-10-CM    1. Blurry vision, bilateral  H53.8       2. Anemia due to chronic kidney disease, on chronic dialysis (H)  N18.6     D63.1     Z99.2           ED COURSE & MEDICAL DECISION MAKIN:00 PM I met with the patient, obtained history, performed an initial exam, and discussed options and plan for diagnostics and treatment here in the ED.     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 She did not think to mention her visual concerns while she was in the hospital, however they have persisted for which she presents today. She has not started her Eliquis since hospital discharge because her pharmacy has not received it yet. She reports intermittent headaches while she was in the hospital. She reports generalized weakness without focal weakness or paresthesias.     She does wear eyeglasses, but did not bring them with her today.     On exam, she has PERRL with EOMI and no visual field deficits. Visual acuity is 20/50 L; 20/40 R; 20/25 BL (uncorrected). Using a pinhole, corrected vision right eye was 20/20; she was unable to use the pinhole with her left eye to test corrected vision.    Neuro exam is normal and I do not  suspect central etiology, such as stroke, mass lesion or intracranial hemorrhage and do not think emergent imaging is indicated.    ESR and CRP WNL (12 and 4, respectively) without suggestion of temporal arteritis.    Labs otherwise remarkable for no leukocytosis (WBC 6.0) with stable and chronic anemia (Hb 8.7).  She has chronic renal failure (creatinine 3.22) with no significant electrolyte derangements.  Serum glucose is elevated (264) without associated acidosis or elevated anion gap to suggest DKA.  Coags WNL.    Patient discharged to home with close follow-up with her eye specialist for a thorough eye exam.  Return precautions provided.  Patient stable throughout ED course.      At the conclusion of the encounter I discussed the results of all the tests and the disposition. The questions were answered. The patient acknowledged understanding and was agreeable with the care plan.        Medical Decision Making    History:  Supplemental history from: Documented in chart  External Record(s) reviewed: Documented in chart SEE HPI    Work Up:  Chart documentation includes differential considered and any EKGs or imaging independently interpreted by provider, where specified.  In additional to work up documented, I considered the following work up: Documented in chart, if applicable.    Considered imaging, however I do not suspect central etiology    External consultation:  Discussion of management with another provider: Documented in chart, if applicable    Complicating factors:  Care impacted by chronic illness: Anticoagulated State, Chronic Kidney Disease, Diabetes, Hyperlipidemia, and Hypertension  Care affected by social determinants of health: N/A    Disposition considerations: Discharge. No recommendations on prescription strength medication(s). I considered admission, but ultimately discharged patient given reassuring evaluation.    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    Patient reports blurred vision since 12/31 or 1/1 (x 5-6 days). The blurry vision started while she was still in the hospital, however she did not think to mention it. She was discharged on Wednesday (2 days ago) and the blurry vision has persisted. The blurriness seems worse in her left eye than in her right. She does wear eyeglasses, but did not bring them with her today. She has not started her Eliquis since hospital discharge because her pharmacy has not received it yet. She reports intermittent headaches while she was in the hospital. She reports generalized weakness without focal weakness or paresthesias.     Patient has had eye surgery before related to her retinopathy. The last eye surgery was about a year ago with [facility] in [place].    Chart Review: 12/27/23-1/3/24 Admission at [facility] for DKA and SVC syndrome. Mechanical thrombectomy performed. Started on Eliquis on 1/2.        Medical History     Past Medical History:   Diagnosis Date    LORIN (acute kidney injury) (H24)     Anxiety     Cannabis abuse     Depression     Diabetes Type 1, uncontrolled 1985    DKA (diabetic ketoacidoses)     ESRD (end stage renal disease) on dialysis (H) 04/11/2023    ETOH abuse     Gastroparesis     HLD (hyperlipidemia)     HTN (hypertension)     Lactic acidosis     Vitamin D deficiency        Past Surgical History:   Procedure Laterality Date    ANKLE FRACTURE SURGERY Left     2007 [facility], [place]    APPENDECTOMY      EYE SURGERY       "retinal surgery Dr. Nagi Tang    INCISION AND DRAINAGE FOOT, COMBINED Right 11/18/2022    Procedure: INCISION AND DRAINAGE, right foot;  Surgeon: David Patel DPM;  Location: [facility] Main OR    IR CVC TUNNEL PLACEMENT > 5 YRS OF AGE  11/30/2023    IR CVC TUNNEL REMOVAL RIGHT  12/28/2023    IR DIALYSIS FISTULOGRAM LEFT  12/29/2023    IR UPPER EXTREMITY VENOGRAM RIGHT  12/29/2023    IR UPPER EXTREMITY VENOGRAM RIGHT  1/2/2024    PICC SINGLE LUMEN PLACEMENT  10/23/2022            Family History   Problem Relation Age of Onset    Clotting Disorder Mother         \"thin blood\"    Arthritis Mother     Hyperlipidemia Mother     Skin Cancer Father         face/nose     Depression Father     Other - See Comments Sister         eye surgeries    No Known Problems Brother     Coronary Artery Disease Maternal Grandmother     Alcoholism Maternal Grandfather     Coronary Artery Disease Maternal Grandfather     No Known Problems Paternal Grandmother     No Known Problems Paternal Grandfather     Cancer No family hx of     Kidney Disease No family hx of     Diabetes No family hx of        Social History     Tobacco Use    Smoking status: Some Days     Types: Cigarettes    Smokeless tobacco: Never   Substance Use Topics    Alcohol use: Not Currently     Alcohol/week: 35.0 standard drinks of alcohol     Types: 35 Standard drinks or equivalent per week     Comment: Alcoholic Drinks/day: ~750 mL wine daily, sober since 10/26/23    Drug use: Yes     Types: Marijuana     Comment: Drug use: 3-4 x week, with nausea       acetaminophen (TYLENOL) 500 MG tablet  apixaban ANTICOAGULANT (ELIQUIS) 5 MG tablet  Cholecalciferol (VITAMIN D3) 50 MCG (2000 UT) CHEW  Continuous Blood Gluc Sensor (DEXCOM G6 SENSOR) MISC  Cyanocobalamin (VITAMIN B-12) 500 MCG LOZG  ferrous sulfate (FEROSUL) 325 (65 Fe) MG tablet  insulin degludec (TRESIBA) 100 UNIT/ML pen  insulin lispro (HUMALOG KWIKPEN) 100 UNIT/ML (1 unit dial) " "KWIKPEN  lidocaine-prilocaine (EMLA) 2.5-2.5 % external cream  losartan (COZAAR) 25 MG tablet  rOPINIRole (REQUIP) 0.25 MG tablet  sertraline (ZOLOFT) 100 MG tablet  sertraline (ZOLOFT) 50 MG tablet  sevelamer carbonate (RENVELA) 800 MG tablet  traZODone (DESYREL) 100 MG tablet        Physical Exam     First Vitals:  Patient Vitals for the past 24 hrs:   BP Temp Temp src Pulse Resp SpO2 Height Weight   01/05/24 2121 -- -- -- 89 -- 100 % -- --   01/05/24 2120 -- -- -- 90 -- 100 % -- --   01/05/24 2119 -- -- -- 89 -- 100 % -- --   01/05/24 2118 -- -- -- 90 -- 99 % -- --   01/05/24 2117 -- -- -- 91 -- 100 % -- --   01/05/24 2116 -- -- -- 91 -- 100 % -- --   01/05/24 2115 -- -- -- 92 -- 100 % -- --   01/05/24 1741 (!) 148/76 98  F (36.7  C) Oral 93 16 100 % 1.702 m (5' 7\") 56.2 kg (124 lb)       PHYSICAL EXAM:   Physical Exam    GENERAL: Awake, alert.  In no acute distress.   HEENT: Normocephalic, atraumatic. Pupils equal, round and reactive. Conjunctiva normal. EOMI without nystagmus.  No visual field deficits.  Visual acuity 20/50 L; 20/40 R; 20/25 BL (uncorrected). Using a pinhole, corrected vision right eye was 20/20; she was unable to use the pinhole with her left eye to test corrected vision.  NECK: No stridor.  PULMONARY: Symmetrical breath sounds without distress.  Lungs clear to auscultation bilaterally without wheezes, rhonchi or rales.  CARDIO: Regular rate and rhythm.  No significant murmur, rub or gallop.    ABDOMINAL: Abdomen soft, non-distended and non-tender to palpation.    EXTREMITIES: No lower extremity swelling or edema.      NEURO: Alert and oriented to person, place and time.  Cranial nerves II-XII intact.  Strength 5/5 BL upper and lower extremities with sensation to light touch grossly intact.  Normal gait.  PSYCH: Normal mood and affect.      Results     LAB:  All pertinent labs reviewed and interpreted  Labs Ordered and Resulted from Time of ED Arrival to Time of ED Departure   CBC WITH " PLATELETS - Abnormal       Result Value    WBC Count 6.0      RBC Count 2.87 (*)     Hemoglobin 8.7 (*)     Hematocrit 26.8 (*)     MCV 93      MCH 30.3      MCHC 32.5      RDW 12.9      Platelet Count 309     BASIC METABOLIC PANEL - Abnormal    Sodium 133 (*)     Potassium 4.2      Chloride 96 (*)     Carbon Dioxide (CO2) 26      Anion Gap 11      Urea Nitrogen 9.5      Creatinine 3.22 (*)     GFR Estimate 17 (*)     Calcium 9.2      Glucose 264 (*)    INR - Normal    INR 1.01     PARTIAL THROMBOPLASTIN TIME - Normal    aPTT 30     CRP INFLAMMATION - Normal    CRP Inflammation 4.00     ERYTHROCYTE SEDIMENTATION RATE AUTO - Normal    Erythrocyte Sedimentation Rate 12         I, Sybil Whitney, am serving as a scribe to document services personally performed by Danielle Grimm MD based on my observation and the provider's statements to me. I, Danielle Grimm MD attest that Sybil Whitney is acting in a scribe capacity, has observed my performance of the services and has documented them in accordance with my direction.    Danielle Grimm MD  Emergency Medicine  [facility] EMERGENCY DEPARTMENT           Danielle Grimm MD  01/06/24 0877

## 2024-01-05 NOTE — PROGRESS NOTES
Clinic Care Coordination Contact  [facility]/Voicemail    Clinical Data: Care Coordinator Outreach    Outreach Documentation Number of Outreach Attempt   1/4/2024   1:26 PM 1   1/5/2024  10:28 AM 2       Left message on patient's voicemail with call back information and requested return call.    Plan: Care Coordinator will send unable to contact letter with care coordinator contact information via mail. Care Coordinator will do no further outreaches at this time.    TCM Episode created  UTC x 2

## 2024-01-05 NOTE — TELEPHONE ENCOUNTER
Called pt today as she was hospitalized on 12/27/2023 when she was supposed to attend in person [facility].  Pt did see [facility] ( Evan Maguire NP ) virtually on 12/19/2023 so this is already completed. During hospitalization on 12/27/2023 pt also had CXR, EKG and Echocardiogram so she does not need these done again for eval at this time.  Pt agreeable to attend in person [facility] on 01/10/2024 slot 2 for pre kidney pancreas eval.  Pt needs Tx surg, tx SW, tx nutritionist and labs.  Instructed pt to read my letter again I sent 10/27/2023 and watch videos if not done already. Pt expressed excellent understanding of all and was in good agreement with the plan.     Revised smart set orders to pre KP eval on [facility] 01/10/24 slot 2 for tx surg, tx SW, tx nutritionist and labs only.

## 2024-01-06 NOTE — DISCHARGE INSTRUCTIONS
Please follow-up with your eye doctor early this week for a thorough eye exam; call to arrange appointment.    Return to the ER for worsening symptoms, worsening vision, sudden onset or severe headache, focal neurologic deficit (for example, facial droop or right arm weakness), persistent nausea / vomiting, fever or other concerns.

## 2024-01-06 NOTE — ED NOTES
Attempted to complete pinhole visual acuity with patient. Right eye was 20/20 but she was not able to focus left eye through the pinhole to get a reading. Provider updated.

## 2024-01-08 ENCOUNTER — TRANSFERRED RECORDS (OUTPATIENT)
Dept: MULTI SPECIALTY CLINIC | Facility: CLINIC | Age: 49
End: 2024-01-08
Payer: MEDICARE

## 2024-01-08 ENCOUNTER — TELEPHONE (OUTPATIENT)
Dept: FAMILY MEDICINE | Facility: CLINIC | Age: 49
End: 2024-01-08
Payer: MEDICARE

## 2024-01-08 ENCOUNTER — MYC MEDICAL ADVICE (OUTPATIENT)
Dept: PHARMACY | Facility: OTHER | Age: 49
End: 2024-01-08
Payer: MEDICARE

## 2024-01-08 LAB
CREATININE (EXTERNAL): 5.52 MG/DL (ref 0.6–1.3)
POTASSIUM (EXTERNAL): 4.5 MEQ/L (ref 3.5–5.1)

## 2024-01-08 NOTE — TELEPHONE ENCOUNTER
MTM referral from: Transitions of Care (recent hospital discharge or ED visit)    MT referral outreach attempt #2 on January 8, 2024 at 10:52 AM      Outcome: Patient not reachable after several attempts, will route to [facility] Pharmacist/Provider as an FYI.  [facility] scheduling number is .  Thank you for the referral.    Use VBC for the carrier/Plan on the flowsheet      [facility] Message Sent    MEREDITH Faust

## 2024-01-09 LAB
A1 AG RBC QL: POSITIVE
ABO/RH(D): NORMAL
ABO/RH(D): NORMAL
ANTIBODY SCREEN: NEGATIVE
ANTIBODY TITER IGM SCREEN: NEGATIVE
B IGG TITR SERPL: 16 {TITER}
B IGM TITR SERPL: 16 {TITER}
SPECIMEN EXPIRATION DATE: NORMAL

## 2024-01-09 NOTE — PROGRESS NOTES
[facility] SOLID ORGAN TRANSPLANT  OUTPATIENT MNT: KIDNEY PANCREAS TRANSPLANT EVALUATION    Current BMI: 20.5 (HT 66.5 in,  lbs/59 kg)  BMI guideline for kidney transplant up to a BMI of 40 / per surgeon discretion  BMI guideline for pancreas transplant up to a BMI of 35 / per surgeon discretion    Frailty Assessment-- Not Frail (1/5 points)- low activity level     Reference:  Score of 0-2 = Not Frail  Score of 3-5 = Frail      TIME SPENT: 15 minutes  VISIT TYPE: Initial   REFERRING PHYSICIAN: Lawanda   PT ACCOMPANIED BY: her sister and friend     History of previous txp: none   Dialysis: HD 4/2023 545 am     NUTRITION ASSESSMENT  H/o DM I, GP    Frequency of BG checks: via CGM (averages 80-170s)  Insulin- 14 tresiba, 2-3 units humalog per meal   Last A1c: 7.9 (12/27/23)    - Appetite: not great, but still trying to eat   - Food allergies/intolerances: none   - Meal prep & grocery shopping: pt or friend  - Previous RD education: yes  - Issues chewing or swallowing: no   - N/V/D/C: some nausea- comes & goes   - Food access concerns: no     Vitamins, Supplements, Pertinent Meds: vit D, iron (occasional use), renvela (takes most of the time)  Herbal Medicines/Supplements: none   Protein Supplement: has protein drinks but not drinking d/t FR    Edema: none     Weight hx: maybe 5 lb weight loss  Wt Readings from Last 10 Encounters:   01/10/24 58.5 kg (129 lb)   01/05/24 56.2 kg (124 lb)   01/03/24 56.8 kg (125 lb 3.2 oz)   09/05/23 55.3 kg (122 lb)   08/17/23 56.4 kg (124 lb 5.4 oz)   10/12/23 56.7 kg (125 lb)   07/14/23 56.7 kg (125 lb)   01/19/23 63 kg (139 lb)   01/02/23 63.3 kg (139 lb 8 oz)   12/29/22 61.2 kg (135 lb)     PHYSICAL ACTIVITY  Energy level wavers- walked 1 mile yesterday (45 min)- 3-4x/week is goal  ADLs go ok- energy level sometimes low    DIET RECALL  Breakfast Tries to eat a little bit of a protein bar; cereal; toast w/ PB, butter, cream cheese   Lunch Toast; mandarin orange/fruit;  protein bar    Dinner Cooked hamburger & carrots; pierogies; soup (homemade); grilled cheese   Snacks HS- crackers, fruit    Beverages 1.5 cups milk/day (not every day), water, Diet Dew (2 cans/day), occasional juice for low BG    Alcohol None    Dining out 1x/week at the most      LABS  1/5 K 4.2, h/o wnl levels  12/29 Phos 5.0     NUTRITION DIAGNOSIS  No nutrition diagnosis identified at this time.     NUTRITION INTERVENTION  Nutrition education provided:  Discussed sodium intake (low sodium foods and drinks, seasoning food without salt and tips for low sodium diet).  Reviewed K/Phos levels, high protein needs being on dialysis. Reviewed some quick/easy protein foods to grab instead of prepping a big meal. Consider Greek yogurt, boiled eggs, cottage cheese, peanut butter, tuna fish, crockpot pulled chicken, etc. Could ask dialysis unit for sample of Prostat.     Reviewed post txp diet guidelines in brief (will review in further detail post txp):  (1) Review of proper food safety measures d/t immunosuppressant therapy post-op and increased risk for food-borne illness    (2) Avoid the following post txp d/t risk for rejection, unknown effects on the organs, and/or potential interactions with immunosuppressants:  - Herbal, Chinese, holistic, chiropractic, natural, alternative medicines and supplements  - Detoxes and cleanses  - Weight loss pills  - Protein powders or other products with extracts or herbs (ie green tea extract)    (3) Med regimen and possible side effects    Patient Understanding: Pt verbalized understanding of education provided.  Expected Engagement: Good  Follow-Up Plans: PRN     NUTRITION GOALS   No nutrition goals identified at this time     Lorenza Mathew, RD, LD, CCTD

## 2024-01-10 ENCOUNTER — ALLIED HEALTH/NURSE VISIT (OUTPATIENT)
Dept: TRANSPLANT | Facility: CLINIC | Age: 49
End: 2024-01-10
Attending: NURSE PRACTITIONER
Payer: MEDICARE

## 2024-01-10 ENCOUNTER — EVALUATION (OUTPATIENT)
Dept: TRANSPLANT | Facility: CLINIC | Age: 49
End: 2024-01-10

## 2024-01-10 ENCOUNTER — LAB (OUTPATIENT)
Dept: LAB | Facility: CLINIC | Age: 49
End: 2024-01-10
Attending: NURSE PRACTITIONER
Payer: MEDICARE

## 2024-01-10 VITALS
HEIGHT: 67 IN | HEART RATE: 100 BPM | WEIGHT: 129 LBS | OXYGEN SATURATION: 100 % | SYSTOLIC BLOOD PRESSURE: 168 MMHG | DIASTOLIC BLOOD PRESSURE: 81 MMHG | BODY MASS INDEX: 20.25 KG/M2

## 2024-01-10 DIAGNOSIS — I10 HYPERTENSION, UNSPECIFIED TYPE: ICD-10-CM

## 2024-01-10 DIAGNOSIS — F12.10 CANNABIS ABUSE: ICD-10-CM

## 2024-01-10 DIAGNOSIS — N18.6 ESRD (END STAGE RENAL DISEASE) (H): ICD-10-CM

## 2024-01-10 DIAGNOSIS — E10.21 TYPE 1 DIABETES MELLITUS WITH NEPHROPATHY (H): Primary | ICD-10-CM

## 2024-01-10 DIAGNOSIS — Z01.818 ENCOUNTER FOR PRE-TRANSPLANT EVALUATION FOR KIDNEY AND PANCREAS TRANSPLANT: ICD-10-CM

## 2024-01-10 DIAGNOSIS — E11.3599 PROLIFERATIVE DIABETIC RETINOPATHY (H): ICD-10-CM

## 2024-01-10 DIAGNOSIS — Z76.82 ORGAN TRANSPLANT CANDIDATE: ICD-10-CM

## 2024-01-10 DIAGNOSIS — Z76.82 ORGAN TRANSPLANT CANDIDATE: Primary | ICD-10-CM

## 2024-01-10 DIAGNOSIS — N18.6 ESRD (END STAGE RENAL DISEASE) ON DIALYSIS (H): ICD-10-CM

## 2024-01-10 DIAGNOSIS — E10.21 TYPE 1 DIABETES MELLITUS WITH NEPHROPATHY (H): ICD-10-CM

## 2024-01-10 DIAGNOSIS — E10.9 TYPE 1 DIABETES MELLITUS (H): ICD-10-CM

## 2024-01-10 DIAGNOSIS — Z99.2 ESRD (END STAGE RENAL DISEASE) ON DIALYSIS (H): ICD-10-CM

## 2024-01-10 DIAGNOSIS — E11.21: ICD-10-CM

## 2024-01-10 DIAGNOSIS — Z72.89 OTHER PROBLEMS RELATED TO LIFESTYLE: ICD-10-CM

## 2024-01-10 DIAGNOSIS — F10.10 ETOH ABUSE: ICD-10-CM

## 2024-01-10 DIAGNOSIS — N18.4 CHRONIC KIDNEY DISEASE, STAGE 4, SEVERELY DECREASED GFR (H): ICD-10-CM

## 2024-01-10 DIAGNOSIS — Z01.818 PRE-TRANSPLANT EVALUATION FOR KIDNEY TRANSPLANT: ICD-10-CM

## 2024-01-10 LAB
ALBUMIN SERPL BCG-MCNC: 4.2 G/DL (ref 3.5–5.2)
ALBUMIN UR-MCNC: 100 MG/DL
ALP SERPL-CCNC: 80 U/L (ref 40–150)
ALT SERPL W P-5'-P-CCNC: 9 U/L (ref 0–50)
ANION GAP SERPL CALCULATED.3IONS-SCNC: 10 MMOL/L (ref 7–15)
APPEARANCE UR: CLEAR
APTT PPP: 27 SECONDS (ref 22–38)
AST SERPL W P-5'-P-CCNC: 19 U/L (ref 0–45)
BACTERIA #/AREA URNS HPF: ABNORMAL /HPF
BASOPHILS # BLD AUTO: 0.1 10E3/UL (ref 0–0.2)
BASOPHILS NFR BLD AUTO: 1 %
BILIRUB SERPL-MCNC: 0.2 MG/DL
BILIRUB UR QL STRIP: NEGATIVE
BUN SERPL-MCNC: 23.3 MG/DL (ref 6–20)
CALCIUM SERPL-MCNC: 8.8 MG/DL (ref 8.6–10)
CHLORIDE SERPL-SCNC: 96 MMOL/L (ref 98–107)
COLOR UR AUTO: ABNORMAL
CREAT SERPL-MCNC: 4.63 MG/DL (ref 0.51–0.95)
DEPRECATED HCO3 PLAS-SCNC: 26 MMOL/L (ref 22–29)
EGFRCR SERPLBLD CKD-EPI 2021: 11 ML/MIN/1.73M2
EOSINOPHIL # BLD AUTO: 0.3 10E3/UL (ref 0–0.7)
EOSINOPHIL NFR BLD AUTO: 4 %
ERYTHROCYTE [DISTWIDTH] IN BLOOD BY AUTOMATED COUNT: 13.2 % (ref 10–15)
FACTOR 2 INTERPRETATION: NORMAL
FACTOR V INTERPRETATION: NORMAL
GLUCOSE SERPL-MCNC: 160 MG/DL (ref 70–99)
GLUCOSE UR STRIP-MCNC: 300 MG/DL
HBA1C MFR BLD: 7.5 %
HBV CORE AB SERPL QL IA: NONREACTIVE
HBV SURFACE AB SERPL IA-ACNC: 11.9 M[IU]/ML
HBV SURFACE AB SERPL IA-ACNC: REACTIVE M[IU]/ML
HBV SURFACE AG SERPL QL IA: NONREACTIVE
HCT VFR BLD AUTO: 27.1 % (ref 35–47)
HCV AB SERPL QL IA: NONREACTIVE
HGB BLD-MCNC: 8.7 G/DL (ref 11.7–15.7)
HGB UR QL STRIP: NEGATIVE
HIV 1+2 AB+HIV1 P24 AG SERPL QL IA: NONREACTIVE
IMM GRANULOCYTES # BLD: 0 10E3/UL
IMM GRANULOCYTES NFR BLD: 0 %
INR PPP: 0.95 (ref 0.85–1.15)
KETONES UR STRIP-MCNC: NEGATIVE MG/DL
LAB DIRECTOR COMMENTS: NORMAL
LAB DIRECTOR DISCLAIMER: NORMAL
LAB DIRECTOR INTERPRETATION: NORMAL
LAB DIRECTOR METHODOLOGY: NORMAL
LAB DIRECTOR RESULTS: NORMAL
LEUKOCYTE ESTERASE UR QL STRIP: NEGATIVE
LYMPHOCYTES # BLD AUTO: 1.4 10E3/UL (ref 0.8–5.3)
LYMPHOCYTES NFR BLD AUTO: 19 %
MCH RBC QN AUTO: 30.5 PG (ref 26.5–33)
MCHC RBC AUTO-ENTMCNC: 32.1 G/DL (ref 31.5–36.5)
MCV RBC AUTO: 95 FL (ref 78–100)
MONOCYTES # BLD AUTO: 0.4 10E3/UL (ref 0–1.3)
MONOCYTES NFR BLD AUTO: 5 %
NEUTROPHILS # BLD AUTO: 5.2 10E3/UL (ref 1.6–8.3)
NEUTROPHILS NFR BLD AUTO: 71 %
NITRATE UR QL: NEGATIVE
NRBC # BLD AUTO: 0 10E3/UL
NRBC BLD AUTO-RTO: 0 /100
PH UR STRIP: 8 [PH] (ref 5–7)
PLATELET # BLD AUTO: 365 10E3/UL (ref 150–450)
POTASSIUM SERPL-SCNC: 4.4 MMOL/L (ref 3.4–5.3)
PROT SERPL-MCNC: 7 G/DL (ref 6.4–8.3)
RBC # BLD AUTO: 2.85 10E6/UL (ref 3.8–5.2)
RBC URINE: 1 /HPF
SODIUM SERPL-SCNC: 132 MMOL/L (ref 135–145)
SP GR UR STRIP: 1.01 (ref 1–1.03)
SPECIMEN DESCRIPTION: NORMAL
SQUAMOUS EPITHELIAL: 2 /HPF
T PALLIDUM AB SER QL: NONREACTIVE
TRANSITIONAL EPI: <1 /HPF
UROBILINOGEN UR STRIP-MCNC: NORMAL MG/DL
WBC # BLD AUTO: 7.5 10E3/UL (ref 4–11)
WBC URINE: <1 /HPF

## 2024-01-10 PROCEDURE — 86905 BLOOD TYPING RBC ANTIGENS: CPT | Performed by: NURSE PRACTITIONER

## 2024-01-10 PROCEDURE — 86900 BLOOD TYPING SEROLOGIC ABO: CPT | Performed by: NURSE PRACTITIONER

## 2024-01-10 PROCEDURE — 85610 PROTHROMBIN TIME: CPT | Performed by: NURSE PRACTITIONER

## 2024-01-10 PROCEDURE — 86787 VARICELLA-ZOSTER ANTIBODY: CPT | Performed by: NURSE PRACTITIONER

## 2024-01-10 PROCEDURE — 86147 CARDIOLIPIN ANTIBODY EA IG: CPT | Performed by: NURSE PRACTITIONER

## 2024-01-10 PROCEDURE — 86644 CMV ANTIBODY: CPT | Performed by: NURSE PRACTITIONER

## 2024-01-10 PROCEDURE — 86704 HEP B CORE ANTIBODY TOTAL: CPT | Performed by: NURSE PRACTITIONER

## 2024-01-10 PROCEDURE — G0463 HOSPITAL OUTPT CLINIC VISIT: HCPCS | Performed by: SURGERY

## 2024-01-10 PROCEDURE — 85025 COMPLETE CBC W/AUTO DIFF WBC: CPT | Performed by: NURSE PRACTITIONER

## 2024-01-10 PROCEDURE — 81378 HLA I & II TYPING HR: CPT | Performed by: NURSE PRACTITIONER

## 2024-01-10 PROCEDURE — 86481 TB AG RESPONSE T-CELL SUSP: CPT | Performed by: NURSE PRACTITIONER

## 2024-01-10 PROCEDURE — 85390 FIBRINOLYSINS SCREEN I&R: CPT | Mod: 26 | Performed by: PATHOLOGY

## 2024-01-10 PROCEDURE — 85613 RUSSELL VIPER VENOM DILUTED: CPT | Performed by: NURSE PRACTITIONER

## 2024-01-10 PROCEDURE — 86665 EPSTEIN-BARR CAPSID VCA: CPT | Performed by: NURSE PRACTITIONER

## 2024-01-10 PROCEDURE — 86706 HEP B SURFACE ANTIBODY: CPT | Performed by: NURSE PRACTITIONER

## 2024-01-10 PROCEDURE — 86832 HLA CLASS I HIGH DEFIN QUAL: CPT | Performed by: NURSE PRACTITIONER

## 2024-01-10 PROCEDURE — 81241 F5 GENE: CPT | Performed by: NURSE PRACTITIONER

## 2024-01-10 PROCEDURE — 36415 COLL VENOUS BLD VENIPUNCTURE: CPT | Performed by: NURSE PRACTITIONER

## 2024-01-10 PROCEDURE — 86780 TREPONEMA PALLIDUM: CPT | Performed by: NURSE PRACTITIONER

## 2024-01-10 PROCEDURE — 84681 ASSAY OF C-PEPTIDE: CPT | Performed by: NURSE PRACTITIONER

## 2024-01-10 PROCEDURE — 85670 THROMBIN TIME PLASMA: CPT | Performed by: NURSE PRACTITIONER

## 2024-01-10 PROCEDURE — 81001 URINALYSIS AUTO W/SCOPE: CPT | Performed by: NURSE PRACTITIONER

## 2024-01-10 PROCEDURE — 83036 HEMOGLOBIN GLYCOSYLATED A1C: CPT | Performed by: NURSE PRACTITIONER

## 2024-01-10 PROCEDURE — G0452 MOLECULAR PATHOLOGY INTERPR: HCPCS | Mod: 26 | Performed by: PATHOLOGY

## 2024-01-10 PROCEDURE — 99204 OFFICE O/P NEW MOD 45 MIN: CPT | Performed by: SURGERY

## 2024-01-10 PROCEDURE — 85730 THROMBOPLASTIN TIME PARTIAL: CPT | Mod: XU | Performed by: NURSE PRACTITIONER

## 2024-01-10 PROCEDURE — 86886 COOMBS TEST INDIRECT TITER: CPT | Performed by: NURSE PRACTITIONER

## 2024-01-10 PROCEDURE — 86833 HLA CLASS II HIGH DEFIN QUAL: CPT | Performed by: NURSE PRACTITIONER

## 2024-01-10 PROCEDURE — 86803 HEPATITIS C AB TEST: CPT | Performed by: NURSE PRACTITIONER

## 2024-01-10 PROCEDURE — 99207 PR NO CHARGE COORDINATED CARE PS: CPT

## 2024-01-10 PROCEDURE — 87340 HEPATITIS B SURFACE AG IA: CPT | Performed by: NURSE PRACTITIONER

## 2024-01-10 PROCEDURE — 80053 COMPREHEN METABOLIC PANEL: CPT | Performed by: NURSE PRACTITIONER

## 2024-01-10 NOTE — LETTER
1/10/2024         RE: Josefa Curiel  [address] [address]        Dear Colleague,    Thank you for referring your patient, Josefa Curiel, to the [facility] TRANSPLANT [facility]INIC. Please see a copy of my visit note below.    Patient already seen by this writer on 12/19/2023 virtually. See note for details.       Again, thank you for allowing me to participate in the care of your patient.        Sincerely,        NATALIA Yoder CNP

## 2024-01-10 NOTE — LETTER
1/10/2024         RE: Josefa Curiel  [address]  [place] [address]        Dear Colleague,    Thank you for referring your patient, Josefa Curiel, to the [facility] TRANSPLANT CLINIC. Please see a copy of my visit note below.    [facility] SOLID ORGAN TRANSPLANT  OUTPATIENT MNT: KIDNEY PANCREAS TRANSPLANT EVALUATION    Current BMI: 20.5 (HT 66.5 in,  lbs/59 kg)  BMI guideline for kidney transplant up to a BMI of 40 / per surgeon discretion  BMI guideline for pancreas transplant up to a BMI of 35 / per surgeon discretion    Frailty Assessment-- Not Frail (1/5 points)- low activity level     Reference:  Score of 0-2 = Not Frail  Score of 3-5 = Frail      TIME SPENT: 15 minutes  VISIT TYPE: Initial   REFERRING PHYSICIAN: Lawanda   PT ACCOMPANIED BY: her sister and friend     History of previous txp: none   Dialysis: HD 4/2023 545 am     NUTRITION ASSESSMENT  H/o DM I, GP    Frequency of BG checks: via CGM (averages 80-170s)  Insulin- 14 tresiba, 2-3 units humalog per meal   Last A1c: 7.9 (12/27/23)    - Appetite: not great, but still trying to eat   - Food allergies/intolerances: none   - Meal prep & grocery shopping: pt or friend  - Previous RD education: yes  - Issues chewing or swallowing: no   - N/V/D/C: some nausea- comes & goes   - Food access concerns: no     Vitamins, Supplements, Pertinent Meds: vit D, iron (occasional use), renvela (takes most of the time)  Herbal Medicines/Supplements: none   Protein Supplement: has protein drinks but not drinking d/t FR    Edema: none     Weight hx: maybe 5 lb weight loss  Wt Readings from Last 10 Encounters:   01/10/24 58.5 kg (129 lb)   01/05/24 56.2 kg (124 lb)   01/03/24 56.8 kg (125 lb 3.2 oz)   09/05/23 55.3 kg (122 lb)   08/17/23 56.4 kg (124 lb 5.4 oz)   10/12/23 56.7 kg (125 lb)   07/14/23 56.7 kg (125 lb)   01/19/23 63 kg (139 lb)   01/02/23 63.3 kg (139 lb 8 oz)   12/29/22 61.2 kg (135 lb)     PHYSICAL  ACTIVITY  Energy level wavers- walked 1 mile yesterday (45 min)- 3-4x/week is goal  ADLs go ok- energy level sometimes low    DIET RECALL  Breakfast Tries to eat a little bit of a protein bar; cereal; toast w/ PB, butter, cream cheese   Lunch Toast; mandarin orange/fruit; protein bar    Dinner Cooked hamburger & carrots; pierogies; soup (homemade); grilled cheese   Snacks HS- crackers, fruit    Beverages 1.5 cups milk/day (not every day), water, Diet Dew (2 cans/day), occasional juice for low BG    Alcohol None    Dining out 1x/week at the most      LABS  1/5 K 4.2, h/o wnl levels  12/29 Phos 5.0     NUTRITION DIAGNOSIS  No nutrition diagnosis identified at this time.     NUTRITION INTERVENTION  Nutrition education provided:  Discussed sodium intake (low sodium foods and drinks, seasoning food without salt and tips for low sodium diet).  Reviewed K/Phos levels, high protein needs being on dialysis. Reviewed some quick/easy protein foods to grab instead of prepping a big meal. Consider Greek yogurt, boiled eggs, cottage cheese, peanut butter, tuna fish, crockpot pulled chicken, etc. Could ask dialysis unit for sample of Prostat.     Reviewed post txp diet guidelines in brief (will review in further detail post txp):  (1) Review of proper food safety measures d/t immunosuppressant therapy post-op and increased risk for food-borne illness    (2) Avoid the following post txp d/t risk for rejection, unknown effects on the organs, and/or potential interactions with immunosuppressants:  - Herbal, Chinese, holistic, chiropractic, natural, alternative medicines and supplements  - Detoxes and cleanses  - Weight loss pills  - Protein powders or other products with extracts or herbs (ie green tea extract)    (3) Med regimen and possible side effects    Patient Understanding: Pt verbalized understanding of education provided.  Expected Engagement: Good  Follow-Up Plans: PRN     NUTRITION GOALS   No nutrition goals identified at  this time     Lorenza Mathew RD, LD, CCTD                                  Again, thank you for allowing me to participate in the care of your patient.        Sincerely,        Lorenza Mathew RD

## 2024-01-10 NOTE — Clinical Note
1/10/2024         RE: Josefa Curiel  [address] [address]        Dear Colleague,    Thank you for referring your patient, Josefa Curiel, to the [facility] TRANSPLANT CLINIC. Please see a copy of my visit note below.    No notes on file    Again, thank you for allowing me to participate in the care of your patient.        Sincerely,        Pee Webber MD

## 2024-01-10 NOTE — LETTER
"    1/10/2024         RE: Josefa Curiel  [address] [facility] [address]        Dear Colleague,    Thank you for referring your patient, Josefa Curiel, to the [facility]. Please see a copy of my visit note below.    Psychosocial Assessment For kidney/pancreas Transplantation  Patient Name/ Age: Josefa Curiel 48 year old   Medical Record #: [id number]  Duration of Interview:     30 min  Process:   Face-to-Face Interview                (counseling < 50%)   Present at Appointment: Josefa, partner - Wilbert and sister - Mikki         : BHAVANI Foster LIC Date:  January 11, 2024        Type of transplant: kidney/pancreas    Donor type:      Cadaver   Prior Transplants:    No Status of Transplant:           Current Living Situation    Location:   [address] [place], [place] [id number]  With Whom:  partner - Wilbert and two roommates.       Family/ Social Support:    Partner - Wilbert  Sister- Mikki, brother - Pradeep live in [facility], parents live in [place]. available, helpful   Committed Relationship:    Pt has been in a relationship with Wilbert for 23 years.   Stable/Supportive   Other Supports:     friends   available, helpful       Activities/ Functional Ability    Current Level: ambulatory and independent with ADL's     Transportation drives self and relies on others       Vocational/Employment/Financial     Employment   disabled   Job Description      Income   SSDI   Insurance. Pt has Medicare A, B and [employer] medical assistance. She reports adequate prescription drug coverage with $1 co-pays.      At this time, patient can afford medication costs:  Yes  Medicare and [place]       Medical Status    Current Mode of Treatment for ESRD Dialysis   Complications - diabetes, controlled None       Behavioral    Tobacco Use Yes  Chemical Dependency No     Pt reports she has cut down her smoking, stating she has \"minimal\" use. Does not elaborate when questioned further. "   Hx of alcohol use. Pt reports she has been sober since 2022.    Psychiatric Impairment No    Pt endorses hx of depression, anxiety. She is on mental health medication to assist with symptom stabilization. She is established with outpatient therapist who she seeks weekly, has a  who she meets with monthly and a PCP. Hx of DBT, IP  2007    Reading Ability: Good  Education Level: Bachelors Degree Recent Legal History No      Coping Style/Strategies: distraction, socialization, therapy, exercise        Ability to Adhere to Complex Medical Regime: Yes     Adherence History: Pt reports taking medication as prescribed, attending appointments as scheduled and follows through with recommendations by providers.         Education  _X_ Medicare  _X_ Rehabilitation  _X_ Donor issues  _X_ Community resources  _X_ Post discharge housing  _X_ Financial resources  _X_ Medical insurance options  _X_ Psych adjustment  _X_ Family adjustment  _X_ Health Care Directive Provided Education. Pt reports she will fill out and provide to PCP at upcoming appointment. At this time, she designates her sister, Mikki as her primary decision maker.    Psychosocial Risks of Transplant Reviewed and Discussed:  _X_ Increased stress related to emotional,            family, social, employment or financial           situation  _X_ Effect on work and/or disability benefits  _X_ Effect on future health and life           insurance  _X_ Transplant outcome expectations may           not be met  _X_ Mental Health Risks: anxiety,           depression, PTSD, guilt, grief and           chronic fatigue     Notable Items:   None noted.       Final Evaluation/Assessment   Patient seemed to process information well. Appeared well informed, motivated and able to follow post transplant requirements. Behavior was appropriate during interview. Has adequate income and insurance coverage. Adequate social support. No major contraindications noted for  transplant.  At this time patient appears to understand the risks and benefits of transplant.      Recommendation  Acceptable    Selection Criteria Met:  Plan for support Yes   Chemical Dependence Yes   Smoking Yes   Mental Health Yes   Adequate Finances Yes    Signature: BHAVANI Foster [facility]   Title: Clinical        BHAVANI Foster, [facility]  Clinical   Outpatient kidney/pancreas/auto islet transplant program  Ph:[phone]      Again, thank you for allowing me to participate in the care of your patient.        Sincerely,        BHAVANI Foster

## 2024-01-11 ENCOUNTER — TELEPHONE (OUTPATIENT)
Dept: FAMILY MEDICINE | Facility: CLINIC | Age: 49
End: 2024-01-11
Payer: MEDICARE

## 2024-01-11 LAB
C PEPTIDE SERPL-MCNC: <0.1 NG/ML (ref 0.9–6.9)
CARDIOLIPIN IGG SER IA-ACNC: <2 GPL-U/ML
CARDIOLIPIN IGG SER IA-ACNC: NEGATIVE
CARDIOLIPIN IGM SER IA-ACNC: <2 MPL-U/ML
CARDIOLIPIN IGM SER IA-ACNC: NEGATIVE
CMV IGG SERPL IA-ACNC: <0.2 U/ML
CMV IGG SERPL IA-ACNC: NORMAL
DRVVT CONFIRM NORMALIZED RATIO: 0.9
DRVVT SCREEN MIX RATIO: 1.05
DRVVT SCREEN RATIO: 1.23
EBV VCA IGG SER IA-ACNC: 126 U/ML
EBV VCA IGG SER IA-ACNC: POSITIVE
LA PPP-IMP: NEGATIVE
LUPUS INTERPRETATION: ABNORMAL
PTT RATIO: 1.14
QUANTIFERON MITOGEN: 10 IU/ML
QUANTIFERON NIL TUBE: 0.02 IU/ML
QUANTIFERON TB1 TUBE: 0.06 IU/ML
QUANTIFERON TB2 TUBE: 0.04
THROMBIN TIME: 16.9 SECONDS (ref 13–19)
VZV IGG SER QL IA: 930.2 INDEX
VZV IGG SER QL IA: POSITIVE

## 2024-01-11 NOTE — PROGRESS NOTES
"Psychosocial Assessment For kidney/pancreas Transplantation  Patient Name/ Age: Josefa Curiel 48 year old   Medical Record #: [id number]  Duration of Interview:     30 min  Process:   Face-to-Face Interview                (counseling < 50%)   Present at Appointment: Josefa, partner - Wilbert and sister - Mikki         : BHAVANI Foster LICSW Date:  January 11, 2024        Type of transplant: kidney/pancreas    Donor type:      Cadaver   Prior Transplants:    No Status of Transplant:           Current Living Situation    Location:   [address] [place] [address]  With Whom:  partner - Wilbert and two roommates.       Family/ Social Support:    Partner - Wilbert  Sister- Mikki, brother - Pradeep live in  area, parents live in [place]. available, helpful   Committed Relationship:    Pt has been in a relationship with Wilbert for 23 years.   Stable/Supportive   Other Supports:     friends   available, helpful       Activities/ Functional Ability    Current Level: ambulatory and independent with ADL's     Transportation drives self and relies on others       Vocational/Employment/Financial     Employment   disabled   Job Description      Income   SSDI   Insurance. Pt has Medicare A, B and [employer] medical assistance. She reports adequate prescription drug coverage with $1 co-pays.      At this time, patient can afford medication costs:  Yes  Medicare and [place]       Medical Status    Current Mode of Treatment for ESRD Dialysis   Complications - diabetes, controlled None       Behavioral    Tobacco Use Yes  Chemical Dependency No     Pt reports she has cut down her smoking, stating she has \"minimal\" use. Does not elaborate when questioned further.   Hx of alcohol use. Pt reports she has been sober since 2022.    Psychiatric Impairment No    Pt endorses hx of depression, anxiety. She is on mental health medication to assist with symptom stabilization. She is established with outpatient therapist who she " seeks weekly, has a  who she meets with monthly and a PCP. Hx of GINNY, CHACE  2007    Reading Ability: Good  Education Level: Bachelors Degree Recent Legal History No      Coping Style/Strategies: distraction, socialization, therapy, exercise        Ability to Adhere to Complex Medical Regime: Yes     Adherence History: Pt reports taking medication as prescribed, attending appointments as scheduled and follows through with recommendations by providers.         Education  _X_ Medicare  _X_ Rehabilitation  _X_ Donor issues  _X_ Community resources  _X_ Post discharge housing  _X_ Financial resources  _X_ Medical insurance options  _X_ Psych adjustment  _X_ Family adjustment  _X_ Health Care Directive Provided Education. Pt reports she will fill out and provide to PCP at upcoming appointment. At this time, she designates her sister, Mikki as her primary decision maker.    Psychosocial Risks of Transplant Reviewed and Discussed:  _X_ Increased stress related to emotional,            family, social, employment or financial           situation  _X_ Effect on work and/or disability benefits  _X_ Effect on future health and life           insurance  _X_ Transplant outcome expectations may           not be met  _X_ Mental Health Risks: anxiety,           depression, PTSD, guilt, grief and           chronic fatigue     Notable Items:   None noted.       Final Evaluation/Assessment   Patient seemed to process information well. Appeared well informed, motivated and able to follow post transplant requirements. Behavior was appropriate during interview. Has adequate income and insurance coverage. Adequate social support. No major contraindications noted for transplant.  At this time patient appears to understand the risks and benefits of transplant.      Recommendation  Acceptable    Selection Criteria Met:  Plan for support Yes   Chemical Dependence Yes   Smoking Yes   Mental Health Yes   Adequate Finances Yes     Signature: BHAVANI Foster [facility]   Title: Clinical        BHAVANI Foster, [facility]  Clinical   Outpatient kidney/pancreas/auto islet transplant program  Ph:[phone]

## 2024-01-11 NOTE — TELEPHONE ENCOUNTER
01/11/24  FYI - Status Update    Who is Calling:     Update: Linda Odell , is following pt for kidney transplant referral. Pt was also just discharged from [facility] and is coming into clinic tomorrow.    Does caller want a call/response back: No

## 2024-01-11 NOTE — PROGRESS NOTES
Kidney, Pancreas Transplant Evaluation - 1/10/2024  Josefa TERE Curiel attended the pre-transplant patient education class today. The [facility] website pre-transplant (Kidney and Kidney/Pancreas modules viewing to be determined by coordinator.   Content reviewed:  Living Donation and how to access that program: donor process explained with card web address given to Josefa.  Paired exchange  Kidney Donor Profile Index (KDPI) Josefa signed NOT Willing to accept >85% KDPI.  Waiting list issues (right to decline without penalty, high PHS risk donors, what to expect when called with an offer)  Hospital experience,  length of stay , need to stay locally post-discharge (2-4 weeks)  Surgical options (with pictures)                           Post-surgery lifting and driving restrictions  Post-transplant routines, frequency of lab work and clinic visits  Need to stay locally post-discharge (2-4 weeks)  Role of Transplant Coordinator  (Pre, Wait List, Post)  Participants were informed of the benefits of transplant as well as potential risks such as infection, cancer, and death.  The need for total adherence with immunosuppression medications and following transplant regimens was stressed.  The overall evaluation/approval/listing process was reviewed.        The patient was provided with the following documents:  What You Need to Know About a Kidney Transplant  Adult Kidney Transplant - A Guide for Patients  SRTR Data Sheet - Kidney  Brochure - Kidney Allocation  What You Need to Know About a Pancreas Transplant  Adult Pancreas Transplant-A Guide for Patients  SRTR Data Sheet - Pancreas  Brochure - Pancreas  SRTR Data Sheet - Kidney/Pancreas  Brochure - SPK  Brochure - Multiple Listing and Waiting Time Transfer  What Every Patient Needs to Know (UNOS)  UNOS Facts and Figures  Finding a Donor  [facility] - Quick Start Guide  KDPI Consent  Receipt of Information form    Josefa TERE Curiel signed the  Receipt of  "Information for Organ Transplant Recipient.\" She was provided Joanna Starr's business card and instructed to call with additional questions.      Summary    Team s concerns/comments: Pt has ESRD on dialysis. Pt diagnosed with Diabetes type 1 at age 11 years old in 1988.      Candidacy category: Yellow    Action/Plan:   - Evan Maguire CNP met Josefa virtually for evaluation previously, 12/19/2023.  Evan did not see Josefa today.  -Labs in  [facility] 1/10/2024  -Dr. Webber met to discuss KP with Josefa today.   Team to review GYN/Pap and dental and Kidney Pancreas candidacy.  -The [facility] website pre-transplant (Kidney and Kidney/Pancreas modules viewing to be determined by coordinator.       Expected Selection Meeting Discussion: 1/17/2024      "

## 2024-01-12 LAB
GAMMA INTERFERON BACKGROUND BLD IA-ACNC: 0.02 IU/ML
M TB IFN-G BLD-IMP: NEGATIVE
M TB IFN-G CD4+ BCKGRND COR BLD-ACNC: 9.98 IU/ML
MITOGEN IGNF BCKGRD COR BLD-ACNC: 0.02 IU/ML
MITOGEN IGNF BCKGRD COR BLD-ACNC: 0.04 IU/ML

## 2024-01-15 LAB
A*: NORMAL
A*LOCUS SEROLOGIC EQUIVALENT: 2
A*LOCUS: NORMAL
A*SEROLOGIC EQUIVALENT: 24
ABTEST METHOD: NORMAL
B*: NORMAL
B*LOCUS SEROLOGIC EQUIVALENT: 18
B*LOCUS: NORMAL
B*SEROLOGIC EQUIVALENT: 60
BW-1: NORMAL
C*: NORMAL
C*LOCUS SEROLOGIC EQUIVALENT: 10
C*LOCUS: NORMAL
C*SEROLOGIC EQUIVALENT: 7
DPA1*: NORMAL
DPB1*: NORMAL
DPB1*LOCUS NMDP: NORMAL
DPB1*LOCUS: NORMAL
DPB1*NMDP: NORMAL
DQA1*: NORMAL
DQA1*LOCUS: NORMAL
DQB1*: NORMAL
DQB1*LOCUS SEROLOGIC EQUIVALENT: 7
DQB1*LOCUS: NORMAL
DQB1*SEROLOGIC EQUIVALENT: 6
DRB1*: NORMAL
DRB1*LOCUS SEROLOGIC EQUIVALENT: 4
DRB1*LOCUS: NORMAL
DRB1*SEROLOGIC EQUIVALENT: 13
DRB3*LOCUS SEROLOGIC EQUIVALENT: 52
DRB3*LOCUS: NORMAL
DRB4*: NORMAL
DRB4*SEROLOGIC EQUIVALENT: 53
DRSSO TEST METHOD: NORMAL

## 2024-01-16 ENCOUNTER — OFFICE VISIT (OUTPATIENT)
Dept: FAMILY MEDICINE | Facility: CLINIC | Age: 49
End: 2024-01-16
Payer: MEDICARE

## 2024-01-16 VITALS
BODY MASS INDEX: 19.78 KG/M2 | TEMPERATURE: 97.6 F | HEIGHT: 67 IN | HEART RATE: 87 BPM | OXYGEN SATURATION: 100 % | RESPIRATION RATE: 14 BRPM | DIASTOLIC BLOOD PRESSURE: 81 MMHG | SYSTOLIC BLOOD PRESSURE: 135 MMHG | WEIGHT: 126 LBS

## 2024-01-16 DIAGNOSIS — Z99.2 ESRD (END STAGE RENAL DISEASE) ON DIALYSIS (H): ICD-10-CM

## 2024-01-16 DIAGNOSIS — N18.6 ESRD (END STAGE RENAL DISEASE) ON DIALYSIS (H): ICD-10-CM

## 2024-01-16 DIAGNOSIS — E10.21 TYPE 1 DIABETES MELLITUS WITH NEPHROPATHY (H): ICD-10-CM

## 2024-01-16 DIAGNOSIS — K31.84 GASTROPARESIS: ICD-10-CM

## 2024-01-16 DIAGNOSIS — I87.1 SUPERIOR VENA CAVA SYNDROME: ICD-10-CM

## 2024-01-16 DIAGNOSIS — F33.1 MODERATE EPISODE OF RECURRENT MAJOR DEPRESSIVE DISORDER (H): ICD-10-CM

## 2024-01-16 DIAGNOSIS — I15.0 RENOVASCULAR HYPERTENSION: ICD-10-CM

## 2024-01-16 DIAGNOSIS — Z00.00 MEDICARE ANNUAL WELLNESS VISIT, SUBSEQUENT: Primary | ICD-10-CM

## 2024-01-16 DIAGNOSIS — F10.11 ALCOHOL ABUSE, IN REMISSION: ICD-10-CM

## 2024-01-16 DIAGNOSIS — Z12.11 SCREEN FOR COLON CANCER: ICD-10-CM

## 2024-01-16 DIAGNOSIS — F41.9 ANXIETY: ICD-10-CM

## 2024-01-16 DIAGNOSIS — Z12.31 VISIT FOR SCREENING MAMMOGRAM: ICD-10-CM

## 2024-01-16 PROCEDURE — 99213 OFFICE O/P EST LOW 20 MIN: CPT | Mod: 25 | Performed by: NURSE PRACTITIONER

## 2024-01-16 PROCEDURE — G0402 INITIAL PREVENTIVE EXAM: HCPCS | Performed by: NURSE PRACTITIONER

## 2024-01-16 RX ORDER — PROCHLORPERAZINE 25 MG/1
SUPPOSITORY RECTAL
COMMUNITY
Start: 2023-12-09

## 2024-01-16 RX ORDER — NEPHROCAP 1 MG
CAP ORAL
COMMUNITY
Start: 2023-12-23

## 2024-01-16 ASSESSMENT — PATIENT HEALTH QUESTIONNAIRE - PHQ9
SUM OF ALL RESPONSES TO PHQ QUESTIONS 1-9: 3
SUM OF ALL RESPONSES TO PHQ QUESTIONS 1-9: 3
10. IF YOU CHECKED OFF ANY PROBLEMS, HOW DIFFICULT HAVE THESE PROBLEMS MADE IT FOR YOU TO DO YOUR WORK, TAKE CARE OF THINGS AT HOME, OR GET ALONG WITH OTHER PEOPLE: SOMEWHAT DIFFICULT

## 2024-01-16 NOTE — Clinical Note
[facility] 4/2023 lipid done, [facility]

## 2024-01-16 NOTE — PROGRESS NOTES
"    SUBJECTIVE:   Josefa is a 48 year old, presenting for the following:  RECHECK (Hospital 12/27/2023 to 01/03/2024, SVC, swelling in the neck and face, did result DK, referral for colonoscopy )  Was your hospitalization related to COVID-19? No   Problems taking medications regularly:  None  Medication changes since discharge: None  Problems adhering to non-medication therapy:  None    Summary of hospitalization:  [facility] discharge summary reviewed  Diagnostic Tests/Treatments reviewed.  Follow up needed: see transplant   Other Healthcare Providers Involved in Patient s Care:         None  Update since discharge: improved.         Plan of care communicated with patient  Date of Admission:  12/27/2023  Date of Discharge:  1/3/2024  Discharging Provider: Mere Conner MD  Discharge Service: Hospitalist Service      1/16/2024     8:54 AM   Additional Questions   Roomed by Leighton     - has been following up with the Transplant clinic and recently had labs.   - she also has been following up closely with Nephrology, [facility].   - she also see's endocrinology. Her blood sugars since discharge have been \"good\". Her A1c is stable. She can have some numbness in her feet, but she overall feels it is not bad. She does not use a cane, nor had an falls recently.   - Admitted recently for blood clot and DKA.   - Pap 2018, she declined pap today    Are you in the first 12 months of your Medicare coverage?  Yes,  Visual Acuity:  Right Eye: 20/30   Left Eye: 20/30  Both Eyes: 20/30      MED REC REQUIRED  Post Medication Reconciliation Status: discharge medications reconciled, continue medications without change    Patient has been advised of split billing requirements and indicates understanding: Yes     Are you in the first 12 months of your Medicare Part B coverage?  Yes,  Visual Acuity:  Right Eye: 20/30   Left Eye: 20/30  Both Eyes: 20/30    Physical Health:  In general, how would you rate your overall " "physical health? fair  Outside of work, how many days during the week do you exercise?2-3 days/week  Outside of work, approximately how many minutes a day do you exercise?30-45 minutes  If you drink alcohol do you typically have >3 drinks per day or >7 drinks per week? No  Do you usually eat at least 4 servings of fruit and vegetables a day, include whole grains & fiber and avoid regularly eating high fat or \"junk\" foods? Yes  Do you have any problems taking medications regularly? No  Do you have any side effects from medications? none  Needs assistance for the following daily activities: no assistance needed  Which of the following safety concerns are present in your home?  none identified   In the past 6 months, have you been bothered by leaking of urine? no    Mental Health:  In general, how would you rate your overall mental or emotional health? good    Today's PHQ-9 Score:       2024     8:43 AM   PHQ-9 SCORE   PHQ-9 Total Score MyChart 3 (Minimal depression)   PHQ-9 Total Score 3       Do you feel safe in your environment? Yes    Have you ever done Advance Care Planning? (For example, a Health Directive, POLST, or a discussion with a medical provider or your loved ones about your wishes)? No, advance care planning information given to patient to review.  Patient declined advance care planning discussion at this time.    Fall risk: done  - no falls this year.     click delete button to remove this line now  Cognitive Screenin) Repeat 3 items (Leader, Season, Table)    2) Clock draw: NORMAL  3) 3 item recall: Recalls 3 objects  Results: 3 items recalled: COGNITIVE IMPAIRMENT LESS LIKELY    Mini-CogTM Copyright KIM Nelson. Licensed by the author for use in [facility]; reprinted with permission (vickie@.[facility]). All rights reserved.      Do you have sleep apnea, excessive snoring or daytime drowsiness? : no    Social History     Tobacco Use    Smoking status: Some Days     Types: Cigarettes    " Smokeless tobacco: Never   Substance Use Topics    Alcohol use: Not Currently     Alcohol/week: 35.0 standard drinks of alcohol     Types: 35 Standard drinks or equivalent per week     Comment: Alcoholic Drinks/day: ~750 mL wine daily, sober since 10/26/23             1/2/2023     1:42 PM   Alcohol Use   Prescreen: >3 drinks/day or >7 drinks/week? Not Applicable          No data to display              Do you have a current opioid prescription? No  Do you use any other controlled substances or medications that are not prescribed by a provider? Cannabis    Current providers sharing in care for this patient include:   Patient Care Team:  Elsa Turner APRN CNP as PCP - General (Family Medicine)  Antoine Diaz MD as Fellow  Carlos Cosme MD as Assigned Infectious Disease Provider  Elsa Turner APRN CNP as Assigned PCP  David Patel DPM as Assigned Surgical Provider  Karey Mendez Psy.D, LP as Assigned Behavioral Health Provider  Frieda Dodge, PharmD as Assigned MTM Pharmacist  Fifi Don [facility] as Pharmacist (Pharmacist)  Lashell Mathew RD as Registered Dietitian (Dietitian, Registered)  Karey Alexander LICSW as   Elsa Turner APRN CNP as Assigned Pain Medication Provider  Evan Maguire APRN CNP as Assigned Nephrology Provider  Pee Webber MD as MD (Surgery)    The following health maintenance items are reviewed in Epic and correct as of today:  Health Maintenance   Topic Date Due    MICROALBUMIN  Never done    DIABETIC FOOT EXAM  Never done    DEPRESSION ACTION PLAN  Never done    MEDICARE ANNUAL WELLNESS VISIT  Never done    HEPATITIS A IMMUNIZATION (1 of 2 - Risk 2-dose series) Never done    PAP  Never done    HEPATITIS B IMMUNIZATION (4 of 4 - Risk Dialysis Recombivax 3-dose series) 05/08/2016    LIPID  04/10/2018    COLORECTAL CANCER SCREENING  03/23/2019    MAMMO SCREENING  02/09/2024    COVID-19 Vaccine (6 - 2023-24 season)  01/29/2024    DEPRESSION 12 MO INDEX REPEAT PHQ-9  01/30/2024    BMP  04/10/2024    A1C  07/10/2024    HEMOGLOBIN  07/10/2024    PHQ-9  07/16/2024    EYE EXAM  07/17/2024    NICOTINE/TOBACCO CESSATION COUNSELING Q 1 YR  01/16/2025    ANNUAL REVIEW OF HM ORDERS  01/16/2025    ADVANCE CARE PLANNING  01/16/2029    DTAP/TDAP/TD IMMUNIZATION (3 - Td or Tdap) 08/23/2029    PARATHYROID  Completed    PHOSPHORUS  Completed    HEPATITIS C SCREENING  Completed    HIV SCREENING  Completed    INFLUENZA VACCINE  Completed    Pneumococcal Vaccine: Pediatrics (0 to 5 Years) and At-Risk Patients (6 to 64 Years)  Completed    URINALYSIS  Completed    ALK PHOS  Completed    IPV IMMUNIZATION  Aged Out    HPV IMMUNIZATION  Aged Out    MENINGITIS IMMUNIZATION  Aged Out    RSV MONOCLONAL ANTIBODY  Aged Out       Patient has been advised of split billing requirements and indicates understanding: Yes    Appropriate preventive services were discussed with this patient, including applicable screening as appropriate for fall prevention, nutrition, physical activity, Tobacco-use cessation, weight loss and cognition.  Checklist reviewing preventive services available has been given to the patient.    Current providers sharing in care for this patient include:     Patient Care Team:  Elsa Turner APRN CNP as PCP - General (Family Medicine)  Antoine Diaz MD as Fellow  Carlos Cosme MD as Assigned Infectious Disease Provider  Elsa Turner APRN CNP as Assigned PCP  David Patel DPM as Assigned Surgical Provider  Karey Mendez Psy.D, LP as Assigned Behavioral Health Provider  Frieda Dodge, PharmD as Assigned MTM Pharmacist  Fifi Don [facility] as Pharmacist (Pharmacist)  Lashell Mathew RD as Registered Dietitian (Dietitian, Registered)  Karey Alexander LICSW as   Elsa Turner APRN CNP as Assigned Pain Medication Provider  Evan Maguire APRN CNP as Assigned Nephrology  Provider  Pee Webber MD as MD (Surgery)    The following health maintenance items are reviewed in Epic and correct as of today:  Health Maintenance   Topic Date Due    MICROALBUMIN  Never done    DIABETIC FOOT EXAM  Never done    DEPRESSION ACTION PLAN  Never done    MEDICARE ANNUAL WELLNESS VISIT  Never done    HEPATITIS A IMMUNIZATION (1 of 2 - Risk 2-dose series) Never done    PAP  Never done    HEPATITIS B IMMUNIZATION (4 of 4 - Risk Dialysis Recombivax 3-dose series) 05/08/2016    LIPID  04/10/2018    COLORECTAL CANCER SCREENING  03/23/2019    ANNUAL REVIEW OF HM ORDERS  01/02/2024    NICOTINE/TOBACCO CESSATION COUNSELING Q 1 YR  01/02/2024    MAMMO SCREENING  02/09/2024    COVID-19 Vaccine (6 - 2023-24 season) 01/29/2024    DEPRESSION 12 MO INDEX REPEAT PHQ-9  01/30/2024    BMP  04/10/2024    A1C  07/10/2024    HEMOGLOBIN  07/10/2024    PHQ-9  07/16/2024    EYE EXAM  07/17/2024    ADVANCE CARE PLANNING  01/04/2028    DTAP/TDAP/TD IMMUNIZATION (3 - Td or Tdap) 08/23/2029    PARATHYROID  Completed    PHOSPHORUS  Completed    HEPATITIS C SCREENING  Completed    HIV SCREENING  Completed    INFLUENZA VACCINE  Completed    Pneumococcal Vaccine: Pediatrics (0 to 5 Years) and At-Risk Patients (6 to 64 Years)  Completed    URINALYSIS  Completed    ALK PHOS  Completed    IPV IMMUNIZATION  Aged Out    HPV IMMUNIZATION  Aged Out    MENINGITIS IMMUNIZATION  Aged Out    RSV MONOCLONAL ANTIBODY  Aged Out     Labs reviewed in EPIC  BP Readings from Last 3 Encounters:   01/16/24 135/81   01/10/24 (!) 168/81   01/05/24 (!) 148/76    Wt Readings from Last 3 Encounters:   01/16/24 57.2 kg (126 lb)   01/10/24 58.5 kg (129 lb)   01/05/24 56.2 kg (124 lb)           Mammogram Screening: Mammogram Screening: Recommended mammography every 1-2 years with patient discussion and risk factor consideration        1/2/2023     1:45 PM 1/30/2023    12:10 PM   Breast CA Risk Assessment (FHS-7)   Do you have a family  "history of breast, colon, or ovarian cancer? Yes No / Unknown         Mammogram Screening: Recommended annual mammography  Pertinent mammograms are reviewed under the imaging tab.    [unfilled]  Constitutional, HEENT, cardiovascular, pulmonary, GI, , musculoskeletal, neuro, skin, endocrine and psych systems are negative, except as otherwise noted.    OBJECTIVE:   /81   Pulse 87   Temp 97.6  F (36.4  C) (Oral)   Resp 14   Ht 1.689 m (5' 6.5\")   Wt 57.2 kg (126 lb)   LMP 12/13/2023 (Approximate)   SpO2 100%   BMI 20.03 kg/m   Estimated body mass index is 20.03 kg/m  as calculated from the following:    Height as of this encounter: 1.689 m (5' 6.5\").    Weight as of this encounter: 57.2 kg (126 lb).  [unfilled]  GENERAL: healthy, alert and no distress  NECK: no adenopathy, no asymmetry, masses, or scars and thyroid normal to palpation  MS: no gross musculoskeletal defects noted, no edema    Labs reviewed in Epic    ASSESSMENT / PLAN:       ICD-10-CM    1. Medicare annual wellness visit, subsequent  Z00.00       2. ESRD (end stage renal disease) on dialysis (H)  N18.6     Z99.2       3. Screen for colon cancer  Z12.11       4. Visit for screening mammogram  Z12.31 MA SCREENING DIGITAL BILAT - Future  (s+30)      5. Renovascular hypertension  I15.0       6. Anxiety  F41.9       7. Moderate episode of recurrent major depressive disorder (H)  F33.1       8. Type 1 diabetes mellitus with nephropathy (H)  E10.21       9. Alcohol abuse, in remission  F10.11       10. Gastroparesis  K31.84       11. Superior vena cava syndrome  I87.1         - I reviewed prior chart notes, admission notes, labs, etc. I also reviewed transplant notes. She excited about the potential to get on the transplant list. I reviewed prior labs with her today.   - She will continue to see Endo for Type I DM. Per HPI, her DM appears stable and blood sugars are stable. I reviewed labs from [facility] and notes in care-everywhere as " well  - she see's a SW twice a week and it appears her mental health is good.   - no longer noticing facial swelling or extremity. No sob noted. CBC completed and HGB stable.   - ESRD; appears stable, she follows a low protein diet. [facility] transferred records reviewed.  - VSS  - She is aware pap is due. Plans to come back and get it done. Declined vaccines .   - Not currently working.     Patient has been advised of split billing requirements and indicates understanding: Yes  MED REC REQUIRED  Post Medication Reconciliation Status: discharge medications reconciled, continue medications without change    COUNSELING:  Reviewed preventive health counseling, as reflected in patient instructions    She reports that she has been smoking cigarettes. She has never used smokeless tobacco.  Nicotine/Tobacco Cessation Plan:   Information offered: Patient not interested at this time      Appropriate preventive services were discussed with this patient, including applicable screening as appropriate for fall prevention, nutrition, physical activity, Tobacco-use cessation, weight loss and cognition.  Checklist reviewing preventive services available has been given to the patient.    Reviewed patients plan of care and provided an AVS. The Complex Care Plan (for patients with higher acuity and needing more deliberate coordination of services) for Josefa meets the Care Plan requirement. This Care Plan has been established and reviewed with the Patient.          NATALIA Mccloud [facility]    Identified Health Risks:  I have reviewed Opioid Use Disorder and Substance Use Disorder risk factors and made any needed referrals.        Answers submitted by the patient for this visit:  Patient Health Questionnaire (Submitted on 1/16/2024)  If you checked off any problems, how difficult have these problems made it for you to do your work, take care of things at home, or get along with other people?:  Somewhat difficult  PHQ9 TOTAL SCORE: 3

## 2024-01-17 DIAGNOSIS — Z01.818 PRE-TRANSPLANT EVALUATION FOR KIDNEY TRANSPLANT: Primary | ICD-10-CM

## 2024-01-17 NOTE — Clinical Note
Osman, please help arrange angiogram for patient.   Joanna, please arrange for 2 week follow up with me after angiogram.  Thank you!

## 2024-01-19 ENCOUNTER — TELEPHONE (OUTPATIENT)
Dept: CARDIOLOGY | Facility: CLINIC | Age: 49
End: 2024-01-19
Payer: MEDICARE

## 2024-01-22 LAB
PROTOCOL CUTOFF: NORMAL
SA 1 CELL: NORMAL
SA 1 TEST METHOD: NORMAL
SA 2 CELL: NORMAL
SA 2 TEST METHOD: NORMAL
SA1 HI RISK ABY: NORMAL
SA1 MOD RISK ABY: NORMAL
SA2 HI RISK ABY: NORMAL
SA2 MOD RISK ABY: NORMAL
UNACCEPTABLE ANTIGENS: NORMAL
UNOS CPRA: 0
ZZZSA 1  COMMENTS: NORMAL
ZZZSA 2 COMMENTS: NORMAL

## 2024-01-25 ENCOUNTER — TELEPHONE (OUTPATIENT)
Dept: TRANSPLANT | Facility: CLINIC | Age: 49
End: 2024-01-25
Payer: MEDICARE

## 2024-01-25 DIAGNOSIS — Z99.2 ESRD (END STAGE RENAL DISEASE) ON DIALYSIS (H): ICD-10-CM

## 2024-01-25 DIAGNOSIS — N18.6 ESRD (END STAGE RENAL DISEASE) ON DIALYSIS (H): ICD-10-CM

## 2024-01-25 DIAGNOSIS — Z01.818 PRE-TRANSPLANT EVALUATION FOR KIDNEY TRANSPLANT: ICD-10-CM

## 2024-01-25 DIAGNOSIS — I10 HYPERTENSION, UNSPECIFIED TYPE: ICD-10-CM

## 2024-01-25 DIAGNOSIS — Z87.891 HISTORY OF SMOKING: ICD-10-CM

## 2024-01-25 DIAGNOSIS — E10.9 TYPE 1 DIABETES MELLITUS (H): ICD-10-CM

## 2024-01-25 DIAGNOSIS — Z76.82 ORGAN TRANSPLANT CANDIDATE: ICD-10-CM

## 2024-01-25 DIAGNOSIS — E10.21 TYPE 1 DIABETES MELLITUS WITH NEPHROPATHY (H): Primary | ICD-10-CM

## 2024-01-25 NOTE — LETTER
01/25/24        Josefa Curiel  [address] [address]        Dear Josefa,    It was a pleasure to see you recently for consideration of kidney and pancreas transplantation. Your pre-transplant evaluation results were reviewed at our Multidisciplinary Selection Committee 01/17/2024. The Committee is requesting the following items are completed for your evaluation.     Coronary angiogram and follow up clinic appointment 1-2 weeks post procedure. The cardiology clinic will call you soon to make this appointment.   Aorto iliac artery ultrasound to assess for any blockages or narrowed areas. A  from our Office will call you soon to make this appointment.   Pulmonary Function Test to assess your lung function. A  from our Office will call you soon to make this appointment.   Please have your mental health provider write a summary letter which includes what you are being seen for, how long have been seen, how long are you expected to continue being seen, how you are responding to the therapy and a recommendation for you to proceed with a kidney pancreas transplant. Please have this letter faxed to our Office at [phone] attention to myself.    Dental work needs to remain up to date. Please make an appointment now for a regular check up with a dentist of your choice if it has been longer then 12 months since your last one.   Mammogram and PAP smear both need to be up to date. Please work with your GYN provider for this.   It is recommended that the following vaccinations remain up to date: COVID 19, hepatitis B, pneumovax and Shingrix.  Please work with your primary care clinic for this.   You have already signed the Receipt of Information and KDPI > 85% consents.   I will call you on Monday, 01/29/2024 at 1:00 pm to review the pre transplant education.     Please do let me know as soon as you have completed all of the above appointments.  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    Anesthesia Type:  MAC    Note:  Anesthesia Post Evaluation    Last vitals:  Vitals Value Taken Time   BP     Temp     Pulse     Resp     SpO2         Electronically Signed By: LEANDRO BECK MD  September 2, 2021  11:26 AM   at our Multidisciplinary Selection Committee for determination of next steps and for approval to proceed with transplant.  When approved, you will be eligible to be added onto the kidney alone and simultaneous kidney pancreas transplant waitlists on ACTIVE status. You will also be eligible to proceed with a live donor kidney transplant in the event you have an approved live donor.     Please have any potential live kidney donors register online at [facility] to initiate their evaluations through our program's living donor screening tool at [email or web address]. Please note that potential donors will get an e-mail response once they submit their form within 1-2 business days. Potential donors may call our Main Office Number at [phone] and ask to speak with a donor coordinator if they have questions about the process. You will be notified by your transplant coordinator if a live donor has been approved for donation.    You have not been added onto the kidney and simultaneous kidney pancreas transplant waitlists at this time because you already on dialysis.  When you are added onto the waitlists in the future, your wait time accrual start date will be the same as the start date of your chronic dialysis which is 04/11/2023.       For any questions, please contact myself at the Transplant Office Main Number at [phone] or at my Direct Number at [phone].      Sincerely,  Joanna Starr, RN, BSN  Pre Kidney Pancreas Transplant Coordinator   [facility]  Solid Organ Transplant Care   [address] [place] [address] [address]  [address] [address]  [email or web address]   Office Number: [phone] Direct Number: [phone]   Fax Number: [phone]  Employed by [facility] [employer]       CC's: Elsa Turner CNP, Dr. Carmelo Wolfe, [facility]

## 2024-01-25 NOTE — TELEPHONE ENCOUNTER
Contacted patient to review outcome of selection committee meeting (See selection committee encounter).   Explained to patient that she needs to complete all components of the evaluation to be eligible for active status on the waiting list or to proceed with a live donor kidney transplant.   Reviewed next steps based on outcomes:   to call pt to make appts for: coronary angiogram, aorto iliac artery US, PFT. Pt to make appts for dental/ mammogram and PAP.  I will call pt on Monday 01/29/24 to review pre transplant education. Pt to have live donors register now.   Patient will not be listed (patient is on dialysis and evaluation is not complete), patient will receive:    - An Evaluation Summary Letter indicating what is needed to complete evaluation-discussed with patient if they would like to have testing done with [facility] or locally  Confirmed with patient that on successful completion of outstanding components, patient is eligible for active status and they will receive a follow-up call.   Confirmed that patient has contact information for additional questions or concerns.  Pt expressed excellent understanding of all and was in good agreement with the plan.   Generated Transplant Evaluation Summary Letter today in EPIC - electronically routed to pt/ providers.

## 2024-01-29 ENCOUNTER — TELEPHONE (OUTPATIENT)
Dept: CARDIOLOGY | Facility: CLINIC | Age: 49
End: 2024-01-29
Payer: MEDICARE

## 2024-01-29 ENCOUNTER — TELEPHONE (OUTPATIENT)
Dept: TRANSPLANT | Facility: CLINIC | Age: 49
End: 2024-01-29
Payer: MEDICARE

## 2024-01-29 NOTE — TELEPHONE ENCOUNTER
Called patient today intending to review transplant education at our agreed upon time. Reached pt's PAYTON THAPA I am calling to review the pre transplant patient education. Asked her to call me at her convenience. Left my name/ phone numbers to call.

## 2024-01-30 DIAGNOSIS — E10.21 TYPE 1 DIABETES MELLITUS WITH NEPHROPATHY (H): Primary | ICD-10-CM

## 2024-01-30 DIAGNOSIS — Z01.818 ENCOUNTER FOR PRE-TRANSPLANT EVALUATION FOR KIDNEY AND PANCREAS TRANSPLANT: ICD-10-CM

## 2024-01-30 DIAGNOSIS — Z99.2 ESRD (END STAGE RENAL DISEASE) ON DIALYSIS (H): ICD-10-CM

## 2024-01-30 DIAGNOSIS — Z76.82 ORGAN TRANSPLANT CANDIDATE: ICD-10-CM

## 2024-01-30 DIAGNOSIS — Z87.891 HISTORY OF SMOKING: ICD-10-CM

## 2024-01-30 DIAGNOSIS — Z98.890 STATUS POST LEFT HEART CATHETERIZATION: ICD-10-CM

## 2024-01-30 DIAGNOSIS — Z01.818 PRE-TRANSPLANT EVALUATION FOR KIDNEY TRANSPLANT: ICD-10-CM

## 2024-01-30 DIAGNOSIS — N18.6 ESRD (END STAGE RENAL DISEASE) ON DIALYSIS (H): ICD-10-CM

## 2024-01-30 DIAGNOSIS — I10 HYPERTENSION, UNSPECIFIED TYPE: ICD-10-CM

## 2024-02-06 ENCOUNTER — OFFICE VISIT (OUTPATIENT)
Dept: PULMONOLOGY | Facility: CLINIC | Age: 49
End: 2024-02-06
Attending: NURSE PRACTITIONER
Payer: MEDICARE

## 2024-02-06 ENCOUNTER — ANCILLARY PROCEDURE (OUTPATIENT)
Dept: ULTRASOUND IMAGING | Facility: CLINIC | Age: 49
End: 2024-02-06
Attending: NURSE PRACTITIONER
Payer: MEDICARE

## 2024-02-06 DIAGNOSIS — Z76.82 ORGAN TRANSPLANT CANDIDATE: ICD-10-CM

## 2024-02-06 DIAGNOSIS — Z01.818 PRE-TRANSPLANT EVALUATION FOR KIDNEY TRANSPLANT: ICD-10-CM

## 2024-02-06 DIAGNOSIS — I10 HYPERTENSION, UNSPECIFIED TYPE: ICD-10-CM

## 2024-02-06 DIAGNOSIS — Z87.891 HISTORY OF SMOKING: ICD-10-CM

## 2024-02-06 DIAGNOSIS — E10.21 TYPE 1 DIABETES MELLITUS WITH NEPHROPATHY (H): ICD-10-CM

## 2024-02-06 DIAGNOSIS — E10.9 TYPE 1 DIABETES MELLITUS (H): ICD-10-CM

## 2024-02-06 DIAGNOSIS — Z99.2 ESRD (END STAGE RENAL DISEASE) ON DIALYSIS (H): ICD-10-CM

## 2024-02-06 DIAGNOSIS — N18.6 ESRD (END STAGE RENAL DISEASE) ON DIALYSIS (H): ICD-10-CM

## 2024-02-06 PROCEDURE — 94729 DIFFUSING CAPACITY: CPT | Performed by: INTERNAL MEDICINE

## 2024-02-06 PROCEDURE — 94726 PLETHYSMOGRAPHY LUNG VOLUMES: CPT | Performed by: INTERNAL MEDICINE

## 2024-02-06 PROCEDURE — 94375 RESPIRATORY FLOW VOLUME LOOP: CPT | Performed by: INTERNAL MEDICINE

## 2024-02-06 PROCEDURE — 93978 VASCULAR STUDY: CPT | Performed by: RADIOLOGY

## 2024-02-08 LAB
DLCOUNC-%PRED-PRE: 86 %
DLCOUNC-PRE: 19.28 ML/MIN/MMHG
DLCOUNC-PRED: 22.25 ML/MIN/MMHG
ERV-%PRED-PRE: 94 %
ERV-PRE: 1.23 L
ERV-PRED: 1.29 L
EXPTIME-PRE: 4.82 SEC
FEF2575-%PRED-PRE: 135 %
FEF2575-PRE: 3.92 L/SEC
FEF2575-PRED: 2.89 L/SEC
FEFMAX-%PRED-PRE: 102 %
FEFMAX-PRE: 7.41 L/SEC
FEFMAX-PRED: 7.23 L/SEC
FEV1-%PRED-PRE: 105 %
FEV1-PRE: 3.04 L
FEV1FEV6-PRE: 86 %
FEV1FEV6-PRED: 83 %
FEV1FVC-PRE: 86 %
FEV1FVC-PRED: 82 %
FEV1SVC-PRE: 88 %
FEV1SVC-PRED: 78 %
FIFMAX-PRE: 4.97 L/SEC
FRCPLETH-%PRED-PRE: 113 %
FRCPLETH-PRE: 3.2 L
FRCPLETH-PRED: 2.83 L
FVC-%PRED-PRE: 99 %
FVC-PRE: 3.54 L
FVC-PRED: 3.55 L
IC-%PRED-PRE: 87 %
IC-PRE: 2.25 L
IC-PRED: 2.58 L
RVPLETH-%PRED-PRE: 109 %
RVPLETH-PRE: 1.97 L
RVPLETH-PRED: 1.8 L
TLCPLETH-%PRED-PRE: 101 %
TLCPLETH-PRE: 5.45 L
TLCPLETH-PRED: 5.36 L
VA-%PRED-PRE: 89 %
VA-PRE: 4.69 L
VC-%PRED-PRE: 94 %
VC-PRE: 3.48 L
VC-PRED: 3.69 L

## 2024-02-13 ENCOUNTER — TELEPHONE (OUTPATIENT)
Dept: CARDIOLOGY | Facility: CLINIC | Age: 49
End: 2024-02-13
Payer: MEDICARE

## 2024-02-13 ENCOUNTER — TEAM CONFERENCE (OUTPATIENT)
Dept: TRANSPLANT | Facility: CLINIC | Age: 49
End: 2024-02-13
Payer: MEDICARE

## 2024-02-13 NOTE — TELEPHONE ENCOUNTER
Called Josefa to go over the following instructions. She had no further questions at this time. Instructions previously sent on maribeth tapia will go over those again and call if anything comes up.     Pre-procedure instructions - Coronary Angiogram  Patient Education    Your arrival time is 11:00 on 02/16/24.  Location is [facility] [place] [id number] [place] [address] Waiting Room  Please plan on being at the hospital all day.  At any time, emergencies and/or urgent cases may come up which could delay the start of your procedure.    Pre-procedure instructions - Coronary Angiogram  Shower in the evening before or the morning of the procedure  No solid food for 8 hours prior and nothing to drink 2 hours prior to arrival time  You can take your morning medications (except for diabetic and blood thinners) with sips of water.  Take 325 mg of Asprin 24 hours prior to the procedure and the morning of procedure.   You will need to arrange a ride to drop you off and pick you up, as you will be unable to drive home.  Prior to discharge you may be required to lay flat for approximately 2-4 hours in the recovery unit to ensure proper clotting of the artery. You will need a responsible adult to stay with you for 24 hours post-procedure.              Diabetic Medication Instructions  Hold oral diabetic medication in morning of your procedure and for 48 hours after IV contrast is given  Typical instructions for insulin diabetic medication holding are below. However, please reach out to your Primary Care Provider or Endocrinologist for specific instructions  DO NOT take any oral diabetic medication, short-acting diabetes medications/insulin, humalog or regular insulin the morning of your test  Take   dose of long-acting insulin (Lantus, Levemir) the day of your test  Remember to bring your glucometer and insulin with you to take after your test if needed  DO NOT take injectable  GLP-1 agonists semaglutide (Ozempic, Wegovy), dulaglutide (Trulicity), exenatide ER (Bydureon), tirzepatide (Mounjaro), or oral semaglutide (Rybelsus) for 7 days prior your procedure  Hold once daily injectable GLP-1 agonists exenatide (Byetta), liraglutide (Saxenda, Victoza), lixisenatide (Soligua) the day before and day of your procedure                  Anticoagulation Medication Instructions   NA    You will need to follow up with one of our cardiology APPs 1-2 weeks after your procedure. If you need help scheduling or rescheduling your appointment, please call [phone]

## 2024-02-13 NOTE — TELEPHONE ENCOUNTER
Image Review Meeting    ATTENDEES: Marty Cross    IMAGES REVIEWED: abd pelvis CT 7/14/23 and aorto iliac artery US 2/6/24    DECISION: ok for kidney/pancreas transplant but should stop smoking     INCIDENTALS: No

## 2024-02-13 NOTE — PROGRESS NOTES
[facility]   Cardiology Service  History and Physical      Josefa Curiel MRN# [id number]   YOB: 1975 Age: 48 year old       HPI:   Josefa Curiel is a 48 year old year old female with a medical history significant for DM1, HLD, HTN, mild mitral stenosis, aortic stenosis. ESRD on HD, and ETOH abuse. She presents today for cardiovascular risk assessment as part of pre-kidney transplant evaluation and 2 week post-angiogram follow up.     She is an intermittent smoker; hasn't smoked in a couple of weeks.  She does not currently use alcohol but has struggled with addiction in the past.  She currently uses marijuana via cartridges. Her activity level is mildly active. She goes on a one mile walk 1-2 x weekly.  She denies symptoms of chest pain, dizziness, lightheadedness, palpitations, shortness of breath, orthopnea, syncope, pre-syncope PND, or edema.    Due to her longstanding history of DM1 and other risk factors, she underwent coronary angiogram on 2/16/24 with Dr. Casper. This revealed no hemodynamically significant coronary artery lesions but she did have 30% stenosis of mLAD. Echocardiogram from 12/28/23 with normal EF >70%, no WMA's, moderate mitral stenosis, and mild-moderate aortic stenosis.     Cardiovascular Risk Factor Profile:  coronary artery disease , hypertension , and diabetes    Current Cardiovascular Symptoms:  None    ACC HF Stage:  StageB    NYHA Class:  Class I    Functional Capacity:  Performs 4 METS exercise without symptoms (e.g., light housework, stairs, 4 mph walk, 7 mph bike, slow step dance)      Past Medical History:   Diagnosis Date    LORIN (acute kidney injury) (H24)     Anxiety     Cannabis abuse     Depression     Diabetes Type 1, uncontrolled 1985    Onset age 10    DKA (diabetic ketoacidoses)     ESRD (end stage renal disease) on dialysis (H) 04/11/2023    start date per 2728 form    ETOH abuse     Gastroparesis     HLD  (hyperlipidemia)     HTN (hypertension)     Lactic acidosis     Vitamin D deficiency        Past Surgical History:   Procedure Laterality Date    ANKLE FRACTURE SURGERY Left     2007 [facility], [place]    APPENDECTOMY      EYE SURGERY      retinal surgery Dr. Nagi Tang    INCISION AND DRAINAGE FOOT, COMBINED Right 11/18/2022    Procedure: INCISION AND DRAINAGE, right foot;  Surgeon: David Patel DPM;  Location: [facility] Main OR    IR CVC TUNNEL PLACEMENT > 5 YRS OF AGE  11/30/2023    IR CVC TUNNEL REMOVAL RIGHT  12/28/2023    IR DIALYSIS FISTULOGRAM LEFT  12/29/2023    IR UPPER EXTREMITY VENOGRAM RIGHT  12/29/2023    IR UPPER EXTREMITY VENOGRAM RIGHT  1/2/2024    PICC SINGLE LUMEN PLACEMENT  10/23/2022            acetaminophen (TYLENOL) 500 MG tablet, Take 2 tablets (1,000 mg) by mouth every 6 hours as needed for pain  apixaban ANTICOAGULANT (ELIQUIS) 5 MG tablet, Take 2 tablets (10 mg) by mouth 2 times daily for 6 days, THEN 1 tablet (5 mg) 2 times daily for 90 days.  B Complex-C-Folic Acid (VIRT-CAPS) 1 MG CAPS, TAKE 1 CAPSULE BY MOUTH DAILY WITH EVENING MEAL  Cholecalciferol (VITAMIN D3) 50 MCG (2000 UT) CHEW, Take 50 mcg by mouth daily Take one tablet on Monday, Wednesday and Friday  Continuous Blood Gluc Sensor (DEXCOM G6 SENSOR) MISC,   Continuous Blood Gluc Transmit (DEXCOM G6 TRANSMITTER) MISC, CHANGE EVERY 90 DAYS  Cyanocobalamin (VITAMIN B-12) 500 MCG LOZG, Take 500 mcg by mouth daily  ferrous sulfate (FEROSUL) 325 (65 Fe) MG tablet, Take 1 tablet (325 mg) by mouth daily  insulin degludec (TRESIBA) 100 UNIT/ML pen, Inject 16 Units Subcutaneous daily  insulin lispro (HUMALOG KWIKPEN) 100 UNIT/ML (1 unit dial) KWIKPEN, Inject 3-4 Units Subcutaneous 4 times daily with meals or snacks  iron sucrose (VENOFER) 20 MG/ML injection, 50 mg by Intravenous Push route  lidocaine-prilocaine (EMLA) 2.5-2.5 % external cream, Apply topically three times a week Monday, Wednesday, Friday  losartan (COZAAR) 25 MG  "tablet, Take 25 mg by mouth daily  rOPINIRole (REQUIP) 0.25 MG tablet, Take 1 mg by mouth at bedtime  sertraline (ZOLOFT) 100 MG tablet, Take 100 mg by mouth daily Take 1 tablet( 100 mg) along with 50 mg for the total dose  sertraline (ZOLOFT) 50 MG tablet, Take 50 mg by mouth daily Take 1 tablet (50 mg) along with 100 mg for the total dose of 150 mg  sevelamer carbonate (RENVELA) 800 MG tablet, Take 800 mg by mouth 2 times daily (with meals)  traZODone (DESYREL) 100 MG tablet, Take 100 mg by mouth At Bedtime    No current facility-administered medications on file prior to visit.      Family History   Problem Relation Age of Onset    Clotting Disorder Mother         \"thin blood\"    Arthritis Mother     Hyperlipidemia Mother     Skin Cancer Father         face/nose     Depression Father     Other - See Comments Sister         eye surgeries    No Known Problems Brother     Coronary Artery Disease Maternal Grandmother     Alcoholism Maternal Grandfather     Coronary Artery Disease Maternal Grandfather     No Known Problems Paternal Grandmother     No Known Problems Paternal Grandfather     Cancer No family hx of     Kidney Disease No family hx of     Diabetes No family hx of        Social History     Tobacco Use    Smoking status: Some Days     Types: Cigarettes    Smokeless tobacco: Never   Substance Use Topics    Alcohol use: Not Currently     Alcohol/week: 35.0 standard drinks of alcohol     Types: 35 Standard drinks or equivalent per week     Comment: Alcoholic Drinks/day: ~750 mL wine daily, sober since 10/26/23       Allergies   Allergen Reactions    Colestipol GI Disturbance and Nausea and Vomiting    Doxycycline Nausea and Vomiting    Cefazolin Nephrotoxicity and Other (See Comments)    Nickel Rash    Penicillin G Rash    Penicillins Rash         Review Of Systems:   CONSTITUTIONAL: No report of fevers or chills  RESPIRATORY: No cough, wheezing, SOB, or hemoptysis  CARDIOVASCULAR: see HPI  MUSCULO-SKELETAL: No " joint pain or swelling, no muscle pain  NEURO: No paresthesias, syncope, pre-syncope, lightheadness, dizziness or vertigo  ENDOCRINE: No temperature intolerance, no skin/hair changes  PSYCHIATRIC: No change in mood, feeling down/anxious, no change in sleep or appetite  GI: No melena or hematochezia, no change in bowel habits  : on HD  HEME: No easy bruising or bleeding, no history of anemia, no history of blood clots  SKIN: No rashes or sores, no unusual itching      Physical Examination:  Vitals: /69   Pulse 97   Wt 61 kg (134 lb 6.4 oz)   LMP 2023 (Approximate)   SpO2 100%   BMI 21.37 kg/m    BMI= Body mass index is 21.37 kg/m .    GENERAL APPEARANCE: healthy, alert and no distress  HEENT: no icterus, no xanthelasmas, normal pupil size and reaction, normal palate, mucosa moist  NECK: JVP not elevated , brisk carotid upstroke bilaterally  CHEST: lungs clear to auscultation without rales, rhonchi or wheezes, no use of accessory muscles, no retractions  CARDIOVASCULAR: regular rhythm, normal S1 and S2, no S3 or S4 and no murmur, click or rub.  ABDOMEN: soft, non tender, without hepatosplenomegaly, no masses palpable, bowel sounds normal  EXTREMITIES: warm, No LE edema, DP/PT pulses 2+ bilaterally, no clubbing or cyanosis   NEURO: alert and oriented to person/place/time, normal speech, gait and affect  SKIN: no ecchymoses, no rashes      Laboratory:  Last Comprehensive Metabolic Panel:  Lab Results   Component Value Date     (L) 01/10/2024    POTASSIUM 4.4 01/10/2024    CHLORIDE 96 (L) 01/10/2024    CO2 26 01/10/2024    ANIONGAP 10 01/10/2024     (H) 01/10/2024    BUN 23.3 (H) 01/10/2024    CR 4.63 (H) 01/10/2024    GFRESTIMATED 11 (L) 01/10/2024    ZACH 8.8 01/10/2024       Last CBC:  CBC RESULTS:   Recent Labs   Lab Test 01/10/24  1244   WBC 7.5   RBC 2.85*   HGB 8.7*   HCT 27.1*   MCV 95   MCH 30.5   MCHC 32.1   RDW 13.2          24 EK/28/23 Echocardiogram:  The  left ventricle is normal in size.  No regional wall motion abnormalities noted.  Normal right ventricle size and systolic function.  There is moderate mitral annular calcification.  Thickened mitral valve posterior leaflet  Decreased mobility of posterior MV leaflet.  There is moderate mitral stenosis.  9 mm Mean gradient at HR of 119.  Mild to moderate valvular aortic stenosis.  Mild increased flow velocity across PV likely related to hyperdynamic state.  IVC diameter and respiratory changes fall into an intermediate range  suggesting an RA pressure of 8 mmHg.  The rhythm was sinus tachycardia.  Posterior leaflet has calcified nodule that is more prominent than MARAH from  10/21/22. No clear vegetation but cannot exclude vegetation.    2/16/24 Coronary Angiogram:  - No hemodynamically significant coronary artery lesions       Assessment and Plan:     # Coronary Artery Disease  # Hyperlipidemia  # Moderate Mitral Valve Stenosis  # Mild-Moderate Aortic Valve Stenosis  Patient risk factors for CAD include ongoing tobacco use, hyperlipidemia, and long-term history of DM1. She underwent coronary angiogram on 2/16/24 which demonstrated no hemodynamically significant coronary artery lesions. Her most recent echo was with normal EF (>70%) but moderate mitral stenosis and mild-moderate aortic stenosis; no WMA's. Today she denies any chest pain, dizziness, lightheadedness, palpitations, orthopnea, PND, syncope/pre-syncope or edema. Most recent lipids done 11/22/23 --> LDL 80, Total 185. She is not currently on statin therapy.  - Yearly echocardiogram for surveillance of valves   - Start Aspirin 81mg daily   - Start Crestor 42mg q HS   - Aggressive risk factor modification   - Cardiology follow up in one year with echocardiogram prior   - No additional testing needed prior to transplant     # Hypertension   Blood pressure in office today 116/69. She does not monitor her BP's at home.   - Continue Losartan 25mg daily     #  Diabetes Mellitus Type 1  Most recent A1c 7.5% on 1/10/24. Home regimen includes Tresiba 16 units daily and Humalog sliding scale. She follows with endocrinology at [facility], Dr. Virk. Her blood sugars average around 120 at home. Previously had hypoglycemic episodes at night; low as 35. She was mildly symptomatic.   - Encouraged to keep snack at bedside and at all times   - Educated patient on the importance of healthy diet and exercise in reducing risk for heart disease   - Encouraged 150 minutes/week of moderate intensity exercise   - Encouraged healthy weight loss; discussed Mediterranean diet   - Follow-up with cardiology in one year     # ESRD on HD  Currently undergoing evaluation for kidney transplant.   - Ongoing follow up with SOT clinic           RCRI Score:   - High risk surgery (>5% cardiac complication risk) = 1 point   - Diabetes Mellitus (on Insulin) = 1 point   - Serum Creatinine >2.0 mg/dl = 1 point          I have reviewed and updated the patient's Past Medical History, Social History, Family History and Medication List.       NATALIA English, CNP  [facility] Cardiology      Review of prior external note(s) from - Care Everywhere  Review of the result(s) of each unique test - Echocardiogram, angiogram, EKG  Ordering of each unique test  Prescription drug management Medication reconciliation, Crestor, Aspirin     35 minutes spent by me on the date of the encounter doing chart review, review of outside records, review of test results, patient visit, and documentation     NATALIA Hensley CNP on 3/13/2024 at 12:06 PM

## 2024-02-15 DIAGNOSIS — I25.9: Primary | ICD-10-CM

## 2024-02-15 RX ORDER — SODIUM CHLORIDE 9 MG/ML
INJECTION, SOLUTION INTRAVENOUS CONTINUOUS
Status: CANCELLED | OUTPATIENT
Start: 2024-02-15

## 2024-02-15 RX ORDER — POTASSIUM CHLORIDE 1500 MG/1
40 TABLET, EXTENDED RELEASE ORAL
Status: CANCELLED | OUTPATIENT
Start: 2024-02-15

## 2024-02-15 RX ORDER — POTASSIUM CHLORIDE 1500 MG/1
20 TABLET, EXTENDED RELEASE ORAL
Status: CANCELLED | OUTPATIENT
Start: 2024-02-15

## 2024-02-15 RX ORDER — ASPIRIN 325 MG
325 TABLET ORAL ONCE
Status: CANCELLED | OUTPATIENT
Start: 2024-02-15 | End: 2024-02-15

## 2024-02-15 RX ORDER — ASPIRIN 81 MG/1
243 TABLET, CHEWABLE ORAL ONCE
Status: CANCELLED | OUTPATIENT
Start: 2024-02-15

## 2024-02-15 RX ORDER — LIDOCAINE 40 MG/G
CREAM TOPICAL
Status: CANCELLED | OUTPATIENT
Start: 2024-02-15

## 2024-02-15 NOTE — PROGRESS NOTES
Date: 2/15/2024    Time of Call: 1:54 PM     Diagnosis: pre-angio for transplant workup     [ VORB ] Ordering provider: Leana Alfaro   Order: pre-angio order set     Order received by: Lluvia Clark     Follow-up/additional notes: Pt's last dose of eliquis was 2/14 at noon. Plan for her to hold starting today.

## 2024-02-16 ENCOUNTER — APPOINTMENT (OUTPATIENT)
Dept: LAB | Facility: CLINIC | Age: 49
End: 2024-02-16
Attending: INTERNAL MEDICINE
Payer: MEDICARE

## 2024-02-16 ENCOUNTER — APPOINTMENT (OUTPATIENT)
Dept: MEDSURG UNIT | Facility: CLINIC | Age: 49
End: 2024-02-16
Attending: INTERNAL MEDICINE
Payer: MEDICARE

## 2024-02-16 ENCOUNTER — HOSPITAL ENCOUNTER (OUTPATIENT)
Facility: CLINIC | Age: 49
Discharge: HOME | End: 2024-02-16
Attending: INTERNAL MEDICINE | Admitting: INTERNAL MEDICINE
Payer: MEDICARE

## 2024-02-16 VITALS
HEIGHT: 67 IN | SYSTOLIC BLOOD PRESSURE: 156 MMHG | RESPIRATION RATE: 12 BRPM | TEMPERATURE: 98 F | DIASTOLIC BLOOD PRESSURE: 84 MMHG | OXYGEN SATURATION: 100 % | BODY MASS INDEX: 20.23 KG/M2 | WEIGHT: 128.9 LBS | HEART RATE: 86 BPM

## 2024-02-16 DIAGNOSIS — Z01.818 PRE-TRANSPLANT EVALUATION FOR KIDNEY TRANSPLANT: ICD-10-CM

## 2024-02-16 DIAGNOSIS — I13.2 BENIGN HYPERTENSIVE HEART AND KIDNEY DISEASE WITH HEART FAILURE AND CHRONIC KIDNEY DISEASE STAGE V OR END STAGE RENAL DISEASE(404.13) (H): Primary | ICD-10-CM

## 2024-02-16 DIAGNOSIS — I25.9: ICD-10-CM

## 2024-02-16 DIAGNOSIS — N18.5 BENIGN HYPERTENSIVE HEART AND KIDNEY DISEASE WITH HEART FAILURE AND CHRONIC KIDNEY DISEASE STAGE V OR END STAGE RENAL DISEASE(404.13) (H): Primary | ICD-10-CM

## 2024-02-16 PROBLEM — Z98.890 STATUS POST CORONARY ANGIOGRAM: Status: ACTIVE | Noted: 2024-02-16

## 2024-02-16 LAB
ANION GAP SERPL CALCULATED.3IONS-SCNC: 16 MMOL/L (ref 7–15)
APTT PPP: 30 SECONDS (ref 22–38)
BUN SERPL-MCNC: 24.5 MG/DL (ref 6–20)
CALCIUM SERPL-MCNC: 9.5 MG/DL (ref 8.6–10)
CHLORIDE SERPL-SCNC: 96 MMOL/L (ref 98–107)
CREAT SERPL-MCNC: 3.64 MG/DL (ref 0.51–0.95)
DEPRECATED HCO3 PLAS-SCNC: 27 MMOL/L (ref 22–29)
EGFRCR SERPLBLD CKD-EPI 2021: 15 ML/MIN/1.73M2
ERYTHROCYTE [DISTWIDTH] IN BLOOD BY AUTOMATED COUNT: 14.5 % (ref 10–15)
GLUCOSE BLDC GLUCOMTR-MCNC: 115 MG/DL (ref 70–99)
GLUCOSE BLDC GLUCOMTR-MCNC: 156 MG/DL (ref 70–99)
GLUCOSE BLDC GLUCOMTR-MCNC: 51 MG/DL (ref 70–99)
GLUCOSE BLDC GLUCOMTR-MCNC: 53 MG/DL (ref 70–99)
GLUCOSE SERPL-MCNC: 93 MG/DL (ref 70–99)
HCG INTACT+B SERPL-ACNC: <1 MIU/ML
HCT VFR BLD AUTO: 38.5 % (ref 35–47)
HGB BLD-MCNC: 12.8 G/DL (ref 11.7–15.7)
INR PPP: 0.99 (ref 0.85–1.15)
MCH RBC QN AUTO: 31.4 PG (ref 26.5–33)
MCHC RBC AUTO-ENTMCNC: 33.2 G/DL (ref 31.5–36.5)
MCV RBC AUTO: 95 FL (ref 78–100)
PLATELET # BLD AUTO: 342 10E3/UL (ref 150–450)
POTASSIUM SERPL-SCNC: 4.5 MMOL/L (ref 3.4–5.3)
RBC # BLD AUTO: 4.07 10E6/UL (ref 3.8–5.2)
SODIUM SERPL-SCNC: 139 MMOL/L (ref 135–145)
WBC # BLD AUTO: 8.5 10E3/UL (ref 4–11)

## 2024-02-16 PROCEDURE — 80048 BASIC METABOLIC PNL TOTAL CA: CPT | Performed by: NURSE PRACTITIONER

## 2024-02-16 PROCEDURE — 250N000011 HC RX IP 250 OP 636: Performed by: STUDENT IN AN ORGANIZED HEALTH CARE EDUCATION/TRAINING PROGRAM

## 2024-02-16 PROCEDURE — 99152 MOD SED SAME PHYS/QHP 5/>YRS: CPT | Performed by: INTERNAL MEDICINE

## 2024-02-16 PROCEDURE — 36415 COLL VENOUS BLD VENIPUNCTURE: CPT | Performed by: NURSE PRACTITIONER

## 2024-02-16 PROCEDURE — 999N000142 HC STATISTIC PROCEDURE PREP ONLY

## 2024-02-16 PROCEDURE — 82962 GLUCOSE BLOOD TEST: CPT

## 2024-02-16 PROCEDURE — 999N000134 HC STATISTIC PP CARE STAGE 3

## 2024-02-16 PROCEDURE — 250N000009 HC RX 250: Performed by: INTERNAL MEDICINE

## 2024-02-16 PROCEDURE — 999N000127 HC STATISTIC PERIPHERAL IV START W US GUIDANCE

## 2024-02-16 PROCEDURE — 96375 TX/PRO/DX INJ NEW DRUG ADDON: CPT

## 2024-02-16 PROCEDURE — 84702 CHORIONIC GONADOTROPIN TEST: CPT | Performed by: INTERNAL MEDICINE

## 2024-02-16 PROCEDURE — 258N000003 HC RX IP 258 OP 636: Performed by: NURSE PRACTITIONER

## 2024-02-16 PROCEDURE — 99153 MOD SED SAME PHYS/QHP EA: CPT | Performed by: INTERNAL MEDICINE

## 2024-02-16 PROCEDURE — 250N000013 HC RX MED GY IP 250 OP 250 PS 637: Performed by: NURSE PRACTITIONER

## 2024-02-16 PROCEDURE — 99152 MOD SED SAME PHYS/QHP 5/>YRS: CPT | Mod: GC | Performed by: INTERNAL MEDICINE

## 2024-02-16 PROCEDURE — 93454 CORONARY ARTERY ANGIO S&I: CPT | Performed by: INTERNAL MEDICINE

## 2024-02-16 PROCEDURE — 85730 THROMBOPLASTIN TIME PARTIAL: CPT | Performed by: NURSE PRACTITIONER

## 2024-02-16 PROCEDURE — 85041 AUTOMATED RBC COUNT: CPT | Performed by: NURSE PRACTITIONER

## 2024-02-16 PROCEDURE — 96374 THER/PROPH/DIAG INJ IV PUSH: CPT | Mod: XU

## 2024-02-16 PROCEDURE — 85610 PROTHROMBIN TIME: CPT | Performed by: NURSE PRACTITIONER

## 2024-02-16 PROCEDURE — C1894 INTRO/SHEATH, NON-LASER: HCPCS | Performed by: INTERNAL MEDICINE

## 2024-02-16 PROCEDURE — 258N000001 HC RX 258: Performed by: STUDENT IN AN ORGANIZED HEALTH CARE EDUCATION/TRAINING PROGRAM

## 2024-02-16 PROCEDURE — 250N000011 HC RX IP 250 OP 636: Performed by: INTERNAL MEDICINE

## 2024-02-16 PROCEDURE — 272N000001 HC OR GENERAL SUPPLY STERILE: Performed by: INTERNAL MEDICINE

## 2024-02-16 PROCEDURE — 93454 CORONARY ARTERY ANGIO S&I: CPT | Mod: 26 | Performed by: INTERNAL MEDICINE

## 2024-02-16 RX ORDER — SODIUM CHLORIDE 9 MG/ML
INJECTION, SOLUTION INTRAVENOUS CONTINUOUS
Status: DISCONTINUED | OUTPATIENT
Start: 2024-02-16 | End: 2024-02-16 | Stop reason: HOSPADM

## 2024-02-16 RX ORDER — ASPIRIN 81 MG/1
243 TABLET, CHEWABLE ORAL ONCE
Status: COMPLETED | OUTPATIENT
Start: 2024-02-16 | End: 2024-02-16

## 2024-02-16 RX ORDER — LIDOCAINE 40 MG/G
CREAM TOPICAL
Status: DISCONTINUED | OUTPATIENT
Start: 2024-02-16 | End: 2024-02-16 | Stop reason: HOSPADM

## 2024-02-16 RX ORDER — NALOXONE HYDROCHLORIDE 0.4 MG/ML
0.2 INJECTION, SOLUTION INTRAMUSCULAR; INTRAVENOUS; SUBCUTANEOUS
Status: DISCONTINUED | OUTPATIENT
Start: 2024-02-16 | End: 2024-02-16 | Stop reason: HOSPADM

## 2024-02-16 RX ORDER — NALOXONE HYDROCHLORIDE 0.4 MG/ML
0.4 INJECTION, SOLUTION INTRAMUSCULAR; INTRAVENOUS; SUBCUTANEOUS
Status: DISCONTINUED | OUTPATIENT
Start: 2024-02-16 | End: 2024-02-16 | Stop reason: HOSPADM

## 2024-02-16 RX ORDER — HYDRALAZINE HYDROCHLORIDE 20 MG/ML
5 INJECTION INTRAMUSCULAR; INTRAVENOUS
Status: COMPLETED | OUTPATIENT
Start: 2024-02-16 | End: 2024-02-16

## 2024-02-16 RX ORDER — OXYCODONE HYDROCHLORIDE 5 MG/1
10 TABLET ORAL EVERY 4 HOURS PRN
Status: DISCONTINUED | OUTPATIENT
Start: 2024-02-16 | End: 2024-02-16 | Stop reason: HOSPADM

## 2024-02-16 RX ORDER — DEXTROSE MONOHYDRATE 25 G/50ML
50 INJECTION, SOLUTION INTRAVENOUS ONCE
Status: COMPLETED | OUTPATIENT
Start: 2024-02-16 | End: 2024-02-16

## 2024-02-16 RX ORDER — IOPAMIDOL 755 MG/ML
INJECTION, SOLUTION INTRAVASCULAR
Status: DISCONTINUED | OUTPATIENT
Start: 2024-02-16 | End: 2024-02-16 | Stop reason: HOSPADM

## 2024-02-16 RX ORDER — OXYCODONE HYDROCHLORIDE 5 MG/1
5 TABLET ORAL EVERY 4 HOURS PRN
Status: DISCONTINUED | OUTPATIENT
Start: 2024-02-16 | End: 2024-02-16 | Stop reason: HOSPADM

## 2024-02-16 RX ORDER — FENTANYL CITRATE 50 UG/ML
25 INJECTION, SOLUTION INTRAMUSCULAR; INTRAVENOUS
Status: DISCONTINUED | OUTPATIENT
Start: 2024-02-16 | End: 2024-02-16 | Stop reason: HOSPADM

## 2024-02-16 RX ORDER — ASPIRIN 325 MG
325 TABLET ORAL ONCE
Status: COMPLETED | OUTPATIENT
Start: 2024-02-16 | End: 2024-02-16

## 2024-02-16 RX ORDER — FENTANYL CITRATE 50 UG/ML
INJECTION, SOLUTION INTRAMUSCULAR; INTRAVENOUS
Status: DISCONTINUED | OUTPATIENT
Start: 2024-02-16 | End: 2024-02-16 | Stop reason: HOSPADM

## 2024-02-16 RX ORDER — ATROPINE SULFATE 0.1 MG/ML
0.5 INJECTION INTRAVENOUS
Status: DISCONTINUED | OUTPATIENT
Start: 2024-02-16 | End: 2024-02-16 | Stop reason: HOSPADM

## 2024-02-16 RX ORDER — NICOTINE POLACRILEX 4 MG
15-30 LOZENGE BUCCAL
Status: DISCONTINUED | OUTPATIENT
Start: 2024-02-16 | End: 2024-02-16 | Stop reason: HOSPADM

## 2024-02-16 RX ORDER — DEXTROSE MONOHYDRATE 25 G/50ML
25-50 INJECTION, SOLUTION INTRAVENOUS
Status: DISCONTINUED | OUTPATIENT
Start: 2024-02-16 | End: 2024-02-16 | Stop reason: HOSPADM

## 2024-02-16 RX ORDER — FLUMAZENIL 0.1 MG/ML
0.2 INJECTION, SOLUTION INTRAVENOUS
Status: DISCONTINUED | OUTPATIENT
Start: 2024-02-16 | End: 2024-02-16 | Stop reason: HOSPADM

## 2024-02-16 RX ORDER — POTASSIUM CHLORIDE 750 MG/1
40 TABLET, EXTENDED RELEASE ORAL
Status: DISCONTINUED | OUTPATIENT
Start: 2024-02-16 | End: 2024-02-16 | Stop reason: HOSPADM

## 2024-02-16 RX ORDER — POTASSIUM CHLORIDE 750 MG/1
20 TABLET, EXTENDED RELEASE ORAL
Status: DISCONTINUED | OUTPATIENT
Start: 2024-02-16 | End: 2024-02-16 | Stop reason: HOSPADM

## 2024-02-16 RX ADMIN — DEXTROSE MONOHYDRATE 25 ML: 25 INJECTION, SOLUTION INTRAVENOUS at 13:46

## 2024-02-16 RX ADMIN — DEXTROSE MONOHYDRATE 25 ML: 25 INJECTION, SOLUTION INTRAVENOUS at 16:10

## 2024-02-16 RX ADMIN — ASPIRIN 325 MG ORAL TABLET 325 MG: 325 PILL ORAL at 12:44

## 2024-02-16 RX ADMIN — HYDRALAZINE HYDROCHLORIDE 5 MG: 20 INJECTION INTRAMUSCULAR; INTRAVENOUS at 17:23

## 2024-02-16 RX ADMIN — SODIUM CHLORIDE: 9 INJECTION, SOLUTION INTRAVENOUS at 12:32

## 2024-02-16 ASSESSMENT — ACTIVITIES OF DAILY LIVING (ADL)
ADLS_ACUITY_SCORE: 37

## 2024-02-16 NOTE — PROGRESS NOTES
Pt arrived on 2A, room 10 for CORS/PCI for transplant workup. Vitals stable. Denies pain. Prep complete. Labs reviewed. Cr 3.64-pt has ESRD and on dialysis MWF. Needs consent signed. Last Eliquis dose 2/14 per pt. Significant other Wilbert at bedside who will drive pt home: [phone]

## 2024-02-16 NOTE — PROGRESS NOTES
Dr Renee updated regarding elevated bp: 170s-180s systolic. MD stated high bp related to her hypoglycemia. Will continue to monitor and administer hydralazine if bp doesn't get better (over 180 systolic). Pt eating/drinking now.

## 2024-02-16 NOTE — PROGRESS NOTES
Pt arrived back on 2A post Coronary Angiogram. Alert and oriented. Bp slightly elevated at 162/90. Rhythm: NSR. Right groin with 4fr arterial sheath in place, site soft, dry and intact. CMS intact. Denies any pain. Gluc 51, asymptomatic. Pt took 1 glucose tablet. 1/2 amp of D50 given. Dr Renee updated.

## 2024-02-16 NOTE — PROVIDER NOTIFICATION
Gluc dropped to 53. Dr Renee updated. 1/2 amp of D50 given and pt already took one glucose tablet. Recheck gluc 156.

## 2024-02-16 NOTE — Clinical Note
Report called to 2A Pooja TAPIA. Procedure indication, procedure, outcome, and [ost-status reviewed. Pt returned to 2A per stretcher in awake and stable condition.

## 2024-02-16 NOTE — DISCHARGE INSTRUCTIONS
Going Home after a Coronary Angiogram  ______________________________________________      After you go home:  Have an adult stay with you for 24 hours.  Drink plenty of fluids.  You may eat your normal diet, unless your doctor tells you otherwise.  For 24 hours:  Relax and take it easy.  Do NOT smoke.  Do NOT make any important or legal decisions.  Do NOT drive or operate machines at home or at work.  Do NOT drink alcohol.  Remove the Band-Aid after 24 hours. If there is minor oozing, apply another Band-aid and remove it after 12 hours.  For 2 days, do NOT have sex or do any heavy exercise.  Do NOT take a bath, or use a hot tub or pool for at least 3 days. You may shower.    Care of groin site  It is normal to have a small bruise or lump at the site.  Do not scrub the site.  For the first 2 days: Do not stoop or squat. When you cough, sneeze or move your bowels, hold your hand over the puncture site and press gently.  Do not lift more than 10 pounds for at least 3 to 5 days.  Do not use lotion or powder near the puncture site for 3 days.    If you start bleeding from the site in your groin, lie down flat and press firmly  on the site. Call your doctor as soon as you can.    Medicines  If you have stopped any other medicines, check with your nurse or provider about when to restart them.    Call 911 right away if you have bleeding that is heavy or does not stop.    Call your doctor if:  You have a large or growing hard lump around the site.  The site is red, swollen, hot or tender.  Blood or fluid is draining from the site.  You have chills or a fever greater than 101 F (38 C).  Your leg feels numb or cool.  You have hives, a rash or unusual itching.      [facility] [place]:  [phone] (7 days a week)

## 2024-02-17 NOTE — PROGRESS NOTES
Pt completed 2 hours flat bedrest without any complications. Bp improved: 130s-150s systolic. Rhythm: NSR. Right groin access site covered with primapore soft, dry and intact before and after ambulation. CMS intact. Denies any pain. Pt tolerating po intake and ambulated in núñez with steady gait. Pt did not have to void-reports she voids once every other day. Discharge instructions reviewed with pt and significant other Wilbert and they verbalizes understanding. PIV removed. Pt escorted via wheelchair to SO's car. All belongings returned to pt.

## 2024-02-19 ENCOUNTER — TELEPHONE (OUTPATIENT)
Dept: CARDIOLOGY | Facility: CLINIC | Age: 49
End: 2024-02-19
Payer: MEDICARE

## 2024-02-19 LAB
ATRIAL RATE - MUSE: 84 BPM
DIASTOLIC BLOOD PRESSURE - MUSE: NORMAL MMHG
INTERPRETATION ECG - MUSE: NORMAL
P AXIS - MUSE: 66 DEGREES
PR INTERVAL - MUSE: 144 MS
QRS DURATION - MUSE: 92 MS
QT - MUSE: 396 MS
QTC - MUSE: 467 MS
R AXIS - MUSE: 64 DEGREES
SYSTOLIC BLOOD PRESSURE - MUSE: NORMAL MMHG
T AXIS - MUSE: 61 DEGREES
VENTRICULAR RATE- MUSE: 84 BPM

## 2024-02-19 NOTE — TELEPHONE ENCOUNTER
S-(situation): called patient post coronary angiography Friday February 16.   There are no hemodynamically significant coronary artery lesions.     Left femoral artery used for access. She states it is flat and dry, no bruising. Reviewed activity restrictions. No new meds at discharge.      B-(background):   Josefa Curiel is a 48 year old female with past medical history of type I DM, ESRD on dialysis, HTN.       A-(assessment): no obstructive coronary artery disease    R-(recommendations): follow up with Lazaar Roper NP

## 2024-03-13 ENCOUNTER — OFFICE VISIT (OUTPATIENT)
Dept: TRANSPLANT | Facility: CLINIC | Age: 49
End: 2024-03-13
Payer: MEDICARE

## 2024-03-13 VITALS
DIASTOLIC BLOOD PRESSURE: 69 MMHG | BODY MASS INDEX: 21.37 KG/M2 | WEIGHT: 134.4 LBS | SYSTOLIC BLOOD PRESSURE: 116 MMHG | HEART RATE: 97 BPM | OXYGEN SATURATION: 100 %

## 2024-03-13 DIAGNOSIS — Z98.890 STATUS POST LEFT HEART CATHETERIZATION: ICD-10-CM

## 2024-03-13 DIAGNOSIS — E10.21 TYPE 1 DIABETES MELLITUS WITH NEPHROPATHY (H): ICD-10-CM

## 2024-03-13 DIAGNOSIS — Z01.818 ENCOUNTER FOR PRE-TRANSPLANT EVALUATION FOR KIDNEY AND PANCREAS TRANSPLANT: ICD-10-CM

## 2024-03-13 DIAGNOSIS — Z01.818 PRE-TRANSPLANT EVALUATION FOR KIDNEY TRANSPLANT: ICD-10-CM

## 2024-03-13 DIAGNOSIS — Z99.2 ESRD (END STAGE RENAL DISEASE) ON DIALYSIS (H): ICD-10-CM

## 2024-03-13 DIAGNOSIS — I10 HYPERTENSION, UNSPECIFIED TYPE: ICD-10-CM

## 2024-03-13 DIAGNOSIS — I35.0 NONRHEUMATIC AORTIC VALVE STENOSIS: Primary | ICD-10-CM

## 2024-03-13 DIAGNOSIS — Z76.82 ORGAN TRANSPLANT CANDIDATE: ICD-10-CM

## 2024-03-13 DIAGNOSIS — N18.6 ESRD (END STAGE RENAL DISEASE) ON DIALYSIS (H): ICD-10-CM

## 2024-03-13 DIAGNOSIS — Z87.891 HISTORY OF SMOKING: ICD-10-CM

## 2024-03-13 PROCEDURE — 99214 OFFICE O/P EST MOD 30 MIN: CPT | Mod: 25

## 2024-03-13 PROCEDURE — G0463 HOSPITAL OUTPT CLINIC VISIT: HCPCS

## 2024-03-13 RX ORDER — ROSUVASTATIN CALCIUM 20 MG/1
20 TABLET, COATED ORAL DAILY
Qty: 90 TABLET | Refills: 3 | Status: SHIPPED | OUTPATIENT
Start: 2024-03-13

## 2024-03-13 RX ORDER — ASPIRIN 81 MG/1
81 TABLET ORAL DAILY
Qty: 90 TABLET | Refills: 3 | Status: SHIPPED | OUTPATIENT
Start: 2024-03-13

## 2024-03-13 NOTE — PATIENT INSTRUCTIONS
You were seen in the cardiology clinic by: Lazara Roper CNP    Plan:     Medication Changes:   - Start baby aspirin 81mg daily   - Start Crestor (Rosuvastatin) 20mg in the evening     Labs/Tests Needed:   - None    Follow Up Visit:   - Establish care with a cardiologist in one year with an echocardiogram prior     Additional Instructions:   - Monitor your blood pressures at home. Please keep a log of the readings and bring to your future nephrology or cardiology appointments. Your bleed pressure goal is < 140/90. It is important to achieve adequate blood pressure control before transplant.   - I encourage you to achieve 150 minutes/week of moderate intensity exercise if able. If this is not achievable right away, you can gradually work yourself up to this starting with low intensity exercises (i.e. walking or swimming) a couple days a week.      Important Numbers:     Cardiology   Telephone Number     To schedule an appointment or leave a message for your care team:   [phone]      Press #1   To reach the billing department: [phone]      Press # 3     To obtain copies of your medical records: [phone]      Press # 4   To reach the on-call cardiologist for after hours or on weekends: [phone]     Option #4, and ask to speak to the      Cardiovascular Clinic:   [address] [facility] [place] [address]  [phone]      Thank you for trusting us with your health care needs!

## 2024-03-13 NOTE — LETTER
3/13/2024         RE: Josefa Curiel  [address] [facility] [address]        Dear Colleague,    Thank you for referring your patient, Josefa Curiel, to the [facility]PL[facility]. Please see a copy of my visit note below.          [facility]   Cardiology Service  History and Physical      Josefa Curiel MRN# [id number]   YOB: 1975 Age: 48 year old       HPI:   Josefa Curiel is a 48 year old year old female with a medical history significant for DM1, HLD, HTN, mild mitral stenosis, aortic stenosis. ESRD on HD, and ETOH abuse. She presents today for cardiovascular risk assessment as part of pre-kidney transplant evaluation and 2 week post-angiogram follow up.     She is an intermittent smoker; hasn't smoked in a couple of weeks.  She does not currently use alcohol but has struggled with addiction in the past.  She currently uses marijuana via cartridges. Her activity level is mildly active. She goes on a one mile walk 1-2 x weekly.  She denies symptoms of chest pain, dizziness, lightheadedness, palpitations, shortness of breath, orthopnea, syncope, pre-syncope PND, or edema.    Due to her longstanding history of DM1 and other risk factors, she underwent coronary angiogram on 2/16/24 with Dr. Casper. This revealed no hemodynamically significant coronary artery lesions but she did have 30% stenosis of mLAD. Echocardiogram from 12/28/23 with normal EF >70%, no WMA's, moderate mitral stenosis, and mild-moderate aortic stenosis.     Cardiovascular Risk Factor Profile:  coronary artery disease , hypertension , and diabetes    Current Cardiovascular Symptoms:  None    ACC HF Stage:  StageB    NYHA Class:  Class I    Functional Capacity:  Performs 4 METS exercise without symptoms (e.g., light housework, stairs, 4 mph walk, 7 mph bike, slow step dance)      Past Medical History:   Diagnosis Date     LORIN (acute kidney injury) (H24)       Anxiety      Cannabis abuse      Depression      Diabetes Type 1, uncontrolled 1985    Onset age 10     DKA (diabetic ketoacidoses)      ESRD (end stage renal disease) on dialysis (H) 04/11/2023    start date per 2728 form     ETOH abuse      Gastroparesis      HLD (hyperlipidemia)      HTN (hypertension)      Lactic acidosis      Vitamin D deficiency        Past Surgical History:   Procedure Laterality Date     ANKLE FRACTURE SURGERY Left     2007 [facility] [place]     APPENDECTOMY       EYE SURGERY      retinal surgery Dr. Nagi Tang     INCISION AND DRAINAGE FOOT, COMBINED Right 11/18/2022    Procedure: INCISION AND DRAINAGE, right foot;  Surgeon: David Patel DPM;  Location: [facility] Main OR     IR CVC TUNNEL PLACEMENT > 5 YRS OF AGE  11/30/2023     IR CVC TUNNEL REMOVAL RIGHT  12/28/2023     IR DIALYSIS FISTULOGRAM LEFT  12/29/2023     IR UPPER EXTREMITY VENOGRAM RIGHT  12/29/2023     IR UPPER EXTREMITY VENOGRAM RIGHT  1/2/2024     PICC SINGLE LUMEN PLACEMENT  10/23/2022            acetaminophen (TYLENOL) 500 MG tablet, Take 2 tablets (1,000 mg) by mouth every 6 hours as needed for pain  apixaban ANTICOAGULANT (ELIQUIS) 5 MG tablet, Take 2 tablets (10 mg) by mouth 2 times daily for 6 days, THEN 1 tablet (5 mg) 2 times daily for 90 days.  B Complex-C-Folic Acid (VIRT-CAPS) 1 MG CAPS, TAKE 1 CAPSULE BY MOUTH DAILY WITH EVENING MEAL  Cholecalciferol (VITAMIN D3) 50 MCG (2000 UT) CHEW, Take 50 mcg by mouth daily Take one tablet on Monday, Wednesday and Friday  Continuous Blood Gluc Sensor (DEXCOM G6 SENSOR) MISC,   Continuous Blood Gluc Transmit (DEXCOM G6 TRANSMITTER) MISC, CHANGE EVERY 90 DAYS  Cyanocobalamin (VITAMIN B-12) 500 MCG LOZG, Take 500 mcg by mouth daily  ferrous sulfate (FEROSUL) 325 (65 Fe) MG tablet, Take 1 tablet (325 mg) by mouth daily  insulin degludec (TRESIBA) 100 UNIT/ML pen, Inject 16 Units Subcutaneous daily  insulin lispro (HUMALOG KWIKPEN) 100 UNIT/ML (1 unit dial) KWIKPEN,  "Inject 3-4 Units Subcutaneous 4 times daily with meals or snacks  iron sucrose (VENOFER) 20 MG/ML injection, 50 mg by Intravenous Push route  lidocaine-prilocaine (EMLA) 2.5-2.5 % external cream, Apply topically three times a week Monday, Wednesday, Friday  losartan (COZAAR) 25 MG tablet, Take 25 mg by mouth daily  rOPINIRole (REQUIP) 0.25 MG tablet, Take 1 mg by mouth at bedtime  sertraline (ZOLOFT) 100 MG tablet, Take 100 mg by mouth daily Take 1 tablet( 100 mg) along with 50 mg for the total dose  sertraline (ZOLOFT) 50 MG tablet, Take 50 mg by mouth daily Take 1 tablet (50 mg) along with 100 mg for the total dose of 150 mg  sevelamer carbonate (RENVELA) 800 MG tablet, Take 800 mg by mouth 2 times daily (with meals)  traZODone (DESYREL) 100 MG tablet, Take 100 mg by mouth At Bedtime    No current facility-administered medications on file prior to visit.      Family History   Problem Relation Age of Onset     Clotting Disorder Mother         \"thin blood\"     Arthritis Mother      Hyperlipidemia Mother      Skin Cancer Father         face/nose      Depression Father      Other - See Comments Sister         eye surgeries     No Known Problems Brother      Coronary Artery Disease Maternal Grandmother      Alcoholism Maternal Grandfather      Coronary Artery Disease Maternal Grandfather      No Known Problems Paternal Grandmother      No Known Problems Paternal Grandfather      Cancer No family hx of      Kidney Disease No family hx of      Diabetes No family hx of        Social History     Tobacco Use     Smoking status: Some Days     Types: Cigarettes     Smokeless tobacco: Never   Substance Use Topics     Alcohol use: Not Currently     Alcohol/week: 35.0 standard drinks of alcohol     Types: 35 Standard drinks or equivalent per week     Comment: Alcoholic Drinks/day: ~750 mL wine daily, sober since 10/26/23       Allergies   Allergen Reactions     Colestipol GI Disturbance and Nausea and Vomiting     Doxycycline " Nausea and Vomiting     Cefazolin Nephrotoxicity and Other (See Comments)     Nickel Rash     Penicillin G Rash     Penicillins Rash         Review Of Systems:   CONSTITUTIONAL: No report of fevers or chills  RESPIRATORY: No cough, wheezing, SOB, or hemoptysis  CARDIOVASCULAR: see HPI  MUSCULO-SKELETAL: No joint pain or swelling, no muscle pain  NEURO: No paresthesias, syncope, pre-syncope, lightheadness, dizziness or vertigo  ENDOCRINE: No temperature intolerance, no skin/hair changes  PSYCHIATRIC: No change in mood, feeling down/anxious, no change in sleep or appetite  GI: No melena or hematochezia, no change in bowel habits  : on HD  HEME: No easy bruising or bleeding, no history of anemia, no history of blood clots  SKIN: No rashes or sores, no unusual itching      Physical Examination:  Vitals: /69   Pulse 97   Wt 61 kg (134 lb 6.4 oz)   LMP 12/13/2023 (Approximate)   SpO2 100%   BMI 21.37 kg/m    BMI= Body mass index is 21.37 kg/m .    GENERAL APPEARANCE: healthy, alert and no distress  HEENT: no icterus, no xanthelasmas, normal pupil size and reaction, normal palate, mucosa moist  NECK: JVP not elevated , brisk carotid upstroke bilaterally  CHEST: lungs clear to auscultation without rales, rhonchi or wheezes, no use of accessory muscles, no retractions  CARDIOVASCULAR: regular rhythm, normal S1 and S2, no S3 or S4 and no murmur, click or rub.  ABDOMEN: soft, non tender, without hepatosplenomegaly, no masses palpable, bowel sounds normal  EXTREMITIES: warm, No LE edema, DP/PT pulses 2+ bilaterally, no clubbing or cyanosis   NEURO: alert and oriented to person/place/time, normal speech, gait and affect  SKIN: no ecchymoses, no rashes      Laboratory:  Last Comprehensive Metabolic Panel:  Lab Results   Component Value Date     (L) 01/10/2024    POTASSIUM 4.4 01/10/2024    CHLORIDE 96 (L) 01/10/2024    CO2 26 01/10/2024    ANIONGAP 10 01/10/2024     (H) 01/10/2024    BUN 23.3 (H)  01/10/2024    CR 4.63 (H) 01/10/2024    GFRESTIMATED 11 (L) 01/10/2024    ZACH 8.8 01/10/2024       Last CBC:  CBC RESULTS:   Recent Labs   Lab Test 01/10/24  1244   WBC 7.5   RBC 2.85*   HGB 8.7*   HCT 27.1*   MCV 95   MCH 30.5   MCHC 32.1   RDW 13.2          24 EK/28/23 Echocardiogram:  The left ventricle is normal in size.  No regional wall motion abnormalities noted.  Normal right ventricle size and systolic function.  There is moderate mitral annular calcification.  Thickened mitral valve posterior leaflet  Decreased mobility of posterior MV leaflet.  There is moderate mitral stenosis.  9 mm Mean gradient at HR of 119.  Mild to moderate valvular aortic stenosis.  Mild increased flow velocity across PV likely related to hyperdynamic state.  IVC diameter and respiratory changes fall into an intermediate range  suggesting an RA pressure of 8 mmHg.  The rhythm was sinus tachycardia.  Posterior leaflet has calcified nodule that is more prominent than MARAH from  10/21/22. No clear vegetation but cannot exclude vegetation.    24 Coronary Angiogram:  - No hemodynamically significant coronary artery lesions       Assessment and Plan:     # Coronary Artery Disease  # Hyperlipidemia  # Moderate Mitral Valve Stenosis  # Mild-Moderate Aortic Valve Stenosis  Patient risk factors for CAD include ongoing tobacco use, hyperlipidemia, and long-term history of DM1. She underwent coronary angiogram on 24 which demonstrated no hemodynamically significant coronary artery lesions. Her most recent echo was with normal EF (>70%) but moderate mitral stenosis and mild-moderate aortic stenosis; no WMA's. Today she denies any chest pain, dizziness, lightheadedness, palpitations, orthopnea, PND, syncope/pre-syncope or edema. Most recent lipids done 23 --> LDL 80, Total 185. She is not currently on statin therapy.  - Yearly echocardiogram for surveillance of valves   - Start Aspirin 81mg daily   - Start  Crestor 42mg q HS   - Aggressive risk factor modification   - Cardiology follow up in one year with echocardiogram prior   - No additional testing needed prior to transplant     # Hypertension   Blood pressure in office today 116/69. She does not monitor her BP's at home.   - Continue Losartan 25mg daily     # Diabetes Mellitus Type 1  Most recent A1c 7.5% on 1/10/24. Home regimen includes Tresiba 16 units daily and Humalog sliding scale. She follows with endocrinology at [facility], Dr. Virk. Her blood sugars average around 120 at home. Previously had hypoglycemic episodes at night; low as 35. She was mildly symptomatic.   - Encouraged to keep snack at bedside and at all times   - Educated patient on the importance of healthy diet and exercise in reducing risk for heart disease   - Encouraged 150 minutes/week of moderate intensity exercise   - Encouraged healthy weight loss; discussed Mediterranean diet   - Follow-up with cardiology in one year     # ESRD on HD  Currently undergoing evaluation for kidney transplant.   - Ongoing follow up with SOT clinic           RCRI Score:   - High risk surgery (>5% cardiac complication risk) = 1 point   - Diabetes Mellitus (on Insulin) = 1 point   - Serum Creatinine >2.0 mg/dl = 1 point          I have reviewed and updated the patient's Past Medical History, Social History, Family History and Medication List.       NATALIA English, CNP  [facility]      Review of prior external note(s) from - Care Everywhere  Review of the result(s) of each unique test - Echocardiogram, angiogram, EKG  Ordering of each unique test  Prescription drug management Medication reconciliation, Crestor, Aspirin     35 minutes spent by me on the date of the encounter doing chart review, review of outside records, review of test results, patient visit, and documentation     NATALIA Hensley CNP on 3/13/2024 at 12:06 PM          Again, thank you for allowing me to participate in  the care of your patient.        Sincerely,        NATALIA Hensley CNP

## 2024-04-11 DIAGNOSIS — F33.1 MODERATE EPISODE OF RECURRENT MAJOR DEPRESSIVE DISORDER (H): Primary | ICD-10-CM

## 2024-04-11 RX ORDER — SERTRALINE HYDROCHLORIDE 100 MG/1
100 TABLET, FILM COATED ORAL DAILY
Qty: 90 TABLET | Refills: 2 | Status: SHIPPED | OUTPATIENT
Start: 2024-04-11 | End: 2024-07-09

## 2024-04-11 NOTE — TELEPHONE ENCOUNTER
Refill Request  Medication name: Pending Prescriptions:                       Disp   Refills    sertraline (ZOLOFT) 100 MG tablet                             Sig: Take 1 tablet (100 mg) by mouth daily Take 1           tablet( 100 mg) along with 50 mg for the total           dose    Requested Pharmacy:     [id number] [place] [address] AT [place] [address]

## 2024-05-22 ENCOUNTER — TELEPHONE (OUTPATIENT)
Dept: FAMILY MEDICINE | Facility: CLINIC | Age: 49
End: 2024-05-22

## 2024-05-22 NOTE — TELEPHONE ENCOUNTER
Left voicemail for patient to return call to clinic. When patient returns call, please give them below message.    Patient needs to be scheduled for a Video Visit with Yue to go over paper work the patient wanted Yue to fill out.    Leighton JACKSON

## 2024-07-05 ENCOUNTER — TELEPHONE (OUTPATIENT)
Dept: FAMILY MEDICINE | Facility: CLINIC | Age: 49
End: 2024-07-05
Payer: MEDICARE

## 2024-07-05 NOTE — TELEPHONE ENCOUNTER
Provider Communication    Who is calling:  Dr Gomez from [facility]    Facility in which provider is associated:  [facility]    Reason for call:  Peer to pee about pt's disability claim.    Okay to leave detailed message?:  No at Other phone number:  [phone]*

## 2024-07-05 NOTE — TELEPHONE ENCOUNTER
Dr. Gomez called back.  RN relayed message that provider is not available until 7/8/24.  Dr. Gomez ok with that, he just needs to talk to provider by 7/9/24 about patients disability.  Phone number: [phone].

## 2024-07-08 NOTE — TELEPHONE ENCOUNTER
General Call    Contacts       Contact Date/Time Type Contact Phone/Fax    07/05/2024 01:13 PM CDT Phone (Incoming) Dr Gomez calling from [facility] (Other) [phone]    07/05/2024 02:35 PM CDT Phone (Outgoing) Dr Gomez calling from [facility] (Other) [phone]    Left Message     07/08/2024 10:13 AM CDT Phone (Incoming) Dr Gomez calling from [facility] (Other) [phone]    07/08/2024 04:14 PM CDT Phone (Incoming) Dr Gomez calling from [facility] (Other) [phone]          Reason for Call:  Returning call    What are your questions or concerns:  Dr. Gomez is from [employer].  This is an external review company for records for Pt's disability claim.  While I was trying to reach someone to grab PCP, Dr. Gomez realized there is a conflict of interest with him working on this case so they will get back into touch with us when there is another doctor working on this claim.     Rui Elam

## 2024-07-08 NOTE — TELEPHONE ENCOUNTER
General Call    Contacts       Contact Date/Time Type Contact Phone/Fax    07/05/2024 01:13 PM CDT Phone (Incoming) Dr Gomez calling from [facility] (Other) [phone]    07/05/2024 02:35 PM CDT Phone (Outgoing) Dr Gomez calling from [facility] (Other) [phone]    Left Message     07/08/2024 10:13 AM CDT Phone (Incoming) Dr Gomez calling from [employer] (Other) [phone]          Reason for Call:  Doctor calling back to talk to PCP    What are your questions or concerns:  Dr. Gomez calling back to communicate with PCP in regard to Pt Disability Claim.      Phone number [phone]    Rui Elam

## 2024-07-08 NOTE — TELEPHONE ENCOUNTER
I called the number provided and left a message without any patient detail.     Can interrupt me if with a patient in office.     Please clarify who  is, I think her kidney doctor?     NATALIA Mccloud CNP

## 2024-07-09 ENCOUNTER — OFFICE VISIT (OUTPATIENT)
Dept: FAMILY MEDICINE | Facility: CLINIC | Age: 49
End: 2024-07-09
Payer: MEDICARE

## 2024-07-09 VITALS
TEMPERATURE: 97.8 F | HEART RATE: 92 BPM | SYSTOLIC BLOOD PRESSURE: 134 MMHG | BODY MASS INDEX: 21.82 KG/M2 | OXYGEN SATURATION: 100 % | DIASTOLIC BLOOD PRESSURE: 74 MMHG | WEIGHT: 139 LBS | HEIGHT: 67 IN | RESPIRATION RATE: 12 BRPM

## 2024-07-09 DIAGNOSIS — E11.21 NEPHROTIC SYNDROME DUE TO DIABETES MELLITUS (H): ICD-10-CM

## 2024-07-09 DIAGNOSIS — D63.1 ANEMIA IN CHRONIC KIDNEY DISEASE, ON CHRONIC DIALYSIS (H): Primary | ICD-10-CM

## 2024-07-09 DIAGNOSIS — I12.0 HYPERTENSIVE CHRONIC KIDNEY DISEASE WITH STAGE 5 CHRONIC KIDNEY DISEASE OR END STAGE RENAL DISEASE (H): ICD-10-CM

## 2024-07-09 DIAGNOSIS — N18.6 ANEMIA IN CHRONIC KIDNEY DISEASE, ON CHRONIC DIALYSIS (H): Primary | ICD-10-CM

## 2024-07-09 DIAGNOSIS — F33.1 MODERATE EPISODE OF RECURRENT MAJOR DEPRESSIVE DISORDER (H): ICD-10-CM

## 2024-07-09 DIAGNOSIS — Z59.89: ICD-10-CM

## 2024-07-09 DIAGNOSIS — E11.10 TYPE 2 DIABETES MELLITUS WITH KETOACIDOSIS WITHOUT COMA, WITH LONG-TERM CURRENT USE OF INSULIN (H): ICD-10-CM

## 2024-07-09 DIAGNOSIS — F43.9 STRESS: ICD-10-CM

## 2024-07-09 DIAGNOSIS — Z79.4 TYPE 2 DIABETES MELLITUS WITH KETOACIDOSIS WITHOUT COMA, WITH LONG-TERM CURRENT USE OF INSULIN (H): ICD-10-CM

## 2024-07-09 DIAGNOSIS — Z99.2 ANEMIA IN CHRONIC KIDNEY DISEASE, ON CHRONIC DIALYSIS (H): Primary | ICD-10-CM

## 2024-07-09 PROBLEM — E46 PROTEIN-CALORIE MALNUTRITION (H): Status: ACTIVE | Noted: 2023-04-11

## 2024-07-09 PROBLEM — N25.81 SECONDARY HYPERPARATHYROIDISM OF RENAL ORIGIN (H): Status: ACTIVE | Noted: 2023-04-11

## 2024-07-09 PROBLEM — D68.9 COAGULATION DEFECT (H): Status: ACTIVE | Noted: 2023-12-01

## 2024-07-09 PROBLEM — E87.8 OTHER DISORDERS OF ELECTROLYTE AND FLUID BALANCE, NOT ELSEWHERE CLASSIFIED: Status: ACTIVE | Noted: 2023-04-11

## 2024-07-09 PROBLEM — D50.8 OTHER IRON DEFICIENCY ANEMIAS: Status: ACTIVE | Noted: 2023-04-11

## 2024-07-09 PROBLEM — Z56.9: Status: ACTIVE | Noted: 2017-09-27

## 2024-07-09 PROBLEM — I82.210: Status: ACTIVE | Noted: 2024-01-08

## 2024-07-09 PROBLEM — A49.9 BACTERIAL INFECTION, UNSPECIFIED: Status: ACTIVE | Noted: 2023-04-26

## 2024-07-09 PROBLEM — K92.0 HEMATEMESIS: Status: ACTIVE | Noted: 2023-07-14

## 2024-07-09 PROBLEM — Z87.891 PERSONAL HISTORY OF NICOTINE DEPENDENCE: Status: ACTIVE | Noted: 2023-03-31

## 2024-07-09 PROBLEM — N18.9 ANEMIA IN CHRONIC KIDNEY DISEASE: Status: ACTIVE | Noted: 2023-03-31

## 2024-07-09 PROCEDURE — 99213 OFFICE O/P EST LOW 20 MIN: CPT | Performed by: NURSE PRACTITIONER

## 2024-07-09 RX ORDER — ROPINIROLE 1 MG/1
TABLET, FILM COATED ORAL
COMMUNITY
Start: 2024-05-23

## 2024-07-09 SDOH — ECONOMIC STABILITY - INCOME SECURITY: OTHER PROBLEMS RELATED TO HOUSING AND ECONOMIC CIRCUMSTANCES: Z59.89

## 2024-07-09 ASSESSMENT — ANXIETY QUESTIONNAIRES
1. FEELING NERVOUS, ANXIOUS, OR ON EDGE: SEVERAL DAYS
IF YOU CHECKED OFF ANY PROBLEMS ON THIS QUESTIONNAIRE, HOW DIFFICULT HAVE THESE PROBLEMS MADE IT FOR YOU TO DO YOUR WORK, TAKE CARE OF THINGS AT HOME, OR GET ALONG WITH OTHER PEOPLE: VERY DIFFICULT
5. BEING SO RESTLESS THAT IT IS HARD TO SIT STILL: NOT AT ALL
2. NOT BEING ABLE TO STOP OR CONTROL WORRYING: SEVERAL DAYS
GAD7 TOTAL SCORE: 7
3. WORRYING TOO MUCH ABOUT DIFFERENT THINGS: SEVERAL DAYS
6. BECOMING EASILY ANNOYED OR IRRITABLE: SEVERAL DAYS
7. FEELING AFRAID AS IF SOMETHING AWFUL MIGHT HAPPEN: MORE THAN HALF THE DAYS
GAD7 TOTAL SCORE: 7

## 2024-07-09 ASSESSMENT — PATIENT HEALTH QUESTIONNAIRE - PHQ9
5. POOR APPETITE OR OVEREATING: SEVERAL DAYS
SUM OF ALL RESPONSES TO PHQ QUESTIONS 1-9: 11

## 2024-07-09 NOTE — PROGRESS NOTES
Assessment & Plan     Anemia in chronic kidney disease, on chronic dialysis (H)  **    Hypertensive chronic kidney disease with stage 5 chronic kidney disease or end stage renal disease (H)  **    Nephrotic syndrome due to diabetes mellitus (H)  **    Moderate episode of recurrent major depressive disorder (H)  **  - sertraline (ZOLOFT) 100 MG tablet  Dispense: 90 tablet; Refill: 0  - sertraline (ZOLOFT) 50 MG tablet  Dispense: 90 tablet; Refill: 0    Type 2 diabetes mellitus with ketoacidosis without coma, with long-term current use of insulin (H)  **    Difficulty budgeting finances  **  - Primary Care - Care Coordination Referral    Stress  **  - Primary Care - Care Coordination Referral    - I had our clinical nurse talk with patient after our visit to assess how else we can help her. She was very emotional at the visit. No SI or HI noted. I placed a referral for care coordination as well. Safety plan discussed.   - I refilled her Zoloft. Has been only taking 100 mg and wants to go back up to 150 mg. Decreased the dose because she was dealing with nausea.   - I offered pap but she declined. Can do another time.   - she will discussed at dialysis her tiredness after dialysis runs.   - continue follow with Endo and nephrology.         Katie Rivero is a 49 year old, presenting for the following health issues:  Consult (Sleeping 12 hours a day, insurance need health record and history,)      7/9/2024     1:24 PM   Additional Questions   Roomed by Leighton     - on disability; her previous employer denied her and feel she is okay to work now.  She is not working related to dialysis, depression, and diabetes. Has not seen behavior health since Feb related insurance reasons. She struggles organization skills. Has dialysis three days a week for 3 hour visits. On dialysis days, she tends to be tired after the treatment. She does not get regular exercise. Lives with Boyfriend, gets [employer] delivery for groceries. She  "feels overall exhausted. Her diabetes can be a struggle with Medicare with Dexcom sensor. She does not see how she could work right now. Does not know if she could be reliable right now, her reaction feels slow, and cognition feels cloudy.     - she is has a lot of procedures. Not sure how she will get it all done plus work.     - not sure if it normal to feel sleepy on dialysis days. She will sometimes skip days because vomiting. She wants to make it a priority. Can throw up like once a week, avoid tomatoes; has gerd intermittently. Can have decreased appetite, she does get at least one or two good meals per day.     - she does not understand the medical system and insurance. [facility] See's her about once a month.     \"Has a lot of bills and they keep piling up. Keep trying to do the right thing for the transplant list, be healthy, but can't keepbuidling up debt\".     Has appt with Endo, and nephrology coming up.     History of Present Illness       Reason for visit:  Insurance forms for disability    She eats 2-3 servings of fruits and vegetables daily.She consumes 0 sweetened beverage(s) daily.She exercises with enough effort to increase her heart rate 9 or less minutes per day.  She exercises with enough effort to increase her heart rate 3 or less days per week. She is missing 1 dose(s) of medications per week.  She is not taking prescribed medications regularly due to remembering to take.         Objective    /74   Pulse 92   Temp 97.8  F (36.6  C) (Oral)   Resp 12   Ht 1.689 m (5' 6.5\")   Wt 63 kg (139 lb)   LMP 07/02/2024 (Exact Date)   SpO2 100%   BMI 22.10 kg/m    Body mass index is 22.1 kg/m .  Physical Exam               Signed Electronically by: NATALIA Mccloud CNP    "

## 2024-07-10 ENCOUNTER — PATIENT OUTREACH (OUTPATIENT)
Dept: CARE COORDINATION | Facility: CLINIC | Age: 49
End: 2024-07-10
Payer: MEDICARE

## 2024-07-10 NOTE — PROGRESS NOTES
Clinic Care Coordination Contact  [facility]/Voicemail    Clinical Data: Care Coordinator Outreach    Outreach Documentation Number of Outreach Attempt   7/10/2024   1:36 PM 1       Left message on patient's voicemail with call back information and requested return call.    Plan: Care Coordinator will try to reach patient again in 1-2 business days.    TIESHA Melo  [phone]  [facility]

## 2024-07-10 NOTE — LETTER
[facility] CARE COORDINATION  [address] [place] [address]    July 11, 2024    Josefa Curiel  [address] [address] [facility] [address]      Dear Josefa,    I am a clinic community health worker who works with NATALIA Mccloud CNP with the [facility]. I have been trying to reach you recently to introduce Clinic Care Coordination. Below is a description of clinic care coordination and how we can further assist you.       The clinic care coordination team is made up of a registered nurse, , financial resource worker and community health worker who understand the health care system. The goal of clinic care coordination is to help you manage your health and improve access to the health care system. Our team works alongside your provider to assist you in determining your health and social needs. We can help you obtain health care and community resources, providing you with necessary information and education. We can work with you through any barriers and develop a care plan that helps coordinate and strengthen the communication between you and your care team.  Our services are voluntary and are offered without charge to you personally.    Please feel free to contact Alessandra at [phone] with any questions or concerns regarding care coordination and what we can offer.      We are focused on providing you with the highest-quality healthcare experience possible.    Sincerely,     TIESHA Melo  [facility] Resource Center  [facility]    
[Visual inspection, sensory exam] : Foot exam, including visual inspection, sensory exam with mono filament, and pulse exam, was performed within the last 12 months

## 2024-07-11 NOTE — PROGRESS NOTES
Clinic Care Coordination Contact  [facility]/Voicemail    Clinical Data: Care Coordinator Outreach    Outreach Documentation Number of Outreach Attempt   7/10/2024   1:36 PM 1   7/11/2024  10:16 AM 2       Left message on patient's voicemail with call back information and requested return call.    Plan: Care Coordinator will send care coordination introduction letter with care coordinator contact information and explanation of care coordination services via [facility]. Care Coordinator will do no further outreaches at this time.    TIESHA Melo  [phone]  [facility] Care Resource [facility]

## 2024-07-12 RX ORDER — SERTRALINE HYDROCHLORIDE 100 MG/1
100 TABLET, FILM COATED ORAL DAILY
Qty: 90 TABLET | Refills: 0 | Status: SHIPPED | OUTPATIENT
Start: 2024-07-12

## 2024-07-15 NOTE — PROGRESS NOTES
Transplant Surgery Consult Note    Medical record number: [id number]  YOB: 1975,   Consult requested for evaluation of kidney and pancreas transplant candidacy.    Assessment and Recommendations: Ms. Curiel is a good candidate for transplantation and has a good understanding of the risks and benefits of this approach to management of renal failure and diabetes. The following issues should be addressed prior to transplant:     Cardiac risk assessment: will need cath with smoking history and longstanding T1DM    Gastroparesis: noted on GES, but does not have significant daily nausea/emesis and is able to tolerated food, liquids, and medications    Ms. Curiel has End stage renal failure due to diabetes mellitus type 1 whose condition is not expected to resolve, is expected to progress, and is expected to continue to develop related comorbid conditions.  Cardiology consult for cardiac risk stratification to be ordered: Yes  CT abdomen and pelvis without contrast to be ordered for assessment of vascular targets: Yes  Transplant listing labs ordered to include HLA, ABOx2, Cr, etc.  Dietician consult ordered: Yes  Social work consult ordered: Yes  Imaging reports reviewed:  n/a  Radiology images reviewed:n/a  Recipient suitable to move forward with work up of living donors:  Yes      The majority of our visit was spent in counselling, discussing the medical and surgical risks of living or  donor kidney and pancreas transplantation, either in a simultaneous or sequential fashion. I contrasted approximate wait time for SPK vs living vs  donor kidneys from normal (0-85%) or higher (%) kidney donor profile index (KDPI) donors and their associated outcomes. I would not recommend this individual to consider kidneys from high KDPI donors. The reason for this decision is best summarized as: multi-organ transplant candidate.  Access to transplant will be impacted by living donor  availability and overall candidacy for SPK, as well as the influence of blood type and degree of sensitization. We discussed advantages of preemptive transplant as well as living donor kidney transplant, and graft and patient survival outcomes associated with these options. Potential surgical complications of kidney and pancreas transplantation include bleeding, clotting, infection, wound complications, anastomotic failure and other issues such as cardiac complications, pneumonia, deep venous thrombosis, pulmonary embolism, post transplant diabetes and death. We discussed the need for protocol biopsy of the kidney and the possible need for a ureteral stent (and subsequent removal). We discussed benefits and risks associated with different approaches to exocrine drainage of pancreatic secretions. We also discussed differences in the average length of stay, recovery process, and posttransplant lab and monitoring protocol. We discussed the risk of graft rejection and recurrent diabetic nephropathy in the setting of poor glycemic control. I emphasized the need for strict immunosuppression adherence and the potential for complications of immunosuppression such as skin cancer or lymphoma, as well as a very low but not zero risk of donor-derived disease transmission risks (infection, cancer). Ms. Curiel asked good questions and the patient's candidacy will be reviewed at our Multidisciplinary Selection Committee. Thank you for the opportunity to participate in Ms. Curiel's care.    Total time: 45 minutes  Counselling time: 40 minutes      Pee Webber MD  ---------------------------------------------------------------------------------------------------    HPI: Ms. Curiel has End stage renal failure due to diabetes mellitus type 1. The patient has had diabetes for 20 years. Management is by insulin pump, ~30 units daily. Hypoglyemic unawareness is not an issu.  The diabetes is controlled.     Complications of diabetes include:    Retinopathy:  Yes   Neuropathy: Yes   Gastroparesis:  Yes     The patient is on dialysis.    Has potential kidney donors:  Doesn't know .  Interested in participation in paired exchange if a donor is willing: Doesn't know     The patient has the following pertinent history:       No    Yes  Dialysis:    []      [] via:       Blood Transfusion                  []      []  Number of units:   Most recently:  Pregnancy:    []      [] Number:       Previous Transplant:  []      [] Details:    Cancer    []      [] Comment:   Kidney stones   []      [] Comment:      Recurrent infections  []      []  Type:                  Bladder dysfunction  []      [] Cause:    Claudication   []      [] Distance:    Previous Amputation  []      [] Cause:     Chronic anticoagulation  []      [] Indication:  Quaker  []      []     Past Medical History:   Diagnosis Date    LORIN (acute kidney injury) (H24)     Anxiety     Cannabis abuse     Depression     Diabetes Type 1, uncontrolled 1985    Onset age 10    DKA (diabetic ketoacidoses)     ESRD (end stage renal disease) on dialysis (H) 04/11/2023    start date per 2728 form    ETOH abuse     Gastroparesis     HLD (hyperlipidemia)     HTN (hypertension)     Lactic acidosis     Vitamin D deficiency      Past Surgical History:   Procedure Laterality Date    ANKLE FRACTURE SURGERY Left     2007 [facility] [place]    APPENDECTOMY      CV CORONARY ANGIOGRAM N/A 2/16/2024    Procedure: Coronary Angiogram;  Surgeon: Carlos Jacobs MD;  Location: [facility] CARDIAC CATH LAB    CV PCI N/A 2/16/2024    Procedure: Percutaneous Coronary Intervention;  Surgeon: Carlos Jacobs MD;  Location: [facility] CARDIAC CATH LAB    EYE SURGERY      retinal surgery Dr. Nagi Tang    INCISION AND DRAINAGE FOOT, COMBINED Right 11/18/2022    Procedure: INCISION AND DRAINAGE, right foot;  Surgeon: David Patel DPM;  Location: [facility] OR    IR CVC TUNNEL  "PLACEMENT > 5 YRS OF AGE  11/30/2023    IR CVC TUNNEL REMOVAL RIGHT  12/28/2023    IR DIALYSIS FISTULOGRAM LEFT  12/29/2023    IR UPPER EXTREMITY VENOGRAM RIGHT  12/29/2023    IR UPPER EXTREMITY VENOGRAM RIGHT  1/2/2024    PICC SINGLE LUMEN PLACEMENT  10/23/2022          Family History   Problem Relation Age of Onset    Clotting Disorder Mother         \"thin blood\"    Arthritis Mother     Hyperlipidemia Mother     Skin Cancer Father         face/nose     Depression Father     Other - See Comments Sister         eye surgeries    No Known Problems Brother     Coronary Artery Disease Maternal Grandmother     Alcoholism Maternal Grandfather     Coronary Artery Disease Maternal Grandfather     No Known Problems Paternal Grandmother     No Known Problems Paternal Grandfather     Cancer No family hx of     Kidney Disease No family hx of     Diabetes No family hx of      Social History     Socioeconomic History    Marital status: Single     Spouse name: Not on file    Number of children: Not on file    Years of education: Not on file    Highest education level: Not on file   Occupational History    Not on file   Tobacco Use    Smoking status: Some Days     Types: Cigarettes    Smokeless tobacco: Never   Vaping Use    Vaping status: Never Used   Substance and Sexual Activity    Alcohol use: Not Currently     Alcohol/week: 35.0 standard drinks of alcohol     Types: 35 Standard drinks or equivalent per week     Comment: Alcoholic Drinks/day: ~750 mL wine daily, sober since 10/26/23    Drug use: Yes     Types: Marijuana     Comment: Drug use: 3-4 x week, with nausea    Sexual activity: Yes     Partners: Male     Birth control/protection: Condom   Other Topics Concern    Parent/sibling w/ CABG, MI or angioplasty before 65F 55M? Not Asked   Social History Narrative    Not on file     Social Determinants of Health     Financial Resource Strain: Low Risk  (7/9/2024)    Financial Resource Strain     Within the past 12 months, have " you or your family members you live with been unable to get utilities (heat, electricity) when it was really needed?: No   Food Insecurity: Low Risk  (7/9/2024)    Food Insecurity     Within the past 12 months, did you worry that your food would run out before you got money to buy more?: No     Within the past 12 months, did the food you bought just not last and you didn t have money to get more?: No   Transportation Needs: Low Risk  (7/9/2024)    Transportation Needs     Within the past 12 months, has lack of transportation kept you from medical appointments, getting your medicines, non-medical meetings or appointments, work, or from getting things that you need?: No   Physical Activity: Insufficiently Active (7/9/2024)    Exercise Vital Sign     Days of Exercise per Week: 1 day     Minutes of Exercise per Session: 60 min   Stress: No Stress Concern Present (7/9/2024)    [employer] - Occupational Stress Questionnaire     Feeling of Stress : Not at all   Social Connections: Unknown (7/9/2024)    Social Connection and Isolation Panel [NHANES]     Frequency of Communication with Friends and Family: Not on file     Frequency of Social Gatherings with Friends and Family: Once a week     Attends Christian Services: Not on file     Active Member of Clubs or Organizations: Not on file     Attends Club or Organization Meetings: Not on file     Marital Status: Not on file   Interpersonal Safety: Low Risk  (1/16/2024)    Interpersonal Safety     Do you feel physically and emotionally safe where you currently live?: Yes     Within the past 12 months, have you been hit, slapped, kicked or otherwise physically hurt by someone?: No     Within the past 12 months, have you been humiliated or emotionally abused in other ways by your partner or ex-partner?: No   Housing Stability: Low Risk  (7/9/2024)    Housing Stability     Do you have housing? : Yes     Are you worried about losing your housing?: No        ROS:   CONSTITUTIONAL:  No fevers or chills  EYES: negative for icterus  ENT:  negative for hearing loss, tinnitus and sore throat  RESPIRATORY:  negative for cough, sputum, dyspnea  CARDIOVASCULAR:  negative for chest pain  positive for lower extremity wounds/ulcers  GASTROINTESTINAL:  negative for nausea, vomiting, diarrhea or constipation  GENITOURINARY:  negative for incontinence, dysuria, bladder emptying problems  HEME:  No easy bruising  INTEGUMENT:  negative for rash and pruritus  NEURO:  Negative for headache, seizure disorder    Allergies:   Allergies   Allergen Reactions    Colestipol GI Disturbance and Nausea and Vomiting    Doxycycline Nausea and Vomiting    Cefazolin Nephrotoxicity and Other (See Comments)    Nickel Rash    Penicillin G Rash    Penicillins Rash       Medications:  Prescription Medications as of 7/14/2024         Rx Number Disp Refills Start End Last Dispensed Date Next Fill Date Owning Pharmacy    acetaminophen (TYLENOL) 500 MG tablet  -- -- 11/28/2022 --       Sig: Take 2 tablets (1,000 mg) by mouth every 6 hours as needed for pain    Class: OTC    Route: Oral    aspirin 81 MG EC tablet  90 tablet 3 3/13/2024 --   [id number] [address] AT [place] [address]    Sig: Take 1 tablet (81 mg) by mouth daily    Class: E-Prescribe    Route: Oral    Renewals       Renewal requests to authorizing provider (Lazara Roper, NATALIA CNP) <b>prohibited</b>            B Complex-C-Folic Acid (VIRT-CAPS) 1 MG CAPS  -- -- 12/23/2023 --       Sig: TAKE 1 CAPSULE BY MOUTH DAILY WITH EVENING MEAL    Class: Historical    Cholecalciferol (VITAMIN D3) 50 MCG (2000 UT) CHEW  -- --  --       Sig: Take 50 mcg by mouth daily Take one tablet on Monday, Wednesday and Friday    Class: Historical    Route: Oral    Continuous Blood Gluc Sensor (DEXCOM G6 SENSOR) MISC  -- -- 10/31/2022 --       Class: Historical    Continuous Blood Gluc Transmit (DEXCOM G6  TRANSMITTER) MISC  -- -- 2023 --       Sig: CHANGE EVERY 90 DAYS    Class: Historical    Cyanocobalamin (VITAMIN B-12) 500 MCG LOZG  -- --  --       Sig: Take 500 mcg by mouth daily    Class: Historical    Route: Oral    ferrous sulfate (FEROSUL) 325 (65 Fe) MG tablet  30 tablet 3 2022 --   [facility] #[id number] [address] AT [place] [address]    Sig: Take 1 tablet (325 mg) by mouth daily    Class: E-Prescribe    Route: Oral    insulin degludec (TRESIBA) 100 UNIT/ML pen  -- --  --       Sig: Inject 16 Units Subcutaneous daily    Class: Historical    Route: Subcutaneous    insulin lispro (HUMALOG KWIKPEN) 100 UNIT/ML (1 unit dial) KWIKPEN  -- --  --       Sig: Inject 3-4 Units Subcutaneous 4 times daily with meals or snacks    Class: Historical    Route: Subcutaneous    iron sucrose (VENOFER) 20 MG/ML injection  -- -- 2024       Si mg by Intravenous Push route    Class: Historical    Route: Intravenous Push    iron sucrose  -- -- 2024       Sig: Inject 50 mg    Class: Historical    Route: Intravenous Push    lidocaine-prilocaine (EMLA) 2.5-2.5 % external cream  -- -- 2023 --       Sig: Apply topically three times a week Monday, Wednesday, Friday    Class: Historical    Route: Topical    losartan (COZAAR) 25 MG tablet  -- --  --       Sig: Take 25 mg by mouth daily    Class: Historical    Route: Oral    Methoxy PEG-Epoetin Beta (MIRCERA IJ)  -- -- 2024       Sig: Inject 75 mcg    Class: Historical    Route: Intravenous Push    Methoxy PEG-Epoetin Beta (MIRCERA IJ)  -- -- 2024       Sig: Inject 100 mcg    Class: Historical    Route: Intravenous Push    rOPINIRole (REQUIP) 1 MG tablet  -- -- 2024 --       Sig: TAKE 1 TABLET BY MOUTH TWICE DAILY AS NEEDED FOR RESTLESS LEGS    Class: Historical    rosuvastatin (CRESTOR) 20 MG tablet  90 tablet 3 3/13/2024 --   [id number] - [place]  [place] - [address] AT [place] [address]    Sig: Take 1 tablet (20 mg) by mouth daily    Class: E-Prescribe    Route: Oral    Renewals       Renewal requests to authorizing provider (Lazara Roper APRN CNP) <b>prohibited</b>            sertraline (ZOLOFT) 100 MG tablet  90 tablet 0 7/12/2024 --   [facility] #[id number] - [place], [place] - [address] AT [place] [address]    Sig: Take 1 tablet (100 mg) by mouth daily Take 1 tablet( 100 mg) along with 50 mg for the total dose    Class: E-Prescribe    Route: Oral    sertraline (ZOLOFT) 50 MG tablet  90 tablet 0 7/12/2024 --   [facility] #[id number] - [place], [place] - [address] AT [place] [address]    Sig: Take 1 tablet (50 mg) by mouth daily Take 1 tablet (50 mg) along with 100 mg for the total dose of 150 mg    Class: E-Prescribe    Route: Oral    sevelamer carbonate (RENVELA) 800 MG tablet  -- --  --       Sig: Take 800 mg by mouth 2 times daily (with meals)    Class: Historical    Route: Oral    traZODone (DESYREL) 100 MG tablet  -- -- 3/23/2018 --       Sig: Take 100 mg by mouth At Bedtime    Class: Historical    Route: Oral            Exam:      Appearance: in no apparent distress.   Skin: normal, warm, dry  Head and Neck: Normal, no rashes or jaundice  Respiratory: easy respirations, no audible wheezing.  Cardiovascular: RRR  Abdomen: abd soft, flat, nontender. Nno prior incisions, no hernia  Extremities: femoral 2+/2+, Edema, none  Neuro: without deficit, cranial nerves intact     Diagnostics:   No results found for this or any previous visit (from the past 672 hour(s)).  UNOS CPRA   Date Value Ref Range Status   01/10/2024 0  Final

## 2024-09-08 ENCOUNTER — HEALTH MAINTENANCE LETTER (OUTPATIENT)
Age: 49
End: 2024-09-08

## 2024-10-29 DIAGNOSIS — F33.1 MODERATE EPISODE OF RECURRENT MAJOR DEPRESSIVE DISORDER (H): ICD-10-CM

## 2024-10-29 RX ORDER — SERTRALINE HYDROCHLORIDE 100 MG/1
TABLET, FILM COATED ORAL
Qty: 90 TABLET | Refills: 0 | Status: SHIPPED | OUTPATIENT
Start: 2024-10-29

## 2024-11-05 ENCOUNTER — DOCUMENTATION ONLY (OUTPATIENT)
Dept: TRANSPLANT | Facility: CLINIC | Age: 49
End: 2024-11-05
Payer: MEDICARE

## 2024-11-06 NOTE — PROGRESS NOTES
Reviewed chart for purpose of pre kidney pancreas transplant evaluation.  Pt still needs to complete: mental health assessment, dental work, PAP, mammogram and needs to attend Tx Class.  Updated [facility] dialysis.

## 2025-03-08 ENCOUNTER — HEALTH MAINTENANCE LETTER (OUTPATIENT)
Age: 50
End: 2025-03-08

## 2025-03-30 ENCOUNTER — HEALTH MAINTENANCE LETTER (OUTPATIENT)
Age: 50
End: 2025-03-30

## 2025-04-01 ENCOUNTER — DOCUMENTATION ONLY (OUTPATIENT)
Dept: TRANSPLANT | Facility: CLINIC | Age: 50
End: 2025-04-01

## 2025-04-01 NOTE — PROGRESS NOTES
Kidney, Pancreas Transplant Evaluation - 1/10/2024  Josefa CARRANZA Moisés confirmed that she did view the pre-transplant videos on [facility] website.   Content reviewed:  Living Donation and how to access that program  Paired exchange, direct donation.   Kidney Donor Profile Index (KDPI). Pt signed with no.   Waiting list issues (right to decline without penalty, high PHS risk donors, what to expect when called with an offer).  Hospital experience, length of stay,  need to stay locally post-discharge (2-4 weeks).    Surgical options (with pictures)                           Post-surgery lifting and driving restrictions.   Post-transplant routines, frequency of lab work and clinic visits.   Need to stay locally post-discharge (2-4 weeks). Pt lives locally. Pt states her partner will assist her post op.   Role of Transplant Coordinator    Pt was informed of the benefits of transplant as well as potential risks such as infection, cancer, and death.  The need for total adherence with immunosuppression medications and following transplant regimens was stressed.  The overall evaluation/approval/listing process was reviewed.        Pt expressed good understanding of all.

## 2025-04-07 ENCOUNTER — LAB (OUTPATIENT)
Dept: LAB | Facility: CLINIC | Age: 50
End: 2025-04-07
Payer: COMMERCIAL

## 2025-04-07 DIAGNOSIS — Z76.82 AWAITING ORGAN TRANSPLANT: ICD-10-CM

## 2025-04-07 PROCEDURE — 86832 HLA CLASS I HIGH DEFIN QUAL: CPT

## 2025-04-07 PROCEDURE — 86833 HLA CLASS II HIGH DEFIN QUAL: CPT

## 2025-04-08 ENCOUNTER — TEAM CONFERENCE (OUTPATIENT)
Dept: TRANSPLANT | Facility: CLINIC | Age: 50
End: 2025-04-08
Payer: COMMERCIAL

## 2025-04-08 DIAGNOSIS — Z76.82 AWAITING ORGAN TRANSPLANT: ICD-10-CM

## 2025-04-08 DIAGNOSIS — Z01.818 PRE-TRANSPLANT EVALUATION FOR KIDNEY TRANSPLANT: Primary | ICD-10-CM

## 2025-04-08 DIAGNOSIS — Z01.818 ENCOUNTER FOR PRE-TRANSPLANT EVALUATION FOR KIDNEY AND PANCREAS TRANSPLANT: ICD-10-CM

## 2025-04-08 DIAGNOSIS — E10.21 TYPE 1 DIABETES MELLITUS WITH NEPHROPATHY (H): ICD-10-CM

## 2025-04-08 DIAGNOSIS — N18.6 ESRD (END STAGE RENAL DISEASE) (H): ICD-10-CM

## 2025-04-08 DIAGNOSIS — Z99.2 DEPENDENCE ON RENAL DIALYSIS: ICD-10-CM

## 2025-04-08 DIAGNOSIS — Z87.891 HISTORY OF SMOKING: ICD-10-CM

## 2025-04-08 DIAGNOSIS — Z76.82 ORGAN TRANSPLANT CANDIDATE: ICD-10-CM

## 2025-04-08 NOTE — TELEPHONE ENCOUNTER
Image Review Meeting on 04/08/2025    ATTENDEES: Dr. Judith Ring     IMAGES REVIEWED: Aorto iliac artery 02/06/2024 and CT abd pelvic wo contrast 07/14/2023    DECISION: Vessel status suitable for kidney and pancreas transplant. Recommend repeat CT abd pelvic wo contrast now for updating.     INCIDENTALS: No

## 2025-04-14 LAB
PROTOCOL CUTOFF: NORMAL
SA 1  COMMENTS: NORMAL
SA 1 CELL: NORMAL
SA 1 TEST METHOD: NORMAL
SA 2 CELL: NORMAL
SA 2 COMMENTS: NORMAL
SA 2 TEST METHOD: NORMAL
SA1 HI RISK ABY: NORMAL
SA1 MOD RISK ABY: NORMAL
SA2 HI RISK ABY: NORMAL
SA2 MOD RISK ABY: NORMAL
UNACCEPTABLE ANTIGENS: NORMAL
UNOS CPRA: 0

## 2025-04-20 ENCOUNTER — HEALTH MAINTENANCE LETTER (OUTPATIENT)
Age: 50
End: 2025-04-20

## 2025-05-06 DIAGNOSIS — F33.1 MODERATE EPISODE OF RECURRENT MAJOR DEPRESSIVE DISORDER (H): ICD-10-CM

## 2025-05-06 NOTE — TELEPHONE ENCOUNTER
Refill Request  Medication name: Pending Prescriptions:                       Disp   Refills    sertraline (ZOLOFT) 100 MG tablet         90 tab*0          Requested Pharmacy:  [facility] #[id number] [place] [address] AT [place] [address]

## 2025-05-07 RX ORDER — SERTRALINE HYDROCHLORIDE 100 MG/1
100 TABLET, FILM COATED ORAL DAILY
Qty: 90 TABLET | Refills: 0 | Status: SHIPPED | OUTPATIENT
Start: 2025-05-07

## 2025-06-05 ENCOUNTER — ANCILLARY PROCEDURE (OUTPATIENT)
Dept: CT IMAGING | Facility: CLINIC | Age: 50
End: 2025-06-05
Attending: NURSE PRACTITIONER
Payer: COMMERCIAL

## 2025-06-05 DIAGNOSIS — Z76.82 ORGAN TRANSPLANT CANDIDATE: ICD-10-CM

## 2025-06-05 DIAGNOSIS — N18.6 ESRD (END STAGE RENAL DISEASE) (H): ICD-10-CM

## 2025-06-05 DIAGNOSIS — Z87.891 HISTORY OF SMOKING: ICD-10-CM

## 2025-06-05 DIAGNOSIS — Z01.818 PRE-TRANSPLANT EVALUATION FOR KIDNEY TRANSPLANT: ICD-10-CM

## 2025-06-05 DIAGNOSIS — E10.21 TYPE 1 DIABETES MELLITUS WITH NEPHROPATHY (H): ICD-10-CM

## 2025-06-05 DIAGNOSIS — Z99.2 DEPENDENCE ON RENAL DIALYSIS: ICD-10-CM

## 2025-06-05 DIAGNOSIS — Z76.82 AWAITING ORGAN TRANSPLANT: ICD-10-CM

## 2025-06-05 DIAGNOSIS — Z01.818 ENCOUNTER FOR PRE-TRANSPLANT EVALUATION FOR KIDNEY AND PANCREAS TRANSPLANT: ICD-10-CM

## 2025-06-05 PROCEDURE — 74176 CT ABD & PELVIS W/O CONTRAST: CPT | Performed by: RADIOLOGY

## 2025-07-07 ENCOUNTER — LAB (OUTPATIENT)
Dept: LAB | Facility: CLINIC | Age: 50
End: 2025-07-07
Payer: COMMERCIAL

## 2025-07-07 DIAGNOSIS — Z76.82 AWAITING ORGAN TRANSPLANT: ICD-10-CM

## 2025-07-22 ENCOUNTER — TEAM CONFERENCE (OUTPATIENT)
Dept: TRANSPLANT | Facility: CLINIC | Age: 50
End: 2025-07-22
Payer: COMMERCIAL

## 2025-07-22 NOTE — TELEPHONE ENCOUNTER
Image Review Meeting on 07/22/2025    ATTENDEES: Dr. Marty Cross     IMAGES REVIEWED: CT abdomen pelvic wo contrast 06/05/2025    DECISION: vessel status suitable for kidney pancreas transplant     INCIDENTALS: No

## 2025-07-29 ENCOUNTER — MYC MEDICAL ADVICE (OUTPATIENT)
Dept: FAMILY MEDICINE | Facility: CLINIC | Age: 50
End: 2025-07-29
Payer: COMMERCIAL

## 2025-07-31 ENCOUNTER — E-VISIT (OUTPATIENT)
Dept: FAMILY MEDICINE | Facility: CLINIC | Age: 50
End: 2025-07-31
Payer: COMMERCIAL

## 2025-07-31 DIAGNOSIS — Z76.82 MULTIPLE ORGAN TRANSPLANT CANDIDATE: ICD-10-CM

## 2025-07-31 DIAGNOSIS — F41.9 ANXIETY: Primary | ICD-10-CM

## 2025-08-04 ENCOUNTER — PATIENT OUTREACH (OUTPATIENT)
Dept: CARE COORDINATION | Facility: CLINIC | Age: 50
End: 2025-08-04
Payer: COMMERCIAL

## 2025-08-05 DIAGNOSIS — F33.1 MODERATE EPISODE OF RECURRENT MAJOR DEPRESSIVE DISORDER (H): ICD-10-CM

## 2025-08-06 RX ORDER — SERTRALINE HYDROCHLORIDE 100 MG/1
100 TABLET, FILM COATED ORAL DAILY
Qty: 90 TABLET | Refills: 0 | Status: SHIPPED | OUTPATIENT
Start: 2025-08-06

## 2025-08-12 ENCOUNTER — VIRTUAL VISIT (OUTPATIENT)
Dept: PSYCHOLOGY | Facility: CLINIC | Age: 50
End: 2025-08-12
Attending: FAMILY MEDICINE
Payer: COMMERCIAL

## 2025-08-12 DIAGNOSIS — F33.1 MDD (MAJOR DEPRESSIVE DISORDER), RECURRENT EPISODE, MODERATE (H): Primary | ICD-10-CM

## 2025-08-12 DIAGNOSIS — Z76.82 MULTIPLE ORGAN TRANSPLANT CANDIDATE: ICD-10-CM

## 2025-08-12 DIAGNOSIS — F41.9 ANXIETY: ICD-10-CM

## 2025-08-12 PROCEDURE — 90791 PSYCH DIAGNOSTIC EVALUATION: CPT | Mod: 95 | Performed by: COUNSELOR

## 2025-08-12 ASSESSMENT — COLUMBIA-SUICIDE SEVERITY RATING SCALE - C-SSRS
6. HAVE YOU EVER DONE ANYTHING, STARTED TO DO ANYTHING, OR PREPARED TO DO ANYTHING TO END YOUR LIFE?: NO
1. HAVE YOU WISHED YOU WERE DEAD OR WISHED YOU COULD GO TO SLEEP AND NOT WAKE UP?: NO
TOTAL  NUMBER OF ABORTED OR SELF INTERRUPTED ATTEMPTS LIFETIME: NO
ATTEMPT LIFETIME: NO
TOTAL  NUMBER OF INTERRUPTED ATTEMPTS LIFETIME: NO
2. HAVE YOU ACTUALLY HAD ANY THOUGHTS OF KILLING YOURSELF?: NO

## 2025-08-12 ASSESSMENT — PATIENT HEALTH QUESTIONNAIRE - PHQ9
SUM OF ALL RESPONSES TO PHQ QUESTIONS 1-9: 7
SUM OF ALL RESPONSES TO PHQ QUESTIONS 1-9: 7
10. IF YOU CHECKED OFF ANY PROBLEMS, HOW DIFFICULT HAVE THESE PROBLEMS MADE IT FOR YOU TO DO YOUR WORK, TAKE CARE OF THINGS AT HOME, OR GET ALONG WITH OTHER PEOPLE: SOMEWHAT DIFFICULT

## 2025-08-27 ENCOUNTER — TELEPHONE (OUTPATIENT)
Dept: TRANSPLANT | Facility: CLINIC | Age: 50
End: 2025-08-27
Payer: COMMERCIAL

## (undated) DEVICE — PACK HEART LEFT CUSTOM

## (undated) DEVICE — CATH ANGIO INFINITI JL4 4FRX100CM 538420

## (undated) DEVICE — INTRO SHEATH AVANTI 4FRX23CM 504604T

## (undated) DEVICE — GOWN LG DISP 9515

## (undated) DEVICE — PREP POVIDONE-IODINE 7.5% SCRUB 4OZ BOTTLE MDS093945

## (undated) DEVICE — SUCTION IRR SYSTEM W/O TIP INTERPULSE HANDPIECE 0210-100-000

## (undated) DEVICE — SOL NACL 0.9% INJ 1000ML BAG 2B1324X

## (undated) DEVICE — GLOVE BIOGEL PI ORTHOPRO SZ 7.5 47675

## (undated) DEVICE — BANDAGE ELASTIC VELCRO 4IN REB3014

## (undated) DEVICE — DRSG KERLIX 4 1/2"X4YDS ROLL 6715

## (undated) DEVICE — PREP POVIDONE IODINE SOLUTION 10% 4OZ BOTTLE 29906-004

## (undated) DEVICE — TUBING PRESSURE 30"

## (undated) DEVICE — Device

## (undated) DEVICE — ESU PENCIL SMOKE EVAC W/ROCKER SWITCH 0703-047-000

## (undated) DEVICE — SOL WATER IRRIG 1000ML BOTTLE 2F7114

## (undated) DEVICE — CUSTOM PACK LOWER EXTREMITY SOP5BLEHEA

## (undated) DEVICE — CUFF TOURN 18IN STRL DISP

## (undated) DEVICE — PLATE GROUNDING ADULT W/CORD 9165L

## (undated) DEVICE — KIT HAND CONTROL ACIST 016795

## (undated) DEVICE — CATH ANGIO INFINITI 3DRC 4FRX100CM 538476

## (undated) DEVICE — CATH ANGIO INFINITI JL3.5 4FRX100CM 538418

## (undated) DEVICE — SUCTION MANIFOLD NEPTUNE 2 SYS 1 PORT 702-025-000

## (undated) DEVICE — DRSG ABD TNDRSRB WET PRUF 8IN X 10IN STRL  9194A

## (undated) DEVICE — BNDG KLING 3" 2232

## (undated) DEVICE — DRSG GAUZE 4X4" TRAY 6939

## (undated) DEVICE — TRAY PREP DRY SKIN SCRUB 067

## (undated) DEVICE — BONE CLEANING TIP INTERPULSE  0210-010-000

## (undated) DEVICE — MANIFOLD KIT ANGIO AUTOMATED 014613

## (undated) DEVICE — PACKING IODOFORM STRIP 1/4" 7831

## (undated) DEVICE — GLOVE BIOGEL PI INDICATOR 8.0 LF 41680

## (undated) RX ORDER — FENTANYL CITRATE 50 UG/ML
INJECTION, SOLUTION INTRAMUSCULAR; INTRAVENOUS
Status: DISPENSED
Start: 2024-01-02

## (undated) RX ORDER — HEPARIN SODIUM 1000 [USP'U]/ML
INJECTION, SOLUTION INTRAVENOUS; SUBCUTANEOUS
Status: DISPENSED
Start: 2024-02-16

## (undated) RX ORDER — HYDRALAZINE HYDROCHLORIDE 20 MG/ML
INJECTION INTRAMUSCULAR; INTRAVENOUS
Status: DISPENSED
Start: 2024-02-16

## (undated) RX ORDER — HEPARIN SODIUM 1000 [USP'U]/ML
INJECTION, SOLUTION INTRAVENOUS; SUBCUTANEOUS
Status: DISPENSED
Start: 2023-11-30

## (undated) RX ORDER — LIDOCAINE HYDROCHLORIDE 10 MG/ML
INJECTION, SOLUTION EPIDURAL; INFILTRATION; INTRACAUDAL; PERINEURAL
Status: DISPENSED
Start: 2024-01-01

## (undated) RX ORDER — FENTANYL CITRATE 50 UG/ML
INJECTION, SOLUTION INTRAMUSCULAR; INTRAVENOUS
Status: DISPENSED
Start: 2024-02-16

## (undated) RX ORDER — NICARDIPINE HCL-0.9% SOD CHLOR 1 MG/10 ML
SYRINGE (ML) INTRAVENOUS
Status: DISPENSED
Start: 2024-02-16

## (undated) RX ORDER — ONDANSETRON 2 MG/ML
INJECTION INTRAMUSCULAR; INTRAVENOUS
Status: DISPENSED
Start: 2023-11-30

## (undated) RX ORDER — BUPIVACAINE HYDROCHLORIDE 5 MG/ML
INJECTION, SOLUTION EPIDURAL; INTRACAUDAL
Status: DISPENSED
Start: 2022-11-18

## (undated) RX ORDER — HEPARIN SODIUM 1000 [USP'U]/ML
INJECTION, SOLUTION INTRAVENOUS; SUBCUTANEOUS
Status: DISPENSED
Start: 2024-01-02

## (undated) RX ORDER — NITROGLYCERIN 5 MG/ML
VIAL (ML) INTRAVENOUS
Status: DISPENSED
Start: 2024-02-16

## (undated) RX ORDER — FENTANYL CITRATE 50 UG/ML
INJECTION, SOLUTION INTRAMUSCULAR; INTRAVENOUS
Status: DISPENSED
Start: 2023-12-29

## (undated) RX ORDER — PROPOFOL 10 MG/ML
INJECTION, EMULSION INTRAVENOUS
Status: DISPENSED
Start: 2022-11-18

## (undated) RX ORDER — LIDOCAINE HYDROCHLORIDE 10 MG/ML
INJECTION, SOLUTION INFILTRATION; PERINEURAL
Status: DISPENSED
Start: 2023-12-28

## (undated) RX ORDER — LIDOCAINE HYDROCHLORIDE 10 MG/ML
INJECTION, SOLUTION EPIDURAL; INFILTRATION; INTRACAUDAL; PERINEURAL
Status: DISPENSED
Start: 2024-02-16

## (undated) RX ORDER — HEPARIN SODIUM 1000 [USP'U]/ML
INJECTION, SOLUTION INTRAVENOUS; SUBCUTANEOUS
Status: DISPENSED
Start: 2023-12-29

## (undated) RX ORDER — DIPHENHYDRAMINE HYDROCHLORIDE 50 MG/ML
INJECTION INTRAMUSCULAR; INTRAVENOUS
Status: DISPENSED
Start: 2024-02-16

## (undated) RX ORDER — LIDOCAINE HYDROCHLORIDE 10 MG/ML
INJECTION, SOLUTION INFILTRATION; PERINEURAL
Status: DISPENSED
Start: 2024-01-02

## (undated) RX ORDER — FENTANYL CITRATE 50 UG/ML
INJECTION, SOLUTION INTRAMUSCULAR; INTRAVENOUS
Status: DISPENSED
Start: 2023-11-30

## (undated) RX ORDER — LIDOCAINE HYDROCHLORIDE 10 MG/ML
INJECTION, SOLUTION INFILTRATION; PERINEURAL
Status: DISPENSED
Start: 2023-11-30

## (undated) RX ORDER — LIDOCAINE HYDROCHLORIDE 10 MG/ML
INJECTION, SOLUTION INFILTRATION; PERINEURAL
Status: DISPENSED
Start: 2023-12-29

## (undated) RX ORDER — LIDOCAINE HYDROCHLORIDE 10 MG/ML
INJECTION, SOLUTION INFILTRATION; PERINEURAL
Status: DISPENSED
Start: 2024-01-01